# Patient Record
Sex: FEMALE | Race: BLACK OR AFRICAN AMERICAN | NOT HISPANIC OR LATINO | Employment: FULL TIME | URBAN - METROPOLITAN AREA
[De-identification: names, ages, dates, MRNs, and addresses within clinical notes are randomized per-mention and may not be internally consistent; named-entity substitution may affect disease eponyms.]

---

## 2017-12-26 PROBLEM — I80.01 SUPERFICIAL THROMBOPHLEBITIS OF RIGHT LEG: Status: ACTIVE | Noted: 2017-12-26

## 2017-12-26 PROBLEM — K21.9 GASTROESOPHAGEAL REFLUX DISEASE WITHOUT ESOPHAGITIS: Status: ACTIVE | Noted: 2017-12-26

## 2017-12-26 PROBLEM — F90.0 ATTENTION DEFICIT HYPERACTIVITY DISORDER (ADHD), PREDOMINANTLY INATTENTIVE TYPE: Status: ACTIVE | Noted: 2017-12-26

## 2017-12-26 PROBLEM — E61.1 IRON DEFICIENCY: Status: ACTIVE | Noted: 2017-12-26

## 2017-12-26 PROBLEM — E88.810 DYSMETABOLIC SYNDROME X: Status: ACTIVE | Noted: 2017-12-26

## 2017-12-26 PROBLEM — I10 ESSENTIAL HYPERTENSION, MALIGNANT: Status: ACTIVE | Noted: 2017-12-26

## 2017-12-28 ENCOUNTER — HOSPITAL ENCOUNTER (OUTPATIENT)
Dept: RADIOLOGY | Facility: HOSPITAL | Age: 42
Discharge: HOME | End: 2017-12-28
Attending: INTERNAL MEDICINE
Payer: COMMERCIAL

## 2017-12-28 DIAGNOSIS — M79.604 RIGHT LEG PAIN: ICD-10-CM

## 2017-12-28 DIAGNOSIS — I80.01 SUPERFICIAL THROMBOPHLEBITIS OF RIGHT LEG: ICD-10-CM

## 2017-12-28 PROCEDURE — 93926 LOWER EXTREMITY STUDY: CPT | Mod: 26,,, | Performed by: RADIOLOGY

## 2017-12-28 PROCEDURE — 93971 EXTREMITY STUDY: CPT | Mod: 26,,, | Performed by: RADIOLOGY

## 2017-12-28 PROCEDURE — 93971 EXTREMITY STUDY: CPT | Mod: TC,PO

## 2017-12-28 PROCEDURE — 93926 LOWER EXTREMITY STUDY: CPT | Mod: TC,PO

## 2018-06-21 PROBLEM — M54.50 CHRONIC MIDLINE LOW BACK PAIN WITHOUT SCIATICA: Status: ACTIVE | Noted: 2018-06-21

## 2018-06-21 PROBLEM — G89.29 CHRONIC MIDLINE LOW BACK PAIN WITHOUT SCIATICA: Status: ACTIVE | Noted: 2018-06-21

## 2018-06-22 ENCOUNTER — TELEPHONE (OUTPATIENT)
Dept: RADIOLOGY | Facility: HOSPITAL | Age: 43
End: 2018-06-22

## 2018-06-25 ENCOUNTER — HOSPITAL ENCOUNTER (OUTPATIENT)
Dept: RADIOLOGY | Facility: HOSPITAL | Age: 43
Discharge: HOME | End: 2018-06-25
Attending: INTERNAL MEDICINE
Payer: COMMERCIAL

## 2018-06-25 DIAGNOSIS — M25.552 HIP PAIN, ACUTE, LEFT: ICD-10-CM

## 2018-06-25 PROCEDURE — 73721 MRI JNT OF LWR EXTRE W/O DYE: CPT | Mod: 26,LT,, | Performed by: RADIOLOGY

## 2018-06-25 PROCEDURE — 73721 MRI JNT OF LWR EXTRE W/O DYE: CPT | Mod: TC,PO,LT

## 2018-07-15 PROBLEM — E78.49 OTHER HYPERLIPIDEMIA: Status: ACTIVE | Noted: 2018-07-15

## 2019-04-08 PROBLEM — N28.9 RENAL INSUFFICIENCY: Status: ACTIVE | Noted: 2019-04-08

## 2019-04-29 PROBLEM — A03.3: Status: ACTIVE | Noted: 2019-04-29

## 2020-01-14 ENCOUNTER — TELEPHONE (OUTPATIENT)
Dept: RADIOLOGY | Facility: HOSPITAL | Age: 45
End: 2020-01-14

## 2020-01-23 ENCOUNTER — TELEPHONE (OUTPATIENT)
Dept: RADIOLOGY | Facility: HOSPITAL | Age: 45
End: 2020-01-23

## 2020-01-23 PROBLEM — I82.4Y1 ACUTE DEEP VEIN THROMBOSIS (DVT) OF PROXIMAL VEIN OF RIGHT LOWER EXTREMITY: Status: ACTIVE | Noted: 2020-01-23

## 2020-01-23 PROBLEM — M54.41 RIGHT-SIDED LOW BACK PAIN WITH RIGHT-SIDED SCIATICA: Status: ACTIVE | Noted: 2020-01-23

## 2020-06-08 PROBLEM — I74.5 OCCLUSION OF RIGHT ILIAC ARTERY: Status: ACTIVE | Noted: 2020-06-08

## 2020-08-21 PROBLEM — I82.4Y1 ACUTE DEEP VEIN THROMBOSIS (DVT) OF PROXIMAL VEIN OF RIGHT LOWER EXTREMITY: Status: ACTIVE | Noted: 2020-07-23

## 2020-09-28 PROBLEM — F51.01 PRIMARY INSOMNIA: Status: ACTIVE | Noted: 2020-09-28

## 2020-09-28 PROBLEM — F41.0 PANIC DISORDER: Status: ACTIVE | Noted: 2020-09-28

## 2020-09-28 PROBLEM — F41.1 GAD (GENERALIZED ANXIETY DISORDER): Status: ACTIVE | Noted: 2020-09-28

## 2021-04-29 ENCOUNTER — PATIENT MESSAGE (OUTPATIENT)
Dept: RESEARCH | Facility: HOSPITAL | Age: 46
End: 2021-04-29

## 2021-09-16 ENCOUNTER — TELEPHONE (OUTPATIENT)
Dept: UROLOGY | Facility: CLINIC | Age: 46
End: 2021-09-16

## 2022-08-30 PROBLEM — K64.8 INTERNAL HEMORRHOID: Status: ACTIVE | Noted: 2022-08-30

## 2022-08-30 PROBLEM — I74.5 ILIAC ARTERY OCCLUSION: Status: ACTIVE | Noted: 2022-08-30

## 2022-08-30 PROBLEM — I82.4Y1 ACUTE DEEP VEIN THROMBOSIS (DVT) OF PROXIMAL VEIN OF RIGHT LOWER EXTREMITY: Status: RESOLVED | Noted: 2020-07-23 | Resolved: 2022-08-30

## 2022-08-30 PROBLEM — K21.9 GERD (GASTROESOPHAGEAL REFLUX DISEASE): Status: ACTIVE | Noted: 2022-08-30

## 2022-08-30 PROBLEM — E78.5 HYPERLIPIDEMIA: Status: ACTIVE | Noted: 2022-08-30

## 2022-08-30 PROBLEM — I80.01 SUPERFICIAL THROMBOPHLEBITIS OF RIGHT LEG: Status: RESOLVED | Noted: 2017-12-26 | Resolved: 2022-08-30

## 2023-01-11 ENCOUNTER — OFFICE VISIT (OUTPATIENT)
Dept: PRIMARY CARE CLINIC | Facility: CLINIC | Age: 48
End: 2023-01-11
Payer: COMMERCIAL

## 2023-01-11 VITALS — HEIGHT: 63 IN | WEIGHT: 224 LBS | BODY MASS INDEX: 39.69 KG/M2

## 2023-01-11 DIAGNOSIS — N28.9 RENAL INSUFFICIENCY: ICD-10-CM

## 2023-01-11 DIAGNOSIS — E78.49 OTHER HYPERLIPIDEMIA: ICD-10-CM

## 2023-01-11 DIAGNOSIS — K21.9 GASTROESOPHAGEAL REFLUX DISEASE, UNSPECIFIED WHETHER ESOPHAGITIS PRESENT: ICD-10-CM

## 2023-01-11 DIAGNOSIS — E66.01 CLASS 2 SEVERE OBESITY WITH SERIOUS COMORBIDITY AND BODY MASS INDEX (BMI) OF 39.0 TO 39.9 IN ADULT, UNSPECIFIED OBESITY TYPE: ICD-10-CM

## 2023-01-11 DIAGNOSIS — G47.00 INSOMNIA, UNSPECIFIED TYPE: ICD-10-CM

## 2023-01-11 DIAGNOSIS — E88.810 DYSMETABOLIC SYNDROME X: Primary | ICD-10-CM

## 2023-01-11 DIAGNOSIS — F41.1 GAD (GENERALIZED ANXIETY DISORDER): ICD-10-CM

## 2023-01-11 PROCEDURE — 1159F PR MEDICATION LIST DOCUMENTED IN MEDICAL RECORD: ICD-10-PCS | Mod: CPTII,95,, | Performed by: FAMILY MEDICINE

## 2023-01-11 PROCEDURE — 1160F RVW MEDS BY RX/DR IN RCRD: CPT | Mod: CPTII,95,, | Performed by: FAMILY MEDICINE

## 2023-01-11 PROCEDURE — 3008F BODY MASS INDEX DOCD: CPT | Mod: CPTII,95,, | Performed by: FAMILY MEDICINE

## 2023-01-11 PROCEDURE — 1160F PR REVIEW ALL MEDS BY PRESCRIBER/CLIN PHARMACIST DOCUMENTED: ICD-10-PCS | Mod: CPTII,95,, | Performed by: FAMILY MEDICINE

## 2023-01-11 PROCEDURE — 1159F MED LIST DOCD IN RCRD: CPT | Mod: CPTII,95,, | Performed by: FAMILY MEDICINE

## 2023-01-11 PROCEDURE — 99204 PR OFFICE/OUTPT VISIT, NEW, LEVL IV, 45-59 MIN: ICD-10-PCS | Mod: 95,,, | Performed by: FAMILY MEDICINE

## 2023-01-11 PROCEDURE — 99204 OFFICE O/P NEW MOD 45 MIN: CPT | Mod: 95,,, | Performed by: FAMILY MEDICINE

## 2023-01-11 PROCEDURE — 3008F PR BODY MASS INDEX (BMI) DOCUMENTED: ICD-10-PCS | Mod: CPTII,95,, | Performed by: FAMILY MEDICINE

## 2023-01-11 RX ORDER — PHENTERMINE HYDROCHLORIDE 8 MG/1
1 TABLET ORAL 2 TIMES DAILY PRN
Qty: 60 EACH | Refills: 0 | Status: SHIPPED | OUTPATIENT
Start: 2023-01-11

## 2023-01-11 NOTE — PROGRESS NOTES
Subjective:       Patient ID: Waldo Emmanuel is a 47 y.o. female.    Pmhx, fam hx, soc hx, surg hx, allergies, med list reviewed     Referring MD:self  PCP: Sangeetha  BMI noted 39  Diet: variable  Exercise/Activity: sedentary  Sleep: poor 4 hrs/night; some due to hx of break ins  Good neighborhood  Stressors: work  Anxiety/Depression Screen/PHQ-2: stable  Works at [employer]/NP  The patient location is: home/[place]  The chief complaint leading to consultation is: weight gain, difficulty losing    Sees Dr Henderson (hx of anemia/thrombocytosis/DVT) , Dr Vivas  NP    Pt wanted to discuss weight gain, potential lifestyle changes and med consideration    Tried trulicity and ozempic.   Is on topamax as well (migraines/has controlled). Was on metformin. No gallbladder so intolerable.   Was on Wellbutrin--did not like how it made her feel.   Pt has been on adipex in the future; made her have dry mouth.      Visit type: audiovisual    Face to Face time with patient: 45   55 minutes of total time spent on the encounter, which includes face to face time and non-face to face time preparing to see the patient (eg, review of tests), Obtaining and/or reviewing separately obtained history, Documenting clinical information in the electronic or other health record, Independently interpreting results (not separately reported) and communicating results to the patient/family/caregiver, or Care coordination (not separately reported).         Each patient to whom he or she provides medical services by telemedicine is:  (1) informed of the relationship between the physician and patient and the respective role of any other health care provider with respect to management of the patient; and (2) notified that he or she may decline to receive medical services by telemedicine and may withdraw from such care at any time.    Pt sent message asking about mounjaro vs ozempic. Pt reports lost 20 lb and gained back while still on the  medication. States has been on max dose of ozempic. Was 198 and now 223-224.       Chief Complaint: weight gain, desires weight loss    Hx of insulin resistance      Pt has been on phentermine    Cannot exercise due to arterial occlusion. On coumadin. Sees Dr. Henderson. Has INR drawn once a month. Has been on other anti coagulants (xarelto and eliquis). Does better on coumadin. Hx of R femoral art occlusion. Exercise is limited    BP controlled--pt reports doing well overall. Pt denies cp/sob    Pt getting water, not having soda.   Typically eating once a day: no rice, pasta, avoids fried foods  Bfast: either IF or latte with almond milk or smoothie  Lunch: as above  Dinner: as above    Today pt had rotisserie chicken, red beans no rice. Eats broccoli, cauliflower, fresh/frozen veggies.  No canned. Appetite variable.     NO imitrex use since on topamax--tolerating without side effects. No brain fog. No hx of renal stones.   NO hx of CV disease.    HPI  Review of Systems   Constitutional:  Negative for activity change, appetite change, fatigue and fever.   HENT:  Negative for mouth dryness and goiter.    Eyes:  Negative for visual disturbance.   Respiratory:  Negative for apnea, cough, chest tightness and shortness of breath.    Cardiovascular:  Negative for chest pain, palpitations and leg swelling.   Gastrointestinal:  Negative for abdominal pain, constipation and diarrhea.   Endocrine: Negative for cold intolerance, heat intolerance, polydipsia, polyphagia and polyuria.   Genitourinary:  Negative for frequency and menstrual problem.   Musculoskeletal:  Negative for arthralgias and myalgias.   Integumentary:  Negative for color change and rash.   Psychiatric/Behavioral:  Negative for sleep disturbance. The patient is not nervous/anxious.        Objective:      Physical Exam  Constitutional:       General: She is not in acute distress.     Appearance: Normal appearance.   HENT:      Head: Normocephalic and  "atraumatic.   Eyes:      General: No scleral icterus.  Pulmonary:      Effort: Pulmonary effort is normal. No respiratory distress.   Neurological:      Mental Status: She is alert and oriented to person, place, and time.   Psychiatric:         Mood and Affect: Mood normal.         Behavior: Behavior normal.       Assessment:         ICD-10-CM ICD-9-CM   1. Dysmetabolic syndrome X  E88.81 277.7   2. Gastroesophageal reflux disease, unspecified whether esophagitis present  K21.9 530.81   3. Other hyperlipidemia  E78.49 272.4   4. Renal insufficiency  N28.9 593.9   5. CIARA (generalized anxiety disorder)  F41.1 300.02   6. Insomnia, unspecified type  G47.00 780.52   7. Class 2 severe obesity with serious comorbidity and body mass index (BMI) of 39.0 to 39.9 in adult, unspecified obesity type  E66.01 278.01    Z68.39 V85.39            Plan:       Dysmetabolic syndrome X  Comments:  lifestyle interventions: send food log; may likely need more kcal and protein  solid fiber foods; may need BMR calc; asked pt to consider more than OMAD-IF has not been as helpful as she hoped  On statin, glp-1  Smart goal: add 1-2 veggie servings/day; low GI/with fiber  NO hypoglycemia   (Htn, waist circ self reported > 35" female,    )  Gastroesophageal reflux disease, unspecified whether esophagitis present  Comments:  chronic/stable on protonix    Other hyperlipidemia  Comments:  on statin    Renal insufficiency    CIARA (generalized anxiety disorder)    Insomnia, unspecified type  Comments:  Natural calm vitality gummies, grounding exercises  pt does not desire any rx medications/too sedating    Class 2 severe obesity with serious comorbidity and body mass index (BMI) of 39.0 to 39.9 in adult, unspecified obesity type  Comments:  on glp-1 and topamax, will add low dose phentermine  dw pt risks/benefits se's  failed metformin, intolerant of Wellbutrin  Pt to do next visit in person and I have asked her to send a set of vitals this " week  Chronic anti coag but denies CV dz; denies other stimulant use. Qsymia is cost prohibitive so will add low dose lomaira to current regimen; pt to get coupon;  reviewed  DC med and call provider if any cp/sob/elevated HR, dry mouth (if intolerable), elevated bp      Chart reviewed  DW pt sending me a food log  F/U appt made for pt

## 2023-01-11 NOTE — PATIENT INSTRUCTIONS
"Natural Vitality Calm Magnesium gummies (1-2 night)    Lomaira---got to website and get coupon    (Generic topamax + lomaira = Qsymia)          PRODUCE  [] All fresh fruit   [] All fresh vegetables   [] All fresh herbs  [] All herb purees + pastes  [] Pre-spiralized vegetable noodles   [] Steam-In-The-Bag begetables  [] Riced cauliflower  [] Jicama sticks  [] Love Beets  all varieties  [] Wholly Guacamole  all varieties  [] Hummus  all varieties, chickpea + vegetable  [] Tofu Shirataki noodles    [] Tofu  all varieties  [] Tempeh  all varieties    PROTEIN  CHICKEN   [] Boneless, skinless breasts  [] Boneless, skinless thighs  [] Ground chicken breast, at least 93% lean  [] Chicken breast cutlet  [] Aidell's  Chicken Sausage + Chicken Meatballs    TURKEY   [] Turkey breast tenderloin   [] Ground turkey breast, at least 93% lean  [] Jeet Naturals  Turkey Sausage    BEEF  [] Tenderloin  [] Sirloin  [] Top Loin  [] Flank Steak  [] Round Steak  [] Filet  [] Lean ground beef, at least 93% lean + grass-fed preferable    PORK  [] Tenderloin  [] Pork Chop  [] Center Cut  [] Jeet Naturals  No-Sugar Day    BISON  [] [place]  90 - 95% lean    SEAFOOD  [] All fresh fish + seafood; locally sourced when possible  [] Smoked salmon    HEAT + EAT ENTREES   [] Angel's Natural Foods  Chicken, Pork, Beef  [] George  "All Natural" Grilled Chicken Breast + Strips, all varieties    SAUCES SPREADS + DIPS  [] Bitchin Sauce  Original, Chipotle, Cilantro [place]  [] Wilfredo's Kitchen  Tzatziki Yogurt Dip, Babaganoush, Hummus  [] Wholly Guacamole  all varieties  [] Hummus  all varieties  [] [place] Gringo Salsa  all varieties  [] Mrs. Nu's Salsa  all varieties  [] Stubb's All Natural BBQ Sauce  [] Primal Kitchen  Maldonado, Ketchup, BBQ Sauce  [] Primal Kitchen Pasta Sauce  Roasted Garlic, Tomato Basil, no-dairy Vodka Sauce  [] Sal & Debra's  HeartSmart Pasta Sauce    DAIRY/DAIRY SUBSTITUTES/EGGS  EGGS   [] All eggs "  cage-free, pasture-raise preferable  [] Crepini  egg wraps  [] Vital Farms  Pasture-Raised Egg Bites  [] JUST Egg [vegan]     CREAMERS   [] [place]ia  Better Half, original + vanilla unsweetened  [] NutPods  all varieties    MILK   [] Horizon Organic  all varieties except chocolate  [] Organic Valley  all varieties except chocolate  [] Organic Valley  ultra-filtered, reduced fat milk     PLANT_BASED MILK ALTERNATIVES  [] All unsweetened almond milks  original, vanilla + chocolate  [] Ripple  unsweetened   [] Milkadamia  original +_ vanilla, unsweetened   [] Forager  original + vanilla, unsweetened   [] Silk Organic  soy milk, unsweetened  [] Oatly  unsweetened  [] Califia  regular + protein-fortified oat milk, unsweetened     CHEESES  [] Regular or reduced fat cheeses  [] BelGioso  Fresh Mozzarella Snack Packs, Parmesan Power-full Snack   [] Goat cheese  [] Fresh mozzarella  [] String cheese  all varieties  [] Martha Cottage Cheese  [] Jeri's Cultured Cottage Cheese  [] Martina Life 'Just Like Mozzarella'  plant-based shreds and other varieties  [] Parmela Creamery  plant-based shredded cheese    YOGURT  [] Fage  2% low-fat, plain  [] Siggi's  plain, vanilla  [] Chobani Greek  nonfat + whole milk yogurt, plain   [] Chobani Less Sugar  all flavored varieties   [] Oikos Greek  nonfat, plain  [] Two Good  all varieties   [] Irish Provisions  plain  [] [place] Organic  low-fat + nonfat, plain  [] [facility]  goat milk yogurt, plain  [] Kefit  unsweetened, plain  [] Forager  Greek style unsweetened, plain [dairy-free]  [] [place]  unsweetened Greek style, plain [dairy-free]  [] [place]  almond milk yogurt, vanilla or plain, unsweetened [dairy-free]    FREEZER SECTION  FROZEN VEGGIES  [] All plain frozen veggies + greens [e.g. broccoli, brussels, carrots, okra, mushrooms, zucchini, yellow squash, butternut squash, kale, spinach, ben greens]  [] Riced veggies [e.g. cauliflower,  broccoli, butternut squash]  [] Edamame  all varieties  [] Green Giant  [] Veggie Spirals  [] Marinated Veggies [e.g. eggplant, peppers, zucchini]  [] Simply Steam East Freedom Sprouts  [] Birds Eye  [] Power Blend Italian Style  lentils, broccoli, kale, zucchini  []  East Freedom Sprouts & Carrots  [] Oven Roasters Broccoli & Cauliflower  [] [place] Blend  [] Tattooed   [] Green Bean Blend  [] Farmer's Market Ratatouille  [] Butter Balsamic Glazed Vegetables  [] Riced Cauliflower & Quinoa Mediterranean Style  [] Nadeen's Good Life  Southern Style Greens [sauteed kale + onion]    FROZEN FRUITS  [] All unsweetened frozen fruits  all varieties  [] Dole Fruits & Veggie Blends  Berries 'n Kale  [] Dole Mix-ins  Triple Berry     FROZEN ENTREES  [] The Good Kitchen meals  all varieties [ e.g. Chili Lime Chicken Over Riced Cauliflower]  [] Premium Paleo  Not Gary Momma's Meatloaf  [] Primal Kitchen  Chicken Pesto + Steak Fajitas w/ Peppers & Onions  [] Eating Well Frozen Entrees  Butter Chicken Masala, Steak Carne Asada, Creamy Pesto Chicken, Chicken + Wild Rice Stroganoff, Yellow Bonds Chicken, Sun-dried Tomato Chicken, Chicken Lo Mein  [] Realgood Entree Bowls  Jordanian Inspired Beef Bowl over Riced Cauliflower, Chicken Burrito Bowl   [] Great Karma Coconut Bonds  [] Janae's  Tamale Nemesio with Black Beans, Vegetable Lasagna  [] Kashi Mayan Plato Bake  [] Healthy Choice  Simply Steamers Chicken Fried Rice  [] Basil Pesto Chicken & To Style Pork Power Bowls  [] Tattooed   Enchilada Bowl  [] Vega Farms  Spicy Black Bean Burgers    FROZEN PIZZAS  [] Cauli'flour Foods  Cauliflower Pizza Crusts  [] Outer Aisle  Cauliflower Crust  [] Janae's  Veggie Crust Cheese Pizza  [] Quest Pizza     VEGETARIAN PRODUCTS  [] Beyond Meat  ground 'meat' + grilled 'chicken' strips  [] Tofurkey  Original Italian Sausage + Original Tempeh  [] Gardein  Beefless Ground + Meatless Meatballs  [] Vega  Farms Grillers  Original Burger, Crumbles, Meatballs    ICE CREAMS + FROZEN DESSERTS  [] Halo Top  regular + keto series, pops  [] Rebel  ice cream + dessert sandwiches  [] Enlightened  ice cream + bars  [] Nightfood  ice cream  [] Realgood  ice cream  [] Arctic Zero Fit  frozen pint  [] The Frozen Farmer  sorbets  [] Wholly Rollies  Protein Balls, all varieties  [] Dream Pops  Coconut Latte    FROZEN BREAKFASTS  [] Realgood  Breakfast Sandwiches on Cauliflower Cheesy Bread  [] Rebel  ice cream + dessert sandwiches  [] Enlightened  ice cream + bars  [] Nightfood  ice cream  [] Realgood  ice cream  [] Arctic Zero Fit  frozen pint  [] The Frozen Farmer  sorbets  [] Wholly Rollies  Protein Balls, all varieties  [] Dream Pops  Coconut Latte    BREADS/BUNS/WRAPS  [] Everton Bread: All Types - In Freezer Section   [] Flat Out Light Wraps - All Varieties   [] Flat Out Protein Up Carb Down Flat Bread   [] Kontos Whole Wheat Pocket Nilam   [] Jamar REAL 100% Whole Wheat Tortillas   [] LaTortilla Factory Tortillas - Smart & Delicious; 50 or 80-calorie   [] Nature's Own 100% Whole Wheat Bread   [] Orowheat Healthful - 100% Whole Wheat Slice Bread and Conway Thins   [] Orowheat Healthful - Whole Wheat Nuts & Grain Bread; Flax & Seed Bread   [] [place] Farm Natural Whole Grain 15 Bread   [] [place] Farm Natural Whole Grain English Muffin - 100% Whole Wheat   [] [place] Very Thin 100% Whole Wheat   [] Rina Hoyt 45 Calories and Delightful   [] Yusuf' 100% Whole Wheat Thin-Sliced Bagels and English Muffins   [] Western Bagel: Perfect 10     GLUTEN FREE  [] Fransico's Gluten Free Bread   [] Florence Bakehouse 7-Grain Gluten Free Bread     LEGUME PASTA   [] Explore Asian Organic Black Bean Spaghetti   [] Modern Table   [] Tolerant Foods       NUT BUTTERS & JELLIES    NUT BUTTERS   [] Better'n Chocolate: Coconut Chocolate Peanut Butter Spread   [] Better'n Peanut Butter - All Types   [] Earth Balance Coconut  and Peanut Spread   [] Kendell's Nut Thendara   [] MaraNatha: All Natural Roasted Cashew Butter - Leonore or Creamy   [] MaraNatha: Roasted Peanut Butter   [] Nuts 'N More Peanut Thendara - All Flavors   [] PB2 Powder - Original or Chocolate   [] Skippy Natural - Creamy, Super Chunk   [] Smart Balance Peanut Butter - Leonore or Creamy   [] Peanut Butter & Company:   [] Smooth , Crunch Time, The Heat Is On, Old Fashioned Smooth, Mighty Nut- Powdered Peanut Butter, Squeeze Pack   [] Smucker's Natural Peanut Butter - Leonore or Creamy   [] Sunbutter Nut Butter   [] Wild Friends Protein Peanut Butter/Fajardo o Butter - Vanilla or Chocolate     JELLIES  o Polaner's All Fruit   o Clearly Organic Best Choice: Strawberry Fruit Spread       SNACKS    BARS  [] Kashi Bars - Chewy or Crunchy; Honey Fajardo o Flax or Peanut Butter   [] KIND Bars - 5 Grams of Sugar or Less   [] KIND Protein Bars - Strong and KIND   [] Nature Valley Protein Bar - All Varieties   [] Nature Valley Roasted Nut Crunch - Fajardo Crunch; Peanut Crunch   [] [facility] Valley Simple Nut Bar - Roasted Peanut & Honey   [] [facility] Valley Simple Nut Bar - Fajardo, Cashew & Sea Salt   [] [facility] Nut Sierra Bar - Salted Caramel Peanut   [] Think Thin Protein Bars   [] Quest Bars, Power Crunch Bars, Pure Protein Bars     BEEF JERKY - NITRATE FREE   [] Game On   [] Grass Run Farms   [] Krave   [] Ostrim   [] Perky Jerky   [] Primal Strips Meatless Vegan Jerky   [] [place]     CHIPS   [] Beanitos Chips   [] Fruit Crisps - e.g. Brother's-All-Natural, Bare Fruit, Yoga Chips   [] Kellee's Soy Crisps: 1.3 ounce bag   [] Quest Protein Chips   [] Wasa Whole Wheat Crisp Bread     CRACKERS  [] Vianey's Gone Crackers   [] Nabisco Triscuit: Regular and Thin Crisp Crackers   [] Vans Say Cheese Crackers (G-F)     POPCORN/NUTS   [] Owen Sears's Smart Pop Popcorn - Single Serving   [] 100-Calorie Pack of Nuts - All Varieties     PROTEIN POWDERS & DRINKS  []   Protein -  Whey Protein Powder   [] Garden of Life Raw Protein Powder   [] Iconic Ready-To-Drink Protein Drink   [] Kuo One Protein Powder   [] VegaSport Protein Powder     SALSA/HUMMUS/DIPS   [] Eat Well Embrace Life: Max Briannebernardjacklyn Carrot o Hummus   [] Pre-Portioned Guacamole Packs   [] Keily's   [] Tostitos Restaurant Style Salsa       SOUPS   [] Janae's Organic Soups - Lentil, Vegetable, Split Pea, Low-Sodium     CANNED GOODS   [] 100% Pure Pumpkin   [] BlueRunner Creole Cream-Style Red Beans or Navy Beans   [] Cajun Power Chicken Gumbo Base   [] Chicken of the Sea Agricola Salter Path   [] Gayathri Fresh Cut Sliced Beets   [] Hormel Breast of Chicken in Water   [] LeSuer Tender Baby Whole Carrots   [] Amandalmeera Tabasco [place] Starter   [] Bennettist: Chunk Lite Tuna in Water, Gourmet Select Pouches   [] StarKist: Yellowfin Tuna Fillets   [] Trappey's: Kidney, Butter, Erwin, Black Eye, Field, and Black Beans   [] BRITTANI Brower's Turnip Greens or Broderick Spinach     CONDIMENTS/ SAUCES/SPREADS/ SPICES  [] Piyush Soto's Magic Seasonings - Regular or Salt Free   [] Shweta Faith's Sauces - All Flavors   [] Laughing Cow Light - All Flavors   [] Dash Salt-Free Marinade - All Flavors   [] Eliot & Debra's: Heart Smart Pasta Sauces   [] Tabasco     SALAD DRESSINGS  [] Stephanie's Naturals: Lite Honey Mustard   [] Segundo's Own: Lighten Up Salad Dressing - All Varieties   [] OPA Greek Yogurt Dressings - Ranch, Blue o Cheese, Caesar, Feta Dill     SWEETENERS  [] Sweet [place] Sweetener   [] Swerve   [] Truvia     BEVERAGES  [] Coconut Water   [] Crystal Light PURE - All Flavors   [] Honest Tea: Just Green Tea, Unsweetened   [] Kombucha Tea   [] [place]   [] [place] Sisters Bloody Vianey Mix   [] Metromint - Zero-Sugar; All Natural Flavored   [] Connie - Plain or Flavored   [] David Crenshaw   [] Terranceaz - Zero-Sugar, All-Natural, Sparkling Tea   [] Tea Bags: Any Brand - e.g. Ceci, Yogi, Tazzo, Celestial   [] V8 100% Vegetable Juice    [] Vitamin Water Zero   [] Water   [] Zevia - Stevia Sweetened Soft Drink     BEER/KATIE/LIQUORS  []Tesfaye's Premier Light 64 Calories   [] Bud Select - 55 Calories   [] [facility] Bloody Vianey Mix   [] Blank Genuine Draft - 64 Calories   [] Red or White Wine - All Varieties     CEREALS: HOT/COLD   [] Gaby's Puffin's Original Cereal  [] Matilda's Mill Oat Bran Hot Cereal - Cracked Wheat, Multi-Grain  [] Kashi GoLean Cereal  [] Kashi GoLean Hot Cereal packets - Vanilla; Honey Cinnamon  [] Radha's Special K Protein Cereal  [] Jax's Steel Cut Korean Oatmeal  [] Nature's Path Smart Bran  [] Latter-day Instant Oatmeal packet, Original  [] Latter-day Old Fashioned Latter-day Oats  [] Uncle Derik's Whole Wheat & Flaxseed Original Cereal

## 2023-03-13 PROBLEM — E78.1 HYPERTRIGLYCERIDEMIA: Status: ACTIVE | Noted: 2023-03-13

## 2023-07-10 PROBLEM — A03.3: Status: RESOLVED | Noted: 2019-04-29 | Resolved: 2023-07-10

## 2024-11-18 ENCOUNTER — HOSPITAL ENCOUNTER (EMERGENCY)
Facility: HOSPITAL | Age: 49
Discharge: HOME | End: 2024-11-18
Attending: EMERGENCY MEDICINE
Payer: COMMERCIAL

## 2024-11-18 VITALS
BODY MASS INDEX: 41.46 KG/M2 | WEIGHT: 234 LBS | TEMPERATURE: 98 F | HEART RATE: 89 BPM | HEIGHT: 63 IN | DIASTOLIC BLOOD PRESSURE: 88 MMHG | OXYGEN SATURATION: 97 % | RESPIRATION RATE: 20 BRPM | SYSTOLIC BLOOD PRESSURE: 161 MMHG

## 2024-11-18 DIAGNOSIS — Z86.718 HISTORY OF DVT OF LOWER EXTREMITY: ICD-10-CM

## 2024-11-18 DIAGNOSIS — M79.604 RIGHT LEG PAIN: Primary | ICD-10-CM

## 2024-11-18 DIAGNOSIS — I74.09 CHRONIC AORTO-ILIAC OCCLUSION SYNDROME: ICD-10-CM

## 2024-11-18 DIAGNOSIS — M79.651 RIGHT THIGH PAIN: ICD-10-CM

## 2024-11-18 DIAGNOSIS — M25.452 SWELLING OF JOINT OF PELVIC REGION OR THIGH, LEFT: ICD-10-CM

## 2024-11-18 DIAGNOSIS — R07.9 CHEST PAIN: ICD-10-CM

## 2024-11-18 LAB
ALBUMIN SERPL BCP-MCNC: 3.6 G/DL (ref 3.5–5.2)
ALP SERPL-CCNC: 92 U/L (ref 40–150)
ALT SERPL W/O P-5'-P-CCNC: 23 U/L (ref 10–44)
ANION GAP SERPL CALC-SCNC: 8 MMOL/L (ref 8–16)
ANISOCYTOSIS BLD QL SMEAR: SLIGHT
APTT PPP: 25.7 SEC (ref 21–32)
AST SERPL-CCNC: 28 U/L (ref 10–40)
BASOPHILS # BLD AUTO: 0.02 K/UL (ref 0–0.2)
BASOPHILS NFR BLD: 0.3 % (ref 0–1.9)
BILIRUB SERPL-MCNC: 0.3 MG/DL (ref 0.1–1)
BILIRUB UR QL STRIP: NEGATIVE
BNP SERPL-MCNC: 13 PG/ML (ref 0–99)
BUN SERPL-MCNC: 16 MG/DL (ref 6–20)
CALCIUM SERPL-MCNC: 9.4 MG/DL (ref 8.7–10.5)
CHLORIDE SERPL-SCNC: 107 MMOL/L (ref 95–110)
CLARITY UR: CLEAR
CO2 SERPL-SCNC: 25 MMOL/L (ref 23–29)
COLOR UR: YELLOW
CREAT SERPL-MCNC: 0.8 MG/DL (ref 0.5–1.4)
DACRYOCYTES BLD QL SMEAR: ABNORMAL
DIFFERENTIAL METHOD BLD: ABNORMAL
EOSINOPHIL # BLD AUTO: 0.1 K/UL (ref 0–0.5)
EOSINOPHIL NFR BLD: 1.3 % (ref 0–8)
ERYTHROCYTE [DISTWIDTH] IN BLOOD BY AUTOMATED COUNT: 13.7 % (ref 11.5–14.5)
EST. GFR  (NO RACE VARIABLE): >60 ML/MIN/1.73 M^2
GLUCOSE SERPL-MCNC: 98 MG/DL (ref 70–110)
GLUCOSE UR QL STRIP: NEGATIVE
HCT VFR BLD AUTO: 36.4 % (ref 37–48.5)
HGB BLD-MCNC: 11.3 G/DL (ref 12–16)
HGB UR QL STRIP: NEGATIVE
HYPOCHROMIA BLD QL SMEAR: ABNORMAL
IMM GRANULOCYTES # BLD AUTO: 0.02 K/UL (ref 0–0.04)
IMM GRANULOCYTES NFR BLD AUTO: 0.3 % (ref 0–0.5)
INR PPP: 1.7 (ref 0.8–1.2)
KETONES UR QL STRIP: NEGATIVE
LEUKOCYTE ESTERASE UR QL STRIP: NEGATIVE
LYMPHOCYTES # BLD AUTO: 3.9 K/UL (ref 1–4.8)
LYMPHOCYTES NFR BLD: 54.4 % (ref 18–48)
MCH RBC QN AUTO: 27.9 PG (ref 27–31)
MCHC RBC AUTO-ENTMCNC: 31 G/DL (ref 32–36)
MCV RBC AUTO: 90 FL (ref 82–98)
MONOCYTES # BLD AUTO: 0.3 K/UL (ref 0.3–1)
MONOCYTES NFR BLD: 4.3 % (ref 4–15)
NEUTROPHILS # BLD AUTO: 2.8 K/UL (ref 1.8–7.7)
NEUTROPHILS NFR BLD: 39.4 % (ref 38–73)
NITRITE UR QL STRIP: NEGATIVE
NRBC BLD-RTO: 0 /100 WBC
PH UR STRIP: 7 [PH] (ref 5–8)
PLATELET # BLD AUTO: 165 K/UL (ref 150–450)
PLATELET BLD QL SMEAR: ABNORMAL
PMV BLD AUTO: 10 FL (ref 9.2–12.9)
POTASSIUM SERPL-SCNC: 4.3 MMOL/L (ref 3.5–5.1)
PROT SERPL-MCNC: 7.7 G/DL (ref 6–8.4)
PROT UR QL STRIP: NEGATIVE
PROTHROMBIN TIME: 19.3 SEC (ref 9–12.5)
RBC # BLD AUTO: 4.05 M/UL (ref 4–5.4)
SODIUM SERPL-SCNC: 140 MMOL/L (ref 136–145)
SP GR UR STRIP: 1.02 (ref 1–1.03)
TROPONIN I SERPL DL<=0.01 NG/ML-MCNC: 0.01 NG/ML (ref 0–0.03)
URN SPEC COLLECT METH UR: NORMAL
UROBILINOGEN UR STRIP-ACNC: NEGATIVE EU/DL
WBC # BLD AUTO: 7.13 K/UL (ref 3.9–12.7)

## 2024-11-18 PROCEDURE — 25500020 PHARM REV CODE 255: Performed by: EMERGENCY MEDICINE

## 2024-11-18 PROCEDURE — 85610 PROTHROMBIN TIME: CPT | Performed by: EMERGENCY MEDICINE

## 2024-11-18 PROCEDURE — 80053 COMPREHEN METABOLIC PANEL: CPT | Performed by: EMERGENCY MEDICINE

## 2024-11-18 PROCEDURE — 85730 THROMBOPLASTIN TIME PARTIAL: CPT | Performed by: EMERGENCY MEDICINE

## 2024-11-18 PROCEDURE — 84484 ASSAY OF TROPONIN QUANT: CPT | Performed by: EMERGENCY MEDICINE

## 2024-11-18 PROCEDURE — 99285 EMERGENCY DEPT VISIT HI MDM: CPT | Mod: 25

## 2024-11-18 PROCEDURE — 93005 ELECTROCARDIOGRAM TRACING: CPT

## 2024-11-18 PROCEDURE — 93010 ELECTROCARDIOGRAM REPORT: CPT | Mod: ,,, | Performed by: STUDENT IN AN ORGANIZED HEALTH CARE EDUCATION/TRAINING PROGRAM

## 2024-11-18 PROCEDURE — 81003 URINALYSIS AUTO W/O SCOPE: CPT | Performed by: EMERGENCY MEDICINE

## 2024-11-18 PROCEDURE — 85025 COMPLETE CBC W/AUTO DIFF WBC: CPT | Performed by: EMERGENCY MEDICINE

## 2024-11-18 PROCEDURE — 83880 ASSAY OF NATRIURETIC PEPTIDE: CPT | Performed by: EMERGENCY MEDICINE

## 2024-11-18 RX ORDER — HYDROCODONE BITARTRATE AND ACETAMINOPHEN 7.5; 325 MG/1; MG/1
1 TABLET ORAL EVERY 6 HOURS PRN
Qty: 15 TABLET | Refills: 0 | Status: SHIPPED | OUTPATIENT
Start: 2024-11-18

## 2024-11-18 RX ADMIN — IOHEXOL 100 ML: 350 INJECTION, SOLUTION INTRAVENOUS at 08:11

## 2024-11-18 NOTE — DISCHARGE INSTRUCTIONS
Take norco as directed for thigh pain     Continue coumadin as previous     Your case was discussed with Dr Zendejas vascular surgeon on call after reviewing records you have chronic aorto iliac disease of the right side as well as history of a dvt on the right    Scans and ultarsounds done here today show no acute change in chronic occlusion of right aorto iliac system with resolution of prior dvt in right leg

## 2024-11-18 NOTE — ED PROVIDER NOTES
SCRIBE #1 NOTE: I, Kaylan Calhoun, am scribing for, and in the presence of, Megan Barton MD. I have scribed the HPI, ROS, and PEx.     SCRIBE #2 NOTE: I, Anju Harry, am scribing for, and in the presence of,  Arturo Goldberg MD. I have scribed the remaining portions of the note not scribed by Scribe #1.      History     Chief Complaint   Patient presents with    Leg Swelling     Pt report swelling and warmth to right thigh that she first noticed at 0130 this morning when she went to the bathroom.  Pt noted she has been limping since Saturday but denies any injury or fall.  -N/-V/-D. 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      Review of patient's allergies indicates:   Allergen Reactions    Metformin Diarrhea     Other reaction(s): Diarrhae         History of Present Illness     [facility]    11/18/2024, 4:46 AM  History obtained from the patient      History of Present Illness: Waldo Emmanuel is a 49 y.o. female patient with a PMHx of GERD, HTN, Anemia, ADHD, and R Arterial Occlusion who presents to the Emergency Department for evaluation of R thigh swelling which she first noticed this morning at 1:30 am. Symptoms are constant and mild in severity. No mitigating or exacerbating factors reported. Associated sxs include L back pain, Chest pain, and limping (past 2 days). Pt denies any injury or fall. Patient denies any fever, SOB, and all other sxs at this time. No prior Tx reported. No further complaints or concerns at this time.       Arrival mode: Personal vehicle    PCP: Ansley Vivas MD        Past Medical History:  Past Medical History:   Diagnosis Date    ADHD (attention deficit hyperactivity disorder)     Anemia     Fibroids     Genital herpes     GERD (gastroesophageal reflux disease)     H/O total hysterectomy 08/03/2021    Hypertension     Labral tear of hip joint     Ovarian cyst     Right common arterial occlusion     Routine general medical examination at  a health care facility 2017    Total Right Arterial Occlusion        Past Surgical History:  Past Surgical History:   Procedure Laterality Date    CARPAL TUNNEL RELEASE       SECTION      CHOLECYSTECTOMY      DILATION AND CURETTAGE OF UTERUS      HYSTERECTOMY  2021    TOTAL ABDOMINAL HYSTERECTOMY W/ BILATERAL SALPINGOOPHORECTOMY           Family History:  Family History   Problem Relation Name Age of Onset    Hypertension Father      Stroke Father      Hypertension Maternal Grandmother      Stroke Maternal Grandmother      Stroke Mother         Social History:  Social History     Tobacco Use    Smoking status: Never    Smokeless tobacco: Never   Substance and Sexual Activity    Alcohol use: Yes     Comment: Social - Rare     Drug use: No    Sexual activity: Not Currently     Partners: Male     Birth control/protection: See Surgical Hx        Review of Systems     Review of Systems   Constitutional:  Negative for fever.        (+) Right sided Limp   Respiratory:  Negative for shortness of breath.    Musculoskeletal:  Positive for back pain (L).        Physical Exam     Initial Vitals [24 0318]   BP Pulse Resp Temp SpO2   (!) 171/86 94 18 98 °F (36.7 °C) 97 %      MAP       --          Physical Exam  Nursing Notes and Vital Signs Reviewed.  Constitutional: Patient is in no acute distress. Well-developed. Obese.  Head: Atraumatic. Normocephalic.  Eyes: PERRL. EOM intact. Conjunctivae are not pale. No scleral icterus.  ENT: Mucous membranes are moist. Oropharynx is clear and symmetric.    Neck: Supple. Full ROM. No lymphadenopathy.  Cardiovascular: Regular rate. Regular rhythm. No murmurs, rubs, or gallops. Distal pulses are 2+ and symmetric.  Pulmonary/Chest: No respiratory distress. Clear to auscultation bilaterally. No wheezing or rales.  Abdominal: Soft and non-distended.  There is no tenderness.  No rebound, guarding, or rigidity. Good bowel sounds.  Genitourinary: No CVA  "tenderness  Musculoskeletal: Moves all extremities. No obvious deformities. No edema. No calf tenderness.  Skin: Warm and dry.  Neurological:  Alert, awake, and appropriate.  Normal speech.  No acute focal neurological deficits are appreciated.  Psychiatric: Normal affect. Good eye contact. Appropriate in content.     ED Course   Procedures  ED Vital Signs:  Vitals:    11/18/24 0318 11/18/24 0333 11/18/24 0400 11/18/24 0610   BP: (!) 171/86 (!) 173/83 (!) 165/85 (!) 143/81   Pulse: 94 99 88 88   Resp: 18   20   Temp: 98 °F (36.7 °C)      TempSrc: Oral      SpO2: 97% 100% 96% 97%   Weight: 106.1 kg (234 lb)      Height: 5' 3" (1.6 m)       11/18/24 0631 11/18/24 0705 11/18/24 0739 11/18/24 0830   BP: 138/78 (!) 145/79  (!) 147/86   Pulse: 91 96 90 91   Resp: 20 20  20   Temp:       TempSrc:       SpO2: 97% 97%  97%   Weight:       Height:        11/18/24 0930 11/18/24 1015 11/18/24 1109   BP: (!) 141/76 (!) 150/86 (!) 161/88   Pulse: 86 86 89   Resp: 18 18 20   Temp:      TempSrc:      SpO2: 97% 99% 97%   Weight:      Height:          Abnormal Lab Results:  Labs Reviewed   CBC W/ AUTO DIFFERENTIAL - Abnormal       Result Value    WBC 7.13      RBC 4.05      Hemoglobin 11.3 (*)     Hematocrit 36.4 (*)     MCV 90      MCH 27.9      MCHC 31.0 (*)     RDW 13.7      Platelets 165      MPV 10.0      Immature Granulocytes 0.3      Gran # (ANC) 2.8      Immature Grans (Abs) 0.02      Lymph # 3.9      Mono # 0.3      Eos # 0.1      Baso # 0.02      nRBC 0      Gran % 39.4      Lymph % 54.4 (*)     Mono % 4.3      Eosinophil % 1.3      Basophil % 0.3      Platelet Estimate Appears normal      Aniso Slight      Hypo Occasional      Tear Drop Cells Occasional      Differential Method Automated     PROTIME-INR - Abnormal    Prothrombin Time 19.3 (*)     INR 1.7 (*)    COMPREHENSIVE METABOLIC PANEL    Sodium 140      Potassium 4.3      Chloride 107      CO2 25      Glucose 98      BUN 16      Creatinine 0.8      Calcium 9.4      " Total Protein 7.7      Albumin 3.6      Total Bilirubin 0.3      Alkaline Phosphatase 92      AST 28      ALT 23      eGFR >60      Anion Gap 8     TROPONIN I    Troponin I 0.010     APTT    aPTT 25.7     B-TYPE NATRIURETIC PEPTIDE    BNP 13     URINALYSIS, REFLEX TO URINE CULTURE    Specimen UA Urine, Clean Catch      Color, UA Yellow      Appearance, UA Clear      pH, UA 7.0      Specific Gravity, UA 1.025      Protein, UA Negative      Glucose, UA Negative      Ketones, UA Negative      Bilirubin (UA) Negative      Occult Blood UA Negative      Nitrite, UA Negative      Urobilinogen, UA Negative      Leukocytes, UA Negative      Narrative:     Specimen Source->Urine        All Lab Results:  Results for orders placed or performed during the hospital encounter of 11/18/24   CBC auto differential    Collection Time: 11/18/24  5:17 AM   Result Value Ref Range    WBC 7.13 3.90 - 12.70 K/uL    RBC 4.05 4.00 - 5.40 M/uL    Hemoglobin 11.3 (L) 12.0 - 16.0 g/dL    Hematocrit 36.4 (L) 37.0 - 48.5 %    MCV 90 82 - 98 fL    MCH 27.9 27.0 - 31.0 pg    MCHC 31.0 (L) 32.0 - 36.0 g/dL    RDW 13.7 11.5 - 14.5 %    Platelets 165 150 - 450 K/uL    MPV 10.0 9.2 - 12.9 fL    Immature Granulocytes 0.3 0.0 - 0.5 %    Gran # (ANC) 2.8 1.8 - 7.7 K/uL    Immature Grans (Abs) 0.02 0.00 - 0.04 K/uL    Lymph # 3.9 1.0 - 4.8 K/uL    Mono # 0.3 0.3 - 1.0 K/uL    Eos # 0.1 0.0 - 0.5 K/uL    Baso # 0.02 0.00 - 0.20 K/uL    nRBC 0 0 /100 WBC    Gran % 39.4 38.0 - 73.0 %    Lymph % 54.4 (H) 18.0 - 48.0 %    Mono % 4.3 4.0 - 15.0 %    Eosinophil % 1.3 0.0 - 8.0 %    Basophil % 0.3 0.0 - 1.9 %    Platelet Estimate Appears normal     Aniso Slight     Hypo Occasional     Tear Drop Cells Occasional     Differential Method Automated    Protime-INR    Collection Time: 11/18/24  5:17 AM   Result Value Ref Range    Prothrombin Time 19.3 (H) 9.0 - 12.5 sec    INR 1.7 (H) 0.8 - 1.2   APTT    Collection Time: 11/18/24  5:17 AM   Result Value Ref Range    aPTT  25.7 21.0 - 32.0 sec   Brain natriuretic peptide    Collection Time: 11/18/24  5:17 AM   Result Value Ref Range    BNP 13 0 - 99 pg/mL   Comprehensive metabolic panel    Collection Time: 11/18/24  5:55 AM   Result Value Ref Range    Sodium 140 136 - 145 mmol/L    Potassium 4.3 3.5 - 5.1 mmol/L    Chloride 107 95 - 110 mmol/L    CO2 25 23 - 29 mmol/L    Glucose 98 70 - 110 mg/dL    BUN 16 6 - 20 mg/dL    Creatinine 0.8 0.5 - 1.4 mg/dL    Calcium 9.4 8.7 - 10.5 mg/dL    Total Protein 7.7 6.0 - 8.4 g/dL    Albumin 3.6 3.5 - 5.2 g/dL    Total Bilirubin 0.3 0.1 - 1.0 mg/dL    Alkaline Phosphatase 92 40 - 150 U/L    AST 28 10 - 40 U/L    ALT 23 10 - 44 U/L    eGFR >60 >60 mL/min/1.73 m^2    Anion Gap 8 8 - 16 mmol/L   Troponin I    Collection Time: 11/18/24  5:55 AM   Result Value Ref Range    Troponin I 0.010 0.000 - 0.026 ng/mL   EKG 12-lead    Collection Time: 11/18/24  6:01 AM   Result Value Ref Range    QRS Duration 82 ms    [facility] QTC Calculation 464 ms   Urinalysis, Reflex to Urine Culture Urine, Clean Catch    Collection Time: 11/18/24  7:39 AM    Specimen: Urine   Result Value Ref Range    Specimen UA Urine, Clean Catch     Color, UA Yellow Yellow, Straw, Stephanie    Appearance, UA Clear Clear    pH, UA 7.0 5.0 - 8.0    Specific Gravity, UA 1.025 1.005 - 1.030    Protein, UA Negative Negative    Glucose, UA Negative Negative    Ketones, UA Negative Negative    Bilirubin (UA) Negative Negative    Occult Blood UA Negative Negative    Nitrite, UA Negative Negative    Urobilinogen, UA Negative <2.0 EU/dL    Leukocytes, UA Negative Negative         Imaging Results:  Imaging Results              CTA Abdomen and Pelvis (Final result)  Result time 11/18/24 09:45:30      Final result by Kameron Marroquin MD (11/18/24 09:45:30)                   Impression:      Severe stricture seen involving the right common iliac artery with trickle of flow extending into the right internal iliac artery which appears patent.  Occlusion of the  right external iliac artery and common femoral artery with recanalization via collaterals to the right superficial femoral artery which appears patent. Right-sided profundus femoris is patent.  Collaterals to the right lower extremity are from lumbar and hypogastric branches as well as from the right inferior epigastric and circumflex iliac arteries.    All CT scans at this facility use dose modulation, iterative reconstruction, and/or weight based dosing when appropriate to reduce radiation dose to as low as reasonable achievable.      Electronically signed by: Kameron Marroquin MD  Date:    11/18/2024  Time:    09:45               Narrative:    EXAMINATION:  CTA ABDOMEN AND PELVIS    CLINICAL HISTORY:  evaluate right leg pain swelling history of right aorto iliac occlusion;    TECHNIQUE:  Using 100 cc of  Omnipaque 350 IV, and multi-detector helical CT technique, axial CT angiogram images of the abdomen were obtained from the lung bases through the pelvis. Precontrast and portal venous phase images of the abdomen and pelvis also done. 2D post-processing coronal and sagittal reconstructions of the abdominal aorta and visceral arteries performed.    COMPARISON:  11/18/2024, CTA report from 06/30/2020 (outside study)    FINDINGS:  CTA: Mild atherosclerotic disease of the aorta.  No aneurysm.  Celiac artery, superior mesenteric artery, inferior mesenteric artery are all widely patent.  Single right and 2 left renal arteries are widely patent.  Incidental replaced hepatic artery off the SMA    Left common iliac artery, left internal iliac artery, left external iliac artery, left common femoral artery and visualized portions of the left superficial femoral artery and profundus femoris are widely patent.    Severe stricture seen involving the right common iliac artery with flow extending into the right internal iliac artery which appears patent.  Occlusion of the right external iliac artery and common femoral artery with  recanalization via collaterals to the right superficial femoral artery which appears patent.  Right-sided profundus femoris is patent.  Collaterals to the right lower extremity are from lumbar and hypogastric branches as well as from the right inferior epigastric and circumflex iliac arteries.    CT abdomen pelvis:    The lung bases are unremarkable.  There is no pleural fluid present.  The visualized portions of the heart appear normal.  Trace pericardial effusion    The liver is normal in size and attenuation with no focal hepatic abnormality.  Post cholecystectomy.  There is no intra-or extrahepatic biliary ductal dilatation.    The spleen, pancreas, and adrenal glands are unremarkable.    The kidneys are normal in size and location and concentrate and excrete contrast properly on delayed imaging.  There is no evidence of hydronephrosis.  The ureters appear normal in course and caliber without evidence of ureteral dilatation. The urinary bladder demonstrates no significant abnormality.  Post hysterectomy.    Stomach is unremarkable.  The visualized loops of small bowel show no evidence of obstruction or inflammation.  Large bowel demonstrates mild constipation.  Appendix is normal.    There is no ascites, free fluid, or intraperitoneal free air.    There is no evidence of lymph node enlargement in the abdomen or pelvis.    When viewed with bone windows the osseous structures are unremarkable.  Mild degenerative change.                                       US Lower Extremity Arteries Right (Final result)  Result time 11/18/24 07:22:47      Final result by Kameron Marroquin MD (11/18/24 07:22:47)                   Impression:      Findings are suggestive of aorta iliac disease.  CTA or conventional angiography.      Electronically signed by: Kameron Marroquin MD  Date:    11/18/2024  Time:    07:22               Narrative:    EXAMINATION:  US LOWER EXTREMITY ARTERIES RIGHT    CLINICAL HISTORY:  Effusion, left  hip    TECHNIQUE:  Right lower extremity arterial duplex ultrasound examination performed. Multiple gray scale and color doppler images were obtained in addition to waveform analysis.    COMPARISON:  12/28/2017    FINDINGS:  Overall decreased velocities throughout the right lower extremity with monophasic waveforms.  Findings are suggestive of aorta iliac disease.  CTA or conventional angiography.                                       US Lower Extremity Veins Right (Final result)  Result time 11/18/24 06:20:05      Final result by Mich Olivas MD (11/18/24 06:20:05)                   Impression:      No evidence of deep venous thrombosis in the right lower extremity.      Electronically signed by: Mich Olivas  Date:    11/18/2024  Time:    06:20               Narrative:    EXAMINATION:  US LOWER EXTREMITY VEINS RIGHT    CLINICAL HISTORY:  Pain in right thigh    TECHNIQUE:  Duplex and color flow Doppler evaluation and graded compression of the right lower extremity veins was performed.    COMPARISON:  None    FINDINGS:  Right thigh veins: The common femoral, femoral, popliteal, upper greater saphenous, and deep femoral veins are patent and free of thrombus. The veins are normally compressible and have normal phasic flow and augmentation response.    Right calf veins: The visualized calf veins are patent.    Contralateral CFV: The contralateral (left) common femoral vein is patent and free of thrombus.    Miscellaneous: None                                       The EKG was ordered, reviewed, and independently interpreted by the ED provider.  Interpretation time: 06:01  Rate: 85 BPM  Rhythm: normal sinus rhythm  Interpretation: No acute ST changes. No STEMI.           The Emergency Provider reviewed the vital signs and test results, which are outlined above.     ED Discussion       6:00 AM: Dr. Barton transfers care of patient to Dr. Goldberg pending imaging results.    11:07 AM: Discussed pt's case with   Aashish (Vascular Surgery) who reports the pt has chronic right aortoiliac occlusive disease and has a PMHx of a DVT in the right lower extremity, that has resolved. Dr. Zendejas sees no acute indication for admission. Dr. Zendejas advises the pt is evaluated by Dr. Aguilar with vascular surgery at [facility]. He also advises the pt continue Coumadin.    11:07 AM: Reassessed pt at this time. Discussed with pt all pertinent ED information and results. Discussed pt dx and plan of tx. Gave pt all f/u and return to the ED instructions. All questions and concerns were addressed at this time. Pt expresses understanding of information and instructions, and is comfortable with plan to discharge. Pt is stable for discharge.    I discussed with patient and/or family/caretaker that evaluation in the ED does not suggest any emergent or life threatening medical conditions requiring immediate intervention beyond what was provided in the ED, and I believe patient is safe for discharge.  Regardless, an unremarkable evaluation in the ED does not preclude the development or presence of a serious of life threatening condition. As such, patient was instructed to return immediately for any worsening or change in current symptoms.         Medical Decision Making  Pt here with complaint right leg pain and swelling for the past few days pt with hx of right aortoiliac vascular disease as well as right LE DVT per chart review. Initial vascular ultasounds done to evaluate right leg pain - showed no evidence of DVT . CTA of abdomen and pelvis done which showed chornic right aortoiliac disease unchanged from prior study per vascular surgoen Dr Zendejas who reviewed pts prior notes from [facility] - Dr Zendejas advised pt follow up as outpatient with vascular surgery at the [facility] and continue her coumadin as previoulsy directed      Amount and/or Complexity of Data Reviewed  External Data Reviewed: notes.     Details: Prior notes from [facility] and here in the past  reviewed   Labs: ordered. Decision-making details documented in ED Course.  Radiology: ordered. Decision-making details documented in ED Course.  ECG/medicine tests: ordered and independent interpretation performed. Decision-making details documented in ED Course.    Risk  Prescription drug management.                ED Medication(s):  Medications   iohexoL (OMNIPAQUE 350) injection 100 mL (100 mLs Intravenous Given 11/18/24 0858)       New Prescriptions    No medications on file        Follow-up Information       Schedule an appointment as soon as possible for a visit  with Xiomy Quinn MD.    Specialty: Cardiothoracic Surgery  Why: you should be contacted regarding an appt to see Vascular surgeon Dr Quinn next monday at the [facility]- call his office if you have not been contacted by wednesday from his office  Contact information:  [address]  [place] [address]  [phone]                                 Scribe Attestation:   Scribe #1: I performed the above scribed service and the documentation accurately describes the services I performed. I attest to the accuracy of the note.     Attending:   Physician Attestation Statement for Scribe #1: I, Megan Barton MD, personally performed the services described in this documentation, as scribed by Kaylan Calhoun, in my presence, and it is both accurate and complete.       Scribe Attestation:   Scribe #2: I performed the above scribed service and the documentation accurately describes the services I performed. I attest to the accuracy of the note.    Attending Attestation:           Physician Attestation for Scribe:    Physician Attestation Statement for Scribe #2: I, Arturo Goldberg MD, reviewed documentation, as scribed by Anju Harry in my presence, and it is both accurate and complete. I also acknowledge and confirm the content of the note done by Levibe #1.           Clinical Impression       ICD-10-CM ICD-9-CM   1. Right leg pain  M79.604 729.5    2. Swelling of joint of pelvic region or thigh, left  M25.452 719.05   3. Chest pain  R07.9 786.50   4. Right thigh pain  M79.651 729.5   5. Chronic aorto-iliac occlusion syndrome  I74.09 444.09   6. History of DVT of lower extremity  Z86.718 V12.51       Disposition:   Disposition: Discharged  Condition: Stable         Arturo Goldberg Jr., MD  11/18/24 6227

## 2024-11-19 ENCOUNTER — PATIENT MESSAGE (OUTPATIENT)
Dept: OTHER | Facility: OTHER | Age: 49
End: 2024-11-19
Payer: COMMERCIAL

## 2024-11-19 LAB
OHS QRS DURATION: 82 MS
OHS QTC CALCULATION: 464 MS

## 2024-11-22 DIAGNOSIS — I74.5 ILIAC ARTERY OCCLUSION: Primary | ICD-10-CM

## 2024-11-25 ENCOUNTER — OFFICE VISIT (OUTPATIENT)
Dept: VASCULAR SURGERY | Facility: CLINIC | Age: 49
End: 2024-11-25
Payer: COMMERCIAL

## 2024-11-25 ENCOUNTER — HOSPITAL ENCOUNTER (OUTPATIENT)
Dept: VASCULAR SURGERY | Facility: HOSPITAL | Age: 49
Discharge: HOME | End: 2024-11-25
Attending: PHYSICIAN ASSISTANT
Payer: COMMERCIAL

## 2024-11-25 VITALS — SYSTOLIC BLOOD PRESSURE: 142 MMHG | DIASTOLIC BLOOD PRESSURE: 76 MMHG

## 2024-11-25 DIAGNOSIS — I10 ESSENTIAL HYPERTENSION, MALIGNANT: ICD-10-CM

## 2024-11-25 DIAGNOSIS — I74.5 ILIAC ARTERY OCCLUSION: ICD-10-CM

## 2024-11-25 DIAGNOSIS — E78.1 HYPERTRIGLYCERIDEMIA: ICD-10-CM

## 2024-11-25 DIAGNOSIS — I73.9 PAD (PERIPHERAL ARTERY DISEASE): Primary | ICD-10-CM

## 2024-11-25 DIAGNOSIS — I73.9 PAD (PERIPHERAL ARTERY DISEASE): ICD-10-CM

## 2024-11-25 DIAGNOSIS — E78.49 OTHER HYPERLIPIDEMIA: ICD-10-CM

## 2024-11-25 PROCEDURE — 1160F RVW MEDS BY RX/DR IN RCRD: CPT | Mod: CPTII,S$GLB,, | Performed by: PHYSICIAN ASSISTANT

## 2024-11-25 PROCEDURE — 93922 UPR/L XTREMITY ART 2 LEVELS: CPT

## 2024-11-25 PROCEDURE — 93880 EXTRACRANIAL BILAT STUDY: CPT

## 2024-11-25 PROCEDURE — 93880 EXTRACRANIAL BILAT STUDY: CPT | Mod: 26,,, | Performed by: STUDENT IN AN ORGANIZED HEALTH CARE EDUCATION/TRAINING PROGRAM

## 2024-11-25 PROCEDURE — 4010F ACE/ARB THERAPY RXD/TAKEN: CPT | Mod: CPTII,S$GLB,, | Performed by: PHYSICIAN ASSISTANT

## 2024-11-25 PROCEDURE — 99999 PR PBB SHADOW E&M-EST. PATIENT-LVL III: CPT | Mod: PBBFAC,,, | Performed by: PHYSICIAN ASSISTANT

## 2024-11-25 PROCEDURE — 3077F SYST BP >= 140 MM HG: CPT | Mod: CPTII,S$GLB,, | Performed by: PHYSICIAN ASSISTANT

## 2024-11-25 PROCEDURE — 1159F MED LIST DOCD IN RCRD: CPT | Mod: CPTII,S$GLB,, | Performed by: PHYSICIAN ASSISTANT

## 2024-11-25 PROCEDURE — 93922 UPR/L XTREMITY ART 2 LEVELS: CPT | Mod: 26,,, | Performed by: STUDENT IN AN ORGANIZED HEALTH CARE EDUCATION/TRAINING PROGRAM

## 2024-11-25 PROCEDURE — 99204 OFFICE O/P NEW MOD 45 MIN: CPT | Mod: S$GLB,,, | Performed by: PHYSICIAN ASSISTANT

## 2024-11-25 PROCEDURE — 3078F DIAST BP <80 MM HG: CPT | Mod: CPTII,S$GLB,, | Performed by: PHYSICIAN ASSISTANT

## 2024-11-25 NOTE — ASSESSMENT & PLAN NOTE
Patient with known hx of right iliac artery stenosis and PAD. She is s/p right LE angiogram without intervention. She has had increased right lower leg claudication pains and night time pains. She was seen in the ED a week ago and CT imaging revealed significant right iliac stenosis with reconstitution to the right SFA. Discussed cares with patient of proceeding to the cath lab for repeat angiogram with possible intervention. She agreed with the plan. Continue with ASA 81, Crestor, and Coumadin

## 2024-11-25 NOTE — PROGRESS NOTES
Kendell Brower [facility], [place]  [facility]    OFFICE NOTE    DATE OF VISIT: 2024  PATIENT NAME: Waldo Emmanuel  : 1975  MRN: [id number]  PRIMARY CARE PHYSICIAN: Ansley Vivas MD  REFERRING PROVIDER: No ref. provider found    CHIEF COMPLAINT   Chief Complaint   Patient presents with    Peripheral Vascular Disease     Patient in for follow up hospital visit.       HISTORY OF PRESENT ILLNESS:  Waldo Emmanuel is a 49 y.o. female who presents to clinic today for ED follow up.  She states that about 4 years ago she was found to have a right lower leg arterial occlusion. She did undergo right LE angiography but without any interventions. She has not had follow up any vascular clinic since that time. Over the past week or more she began experiencing increased right leg pains and heaviness. She did go to the ED and CT imaging revealed right iliac artery stenosis and arterial disease. She was told to follow up with the vascular clinic.   Today she is overall doing well. She does follow up with hem/onc clinic and is on Coumadin from hx of arterial occlusion without known cause. She is not a diabetic and has no smoking hx. She     ALLERGIES:  Review of patient's allergies indicates:   Allergen Reactions    Metformin Diarrhea     Other reaction(s): Diarrhae       PAST MEDICAL HISTORY:  Past Medical History:   Diagnosis Date    ADHD (attention deficit hyperactivity disorder)     Anemia     Fibroids     Genital herpes     GERD (gastroesophageal reflux disease)     H/O total hysterectomy 2021    Hypertension     Labral tear of hip joint     Ovarian cyst     Right common arterial occlusion     Routine general medical examination at a health care facility 2017    Total Right Arterial Occlusion        PAST SURGICAL HISTORY:  Past Surgical History:   Procedure Laterality Date    CARPAL TUNNEL RELEASE       SECTION      CHOLECYSTECTOMY      DILATION AND CURETTAGE OF UTERUS      HYSTERECTOMY   08/02/2021    TOTAL ABDOMINAL HYSTERECTOMY W/ BILATERAL SALPINGOOPHORECTOMY         SOCIAL HISTORY:   Social History     Tobacco Use    Smoking status: Never    Smokeless tobacco: Never   Substance Use Topics    Alcohol use: Yes     Comment: Social - Rare     Drug use: No       FAMILY HISTORY:  Family History   Problem Relation Name Age of Onset    Hypertension Father      Stroke Father      Hypertension Maternal Grandmother      Stroke Maternal Grandmother      Stroke Mother         REVIEW OF SYSTEMS:  ROS    PHYSICAL EXAM:  Vitals:    11/25/24 1038   BP: (!) 142/76      Physical Exam        VASCULAR LAB STUDIES:  Dr. Zendejas and I have personally reviewed the studies.  QUIANA performed today in clinic:   Right is 0.54 with a TP of 80   Left is 1.00 with a TP of 125      ASSESSMENT AND PLAN:  Hypertriglyceridemia  Compliant with current medication regimen.     Iliac artery occlusion  Patient with known hx of right iliac artery stenosis and PAD. She is s/p right LE angiogram without intervention. She has had increased right lower leg claudication pains and night time pains. She was seen in the ED a week ago and CT imaging revealed significant right iliac stenosis with reconstitution to the right SFA. Discussed cares with patient of proceeding to the cath lab for repeat angiogram with possible intervention. She agreed with the plan. Continue with ASA 81, Crestor, and Coumadin    Essential hypertension, malignant  Compliant with current medication regimen.     Other hyperlipidemia  Compliant with current medication regimen.       Waldo was seen today for peripheral vascular disease.    Diagnoses and all orders for this visit:    PAD (peripheral artery disease)  -     CV Ultrasound Bilateral Doppler Carotid; Future  -     Basic Metabolic Panel; Future  -     CBC Without Differential; Future    Iliac artery occlusion  -     Basic Metabolic Panel; Future  -     CBC Without Differential; Future    Essential hypertension,  malignant    Other hyperlipidemia    Hypertriglyceridemia        Follow up in about 4 weeks (around 12/23/2024) for post op visit.        Kendell Brower  [facility]  Vascular Surgery  [phone] (Clinic Number)

## 2024-11-27 LAB
LEFT ABI: 1.05
LEFT ARM BP: 142 MMHG
LEFT ARM SYSTOLIC BLOOD PRESSURE: 145 MMHG
LEFT CCA DIST DIAS: 21 CM/S
LEFT CCA DIST SYS: 87 CM/S
LEFT CCA MID DIAS: 22 CM/S
LEFT CCA MID SYS: 118 CM/S
LEFT CCA PROX DIAS: 22 CM/S
LEFT CCA PROX SYS: 137 CM/S
LEFT DORSALIS PEDIS: 175 MMHG
LEFT ECA DIAS: 18 CM/S
LEFT ECA SYS: 141 CM/S
LEFT ICA DIST DIAS: 48 CM/S
LEFT ICA DIST SYS: 95 CM/S
LEFT ICA MID DIAS: 25 CM/S
LEFT ICA MID SYS: 108 CM/S
LEFT ICA PROX DIAS: 41 CM/S
LEFT ICA PROX SYS: 104 CM/S
LEFT POSTERIOR TIBIAL: 184 MMHG
LEFT TBI: 0.71
LEFT TOE PRESSURE: 125 MMHG
LEFT VERTEBRAL DIAS: 22 CM/S
LEFT VERTEBRAL SYS: 58 CM/S
OHS CV CAROTID RIGHT ICA EDV HIGHEST: 43
OHS CV CAROTID ULTRASOUND LEFT ICA/CCA RATIO: 1.24
OHS CV CAROTID ULTRASOUND RIGHT ICA/CCA RATIO: 1.2
OHS CV PV CAROTID LEFT HIGHEST CCA: 137
OHS CV PV CAROTID LEFT HIGHEST ICA: 108
OHS CV PV CAROTID RIGHT HIGHEST CCA: 101
OHS CV PV CAROTID RIGHT HIGHEST ICA: 106
OHS CV US CAROTID LEFT HIGHEST EDV: 48
RIGHT ABI: 0.63
RIGHT ARM BP: 175 MMHG
RIGHT ARM SYSTOLIC BLOOD PRESSURE: 172 MMHG
RIGHT CCA DIST DIAS: 22 CM/S
RIGHT CCA DIST SYS: 88 CM/S
RIGHT CCA MID DIAS: 33 CM/S
RIGHT CCA MID SYS: 101 CM/S
RIGHT CCA PROX DIAS: 11 CM/S
RIGHT CCA PROX SYS: 60 CM/S
RIGHT DORSALIS PEDIS: 95 MMHG
RIGHT ECA DIAS: 18 CM/S
RIGHT ECA SYS: 100 CM/S
RIGHT ICA DIST DIAS: 43 CM/S
RIGHT ICA DIST SYS: 106 CM/S
RIGHT ICA MID DIAS: 29 CM/S
RIGHT ICA MID SYS: 103 CM/S
RIGHT ICA PROX DIAS: 37 CM/S
RIGHT ICA PROX SYS: 100 CM/S
RIGHT POSTERIOR TIBIAL: 110 MMHG
RIGHT TBI: 0.46
RIGHT TOE PRESSURE: 80 MMHG
RIGHT VERTEBRAL DIAS: 28 CM/S
RIGHT VERTEBRAL SYS: 69 CM/S

## 2025-01-08 ENCOUNTER — HOSPITAL ENCOUNTER (OUTPATIENT)
Facility: HOSPITAL | Age: 50
Discharge: HOME | End: 2025-01-08
Attending: SURGERY | Admitting: SURGERY
Payer: COMMERCIAL

## 2025-01-08 VITALS
HEART RATE: 83 BPM | RESPIRATION RATE: 18 BRPM | DIASTOLIC BLOOD PRESSURE: 75 MMHG | OXYGEN SATURATION: 97 % | HEIGHT: 63 IN | BODY MASS INDEX: 42.35 KG/M2 | TEMPERATURE: 98 F | WEIGHT: 239 LBS | SYSTOLIC BLOOD PRESSURE: 144 MMHG

## 2025-01-08 DIAGNOSIS — I74.5 ILIAC ARTERY OCCLUSION: Primary | ICD-10-CM

## 2025-01-08 DIAGNOSIS — I74.5 ILIAC ARTERY OCCLUSION: ICD-10-CM

## 2025-01-08 LAB
ANION GAP SERPL CALC-SCNC: 10 MMOL/L (ref 8–16)
BASOPHILS # BLD AUTO: 0.02 K/UL (ref 0–0.2)
BASOPHILS NFR BLD: 0.3 % (ref 0–1.9)
BUN SERPL-MCNC: 16 MG/DL (ref 6–20)
CALCIUM SERPL-MCNC: 9.4 MG/DL (ref 8.7–10.5)
CHLORIDE SERPL-SCNC: 106 MMOL/L (ref 95–110)
CO2 SERPL-SCNC: 25 MMOL/L (ref 23–29)
CREAT SERPL-MCNC: 0.8 MG/DL (ref 0.5–1.4)
DIFFERENTIAL METHOD BLD: ABNORMAL
EOSINOPHIL # BLD AUTO: 0.2 K/UL (ref 0–0.5)
EOSINOPHIL NFR BLD: 2.6 % (ref 0–8)
ERYTHROCYTE [DISTWIDTH] IN BLOOD BY AUTOMATED COUNT: 14.1 % (ref 11.5–14.5)
EST. GFR  (NO RACE VARIABLE): >60 ML/MIN/1.73 M^2
GLUCOSE SERPL-MCNC: 126 MG/DL (ref 70–110)
HCT VFR BLD AUTO: 36.3 % (ref 37–48.5)
HGB BLD-MCNC: 11 G/DL (ref 12–16)
IMM GRANULOCYTES # BLD AUTO: 0.01 K/UL (ref 0–0.04)
IMM GRANULOCYTES NFR BLD AUTO: 0.1 % (ref 0–0.5)
INR PPP: 0.9 (ref 0.8–1.2)
LYMPHOCYTES # BLD AUTO: 2.7 K/UL (ref 1–4.8)
LYMPHOCYTES NFR BLD: 39.6 % (ref 18–48)
MCH RBC QN AUTO: 27.9 PG (ref 27–31)
MCHC RBC AUTO-ENTMCNC: 30.3 G/DL (ref 32–36)
MCV RBC AUTO: 92 FL (ref 82–98)
MONOCYTES # BLD AUTO: 0.5 K/UL (ref 0.3–1)
MONOCYTES NFR BLD: 6.7 % (ref 4–15)
NEUTROPHILS # BLD AUTO: 3.5 K/UL (ref 1.8–7.7)
NEUTROPHILS NFR BLD: 50.7 % (ref 38–73)
NRBC BLD-RTO: 0 /100 WBC
PLATELET # BLD AUTO: 261 K/UL (ref 150–450)
PMV BLD AUTO: 9.4 FL (ref 9.2–12.9)
POTASSIUM SERPL-SCNC: 3.8 MMOL/L (ref 3.5–5.1)
PROTHROMBIN TIME: 10.8 SEC (ref 9–12.5)
RBC # BLD AUTO: 3.94 M/UL (ref 4–5.4)
SODIUM SERPL-SCNC: 141 MMOL/L (ref 136–145)
WBC # BLD AUTO: 6.9 K/UL (ref 3.9–12.7)

## 2025-01-08 PROCEDURE — 25000003 PHARM REV CODE 250: Performed by: NURSE PRACTITIONER

## 2025-01-08 PROCEDURE — C1725 CATH, TRANSLUMIN NON-LASER: HCPCS | Performed by: SURGERY

## 2025-01-08 PROCEDURE — 99153 MOD SED SAME PHYS/QHP EA: CPT | Performed by: SURGERY

## 2025-01-08 PROCEDURE — 63600175 PHARM REV CODE 636 W HCPCS: Performed by: SURGERY

## 2025-01-08 PROCEDURE — 27201423 OPTIME MED/SURG SUP & DEVICES STERILE SUPPLY: Performed by: SURGERY

## 2025-01-08 PROCEDURE — 85610 PROTHROMBIN TIME: CPT | Performed by: NURSE PRACTITIONER

## 2025-01-08 PROCEDURE — C1887 CATHETER, GUIDING: HCPCS | Performed by: SURGERY

## 2025-01-08 PROCEDURE — C1730 CATH, EP, 19 OR FEW ELECT: HCPCS | Performed by: SURGERY

## 2025-01-08 PROCEDURE — 25000003 PHARM REV CODE 250: Performed by: SURGERY

## 2025-01-08 PROCEDURE — 80048 BASIC METABOLIC PNL TOTAL CA: CPT | Performed by: NURSE PRACTITIONER

## 2025-01-08 PROCEDURE — 25500020 PHARM REV CODE 255: Performed by: SURGERY

## 2025-01-08 PROCEDURE — 99152 MOD SED SAME PHYS/QHP 5/>YRS: CPT | Performed by: SURGERY

## 2025-01-08 PROCEDURE — 85025 COMPLETE CBC W/AUTO DIFF WBC: CPT | Performed by: NURSE PRACTITIONER

## 2025-01-08 PROCEDURE — C1894 INTRO/SHEATH, NON-LASER: HCPCS | Performed by: SURGERY

## 2025-01-08 PROCEDURE — C1769 GUIDE WIRE: HCPCS | Performed by: SURGERY

## 2025-01-08 RX ORDER — SODIUM CHLORIDE 9 MG/ML
INJECTION, SOLUTION INTRAVENOUS ONCE
Status: COMPLETED | OUTPATIENT
Start: 2025-01-08 | End: 2025-01-08

## 2025-01-08 RX ORDER — SODIUM CHLORIDE 0.9 % (FLUSH) 0.9 %
10 SYRINGE (ML) INJECTION
Status: DISCONTINUED | OUTPATIENT
Start: 2025-01-08 | End: 2025-01-08 | Stop reason: HOSPADM

## 2025-01-08 RX ORDER — IODIXANOL 320 MG/ML
INJECTION, SOLUTION INTRAVASCULAR
Status: DISCONTINUED | OUTPATIENT
Start: 2025-01-08 | End: 2025-01-08 | Stop reason: HOSPADM

## 2025-01-08 RX ORDER — HYDROCODONE BITARTRATE AND ACETAMINOPHEN 5; 325 MG/1; MG/1
1 TABLET ORAL EVERY 4 HOURS PRN
Status: DISCONTINUED | OUTPATIENT
Start: 2025-01-08 | End: 2025-01-08 | Stop reason: HOSPADM

## 2025-01-08 RX ORDER — MIDAZOLAM HYDROCHLORIDE 1 MG/ML
INJECTION, SOLUTION INTRAMUSCULAR; INTRAVENOUS
Status: DISCONTINUED | OUTPATIENT
Start: 2025-01-08 | End: 2025-01-08 | Stop reason: HOSPADM

## 2025-01-08 RX ORDER — CILOSTAZOL 50 MG/1
50 TABLET ORAL 2 TIMES DAILY
Qty: 60 TABLET | Refills: 11 | Status: SHIPPED | OUTPATIENT
Start: 2025-01-08 | End: 2026-01-08

## 2025-01-08 RX ORDER — LIDOCAINE HYDROCHLORIDE 20 MG/ML
INJECTION, SOLUTION INFILTRATION; PERINEURAL
Status: DISCONTINUED | OUTPATIENT
Start: 2025-01-08 | End: 2025-01-08 | Stop reason: HOSPADM

## 2025-01-08 RX ORDER — PROTAMINE SULFATE 10 MG/ML
INJECTION, SOLUTION INTRAVENOUS
Status: DISCONTINUED | OUTPATIENT
Start: 2025-01-08 | End: 2025-01-08 | Stop reason: HOSPADM

## 2025-01-08 RX ORDER — FENTANYL CITRATE 50 UG/ML
INJECTION, SOLUTION INTRAMUSCULAR; INTRAVENOUS
Status: DISCONTINUED | OUTPATIENT
Start: 2025-01-08 | End: 2025-01-08 | Stop reason: HOSPADM

## 2025-01-08 RX ORDER — HEPARIN SODIUM 1000 [USP'U]/ML
INJECTION, SOLUTION INTRAVENOUS; SUBCUTANEOUS
Status: DISCONTINUED | OUTPATIENT
Start: 2025-01-08 | End: 2025-01-08 | Stop reason: HOSPADM

## 2025-01-08 RX ORDER — DIPHENHYDRAMINE HYDROCHLORIDE 50 MG/ML
INJECTION INTRAMUSCULAR; INTRAVENOUS
Status: DISCONTINUED | OUTPATIENT
Start: 2025-01-08 | End: 2025-01-08 | Stop reason: HOSPADM

## 2025-01-08 RX ORDER — SODIUM CHLORIDE 9 MG/ML
INJECTION, SOLUTION INTRAVENOUS
Status: DISCONTINUED | OUTPATIENT
Start: 2025-01-08 | End: 2025-01-08 | Stop reason: HOSPADM

## 2025-01-08 RX ADMIN — SODIUM CHLORIDE: 9 INJECTION, SOLUTION INTRAVENOUS at 06:01

## 2025-01-08 NOTE — PLAN OF CARE
Went over discharge instructions with patient.   Stressed importance of making and keeping all follow ups as well as making prescribed medication changes.   Gave education material for safe mobility after discharge.   Walked patient to ensure patient was safe to go home.   Patient verbalized understanding and has no questions in regards to discharge.  IV removed, catheter intact.  Primary nurse notified of pt's discharge status.   Made sure patient has someone to drive them and explained not to drive for 24 hours.   Dressing to right and left groin CDI with no signs of bleeding or hematoma.

## 2025-01-08 NOTE — Clinical Note
The left DP pulse was 1+. The right DP pulse was detected with doppler.  The left PT pulse was 1+. The right PT pulse was detected with doppler.

## 2025-01-08 NOTE — H&P
Consulting Physician:  Lennox Zendejas MD    CC: life style limiting claudication    HPI: Waldo Emmanuel is a 49 y.o. female with PMHx as seen below, was admitted on 2025 for an aortogram with right leg angiogram.  She has life-style limiting claudication of right lower extremity.  She has known right iliac artery occlusion.  QUIANA of 0.5 on right and normal on left.  Comes in today for aortogram with right leg angiogram      Past Medical History:   Diagnosis Date    ADHD (attention deficit hyperactivity disorder)     Anemia     Fibroids     Genital herpes     GERD (gastroesophageal reflux disease)     H/O total hysterectomy 2021    Hypertension     Labral tear of hip joint     Ovarian cyst     Right common arterial occlusion     Routine general medical examination at a health care facility 2017    Total Right Arterial Occlusion        Past Surgical History:   Procedure Laterality Date    CARPAL TUNNEL RELEASE       SECTION      CHOLECYSTECTOMY      DILATION AND CURETTAGE OF UTERUS      TOTAL ABDOMINAL HYSTERECTOMY W/ BILATERAL SALPINGOOPHORECTOMY  2021    menorrhagia       Social History     Socioeconomic History    Marital status:    Tobacco Use    Smoking status: Never    Smokeless tobacco: Never   Substance and Sexual Activity    Alcohol use: Yes     Comment: Social - Rare     Drug use: No    Sexual activity: Not Currently     Partners: Male     Birth control/protection: See Surgical Hx     Social Drivers of Health     Financial Resource Strain: Low Risk  (2025)    Overall Financial Resource Strain (CARDIA)     Difficulty of Paying Living Expenses: Not hard at all   Food Insecurity: Patient Declined (2025)    Hunger Vital Sign     Worried About Running Out of Food in the Last Year: Patient declined     Ran Out of Food in the Last Year: Patient declined   Physical Activity: Inactive (2025)    Exercise Vital Sign     Days of Exercise per Week: 0 days  "    Minutes of Exercise per Session: 0 min   Stress: Stress Concern Present (1/2/2025)    [employer] - Occupational Stress Questionnaire     Feeling of Stress : Very much   Housing Stability: Unknown (1/2/2025)    Housing Stability Vital Sign     Unable to Pay for Housing in the Last Year: No       Family History   Problem Relation Name Age of Onset    Hypertension Maternal Grandmother      Stroke Maternal Grandmother      Hypertension Father      Stroke Father      Stroke Mother      Breast cancer Paternal Cousin      Prostate cancer Paternal Uncle           Current Facility-Administered Medications:     0.9% NaCl infusion, , , Continuous PRNAashish Matthew R, MD, Last Rate: 100 mL/hr at 01/08/25 0705, 100 mL/hr at 01/08/25 0705    diphenhydrAMINE injection, , , Aashish YAN Matthew R, MD, 25 mg at 01/08/25 0720    fentaNYL 50 mcg/mL injection, , , Aashish YAN Matthew R, MD, 50 mcg at 01/08/25 0817    heparin (porcine) injection, , , Aashish YAN Matthew R, MD, 5,000 Units at 01/08/25 0739    LIDOcaine HCL 20 mg/ml (2%) injection, , , Aashish YAN Matthew R, MD, 10 mL at 01/08/25 0752    midazolam injection, , , Aashish YAN Matthew R, MD, 0.5 mg at 01/08/25 0817    protamine injection, , , Aashish YAN Matthew R, MD, 30 mg at 01/08/25 0834    sodium chloride 0.9% flush 10 mL, 10 mL, Intravenous, PRN, Molly Daugherty NP    Review of patient's allergies indicates:   Allergen Reactions    Metformin Diarrhea     Other reaction(s): Diarrhae       ROS:  10 point review of systems is negative except as mentioned in HPI.    Vitals:    01/08/25 0606   BP:    Pulse: 86   Resp:    Temp:        Objective:  General Appearance:  Comfortable, well-appearing and in no acute distress.    Vital signs: (most recent): Blood pressure 137/75, pulse 86, temperature 98.1 °F (36.7 °C), temperature source Temporal, resp. rate 16, height 5' 3" (1.6 m), weight 108.4 kg (238 lb 15.7 oz), last menstrual period " 10/10/2020, SpO2 96%, not currently breastfeeding.  Vital signs are normal.    HEENT: Normal HEENT exam.    Lungs:  Normal effort and normal respiratory rate.    Heart: Normal rate.  Regular rhythm.    Abdomen: Abdomen is soft.  Bowel sounds are normal.   There is no abdominal tenderness.     Extremities: Normal range of motion.    Pulses: (Both feet warm and perfused; no pulses in right foot)    Neurological: Patient is alert and oriented to person, place and time.    Pupils:  Pupils are equal, round, and reactive to light.    Skin:  Warm.        LABS:  I have reviewed all pertinent lab results within the past 24 hours.    IMAGING:  I have personally reviewed the imaging.    No orders to display       MDM      Assessment & Plan  Waldo Emmanuel is a 49 y.o. female brought in today for aortogram with right leg angiogram.  Has life-style limiting claudication of right leg.  Known right iliac artery occlusion.  Risks and benefits of above procedure discussed with patient and she agrees to proceed.      I discussed this plan with the patient and she understands, agrees, and appreciates our input and would like to proceed with our above stated plan.    Lennox Zendejas  1/8/2025  [facility]  Vascular Surgery  [phone] (Clinic Number)    There are no hospital problems to display for this patient.

## 2025-01-08 NOTE — DISCHARGE INSTRUCTIONS
"                                      Post-op Abdominal Aorta Angiogram    1. DIET: It is advisable for you to follow a diet that limits the intake of salt, sugar, saturated fats and cholesterol.     2. FOR THE NEXT 24 HOURS:   For the next 8 hours, you should be watched by a responsible adult. This person should make sure your condition is not getting worse.   Don't drink any alcohol for the next 24 hours.  Don't drive, operate dangerous machinery, or make important business or personal decisions during the next 24 hours.  Your healthcare provider may tell you not to take any medicine by mouth for pain or sleep in the next 4 hours. These medicines may react with the medicines you were given in the hospital. This could cause a much stronger response than usual.       3. ACTIVITY:                                Day of discharge:             NO vigorous activity, lifting or straining                                                   Day after discharge:         Avoid heavy lifting (up to 10 lbs)                                                                        The day after discharge you may shower, but avoid tub baths for 5 days                                                                         Wash site gently with soap and water        NO powder or lotion to your procedure site.                 Before you shower, remove dressing, apply bandaid if desired                                                                                                                                                                            2nd day after discharge:  Resume normal activities                                                                         Exercise program as instructed      4. WOUND CARE: It is not unusual to have a small amount of bruising appear in the groin area. It is also common to have a tender "knot" develop beneath the skin at the puncture site in the groin. This is scar tissue only and is not " "a cause for concern or alarm. This tender knot may take several weeks to fully resolve. The bruise will usually spread over several days. If the lump gets bigger, call you doctor immediately.    5. DISCOMFORT: For general discomfort at the puncture site, you may take 1 or 2 Acetaminophen (Tylenol) tablets every 4 hours as needed. (Do not take more than 4000 mg a day)                 6. CALL YOUR HEALTHCARE PROVIDER IF YOU START TO HAVE THE FOLLOWING SYMPTOMS:        1. Problems with the affected leg: Pain, discomfort, loss of warmth, numbness or tingling                                                                                                                            2. Problems at the groin site: Bleeding, pain that is sudden/sharp/persistent,                   swelling at site or a change in "lump" size, increased redness or drainage at                     puncture site                                                               3. High fever (101 degrees or higher)       4.  Drowsiness that doesn't get better       5. Weakness or dizziness that doesn't get better            6. Repeated vomiting        7. GO TO  THE EMERGENCY ROOM OR CALL 911 IF YOU HAVE: Chest pains or discomforts not relieved with 3 nitroglycerin doses (sublingual tablets or spray), numbness or severe pain or if your foot or leg becomes cold or discolored or uncontrolled bleeding from site (apply direct pressure above site).  "

## 2025-01-08 NOTE — Clinical Note
An angiography was performed of the distal right anterior tibial artery, peroneal artery and posterior tibial artery

## 2025-01-08 NOTE — OP NOTE
Date of Surgery: 1/8/25    Pre-operative Diagnosis: peripheral arterial disease with short distance claudication    Post-operative Diagnosis: same    Surgeon: Lennox Zendejas MD    Procedure:  1) conscious sedation x77 minutes  2) u/s guided access of left common femoral artery  3) aortogram with right leg angiogram  4) u/s guided access right femoral vein with ascending venogram    Findings: Chronic total occlusion of right common iliac artery and external iliac artery down to the level of the inguinal ligament.    Complications: none immediate    Estimated Blood Loss: minimal    Disposition: to recovery in stable condition      INDICATION FOR PROCEDURE:  This is a 48 y/o female with known peripheral arterial disease.  Has known right iliac artery occlusion.  QUIANA of 0.5 on right side.  Presented to office recently with worsening right leg claudication.  Risks and benefits of aortogram with right leg angiogram discussed with patient and she agreed to proceed.      OPERATION IN DETAIL:  Patient taken back to the cath lab and laid supine on the angiography table.  She was prepped and draped in standard sterile fashion.  A timeout was called.  Conscious sedation was administered with intravenous versed and fentanyl under my direct supervision by an independent trained observer.  Patient monitored with rhythm strip, vital signs, and pulse oximetry throughout the procedure.  Intra-procedural sedation time was 77 minutes.  I started by gaining percutaneous access of the left common femoral artery under real-time ultrasound guidance with a microneedle.  The artery was pulsatile and patent.  A permanent image was stored and saved to PACS.  I put an omni flush catheter in the visceral segment of the aorta and shot an aortogram.  Images to be dictated in detail in the next section.  I pulled my catheter down to the distal infrarenal aorta and shot the right leg in stations to the foot.  At this point in time, I gave 5000  units of heparin and used a RIM catheter to hook the bifurcation and a soft glidewire to try and cross this iliac occlusion.  I could get 4-6 cm of purchase in the iliac with my wire but not enough purchase to push any kind of catheter up and over.  At this time, I used u/s guided access to access the proximal SFA initially so I could give myself some purchase to stent if I was able to cross the occlusion.  I got access to the artery with a wire and needle but could not get a microcatheter into the artery even using both 4Fr and 5Fr stiff microcatheters and pre-dilating first.  At one point, I inadvertently stuck the vein and could get the catheter her but a flouro run confirmed I was the right femoral vein.  Subsequent venogram confirmed this and I removed the catheter and wire and held pressure a few minutes.  I then moved up to the common femoral artery and same thing, I could get access to the artery with microneedle and wire under real-time ultrasound guidance but I could not get a 4Fr microcatheter to advance into the artery.  At this time, I concluded the case.  Patient tolerated the procedure well.  Protamine given.  Sheath pulled in left groin and pressure held.      RADIOGRAPHIC INTERPRETATION:  Visceral segment of aorta is patent.  Celiac artery and SMA patent.  Bilateral renal arteries patent.  Infrarenal aorta is patent.  Left iliac system patent.  Nubbing of right common iliac artery patent but occluded beyond this down to the distal external iliac artery just above the inguinal ligament.  Right common femoral artery, profunda, SFA, popliteal artery, and 3 vessel tibial runoff to the right foot.  There is an arteriovenous fistula present around the right knee and there is filling of contrast in the venous system.  No intervention done.      PLAN:  I will try Pletal for 3 months.  If this works will continue conservative measures.  If still with life-style limiting systems, will plan possible  aorto-femoral bypass vs a left to right fem-fem bypass.  I believe ideally, I would take her to a hybrid room and cut down on the right groin and attempt to cross this occlusion retrograde and possibly antegrade from the arm and if successful stent and if not bail out option at that time would be aorto-femoral bypass vs a left to right fem-fem bypass.

## 2025-01-08 NOTE — Clinical Note
An angiography of the  right lower extremity was performed with the catheter and power injected with 14 mL contrast at at 7 mL/s.

## 2025-01-08 NOTE — Clinical Note
An angiography was performed of the distal infrarenal abdominal aorta  via power injection. Injection was performed with 13 mL contrast at 7 mL/s.

## 2025-01-08 NOTE — Clinical Note
An angiography was performed of the right popliteal artery, anterior tibial artery, tibio-peroneal trunk, peroneal artery and posterior tibial artery

## 2025-01-15 ENCOUNTER — LAB VISIT (OUTPATIENT)
Dept: LAB | Facility: HOSPITAL | Age: 50
End: 2025-01-15
Attending: INTERNAL MEDICINE
Payer: COMMERCIAL

## 2025-01-15 DIAGNOSIS — Z00.00 ANNUAL PHYSICAL EXAM: ICD-10-CM

## 2025-01-15 LAB
ALBUMIN SERPL BCP-MCNC: 3.9 G/DL (ref 3.5–5.2)
ALBUMIN/CREAT UR: 9.6 UG/MG (ref 0–30)
ALP SERPL-CCNC: 95 U/L (ref 40–150)
ALT SERPL W/O P-5'-P-CCNC: 27 U/L (ref 10–44)
ANION GAP SERPL CALC-SCNC: 12 MMOL/L (ref 8–16)
AST SERPL-CCNC: 35 U/L (ref 10–40)
BASOPHILS # BLD AUTO: 0.02 K/UL (ref 0–0.2)
BASOPHILS NFR BLD: 0.2 % (ref 0–1.9)
BILIRUB SERPL-MCNC: 0.4 MG/DL (ref 0.1–1)
BUN SERPL-MCNC: 16 MG/DL (ref 6–20)
CALCIUM SERPL-MCNC: 10.3 MG/DL (ref 8.7–10.5)
CHLORIDE SERPL-SCNC: 105 MMOL/L (ref 95–110)
CHOLEST SERPL-MCNC: 100 MG/DL (ref 120–199)
CHOLEST/HDLC SERPL: 3.2 {RATIO} (ref 2–5)
CO2 SERPL-SCNC: 24 MMOL/L (ref 23–29)
CREAT SERPL-MCNC: 0.9 MG/DL (ref 0.5–1.4)
CREAT UR-MCNC: 197 MG/DL (ref 15–325)
DIFFERENTIAL METHOD BLD: ABNORMAL
EOSINOPHIL # BLD AUTO: 0.2 K/UL (ref 0–0.5)
EOSINOPHIL NFR BLD: 1.8 % (ref 0–8)
ERYTHROCYTE [DISTWIDTH] IN BLOOD BY AUTOMATED COUNT: 14.6 % (ref 11.5–14.5)
EST. GFR  (NO RACE VARIABLE): >60 ML/MIN/1.73 M^2
ESTIMATED AVG GLUCOSE: 108 MG/DL (ref 68–131)
GLUCOSE SERPL-MCNC: 86 MG/DL (ref 70–110)
HBA1C MFR BLD: 5.4 % (ref 4–5.6)
HCT VFR BLD AUTO: 35.3 % (ref 37–48.5)
HDLC SERPL-MCNC: 31 MG/DL (ref 40–75)
HDLC SERPL: 31 % (ref 20–50)
HGB BLD-MCNC: 10.6 G/DL (ref 12–16)
IMM GRANULOCYTES # BLD AUTO: 0.02 K/UL (ref 0–0.04)
IMM GRANULOCYTES NFR BLD AUTO: 0.2 % (ref 0–0.5)
INSULIN COLLECTION INTERVAL: ABNORMAL
INSULIN SERPL-ACNC: 33.9 UU/ML
LDLC SERPL CALC-MCNC: 31.6 MG/DL (ref 63–159)
LYMPHOCYTES # BLD AUTO: 4.3 K/UL (ref 1–4.8)
LYMPHOCYTES NFR BLD: 44.4 % (ref 18–48)
MCH RBC QN AUTO: 28.3 PG (ref 27–31)
MCHC RBC AUTO-ENTMCNC: 30 G/DL (ref 32–36)
MCV RBC AUTO: 94 FL (ref 82–98)
MICROALBUMIN UR DL<=1MG/L-MCNC: 19 UG/ML
MONOCYTES # BLD AUTO: 0.5 K/UL (ref 0.3–1)
MONOCYTES NFR BLD: 4.8 % (ref 4–15)
NEUTROPHILS # BLD AUTO: 4.7 K/UL (ref 1.8–7.7)
NEUTROPHILS NFR BLD: 48.6 % (ref 38–73)
NONHDLC SERPL-MCNC: 69 MG/DL
NRBC BLD-RTO: 0 /100 WBC
PLATELET # BLD AUTO: 373 K/UL (ref 150–450)
PMV BLD AUTO: 10.9 FL (ref 9.2–12.9)
POTASSIUM SERPL-SCNC: 4.5 MMOL/L (ref 3.5–5.1)
PROT SERPL-MCNC: 8.5 G/DL (ref 6–8.4)
RBC # BLD AUTO: 3.74 M/UL (ref 4–5.4)
SODIUM SERPL-SCNC: 141 MMOL/L (ref 136–145)
T3FREE SERPL-MCNC: 3.6 PG/ML (ref 2.3–4.2)
T4 FREE SERPL-MCNC: 1.06 NG/DL (ref 0.71–1.51)
TRIGL SERPL-MCNC: 187 MG/DL (ref 30–150)
TSH SERPL DL<=0.005 MIU/L-ACNC: 1.43 UIU/ML (ref 0.4–4)
WBC # BLD AUTO: 9.62 K/UL (ref 3.9–12.7)

## 2025-01-15 PROCEDURE — 84439 ASSAY OF FREE THYROXINE: CPT | Performed by: INTERNAL MEDICINE

## 2025-01-15 PROCEDURE — 82985 ASSAY OF GLYCATED PROTEIN: CPT | Performed by: INTERNAL MEDICINE

## 2025-01-15 PROCEDURE — 84481 FREE ASSAY (FT-3): CPT | Performed by: INTERNAL MEDICINE

## 2025-01-15 PROCEDURE — 80053 COMPREHEN METABOLIC PANEL: CPT | Performed by: INTERNAL MEDICINE

## 2025-01-15 PROCEDURE — 85025 COMPLETE CBC W/AUTO DIFF WBC: CPT | Performed by: INTERNAL MEDICINE

## 2025-01-15 PROCEDURE — 84443 ASSAY THYROID STIM HORMONE: CPT | Performed by: INTERNAL MEDICINE

## 2025-01-15 PROCEDURE — 83036 HEMOGLOBIN GLYCOSYLATED A1C: CPT | Performed by: INTERNAL MEDICINE

## 2025-01-15 PROCEDURE — 82570 ASSAY OF URINE CREATININE: CPT | Performed by: INTERNAL MEDICINE

## 2025-01-15 PROCEDURE — 80061 LIPID PANEL: CPT | Performed by: INTERNAL MEDICINE

## 2025-01-15 PROCEDURE — 83525 ASSAY OF INSULIN: CPT | Performed by: INTERNAL MEDICINE

## 2025-01-15 PROCEDURE — 36415 COLL VENOUS BLD VENIPUNCTURE: CPT | Performed by: INTERNAL MEDICINE

## 2025-01-20 LAB — FRUCTOSAMINE SERPL-SCNC: 170 UMOL /L (ref 151–300)

## 2025-01-30 ENCOUNTER — LAB VISIT (OUTPATIENT)
Dept: LAB | Facility: HOSPITAL | Age: 50
End: 2025-01-30
Attending: INTERNAL MEDICINE
Payer: COMMERCIAL

## 2025-01-30 ENCOUNTER — OFFICE VISIT (OUTPATIENT)
Dept: CARDIOLOGY | Facility: CLINIC | Age: 50
End: 2025-01-30
Payer: COMMERCIAL

## 2025-01-30 VITALS
OXYGEN SATURATION: 96 % | WEIGHT: 234.56 LBS | HEART RATE: 106 BPM | DIASTOLIC BLOOD PRESSURE: 78 MMHG | SYSTOLIC BLOOD PRESSURE: 146 MMHG | BODY MASS INDEX: 41.55 KG/M2

## 2025-01-30 DIAGNOSIS — I74.5 ILIAC ARTERY OCCLUSION: ICD-10-CM

## 2025-01-30 DIAGNOSIS — I73.9 PAD (PERIPHERAL ARTERY DISEASE): ICD-10-CM

## 2025-01-30 DIAGNOSIS — R00.2 PALPITATIONS: ICD-10-CM

## 2025-01-30 DIAGNOSIS — R29.818 SUSPECTED SLEEP APNEA: ICD-10-CM

## 2025-01-30 DIAGNOSIS — Z79.01 ON ANTICOAGULANT THERAPY: ICD-10-CM

## 2025-01-30 DIAGNOSIS — I31.39 PERICARDIAL EFFUSION: ICD-10-CM

## 2025-01-30 DIAGNOSIS — E66.01 MORBID OBESITY: ICD-10-CM

## 2025-01-30 DIAGNOSIS — I73.9 PAD (PERIPHERAL ARTERY DISEASE): Primary | ICD-10-CM

## 2025-01-30 PROCEDURE — 99999 PR PBB SHADOW E&M-EST. PATIENT-LVL V: CPT | Mod: PBBFAC,,, | Performed by: INTERNAL MEDICINE

## 2025-01-30 PROCEDURE — 3008F BODY MASS INDEX DOCD: CPT | Mod: CPTII,S$GLB,, | Performed by: INTERNAL MEDICINE

## 2025-01-30 PROCEDURE — 99205 OFFICE O/P NEW HI 60 MIN: CPT | Mod: S$GLB,,, | Performed by: INTERNAL MEDICINE

## 2025-01-30 PROCEDURE — 3061F NEG MICROALBUMINURIA REV: CPT | Mod: CPTII,S$GLB,, | Performed by: INTERNAL MEDICINE

## 2025-01-30 PROCEDURE — 3077F SYST BP >= 140 MM HG: CPT | Mod: CPTII,S$GLB,, | Performed by: INTERNAL MEDICINE

## 2025-01-30 PROCEDURE — 4010F ACE/ARB THERAPY RXD/TAKEN: CPT | Mod: CPTII,S$GLB,, | Performed by: INTERNAL MEDICINE

## 2025-01-30 PROCEDURE — 83695 ASSAY OF LIPOPROTEIN(A): CPT | Performed by: INTERNAL MEDICINE

## 2025-01-30 PROCEDURE — 3078F DIAST BP <80 MM HG: CPT | Mod: CPTII,S$GLB,, | Performed by: INTERNAL MEDICINE

## 2025-01-30 PROCEDURE — 36415 COLL VENOUS BLD VENIPUNCTURE: CPT | Performed by: INTERNAL MEDICINE

## 2025-01-30 PROCEDURE — 3044F HG A1C LEVEL LT 7.0%: CPT | Mod: CPTII,S$GLB,, | Performed by: INTERNAL MEDICINE

## 2025-01-30 PROCEDURE — 1159F MED LIST DOCD IN RCRD: CPT | Mod: CPTII,S$GLB,, | Performed by: INTERNAL MEDICINE

## 2025-01-30 PROCEDURE — 3066F NEPHROPATHY DOC TX: CPT | Mod: CPTII,S$GLB,, | Performed by: INTERNAL MEDICINE

## 2025-01-30 NOTE — PROGRESS NOTES
Subjective:   Patient ID:  Waldo Emmanuel is a 49 y.o. female who presents for evaluation of Chest Pain      HPI  2025  49-year-old female, was interesting history of right common iliac artery occlusion in 2019.  Initially was told she has a DVT.  However recent venous ultrasound negative for DVT.    She underwent also a peripheral angiogram with vascular with  of right common iliac artery.  Now on cilostazol.    She was also put on Coumadin in the past and had seen hematology.  She also had seen rheumatology with negative workup as she states.    She had a CT scan of her chest negative for arthritis or aneurysm.    She does report that her watch tells her that she has 2% AFib.  She does have palpitations at times.    She has a intermittent chest pain very short lasting, mild in intensity, nonexertional.    She is not able to do much exertion due to claudication from her right common iliac occlusion.    Recent CT scan showed trace pericardial effusion.    Thus she was referred to Cardiology.        Past Medical History:   Diagnosis Date    ADHD (attention deficit hyperactivity disorder)     Anemia     Fibroids     Genital herpes     GERD (gastroesophageal reflux disease)     H/O total hysterectomy 2021    Hypertension     Labral tear of hip joint     Ovarian cyst     Right common arterial occlusion     Routine general medical examination at a health care facility 2017    Total Right Arterial Occlusion        Past Surgical History:   Procedure Laterality Date    ANGIOGRAM, ABDOMINAL AORTA W/ EXTREMITY RUNOFF N/A 2025    Procedure: Angiogram, Abdominal Aorta W/ Extremity Runoff: Right lower extremity angiogram;  Surgeon: Lennox Zendejas MD;  Location: [facility] CATH LAB;  Service: Vascular;  Laterality: N/A;    CARPAL TUNNEL RELEASE       SECTION      CHOLECYSTECTOMY      DILATION AND CURETTAGE OF UTERUS      TOTAL ABDOMINAL HYSTERECTOMY W/ BILATERAL SALPINGOOPHORECTOMY   08/02/2021    menorrhagia       Social History     Tobacco Use    Smoking status: Never    Smokeless tobacco: Never   Substance Use Topics    Alcohol use: Yes     Comment: Social - Rare     Drug use: No       Family History   Problem Relation Name Age of Onset    Hypertension Maternal Grandmother      Stroke Maternal Grandmother      Hypertension Father      Stroke Father      Stroke Mother      Breast cancer Paternal Cousin      Prostate cancer Paternal Uncle         Review of Systems   Cardiovascular:  Positive for chest pain, claudication and palpitations. Negative for dyspnea on exertion and syncope.   Genitourinary: Negative.    Neurological: Negative.        Current Outpatient Medications on File Prior to Visit   Medication Sig    ALPRAZolam (XANAX) 0.5 MG tablet 1/2 tab po qd prn anxiety    aspirin (ECOTRIN) 81 MG EC tablet Take 81 mg by mouth.    cetirizine (ZYRTEC) 10 MG tablet Take 10 mg by mouth.    cilostazoL (PLETAL) 50 MG Tab Take 1 tablet (50 mg total) by mouth 2 (two) times daily.    lisdexamfetamine (VYVANSE) 60 MG capsule Take 1 capsule (60 mg total) by mouth every morning.    [START ON 2/14/2025] lisdexamfetamine (VYVANSE) 60 MG capsule Take 1 capsule (60 mg total) by mouth every morning.    [START ON 3/16/2025] lisdexamfetamine (VYVANSE) 60 MG capsule Take 1 capsule (60 mg total) by mouth every morning.    multivitamin capsule Take 1 capsule by mouth once daily.    omega-3 acid ethyl esters (LOVAZA) 1 gram capsule Take 1 capsule (1 g total) by mouth once daily.    pantoprazole (PROTONIX) 40 MG tablet Take 1 tablet (40 mg total) by mouth 2 (two) times daily.    rosuvastatin (CRESTOR) 20 MG tablet Take 1 tablet (20 mg total) by mouth every evening.    sumatriptan (IMITREX) 100 MG tablet Take one with onset of ha repeatin 2hours if still with ha No more than twice in a 24 hour period    topiramate (TOPAMAX) 50 MG tablet Take 1 tablet by mouth twice daily (dose correction)    valACYclovir (VALTREX)  1000 MG tablet Take 1 tablet (1,000 mg total) by mouth once daily.    valsartan (DIOVAN) 320 MG tablet Take 1 tablet (320 mg total) by mouth once daily.    warfarin (COUMADIN) 5 MG tablet Take 5 mg by mouth once daily.     No current facility-administered medications on file prior to visit.       Objective:   Objective:  Wt Readings from Last 3 Encounters:   01/30/25 106.4 kg (234 lb 9.1 oz)   01/15/25 108.5 kg (239 lb 3.2 oz)   01/08/25 108.4 kg (238 lb 15.7 oz)     Temp Readings from Last 3 Encounters:   01/15/25 97.3 °F (36.3 °C) (Tympanic)   01/08/25 98.1 °F (36.7 °C) (Temporal)   11/18/24 98 °F (36.7 °C) (Oral)     BP Readings from Last 3 Encounters:   01/30/25 (!) 146/78   01/15/25 132/80   01/08/25 (!) 144/75     Pulse Readings from Last 3 Encounters:   01/30/25 106   01/15/25 85   01/08/25 83       Physical Exam  Vitals reviewed.   Constitutional:       Appearance: She is well-developed.   Neck:      Vascular: No carotid bruit.   Cardiovascular:      Rate and Rhythm: Normal rate and regular rhythm.      Pulses: Intact distal pulses.      Heart sounds: Normal heart sounds. No murmur heard.  Pulmonary:      Breath sounds: Normal breath sounds.   Neurological:      Mental Status: She is oriented to person, place, and time.         Lab Results   Component Value Date    CHOL 100 (L) 01/15/2025    CHOL 184 07/03/2023    CHOL 116 02/24/2023     Lab Results   Component Value Date    HDL 31 (L) 01/15/2025    HDL 43 07/03/2023    HDL 34 (L) 02/24/2023     Lab Results   Component Value Date    LDLCALC 31.6 (L) 01/15/2025    LDLCALC 115 (H) 07/03/2023    LDLCALC 50 02/24/2023     Lab Results   Component Value Date    TRIG 187 (H) 01/15/2025    TRIG 148 07/03/2023    TRIG 192 (H) 02/24/2023     Lab Results   Component Value Date    CHOLHDL 31.0 01/15/2025       Chemistry        Component Value Date/Time     01/15/2025 0849    K 4.5 01/15/2025 0849     01/15/2025 0849    CO2 24 01/15/2025 0849    BUN 16  01/15/2025 0849    CREATININE 0.9 01/15/2025 0849    GLU 86 01/15/2025 0849        Component Value Date/Time    CALCIUM 10.3 01/15/2025 0849    ALKPHOS 95 01/15/2025 0849    AST 35 01/15/2025 0849    ALT 27 01/15/2025 0849    BILITOT 0.4 01/15/2025 0849    ESTGFRAFRICA 97 06/30/2020 1715    EGFRNONAA 65 08/26/2020 0946          Lab Results   Component Value Date    TSH 1.427 01/15/2025     Lab Results   Component Value Date    INR 1.9 01/30/2025    INR 1.5 01/23/2025    INR 1.5 01/16/2025     Lab Results   Component Value Date    WBC 9.62 01/15/2025    HGB 10.6 (L) 01/15/2025    HCT 35.3 (L) 01/15/2025    MCV 94 01/15/2025     01/15/2025     BNP  @LABRCNTIP(BNP,BNPTRIAGEBLO)@  CrCl cannot be calculated (Patient's most recent lab result is older than the maximum 7 days allowed.).     Imaging:  ======    No results found for this or any previous visit.    No results found for this or any previous visit.    Results for orders placed in visit on 08/30/22    X-Ray Chest PA And Lateral    No results found for this or any previous visit.    No valid procedures specified.    Results for orders placed during the hospital encounter of 01/08/25    Cardiac catheterization    Narrative  Procedure performed in the Invasive Lab  - See Procedure Log link below for nursing documentation  - See OpNote on Surgeries Tab for physician findings  - See Imaging Tab for radiologist dictation      No results found for this or any previous visit.      No results found for this or any previous visit.      Diagnostic Results:  ECG: Reviewed    The ASCVD Risk score (Melvin DK, et al., 2019) failed to calculate for the following reasons:    The valid total cholesterol range is 130 to 320 mg/dL        Assessment and Plan:   PAD (peripheral artery disease)  -     Echo; Future  -     Nuclear Stress - Cardiology Interpreted; Future  -     LIPOPROTEIN A (LPA); Future; Expected date: 01/30/2025    Pericardial effusion  -     Ambulatory  referral/consult to Cardiology  -     Echo; Future  -     Nuclear Stress - Cardiology Interpreted; Future    Palpitations  -     Cardiac Monitor - 3-15 Day Adult; Future    Suspected sleep apnea  -     Ambulatory referral/consult to Pulmonology; Future; Expected date: 02/06/2025    Morbid obesity    On anticoagulant therapy    Iliac artery occlusion      NEW PATIENT TO ME  Multiple stigmata of sleep apnea.  Stressed the importance of being evaluated for that.    Could she have had Afib?  With thromboembolism to her iliac artery with later recanalization.   We will also check lipoprotein a level  Followed and evaluated by heme Onc and rheumatology before.  On warfarin.  Follows with vascular  Reviewed all tests and above medical conditions with patient in detail and formulated treatment plan.  Risk factor modification discussed.   Cardiac low salt diet discussed.  Maintaining healthy weight and weight loss goals were discussed in clinic.  Follow-up in 3 months on results  Visit today included increased complexity associated with the care of the episodic problem unclear cause of PAD with possibility of thromboembolism and other risk factors of Afib.  Chart required careful review of multiple notes and studies from the past and personal review of recent peripheral angiogram

## 2025-02-03 LAB — LPA SERPL-MCNC: 11 MG/DL (ref 0–30)

## 2025-02-06 ENCOUNTER — TELEPHONE (OUTPATIENT)
Dept: HEMATOLOGY/ONCOLOGY | Facility: CLINIC | Age: 50
End: 2025-02-06
Payer: COMMERCIAL

## 2025-02-06 ENCOUNTER — PATIENT MESSAGE (OUTPATIENT)
Dept: PULMONOLOGY | Facility: CLINIC | Age: 50
End: 2025-02-06
Payer: COMMERCIAL

## 2025-02-06 NOTE — TELEPHONE ENCOUNTER
Spoke to pt in reference to Hem/Onc appt scheduled on 2/20 has been canceled and will need to be r/s d/t being scheduled incorrectly on Onc schedule. Appt r/s per request first available with Dr. Patino or EDEN Chappell. Notice via pt portal.

## 2025-02-07 ENCOUNTER — TELEPHONE (OUTPATIENT)
Dept: PREADMISSION TESTING | Facility: HOSPITAL | Age: 50
End: 2025-02-07
Payer: COMMERCIAL

## 2025-02-12 ENCOUNTER — HOSPITAL ENCOUNTER (OUTPATIENT)
Dept: CARDIOLOGY | Facility: HOSPITAL | Age: 50
Discharge: HOME | End: 2025-02-12
Attending: INTERNAL MEDICINE
Payer: COMMERCIAL

## 2025-02-12 ENCOUNTER — HOSPITAL ENCOUNTER (OUTPATIENT)
Dept: RADIOLOGY | Facility: HOSPITAL | Age: 50
Discharge: HOME | End: 2025-02-12
Attending: INTERNAL MEDICINE
Payer: COMMERCIAL

## 2025-02-12 VITALS
WEIGHT: 234 LBS | DIASTOLIC BLOOD PRESSURE: 78 MMHG | HEIGHT: 63 IN | BODY MASS INDEX: 41.46 KG/M2 | SYSTOLIC BLOOD PRESSURE: 146 MMHG

## 2025-02-12 DIAGNOSIS — I73.9 PAD (PERIPHERAL ARTERY DISEASE): ICD-10-CM

## 2025-02-12 DIAGNOSIS — R00.2 PALPITATIONS: ICD-10-CM

## 2025-02-12 DIAGNOSIS — I31.39 PERICARDIAL EFFUSION: ICD-10-CM

## 2025-02-12 LAB
AORTIC ROOT ANNULUS: 2.94 CM
ASCENDING AORTA: 2.74 CM
AV INDEX (PROSTH): 0.87
AV MEAN GRADIENT: 4 MMHG
AV PEAK GRADIENT: 9 MMHG
AV VALVE AREA BY VELOCITY RATIO: 2.5 CM²
AV VALVE AREA: 2.7 CM²
AV VELOCITY RATIO: 0.8
BSA FOR ECHO PROCEDURE: 2.17 M2
CV ECHO LV RWT: 0.46 CM
CV STRESS BASE HR: 88 BPM
DIASTOLIC BLOOD PRESSURE: 68 MMHG
DOP CALC AO PEAK VEL: 1.5 M/S
DOP CALC AO VTI: 28.5 CM
DOP CALC LVOT AREA: 3.1 CM2
DOP CALC LVOT DIAMETER: 2 CM
DOP CALC LVOT PEAK VEL: 1.2 M/S
DOP CALC LVOT STROKE VOLUME: 77.6 CM3
DOP CALC RVOT PEAK VEL: 0.72 M/S
DOP CALC RVOT VTI: 17.5 CM
DOP CALCLVOT PEAK VEL VTI: 24.7 CM
E WAVE DECELERATION TIME: 192 MSEC
E/A RATIO: 0.81
E/E' RATIO: 12 M/S
ECHO LV POSTERIOR WALL: 1.1 CM (ref 0.6–1.1)
EJECTION FRACTION: 60 %
FRACTIONAL SHORTENING: 35.4 % (ref 28–44)
INTERVENTRICULAR SEPTUM: 1 CM (ref 0.6–1.1)
IVRT: 94 MSEC
LA MAJOR: 6 CM
LA MINOR: 5.2 CM
LA WIDTH: 4.1 CM
LEFT ATRIUM AREA SYSTOLIC (APICAL 2 CHAMBER): 16.18 CM2
LEFT ATRIUM AREA SYSTOLIC (APICAL 4 CHAMBER): 21.76 CM2
LEFT ATRIUM SIZE: 3.8 CM
LEFT ATRIUM VOLUME INDEX MOD: 27 ML/M2
LEFT ATRIUM VOLUME INDEX: 36 ML/M2
LEFT ATRIUM VOLUME MOD: 55 ML
LEFT ATRIUM VOLUME: 74 CM3
LEFT INTERNAL DIMENSION IN SYSTOLE: 3.1 CM (ref 2.1–4)
LEFT VENTRICLE DIASTOLIC VOLUME INDEX: 51.53 ML/M2
LEFT VENTRICLE DIASTOLIC VOLUME: 106.66 ML
LEFT VENTRICLE END SYSTOLIC VOLUME APICAL 2 CHAMBER: 40.86 ML
LEFT VENTRICLE END SYSTOLIC VOLUME APICAL 4 CHAMBER: 67.82 ML
LEFT VENTRICLE MASS INDEX: 87.9 G/M2
LEFT VENTRICLE SYSTOLIC VOLUME INDEX: 18.6 ML/M2
LEFT VENTRICLE SYSTOLIC VOLUME: 38.56 ML
LEFT VENTRICULAR INTERNAL DIMENSION IN DIASTOLE: 4.8 CM (ref 3.5–6)
LEFT VENTRICULAR MASS: 181.9 G
LV LATERAL E/E' RATIO: 12.8 M/S
LV SEPTAL E/E' RATIO: 11 M/S
LVED V (TEICH): 106.66 ML
LVES V (TEICH): 38.56 ML
LVOT MG: 2.91 MMHG
LVOT MV: 0.8 CM/S
MV PEAK A VEL: 0.95 M/S
MV PEAK E VEL: 0.77 M/S
MV STENOSIS PRESSURE HALF TIME: 55.77 MS
MV VALVE AREA P 1/2 METHOD: 3.94 CM2
NUC REST EJECTION FRACTION: 68
NUC STRESS EJECTION FRACTION: 85 %
OHS CV CPX 85 PERCENT MAX PREDICTED HEART RATE MALE: 145
OHS CV CPX ESTIMATED METS: 1
OHS CV CPX MAX PREDICTED HEART RATE: 170
OHS CV CPX PATIENT IS FEMALE: 1
OHS CV CPX PATIENT IS MALE: 0
OHS CV CPX PEAK DIASTOLIC BLOOD PRESSURE: 68 MMHG
OHS CV CPX PEAK HEAR RATE: 112 BPM
OHS CV CPX PEAK RATE PRESSURE PRODUCT: NORMAL
OHS CV CPX PEAK SYSTOLIC BLOOD PRESSURE: 138 MMHG
OHS CV CPX PERCENT MAX PREDICTED HEART RATE ACHIEVED: 69
OHS CV CPX RATE PRESSURE PRODUCT PRESENTING: NORMAL
OHS CV INITIAL DOSE: 9.8 MCG/KG/MIN
OHS CV PEAK DOSE: 30 MCG/KG/MIN
OHS CV RV/LV RATIO: 0.58 CM
PISA TR MAX VEL: 2.3 M/S
PULM VEIN S/D RATIO: 1.45
PV MEAN GRADIENT: 1 MMHG
PV MV: 0.69 M/S
PV PEAK D VEL: 0.47 M/S
PV PEAK GRADIENT: 4 MMHG
PV PEAK S VEL: 0.68 M/S
PV PEAK VELOCITY: 0.95 M/S
RA MAJOR: 4.86 CM
RA PRESSURE ESTIMATED: 3 MMHG
RA WIDTH: 2.85 CM
RIGHT VENTRICLE DIASTOLIC BASEL DIMENSION: 2.8 CM
RIGHT VENTRICULAR END-DIASTOLIC DIMENSION: 2.84 CM
RV TB RVSP: 5 MMHG
SINUS: 2.8 CM
STJ: 2.3 CM
STRESS ECHO POST EXERCISE DUR SEC: 57 SECONDS
SYSTOLIC BLOOD PRESSURE: 132 MMHG
TDI LATERAL: 0.06 M/S
TDI SEPTAL: 0.07 M/S
TDI: 0.07 M/S
TR MAX PG: 21 MMHG
TRICUSPID ANNULAR PLANE SYSTOLIC EXCURSION: 2.02 CM
TV REST PULMONARY ARTERY PRESSURE: 24 MMHG
Z-SCORE OF LEFT VENTRICULAR DIMENSION IN END DIASTOLE: -2.71
Z-SCORE OF LEFT VENTRICULAR DIMENSION IN END SYSTOLE: -1.72

## 2025-02-12 PROCEDURE — 78452 HT MUSCLE IMAGE SPECT MULT: CPT

## 2025-02-12 PROCEDURE — 93016 CV STRESS TEST SUPVJ ONLY: CPT | Mod: ,,, | Performed by: INTERNAL MEDICINE

## 2025-02-12 PROCEDURE — 63600175 PHARM REV CODE 636 W HCPCS: Performed by: INTERNAL MEDICINE

## 2025-02-12 PROCEDURE — 93306 TTE W/DOPPLER COMPLETE: CPT | Mod: 26,,, | Performed by: INTERNAL MEDICINE

## 2025-02-12 PROCEDURE — 93017 CV STRESS TEST TRACING ONLY: CPT

## 2025-02-12 PROCEDURE — 78452 HT MUSCLE IMAGE SPECT MULT: CPT | Mod: 26,,, | Performed by: INTERNAL MEDICINE

## 2025-02-12 PROCEDURE — 93306 TTE W/DOPPLER COMPLETE: CPT

## 2025-02-12 PROCEDURE — 93018 CV STRESS TEST I&R ONLY: CPT | Mod: ,,, | Performed by: INTERNAL MEDICINE

## 2025-02-12 PROCEDURE — A9502 TC99M TETROFOSMIN: HCPCS | Performed by: INTERNAL MEDICINE

## 2025-02-12 RX ORDER — REGADENOSON 0.08 MG/ML
0.4 INJECTION, SOLUTION INTRAVENOUS
Status: COMPLETED | OUTPATIENT
Start: 2025-02-12 | End: 2025-02-12

## 2025-02-12 RX ADMIN — TETROFOSMIN 30 MILLICURIE: 1.38 INJECTION, POWDER, LYOPHILIZED, FOR SOLUTION INTRAVENOUS at 10:02

## 2025-02-12 RX ADMIN — TETROFOSMIN 9.8 MILLICURIE: 1.38 INJECTION, POWDER, LYOPHILIZED, FOR SOLUTION INTRAVENOUS at 07:02

## 2025-02-12 RX ADMIN — REGADENOSON 0.4 MG: 0.08 INJECTION, SOLUTION INTRAVENOUS at 09:02

## 2025-02-13 ENCOUNTER — OFFICE VISIT (OUTPATIENT)
Dept: PULMONOLOGY | Facility: CLINIC | Age: 50
End: 2025-02-13
Payer: COMMERCIAL

## 2025-02-13 DIAGNOSIS — R06.83 SNORING: Primary | ICD-10-CM

## 2025-02-13 DIAGNOSIS — F51.01 PRIMARY INSOMNIA: ICD-10-CM

## 2025-02-13 NOTE — PATIENT INSTRUCTIONS
Your provider has scheduled you for a sleep study.   You should be receiving a phone call from the sleep lab shortly after your study has been approved by your insurance. Please make sure you have your current phone numbers in the [facility] system.     If you do not hear from anyone in the next 10 business days, please call the sleep lab at [phone] to schedule your sleep study.     The sleep studies are performed at [facility] seven nights a week.        After completion of sleep study, the pulmonary /Sleep navigator coordinator  will  make you a follow up appointment with your sleep provider after they have reviewed the results     This follow up appointment will be 10-14 days after your sleep study to review the results. If it is noted that you do have sleep apnea on your initial sleep study, you may receive a call back for a second night study with the CPAP before you come back to the office.     Followup appointments for results review would be best done VIRTUAL however inperson can also be done
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
## 2025-02-14 NOTE — ASSESSMENT & PLAN NOTE
- [place] score 11  - STOP BANG: High risk of WILSON: Yes 5 - 8  - HTN, HDL, GERD, CIARA, ADHD, BMI 41.45, Headaches    Suspected sleep apnea    Plan:  - PSG  - CXR  - Follow up 4 weeks

## 2025-02-18 ENCOUNTER — RESULTS FOLLOW-UP (OUTPATIENT)
Dept: CARDIOLOGY | Facility: CLINIC | Age: 50
End: 2025-02-18

## 2025-02-28 ENCOUNTER — PATIENT MESSAGE (OUTPATIENT)
Dept: INTERNAL MEDICINE | Facility: CLINIC | Age: 50
End: 2025-02-28

## 2025-02-28 ENCOUNTER — OFFICE VISIT (OUTPATIENT)
Dept: INTERNAL MEDICINE | Facility: CLINIC | Age: 50
End: 2025-02-28
Payer: COMMERCIAL

## 2025-02-28 DIAGNOSIS — E66.01 OBESITY, CLASS III, BMI 40-49.9 (MORBID OBESITY): Primary | ICD-10-CM

## 2025-02-28 RX ORDER — TIRZEPATIDE 7.5 MG/.5ML
7.5 INJECTION, SOLUTION SUBCUTANEOUS
Qty: 2 ML | Refills: 0 | Status: SHIPPED | OUTPATIENT
Start: 2025-02-28

## 2025-02-28 RX ORDER — TIRZEPATIDE 5 MG/.5ML
5 INJECTION, SOLUTION SUBCUTANEOUS
Qty: 2 ML | Refills: 0 | Status: SHIPPED | OUTPATIENT
Start: 2025-02-28

## 2025-02-28 RX ORDER — TIRZEPATIDE 2.5 MG/.5ML
2.5 INJECTION, SOLUTION SUBCUTANEOUS
Qty: 2 ML | Refills: 0 | Status: SHIPPED | OUTPATIENT
Start: 2025-02-28

## 2025-02-28 NOTE — PROGRESS NOTES
Patient ID: Waldo Emmanuel is a 49 y.o. Black or  female    Subjective  Chief Complaint: patient presents for medical weight loss management.    Contraindications to GLP-1 receptor agonist therapy:   Denies personal or family history of MTC, personal history of MEN2, history of allergic reaction while taking a GLP-1 receptor agonist, and history of pancreatitis while taking a GLP-1 receptor agonist     Co-morbidities: HTN, DLD, metabolic syndrome    History of weight loss therapy:  Pt has previously used Ozempic for about 3 years and reported using Mounjaro (up to 15 mg) for about 6 months. Pt has not had therapy for >1 month ago. Pt denies tolerability issues and reported a loss of 20 lbs with Ozempic and 10 lbs with Mounjaro.    Weight loss history:  Starting weight:    2/24/2025   Recent Readings    Weight (lbs) 227 lb    BMI 40.21 BMI      Objective  Lab Results   Component Value Date     01/15/2025     01/08/2025     11/18/2024     Lab Results   Component Value Date    K 4.5 01/15/2025    K 3.8 01/08/2025    K 4.3 11/18/2024     Lab Results   Component Value Date     01/15/2025     01/08/2025     11/18/2024     Lab Results   Component Value Date    CO2 24 01/15/2025    CO2 25 01/08/2025    CO2 25 11/18/2024     Lab Results   Component Value Date    BUN 16 01/15/2025    BUN 16 01/08/2025    BUN 16 11/18/2024     Lab Results   Component Value Date    GLU 86 01/15/2025     (H) 01/08/2025    GLU 98 11/18/2024     Lab Results   Component Value Date    CALCIUM 10.3 01/15/2025    CALCIUM 9.4 01/08/2025    CALCIUM 9.4 11/18/2024     Lab Results   Component Value Date    PROT 8.5 (H) 01/15/2025    PROT 7.7 11/18/2024    PROT 7.8 05/22/2020     Lab Results   Component Value Date    ALBUMIN 3.9 01/15/2025    ALBUMIN 3.6 11/18/2024    ALBUMIN 4.2 07/03/2023     Lab Results   Component Value Date    BILITOT 0.4 01/15/2025    BILITOT 0.3 11/18/2024    BILITOT  0.3 07/03/2023     Lab Results   Component Value Date    AST 35 01/15/2025    AST 28 11/18/2024    AST 21 07/03/2023     Lab Results   Component Value Date    ALT 27 01/15/2025    ALT 23 11/18/2024    ALT 16 07/03/2023     Lab Results   Component Value Date    ANIONGAP 12 01/15/2025    ANIONGAP 10 01/08/2025    ANIONGAP 8 11/18/2024     Lab Results   Component Value Date    CREATININE 0.9 01/15/2025    CREATININE 0.8 01/08/2025    CREATININE 0.8 11/18/2024     Lab Results   Component Value Date    EGFRNORACEVR >60.0 01/15/2025    EGFRNORACEVR >60 01/08/2025    EGFRNORACEVR >60 11/18/2024     Assessment/Plan  -Pt qualifies for GLP-1 RA therapy based on BMI greater than or equal to 30 kg/m2  - Initiate Zepbound 2.5 mg SQ weekly x 4 weeks  - Then increase to Zepbound 5 mg SQ weekly x 4 weeks  - Then increase to Zepbound 7.5 mg SQ weekly  - RTC in 3 months for follow-up evaluation     Patient consented to pharmacist management via collaborative practice.

## 2025-03-11 PROBLEM — E66.01 OBESITY, CLASS III, BMI 40-49.9 (MORBID OBESITY): Status: ACTIVE | Noted: 2025-03-11

## 2025-03-11 PROBLEM — E66.813 OBESITY, CLASS III, BMI 40-49.9 (MORBID OBESITY): Status: ACTIVE | Noted: 2025-03-11

## 2025-03-14 ENCOUNTER — TELEPHONE (OUTPATIENT)
Dept: PREADMISSION TESTING | Facility: HOSPITAL | Age: 50
End: 2025-03-14
Payer: COMMERCIAL

## 2025-03-18 ENCOUNTER — LAB VISIT (OUTPATIENT)
Dept: LAB | Facility: HOSPITAL | Age: 50
End: 2025-03-18
Attending: INTERNAL MEDICINE
Payer: COMMERCIAL

## 2025-03-18 ENCOUNTER — TELEPHONE (OUTPATIENT)
Dept: HEMATOLOGY/ONCOLOGY | Facility: CLINIC | Age: 50
End: 2025-03-18
Payer: COMMERCIAL

## 2025-03-18 ENCOUNTER — OFFICE VISIT (OUTPATIENT)
Dept: HEMATOLOGY/ONCOLOGY | Facility: CLINIC | Age: 50
End: 2025-03-18
Payer: COMMERCIAL

## 2025-03-18 DIAGNOSIS — I74.5 ILIAC ARTERY OCCLUSION: Primary | ICD-10-CM

## 2025-03-18 DIAGNOSIS — I74.5 ILIAC ARTERY OCCLUSION: ICD-10-CM

## 2025-03-18 LAB
BASOPHILS # BLD AUTO: 0.04 K/UL (ref 0–0.2)
BASOPHILS NFR BLD: 0.3 % (ref 0–1.9)
DIFFERENTIAL METHOD BLD: ABNORMAL
EOSINOPHIL # BLD AUTO: 0.2 K/UL (ref 0–0.5)
EOSINOPHIL NFR BLD: 1.9 % (ref 0–8)
ERYTHROCYTE [DISTWIDTH] IN BLOOD BY AUTOMATED COUNT: 13.6 % (ref 11.5–14.5)
HCT VFR BLD AUTO: 37.7 % (ref 37–48.5)
HGB BLD-MCNC: 11.6 G/DL (ref 12–16)
IMM GRANULOCYTES # BLD AUTO: 0.03 K/UL (ref 0–0.04)
IMM GRANULOCYTES NFR BLD AUTO: 0.3 % (ref 0–0.5)
INR PPP: 2.4 (ref 0.8–1.2)
LYMPHOCYTES # BLD AUTO: 4.9 K/UL (ref 1–4.8)
LYMPHOCYTES NFR BLD: 42.2 % (ref 18–48)
MCH RBC QN AUTO: 28.6 PG (ref 27–31)
MCHC RBC AUTO-ENTMCNC: 30.8 G/DL (ref 32–36)
MCV RBC AUTO: 93 FL (ref 82–98)
MONOCYTES # BLD AUTO: 0.4 K/UL (ref 0.3–1)
MONOCYTES NFR BLD: 3.4 % (ref 4–15)
NEUTROPHILS # BLD AUTO: 6 K/UL (ref 1.8–7.7)
NEUTROPHILS NFR BLD: 51.9 % (ref 38–73)
NRBC BLD-RTO: 0 /100 WBC
PLATELET # BLD AUTO: 390 K/UL (ref 150–450)
PMV BLD AUTO: 9.2 FL (ref 9.2–12.9)
PROTHROMBIN TIME: 24.5 SEC (ref 9–12.5)
RBC # BLD AUTO: 4.06 M/UL (ref 4–5.4)
WBC # BLD AUTO: 11.49 K/UL (ref 3.9–12.7)

## 2025-03-18 PROCEDURE — 36415 COLL VENOUS BLD VENIPUNCTURE: CPT | Performed by: INTERNAL MEDICINE

## 2025-03-18 PROCEDURE — 85025 COMPLETE CBC W/AUTO DIFF WBC: CPT | Performed by: INTERNAL MEDICINE

## 2025-03-18 PROCEDURE — 85610 PROTHROMBIN TIME: CPT | Performed by: INTERNAL MEDICINE

## 2025-03-18 NOTE — TELEPHONE ENCOUNTER
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  I need those lab records and the reports for the Venous US she had done 01/04/2020.She is on warfarin so I'll refer her to the Coumadin clinic as well.Thanks!

## 2025-03-18 NOTE — PROGRESS NOTES
[facility]   DORIS Morelos  Phone: [phone];  Fax: [phone]    Patient ID: Waldo Emmanuel   Reason for Visit: Establish Care  MRN:  [id number]     Hematologic Diagnosis:  Arterial Thrombosis  Previous Treatment:  Apixaban   Current Treatment:  Warfarin  Subjective   Waldo Emmanuel is a 49 y.o. female with Essential hypertension, HLD, ADHD, GERD, Anemia, CIARA, G6 PD deficiency and Dysmetabolic Syndrome X who presents to clinic to establish care for history of arterial thrombosis.    She was previously followed at [facility] for thrombocytosis and leukocytosis - CLL ruled out.  In 2019, She was found to have right lower extremity DVT and was initially treated with the apixaban.  For ease of dosing she will switch to Xarelto.  On review of the medical record, it is noted that she had progressive discomfort in the right lower extremity and diminished dorsalis pedis and posterior tibial pulses of the right lower extremity.  Imaging revealed complete occlusion of the right common iliac artery and external iliac artery.  At that point she will switch to anticoagulation with warfarin which improved her symptoms.    On discussion, she reports that she has had multiple miscarriages ?on heparin.  She also states that she continues to have extremity pain especially when walking on concrete.  This is the only thrombosis that she has had.  Also, she reports that she had genetic testing last year and has G6PD deficiency.  She will need referral to the Coumadin clinic. She has no acute complaints.     She has no family history of clotting disorders that she is aware of.  She does not smoke cigarettes or drink alcohol.  No smoking or alcohol use    Review of Systems   Constitutional:  Negative for activity change, appetite change, diaphoresis, fatigue and fever.   HENT:  Negative for nosebleeds.    Respiratory:  Negative for shortness of breath.    Cardiovascular:   Negative for chest pain.   Gastrointestinal:  Negative for abdominal distention, abdominal pain, blood in stool, constipation, diarrhea, nausea and vomiting.   Genitourinary:  Negative for difficulty urinating and hematuria.   Skin:  Negative for rash.   Neurological:  Negative for weakness and headaches.   Hematological:  Does not bruise/bleed easily.     History     Oncology History    No history exists.         Past Medical History:   Diagnosis Date    ADHD (attention deficit hyperactivity disorder)     Anemia     Fibroids     Genital herpes     GERD (gastroesophageal reflux disease)     H/O total hysterectomy 2021    Hypertension     Labral tear of hip joint     Ovarian cyst     Right common arterial occlusion     Routine general medical examination at a [facility] facility 2017    Total Right Arterial Occlusion        Past Surgical History:   Procedure Laterality Date    ANGIOGRAM, ABDOMINAL AORTA W/ EXTREMITY RUNOFF N/A 2025    Procedure: Angiogram, Abdominal Aorta W/ Extremity Runoff: Right lower extremity angiogram;  Surgeon: Lennox Zendejas MD;  Location: [facility] CATH LAB;  Service: Vascular;  Laterality: N/A;    CARPAL TUNNEL RELEASE       SECTION      CHOLECYSTECTOMY      DILATION AND CURETTAGE OF UTERUS      TOTAL ABDOMINAL HYSTERECTOMY W/ BILATERAL SALPINGOOPHORECTOMY  2021    menorrhagia       Family History   Problem Relation Name Age of Onset    Hypertension Maternal Grandmother      Stroke Maternal Grandmother      Hypertension Father      Stroke Father      Stroke Mother      Breast cancer Paternal Cousin      Prostate cancer Paternal Uncle         Review of patient's allergies indicates:   Allergen Reactions    Metformin Diarrhea     Other reaction(s): Diarrhae       Social History[1]    Labs   Labs:  Lab Visit on 2025   Component Date Value Ref Range Status    Prothrombin Time 2025 24.5 (H)  9.0 - 12.5 sec Final    INR 2025 2.4 (H)  0.8 - 1.2 Final     Comment: Coumadin Therapy:  2.0 - 3.0 for INR for all indicators except mechanical heart valves  and antiphospholipid syndromes which should use 2.5 - 3.5.      WBC 03/18/2025 11.49  3.90 - 12.70 K/uL Final    RBC 03/18/2025 4.06  4.00 - 5.40 M/uL Final    Hemoglobin 03/18/2025 11.6 (L)  12.0 - 16.0 g/dL Final    Hematocrit 03/18/2025 37.7  37.0 - 48.5 % Final    MCV 03/18/2025 93  82 - 98 fL Final    MCH 03/18/2025 28.6  27.0 - 31.0 pg Final    MCHC 03/18/2025 30.8 (L)  32.0 - 36.0 g/dL Final    RDW 03/18/2025 13.6  11.5 - 14.5 % Final    Platelets 03/18/2025 390  150 - 450 K/uL Final    MPV 03/18/2025 9.2  9.2 - 12.9 fL Final    Immature Granulocytes 03/18/2025 0.3  0.0 - 0.5 % Final    Gran # (ANC) 03/18/2025 6.0  1.8 - 7.7 K/uL Final    Immature Grans (Abs) 03/18/2025 0.03  0.00 - 0.04 K/uL Final    Comment: Mild elevation in immature granulocytes is non specific and   can be seen in a variety of conditions including stress response,   acute inflammation, trauma and pregnancy. Correlation with other   laboratory and clinical findings is essential.      Lymph # 03/18/2025 4.9 (H)  1.0 - 4.8 K/uL Final    Mono # 03/18/2025 0.4  0.3 - 1.0 K/uL Final    Eos # 03/18/2025 0.2  0.0 - 0.5 K/uL Final    Baso # 03/18/2025 0.04  0.00 - 0.20 K/uL Final    nRBC 03/18/2025 0  0 /100 WBC Final    Gran % 03/18/2025 51.9  38.0 - 73.0 % Final    Lymph % 03/18/2025 42.2  18.0 - 48.0 % Final    Mono % 03/18/2025 3.4 (L)  4.0 - 15.0 % Final    Eosinophil % 03/18/2025 1.9  0.0 - 8.0 % Final    Basophil % 03/18/2025 0.3  0.0 - 1.9 % Final    Differential Method 03/18/2025 Automated   Final      Imaging   CTA Abdomen and Pelvis  - 11/18/2024  Impression:  Severe stricture seen involving the right common iliac artery with trickle of flow extending into the right internal iliac artery which appears patent.  Occlusion of the right external iliac artery and common femoral artery with recanalization via collaterals to the right  superficial femoral artery which appears patent. Right-sided profundus femoris is patent.  Collaterals to the right lower extremity are from lumbar and hypogastric branches as well as from the right inferior epigastric and circumflex iliac arteries.     US Lower Extremity Veins Right - 11/18/2024  Impression:  No evidence of deep venous thrombosis in the right lower extremity.    CT Angiogram Abdomen Pelvis   CT Angiogram Chest - 06/30/2020  Impression  1. Complete occlusion of the right common iliac artery and external iliac artery. There is reconstitution of flow in the right CFA and internal iliac arteries via collaterals. Left iliacs are patent.  2. No aortic dissection or aneurysm    Assessment and Plan   Hx of DVT  Chronic Iliac artery occlusion  She has established with vascular surgery and it is not clear why she had an arterial thrombosis; Hx R IR angiogram with discussion about possible surgery  Will obtain outside records  Per review of the medical record previous thrombophilia workup negative, however patient recommended for indefinite anticoagulation given recurrent nature and extent of previous thrombosis with possible chronicity  Recommend continuing with indefinite anticoagulation with warfarin  Referral placed to anticoagulation clinic for management and monitoring of PT INR levels      Cancer Screening  MMG 12/17/2024: BI-RADS Category 1: Negative   PAP Smear: Hx of DONNA/BSO 8/2/21   Colonoscopy: Due      Chronic Medical Conditions  Essential hypertension, malignant   HLD  ADHD  GERD  Anemia  CIARA  Dysmetabolic Syndrome X        Med Onc Chart Routing      Follow up with physician    Follow up with [facility] 1 year.   Infusion scheduling note    Injection scheduling note    Labs    Imaging    Pharmacy appointment    Other referrals                      The patient was seen, interviewed and examined. Pertinent lab and radiologic studies were reviewed. Pt instructed to call should they develop concerning  signs/symptoms or have further questions.        Portions of the record may have been created with voice recognition software. Occasional wrong-word or sound-a-like substitutions may have occurred due to the inherent limitations of voice recognition software. Read the chart carefully and recognize, using context, where substitutions have occurred.      Shwetha Patino MD    Hematology/Oncology              [1]   Social History  Tobacco Use    Smoking status: Never    Smokeless tobacco: Never   Substance Use Topics    Alcohol use: Yes     Comment: Social - Rare     Drug use: No

## 2025-03-20 ENCOUNTER — ANTI-COAG VISIT (OUTPATIENT)
Dept: CARDIOLOGY | Facility: CLINIC | Age: 50
End: 2025-03-20
Payer: COMMERCIAL

## 2025-03-20 ENCOUNTER — RESULTS FOLLOW-UP (OUTPATIENT)
Dept: HEMATOLOGY/ONCOLOGY | Facility: CLINIC | Age: 50
End: 2025-03-20

## 2025-03-20 DIAGNOSIS — I74.5 ILIAC ARTERY OCCLUSION: Primary | ICD-10-CM

## 2025-03-20 NOTE — PROGRESS NOTES
48 y/o referred to  for warfarin monitoirng 2/2 to DVT, recurrent/failed Xarelto.  Per Hem/On notes: 2019, the patient developed right lower extremity pain on ambulation. She had no swelling or skin changes. Found to have a right lower extremity DVT. She was initiated on apixaban therapy. She was subsequently switched to rivaroxaban for ease of once daily dosing. However while on rivaroxaban she had imaging that unfortunately revealed complete occlusion of the right common iliac artery and external iliac artery. Was then switched to warfarin therapy .  She was recently followed at [facility].  PMHx HTN, HLD, GERD, CIARA, obesity.  She reports her current dose of 2.5 mg on T/R and 5 mg all other days, will confirm.  LVMm x 2 and sent portal message.  Will await return call or message.

## 2025-03-27 ENCOUNTER — ANTI-COAG VISIT (OUTPATIENT)
Dept: CARDIOLOGY | Facility: CLINIC | Age: 50
End: 2025-03-27
Payer: COMMERCIAL

## 2025-03-27 DIAGNOSIS — Z79.01 LONG TERM (CURRENT) USE OF ANTICOAGULANTS: Primary | ICD-10-CM

## 2025-03-27 DIAGNOSIS — I74.5 ILIAC ARTERY OCCLUSION: ICD-10-CM

## 2025-03-27 LAB
CTP QC/QA: YES
INR PPP: 2.8 (ref 2–3)

## 2025-03-27 PROCEDURE — 85610 PROTHROMBIN TIME: CPT | Mod: QW,S$GLB,, | Performed by: INTERNAL MEDICINE

## 2025-03-27 NOTE — PROGRESS NOTES
Patient's INR is therapeutic at 2.8.  This is patient's initial Coumadin Clinic visit. Patient reports no changes. Coumadin Diet and Therapy reviewed with patient. Patient verbalizes understanding. Sent to PharmD for dosing/instructions.     Taking Coumadin   Coumadin (warfarin) helps keep your blood from clotting. But it also increases your risk for bleeding. Because of this, it must be taken exactly as directed. You also need to protect yourself from injury.      Follow These Tips   · Take Coumadin at the same time each day. If you miss a dose, take it as soon as you remember (if less then 4 hours); otherwise, if it's almost time for your next dose, skip the missed dose. Do not take a double dose.   · Go for your blood (protime/INR) tests as often as directed. Note that diet and   · medication can affect your protime/INR level.             REMEMBER:   When value decreases from therapeutic range, the blood                                           gets thicker and when value increases, the blood gets thinner.                                      · Don't take any other medications without checking with your healthcare provider first. This includes aspirin, vitamins, and herbal and other dietary supplements.   · Tell all healthcare providers that you take Coumadin. It's also a good idea to carry a medical ID card or wear a medical-alert bracelet.   · Use a soft toothbrush and an electric razor.   · Don't go barefoot. And don't trim corns or calluses yourself.  ·    When to Call Your Healthcare Provider   Call your healthcare provider right away before you take your next dose of Coumadin if you have any of these problems:   · Bleeding that doesn't stop in 10 minutes   · A heavier-than-normal period or bleeding between periods   · Coughing or throwing up blood   · Diarrhea or bleeding hemorrhoids   · Dark urine or black stools   · Red or black-and-blue marks on the skin that get larger   · A fever or an illness that  gets worse   · Dizziness or fatigue   · Chest pain or trouble breathing   · A serious fall or a blow to the head     Keep Your Diet Steady   Keep your diet pretty much the same each day. That's because many foods contain vitamin K. Vitamin K helps your blood clot. So eating foods that contain vitamin K can affect the way Coumadin works. You don't need to avoid foods that have vitamin K. But you do need to keep the amount of them you eat steady (about the same day to day). If you change your diet for any reason, such as due to illness or to lose weight, be sure to tell your doctor.      · Examples of foods high in vitamin K are brussels sprouts, avocado, broccoli, cabbage, coleslaw, mustard greens, ben greens, turnip greens, kale, spinach, ranjana lettuce, and some other leafy green vegetables. Foods that are mixed with mayonnaise, such as tuna, chicken, and potato salads.  Oils, such as soybean, canola, and Vegetable oils, are also high in vitamin K.         · Other food products can affect the way Coumadin works in your body

## 2025-03-31 ENCOUNTER — PATIENT MESSAGE (OUTPATIENT)
Dept: PULMONOLOGY | Facility: CLINIC | Age: 50
End: 2025-03-31
Payer: COMMERCIAL

## 2025-04-17 ENCOUNTER — PATIENT MESSAGE (OUTPATIENT)
Dept: ADMINISTRATIVE | Facility: OTHER | Age: 50
End: 2025-04-17
Payer: COMMERCIAL

## 2025-04-17 ENCOUNTER — ANTI-COAG VISIT (OUTPATIENT)
Dept: CARDIOLOGY | Facility: CLINIC | Age: 50
End: 2025-04-17
Payer: COMMERCIAL

## 2025-04-17 DIAGNOSIS — Z79.01 LONG TERM (CURRENT) USE OF ANTICOAGULANTS: Primary | ICD-10-CM

## 2025-04-17 DIAGNOSIS — I74.5 ILIAC ARTERY OCCLUSION: ICD-10-CM

## 2025-04-17 LAB
CTP QC/QA: YES
INR PPP: 4.9 (ref 2–3)

## 2025-04-17 PROCEDURE — 85610 PROTHROMBIN TIME: CPT | Mod: QW,S$GLB,, | Performed by: INTERNAL MEDICINE

## 2025-04-17 NOTE — PROGRESS NOTES
INR not at goal. Medications, chart, and patient findings reviewed. See calendar for adjustments to dose and follow up plan.  Jump in INR explained by alcohol intake, avoid or reduce as bleeding risk increases with ETOH.  ER with any bleeding.  Hold today and lower dose tomorrow to 2.5 mg then resume per calendar.  Repeat INR in 2 weeks.

## 2025-04-17 NOTE — PROGRESS NOTES
Patient's INR is supra-therapeutic at 4.9.  Patient reports she consumed alcoholic beverages. Advised patient of increased risk of bleeding;signs/symptoms of bleeding and need to go to ED if she experiences any. Patient reports she is not having any signs/symptoms of bleeding. Sent to PharmD for dosing/instructions.

## 2025-04-22 ENCOUNTER — LAB VISIT (OUTPATIENT)
Dept: LAB | Facility: HOSPITAL | Age: 50
End: 2025-04-22
Attending: INTERNAL MEDICINE
Payer: COMMERCIAL

## 2025-04-22 DIAGNOSIS — I10 ESSENTIAL HYPERTENSION, MALIGNANT: ICD-10-CM

## 2025-04-22 DIAGNOSIS — E88.810 DYSMETABOLIC SYNDROME X: ICD-10-CM

## 2025-04-22 DIAGNOSIS — E78.49 OTHER HYPERLIPIDEMIA: ICD-10-CM

## 2025-04-22 LAB
ALBUMIN SERPL BCP-MCNC: 3.5 G/DL (ref 3.5–5.2)
ALBUMIN/CREAT UR: 11.1 UG/MG
ALP SERPL-CCNC: 109 UNIT/L (ref 40–150)
ALT SERPL W/O P-5'-P-CCNC: 20 UNIT/L (ref 10–44)
ANION GAP (OHS): 8 MMOL/L (ref 8–16)
AST SERPL-CCNC: 27 UNIT/L (ref 11–45)
BILIRUB SERPL-MCNC: 0.5 MG/DL (ref 0.1–1)
BUN SERPL-MCNC: 15 MG/DL (ref 6–20)
CALCIUM SERPL-MCNC: 9.3 MG/DL (ref 8.7–10.5)
CHLORIDE SERPL-SCNC: 105 MMOL/L (ref 95–110)
CHOLEST SERPL-MCNC: 102 MG/DL (ref 120–199)
CHOLEST/HDLC SERPL: 3.4 {RATIO} (ref 2–5)
CO2 SERPL-SCNC: 27 MMOL/L (ref 23–29)
CREAT SERPL-MCNC: 0.8 MG/DL (ref 0.5–1.4)
CREAT UR-MCNC: 208 MG/DL (ref 15–325)
EAG (OHS): 108 MG/DL (ref 68–131)
GFR SERPLBLD CREATININE-BSD FMLA CKD-EPI: >60 ML/MIN/1.73/M2
GLUCOSE SERPL-MCNC: 97 MG/DL (ref 70–110)
HBA1C MFR BLD: 5.4 % (ref 4–5.6)
HDLC SERPL-MCNC: 30 MG/DL (ref 40–75)
HDLC SERPL: 29.4 % (ref 20–50)
INSULIN SERPL-ACNC: 16 UU/ML
LDLC SERPL CALC-MCNC: 49.8 MG/DL (ref 63–159)
MICROALBUMIN UR-MCNC: 23 UG/ML (ref ?–5000)
NONHDLC SERPL-MCNC: 72 MG/DL
POTASSIUM SERPL-SCNC: 4.2 MMOL/L (ref 3.5–5.1)
PROT SERPL-MCNC: 7.7 GM/DL (ref 6–8.4)
SODIUM SERPL-SCNC: 140 MMOL/L (ref 136–145)
TRIGL SERPL-MCNC: 111 MG/DL (ref 30–150)

## 2025-04-22 PROCEDURE — 82435 ASSAY OF BLOOD CHLORIDE: CPT

## 2025-04-22 PROCEDURE — 83525 ASSAY OF INSULIN: CPT

## 2025-04-22 PROCEDURE — 36415 COLL VENOUS BLD VENIPUNCTURE: CPT

## 2025-04-22 PROCEDURE — 82985 ASSAY OF GLYCATED PROTEIN: CPT

## 2025-04-22 PROCEDURE — 83036 HEMOGLOBIN GLYCOSYLATED A1C: CPT

## 2025-04-22 PROCEDURE — 82465 ASSAY BLD/SERUM CHOLESTEROL: CPT

## 2025-04-22 PROCEDURE — 82043 UR ALBUMIN QUANTITATIVE: CPT

## 2025-04-24 ENCOUNTER — TELEPHONE (OUTPATIENT)
Dept: PREADMISSION TESTING | Facility: HOSPITAL | Age: 50
End: 2025-04-24
Payer: COMMERCIAL

## 2025-04-25 ENCOUNTER — E-CONSULT (OUTPATIENT)
Dept: CARDIOLOGY | Facility: CLINIC | Age: 50
End: 2025-04-25
Payer: COMMERCIAL

## 2025-04-25 DIAGNOSIS — Z01.810 PREOP CARDIOVASCULAR EXAM: Primary | ICD-10-CM

## 2025-04-25 LAB — W FRUCTOSAMINE: 159 UMOL /L

## 2025-04-25 NOTE — CONSULTS
[facility]  Response for E-Consult     Patient Name: Waldo Emmanuel  MRN: [id number]  Primary Care Provider: Ansley Vivas MD   Requesting Provider: Ana Paula Evans FNP  E-Consult to General Cardiology  Consult performed by: Adan Denney MD  Consult ordered by: Ana Paula Evans FNP          E consult for preop clearance of colonoscopy  The chart reviewed.  PMH PAD HTN HLD  02/25 phar MPI showed no ischemia      Plan  Elevated periop risk of CV events for non-high risk procedure.  Ok to proceed the scheduled procedure without further cardiac study.  OK to hold ASA Pletal coumadin 5 days before the procedure and resume postop once hemodynamically stable      Total time of Consultation: 10 minute    I did not speak to the requesting provider verbally about this.     *This eConsult is based on the clinical data available to me and is furnished without benefit of a physical examination. The eConsult will need to be interpreted in light of any clinical issues or changes in patient status not available to me at the time of filing this eConsults. Significant changes in patient condition or level of acuity should result in immediate formal consultation and reevaluation. Please alert me if you have further questions.    Thank you for this eConsult referral.     Adan Denney MD  [facility]

## 2025-05-05 ENCOUNTER — ANTI-COAG VISIT (OUTPATIENT)
Dept: CARDIOLOGY | Facility: CLINIC | Age: 50
End: 2025-05-05
Payer: COMMERCIAL

## 2025-05-05 ENCOUNTER — HOSPITAL ENCOUNTER (EMERGENCY)
Facility: HOSPITAL | Age: 50
Discharge: ANOTHER HOSPITAL | End: 2025-05-06
Attending: EMERGENCY MEDICINE
Payer: COMMERCIAL

## 2025-05-05 DIAGNOSIS — R55 SYNCOPE, UNSPECIFIED SYNCOPE TYPE: Primary | ICD-10-CM

## 2025-05-05 DIAGNOSIS — Z79.01 LONG TERM (CURRENT) USE OF ANTICOAGULANTS: Primary | ICD-10-CM

## 2025-05-05 DIAGNOSIS — I62.9 INTRACRANIAL HEMORRHAGE: ICD-10-CM

## 2025-05-05 DIAGNOSIS — I74.5 ILIAC ARTERY OCCLUSION: ICD-10-CM

## 2025-05-05 LAB
BILIRUB UR QL STRIP.AUTO: NEGATIVE
CLARITY UR: CLEAR
COLOR UR AUTO: COLORLESS
CTP QC/QA: YES
GLUCOSE UR QL STRIP: ABNORMAL
HGB UR QL STRIP: NEGATIVE
INR PPP: 2.7 (ref 2–3)
KETONES UR QL STRIP: ABNORMAL
LEUKOCYTE ESTERASE UR QL STRIP: NEGATIVE
NITRITE UR QL STRIP: NEGATIVE
PH UR STRIP: 6 [PH]
PROT UR QL STRIP: NEGATIVE
SP GR UR STRIP: 1.01
UROBILINOGEN UR STRIP-ACNC: NEGATIVE EU/DL

## 2025-05-05 PROCEDURE — 93793 ANTICOAG MGMT PT WARFARIN: CPT | Mod: S$GLB,,,

## 2025-05-05 PROCEDURE — 25000003 PHARM REV CODE 250: Performed by: EMERGENCY MEDICINE

## 2025-05-05 PROCEDURE — 86803 HEPATITIS C AB TEST: CPT | Performed by: EMERGENCY MEDICINE

## 2025-05-05 PROCEDURE — 87389 HIV-1 AG W/HIV-1&-2 AB AG IA: CPT | Performed by: EMERGENCY MEDICINE

## 2025-05-05 PROCEDURE — 96375 TX/PRO/DX INJ NEW DRUG ADDON: CPT

## 2025-05-05 PROCEDURE — 85610 PROTHROMBIN TIME: CPT | Mod: QW,S$GLB,, | Performed by: INTERNAL MEDICINE

## 2025-05-05 PROCEDURE — 81003 URINALYSIS AUTO W/O SCOPE: CPT | Performed by: EMERGENCY MEDICINE

## 2025-05-05 PROCEDURE — 99291 CRITICAL CARE FIRST HOUR: CPT

## 2025-05-05 PROCEDURE — 99292 CRITICAL CARE ADDL 30 MIN: CPT

## 2025-05-05 PROCEDURE — 63600175 PHARM REV CODE 636 W HCPCS: Performed by: EMERGENCY MEDICINE

## 2025-05-05 RX ORDER — MORPHINE SULFATE 4 MG/ML
4 INJECTION, SOLUTION INTRAMUSCULAR; INTRAVENOUS
Refills: 0 | Status: COMPLETED | OUTPATIENT
Start: 2025-05-05 | End: 2025-05-05

## 2025-05-05 RX ORDER — ONDANSETRON HYDROCHLORIDE 2 MG/ML
4 INJECTION, SOLUTION INTRAVENOUS
Status: COMPLETED | OUTPATIENT
Start: 2025-05-05 | End: 2025-05-05

## 2025-05-05 RX ORDER — AMLODIPINE BESYLATE 5 MG/1
10 TABLET ORAL
Status: COMPLETED | OUTPATIENT
Start: 2025-05-05 | End: 2025-05-05

## 2025-05-05 RX ADMIN — ONDANSETRON 4 MG: 2 INJECTION INTRAMUSCULAR; INTRAVENOUS at 11:05

## 2025-05-05 RX ADMIN — AMLODIPINE BESYLATE 10 MG: 5 TABLET ORAL at 11:05

## 2025-05-05 RX ADMIN — MORPHINE SULFATE 4 MG: 4 INJECTION INTRAVENOUS at 11:05

## 2025-05-05 NOTE — PROGRESS NOTES
Patient's INR is therapeutic at 2.7. Patient reports no changes. Instructions given: Continue warfarin 2.5 mg on Tuesdays and Thursdays; and 5 mg all other days. Recheck on 5/26/25. Patient verbalizes understanding. Patient declined calendar.

## 2025-05-06 ENCOUNTER — HOSPITAL ENCOUNTER (INPATIENT)
Facility: HOSPITAL | Age: 50
LOS: 20 days | Discharge: NURSING, REHAB OR LONG-TERM CARE | DRG: 020 | End: 2025-05-26
Attending: EMERGENCY MEDICINE | Admitting: PSYCHIATRY & NEUROLOGY
Payer: COMMERCIAL

## 2025-05-06 ENCOUNTER — ANESTHESIA (OUTPATIENT)
Dept: INTERVENTIONAL RADIOLOGY/VASCULAR | Facility: HOSPITAL | Age: 50
End: 2025-05-06
Payer: COMMERCIAL

## 2025-05-06 VITALS
OXYGEN SATURATION: 100 % | DIASTOLIC BLOOD PRESSURE: 64 MMHG | HEART RATE: 113 BPM | HEIGHT: 63 IN | SYSTOLIC BLOOD PRESSURE: 131 MMHG | TEMPERATURE: 98 F | WEIGHT: 222 LBS | BODY MASS INDEX: 39.34 KG/M2 | RESPIRATION RATE: 24 BRPM

## 2025-05-06 DIAGNOSIS — I61.9 NONTRAUMATIC INTRACEREBRAL HEMORRHAGE, UNSPECIFIED CEREBRAL LOCATION, UNSPECIFIED LATERALITY: Primary | ICD-10-CM

## 2025-05-06 DIAGNOSIS — I49.9 ARRHYTHMIA: ICD-10-CM

## 2025-05-06 DIAGNOSIS — G93.6 BRAIN EDEMA: ICD-10-CM

## 2025-05-06 DIAGNOSIS — G93.5 BRAIN COMPRESSION: ICD-10-CM

## 2025-05-06 DIAGNOSIS — Z86.718 HISTORY OF DVT IN ADULTHOOD: ICD-10-CM

## 2025-05-06 DIAGNOSIS — I60.6 NONTRAUMATIC SUBARACHNOID HEMORRHAGE FROM OTHER INTRACRANIAL ARTERIES: ICD-10-CM

## 2025-05-06 DIAGNOSIS — I63.9 CEREBELLAR STROKE, ACUTE: ICD-10-CM

## 2025-05-06 DIAGNOSIS — R00.0 TACHYCARDIA: ICD-10-CM

## 2025-05-06 DIAGNOSIS — I70.90 ARTERIAL CALCIFICATION: ICD-10-CM

## 2025-05-06 DIAGNOSIS — I67.1 BRAIN ANEURYSM: ICD-10-CM

## 2025-05-06 DIAGNOSIS — I60.9 SAH (SUBARACHNOID HEMORRHAGE): ICD-10-CM

## 2025-05-06 DIAGNOSIS — G93.40 ACUTE ENCEPHALOPATHY: ICD-10-CM

## 2025-05-06 DIAGNOSIS — I72.9 ANEURYSM: ICD-10-CM

## 2025-05-06 DIAGNOSIS — G91.1 OBSTRUCTIVE HYDROCEPHALUS: ICD-10-CM

## 2025-05-06 DIAGNOSIS — I70.209 ARTERIAL OCCLUSION, LOWER EXTREMITY: ICD-10-CM

## 2025-05-06 DIAGNOSIS — I10 ESSENTIAL HYPERTENSION: ICD-10-CM

## 2025-05-06 PROBLEM — Z97.8 ENDOTRACHEAL TUBE PRESENT: Status: ACTIVE | Noted: 2025-05-06

## 2025-05-06 LAB
ABSOLUTE EOSINOPHIL (OHS): 0.01 K/UL
ABSOLUTE MONOCYTE (OHS): 0.49 K/UL (ref 0.3–1)
ABSOLUTE NEUTROPHIL COUNT (OHS): 13.52 K/UL (ref 1.8–7.7)
ALBUMIN SERPL BCP-MCNC: 3.9 G/DL (ref 3.5–5.2)
ALLENS TEST: ABNORMAL
ALLENS TEST: ABNORMAL
ALP SERPL-CCNC: 118 UNIT/L (ref 40–150)
ALT SERPL W/O P-5'-P-CCNC: 20 UNIT/L (ref 10–44)
ANION GAP (OHS): 13 MMOL/L (ref 8–16)
AST SERPL-CCNC: 26 UNIT/L (ref 11–45)
BACTERIA #/AREA URNS AUTO: ABNORMAL /HPF
BASOPHILS # BLD AUTO: 0.02 K/UL
BASOPHILS NFR BLD AUTO: 0.1 %
BILIRUB SERPL-MCNC: 0.3 MG/DL (ref 0.1–1)
BILIRUB UR QL STRIP.AUTO: NEGATIVE
BIPAP: 0
BNP SERPL-MCNC: <10 PG/ML (ref 0–99)
BUN SERPL-MCNC: 12 MG/DL (ref 6–20)
CALCIUM SERPL-MCNC: 9.5 MG/DL (ref 8.7–10.5)
CHLORIDE SERPL-SCNC: 102 MMOL/L (ref 95–110)
CLARITY UR: CLEAR
CO2 SERPL-SCNC: 22 MMOL/L (ref 23–29)
COLOR UR AUTO: COLORLESS
CORRECTED TEMPERATURE (PCO2): 40.2 MMHG
CORRECTED TEMPERATURE (PH): 7.36
CORRECTED TEMPERATURE (PO2): 85.5 MMHG
CREAT SERPL-MCNC: 0.7 MG/DL (ref 0.5–1.4)
DELSYS: ABNORMAL
DELSYS: ABNORMAL
ERYTHROCYTE [DISTWIDTH] IN BLOOD BY AUTOMATED COUNT: 13.3 % (ref 11.5–14.5)
ERYTHROCYTE [SEDIMENTATION RATE] IN BLOOD BY WESTERGREN METHOD: 20 MM/H
ERYTHROCYTE [SEDIMENTATION RATE] IN BLOOD BY WESTERGREN METHOD: 20 MM/H
FIO2: 100 %
FIO2: 60
FIO2: 90
GFR SERPLBLD CREATININE-BSD FMLA CKD-EPI: >60 ML/MIN/1.73/M2
GLUCOSE SERPL-MCNC: 156 MG/DL (ref 70–110)
GLUCOSE UR QL STRIP: ABNORMAL
HCO3 UR-SCNC: 22.1 MMOL/L (ref 24–28)
HCO3 UR-SCNC: 22.9 MMOL/L (ref 24–28)
HCT VFR BLD AUTO: 36.3 % (ref 37–48.5)
HCV AB SERPL QL IA: NEGATIVE
HGB BLD-MCNC: 11.2 GM/DL (ref 12–16)
HGB UR QL STRIP: ABNORMAL
HIV 1+2 AB+HIV1 P24 AG SERPL QL IA: NEGATIVE
HOLD SPECIMEN: NORMAL
HYALINE CASTS UR QL AUTO: 0 /LPF (ref 0–1)
IMM GRANULOCYTES # BLD AUTO: 0.08 K/UL (ref 0–0.04)
IMM GRANULOCYTES NFR BLD AUTO: 0.5 % (ref 0–0.5)
INDIRECT COOMBS: NORMAL
INDIRECT COOMBS: NORMAL
INR PPP: 2.3 (ref 0.8–1.2)
KETONES UR QL STRIP: ABNORMAL
LEUKOCYTE ESTERASE UR QL STRIP: NEGATIVE
LIPASE SERPL-CCNC: 14 U/L (ref 4–60)
LYMPHOCYTES # BLD AUTO: 1.94 K/UL (ref 1–4.8)
MCH RBC QN AUTO: 28.2 PG (ref 27–31)
MCHC RBC AUTO-ENTMCNC: 30.9 G/DL (ref 32–36)
MCV RBC AUTO: 91 FL (ref 82–98)
MICROSCOPIC COMMENT: ABNORMAL
MIN VOL: 9.11
MIN VOL: 9.45
MODE: ABNORMAL
MODE: ABNORMAL
NITRITE UR QL STRIP: NEGATIVE
NUCLEATED RBC (/100WBC) (OHS): 0 /100 WBC
OHS QRS DURATION: 84 MS
OHS QTC CALCULATION: 415 MS
PCO2 BLDA: 36.9 MMHG (ref 35–45)
PCO2 BLDA: 39.2 MMHG (ref 35–45)
PCO2 BLDA: 40.2 MMHG (ref 35–45)
PEEP: 10
PEEP: 5
PH SMN: 7.36 [PH] (ref 7.3–7.45)
PH SMN: 7.37 [PH] (ref 7.35–7.45)
PH SMN: 7.38 [PH] (ref 7.35–7.45)
PH UR STRIP: 6 [PH]
PIP: 23
PIP: 30
PLATELET # BLD AUTO: 384 K/UL (ref 150–450)
PMV BLD AUTO: 9 FL (ref 9.2–12.9)
PO2 BLDA: 242 MMHG (ref 80–100)
PO2 BLDA: 85 MMHG (ref 80–100)
PO2 BLDA: 85.5 MMHG (ref 80–100)
POC BASE DEFICIT: -2.7 MMOL/L (ref -2–2)
POC BE: -2 MMOL/L (ref -2–2)
POC BE: -3 MMOL/L (ref -2–2)
POC HCO3: 22.2 MMOL/L (ref 24–28)
POC PERFORMED BY: ABNORMAL
POC SATURATED O2: 100 % (ref 95–100)
POC SATURATED O2: 96 % (ref 95–100)
POC SATURATED O2: 96.8 % (ref 95–100)
POC TCO2: 23 MMOL/L (ref 23–27)
POC TCO2: 24 MMOL/L (ref 23–27)
POC TEMPERATURE: 37 C
POCT GLUCOSE: 125 MG/DL (ref 70–110)
POCT GLUCOSE: 96 MG/DL (ref 70–110)
POTASSIUM SERPL-SCNC: 3.7 MMOL/L (ref 3.5–5.1)
PROT SERPL-MCNC: 8.9 GM/DL (ref 6–8.4)
PROT UR QL STRIP: ABNORMAL
PROTHROMBIN TIME: 25.1 SECONDS (ref 9–12.5)
RBC # BLD AUTO: 3.97 M/UL (ref 4–5.4)
RBC #/AREA URNS AUTO: 17 /HPF (ref 0–4)
RELATIVE EOSINOPHIL (OHS): 0.1 %
RELATIVE LYMPHOCYTE (OHS): 12.1 % (ref 18–48)
RELATIVE MONOCYTE (OHS): 3.1 % (ref 4–15)
RELATIVE NEUTROPHIL (OHS): 84.1 % (ref 38–73)
RH BLD: NORMAL
RH BLD: NORMAL
SAMPLE: ABNORMAL
SAMPLE: ABNORMAL
SITE: ABNORMAL
SITE: ABNORMAL
SODIUM SERPL-SCNC: 137 MMOL/L (ref 136–145)
SP GR UR STRIP: >=1.03
SP02: 100
SP02: 95
SPECIMEN OUTDATE: NORMAL
SPECIMEN OUTDATE: NORMAL
SPECIMEN SOURCE: ABNORMAL
SQUAMOUS #/AREA URNS AUTO: <1 /HPF
TROPONIN I SERPL DL<=0.01 NG/ML-MCNC: <0.006 NG/ML
TSH SERPL-ACNC: 0.43 UIU/ML (ref 0.4–4)
UROBILINOGEN UR STRIP-ACNC: NEGATIVE EU/DL
VT: 375
VT: 375
WBC # BLD AUTO: 16.06 K/UL (ref 3.9–12.7)
WBC #/AREA URNS AUTO: 3 /HPF (ref 0–5)
YEAST UR QL AUTO: ABNORMAL /HPF

## 2025-05-06 PROCEDURE — 20000000 HC ICU ROOM

## 2025-05-06 PROCEDURE — 25500020 PHARM REV CODE 255: Performed by: EMERGENCY MEDICINE

## 2025-05-06 PROCEDURE — 5A1945Z RESPIRATORY VENTILATION, 24-96 CONSECUTIVE HOURS: ICD-10-PCS | Performed by: NEUROLOGICAL SURGERY

## 2025-05-06 PROCEDURE — 83690 ASSAY OF LIPASE: CPT | Performed by: EMERGENCY MEDICINE

## 2025-05-06 PROCEDURE — 80053 COMPREHEN METABOLIC PANEL: CPT | Performed by: EMERGENCY MEDICINE

## 2025-05-06 PROCEDURE — 82803 BLOOD GASES ANY COMBINATION: CPT

## 2025-05-06 PROCEDURE — 99900026 HC AIRWAY MAINTENANCE (STAT)

## 2025-05-06 PROCEDURE — 51702 INSERT TEMP BLADDER CATH: CPT

## 2025-05-06 PROCEDURE — 94002 VENT MGMT INPAT INIT DAY: CPT

## 2025-05-06 PROCEDURE — 96376 TX/PRO/DX INJ SAME DRUG ADON: CPT

## 2025-05-06 PROCEDURE — 96374 THER/PROPH/DIAG INJ IV PUSH: CPT | Mod: 59

## 2025-05-06 PROCEDURE — 86850 RBC ANTIBODY SCREEN: CPT | Performed by: EMERGENCY MEDICINE

## 2025-05-06 PROCEDURE — 25000003 PHARM REV CODE 250

## 2025-05-06 PROCEDURE — 93005 ELECTROCARDIOGRAM TRACING: CPT

## 2025-05-06 PROCEDURE — 25000003 PHARM REV CODE 250: Performed by: NURSE ANESTHETIST, CERTIFIED REGISTERED

## 2025-05-06 PROCEDURE — 93010 ELECTROCARDIOGRAM REPORT: CPT | Mod: ,,, | Performed by: INTERNAL MEDICINE

## 2025-05-06 PROCEDURE — 36620 INSERTION CATHETER ARTERY: CPT | Mod: ,,, | Performed by: ANESTHESIOLOGY

## 2025-05-06 PROCEDURE — 36600 WITHDRAWAL OF ARTERIAL BLOOD: CPT

## 2025-05-06 PROCEDURE — 63600175 PHARM REV CODE 636 W HCPCS

## 2025-05-06 PROCEDURE — 25000003 PHARM REV CODE 250: Performed by: EMERGENCY MEDICINE

## 2025-05-06 PROCEDURE — 25500020 PHARM REV CODE 255: Performed by: PSYCHIATRY & NEUROLOGY

## 2025-05-06 PROCEDURE — 63600175 PHARM REV CODE 636 W HCPCS: Performed by: NURSE ANESTHETIST, CERTIFIED REGISTERED

## 2025-05-06 PROCEDURE — B31GYZZ FLUOROSCOPY OF BILATERAL VERTEBRAL ARTERIES USING OTHER CONTRAST: ICD-10-PCS | Performed by: RADIOLOGY

## 2025-05-06 PROCEDURE — C9248 INJ, CLEVIDIPINE BUTYRATE: HCPCS | Mod: JZ,TB

## 2025-05-06 PROCEDURE — 99900035 HC TECH TIME PER 15 MIN (STAT)

## 2025-05-06 PROCEDURE — 86850 RBC ANTIBODY SCREEN: CPT | Mod: 91

## 2025-05-06 PROCEDURE — 85025 COMPLETE CBC W/AUTO DIFF WBC: CPT | Performed by: EMERGENCY MEDICINE

## 2025-05-06 PROCEDURE — 96375 TX/PRO/DX INJ NEW DRUG ADDON: CPT

## 2025-05-06 PROCEDURE — B31CYZZ FLUOROSCOPY OF BILATERAL EXTERNAL CAROTID ARTERIES USING OTHER CONTRAST: ICD-10-PCS | Performed by: RADIOLOGY

## 2025-05-06 PROCEDURE — 84484 ASSAY OF TROPONIN QUANT: CPT | Performed by: EMERGENCY MEDICINE

## 2025-05-06 PROCEDURE — 96365 THER/PROPH/DIAG IV INF INIT: CPT

## 2025-05-06 PROCEDURE — C9248 INJ, CLEVIDIPINE BUTYRATE: HCPCS | Mod: JZ,TB | Performed by: EMERGENCY MEDICINE

## 2025-05-06 PROCEDURE — 94761 N-INVAS EAR/PLS OXIMETRY MLT: CPT | Mod: XB

## 2025-05-06 PROCEDURE — 99285 EMERGENCY DEPT VISIT HI MDM: CPT | Mod: 25

## 2025-05-06 PROCEDURE — 96366 THER/PROPH/DIAG IV INF ADDON: CPT

## 2025-05-06 PROCEDURE — 85610 PROTHROMBIN TIME: CPT | Performed by: EMERGENCY MEDICINE

## 2025-05-06 PROCEDURE — 63600175 PHARM REV CODE 636 W HCPCS: Mod: JZ,TB

## 2025-05-06 PROCEDURE — 84443 ASSAY THYROID STIM HORMONE: CPT

## 2025-05-06 PROCEDURE — 63600175 PHARM REV CODE 636 W HCPCS: Mod: JZ,TB | Performed by: EMERGENCY MEDICINE

## 2025-05-06 PROCEDURE — B315YZZ FLUOROSCOPY OF BILATERAL COMMON CAROTID ARTERIES USING OTHER CONTRAST: ICD-10-PCS | Performed by: RADIOLOGY

## 2025-05-06 PROCEDURE — 31500 INSERT EMERGENCY AIRWAY: CPT

## 2025-05-06 PROCEDURE — 81003 URINALYSIS AUTO W/O SCOPE: CPT

## 2025-05-06 PROCEDURE — 94761 N-INVAS EAR/PLS OXIMETRY MLT: CPT

## 2025-05-06 PROCEDURE — 37799 UNLISTED PX VASCULAR SURGERY: CPT

## 2025-05-06 PROCEDURE — 27200966 HC CLOSED SUCTION SYSTEM

## 2025-05-06 PROCEDURE — B318YZZ FLUOROSCOPY OF BILATERAL INTERNAL CAROTID ARTERIES USING OTHER CONTRAST: ICD-10-PCS | Performed by: RADIOLOGY

## 2025-05-06 PROCEDURE — 99499 UNLISTED E&M SERVICE: CPT | Mod: ,,, | Performed by: PSYCHIATRY & NEUROLOGY

## 2025-05-06 PROCEDURE — 99223 1ST HOSP IP/OBS HIGH 75: CPT | Mod: ,,, | Performed by: PSYCHIATRY & NEUROLOGY

## 2025-05-06 PROCEDURE — 009630Z DRAINAGE OF CEREBRAL VENTRICLE WITH DRAINAGE DEVICE, PERCUTANEOUS APPROACH: ICD-10-PCS | Performed by: NEUROLOGICAL SURGERY

## 2025-05-06 PROCEDURE — 27100171 HC OXYGEN HIGH FLOW UP TO 24 HOURS

## 2025-05-06 PROCEDURE — 96365 THER/PROPH/DIAG IV INF INIT: CPT | Mod: 59

## 2025-05-06 PROCEDURE — 83880 ASSAY OF NATRIURETIC PEPTIDE: CPT | Performed by: EMERGENCY MEDICINE

## 2025-05-06 PROCEDURE — 96368 THER/DIAG CONCURRENT INF: CPT

## 2025-05-06 PROCEDURE — 03VG3DZ RESTRICTION OF INTRACRANIAL ARTERY WITH INTRALUMINAL DEVICE, PERCUTANEOUS APPROACH: ICD-10-PCS | Performed by: RADIOLOGY

## 2025-05-06 PROCEDURE — 99291 CRITICAL CARE FIRST HOUR: CPT | Mod: ,,, | Performed by: PSYCHIATRY & NEUROLOGY

## 2025-05-06 PROCEDURE — 009630Z DRAINAGE OF CEREBRAL VENTRICLE WITH DRAINAGE DEVICE, PERCUTANEOUS APPROACH: ICD-10-PCS | Performed by: RADIOLOGY

## 2025-05-06 PROCEDURE — 63600531 PHARM REV CODE 636 NO ALT 250 W HCPCS: Mod: JZ,TB | Performed by: EMERGENCY MEDICINE

## 2025-05-06 RX ORDER — SODIUM,POTASSIUM PHOSPHATES 280-250MG
2 POWDER IN PACKET (EA) ORAL
Status: DISCONTINUED | OUTPATIENT
Start: 2025-05-06 | End: 2025-05-08

## 2025-05-06 RX ORDER — CEFAZOLIN 2 G/1
2 INJECTION, POWDER, FOR SOLUTION INTRAMUSCULAR; INTRAVENOUS
Status: DISCONTINUED | OUTPATIENT
Start: 2025-05-06 | End: 2025-05-07

## 2025-05-06 RX ORDER — MIDAZOLAM HYDROCHLORIDE 1 MG/ML
4 INJECTION, SOLUTION INTRAMUSCULAR; INTRAVENOUS ONCE
Status: COMPLETED | OUTPATIENT
Start: 2025-05-06 | End: 2025-05-06

## 2025-05-06 RX ORDER — FENTANYL CITRATE 50 UG/ML
50 INJECTION, SOLUTION INTRAMUSCULAR; INTRAVENOUS ONCE
Refills: 0 | Status: DISCONTINUED | OUTPATIENT
Start: 2025-05-06 | End: 2025-05-06

## 2025-05-06 RX ORDER — PROPOFOL 10 MG/ML
INJECTION, EMULSION INTRAVENOUS
Status: COMPLETED
Start: 2025-05-06 | End: 2025-05-06

## 2025-05-06 RX ORDER — VASOPRESSIN 20 [USP'U]/ML
INJECTION, SOLUTION INTRAMUSCULAR; SUBCUTANEOUS
Status: DISCONTINUED | OUTPATIENT
Start: 2025-05-06 | End: 2025-05-06

## 2025-05-06 RX ORDER — SODIUM CHLORIDE 9 MG/ML
INJECTION, SOLUTION INTRAVENOUS CONTINUOUS
Status: DISCONTINUED | OUTPATIENT
Start: 2025-05-06 | End: 2025-05-06

## 2025-05-06 RX ORDER — LEVETIRACETAM 500 MG/5ML
1000 INJECTION, SOLUTION, CONCENTRATE INTRAVENOUS
Status: COMPLETED | OUTPATIENT
Start: 2025-05-06 | End: 2025-05-06

## 2025-05-06 RX ORDER — MUPIROCIN 20 MG/G
OINTMENT TOPICAL 2 TIMES DAILY
Status: CANCELLED | OUTPATIENT
Start: 2025-05-06 | End: 2025-05-11

## 2025-05-06 RX ORDER — POLYETHYLENE GLYCOL 3350 17 G/17G
17 POWDER, FOR SOLUTION ORAL DAILY
Status: DISCONTINUED | OUTPATIENT
Start: 2025-05-06 | End: 2025-05-08

## 2025-05-06 RX ORDER — FENTANYL CITRATE 50 UG/ML
100 INJECTION, SOLUTION INTRAMUSCULAR; INTRAVENOUS ONCE
Refills: 0 | Status: COMPLETED | OUTPATIENT
Start: 2025-05-06 | End: 2025-05-06

## 2025-05-06 RX ORDER — ATORVASTATIN CALCIUM 40 MG/1
40 TABLET, FILM COATED ORAL DAILY
Status: DISCONTINUED | OUTPATIENT
Start: 2025-05-06 | End: 2025-05-08

## 2025-05-06 RX ORDER — AMOXICILLIN 250 MG
1 CAPSULE ORAL DAILY
Status: DISCONTINUED | OUTPATIENT
Start: 2025-05-06 | End: 2025-05-08

## 2025-05-06 RX ORDER — INSULIN ASPART 100 [IU]/ML
0-5 INJECTION, SOLUTION INTRAVENOUS; SUBCUTANEOUS EVERY 6 HOURS PRN
Status: DISCONTINUED | OUTPATIENT
Start: 2025-05-06 | End: 2025-05-08

## 2025-05-06 RX ORDER — PROPOFOL 10 MG/ML
0-50 INJECTION, EMULSION INTRAVENOUS CONTINUOUS
Status: DISCONTINUED | OUTPATIENT
Start: 2025-05-06 | End: 2025-05-06

## 2025-05-06 RX ORDER — LABETALOL HCL 20 MG/4 ML
10 SYRINGE (ML) INTRAVENOUS EVERY 4 HOURS PRN
Status: DISCONTINUED | OUTPATIENT
Start: 2025-05-06 | End: 2025-05-07

## 2025-05-06 RX ORDER — LABETALOL HCL 20 MG/4 ML
10 SYRINGE (ML) INTRAVENOUS
Status: DISCONTINUED | OUTPATIENT
Start: 2025-05-06 | End: 2025-05-06

## 2025-05-06 RX ORDER — ONDANSETRON HYDROCHLORIDE 2 MG/ML
4 INJECTION, SOLUTION INTRAVENOUS
Status: COMPLETED | OUTPATIENT
Start: 2025-05-06 | End: 2025-05-06

## 2025-05-06 RX ORDER — SODIUM CHLORIDE 9 MG/ML
INJECTION, SOLUTION INTRAVENOUS CONTINUOUS
Status: ACTIVE | OUTPATIENT
Start: 2025-05-06 | End: 2025-05-07

## 2025-05-06 RX ORDER — NAPROXEN SODIUM 220 MG/1
81 TABLET, FILM COATED ORAL DAILY
Status: DISCONTINUED | OUTPATIENT
Start: 2025-05-07 | End: 2025-05-06

## 2025-05-06 RX ORDER — ROCURONIUM BROMIDE 10 MG/ML
INJECTION, SOLUTION INTRAVENOUS
Status: COMPLETED
Start: 2025-05-06 | End: 2025-05-06

## 2025-05-06 RX ORDER — CEFAZOLIN 2 G/1
2 INJECTION, POWDER, FOR SOLUTION INTRAMUSCULAR; INTRAVENOUS
Status: DISCONTINUED | OUTPATIENT
Start: 2025-05-06 | End: 2025-05-13

## 2025-05-06 RX ORDER — ROCURONIUM BROMIDE 10 MG/ML
50 INJECTION, SOLUTION INTRAVENOUS ONCE
Status: COMPLETED | OUTPATIENT
Start: 2025-05-06 | End: 2025-05-06

## 2025-05-06 RX ORDER — FENTANYL CITRATE 50 UG/ML
100 INJECTION, SOLUTION INTRAMUSCULAR; INTRAVENOUS
Refills: 0 | Status: COMPLETED | OUTPATIENT
Start: 2025-05-06 | End: 2025-05-06

## 2025-05-06 RX ORDER — PROPOFOL 10 MG/ML
0-50 INJECTION, EMULSION INTRAVENOUS CONTINUOUS
Status: DISCONTINUED | OUTPATIENT
Start: 2025-05-06 | End: 2025-05-06 | Stop reason: HOSPADM

## 2025-05-06 RX ORDER — LEVETIRACETAM 500 MG/5ML
500 INJECTION, SOLUTION, CONCENTRATE INTRAVENOUS EVERY 12 HOURS
Status: DISCONTINUED | OUTPATIENT
Start: 2025-05-07 | End: 2025-05-08

## 2025-05-06 RX ORDER — NAPROXEN SODIUM 220 MG/1
81 TABLET, FILM COATED ORAL DAILY
Status: DISCONTINUED | OUTPATIENT
Start: 2025-05-06 | End: 2025-05-08

## 2025-05-06 RX ORDER — SODIUM CHLORIDE 0.9 % (FLUSH) 0.9 %
10 SYRINGE (ML) INJECTION
Status: DISCONTINUED | OUTPATIENT
Start: 2025-05-06 | End: 2025-05-26 | Stop reason: HOSPADM

## 2025-05-06 RX ORDER — IODIXANOL 320 MG/ML
200 INJECTION, SOLUTION INTRAVASCULAR
Status: COMPLETED | OUTPATIENT
Start: 2025-05-06 | End: 2025-05-06

## 2025-05-06 RX ORDER — ROCURONIUM BROMIDE 10 MG/ML
INJECTION, SOLUTION INTRAVENOUS
Status: DISCONTINUED | OUTPATIENT
Start: 2025-05-06 | End: 2025-05-06

## 2025-05-06 RX ORDER — FENTANYL CITRATE-0.9 % NACL/PF 10 MCG/ML
25 PLASTIC BAG, INJECTION (ML) INTRAVENOUS CONTINUOUS
Status: DISCONTINUED | OUTPATIENT
Start: 2025-05-06 | End: 2025-05-06

## 2025-05-06 RX ORDER — SODIUM CHLORIDE 0.9 % (FLUSH) 0.9 %
10 SYRINGE (ML) INJECTION
Status: DISCONTINUED | OUTPATIENT
Start: 2025-05-06 | End: 2025-05-07

## 2025-05-06 RX ORDER — FAMOTIDINE 10 MG/ML
20 INJECTION, SOLUTION INTRAVENOUS 2 TIMES DAILY
Status: CANCELLED | OUTPATIENT
Start: 2025-05-06

## 2025-05-06 RX ORDER — PANTOPRAZOLE SODIUM 40 MG/10ML
40 INJECTION, POWDER, LYOPHILIZED, FOR SOLUTION INTRAVENOUS DAILY
Status: DISCONTINUED | OUTPATIENT
Start: 2025-05-06 | End: 2025-05-06

## 2025-05-06 RX ORDER — VALACYCLOVIR HYDROCHLORIDE 500 MG/1
1000 TABLET, FILM COATED ORAL DAILY
Status: DISCONTINUED | OUTPATIENT
Start: 2025-05-06 | End: 2025-05-06

## 2025-05-06 RX ORDER — PROPOFOL 10 MG/ML
VIAL (ML) INTRAVENOUS
Status: DISCONTINUED | OUTPATIENT
Start: 2025-05-06 | End: 2025-05-06

## 2025-05-06 RX ORDER — FAMOTIDINE 20 MG/1
20 TABLET, FILM COATED ORAL 2 TIMES DAILY
Status: DISCONTINUED | OUTPATIENT
Start: 2025-05-07 | End: 2025-05-07

## 2025-05-06 RX ORDER — BISACODYL 10 MG/1
10 SUPPOSITORY RECTAL DAILY PRN
Status: DISCONTINUED | OUTPATIENT
Start: 2025-05-06 | End: 2025-05-26 | Stop reason: HOSPADM

## 2025-05-06 RX ORDER — ETOMIDATE 2 MG/ML
20 INJECTION INTRAVENOUS
Status: COMPLETED | OUTPATIENT
Start: 2025-05-06 | End: 2025-05-06

## 2025-05-06 RX ORDER — LEVETIRACETAM 500 MG/5ML
500 INJECTION, SOLUTION, CONCENTRATE INTRAVENOUS EVERY 12 HOURS
Status: DISCONTINUED | OUTPATIENT
Start: 2025-05-06 | End: 2025-05-06

## 2025-05-06 RX ORDER — LIDOCAINE HYDROCHLORIDE AND EPINEPHRINE 10; 10 UG/ML; MG/ML
10 INJECTION, SOLUTION INFILTRATION; PERINEURAL ONCE
Status: COMPLETED | OUTPATIENT
Start: 2025-05-06 | End: 2025-05-06

## 2025-05-06 RX ORDER — ETOMIDATE 2 MG/ML
INJECTION INTRAVENOUS
Status: COMPLETED
Start: 2025-05-06 | End: 2025-05-06

## 2025-05-06 RX ORDER — PHENYLEPHRINE HYDROCHLORIDE 10 MG/ML
INJECTION INTRAVENOUS
Status: DISCONTINUED | OUTPATIENT
Start: 2025-05-06 | End: 2025-05-06

## 2025-05-06 RX ORDER — GLUCAGON 1 MG
1 KIT INJECTION
Status: DISCONTINUED | OUTPATIENT
Start: 2025-05-06 | End: 2025-05-08

## 2025-05-06 RX ORDER — FENTANYL CITRATE-0.9 % NACL/PF 10 MCG/ML
0-250 PLASTIC BAG, INJECTION (ML) INTRAVENOUS CONTINUOUS
Refills: 0 | Status: DISCONTINUED | OUTPATIENT
Start: 2025-05-06 | End: 2025-05-07

## 2025-05-06 RX ADMIN — PHENYLEPHRINE HYDROCHLORIDE 200 MCG: 10 INJECTION INTRAVENOUS at 11:05

## 2025-05-06 RX ADMIN — Medication 2710 UNITS: at 03:05

## 2025-05-06 RX ADMIN — VASOPRESSIN 1 UNITS: 20 INJECTION INTRAVENOUS at 12:05

## 2025-05-06 RX ADMIN — ETOMIDATE 20 MG: 2 INJECTION INTRAVENOUS at 02:05

## 2025-05-06 RX ADMIN — CEFAZOLIN 2 G: 2 INJECTION, POWDER, FOR SOLUTION INTRAMUSCULAR; INTRAVENOUS at 06:05

## 2025-05-06 RX ADMIN — ROCURONIUM BROMIDE 10 MG: 10 INJECTION INTRAVENOUS at 02:05

## 2025-05-06 RX ADMIN — PANTOPRAZOLE SODIUM 40 MG: 40 INJECTION, POWDER, FOR SOLUTION INTRAVENOUS at 10:05

## 2025-05-06 RX ADMIN — VASOPRESSIN 2 UNITS: 20 INJECTION INTRAVENOUS at 12:05

## 2025-05-06 RX ADMIN — LEVETIRACETAM 1000 MG: 100 INJECTION INTRAVENOUS at 01:05

## 2025-05-06 RX ADMIN — PROPOFOL 20 MCG/KG/MIN: 10 INJECTION, EMULSION INTRAVENOUS at 06:05

## 2025-05-06 RX ADMIN — SODIUM CHLORIDE 500 ML: 9 INJECTION, SOLUTION INTRAVENOUS at 09:05

## 2025-05-06 RX ADMIN — PROPOFOL 20 MCG/KG/MIN: 10 INJECTION, EMULSION INTRAVENOUS at 10:05

## 2025-05-06 RX ADMIN — PHYTONADIONE 10 MG: 10 INJECTION, EMULSION INTRAMUSCULAR; INTRAVENOUS; SUBCUTANEOUS at 01:05

## 2025-05-06 RX ADMIN — FENTANYL CITRATE 100 MCG: 50 INJECTION INTRAMUSCULAR; INTRAVENOUS at 07:05

## 2025-05-06 RX ADMIN — Medication 50 MCG/HR: at 09:05

## 2025-05-06 RX ADMIN — VALACYCLOVIR HYDROCHLORIDE 1000 MG: 500 TABLET, FILM COATED ORAL at 10:05

## 2025-05-06 RX ADMIN — ONDANSETRON 4 MG: 2 INJECTION INTRAMUSCULAR; INTRAVENOUS at 03:05

## 2025-05-06 RX ADMIN — ROCURONIUM BROMIDE 50 MG: 10 INJECTION INTRAVENOUS at 02:05

## 2025-05-06 RX ADMIN — SENNOSIDES AND DOCUSATE SODIUM 1 TABLET: 50; 8.6 TABLET ORAL at 10:05

## 2025-05-06 RX ADMIN — Medication 25 MCG/HR: at 07:05

## 2025-05-06 RX ADMIN — ROCURONIUM BROMIDE 50 MG: 10 INJECTION INTRAVENOUS at 11:05

## 2025-05-06 RX ADMIN — FENTANYL CITRATE 100 MCG: 50 INJECTION INTRAMUSCULAR; INTRAVENOUS at 06:05

## 2025-05-06 RX ADMIN — CLEVIPIDINE 4 MG/HR: 0.5 EMULSION INTRAVENOUS at 06:05

## 2025-05-06 RX ADMIN — LIDOCAINE HYDROCHLORIDE,EPINEPHRINE BITARTRATE 10 ML: 10; .01 INJECTION, SOLUTION INFILTRATION; PERINEURAL at 09:05

## 2025-05-06 RX ADMIN — PROPOFOL 50 MG: 10 INJECTION, EMULSION INTRAVENOUS at 02:05

## 2025-05-06 RX ADMIN — CEFAZOLIN 2 G: 2 INJECTION, POWDER, FOR SOLUTION INTRAMUSCULAR; INTRAVENOUS at 10:05

## 2025-05-06 RX ADMIN — LEVETIRACETAM 500 MG: 500 INJECTION, SOLUTION, CONCENTRATE INTRAVENOUS at 10:05

## 2025-05-06 RX ADMIN — NOREPINEPHRINE BITARTRATE 0.06 MCG/KG/MIN: 1 INJECTION, SOLUTION, CONCENTRATE INTRAVENOUS at 12:05

## 2025-05-06 RX ADMIN — SODIUM CHLORIDE, SODIUM GLUCONATE, SODIUM ACETATE, POTASSIUM CHLORIDE, MAGNESIUM CHLORIDE, SODIUM PHOSPHATE, DIBASIC, AND POTASSIUM PHOSPHATE: .53; .5; .37; .037; .03; .012; .00082 INJECTION, SOLUTION INTRAVENOUS at 11:05

## 2025-05-06 RX ADMIN — LEVETIRACETAM 500 MG: 500 INJECTION, SOLUTION, CONCENTRATE INTRAVENOUS at 09:05

## 2025-05-06 RX ADMIN — CLEVIPIDINE 4 MG/HR: 0.5 EMULSION INTRAVENOUS at 03:05

## 2025-05-06 RX ADMIN — ROCURONIUM BROMIDE 20 MG: 10 INJECTION INTRAVENOUS at 01:05

## 2025-05-06 RX ADMIN — CLEVIPIDINE 1 MG/HR: 0.5 EMULSION INTRAVENOUS at 01:05

## 2025-05-06 RX ADMIN — IOHEXOL 100 ML: 350 INJECTION, SOLUTION INTRAVENOUS at 03:05

## 2025-05-06 RX ADMIN — PROPOFOL 5 MCG/KG/MIN: 10 INJECTION, EMULSION INTRAVENOUS at 02:05

## 2025-05-06 RX ADMIN — NIMODIPINE 60 MG: 60 SOLUTION ORAL at 04:05

## 2025-05-06 RX ADMIN — ROCURONIUM BROMIDE 50 MG: 10 INJECTION, SOLUTION INTRAVENOUS at 02:05

## 2025-05-06 RX ADMIN — NIMODIPINE 60 MG: 60 SOLUTION ORAL at 09:05

## 2025-05-06 RX ADMIN — PROPOFOL 15 MCG/KG/MIN: 10 INJECTION, EMULSION INTRAVENOUS at 06:05

## 2025-05-06 RX ADMIN — IODIXANOL 120 ML: 320 INJECTION, SOLUTION INTRAVASCULAR at 02:05

## 2025-05-06 RX ADMIN — SUGAMMADEX 400 MG: 100 INJECTION, SOLUTION INTRAVENOUS at 03:05

## 2025-05-06 RX ADMIN — PHENYLEPHRINE HYDROCHLORIDE 100 MCG: 10 INJECTION INTRAVENOUS at 11:05

## 2025-05-06 RX ADMIN — ROCURONIUM BROMIDE 20 MG: 10 INJECTION INTRAVENOUS at 12:05

## 2025-05-06 RX ADMIN — ASPIRIN 81 MG CHEWABLE TABLET 81 MG: 81 TABLET CHEWABLE at 09:05

## 2025-05-06 RX ADMIN — MIDAZOLAM 4 MG: 1 INJECTION INTRAMUSCULAR; INTRAVENOUS at 09:05

## 2025-05-06 RX ADMIN — VASOPRESSIN 1 UNITS: 20 INJECTION INTRAVENOUS at 11:05

## 2025-05-06 RX ADMIN — PROPOFOL 50 MG: 10 INJECTION, EMULSION INTRAVENOUS at 11:05

## 2025-05-06 NOTE — HPI
Waldo Emmanuel is a 49 y.o. female with PMH of HTN, PAD, Right iliac arterial occlusion,2019 (takes coumadin daily) and anemia, that presents as a transfer from [facility] with [facility] for NSGY evaluation. Per chart review, patient experienced syncopal episode witnessed by bystanders while loading groceries into her car. Unknown head strike or trauma post fall. She was transported to [facility] and evaluated. Per chart review, she was awake and following commands upon arrival, but required emergent intubation for airway protection. 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 She was given KCentra and transferred to [facility] for NSGY evaluation and possible IR intervention. She is now s/p coil embolization of R AICA, DSA, EVD placement, and admitted to [facility] where she remains intubated for close monitoring and observation.

## 2025-05-06 NOTE — CONSULTS
[facility] - Neuro Critical Care  Vascular Neurology  [facility]  Consult Note    Inpatient consult to Vascular Neurology  Consult performed by: Ramesh Reeves MD  Consult ordered by: Nerissa Rios,         Assessment/Plan:     Patient is a 49 y.o. 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 Patient transferred to [facility] for IR evaluation. DSA performed 5/6/25 demonstrated the above aneurysm, which was secured with coiling. Patient remains intubated and sedation is being weaned. Recommend continuing aneurysmal SAH protocol.    Antithrombotics for secondary stroke prevention: Antiplatelets: Aspirin: 81 mg daily    Statins for secondary stroke prevention and hyperlipidemia, if present:   Statins: Atorvastatin- 40 mg daily    Aggressive risk factor modification: HTN, HLD, Diet, Exercise, Obesity     Rehab efforts: The patient has been evaluated by a stroke team provider and the therapy needs have been fully considered based off the presenting complaints and exam findings. The following therapy evaluations are needed: PT evaluate and treat, OT evaluate and treat, SLP evaluate and treat    Diagnostics ordered/pending: MRI head without contrast to assess brain parenchyma, TTE to assess cardiac function/status     VTE prophylaxis: None: Reason for No Pharmacological VTE Prophylaxis: Known or suspected cerebral aneurysm or AVM    BP parameters: SAH: Secured aneurysm, no target, increase BP to prevent vasospasm if needed     Plan  -Continue nimodipine 60mg q4h  -sbp goal as above  -antiplatelet as above  -statin as above  -Appreciate NSGY recommendations  -PT/OT/SLP when appropriate        STROKE DOCUMENTATION          NIH Scale:  1a. Level of Consciousness: 2-->Not alert, requires  repeated stimulation to attend, or is obtunded and requires strong or painful stimulation to make movements (not stereotyped)  1b. LOC Questions: 2-->Answers neither question correctly  1c. LOC Commands: 2-->Performs neither task correctly  2. Best Gaze: 0-->Normal  3. Visual: 0-->No visual loss  4. Facial Palsy: 0-->Normal symmetrical movements  5a. Motor Arm, Left: 2-->Some effort against gravity, limb cannot get to or maintain (if cued) 90 (or 45) degrees, drifts down to bed, but has some effort against gravity  5b. Motor Arm, Right: 2-->Some effort against gravity, limb cannot get to or maintain (if cued) 90 (or 45) degrees, drifts down to bed, but has some effort against gravity  6a. Motor Leg, Left: 2-->Some effort against gravity, leg falls to bed by 5 secs, but has some effort against gravity  6b. Motor Leg, Right: 2-->Some effort against gravity, leg falls to bed by 5 secs, but has some effort against gravity  7. Limb Ataxia: 0-->Absent  8. Sensory: 1-->Mild-to-moderate sensory loss, patient feels pinprick is less sharp or is dull on the affected side, or there is a loss of superficial pain with pinprick, but patient is aware of being touched  9. Best Language: 3-->Mute, global aphasia, no usable speech or auditory comprehension  10. Dysarthria: (UN) Intubated or other physical barrier  11. Extinction and Inattention (formerly Neglect): 0-->No abnormality  Total (NIH Stroke Scale): 18    Modified [place]    [place]oro Coma Scale:    ABCD2 Score:    [id number] Score:   HAS -BLED Score:   ICH Score:   Hunt & Castillo Classification:       Thrombolysis Candidate? No, Suspicion of subarachnoid hemorrhage     Delays to Thrombolysis?  Not Applicable    Interventional Revascularization Candidate?   Is the patient eligible for mechanical endovascular reperfusion (JOSE)?  No; at this time symptoms not suggestive of large vessel occlusion    Delays to Thrombectomy? Not Applicable    Hemorrhagic change of an Ischemic Stroke:  Does this patient have an ischemic stroke with hemorrhagic changes? No     Subjective:     History of Present Illness:  Waldo Emmanuel is a 49 y.o. female with PMH of HTN, PAD, Right iliac arterial occlusion,2019 (takes coumadin daily) and anemia, that presents as a transfer from [facility] with SAH for NSGY evaluation. Per chart review, patient experienced syncopal episode witnessed by bystanders while loading groceries into her car. Unknown head strike or trauma post fall. She was transported to [facility] and evaluated. Per chart review, she was awake and following commands upon arrival, but required emergent intubation for airway protection. 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 She was given KCentra and transferred to [facility] for NSGY evaluation and possible IR intervention. She is now s/p coil embolization of R AICA, DSA, EVD placement, and admitted to [facility] where she remains intubated for close monitoring and observation.            Past Medical History:   Diagnosis Date    ADHD (attention deficit hyperactivity disorder)     Anemia     Fibroids     Genital herpes     GERD (gastroesophageal reflux disease)     H/O total hysterectomy 2021    Hypertension     Labral tear of hip joint     Ovarian cyst     Right common arterial occlusion     Routine general medical examination at a health care facility 2017    Total Right Arterial Occlusion      Past Surgical History:   Procedure Laterality Date    ANGIOGRAM, ABDOMINAL AORTA W/ EXTREMITY RUNOFF N/A 2025    Procedure: Angiogram, Abdominal Aorta W/ Extremity Runoff: Right lower extremity angiogram;  Surgeon: Lennox Zendejas MD;  Location: [facility] CATH LAB;  Service: Vascular;  Laterality: N/A;    CARPAL TUNNEL RELEASE       SECTION      CHOLECYSTECTOMY      DILATION AND CURETTAGE OF UTERUS      TOTAL ABDOMINAL HYSTERECTOMY W/ BILATERAL  SALPINGOOPHORECTOMY  08/02/2021    menorrhagia     Social History[1]  Review of patient's allergies indicates:   Allergen Reactions    Metformin Diarrhea     Other reaction(s): Diarrhae       Medications: I have reviewed the current medication administration record.    Prescriptions Prior to Admission[2]    Review of Systems   Unable to perform ROS: Intubated     Objective:     Vital Signs (Most Recent):  Temp: 99 °F (37.2 °C) (05/06/25 1101)  Pulse: 87 (05/06/25 1802)  Resp: (!) 23 (05/06/25 1802)  BP: 94/68 (05/06/25 1802)  SpO2: 95 % (05/06/25 1802)  ICP Mean (mmHg): 8 mmHg (05/06/25 1700)  CPP (mmHg calculated using NBP): 76 (05/06/25 1700)    Vital Signs Range (Last 24H):  Temp:  [98.2 °F (36.8 °C)-99.3 °F (37.4 °C)]   Pulse:  []   Resp:  [15-48]   BP: ()/()   SpO2:  [95 %-100 %]   Arterial Line BP: (112-127)/(44-66)        Physical Exam  Vitals and nursing note reviewed.   Constitutional:       General: She is not in acute distress.     Appearance: She is ill-appearing.   HENT:      Head:      Comments: EVD in place  Intubated  Eyes:      General: No scleral icterus.     Conjunctiva/sclera: Conjunctivae normal.   Cardiovascular:      Rate and Rhythm: Normal rate.      Pulses: Normal pulses.   Pulmonary:      Comments: Mechanical breath sounds  Abdominal:      General: Abdomen is flat.      Palpations: Abdomen is soft.   Musculoskeletal:      Right lower leg: No edema.      Left lower leg: No edema.   Skin:     General: Skin is warm and dry.              Neurological Exam:   LOC: intubated and sedated  Language: Intubated  Pupils (CN II, III): PERRL  Facial Movement (CN VII): Symmetric facial expression    Gag Reflex: present  Motor: spontaneous antigravity movement in all extremities in response to noxious stimuli      Laboratory:  CMP:   Recent Labs   Lab 05/06/25  0148   CALCIUM 9.5   ALBUMIN 3.9   PROT 8.9*      K 3.7   CO2 22*      BUN 12   CREATININE 0.7   ALKPHOS 118   ALT  "20   AST 26   BILITOT 0.3     CBC:   Recent Labs   Lab 05/06/25  0148   WBC 16.06*   RBC 3.97*   HGB 11.2*   HCT 36.3*      MCV 91   MCH 28.2   MCHC 30.9*     Lipid Panel: No results for input(s): "CHOL", "LDLCALC", "HDL", "TRIG" in the last 168 hours.  Coagulation:   Recent Labs   Lab 05/06/25  0148   INR 2.3*     Hgb A1C: No results for input(s): "HGBA1C" in the last 168 hours.  TSH:   Recent Labs   Lab 05/06/25  0750   TSH 0.430       Diagnostic Results:      Brain imaging:  CTH 5/6/25  Impression:  -Subarachnoid hemorrhage.  Moderate volume of intraventricular hemorrhage.  Mild hydrocephalus.  Recommend CTA.  Findings reported by Stat Rad radiologist at 01:06    DSA 5/6/25  Endovascular coil embolization of partially thrombosed right anterior inferior cerebellar artery aneurysm using Cerepak coils 5 mm * 10 cm. There were no complications. Please see Imaging report for full details.     Vessel Imaging:  CTA Head and Neck 5/6/25  Impression:  -8.6 mm aneurysm arising off the distal right anterior inferior cerebellar artery.  Distal location suspicious for mycotic aneurysm.  -No intracranial large vessel occlusion or hemodynamically significant stenosis.  -The cervical carotid and vertebral arteries are patent without hemodynamically significant stenosis.  -Right upper lobe, right middle lobe and left lower lobe consolidative opacities.  -Endotracheal tube terminates just superior to the charis recommend slight retraction.    Cardiac Evaluation:   TTE pending      Ramesh Reeves MD  Comprehensive Stroke Center  Department of Vascular Neurology   [facility] - Neuro Critical Care        [1]   Social History  Tobacco Use    Smoking status: Never    Smokeless tobacco: Never   Substance Use Topics    Alcohol use: Yes     Comment: Social - Rare     Drug use: No   [2]   Medications Prior to Admission   Medication Sig Dispense Refill Last Dose/Taking    aspirin (ECOTRIN) 81 MG EC tablet Take 81 mg by mouth.       " cetirizine (ZYRTEC) 10 MG tablet Take 10 mg by mouth.       cilostazoL (PLETAL) 50 MG Tab Take 1 tablet (50 mg total) by mouth 2 (two) times daily. 60 tablet 11     lisdexamfetamine (VYVANSE) 60 MG capsule Take 1 capsule (60 mg total) by mouth every morning. 30 capsule 0     multivitamin capsule Take 1 capsule by mouth once daily.       omega-3 acid ethyl esters (LOVAZA) 1 gram capsule Take 1 capsule (1 g total) by mouth once daily. 90 capsule 1     pantoprazole (PROTONIX) 40 MG tablet Take 1 tablet (40 mg total) by mouth 2 (two) times daily. 180 tablet 1     rosuvastatin (CRESTOR) 20 MG tablet Take 1 tablet (20 mg total) by mouth every evening. 90 tablet 1     tirzepatide, weight loss, (ZEPBOUND) 5 mg/0.5 mL PnIj Inject 5 mg into the skin every 7 days. 2 mL 0     tirzepatide, weight loss, (ZEPBOUND) 7.5 mg/0.5 mL PnIj Inject 7.5 mg into the skin every 7 days. 2 mL 0     topiramate (TOPAMAX) 50 MG tablet Take 1 tablet by mouth twice daily (dose correction) 180 tablet 1     valACYclovir (VALTREX) 1000 MG tablet Take 1 tablet (1,000 mg total) by mouth once daily. 90 tablet 1     valsartan (DIOVAN) 320 MG tablet Take 1 tablet (320 mg total) by mouth once daily. 90 tablet 1     warfarin (COUMADIN) 5 MG tablet Take by mouth. Takes 2.5 mg on Tues and Thur; and 5 mg all other days.

## 2025-05-06 NOTE — ASSESSMENT & PLAN NOTE
49f presenting after syncope with CTH demonstrating SAH. CTA without clear vascular malformation. Intubated prior to arrival to [facility] ED.   Suspicious for aneurysmal SAH    Plan:  EVD in ICU  DSA today  SAH protocol (euvolemia, SBP <140 until aneurysm secured, nimotop, keppra, TCDs)

## 2025-05-06 NOTE — PLAN OF CARE
Cerebral angiogram procedure completed. One coil implanted. Anesthesia remains on the case. Please see anesthesia flowsheet for airway, cardiac and VS monitoring, as well as sedation/medication administration. Left groin  procedure site clean, dry, and intact; no bleeding or hematoma noted. Hemostasis at 1445. Pt must lie flat for 2 hours. Pt can sit up at 1645. Posterior tibial pulses remain doppler. Patient to be transferred to [facility]. Report to be given at bedside to RN.

## 2025-05-06 NOTE — ED TRIAGE NOTES
Waldo Chanashley Emmanuel, a 49 y.o. female presents to the ED w/ complaint of [facility] neuro ICU transfer from .    Triage note:  Chief Complaint   Patient presents with    Cerebrovascular Accident     Transfer from [place]. [facility]. neuroICU transfer     Review of patient's allergies indicates:   Allergen Reactions    Metformin Diarrhea     Other reaction(s): Diarrhae     Past Medical History:   Diagnosis Date    ADHD (attention deficit hyperactivity disorder)     Anemia     Fibroids     Genital herpes     GERD (gastroesophageal reflux disease)     H/O total hysterectomy 08/03/2021    Hypertension     Labral tear of hip joint     Ovarian cyst     Right common arterial occlusion     Routine general medical examination at a [facility] care facility 06/26/2017    Total Right Arterial Occlusion

## 2025-05-06 NOTE — SUBJECTIVE & OBJECTIVE
Past Medical History:   Diagnosis Date    ADHD (attention deficit hyperactivity disorder)     Anemia     Fibroids     Genital herpes     GERD (gastroesophageal reflux disease)     H/O total hysterectomy 2021    Hypertension     Labral tear of hip joint     Ovarian cyst     Right common arterial occlusion     Routine general medical examination at a health care facility 2017    Total Right Arterial Occlusion      Past Surgical History:   Procedure Laterality Date    ANGIOGRAM, ABDOMINAL AORTA W/ EXTREMITY RUNOFF N/A 2025    Procedure: Angiogram, Abdominal Aorta W/ Extremity Runoff: Right lower extremity angiogram;  Surgeon: Lennox Zendejas MD;  Location: [facility] CATH LAB;  Service: Vascular;  Laterality: N/A;    CARPAL TUNNEL RELEASE       SECTION      CHOLECYSTECTOMY      DILATION AND CURETTAGE OF UTERUS      TOTAL ABDOMINAL HYSTERECTOMY W/ BILATERAL SALPINGOOPHORECTOMY  2021    menorrhagia     Social History[1]  Review of patient's allergies indicates:   Allergen Reactions    Metformin Diarrhea     Other reaction(s): Diarrhae       Medications: I have reviewed the current medication administration record.    Prescriptions Prior to Admission[2]    Review of Systems   Unable to perform ROS: Intubated     Objective:     Vital Signs (Most Recent):  Temp: 99 °F (37.2 °C) (25 1101)  Pulse: 87 (25 1802)  Resp: (!) 23 (25 1802)  BP: 94/68 (25 1802)  SpO2: 95 % (25 1802)  ICP Mean (mmHg): 8 mmHg (25 1700)  CPP (mmHg calculated using NBP): 76 (25 1700)    Vital Signs Range (Last 24H):  Temp:  [98.2 °F (36.8 °C)-99.3 °F (37.4 °C)]   Pulse:  []   Resp:  [15-48]   BP: ()/()   SpO2:  [95 %-100 %]   Arterial Line BP: (112-127)/(44-66)        Physical Exam  Vitals and nursing note reviewed.   Constitutional:       General: She is not in acute distress.     Appearance: She is ill-appearing.   HENT:      Head:      Comments: EVD in  "place  Intubated  Eyes:      General: No scleral icterus.     Conjunctiva/sclera: Conjunctivae normal.   Cardiovascular:      Rate and Rhythm: Normal rate.      Pulses: Normal pulses.   Pulmonary:      Comments: Mechanical breath sounds  Abdominal:      General: Abdomen is flat.      Palpations: Abdomen is soft.   Musculoskeletal:      Right lower leg: No edema.      Left lower leg: No edema.   Skin:     General: Skin is warm and dry.              Neurological Exam:   LOC: intubated and sedated  Language: Intubated  Pupils (CN II, III): PERRL  Facial Movement (CN VII): Symmetric facial expression    Gag Reflex: present  Motor: spontaneous antigravity movement in all extremities in response to noxious stimuli      Laboratory:  CMP:   Recent Labs   Lab 05/06/25  0148   CALCIUM 9.5   ALBUMIN 3.9   PROT 8.9*      K 3.7   CO2 22*      BUN 12   CREATININE 0.7   ALKPHOS 118   ALT 20   AST 26   BILITOT 0.3     CBC:   Recent Labs   Lab 05/06/25  0148   WBC 16.06*   RBC 3.97*   HGB 11.2*   HCT 36.3*      MCV 91   MCH 28.2   MCHC 30.9*     Lipid Panel: No results for input(s): "CHOL", "LDLCALC", "HDL", "TRIG" in the last 168 hours.  Coagulation:   Recent Labs   Lab 05/06/25  0148   INR 2.3*     Hgb A1C: No results for input(s): "HGBA1C" in the last 168 hours.  TSH:   Recent Labs   Lab 05/06/25  0750   TSH 0.430       Diagnostic Results:      Brain imaging:  CTH 5/6/25  Impression:  -Subarachnoid hemorrhage.  Moderate volume of intraventricular hemorrhage.  Mild hydrocephalus.  Recommend CTA.  Findings reported by Stat Rad radiologist at 01:06    DSA 5/6/25  Endovascular coil embolization of partially thrombosed right anterior inferior cerebellar artery aneurysm using Cerepak coils 5 mm * 10 cm. There were no complications. Please see Imaging report for full details.     Vessel Imaging:  CTA Head and Neck 5/6/25  Impression:  -8.6 mm aneurysm arising off the distal right anterior inferior cerebellar artery.  " Distal location suspicious for mycotic aneurysm.  -No intracranial large vessel occlusion or hemodynamically significant stenosis.  -The cervical carotid and vertebral arteries are patent without hemodynamically significant stenosis.  -Right upper lobe, right middle lobe and left lower lobe consolidative opacities.  -Endotracheal tube terminates just superior to the charis recommend slight retraction.    Cardiac Evaluation:   TTE pending         [1]   Social History  Tobacco Use    Smoking status: Never    Smokeless tobacco: Never   Substance Use Topics    Alcohol use: Yes     Comment: Social - Rare     Drug use: No   [2]   Medications Prior to Admission   Medication Sig Dispense Refill Last Dose/Taking    aspirin (ECOTRIN) 81 MG EC tablet Take 81 mg by mouth.       cetirizine (ZYRTEC) 10 MG tablet Take 10 mg by mouth.       cilostazoL (PLETAL) 50 MG Tab Take 1 tablet (50 mg total) by mouth 2 (two) times daily. 60 tablet 11     lisdexamfetamine (VYVANSE) 60 MG capsule Take 1 capsule (60 mg total) by mouth every morning. 30 capsule 0     multivitamin capsule Take 1 capsule by mouth once daily.       omega-3 acid ethyl esters (LOVAZA) 1 gram capsule Take 1 capsule (1 g total) by mouth once daily. 90 capsule 1     pantoprazole (PROTONIX) 40 MG tablet Take 1 tablet (40 mg total) by mouth 2 (two) times daily. 180 tablet 1     rosuvastatin (CRESTOR) 20 MG tablet Take 1 tablet (20 mg total) by mouth every evening. 90 tablet 1     tirzepatide, weight loss, (ZEPBOUND) 5 mg/0.5 mL PnIj Inject 5 mg into the skin every 7 days. 2 mL 0     tirzepatide, weight loss, (ZEPBOUND) 7.5 mg/0.5 mL PnIj Inject 7.5 mg into the skin every 7 days. 2 mL 0     topiramate (TOPAMAX) 50 MG tablet Take 1 tablet by mouth twice daily (dose correction) 180 tablet 1     valACYclovir (VALTREX) 1000 MG tablet Take 1 tablet (1,000 mg total) by mouth once daily. 90 tablet 1     valsartan (DIOVAN) 320 MG tablet Take 1 tablet (320 mg total) by mouth once  daily. 90 tablet 1     warfarin (COUMADIN) 5 MG tablet Take by mouth. Takes 2.5 mg on Tues and Thur; and 5 mg all other days.

## 2025-05-06 NOTE — ANESTHESIA POSTPROCEDURE EVALUATION
Anesthesia Post Evaluation    Patient: Waldo Emmanuel    Procedure(s) Performed: * No procedures listed *    Final Anesthesia Type: general      Patient location during evaluation: PACU  Patient participation: Yes- Able to Participate  Level of consciousness: awake and alert  Post-procedure vital signs: reviewed and stable  Pain management: adequate  Airway patency: patent    PONV status at discharge: No PONV  Anesthetic complications: no      Cardiovascular status: blood pressure returned to baseline  Respiratory status: unassisted  Hydration status: euvolemic  Follow-up not needed.            Vitals Value Taken Time   /69 05/06/25 17:02   Temp  05/06/25 17:05   Pulse 94 05/06/25 17:04   Resp 21 05/06/25 17:04   SpO2 96 % 05/06/25 17:04   Vitals shown include unfiled device data.      No case tracking events are documented in the log.      Pain/Louis Score: Pain Rating Prior to Med Admin: 0 (5/6/2025 10:04 AM)  Pain Rating Post Med Admin: 8 (5/6/2025 12:54 AM)           04-Mar-2024 14:56

## 2025-05-06 NOTE — ANESTHESIA PROCEDURE NOTES
Arterial    Diagnosis: [facility]    Patient location during procedure: done in OR  Procedure end time: 5/6/2025 11:56 AM    Staffing  Authorizing Provider: Rober Morejon Jr., MD  Performing Provider: Rober Morejon Jr., MD    Staffing  Performed by: Rober Morejon Jr., MD  Authorized by: Rober Morejon Jr., MD    Anesthesiologist was present at the time of the procedure.    Preanesthetic Checklist  Completed: patient identified, IV checked, site marked, risks and benefits discussed, surgical consent, monitors and equipment checked, pre-op evaluation, timeout performed and anesthesia consent givenArterial  Skin Prep: chlorhexidine gluconate  Local Infiltration: none  Orientation: right  Location: radial    Catheter Size: 20 G  Catheter placement by Ultrasound guidance. Heme positive aspiration all ports.   Vessel Caliber: medium, patent, compressibility normal  Needle advanced into vessel with real time Ultrasound guidance.  Guidewire confirmed in vessel.  Sterile sheath used.Insertion Attempts: 2  Assessment  Dressing: secured with tape and tegaderm  Patient: Tolerated well  Additional Notes  Failed attempt on left radial

## 2025-05-06 NOTE — ED NOTES
Pt transported to [id number] with RN and respiratory.  All belongings sent with pt.  Daughter with pt for transport.

## 2025-05-06 NOTE — SUBJECTIVE & OBJECTIVE
Prescriptions Prior to Admission[1]    Review of patient's allergies indicates:   Allergen Reactions    Metformin Diarrhea     Other reaction(s): Diarrhae       Past Medical History:   Diagnosis Date    ADHD (attention deficit hyperactivity disorder)     Anemia     Fibroids     Genital herpes     GERD (gastroesophageal reflux disease)     H/O total hysterectomy 2021    Hypertension     Labral tear of hip joint     Ovarian cyst     Right common arterial occlusion     Routine general medical examination at a health care facility 2017    Total Right Arterial Occlusion      Past Surgical History:   Procedure Laterality Date    ANGIOGRAM, ABDOMINAL AORTA W/ EXTREMITY RUNOFF N/A 2025    Procedure: Angiogram, Abdominal Aorta W/ Extremity Runoff: Right lower extremity angiogram;  Surgeon: Lennox Zendejas MD;  Location: [facility] CATH LAB;  Service: Vascular;  Laterality: N/A;    CARPAL TUNNEL RELEASE       SECTION      CHOLECYSTECTOMY      DILATION AND CURETTAGE OF UTERUS      TOTAL ABDOMINAL HYSTERECTOMY W/ BILATERAL SALPINGOOPHORECTOMY  2021    menorrhagia     Family History       Problem Relation (Age of Onset)    Breast cancer Paternal Cousin    Hypertension Maternal Grandmother, Father    Prostate cancer Paternal Uncle    Stroke Maternal Grandmother, Father, Mother          Tobacco Use    Smoking status: Never    Smokeless tobacco: Never   Substance and Sexual Activity    Alcohol use: Yes     Comment: Social - Rare     Drug use: No    Sexual activity: Not Currently     Partners: Male     Birth control/protection: See Surgical Hx     Review of Systems  Objective:     Weight: 100.6 kg (221 lb 12.5 oz)  Body mass index is 39.29 kg/m².  Vital Signs (Most Recent):  Temp: 98.3 °F (36.8 °C) (25)  Pulse: (!) 142 (25)  Resp: 15 (25)  BP: 131/64 (25)  SpO2: 100 % (25) Vital Signs (24h Range):  Temp:  [98.2 °F (36.8 °C)-98.3 °F (36.8 °C)] 98.3 °F  "(36.8 °C)  Pulse:  [] 142  Resp:  [15-39] 15  SpO2:  [92 %-100 %] 100 %  BP: (104-197)/() 131/64                Vent Mode: A/C  Oxygen Concentration (%):  [] 100  Resp Rate Total:  [20 br/min-37 br/min] 33 br/min  Vt Set:  [375 mL-380 mL] 375 mL  PEEP/CPAP:  [5 cmH20-10 cmH20] 10 cmH20  Mean Airway Pressure:  [11 cmH20-15 cmH20] 15 cmH20             Urethral Catheter 05/06/25 0608 (Active)   Site Assessment Clean;Intact;Dry 05/06/25 0609   Collection Container Standard drainage bag 05/06/25 0609   Securement Method secured to top of thigh w/ adhesive device 05/06/25 0609   Catheter Care Performed yes 05/06/25 0609   Reason for Continuing Urinary Catheterization Critically ill in ICU and requiring hourly monitoring of intake/output 05/06/25 0609   CAUTI Prevention Bundle Securement Device in place with 1" slack 05/06/25 0609   Output (mL) 1000 mL 05/06/25 0609          Physical Exam      E2VTM5 ; BSi , PERRL , Loc briskly x4    Neurosurgery Physical Exam    Significant Labs:  Recent Labs   Lab 05/06/25  0148   *      K 3.7      CO2 22*   BUN 12   CREATININE 0.7   CALCIUM 9.5     Recent Labs   Lab 05/06/25  0148   WBC 16.06*   HGB 11.2*   HCT 36.3*        Recent Labs   Lab 05/05/25  1201 05/06/25  0148   INR 2.7 2.3*     Microbiology Results (last 7 days)       ** No results found for the last 168 hours. **          All pertinent labs from the last 24 hours have been reviewed.    Significant Diagnostics:  I have reviewed all pertinent imaging results/findings within the past 24 hours.  I have reviewed and interpreted all pertinent imaging results/findings within the past 24 hours.  No results found in the last 24 hours.         [1] (Not in a hospital admission)    "

## 2025-05-06 NOTE — ED NOTES
Pt c/o of generalized pain reporting that it hurts to move. Pt denies hx of dm. Reports that she got dizzy and fell pta no hx of similar episodes.

## 2025-05-06 NOTE — PLAN OF CARE
Pt arrived to 200 for cerebral angiogram for subarachnoid hemorrhage. Pt intubated. Anesthesia on the case. Please see anesthesia flowsheet for cardiac, airway and VS monitoring, as well as sedation/medication administration. Pt safely transferred from stretcher to procedural table. Ar, boards applied, positioner pillows utilized to minimize pressure points. Blankets applied. Pt prepped and draped utilizing standard sterile technique. Patient placed on continuous monitoring, as required by sedation policy. Timeouts to be completed utilizing standard universal time-out, per department and facility policy. RN to remain at bedside.

## 2025-05-06 NOTE — TRANSFER OF CARE
"Anesthesia Transfer of Care Note    Patient: Waldo Emmanuel    Procedure(s) Performed: * No procedures listed *    Patient location: ICU    Anesthesia Type: general    Transport from OR: Transported from OR intubated on 100% O2 by AMBU with adequate controlled ventilation. Continuous ECG monitoring in transport. Continuous SpO2 monitoring in transport. Continuos invasive BP monitoring in transport    Post pain: adequate analgesia    Post assessment: no apparent anesthetic complications and tolerated procedure well    Post vital signs: stable    Level of consciousness: sedated and responds to stimulation    Nausea/Vomiting: no nausea/vomiting    Complications: none    Transfer of care protocol was followedComments: Patient remains intubated per surgeon request, EVD clamped for transport.       Last vitals: Visit Vitals  /63   Pulse 101   Temp 37.2 °C (99 °F) (Axillary)   Resp (!) 20   Ht 5' 3" (1.6 m)   Wt 100.6 kg (221 lb 12.5 oz)   LMP 10/10/2020 (Exact Date)   SpO2 99%   Breastfeeding No   BMI 39.29 kg/m²     "

## 2025-05-06 NOTE — SUBJECTIVE & OBJECTIVE
Past Medical History:   Diagnosis Date    ADHD (attention deficit hyperactivity disorder)     Anemia     Fibroids     Genital herpes     GERD (gastroesophageal reflux disease)     H/O total hysterectomy 2021    Hypertension     Labral tear of hip joint     Ovarian cyst     Right common arterial occlusion     Routine general medical examination at a health care facility 2017    Total Right Arterial Occlusion      Past Surgical History:   Procedure Laterality Date    ANGIOGRAM, ABDOMINAL AORTA W/ EXTREMITY RUNOFF N/A 2025    Procedure: Angiogram, Abdominal Aorta W/ Extremity Runoff: Right lower extremity angiogram;  Surgeon: Lennox Zendejas MD;  Location: [facility] CATH LAB;  Service: Vascular;  Laterality: N/A;    CARPAL TUNNEL RELEASE       SECTION      CHOLECYSTECTOMY      DILATION AND CURETTAGE OF UTERUS      TOTAL ABDOMINAL HYSTERECTOMY W/ BILATERAL SALPINGOOPHORECTOMY  2021    menorrhagia      Medications Ordered Prior to Encounter[1]   Allergies: Metformin  Family History   Problem Relation Name Age of Onset    Hypertension Maternal Grandmother      Stroke Maternal Grandmother      Hypertension Father      Stroke Father      Stroke Mother      Breast cancer Paternal Cousin      Prostate cancer Paternal Uncle       Social History[2]  Review of Systems   Unable to perform ROS: Acuity of condition     Objective:     Vitals:    Temp: 99 °F (37.2 °C)  Pulse: 105  Rhythm: normal sinus rhythm  BP: (!) 115/58  MAP (mmHg): 93  ICP Mean (mmHg): 8 mmHg  Resp: (!) 22  SpO2: 100 %  Oxygen Concentration (%): 90  Vent Mode: A/C  Set Rate: 20 BPM  Vt Set: 375 mL  PEEP/CPAP: 10 cmH20  Peak Airway Pressure: 30 cmH20  Mean Airway Pressure: 15 cmH20  Plateau Pressure: 0 cmH20    Temp  Min: 98.2 °F (36.8 °C)  Max: 99.3 °F (37.4 °C)  Pulse  Min: 66  Max: 150  BP  Min: 104/75  Max: 197/102  MAP (mmHg)  Min: 87  Max: 167  ICP Mean (mmHg)  Min: 8 mmHg  Max: 8 mmHg  Resp  Min: 15  Max: 48  SpO2  Min: 95 %   Max: 100 %  Oxygen Concentration (%)  Min: 60  Max: 100    05/05 0701 - 05/06 0700  In: -   Out: 1900 [Urine:1900]            Physical Exam  Constitutional:       Appearance: She is not ill-appearing.      Comments: Sedated   HENT:      Head: Atraumatic.      Right Ear: External ear normal.      Left Ear: External ear normal.      Mouth/Throat:      Comments: ETT in place  Cardiovascular:      Rate and Rhythm: Regular rhythm. Tachycardia present.      Pulses: Normal pulses.   Pulmonary:      Breath sounds: No wheezing or rales.   Abdominal:      General: There is no distension.   Musculoskeletal:         General: No swelling.   Skin:     General: Skin is warm.   Neurological:      Comments: E3VTM5  Localizes pain in all extremities  Pupils equal and reactive                Today I personally reviewed pertinent medications, lines/drains/airways, imaging, laboratory results, notably:             [1]   Current Facility-Administered Medications on File Prior to Encounter   Medication Dose Route Frequency Provider Last Rate Last Admin    [COMPLETED] amLODIPine tablet 10 mg  10 mg Oral ED 1 Time Doron Cavanaugh Jr., MD   10 mg at 05/05/25 2351    [COMPLETED] etomidate injection 20 mg  20 mg Intravenous ED 1 Time Doron Cavanaugh Jr., MD   20 mg at 05/06/25 0206    [COMPLETED] iohexoL (OMNIPAQUE 350) injection 100 mL  100 mL Intravenous ONCE PRN Doron Cavanaugh Jr., MD   100 mL at 05/06/25 0305    [COMPLETED] levETIRAcetam injection 1,000 mg  1,000 mg Intravenous ED 1 Time Doron Cavanaugh Jr., MD   1,000 mg at 05/06/25 0139    [COMPLETED] morphine injection 4 mg  4 mg Intravenous ED 1 Time Doron Cavanaugh Jr., MD   4 mg at 05/05/25 2351    [COMPLETED] ondansetron injection 4 mg  4 mg Intravenous ED 1 Time Doron Cavanaugh Jr., MD   4 mg at 05/05/25 2351    [COMPLETED] ondansetron injection 4 mg  4 mg Intravenous ED 1 Time Doron Cavanaugh Jr., MD   4 mg at 05/06/25 0315    [COMPLETED] phytonadione vitamin k  (AQUA-MEPHYTON) 10 mg in D5W 50 mL IVPB  10 mg Intravenous ED 1 Time Doron Cavanaugh Jr., MD   Stopped at 05/06/25 0251    [COMPLETED] Prothrombin complex concentrate, human (Kcentra) 2,710 Units in sterile water for injection IVPB  2,710 Units Intravenous Once Doron Cavanaugh Jr.,  mL/hr at 05/06/25 0354 2,710 Units at 05/06/25 0354    [COMPLETED] rocuronium injection 50 mg  50 mg Intravenous Once Doron Cavanaugh Jr., MD   50 mg at 05/06/25 0206    [DISCONTINUED] clevidipine (CLEVIPREX) 25 mg/50 mL infusion  0-16 mg/hr Intravenous Continuous Doron Cavanaugh Jr., MD 8 mL/hr at 05/06/25 0359 4 mg/hr at 05/06/25 0359    [DISCONTINUED] propofol (DIPRIVAN) 10 mg/mL infusion  0-50 mcg/kg/min Intravenous Continuous Doron Cavanaugh Jr., MD 12.1 mL/hr at 05/06/25 0321 20 mcg/kg/min at 05/06/25 0321     Current Outpatient Medications on File Prior to Encounter   Medication Sig Dispense Refill    aspirin (ECOTRIN) 81 MG EC tablet Take 81 mg by mouth.      cetirizine (ZYRTEC) 10 MG tablet Take 10 mg by mouth.      cilostazoL (PLETAL) 50 MG Tab Take 1 tablet (50 mg total) by mouth 2 (two) times daily. 60 tablet 11    lisdexamfetamine (VYVANSE) 60 MG capsule Take 1 capsule (60 mg total) by mouth every morning. 30 capsule 0    multivitamin capsule Take 1 capsule by mouth once daily.      omega-3 acid ethyl esters (LOVAZA) 1 gram capsule Take 1 capsule (1 g total) by mouth once daily. 90 capsule 1    pantoprazole (PROTONIX) 40 MG tablet Take 1 tablet (40 mg total) by mouth 2 (two) times daily. 180 tablet 1    rosuvastatin (CRESTOR) 20 MG tablet Take 1 tablet (20 mg total) by mouth every evening. 90 tablet 1    tirzepatide, weight loss, (ZEPBOUND) 5 mg/0.5 mL PnIj Inject 5 mg into the skin every 7 days. 2 mL 0    tirzepatide, weight loss, (ZEPBOUND) 7.5 mg/0.5 mL PnIj Inject 7.5 mg into the skin every 7 days. 2 mL 0    topiramate (TOPAMAX) 50 MG tablet Take 1 tablet by mouth twice daily (dose correction) 180 tablet  1    valACYclovir (VALTREX) 1000 MG tablet Take 1 tablet (1,000 mg total) by mouth once daily. 90 tablet 1    valsartan (DIOVAN) 320 MG tablet Take 1 tablet (320 mg total) by mouth once daily. 90 tablet 1    warfarin (COUMADIN) 5 MG tablet Take by mouth. Takes 2.5 mg on Tues and Thur; and 5 mg all other days.     [2]   Social History  Tobacco Use    Smoking status: Never    Smokeless tobacco: Never   Substance Use Topics    Alcohol use: Yes     Comment: Social - Rare     Drug use: No

## 2025-05-06 NOTE — PLAN OF CARE
Recommendations  With propofol:   --Continuous TF recommendation: Impact Peptide 1.5 @ 40 mL/hr to provide 960 mL total volume, 1440 kcal, 134 g CHO, 90 g protein, 0 g fiber, 741 mL free water, 96% DRI         --115 mL flush q4 hrs to provide additional 690 mL free water (Total 1431 mL)    --Propofol providing an additional 319 kcal/day     --Nursing: please continue to document rate and formula on flowsheet    --RD to monitor weight, rate, tolerance    Without propofol:   ---Continuous TF recommendation: Impact Peptide 1.5 @ 45 mL/hr to provide 1080 mL total volume, 1620 kcal, 151 g CHO, 102 g protein, 0 g fiber, 834 mL free water, 108% DRI         --130 mL flush q4 hrs to provide additional 780 mL free water (Total 1614 mL)    Goals: Provide nutrition within 48 hours  Nutrition Goal Status: new  Communication of FIDELINA Recs:  (POC)

## 2025-05-06 NOTE — ASSESSMENT & PLAN NOTE
Patient is a 49-year-old female with past medical history of hypertension presenting via transfer from [place] for higher level of care after being diagnosed with subarachnoid hemorrhage.    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  S/p EVD 5/6  S/p Coil emolization 5/6  - Neuro IR consulted   - Neurosurgery consulted  - Vascular Neurology consulted  - goal BP less than 140  - fentanyl propofol for sedation  - nimodipine and daily TCDs given subarachnoid hemorrhage  - daily aspirin per neuro IR    CV  Hx of HTN  Tachycardia - unclear to monitor  - on clevidipine for BP goals  - oral BP meds if needed  - PRN labetalol     Pulm  Patient intubated for airway protection prior to transfer and given intraventricular hemorraghe  - mechanical ventilation, wean settings as tolerated  - daily x-ray    GI  - NPO for now  - Bowel regimen  - Consider tube feeding    Renal  - Electrolyte replacement as needed  - Daily CMP    Heme  Hx of anemia   - Daily CBC    ID  - Cefazolin prophylaxis while EVD inplace

## 2025-05-06 NOTE — PROCEDURES
Interventional Neuroradiology Post-Procedure Note    Pre Op Diagnosis: SAH, Right Right AICA aneurysm    Post Op Diagnosis: Same    Procedure: Endo-vascular treatment- coil embolization of Right AICA aneurysm + Diagnostic cerebral angiogram    Procedure performed by: Piyush Silverio MD;  Sunny SHI, Nadia    Written Informed Consent Obtained: Yes    Specimen Removed: NO    Estimated Blood Loss: Minimal    Procedure report:     Initially we attempted to access the right groin under US guidance using a micropuncture needle. The microwire would not pass be external iliac artery so needle and wire were removed and hemostasis was achieved.       Next, under US guidance, A 6 F sheath was placed into the left femoral artery and a 5F Robert catheter was advanced into the aortic arch.  Bilateral CCA/ICA/ECA and vertebral arteries were subselected and angiography of the brain was performed after injection into each of these vessels.    For intervention purposes, Benchmark 071- 95 cm long, Jeane 5 Pashto- 125 cm long, Headway duo 156 cm, Synchro, 5 Pashto SELECT    Preliminary interpretation:  Endovascular coil embolization of partially thrombosed right anterior inferior cerebellar artery aneurysm using Cerepak coils 5 mm * 10 cm. There were no complications. Please see Imaging report for full details.    A left femoral artery angiogram was performed, the sheath removed and hemostasis achieved using 6 F Vascade device.  No hematoma was present at the time of hemostasis.    The patient tolerated the procedure well.     Plan:  -To [facility] for monitoring  -Bed rest for 2h  -Please start aspirin 81 mg daily from today  -Groin check and pulse check q2h   - BP goal per ICU  -Avoid carrying heavy weights > 10 lbs x 24 hrs   -Remove groin dressing tomorrow       Nadia Correa MD  Fellow, NeuroEndovascular Surgery, [facility]

## 2025-05-06 NOTE — RESPIRATORY THERAPY
Pt received from EMS with 7.5 ETT secured 23cm @tetth. Pt placed on  with settings documented in flowsheets.

## 2025-05-06 NOTE — PROCEDURES
Indications, risks, and benefits explained to surrogate decision maker and informed consent obtained. A time-out was completed verifying correct patient, procedure, and site. All pre-operative labs and imaging were reviewed. The scalp was clipped. Patient was prepped with ChloraPrep and draped in a sterile manner. Incisions were infiltrated with 1% Xylocaine with epinephrine [id number] and post-procedural antibiotics were given. An incision was made on the right side of the head at the mid-pupillary line, 11 cm behind the nasion. A self-retaining retractor was placed. A burrhole was drilled with the cranial twist drill and the dura was punctured with the EVD catheter trochar. The ventricular catheter was then passed into the ventricle and brought out through a separate stab wound, the depth of catheter was 7 cm from the outer table of the skull. There was intermittent drainage of CSF after that. The catheter was secured to the scalp with #2-0 silk suture, stapled to the scalp at various places along its length, and the incision was closed with staples. The patient tolerated the procedure well. No complications. Sponge and needle counts were correct. Blood loss is minimal. Post-operative CTH was ordered for confirmation of catheter placement.  Total fluid removed: 2 mL  Color of fluid: clear

## 2025-05-06 NOTE — EICU
Intervention Initiated From:  Bedside    Kendall intervened regarding:  Documentation    Comments: Called to the room for EVD placement    [x] Verified patient name   [x] Verified patient   [x] Read from armband    EVD     0846 - Versed 4 mg given IVP  0848 - fentanyl 50 mcg given IVP  0858 - NS bolus 500 ml initiated  0914 - fentanyl 50 mcg bolus    0910 - EVD placed to (R) side by MD Yu.    Procedure checklist: Time out flowsheet complete, LDA added, and Patient tolerated well

## 2025-05-06 NOTE — EICU
"Intervention Initiated From:  COR / EICU    Kendall intervened regarding:  Other    Comments: Virtual ICU Admission    Admit Date: 2025  LOS: 0  Code Status: Full Code   : 1975  Bed: 9091/9091 A:     Diagnosis: <principal problem not specified>    Patient  has a past medical history of ADHD (attention deficit hyperactivity disorder), Anemia, Fibroids, Genital herpes, GERD (gastroesophageal reflux disease), H/O total hysterectomy, Hypertension, Labral tear of hip joint, Ovarian cyst, Right common arterial occlusion, Routine general medical examination at a health care facility, and Total Right Arterial Occlusion.    Last VS: /84   Pulse (!) 126   Temp 99.3 °F (37.4 °C) (Axillary)   Resp (!) 27   Ht 5' 3" (1.6 m)   Wt 100.6 kg (221 lb 12.5 oz)   LMP 10/10/2020 (Exact Date)   SpO2 98%   Breastfeeding No   BMI 39.29 kg/m²       VICU Review    VICU nurse assessment :  [place] completed, LDA documentation reconciliation completed, Ventilator settings reviewed , Stress ulcer prophylaxis for ventilated patients review, and VTE prophylaxis review                  "

## 2025-05-06 NOTE — ED PROVIDER NOTES
Encounter Date: 5/6/2025       History     Chief Complaint   Patient presents with    Cerebrovascular Accident     Transfer from [place]. ICH. neuroICU transfer    Transfer     [facility], dx with ICH, arrives intubated and on sedation     HPI  Waldo Emmanuel is a 49 y.o. female, PMH HTN and anemia,  presenting as a transfer for neurosurgical evaluation with newly diagnosed ICH from outside hospital.  Patient was transferred from [facility].  Daughter at bedside helps provide history.  She states that she was at a grocery store when she had a syncopal event.  She was unsure if she hit her head.  She states that people on the scene called her to let her know what happened and after she was seen in the ED they told her that she had bleeding inside of her head.  She states that while in the ED her mother was answering questions appropriately and acting like her normal self except frequently falling asleep.  Patient was intubated for airway protection prior to arrival.  Patient and reversal with [facility] prior to transfer.  She was previously taking Coumadin.    Review of patient's allergies indicates:   Allergen Reactions    Metformin Diarrhea     Other reaction(s): Diarrhae     Past Medical History:   Diagnosis Date    ADHD (attention deficit hyperactivity disorder)     Anemia     Fibroids     Genital herpes     GERD (gastroesophageal reflux disease)     H/O total hysterectomy 08/03/2021    Hypertension     Labral tear of hip joint     Ovarian cyst     Right common arterial occlusion     Routine general medical examination at a health care facility 06/26/2017    Total Right Arterial Occlusion      Past Surgical History:   Procedure Laterality Date    ANGIOGRAM, ABDOMINAL AORTA W/ EXTREMITY RUNOFF N/A 1/8/2025    Procedure: Angiogram, Abdominal Aorta W/ Extremity Runoff: Right lower extremity angiogram;  Surgeon: Lennox Zendejas MD;  Location: [facility] CATH LAB;  Service:  Vascular;  Laterality: N/A;    CARPAL TUNNEL RELEASE       SECTION      CHOLECYSTECTOMY      DILATION AND CURETTAGE OF UTERUS      TOTAL ABDOMINAL HYSTERECTOMY W/ BILATERAL SALPINGOOPHORECTOMY  2021    menorrhagia     Family History   Problem Relation Name Age of Onset    Hypertension Maternal Grandmother      Stroke Maternal Grandmother      Hypertension Father      Stroke Father      Stroke Mother      Breast cancer Paternal Cousin      Prostate cancer Paternal Uncle       Social History[1]  Review of Systems    Physical Exam     Initial Vitals   BP Pulse Resp Temp SpO2   25 0600 25 0541 25 0541 25 0546 25 0541   (!) 141/74 (!) 116 (!) 22 98.3 °F (36.8 °C) 100 %      MAP       --                Physical Exam    Nursing note and vitals reviewed.  Constitutional: She appears well-developed and well-nourished. She is not diaphoretic. She is sleeping. No distress. She is sedated and intubated.   Eyes: Pupils are equal, round, and reactive to light.   Cardiovascular:  Normal rate and regular rhythm.     Exam reveals no gallop and no friction rub.       No murmur heard.  Pulmonary/Chest: No accessory muscle usage. She is intubated. No respiratory distress. She has no decreased breath sounds. She has no wheezes.   Abdominal: She exhibits no shifting dullness, no distension and no fluid wave.     Neurological: GCS eye subscore is 2. GCS motor subscore is 4.   Skin: Skin is warm and dry. Capillary refill takes less than 2 seconds.         ED Course   Procedures  Labs Reviewed   TSH   URINALYSIS   TYPE & SCREEN          Imaging Results    None          Medications   propofol (DIPRIVAN) 10 mg/mL infusion (20 mcg/kg/min × 100.6 kg Intravenous New Bag 25 0605)   clevidipine (CLEVIPREX) 50 mg/100 mL infusion (0 mg/hr Intravenous Stopped 25 0748)   fentaNYL 2500 mcg in 0.9% sodium chloride 250 mL infusion premix (non-titrating) (conc: 10mcg/mL) (25 mcg/hr Intravenous New Bag  5/6/25 0740)   LIDOcaine-EPINEPHrine 1%-1:100,000 injection 10 mL (has no administration in time range)   midazolam injection 4 mg (0 mg Intravenous Hold 5/6/25 0741)   sodium chloride 0.9% flush 10 mL (has no administration in time range)   labetalol 20 mg/4 mL (5 mg/mL) IV syring (has no administration in time range)   bisacodyL suppository 10 mg (has no administration in time range)   potassium bicarbonate disintegrating tablet 50 mEq (has no administration in time range)   potassium bicarbonate disintegrating tablet 35 mEq (has no administration in time range)   potassium bicarbonate disintegrating tablet 60 mEq (has no administration in time range)   potassium, sodium phosphates 280-160-250 mg packet 2 packet (has no administration in time range)   potassium, sodium phosphates 280-160-250 mg packet 2 packet (has no administration in time range)   potassium, sodium phosphates 280-160-250 mg packet 2 packet (has no administration in time range)   niMODipine (Nymalize) 6 mg/mL oral solution 60 mg (has no administration in time range)   levETIRAcetam injection 500 mg (has no administration in time range)   pantoprazole injection 40 mg (has no administration in time range)   atorvastatin tablet 40 mg (has no administration in time range)   valACYclovir tablet 1,000 mg (has no administration in time range)   polyethylene glycol packet 17 g (has no administration in time range)   senna-docusate 8.6-50 mg per tablet 1 tablet (has no administration in time range)   fentaNYL 50 mcg/mL injection 100 mcg (100 mcg Intravenous Given 5/6/25 4657)   fentaNYL 50 mcg/mL injection 100 mcg (100 mcg Intravenous Given 5/6/25 0740)     Medical Decision Making  Waldo Emmanuel  is a 49 y.o. female, presenting with complaints of ICH, history of present illness obtained from daughter and records review as seen above. Patient unable to provide adequate and detailed history of present illness. Patient found to be well appearing and  in no acute distress , stable. Exam notable as above.     DDX includes but is not limited to:  ICH, intraparenchymal hemorrhage, cerebral aneurysm.  Patient has known ICH.  Neurosurgery has evaluated and has plans for EVD later today.  Patient will remain intubated and sedated on propofol and goal SBP <140 on Cleviprex.     See ED course for additional discussion. I discussed this case with neurosurgery who agreed to evaluate and provide recommendations for this patient.     Data Reviewed/Counseling: I have reviewed the patient's vital signs, nursing notes, and other relevant tests/information. Any incidental findings were discussed with the patient. I had a detailed discussion with the patient regarding the historical points, exam findings, and any diagnostic results supporting the diagnosis. Discussed case with neuro critical care, who agreed to evaluate and admit patient.  Disposition: Admitted    Risk  Prescription drug management.  Decision regarding hospitalization.               ED Course as of 05/06/25 0848   Tue May 06, 2025   0635 Attempted NCC X2 no answer [HB]   0645 NCC will admit  [HB]      ED Course User Index  [HB] Purvi Gonzales MD                           Clinical Impression:  Final diagnoses:  [R00.0] Tachycardia  [I61.9] Nontraumatic intracerebral hemorrhage, unspecified cerebral location, unspecified laterality (Primary)          ED Disposition Condition    Admit                   [1]   Social History  Tobacco Use    Smoking status: Never    Smokeless tobacco: Never   Substance Use Topics    Alcohol use: Yes     Comment: Social - Rare     Drug use: No        Purvi Gonzales MD  Resident  05/06/25 08

## 2025-05-06 NOTE — CONSULTS
[facility] - Neuro Critical Care  Adult Nutrition  Consult Note    SUMMARY     Recommendations  With propofol:   --Continuous TF recommendation: Impact Peptide 1.5 @ 40 mL/hr to provide 960 mL total volume, 1440 kcal, 134 g CHO, 90 g protein, 0 g fiber, 741 mL free water, 96% DRI         --115 mL flush q4 hrs to provide additional 690 mL free water (Total 1431 mL)    --Propofol providing an additional 319 kcal/day     --Nursing: please continue to document rate and formula on flowsheet    --RD to monitor weight, rate, tolerance    Without propofol:   ---Continuous TF recommendation: Impact Peptide 1.5 @ 45 mL/hr to provide 1080 mL total volume, 1620 kcal, 151 g CHO, 102 g protein, 0 g fiber, 834 mL free water, 108% DRI         --130 mL flush q4 hrs to provide additional 780 mL free water (Total 1614 mL)    Goals: Provide nutrition within 48 hours  Nutrition Goal Status: new  Communication of RD Recs:  (POC)    Nutrition Discharge Planning     Nutrition Discharge Planning: Too early to determine, pending clinical course     Reason for Assessment    Reason For Assessment: consult (Assess dietary needs)  Diagnosis: hemorrhage (SAH (subarachnoid hemorrhage))  General Information Comments: 49f presenting after syncope with CTH demonstrating SAH. RD consult to assess dietary needs. Pt intubated and sedated - Vtot 9.23 L/m. Propofol running at 12.1 mL/hr, providing an additional 319 kcal/day. Pt currently NPO with NG in place. See above for tube feed recommendations. Weight stable - no concerns for malnutrition at this time.    Nutrition Related Social Determinants of Health: SDOH: None Identified     Food Insecurity: Patient Declined (2/23/2025)    Hunger Vital Sign     Worried About Running Out of Food in the Last Year: Patient declined     Ran Out of Food in the Last Year: Patient declined       Nutrition/Diet History    Spiritual, Cultural Beliefs, Jewish Practices, Values that Affect Care: no  Food Allergies:  "NKFA  Factors Affecting Nutritional Intake: NPO, on mechanical ventilation    Anthropometrics    Height: 5' 3" (160 cm)  Height (inches): 63 in  Height Method: Stated  Weight: 100.6 kg (221 lb 12.5 oz)  Weight (lb): 221.78 lb  Weight Method: Standard Scale  Ideal Body Weight (IBW), Female: 115 lb  % Ideal Body Weight, Female (lb): 192.85 %  BMI (Calculated): 39.3  BMI Grade: 35 - 39.9 - obesity - grade II       Lab/Procedures/Meds    Pertinent Labs Reviewed: reviewed - glucose 156    Pertinent Medications Reviewed: reviewed - atorvastatin, fentanyl, bowel reg    Estimated/Assessed Needs    Weight Used For Calorie Calculations: 100.6 kg (221 lb 12.5 oz)  Energy Calorie Requirements (kcal): 1849 kcal/day  Energy Need Method: [facility]  Protein Requirements: 105 g/day (2.0 g/kg IBW)  Weight Used For Protein Calculations: 52.3 kg (115 lb 4.8 oz)     Estimated Fluid Requirement Method: RDA Method  RDA Method (mL): 1849         Nutrition Prescription Ordered    Current Diet Order: NPO    Evaluation of Received Nutrient/Fluid Intake    Energy Calories Required: not meeting needs  Protein Required: not meeting needs  Fluid Required:  (Per MD)  % Intake of Estimated Energy Needs: NPO  % Meal Intake: NPO    PES Statement  Nutrition PES Status: New  Nutrition PES Problem: Inadequate enteral nutrition infusion  Nutrition PES Etiology:  (Infusion volume not reached)  Nutrition PES Signs and Symptoms:  (Inadequate EN volume compared to estimated requirements)    Nutrition Risk    Level of Risk/Frequency of Follow-up: high       Monitor and Evaluation    Monitor and Evaluation: Enteral and parenteral nutrition administration, Weight, Electrolyte and renal panel, Gastrointestinal profile, Glucose/endocrine profile, Inflammatory profile, Lipid profile, Nutrition focused physical findings, Skin       Nutrition Follow-Up    RD Follow-up?: Yes    "

## 2025-05-06 NOTE — CONSULTS
[facility] - Emergency Dept  Neurosurgery  Consult Note    Inpatient consult to Neurosurgery  Consult performed by: Angel Yu MD  Consult ordered by: Purvi Gonzales MD        Subjective:     Chief Complaint/Reason for Admission: syncope    History of Present Illness: 49f presenting after syncope with CTH demonstrating SAH. CTA without clear vascular malformation. Intubated prior to arrival to [facility] ED. Discussed expected hospital course and imaging with daughter in room    Prescriptions Prior to Admission[1]    Review of patient's allergies indicates:   Allergen Reactions    Metformin Diarrhea     Other reaction(s): Diarrhae       Past Medical History:   Diagnosis Date    ADHD (attention deficit hyperactivity disorder)     Anemia     Fibroids     Genital herpes     GERD (gastroesophageal reflux disease)     H/O total hysterectomy 2021    Hypertension     Labral tear of hip joint     Ovarian cyst     Right common arterial occlusion     Routine general medical examination at a [facility] care facility 2017    Total Right Arterial Occlusion      Past Surgical History:   Procedure Laterality Date    ANGIOGRAM, ABDOMINAL AORTA W/ EXTREMITY RUNOFF N/A 2025    Procedure: Angiogram, Abdominal Aorta W/ Extremity Runoff: Right lower extremity angiogram;  Surgeon: Lennox Zendejas MD;  Location: [facility] CATH LAB;  Service: Vascular;  Laterality: N/A;    CARPAL TUNNEL RELEASE       SECTION      CHOLECYSTECTOMY      DILATION AND CURETTAGE OF UTERUS      TOTAL ABDOMINAL HYSTERECTOMY W/ BILATERAL SALPINGOOPHORECTOMY  2021    menorrhagia     Family History       Problem Relation (Age of Onset)    Breast cancer Paternal Cousin    Hypertension Maternal Grandmother, Father    Prostate cancer Paternal Uncle    Stroke Maternal Grandmother, Father, Mother          Tobacco Use    Smoking status: Never    Smokeless tobacco: Never   Substance and Sexual Activity    Alcohol use: Yes     Comment: Social - Rare      "Drug use: No    Sexual activity: Not Currently     Partners: Male     Birth control/protection: See Surgical Hx     Review of Systems  Objective:     Weight: 100.6 kg (221 lb 12.5 oz)  Body mass index is 39.29 kg/m².  Vital Signs (Most Recent):  Temp: 98.3 °F (36.8 °C) (05/06/25 0615)  Pulse: (!) 142 (05/06/25 0647)  Resp: 15 (05/06/25 0657)  BP: 131/64 (05/06/25 0615)  SpO2: 100 % (05/06/25 0647) Vital Signs (24h Range):  Temp:  [98.2 °F (36.8 °C)-98.3 °F (36.8 °C)] 98.3 °F (36.8 °C)  Pulse:  [] 142  Resp:  [15-39] 15  SpO2:  [92 %-100 %] 100 %  BP: (104-197)/() 131/64                Vent Mode: A/C  Oxygen Concentration (%):  [] 100  Resp Rate Total:  [20 br/min-37 br/min] 33 br/min  Vt Set:  [375 mL-380 mL] 375 mL  PEEP/CPAP:  [5 cmH20-10 cmH20] 10 cmH20  Mean Airway Pressure:  [11 cmH20-15 cmH20] 15 cmH20             Urethral Catheter 05/06/25 0608 (Active)   Site Assessment Clean;Intact;Dry 05/06/25 0609   Collection Container Standard drainage bag 05/06/25 0609   Securement Method secured to top of thigh w/ adhesive device 05/06/25 0609   Catheter Care Performed yes 05/06/25 0609   Reason for Continuing Urinary Catheterization Critically ill in ICU and requiring hourly monitoring of intake/output 05/06/25 0609   CAUTI Prevention Bundle Securement Device in place with 1" slack 05/06/25 0609   Output (mL) 1000 mL 05/06/25 0609          Physical Exam      E2VTM5 ; BSi , PERRL , Loc briskly x4    Neurosurgery Physical Exam    Significant Labs:  Recent Labs   Lab 05/06/25  0148   *      K 3.7      CO2 22*   BUN 12   CREATININE 0.7   CALCIUM 9.5     Recent Labs   Lab 05/06/25  0148   WBC 16.06*   HGB 11.2*   HCT 36.3*        Recent Labs   Lab 05/05/25  1201 05/06/25 0148   INR 2.7 2.3*     Microbiology Results (last 7 days)       ** No results found for the last 168 hours. **          All pertinent labs from the last 24 hours have been reviewed.    Significant " Diagnostics:  I have reviewed all pertinent imaging results/findings within the past 24 hours.  I have reviewed and interpreted all pertinent imaging results/findings within the past 24 hours.  No results found in the last 24 hours.    Assessment/Plan:     SAH (subarachnoid hemorrhage)  49f presenting after syncope with CTH demonstrating SAH. CTA without clear vascular malformation. Intubated prior to arrival to [facility] ED.   Suspicious for aneurysmal SAH    Plan:  EVD in ICU  DSA today  SAH protocol (euvolemia, SBP <140 until aneurysm secured, nimotop, keppra, TCDs)            Thank you for your consult. I will follow-up with patient. Please contact us if you have any additional questions.    Angel Yu MD  Neurosurgery  [facility] - Emergency Dept       [1] (Not in a hospital admission)

## 2025-05-06 NOTE — CONSULTS
Interventional Neuroradiology Pre-procedure Note    History of Present Illness:  49 years old female presenting after syncope and CTH demonstrating SAH. CTA  with 8.6 mm aneurysm arising off the distal right anterior inferior cerebellar artery.      Past Medical History:   Diagnosis Date    ADHD (attention deficit hyperactivity disorder)     Anemia     Fibroids     Genital herpes     GERD (gastroesophageal reflux disease)     H/O total hysterectomy 2021    Hypertension     Labral tear of hip joint     Ovarian cyst     Right common arterial occlusion     Routine general medical examination at a health care facility 2017    Total Right Arterial Occlusion      Past Surgical History:   Procedure Laterality Date    ANGIOGRAM, ABDOMINAL AORTA W/ EXTREMITY RUNOFF N/A 2025    Procedure: Angiogram, Abdominal Aorta W/ Extremity Runoff: Right lower extremity angiogram;  Surgeon: Lennox Zendejas MD;  Location: [facility] CATH LAB;  Service: Vascular;  Laterality: N/A;    CARPAL TUNNEL RELEASE       SECTION      CHOLECYSTECTOMY      DILATION AND CURETTAGE OF UTERUS      TOTAL ABDOMINAL HYSTERECTOMY W/ BILATERAL SALPINGOOPHORECTOMY  2021    menorrhagia       Review of Systems:   As documented in primary team H&P    Home Meds:   Prior to Admission medications    Medication Sig Start Date End Date Taking? Authorizing Provider   aspirin (ECOTRIN) 81 MG EC tablet Take 81 mg by mouth.    Provider, Historical   cetirizine (ZYRTEC) 10 MG tablet Take 10 mg by mouth.    Provider, Historical   cilostazoL (PLETAL) 50 MG Tab Take 1 tablet (50 mg total) by mouth 2 (two) times daily. 25  Lennox Zendejas MD   lisdexamfetamine (VYVANSE) 60 MG capsule Take 1 capsule (60 mg total) by mouth every morning. 3/16/25 5/1/25  Ansley Vivas MD   multivitamin capsule Take 1 capsule by mouth once daily.    Provider, Historical   omega-3 acid ethyl esters (LOVAZA) 1 gram capsule Take 1 capsule (1 g total) by mouth  once daily. 7/10/23   Ansley Vivas MD   pantoprazole (PROTONIX) 40 MG tablet Take 1 tablet (40 mg total) by mouth 2 (two) times daily. 1/15/25   Ansley Vivas MD   rosuvastatin (CRESTOR) 20 MG tablet Take 1 tablet (20 mg total) by mouth every evening. 2/15/25 2/15/26  Ansley Vivas MD   tirzepatide, weight loss, (ZEPBOUND) 5 mg/0.5 mL PnIj Inject 5 mg into the skin every 7 days. 2/28/25   Bella Diane MD   tirzepatide, weight loss, (ZEPBOUND) 7.5 mg/0.5 mL PnIj Inject 7.5 mg into the skin every 7 days. 2/28/25   Bella Diane MD   topiramate (TOPAMAX) 50 MG tablet Take 1 tablet by mouth twice daily (dose correction) 1/15/25   Ansley Vivas MD   valACYclovir (VALTREX) 1000 MG tablet Take 1 tablet (1,000 mg total) by mouth once daily. 1/15/25   Ansley Vivas MD   valsartan (DIOVAN) 320 MG tablet Take 1 tablet (320 mg total) by mouth once daily. 1/15/25   Ansley Vivas MD   warfarin (COUMADIN) 5 MG tablet Take by mouth. Takes 2.5 mg on Tues and Thur; and 5 mg all other days.    Provider, Historical     Scheduled Meds:    atorvastatin  40 mg Per OG tube Daily    ceFAZolin (Ancef) IV (PEDS and ADULTS)  2 g Intravenous Q8H    fentaNYL  50 mcg Intravenous Once    fentaNYL  50 mcg Intravenous Once    levETIRAcetam (Keppra) IV (PEDS and ADULTS)  500 mg Intravenous Q12H    niMODipine  60 mg Per OG tube Q4H    pantoprazole  40 mg Intravenous Daily    polyethylene glycol  17 g Per OG tube Daily    senna-docusate  1 tablet Per OG tube Daily    valACYclovir  1,000 mg Per OG tube Daily     Continuous Infusions:    clevidipine  0-32 mg/hr Intravenous Continuous   Stopped at 05/06/25 0748    fentanyl  25 mcg/hr Intravenous Continuous 5 mL/hr at 05/06/25 0915 50 mcg/hr at 05/06/25 0915    propofoL  0-50 mcg/kg/min Intravenous Continuous 12.1 mL/hr at 05/06/25 1008 20 mcg/kg/min at 05/06/25 1008     PRN Meds:  Current Facility-Administered Medications:     bisacodyL, 10 mg, Rectal, Daily  PRN    ceFAZolin (Ancef) IV (PEDS and ADULTS), 2 g, Intravenous, On Call Procedure    labetalol, 10 mg, Intravenous, Q15 Min PRN    potassium bicarbonate, 35 mEq, Per OG tube, PRN    potassium bicarbonate, 50 mEq, Per OG tube, PRN    potassium bicarbonate, 60 mEq, Per OG tube, PRN    potassium, sodium phosphates, 2 packet, Per OG tube, PRN    potassium, sodium phosphates, 2 packet, Per OG tube, PRN    potassium, sodium phosphates, 2 packet, Per OG tube, PRN    sodium chloride 0.9%, 10 mL, Intravenous, PRN  Anticoagulants/Antiplatelets: no anticoagulation    Allergies:   Review of patient's allergies indicates:   Allergen Reactions    Metformin Diarrhea     Other reaction(s): Diarrhae     Sedation Hx: have not been any systemic reactions    Labs:  Recent Labs   Lab 05/06/25 0148   INR 2.3*       Recent Labs   Lab 05/06/25 0148   WBC 16.06*   HGB 11.2*   HCT 36.3*   MCV 91         Recent Labs   Lab 05/06/25 0148   *      K 3.7      CO2 22*   BUN 12   CREATININE 0.7   CALCIUM 9.5   ALT 20   AST 26   ALBUMIN 3.9   BILITOT 0.3         Vitals:  Temp: 99.3 °F (37.4 °C) (05/06/25 0845)  Pulse: (!) 126 (05/06/25 0951)  Resp: (!) 27 (05/06/25 0951)  BP: 129/84 (05/06/25 0859)  SpO2: 98 % (05/06/25 0951)     Physical Exam:  ASA: 4  Mallampati: 2    Intubated and sedated  Face symmetric, Pupil equal and reactive to light, corneal reflex intact, gag reflex intact, cough reflex intact  Withdrawal in the legs with moderate sedation  Spontaneous movement in legs with mild sedation  Localizes with both arms and upper extremities      Plan:  Sedation Plan: general anesthesia  Patient will undergo: Patient will undergo cerebral angiogram and possible endo-vascular treatment of intracranial aneurysm   Informed consent obtained from patient's daughter. Risk vs benefits explained. She agrees to procedure.        Nadia Correa MD  Fellow, NeuroEndovascular Surgery, [facility]

## 2025-05-06 NOTE — PLAN OF CARE
[facility] Care Plan  POC reviewed with Waldo Emmanuel and family at 1600. Family verbalized understanding. Questions and concerns addressed. No acute events today. Pt progressing toward goals. Will continue to monitor. See below and flowsheets for full assessment and VS info.     Pt intubated, weaning vent settings, possible extubation in am   ABG in am  Sp coiling of aneurysm in IR  PT pulses per doppler  Weaning sedation as tolerated  EVD at 10  ICP 8 - 22 today  Minimal CSF drainage - light pink in color             Is this a stroke patient? yes    Neuro:  Roro Coma Scale  Best Eye Response: 3-->(E3) to speech  Best Motor Response: 4-->(M4) withdraws from pain  Best Verbal Response: 1-->(V1) none  Roro Coma Scale Score: 8  Assessment Qualifiers: patient intubated, patient chemically sedated or paralyzed  Pupil PERRLA: yes (admit assessemt interrupted by urgent EVD placement)     24 hr Temp:  [98.2 °F (36.8 °C)-99.3 °F (37.4 °C)]     CV:   Rhythm: normal sinus rhythm  BP goals:   SBP < 140  MAP > 65    Resp:      Vent Mode: A/C  Set Rate: 20 BPM  Oxygen Concentration (%): 60  Vt Set: 375 mL  PEEP/CPAP: 5 cmH20    Plan: wean to extubate    GI/:     Diet/Nutrition Received: NPO  Last Bowel Movement: 05/06/25  Voiding Characteristics: urethral catheter (bladder)    Intake/Output Summary (Last 24 hours) at 5/6/2025 1848  Last data filed at 5/6/2025 1801  Gross per 24 hour   Intake 1477.66 ml   Output 2312 ml   Net -834.34 ml          Labs/Accuchecks:  Recent Labs   Lab 05/06/25 0148   WBC 16.06*   RBC 3.97*   HGB 11.2*   HCT 36.3*         Recent Labs   Lab 05/06/25 0148      K 3.7   CO2 22*      BUN 12   CREATININE 0.7   ALKPHOS 118   ALT 20   AST 26   BILITOT 0.3      Recent Labs   Lab 05/06/25 0148   PROTIME 25.1*   INR 2.3*      Recent Labs   Lab 05/06/25 0148   TROPONINI <0.006       Electrolytes: N/A - electrolytes WDL  Accuchecks: Q6H    Gtts:   clevidipine  0-32 mg/hr  Intravenous Continuous   Stopped at 25 0748    fentanyl  0-250 mcg/hr Intravenous Continuous        propofoL  0-50 mcg/kg/min Intravenous Continuous 9.1 mL/hr at 25 1808 15 mcg/kg/min at 25 180       LDA/Wounds:    Ivan Risk Assessment  Sensory Perception: 3-->slightly limited  Moisture: 3-->occasionally moist  Activity: 1-->bedfast  Mobility: 2-->very limited  Nutrition: 2-->probably inadequate  Friction and Shear: 2-->potential problem  Ivan Score: 13    Is your ivan score 12 or less? no            Restraints:   Restraint Order  Length of Order: Order good for next 24 hours or when removed.  Date that the current order will : 25  Time that the current order will : 1115  Order Upon Application: Yes    [facility]

## 2025-05-06 NOTE — NURSING
SHAHBAZ Rios DO notified at bedside that pt is withdrawing x4 extremities only, Propofol at 50 mcg/kg/min, will wean sedation as tolerated.

## 2025-05-06 NOTE — PT/OT/SLP PROGRESS
Occupational Therapy      Patient Name:  Waldo Emmanuel   MRN:  [id number]    OT orders received and acknowledged.  Patient not seen today secondary to hold due to pt being intuabted and sedated.  Pt also was off the floor for a procedure earlier.  Will follow-up when appropriate.    5/6/2025

## 2025-05-06 NOTE — ANESTHESIA PREPROCEDURE EVALUATION
05/06/2025  Waldo Emmanuel is a 49 y.o., female.      HISTORY FROM [facility] [phone]      Procedure: IR ANGIOGRAM CAROTID INTERNAL INC ARCH AND CEREBRAL BILAT   Diagnosis:      Intracranial hemorrhage [I62.9]      Tachycardia [R00.0]      Intracranial hemorrhage [I62.9]      Tachycardia [R00.0]   Indications: SAH-           Pre-operative evaluation for * No procedures listed *      Encounter Diagnoses   Name Primary?    Tachycardia     Nontraumatic intracerebral hemorrhage, unspecified cerebral location, unspecified laterality Yes       Review of patient's allergies indicates:   Allergen Reactions    Metformin Diarrhea     Other reaction(s): Diarrhae       Medications Prior to Admission   Medication Sig Dispense Refill Last Dose/Taking    aspirin (ECOTRIN) 81 MG EC tablet Take 81 mg by mouth.       cetirizine (ZYRTEC) 10 MG tablet Take 10 mg by mouth.       cilostazoL (PLETAL) 50 MG Tab Take 1 tablet (50 mg total) by mouth 2 (two) times daily. 60 tablet 11     lisdexamfetamine (VYVANSE) 60 MG capsule Take 1 capsule (60 mg total) by mouth every morning. 30 capsule 0     multivitamin capsule Take 1 capsule by mouth once daily.       omega-3 acid ethyl esters (LOVAZA) 1 gram capsule Take 1 capsule (1 g total) by mouth once daily. 90 capsule 1     pantoprazole (PROTONIX) 40 MG tablet Take 1 tablet (40 mg total) by mouth 2 (two) times daily. 180 tablet 1     rosuvastatin (CRESTOR) 20 MG tablet Take 1 tablet (20 mg total) by mouth every evening. 90 tablet 1     tirzepatide, weight loss, (ZEPBOUND) 5 mg/0.5 mL PnIj Inject 5 mg into the skin every 7 days. 2 mL 0     tirzepatide, weight loss, (ZEPBOUND) 7.5 mg/0.5 mL PnIj Inject 7.5 mg into the skin every 7 days. 2 mL 0     topiramate (TOPAMAX) 50 MG tablet Take 1 tablet by mouth twice daily (dose correction) 180 tablet 1     valACYclovir (VALTREX)  1000 MG tablet Take 1 tablet (1,000 mg total) by mouth once daily. 90 tablet 1     valsartan (DIOVAN) 320 MG tablet Take 1 tablet (320 mg total) by mouth once daily. 90 tablet 1     warfarin (COUMADIN) 5 MG tablet Take by mouth. Takes 2.5 mg on Tues and Thur; and 5 mg all other days.            clevidipine  0-32 mg/hr Intravenous Continuous   Stopped at 25 0748    fentanyl  25 mcg/hr Intravenous Continuous 2.5 mL/hr at 25 0740 25 mcg/hr at 25 0740    propofoL  0-50 mcg/kg/min Intravenous Continuous 12.1 mL/hr at 25 0605 20 mcg/kg/min at 25 0605       Medications Ordered Prior to Encounter[1]    Past Medical History:  No date: ADHD (attention deficit hyperactivity disorder)  No date: Anemia  No date: Fibroids  No date: Genital herpes  No date: GERD (gastroesophageal reflux disease)  2021: H/O total hysterectomy  No date: Hypertension  No date: Labral tear of hip joint  No date: Ovarian cyst  No date: Right common arterial occlusion  2017: Routine general medical examination at a health care   facility  No date: Total Right Arterial Occlusion    Past Surgical History:   Procedure Laterality Date    ANGIOGRAM, ABDOMINAL AORTA W/ EXTREMITY RUNOFF N/A 2025    Procedure: Angiogram, Abdominal Aorta W/ Extremity Runoff: Right lower extremity angiogram;  Surgeon: Lennox Zendejas MD;  Location: [facility] CATH LAB;  Service: Vascular;  Laterality: N/A;    CARPAL TUNNEL RELEASE       SECTION      CHOLECYSTECTOMY      DILATION AND CURETTAGE OF UTERUS      TOTAL ABDOMINAL HYSTERECTOMY W/ BILATERAL SALPINGOOPHORECTOMY  2021    menorrhagia       Tobacco Use History[2]    Social History     Substance and Sexual Activity   Alcohol Use Yes    Comment: Social - Rare        Physical Activity: Inactive (2025)    Exercise Vital Sign     Days of Exercise per Week: 0 days     Minutes of Exercise per Session: 0 min       No birth history on file.  Recent Labs     25  0148  "  HCT 36.3*     Recent Labs     05/06/25  0148        Recent Labs     05/06/25  0148   K 3.7     Recent Labs     05/06/25  0148   CREATININE 0.7     Recent Labs     05/06/25  0148   *     No results for input(s): "PT" in the last 72 hours.  Vitals:    05/06/25 0800 05/06/25 0817 05/06/25 0845 05/06/25 0859   BP: (!) 135/90 124/76  129/84   Pulse: (!) 140 (!) 142  (!) 143   Resp: (!) 22 (!) 25  (!) 25   Temp:   37.4 °C (99.3 °F)    TempSrc:   Axillary    SpO2: 100% 100%  100%   Weight:       Height:    5' 3" (1.6 m)                       Pre-op Assessment          Review of Systems  Anesthesia Hx:  No problems with previous Anesthesia                Hematology/Oncology:  Hematology Normal   Oncology Normal                Hematology Comments: DVT without PE years ago, last coumadin was probably Sunday night per daughter                    Cardiovascular:     Denies Pacemaker. Hypertension, well controlled   Denies MI.     Denies CABG/stent.    Denies Angina.                                    Pulmonary:  Pulmonary Normal   Denies COPD.  Denies Asthma.   Denies Shortness of breath.   Acute bronchitis a few years ago without sequelae with Covid.    Intubated and ventilated               Renal/:   Denies Chronic Renal Disease.                Hepatic/GI:     GERD, poorly controlled Denies Liver Disease.   Taking GLP-1 Agonists (daughter unaware if symptoms or if she is still taking it)            Neurological:   CVA    Denies Seizures.    No prior history,  found unconscious in parking lot yesterday                            Endocrine:  Denies Diabetes.               Physical Exam  General: Well nourished, Cooperative, Alert and Oriented    Airway:  Mallampati: II   Mouth Opening: Normal  TM Distance: Normal  Tongue: Normal  Neck ROM: Normal ROM  Pre-Existing Airway: Oral Endotracheal tube        Anesthesia Plan  Type of Anesthesia, risks & benefits discussed:    Anesthesia Type: Gen ETT  Intra-op " Monitoring Plan: Standard ASA Monitors  Post Op Pain Control Plan: multimodal analgesia and IV/PO Opioids PRN  Induction:  IV  Informed Consent: Informed consent signed with the Patient and all parties understand the risks and agree with anesthesia plan.  All questions answered.   ASA Score: 4 Emergent  Day of Surgery Review of History & Physical: H&P Update referred to the surgeon/provider.    Ready For Surgery From Anesthesia Perspective.     .  Date/Time Temp Temp #2 Pulse Resp BP Patient Position MAP (mmHg) SpO2 Weight Flow (L/min) (Oxygen Therapy) Oxygen Concentration (%) Device (Oxygen Therapy) Arterial Line BP Arterial Line BP 2 Temp #2 [facility]   05/06/25 0859 -- -- 143 Abnormal  25 Abnormal  129/84 -- -- 100 % -- -- 90 ventilator -- -- --    05/06/25 0845 37.4 °C (99.3 °F) -- -- -- -- -- -- -- -- -- -- -- -- -- --    05/06/25 0817 -- -- 142 Abnormal  25 Abnormal  124/76 -- 95 100 % -- -- 90 -- -- -- --    05/06/25 0800 -- -- 140 Abnormal  22 Abnormal  135/90 Abnormal  -- 108 100 % -- -- 90 ventilator -- -- --    05/06/25 0740 -- -- -- 26 Abnormal  -- -- -- -- -- -- -- -- -- -- --    05/06/25 0730 -- -- 144 Abnormal  27 Abnormal  -- -- -- 100 % -- -- 100 ventilator -- -- --    05/06/25 0700 36.8 °C (98.3 °F) -- -- 20 -- -- -- -- -- -- 100 ventilator -- -- --    05/06/25 0700 -- -- 139 Abnormal  -- 122/67 -- 87 100 % -- -- -- -- -- -- -- AS   05/06/25 0657 -- -- -- 15 -- -- -- -- -- -- -- -- -- -- --    05/06/25 0647 -- -- 142 Abnormal  33 Abnormal  -- -- -- 100 % -- -- 100 ventilator -- -- -- EM   05/06/25 0645 36.8 °C (98.3 °F) -- 142 Abnormal  33 Abnormal  131/61 -- -- 100 % -- -- 100 ventilator -- -- -- AC   05/06/25 0630 36.8 °C (98.3 °F) -- 143 Abnormal  37 Abnormal  134/69 -- 95 100 % -- -- 100 ventilator -- -- -- AC   05/06/25 0616 -- -- -- -- -- -- -- -- 100.6 kg (221 lb 12.5 oz) -- -- -- -- -- -- [facility] Reference Range & Units 05/06/25 01:48   PT 9.0 - 12.5 seconds 25.1 (H)    (H): Data is abnormally high         Latest Reference Range & Units 05/06/25 01:48   BNP 0 - 99 pg/mL <10         Vent. Rate :  85 BPM     Atrial Rate :  85 BPM      P-R Int : 162 ms          QRS Dur :  82 ms       QT Int : 390 ms       P-R-T Axes :  42  12  75 degrees     QTcB Int : 464 ms     Normal sinus rhythm   Normal ECG   No previous ECGs available   Confirmed by Josee Gregorio ([id number]) on 11/19/2024 8:12:54 PM       2/2025    Normal myocardial perfusion scan. There is no evidence of myocardial ischemia or infarction.    There is a moderate intensity perfusion abnormality in the apical and anteroseptal wall of the left ventricle, secondary to breast attenuation.    The gated perfusion images showed an ejection fraction of 68% at rest. The gated perfusion images showed an ejection fraction of 85% post stress.    There is normal wall motion at rest and post-stress.    The ECG portion of the study is negative for ischemia.    The patient reported no chest pain during the stress test.        2/12/2025  Left Ventricle The left ventricle is normal in size. Normal wall thickness. There is concentric remodeling. Normal wall motion. There is normal systolic function with a visually estimated ejection fraction of 60 - 65%. Ejection fraction is approximately 60%. There is normal diastolic function.   Right Ventricle Normal right ventricular cavity size. Wall thickness is normal. Systolic function is normal.   Left Atrium Left atrium is mildly dilated.   Right Atrium Normal right atrial size.   Aortic Valve The aortic valve is structurally normal. There is normal leaflet mobility. Aortic valve peak velocity is 1.5 m/s. Mean gradient is 4 mmHg. There is no significant regurgitation.   Mitral Valve The mitral valve is structurally normal. There is normal leaflet mobility. There is trace regurgitation.   Tricuspid Valve The tricuspid valve is structurally normal. There is normal leaflet mobility. There is trace  regurgitation.  No pulmonary hypertension.   Pulmonic Valve The pulmonic valve is structurally normal. There is normal leaflet mobility. There is no significant regurgitation.   IVC/SVC Normal venous pressure at 3 mmHg.   Ascending Aorta Aortic root is normal in size measuring 2.80 cm. Ascending aorta is normal measuring 2.74 cm.   Pericardium and Other Findings There is a posterior effusion. No indication of cardiac tamponade.   Pulmonary Artery The estimated pulmonary artery systolic pressure is 24 mmHg.                  [1]   No current facility-administered medications on file prior to encounter.     Current Outpatient Medications on File Prior to Encounter   Medication Sig Dispense Refill    aspirin (ECOTRIN) 81 MG EC tablet Take 81 mg by mouth.      cetirizine (ZYRTEC) 10 MG tablet Take 10 mg by mouth.      cilostazoL (PLETAL) 50 MG Tab Take 1 tablet (50 mg total) by mouth 2 (two) times daily. 60 tablet 11    lisdexamfetamine (VYVANSE) 60 MG capsule Take 1 capsule (60 mg total) by mouth every morning. 30 capsule 0    multivitamin capsule Take 1 capsule by mouth once daily.      omega-3 acid ethyl esters (LOVAZA) 1 gram capsule Take 1 capsule (1 g total) by mouth once daily. 90 capsule 1    pantoprazole (PROTONIX) 40 MG tablet Take 1 tablet (40 mg total) by mouth 2 (two) times daily. 180 tablet 1    rosuvastatin (CRESTOR) 20 MG tablet Take 1 tablet (20 mg total) by mouth every evening. 90 tablet 1    tirzepatide, weight loss, (ZEPBOUND) 5 mg/0.5 mL PnIj Inject 5 mg into the skin every 7 days. 2 mL 0    tirzepatide, weight loss, (ZEPBOUND) 7.5 mg/0.5 mL PnIj Inject 7.5 mg into the skin every 7 days. 2 mL 0    topiramate (TOPAMAX) 50 MG tablet Take 1 tablet by mouth twice daily (dose correction) 180 tablet 1    valACYclovir (VALTREX) 1000 MG tablet Take 1 tablet (1,000 mg total) by mouth once daily. 90 tablet 1    valsartan (DIOVAN) 320 MG tablet Take 1 tablet (320 mg total) by mouth once daily. 90 tablet 1     warfarin (COUMADIN) 5 MG tablet Take by mouth. Takes 2.5 mg on Tues and Thur; and 5 mg all other days.     [2]   Social History  Tobacco Use   Smoking Status Never   Smokeless Tobacco Never

## 2025-05-06 NOTE — HPI
49f presenting after syncope with CTH demonstrating SAH. CTA without clear vascular malformation. Intubated prior to arrival to [facility] ED. Discussed expected hospital course and imaging with daughter in room

## 2025-05-06 NOTE — ED PROVIDER NOTES
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                ED Medication(s):  Medications   clevidipine (CLEVIPREX) 25 mg/50 mL infusion (4 mg/hr Intravenous Rate/Dose Change 5/6/25 0320)   propofol (DIPRIVAN) 10 mg/mL infusion (20 mcg/kg/min × 100.7 kg Intravenous Verify Only 5/6/25 0321)   Prothrombin complex concentrate, human (Kcentra) 2,517.5 Units in sterile water for injection IVPB (has no administration in time range)   amLODIPine tablet 10 mg (10 mg Oral Given 5/5/25 2351)   ondansetron injection 4 mg (4 mg Intravenous Given 5/5/25 2351)   morphine injection 4 mg (4 mg Intravenous Given 5/5/25 2351)   phytonadione vitamin k (AQUA-MEPHYTON) 10 mg in D5W 50 mL IVPB (0 mg Intravenous Stopped 5/6/25 0251)   levETIRAcetam injection 1,000 mg (1,000 mg Intravenous Given 5/6/25 0139)   iohexoL (OMNIPAQUE 350) injection 100 mL (100 mLs Intravenous Given 5/6/25 0305)   etomidate injection 20 mg (20 mg Intravenous Given 5/6/25 0206)   rocuronium injection 50 mg (50 mg Intravenous Given 5/6/25 0206)   ondansetron injection 4 mg (4 mg Intravenous Given 5/6/25 0315)       New Prescriptions    No medications on file               Scribe Attestation:   Scribe #1: I performed the above scribed service and the documentation accurately  describes the services I performed. I attest to the accuracy of the note.     Attending:   Physician Attestation Statement for Scribe #1: I, Doron Cavanaugh Jr., MD, personally performed the services described in this documentation, as scribed by Estrellita Birmingham, in my presence, and it is both accurate and complete.           Clinical Impression       ICD-10-CM ICD-9-CM   1. Syncope, unspecified syncope type  R55 780.2   2. Intracranial hemorrhage  I62.9 432.9       Disposition:   Disposition: Transferred  Condition: Stable       Doron Cavanaugh Jr., MD  05/06/25 0306       Doron Cavanaugh Jr., MD  05/06/25 4017

## 2025-05-06 NOTE — PT/OT/SLP PROGRESS
Speech Language Pathology  Discharge    Waldo Emmanuel  MRN: [id number]    Patient not seen today secondary to pt intubated at this time. Please re-consult when medically appropriate. No further ST warranted at this time.   5/6/2025

## 2025-05-06 NOTE — HPI
History obtained via chart review and daughter at bedside as patient intubated on arrival.     Per ED note:  49 y.o. female, PMH HTN and anemia,  presenting as a transfer for neurosurgical evaluation with newly diagnosed ICH from outside hospital.  Patient was transferred from [facility].  Daughter at bedside helps provide history.  She states that she was at a grocery store when she had a syncopal event.  She was unsure if she hit her head.  She states that people on the scene called her to let her know what happened and after she was seen in the ED they told her that she had bleeding inside of her head.  She states that while in the ED her mother was answering questions appropriately and acting like her normal self except frequently falling asleep.  Patient was intubated for airway protection prior to arrival.  Patient and reversal with Kcentra prior to transfer.  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 Distal location suspicious for mycotic aneurysm     [place] [facility]gery consulted, EVD placed and admitted to neurocritical care

## 2025-05-06 NOTE — NURSING
Patient arrived to [facility] from ED     Type of stroke/diagnosis:  SAH    TPA start and end time (if applicable)    Thrombectomy start and end time (if applicable): na     Current symptoms: arthur spont, localized bue, pupils 2-3 mm/reactive    Skin assessment done: Y/N yes   Wounds noted:: none    *If wounds noted, was Wound Care consulted? Y/N na    Harper Completed? Y/N pending extubation     Patient Belongings on Admit: (be specific): with daughter     NCC notified: name of person notified Susana HARMON

## 2025-05-06 NOTE — PLAN OF CARE
Coil embo for R AICA per IR  Okay for ASA as requested by IR  Please obtain CTH post IR procedure  Dropping EVD to 10 now that aneurysm secured  Okay to liberalize SBP per NCC now that aneurysm secured    Kevin Aria , MD [facility]

## 2025-05-06 NOTE — ASSESSMENT & PLAN NOTE
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 Patient transferred [place] [facility] [place]. DSA performed 5/6/25 demonstrated the above aneurysm, which was secured with coiling. Patient remains intubated and sedation is being weaned. Recommend continuing aneurysmal SAH protocol.    Antithrombotics for secondary stroke prevention: Antiplatelets: Aspirin: 81 mg daily    Statins for secondary stroke prevention and hyperlipidemia, if present:   Statins: Atorvastatin- 40 mg daily    Aggressive risk factor modification: HTN, HLD, Diet, Exercise, Obesity     Rehab efforts: The patient has been evaluated by a stroke team provider and the therapy needs have been fully considered based off the presenting complaints and exam findings. The following therapy evaluations are needed: PT evaluate and treat, OT evaluate and treat, SLP evaluate and treat    Diagnostics ordered/pending: MRI head without contrast to assess brain parenchyma, TTE to assess cardiac function/status     VTE prophylaxis: None: Reason for No Pharmacological VTE Prophylaxis: Known or suspected cerebral aneurysm or AVM    BP parameters: SAH: Secured aneurysm, no target, increase BP to prevent vasospasm if needed     Plan  -Continue nimodipine 60mg q4h  -sbp goal as above  -antiplatelet as above  -statin as above  -Appreciate NSGY recommendations  -PT/OT/SLP when appropriate

## 2025-05-06 NOTE — H&P
[facility] - Neuro Critical Care  Neurocritical Care  History & Physical    Admit Date: 5/6/2025  Service Date: 05/06/2025  Length of Stay: 0    Subjective:     Chief Complaint: SAH (subarachnoid hemorrhage)    History of Present Illness: History obtained via chart review and daughter at bedside as patient intubated on arrival.     Per ED note:  49 y.o. female, PMH HTN and anemia,  presenting as a transfer for neurosurgical evaluation with newly diagnosed ICH from outside hospital.  Patient was transferred from [facility].  Daughter at bedside helps provide history.  She states that she was at a grocery store when she had a syncopal event.  She was unsure if she hit her head.  She states that people on the scene called her to let her know what happened and after she was seen in the ED they told her that she had bleeding inside of her head.  She states that while in the ED her mother was answering questions appropriately and acting like her normal self except frequently falling asleep.  Patient was intubated for airway protection prior to arrival.  Patient and reversal with Kcentra prior to transfer.  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 Distal location suspicious for mycotic aneurysm     On arrival [place] [facility]: Neursurgery consulted, EVD placed and admitted to neurocritical care      Past Medical History:   Diagnosis Date    ADHD (attention deficit hyperactivity disorder)     Anemia     Fibroids     Genital herpes     GERD (gastroesophageal reflux disease)     H/O total hysterectomy 08/03/2021    Hypertension     Labral tear of hip joint     Ovarian cyst     Right common arterial occlusion     Routine general medical examination at a health care facility 06/26/2017    Total Right Arterial Occlusion      Past Surgical History:   Procedure Laterality Date    ANGIOGRAM, ABDOMINAL AORTA W/ EXTREMITY RUNOFF  N/A 2025    Procedure: Angiogram, Abdominal Aorta W/ Extremity Runoff: Right lower extremity angiogram;  Surgeon: Lennox Zendejas MD;  Location: [facility] CATH LAB;  Service: Vascular;  Laterality: N/A;    CARPAL TUNNEL RELEASE       SECTION      CHOLECYSTECTOMY      DILATION AND CURETTAGE OF UTERUS      TOTAL ABDOMINAL HYSTERECTOMY W/ BILATERAL SALPINGOOPHORECTOMY  2021    menorrhagia      Medications Ordered Prior to Encounter[1]   Allergies: Metformin  Family History   Problem Relation Name Age of Onset    Hypertension Maternal Grandmother      Stroke Maternal Grandmother      Hypertension Father      Stroke Father      Stroke Mother      Breast cancer Paternal Cousin      Prostate cancer Paternal Uncle       Social History[2]  Review of Systems   Unable to perform ROS: Acuity of condition     Objective:     Vitals:    Temp: 99 °F (37.2 °C)  Pulse: 105  Rhythm: normal sinus rhythm  BP: (!) 115/58  MAP (mmHg): 93  ICP Mean (mmHg): 8 mmHg  Resp: (!) 22  SpO2: 100 %  Oxygen Concentration (%): 90  Vent Mode: A/C  Set Rate: 20 BPM  Vt Set: 375 mL  PEEP/CPAP: 10 cmH20  Peak Airway Pressure: 30 cmH20  Mean Airway Pressure: 15 cmH20  Plateau Pressure: 0 cmH20    Temp  Min: 98.2 °F (36.8 °C)  Max: 99.3 °F (37.4 °C)  Pulse  Min: 66  Max: 150  BP  Min: 104/75  Max: 197/102  MAP (mmHg)  Min: 87  Max: 167  ICP Mean (mmHg)  Min: 8 mmHg  Max: 8 mmHg  Resp  Min: 15  Max: 48  SpO2  Min: 95 %  Max: 100 %  Oxygen Concentration (%)  Min: 60  Max: 100    05/ 0701 -  0700  In: -   Out: 1900 [Urine:1900]            Physical Exam  Constitutional:       Appearance: She is not ill-appearing.      Comments: Sedated   HENT:      Head: Atraumatic.      Right Ear: External ear normal.      Left Ear: External ear normal.      Mouth/Throat:      Comments: ETT in place  Cardiovascular:      Rate and Rhythm: Regular rhythm. Tachycardia present.      Pulses: Normal pulses.   Pulmonary:      Breath sounds: No wheezing or  rales.   Abdominal:      General: There is no distension.   Musculoskeletal:         General: No swelling.   Skin:     General: Skin is warm.   Neurological:      Comments: [id number]  Localizes pain in all extremities  Pupils equal and reactive                Today I personally reviewed pertinent medications, lines/drains/airways, imaging, laboratory results, notably:        Assessment/Plan:     Neuro  * SAH (subarachnoid hemorrhage)  Patient is a 49-year-old female with past medical history of hypertension presenting via transfer from [place] for higher level of care after being diagnosed with subarachnoid hemorrhage.    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  S/p EVD 5/6  S/p Coil emolization 5/6  - Neuro IR consulted   - Neurosurgery consulted  - Vascular Neurology consulted  - goal BP less than 140  - fentanyl propofol for sedation  - nimodipine and daily TCDs given subarachnoid hemorrhage  - daily aspirin per neuro IR    CV  Hx of HTN  Tachycardia - unclear to monitor  - on clevidipine for BP goals  - oral BP meds if needed  - PRN labetalol     Pulm  Patient intubated for airway protection prior to transfer and given intraventricular hemorraghe  - mechanical ventilation, wean settings as tolerated  - daily x-ray    GI  - NPO for now  - Bowel regimen  - Consider tube feeding    Renal  - Electrolyte replacement as needed  - Daily CMP    Heme  Hx of anemia   - Daily CBC    ID  - Cefazolin prophylaxis while EVD inplace                 Brain aneurysm  See primary problem          The patient is being Prophylaxed for:  Venous Thromboembolism with: Mechanical  Stress Ulcer with: PPI  Ventilator Pneumonia with: chlorhexidine oral care    Activity Orders            Progressive Mobility Protocol (mobilize patient to their highest level of functioning at least twice daily) starting at 05/06 2000    Turn patient starting at 05/06 0800    Diet  NPO: NPO starting at 05/06 0731    Bed rest starting at 05/06 0554    Elevate HOB 30 (30-45 Degrees) starting at 05/06 0554          Full Code    Nerissa Rios DO  Neurocritical Care  [facility] - Neuro Critical Care       [1]   Current Facility-Administered Medications on File Prior to Encounter   Medication Dose Route Frequency Provider Last Rate Last Admin    [COMPLETED] amLODIPine tablet 10 mg  10 mg Oral ED 1 Time Doron Cavanaugh Jr., MD   10 mg at 05/05/25 2351    [COMPLETED] etomidate injection 20 mg  20 mg Intravenous ED 1 Time Doron Cavanaugh Jr., MD   20 mg at 05/06/25 0206    [COMPLETED] iohexoL (OMNIPAQUE 350) injection 100 mL  100 mL Intravenous ONCE PRN Doron Cavanaugh Jr., MD   100 mL at 05/06/25 0305    [COMPLETED] levETIRAcetam injection 1,000 mg  1,000 mg Intravenous ED 1 Time Doron Cavanaugh Jr., MD   1,000 mg at 05/06/25 0139    [COMPLETED] morphine injection 4 mg  4 mg Intravenous ED 1 Time Doron Cavanaugh Jr., MD   4 mg at 05/05/25 2351    [COMPLETED] ondansetron injection 4 mg  4 mg Intravenous ED 1 Time Doron Cavanaugh Jr., MD   4 mg at 05/05/25 2351    [COMPLETED] ondansetron injection 4 mg  4 mg Intravenous ED 1 Time Doron Cavanaugh Jr., MD   4 mg at 05/06/25 0315    [COMPLETED] phytonadione vitamin k (AQUA-MEPHYTON) 10 mg in D5W 50 mL IVPB  10 mg Intravenous ED 1 Time Doron Cavanaugh Jr., MD   Stopped at 05/06/25 0251    [COMPLETED] Prothrombin complex concentrate, human (Kcentra) 2,710 Units in sterile water for injection IVPB  2,710 Units Intravenous Once Doron Cavanaugh Jr.,  mL/hr at 05/06/25 0354 2,710 Units at 05/06/25 0354    [COMPLETED] rocuronium injection 50 mg  50 mg Intravenous Once Doron Cavanaguh Jr., MD   50 mg at 05/06/25 0206    [DISCONTINUED] clevidipine (CLEVIPREX) 25 mg/50 mL infusion  0-16 mg/hr Intravenous Continuous Doron Cavanaugh Jr., MD 8 mL/hr at 05/06/25 0359 4 mg/hr at 05/06/25 0359    [DISCONTINUED] propofol (DIPRIVAN) 10 mg/mL  infusion  0-50 mcg/kg/min Intravenous Continuous Doron Cavanaugh Jr., MD 12.1 mL/hr at 05/06/25 0321 20 mcg/kg/min at 05/06/25 0321     Current Outpatient Medications on File Prior to Encounter   Medication Sig Dispense Refill    aspirin (ECOTRIN) 81 MG EC tablet Take 81 mg by mouth.      cetirizine (ZYRTEC) 10 MG tablet Take 10 mg by mouth.      cilostazoL (PLETAL) 50 MG Tab Take 1 tablet (50 mg total) by mouth 2 (two) times daily. 60 tablet 11    lisdexamfetamine (VYVANSE) 60 MG capsule Take 1 capsule (60 mg total) by mouth every morning. 30 capsule 0    multivitamin capsule Take 1 capsule by mouth once daily.      omega-3 acid ethyl esters (LOVAZA) 1 gram capsule Take 1 capsule (1 g total) by mouth once daily. 90 capsule 1    pantoprazole (PROTONIX) 40 MG tablet Take 1 tablet (40 mg total) by mouth 2 (two) times daily. 180 tablet 1    rosuvastatin (CRESTOR) 20 MG tablet Take 1 tablet (20 mg total) by mouth every evening. 90 tablet 1    tirzepatide, weight loss, (ZEPBOUND) 5 mg/0.5 mL PnIj Inject 5 mg into the skin every 7 days. 2 mL 0    tirzepatide, weight loss, (ZEPBOUND) 7.5 mg/0.5 mL PnIj Inject 7.5 mg into the skin every 7 days. 2 mL 0    topiramate (TOPAMAX) 50 MG tablet Take 1 tablet by mouth twice daily (dose correction) 180 tablet 1    valACYclovir (VALTREX) 1000 MG tablet Take 1 tablet (1,000 mg total) by mouth once daily. 90 tablet 1    valsartan (DIOVAN) 320 MG tablet Take 1 tablet (320 mg total) by mouth once daily. 90 tablet 1    warfarin (COUMADIN) 5 MG tablet Take by mouth. Takes 2.5 mg on Tues and Thur; and 5 mg all other days.     [2]   Social History  Tobacco Use    Smoking status: Never    Smokeless tobacco: Never   Substance Use Topics    Alcohol use: Yes     Comment: Social - Rare     Drug use: No

## 2025-05-06 NOTE — NURSING
Subjective:       Patient ID: Julius Baker is a 77 y.o. male.    Chief Complaint: swelling of testicles     This is a 77 y.o.  male patient that is new to me.  The patient was referred to me by Dr. Howard for testicular swelling.   Patient reports testicle goes up into his groin but will come back down into his scrotum; this happens at random times but always comes back down.  Reports that he has been having swelling in his testicles for the past three days. Yesterday he was sitting at the car shop for a couple of hours and when he woke up he had swollen testicles, he applied ice and the swelling has gone down. Denies pain to the testicles, LUTS, hematuria, dysuria.      LAST PSA  Lab Results   Component Value Date    PSA 4.7 (H) 02/03/2020    PSADIAG 6.0 (H) 07/30/2020       Lab Results   Component Value Date    CREATININE 1.1 03/21/2024       ---  PMH/PSH/Medications/Allergies/Social history reviewed and as in chart.    Review of Systems   Constitutional:  Negative for activity change, chills and fever.   Respiratory:  Negative for shortness of breath.    Cardiovascular:  Negative for chest pain and palpitations.   Gastrointestinal:  Negative for abdominal pain and constipation.   Genitourinary:  Positive for scrotal swelling. Negative for difficulty urinating, dysuria, flank pain, frequency, hematuria, penile pain, penile swelling, testicular pain and urgency.   Neurological:  Negative for dizziness and light-headedness.       Objective:      Physical Exam  HENT:      Head: Normocephalic.   Pulmonary:      Effort: Pulmonary effort is normal.   Abdominal:      General: Abdomen is flat.      Palpations: Abdomen is soft.   Genitourinary:     Testes:         Right: Swelling present. Tenderness not present.         Left: Swelling present. Tenderness not present.      Comments: Bilateral testicles swollen, left larger than the right, possible hydrocele.  Musculoskeletal:         General: Normal range of  Pt transported back to room 9091 from IR per CRNA and IR RN via bed and transport vent, portable monitor, EVD and Propofol gtt. S/p aneurysm coiling hemostasis time 1439. L groin without hematoma, dressing clean, dry and intact. NCC team notified.     motion.      Cervical back: Normal range of motion.   Skin:     General: Skin is warm and dry.   Neurological:      Mental Status: He is alert and oriented to person, place, and time.         Assessment:     Problem Noted   Testicular Swelling 6/7/2024       Plan:     Conservative/behavioral management for testicular pain: NSAIDs (Ibuprofen 200-400mg three times daily as needed), supportive underwear, cold compresses, elevate the testicles.   Scrotal US ordered.  Notify us if testes ascend upward into the groin and do not go back down. Patient voiced understanding.  Advised him to go to the ED if intractable pain, n/v, fevers, chills occur. Patient voiced understanding.  Follow-up 1 month.    JINNY Paredes    I spent a total of 25 minutes on the day of the visit.This includes face to face time and non-face to face time preparing to see the patient (eg, review of tests), obtaining and/or reviewing separately obtained history, documenting clinical information in the electronic or other health record, independently interpreting results and communicating results to the patient/family/caregiver, or care coordinator.

## 2025-05-07 PROBLEM — Z86.718 HISTORY OF DVT IN ADULTHOOD: Status: ACTIVE | Noted: 2025-05-07

## 2025-05-07 LAB
ABSOLUTE EOSINOPHIL (OHS): 0.02 K/UL
ABSOLUTE MONOCYTE (OHS): 0.47 K/UL (ref 0.3–1)
ABSOLUTE NEUTROPHIL COUNT (OHS): 8.98 K/UL (ref 1.8–7.7)
ALBUMIN SERPL BCP-MCNC: 2.8 G/DL (ref 3.5–5.2)
ALLENS TEST: ABNORMAL
ALLENS TEST: ABNORMAL
ALP SERPL-CCNC: 88 UNIT/L (ref 40–150)
ALT SERPL W/O P-5'-P-CCNC: 15 UNIT/L (ref 10–44)
ANION GAP (OHS): 11 MMOL/L (ref 8–16)
APTT PPP: 27.2 SECONDS (ref 21–32)
AST SERPL-CCNC: 22 UNIT/L (ref 11–45)
AV AREA BY CONTINUOUS VTI: 2.8 CM2
AV INDEX (PROSTH): 0.97
AV LVOT MEAN GRADIENT: 3 MMHG
AV LVOT PEAK GRADIENT: 4 MMHG
AV MEAN GRADIENT: 4 MMHG
AV PEAK GRADIENT: 9 MMHG
AV VALVE AREA BY VELOCITY RATIO: 1.9 CM²
AV VALVE AREA: 2.7 CM2
AV VELOCITY RATIO: 0.67
BASOPHILS # BLD AUTO: 0.03 K/UL
BASOPHILS NFR BLD AUTO: 0.3 %
BILIRUB SERPL-MCNC: 0.4 MG/DL (ref 0.1–1)
BSA FOR ECHO PROCEDURE: 2.11 M2
BUN SERPL-MCNC: 20 MG/DL (ref 6–20)
CALCIUM SERPL-MCNC: 8.6 MG/DL (ref 8.7–10.5)
CHLORIDE SERPL-SCNC: 104 MMOL/L (ref 95–110)
CO2 SERPL-SCNC: 22 MMOL/L (ref 23–29)
CREAT SERPL-MCNC: 0.9 MG/DL (ref 0.5–1.4)
CV ECHO LV RWT: 0.39 CM
DELSYS: ABNORMAL
DELSYS: ABNORMAL
DOP CALC AO PEAK VEL: 1.5 M/S
DOP CALC AO VTI: 21.7 CM
DOP CALC LVOT AREA: 2.8 CM2
DOP CALC LVOT DIAMETER: 1.9 CM
DOP CALC LVOT PEAK VEL: 1 M/S
DOP CALC LVOT STROKE VOLUME: 59.5 CM3
DOP CALCLVOT PEAK VEL VTI: 21 CM
E WAVE DECELERATION TIME: 244 MS
E WAVE DECELERATION TIME: 245 MS
E/A RATIO: 0.83
E/E' RATIO: 7 M/S
ECHO EF ESTIMATED: 71 %
ECHO LV POSTERIOR WALL: 0.9 CM (ref 0.6–1.1)
EJECTION FRACTION: 68 %
ERYTHROCYTE [DISTWIDTH] IN BLOOD BY AUTOMATED COUNT: 13.9 % (ref 11.5–14.5)
ERYTHROCYTE [SEDIMENTATION RATE] IN BLOOD BY WESTERGREN METHOD: 20 MM/H
FIO2: 40
FIO2: 60
FRACTIONAL SHORTENING: 41.3 % (ref 28–44)
GFR SERPLBLD CREATININE-BSD FMLA CKD-EPI: >60 ML/MIN/1.73/M2
GLUCOSE SERPL-MCNC: 146 MG/DL (ref 70–110)
HCO3 UR-SCNC: 25.5 MMOL/L (ref 24–28)
HCO3 UR-SCNC: 26.9 MMOL/L (ref 24–28)
HCT VFR BLD AUTO: 31.8 % (ref 37–48.5)
HCT VFR BLD CALC: 29 %PCV (ref 36–54)
HGB BLD-MCNC: 9.5 GM/DL (ref 12–16)
IMM GRANULOCYTES # BLD AUTO: 0.05 K/UL (ref 0–0.04)
IMM GRANULOCYTES NFR BLD AUTO: 0.5 % (ref 0–0.5)
INR PPP: 1.1 (ref 0.8–1.2)
INTERVENTRICULAR SEPTUM: 0.9 CM (ref 0.6–1.1)
LA MAJOR: 5 CM
LA MINOR: 4.4 CM
LA WIDTH: 3.6 CM
LEFT ATRIUM SIZE: 3.2 CM
LEFT ATRIUM VOLUME INDEX: 23 ML/M2
LEFT ATRIUM VOLUME: 46 CM3
LEFT INTERNAL DIMENSION IN SYSTOLE: 2.7 CM (ref 2.1–4)
LEFT VENTRICLE DIASTOLIC VOLUME INDEX: 48.51 ML/M2
LEFT VENTRICLE DIASTOLIC VOLUME: 98 ML
LEFT VENTRICLE MASS INDEX: 68.2 G/M2
LEFT VENTRICLE SYSTOLIC VOLUME INDEX: 13.9 ML/M2
LEFT VENTRICLE SYSTOLIC VOLUME: 28 ML
LEFT VENTRICULAR INTERNAL DIMENSION IN DIASTOLE: 4.6 CM (ref 3.5–6)
LEFT VENTRICULAR MASS: 137.7 G
LV LATERAL E/E' RATIO: 7.8
LV SEPTAL E/E' RATIO: 6.4
LYMPHOCYTES # BLD AUTO: 1.34 K/UL (ref 1–4.8)
MAGNESIUM SERPL-MCNC: 1.6 MG/DL (ref 1.6–2.6)
MCH RBC QN AUTO: 26.8 PG (ref 27–31)
MCHC RBC AUTO-ENTMCNC: 29.9 G/DL (ref 32–36)
MCV RBC AUTO: 90 FL (ref 82–98)
MIN VOL: 8.28
MODE: ABNORMAL
MODE: ABNORMAL
MV PEAK A VEL: 0.84 M/S
MV PEAK E VEL: 0.7 M/S
NUCLEATED RBC (/100WBC) (OHS): 0 /100 WBC
OHS CV RV/LV RATIO: 0.65 CM
PCO2 BLDA: 43.5 MMHG (ref 35–45)
PCO2 BLDA: 44.6 MMHG (ref 35–45)
PEEP: 5
PEEP: 5
PH SMN: 7.38 [PH] (ref 7.35–7.45)
PH SMN: 7.39 [PH] (ref 7.35–7.45)
PHOSPHATE SERPL-MCNC: 3 MG/DL (ref 2.7–4.5)
PIP: 25
PISA TR MAX VEL: 3.1 M/S
PLATELET # BLD AUTO: 335 K/UL (ref 150–450)
PMV BLD AUTO: 9.6 FL (ref 9.2–12.9)
PO2 BLDA: 126 MMHG (ref 80–100)
PO2 BLDA: 72 MMHG (ref 80–100)
POC BE: 0 MMOL/L (ref -2–2)
POC BE: 2 MMOL/L (ref -2–2)
POC IONIZED CALCIUM: 1.17 MMOL/L (ref 1.06–1.42)
POC SATURATED O2: 94 % (ref 95–100)
POC SATURATED O2: 99 % (ref 95–100)
POC TCO2: 27 MMOL/L (ref 23–27)
POC TCO2: 28 MMOL/L (ref 23–27)
POCT GLUCOSE: 140 MG/DL (ref 70–110)
POCT GLUCOSE: 144 MG/DL (ref 70–110)
POCT GLUCOSE: 150 MG/DL (ref 70–110)
POCT GLUCOSE: 161 MG/DL (ref 70–110)
POTASSIUM BLD-SCNC: 4.1 MMOL/L (ref 3.5–5.1)
POTASSIUM SERPL-SCNC: 4.3 MMOL/L (ref 3.5–5.1)
PROT SERPL-MCNC: 6.8 GM/DL (ref 6–8.4)
PROTHROMBIN TIME: 12.4 SECONDS (ref 9–12.5)
PS: 5
RA MAJOR: 4.58 CM
RA WIDTH: 3.53 CM
RBC # BLD AUTO: 3.54 M/UL (ref 4–5.4)
RELATIVE EOSINOPHIL (OHS): 0.2 %
RELATIVE LYMPHOCYTE (OHS): 12.3 % (ref 18–48)
RELATIVE MONOCYTE (OHS): 4.3 % (ref 4–15)
RELATIVE NEUTROPHIL (OHS): 82.4 % (ref 38–73)
RIGHT VENTRICLE DIASTOLIC BASEL DIMENSION: 3 CM
SAMPLE: ABNORMAL
SAMPLE: ABNORMAL
SINUS: 2.72 CM
SITE: ABNORMAL
SITE: ABNORMAL
SODIUM BLD-SCNC: 139 MMOL/L (ref 136–145)
SODIUM SERPL-SCNC: 137 MMOL/L (ref 136–145)
SP02: 94
SP02: 99
SPONT RATE: 27
STJ: 2.4 CM
TDI LATERAL: 0.09 M/S
TDI SEPTAL: 0.11 M/S
TDI: 0.1 M/S
TRICUSPID ANNULAR PLANE SYSTOLIC EXCURSION: 1.8 CM
TV PEAK GRADIENT: 38 MMHG
VT: 375
WBC # BLD AUTO: 10.89 K/UL (ref 3.9–12.7)
Z-SCORE OF LEFT VENTRICULAR DIMENSION IN END DIASTOLE: -2.58
Z-SCORE OF LEFT VENTRICULAR DIMENSION IN END SYSTOLE: -2.38

## 2025-05-07 PROCEDURE — 99233 SBSQ HOSP IP/OBS HIGH 50: CPT | Mod: ,,, | Performed by: NEUROLOGICAL SURGERY

## 2025-05-07 PROCEDURE — 82803 BLOOD GASES ANY COMBINATION: CPT

## 2025-05-07 PROCEDURE — 94010 BREATHING CAPACITY TEST: CPT

## 2025-05-07 PROCEDURE — 25000003 PHARM REV CODE 250: Performed by: NURSE PRACTITIONER

## 2025-05-07 PROCEDURE — 85025 COMPLETE CBC W/AUTO DIFF WBC: CPT

## 2025-05-07 PROCEDURE — 94150 VITAL CAPACITY TEST: CPT

## 2025-05-07 PROCEDURE — 83735 ASSAY OF MAGNESIUM: CPT

## 2025-05-07 PROCEDURE — 99232 SBSQ HOSP IP/OBS MODERATE 35: CPT | Mod: ,,,

## 2025-05-07 PROCEDURE — 37799 UNLISTED PX VASCULAR SURGERY: CPT

## 2025-05-07 PROCEDURE — 27200966 HC CLOSED SUCTION SYSTEM

## 2025-05-07 PROCEDURE — 85610 PROTHROMBIN TIME: CPT

## 2025-05-07 PROCEDURE — 63600175 PHARM REV CODE 636 W HCPCS

## 2025-05-07 PROCEDURE — 94761 N-INVAS EAR/PLS OXIMETRY MLT: CPT

## 2025-05-07 PROCEDURE — 27100171 HC OXYGEN HIGH FLOW UP TO 24 HOURS

## 2025-05-07 PROCEDURE — 25000003 PHARM REV CODE 250

## 2025-05-07 PROCEDURE — 85730 THROMBOPLASTIN TIME PARTIAL: CPT

## 2025-05-07 PROCEDURE — 84100 ASSAY OF PHOSPHORUS: CPT

## 2025-05-07 PROCEDURE — 20000000 HC ICU ROOM

## 2025-05-07 PROCEDURE — 99900026 HC AIRWAY MAINTENANCE (STAT)

## 2025-05-07 PROCEDURE — 99900035 HC TECH TIME PER 15 MIN (STAT)

## 2025-05-07 PROCEDURE — 94640 AIRWAY INHALATION TREATMENT: CPT

## 2025-05-07 PROCEDURE — 94003 VENT MGMT INPAT SUBQ DAY: CPT

## 2025-05-07 PROCEDURE — 25000242 PHARM REV CODE 250 ALT 637 W/ HCPCS: Performed by: NURSE PRACTITIONER

## 2025-05-07 PROCEDURE — 80053 COMPREHEN METABOLIC PANEL: CPT

## 2025-05-07 PROCEDURE — 99291 CRITICAL CARE FIRST HOUR: CPT | Mod: ,,, | Performed by: NURSE PRACTITIONER

## 2025-05-07 PROCEDURE — 99900017 HC EXTUBATION W/PARAMETERS (STAT)

## 2025-05-07 RX ORDER — IPRATROPIUM BROMIDE AND ALBUTEROL SULFATE 2.5; .5 MG/3ML; MG/3ML
3 SOLUTION RESPIRATORY (INHALATION) EVERY 6 HOURS
Status: DISCONTINUED | OUTPATIENT
Start: 2025-05-07 | End: 2025-05-16

## 2025-05-07 RX ORDER — LABETALOL HCL 20 MG/4 ML
10 SYRINGE (ML) INTRAVENOUS EVERY 4 HOURS PRN
Status: DISCONTINUED | OUTPATIENT
Start: 2025-05-07 | End: 2025-05-11

## 2025-05-07 RX ORDER — FAMOTIDINE 20 MG/1
20 TABLET, FILM COATED ORAL 2 TIMES DAILY
Status: DISCONTINUED | OUTPATIENT
Start: 2025-05-07 | End: 2025-05-07

## 2025-05-07 RX ORDER — ACETAMINOPHEN 325 MG/1
650 TABLET ORAL EVERY 6 HOURS PRN
Status: DISCONTINUED | OUTPATIENT
Start: 2025-05-07 | End: 2025-05-26 | Stop reason: HOSPADM

## 2025-05-07 RX ORDER — OXYCODONE HYDROCHLORIDE 5 MG/1
5 TABLET ORAL EVERY 6 HOURS PRN
Refills: 0 | Status: DISCONTINUED | OUTPATIENT
Start: 2025-05-07 | End: 2025-05-16

## 2025-05-07 RX ADMIN — NIMODIPINE 30 MG: 60 SOLUTION ORAL at 03:05

## 2025-05-07 RX ADMIN — IPRATROPIUM BROMIDE AND ALBUTEROL SULFATE 3 ML: 2.5; .5 SOLUTION RESPIRATORY (INHALATION) at 01:05

## 2025-05-07 RX ADMIN — ASPIRIN 81 MG CHEWABLE TABLET 81 MG: 81 TABLET CHEWABLE at 08:05

## 2025-05-07 RX ADMIN — ATORVASTATIN CALCIUM 40 MG: 40 TABLET, FILM COATED ORAL at 08:05

## 2025-05-07 RX ADMIN — NIMODIPINE 30 MG: 60 SOLUTION ORAL at 10:05

## 2025-05-07 RX ADMIN — ACETAMINOPHEN 650 MG: 325 TABLET ORAL at 05:05

## 2025-05-07 RX ADMIN — NIMODIPINE 30 MG: 60 SOLUTION ORAL at 06:05

## 2025-05-07 RX ADMIN — LEVETIRACETAM 500 MG: 500 INJECTION, SOLUTION, CONCENTRATE INTRAVENOUS at 08:05

## 2025-05-07 RX ADMIN — SENNOSIDES AND DOCUSATE SODIUM 1 TABLET: 50; 8.6 TABLET ORAL at 08:05

## 2025-05-07 RX ADMIN — NIMODIPINE 30 MG: 60 SOLUTION ORAL at 04:05

## 2025-05-07 RX ADMIN — NIMODIPINE 30 MG: 60 SOLUTION ORAL at 05:05

## 2025-05-07 RX ADMIN — IPRATROPIUM BROMIDE AND ALBUTEROL SULFATE 3 ML: 2.5; .5 SOLUTION RESPIRATORY (INHALATION) at 07:05

## 2025-05-07 RX ADMIN — NIMODIPINE 30 MG: 60 SOLUTION ORAL at 08:05

## 2025-05-07 RX ADMIN — FAMOTIDINE 20 MG: 20 TABLET, FILM COATED ORAL at 08:05

## 2025-05-07 RX ADMIN — NIMODIPINE 30 MG: 60 SOLUTION ORAL at 12:05

## 2025-05-07 RX ADMIN — OXYCODONE 5 MG: 5 TABLET ORAL at 06:05

## 2025-05-07 RX ADMIN — CEFAZOLIN 2 G: 2 INJECTION, POWDER, FOR SOLUTION INTRAMUSCULAR; INTRAVENOUS at 02:05

## 2025-05-07 RX ADMIN — SODIUM CHLORIDE: 9 INJECTION, SOLUTION INTRAVENOUS at 06:05

## 2025-05-07 RX ADMIN — NIMODIPINE 30 MG: 60 SOLUTION ORAL at 02:05

## 2025-05-07 RX ADMIN — SODIUM CHLORIDE: 9 INJECTION, SOLUTION INTRAVENOUS at 12:05

## 2025-05-07 RX ADMIN — POLYETHYLENE GLYCOL 3350 17 G: 17 POWDER, FOR SOLUTION ORAL at 08:05

## 2025-05-07 RX ADMIN — CEFAZOLIN 2 G: 2 INJECTION, POWDER, FOR SOLUTION INTRAMUSCULAR; INTRAVENOUS at 10:05

## 2025-05-07 RX ADMIN — CEFAZOLIN 2 G: 2 INJECTION, POWDER, FOR SOLUTION INTRAMUSCULAR; INTRAVENOUS at 05:05

## 2025-05-07 NOTE — RESPIRATORY THERAPY
Patient extubated per MD order.  Placed patient on 4L NC; sats are currently 95%.  Will continue to monitor.

## 2025-05-07 NOTE — PROGRESS NOTES
[facility] - Neuro Critical Care  Neurocritical Care  Progress Note    Admit Date: 5/6/2025  Service Date: 05/07/2025  Length of Stay: 1    Subjective:     Chief Complaint: SAH (subarachnoid hemorrhage)    History of Present Illness: History obtained via chart review and daughter at bedside as patient intubated on arrival.     Per ED note:  49 y.o. female, PMH HTN and anemia,  presenting as a transfer for neurosurgical evaluation with newly diagnosed ICH from outside hospital.  Patient was transferred from [facility].  Daughter at bedside helps provide history.  She states that she was at a grocery store when she had a syncopal event.  She was unsure if she hit her head.  She states that people on the scene called her to let her know what happened and after she was seen in the ED they told her that she had bleeding inside of her head.  She states that while in the ED her mother was answering questions appropriately and acting like her normal self except frequently falling asleep.  Patient was intubated for airway protection prior to arrival.  Patient and reversal with Kcentra prior to transfer.  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 Distal location suspicious for mycotic aneurysm     On arrival to [facility]: Neursurgery consulted, EVD placed and admitted to neurocritical care    Hospital Course: 5/7/2025: extubated today to NC, SBP liberalized to 220, d/c jay cath,       Review of Systems  Unable to obtain a complete ROS due to patient was intubated but wake and follows simple commands  Objective:     Vitals:  Temp: 98 °F (36.7 °C)  Pulse: 82  Rhythm: normal sinus rhythm  BP: (!) 128/102  MAP (mmHg): 76  ICP Mean (mmHg): 5 mmHg  Resp: (!) 27  SpO2: 98 %  Oxygen Concentration (%): 40  Vent Mode: Spont  Set Rate: 20 BPM  Vt Set: 375 mL  Pressure Support: 5 cmH20  PEEP/CPAP: 5 cmH20  Peak Airway Pressure:  11 cmH20  Mean Airway Pressure: 7.4 cmH20  Plateau Pressure: 0 cmH20    Temp  Min: 98 °F (36.7 °C)  Max: 98.9 °F (37.2 °C)  Pulse  Min: 69  Max: 106  BP  Min: 85/61  Max: 128/102  MAP (mmHg)  Min: 68  Max: 89  ICP Mean (mmHg)  Min: 2 mmHg  Max: 22 mmHg  Resp  Min: 5  Max: 37  SpO2  Min: 93 %  Max: 100 %  Oxygen Concentration (%)  Min: 40  Max: 90    05/06 0701 - 05/07 0700  In: 2069.8 [I.V.:795.4]  Out: 1031 [Urine:885; Drains:146]            Physical Exam  Vitals reviewed.   Constitutional:       Appearance: She is obese. She is ill-appearing.   HENT:      Head: Normocephalic.      Nose: Nose normal.      Mouth/Throat:      Mouth: Mucous membranes are moist.   Eyes:      Pupils: Pupils are equal, round, and reactive to light.   Cardiovascular:      Rate and Rhythm: Normal rate.   Pulmonary:      Comments: intubated  Neurological:      Mental Status: She is alert.       Neuro:  --GCS: E3 Vt1 M6  --Mental Status:  awake, nods appropriately, follows simple commands  --Pupils reactive   --URENA spont         Medications:  Continuous Scheduledalbuterol-ipratropium, 3 mL, Q6H  aspirin, 81 mg, Daily  atorvastatin, 40 mg, Daily  ceFAZolin (Ancef) IV (PEDS and ADULTS), 2 g, Q8H  famotidine, 20 mg, BID  levETIRAcetam (Keppra) IV (PEDS and ADULTS), 500 mg, Q12H  niMODipine, 30 mg, Q2H  polyethylene glycol, 17 g, Daily  senna-docusate, 1 tablet, Daily    PRNbisacodyL, 10 mg, Daily PRN  dextrose 50%, 12.5 g, PRN  fentanyl, 50 mcg, Q1H PRN  glucagon (human recombinant), 1 mg, PRN  insulin aspart U-100, 0-5 Units, Q6H PRN  labetalol, 10 mg, Q4H PRN  potassium bicarbonate, 35 mEq, PRN  potassium bicarbonate, 50 mEq, PRN  potassium bicarbonate, 60 mEq, PRN  potassium, sodium phosphates, 2 packet, PRN  potassium, sodium phosphates, 2 packet, PRN  potassium, sodium phosphates, 2 packet, PRN  sodium chloride 0.9%, 10 mL, PRN      Today I personally reviewed pertinent medications, lines/drains/airways, imaging, cardiology results,  laboratory results, microbiology results,    Diet  Diet NPO  Diet NPO      Assessment/Plan:     Neuro  * SAH (subarachnoid hemorrhage)  Patient is a 49-year-old female with past medical history of hypertension presenting via transfer from [place] for higher level of care after being diagnosed with subarachnoid hemorrhage.    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  S/p EVD 5/6  S/p Coil emolization 5/6  - Neuro IR consulted   - Neurosurgery consulted  - Vascular Neurology consulted  - goal BP less than 140  - fentanyl propofol for sedation  - nimodipine and daily TCDs given subarachnoid hemorrhage  - daily aspirin per neuro IR  5/7/2025: TCD's pending today, CTH reviewed  SBP <220    Brain aneurysm  See primary problem    Cardiac/Vascular  Essential hypertension  -- SBP <220    Hematology  History of DVT in adulthood  -- pt was warfarin- currently held  -- Per NSGY team hold AC for 72 hrs and obtain US     Palliative Care  Endotracheal tube present  - plan to extubate today           The patient is being Prophylaxed for:  Venous Thromboembolism with: Mechanical  Stress Ulcer with: H2B  Ventilator Pneumonia with: not applicable    Activity Orders            Progressive Mobility Protocol (mobilize patient to their highest level of functioning at least twice daily) starting at 05/06 2000    Turn patient starting at 05/06 0800    Diet NPO: NPO starting at 05/06 0731          Full Code    Mariluz Durand NP  Neurocritical Care  [facility] - Neuro Critical Care    Critical condition in that Patient has a condition that poses threat to life and bodily function: SAH, plan for extubation today      35 minutes of Critical care time was spent personally by me on the following activities: development of treatment plan with patient or surrogate and bedside caregivers, discussions with consultants, evaluation of patient's response to treatment,  examination of patient, ordering and performing treatments and interventions, ordering and review of laboratory studies, ordering and review of radiographic studies, pulse oximetry, antibiotic titration if applicable, vasopressor titration if applicable, re-evaluation of patient's condition. This critical care time did not overlap with that of any other provider or involve time for any procedures. There is high probability for acute neurological change leading to clinical and possibly life-threatening deterioration requiring highest level of physician preparedness for urgent intervention.

## 2025-05-07 NOTE — PROGRESS NOTES
[facility] - Neuro Critical Care  Neurosurgery  Progress Note    Subjective:     History of Present Illness: 49f presenting after syncope with CTH demonstrating SAH. CTA without clear vascular malformation. Intubated prior to arrival to [facility] ED. Discussed expected hospital course and imaging with daughter in room    Post-Op Info:  * No surgery found *       : POD 1 from coil embo AICA aneurysm. EVD dropped to 10 post procedure. ASA start today. CTH post procedure stable. FC this AM. WTE per NCC    Prescriptions Prior to Admission[1]    Review of patient's allergies indicates:   Allergen Reactions    Metformin Diarrhea     Other reaction(s): Diarrhae       Past Medical History:   Diagnosis Date    ADHD (attention deficit hyperactivity disorder)     Anemia     Fibroids     Genital herpes     GERD (gastroesophageal reflux disease)     H/O total hysterectomy 2021    Hypertension     Labral tear of hip joint     Ovarian cyst     Right common arterial occlusion     Routine general medical examination at a [facility] care facility 2017    Total Right Arterial Occlusion      Past Surgical History:   Procedure Laterality Date    ANGIOGRAM, ABDOMINAL AORTA W/ EXTREMITY RUNOFF N/A 2025    Procedure: Angiogram, Abdominal Aorta W/ Extremity Runoff: Right lower extremity angiogram;  Surgeon: Lennox Zendejas MD;  Location: [facility] CATH LAB;  Service: Vascular;  Laterality: N/A;    CARPAL TUNNEL RELEASE       SECTION      CHOLECYSTECTOMY      DILATION AND CURETTAGE OF UTERUS      TOTAL ABDOMINAL HYSTERECTOMY W/ BILATERAL SALPINGOOPHORECTOMY  2021    menorrhagia     Family History       Problem Relation (Age of Onset)    Breast cancer Paternal Cousin    Hypertension Maternal Grandmother, Father    Prostate cancer Paternal Uncle    Stroke Maternal Grandmother, Father, Mother          Tobacco Use    Smoking status: Never    Smokeless tobacco: Never   Substance and Sexual Activity    Alcohol use: Yes      "Comment: Social - Rare     Drug use: No    Sexual activity: Not Currently     Partners: Male     Birth control/protection: See Surgical Hx     Review of Systems  Objective:     Weight: 100.6 kg (221 lb 12.5 oz)  Body mass index is 39.29 kg/m².  Vital Signs (Most Recent):  Temp: 98.1 °F (36.7 °C) (05/07/25 0330)  Pulse: 83 (05/07/25 0701)  Resp: 20 (05/07/25 0701)  BP: 102/69 (05/07/25 0701)  SpO2: 100 % (05/07/25 0701) Vital Signs (24h Range):  Temp:  [98.1 °F (36.7 °C)-99.3 °F (37.4 °C)] 98.1 °F (36.7 °C)  Pulse:  [] 83  Resp:  [20-48] 20  SpO2:  [95 %-100 %] 100 %  BP: ()/(53-90) 102/69  Arterial Line BP: (109-127)/(44-66) 125/59     Date 05/07/25 0700 - 05/08/25 0659   Shift 2065-0663 9401-1721 2275-6474 24 Hour Total   INTAKE   I.V.(mL/kg) 42.4(0.4)   42.4(0.4)   Shift Total(mL/kg) 42.4(0.4)   42.4(0.4)   OUTPUT   Drains 15   15   Shift Total(mL/kg) 15(0.1)   15(0.1)   Weight (kg) 100.6 100.6 100.6 100.6              Vent Mode: A/C  Oxygen Concentration (%):  [60-90] 60  Resp Rate Total:  [20 br/min-34 br/min] 20 br/min  Vt Set:  [375 mL] 375 mL  PEEP/CPAP:  [5 cmH20-10 cmH20] 5 cmH20  Mean Airway Pressure:  [9.5 qlA56-54 cmH20] 9.7 cmH20             Urethral Catheter 05/06/25 0608 (Active)   Site Assessment Clean;Intact;Dry 05/06/25 0609   Collection Container Standard drainage bag 05/06/25 0609   Securement Method secured to top of thigh w/ adhesive device 05/06/25 0609   Catheter Care Performed yes 05/06/25 0609   Reason for Continuing Urinary Catheterization Critically ill in ICU and requiring hourly monitoring of intake/output 05/06/25 0609   CAUTI Prevention Bundle Securement Device in place with 1" slack 05/06/25 0609   Output (mL) 1000 mL 05/06/25 0609          Physical Exam      E3VTM6 ; BSi , PERRL , FC x4    Neurosurgery Physical Exam    Significant Labs:  Recent Labs   Lab 05/06/25  0148 05/07/25  0029   * 146*    137   K 3.7 4.3    104   CO2 22* 22*   BUN 12 20 "   CREATININE 0.7 0.9   CALCIUM 9.5 8.6*   MG  --  1.6     Recent Labs   Lab 05/06/25  0148 05/07/25  0029 05/07/25  0352   WBC 16.06* 10.89  --    HGB 11.2* 9.5*  --    HCT 36.3* 31.8* 29*    335  --      Recent Labs   Lab 05/05/25  1201 05/06/25  0148 05/07/25  0029   INR 2.7 2.3* 1.1   APTT  --   --  27.2     Microbiology Results (last 7 days)       ** No results found for the last 168 hours. **          All pertinent labs from the last 24 hours have been reviewed.    Significant Diagnostics:  I have reviewed all pertinent imaging results/findings within the past 24 hours.  I have reviewed and interpreted all pertinent imaging results/findings within the past 24 hours.  No results found in the last 24 hours.  X-Ray Chest 1 View  Result Date: 5/7/2025  See above Electronically signed by: Lito Hoover MD Date:    05/07/2025 Time:    07:08    X-Ray Chest AP Portable  Result Date: 5/6/2025  Right-sided pneumonia versus aspiration. Electronically signed by: Gilberto Mcneal MD Date:    05/06/2025 Time:    10:56    CT Head Without Contrast  Result Date: 5/6/2025  Subarachnoid and intraventricular hemorrhage, similar to recent exam. Interval placement of right frontal EVD without apparent intracranial complication.  Ventricles remain mildly dilated. Electronically signed by: Merrill Shepard MD Date:    05/06/2025 Time:    10:40      Assessment/Plan:     * SAH (subarachnoid hemorrhage)  49f presenting after syncope with CTH demonstrating SAH. CTA with AICA aneurysm R. Intubated prior to arrival to [facility] ED.   DSA coil embo 5/6    Plan:  EVD dropped to 10 post coil  Daily ASA 81  Hold warfarin  SAH protocol (euvolemia, SBP liberalized, nimotop, keppra, TCDs)            Angel Yu MD  Neurosurgery  [facility] - Neuro Critical Care       [1]   Medications Prior to Admission   Medication Sig Dispense Refill Last Dose/Taking    aspirin (ECOTRIN) 81 MG EC tablet Take 81 mg by mouth.       cetirizine (ZYRTEC) 10 MG  tablet Take 10 mg by mouth.       cilostazoL (PLETAL) 50 MG Tab Take 1 tablet (50 mg total) by mouth 2 (two) times daily. 60 tablet 11     lisdexamfetamine (VYVANSE) 60 MG capsule Take 1 capsule (60 mg total) by mouth every morning. 30 capsule 0     multivitamin capsule Take 1 capsule by mouth once daily.       omega-3 acid ethyl esters (LOVAZA) 1 gram capsule Take 1 capsule (1 g total) by mouth once daily. 90 capsule 1     pantoprazole (PROTONIX) 40 MG tablet Take 1 tablet (40 mg total) by mouth 2 (two) times daily. 180 tablet 1     rosuvastatin (CRESTOR) 20 MG tablet Take 1 tablet (20 mg total) by mouth every evening. 90 tablet 1     tirzepatide, weight loss, (ZEPBOUND) 5 mg/0.5 mL PnIj Inject 5 mg into the skin every 7 days. 2 mL 0     tirzepatide, weight loss, (ZEPBOUND) 7.5 mg/0.5 mL PnIj Inject 7.5 mg into the skin every 7 days. 2 mL 0     topiramate (TOPAMAX) 50 MG tablet Take 1 tablet by mouth twice daily (dose correction) 180 tablet 1     valACYclovir (VALTREX) 1000 MG tablet Take 1 tablet (1,000 mg total) by mouth once daily. 90 tablet 1     valsartan (DIOVAN) 320 MG tablet Take 1 tablet (320 mg total) by mouth once daily. 90 tablet 1     warfarin (COUMADIN) 5 MG tablet Take by mouth. Takes 2.5 mg on Tues and Thur; and 5 mg all other days.

## 2025-05-07 NOTE — PLAN OF CARE
[facility] Care Plan    POC reviewed with Waldo Emmanuel and family at 0300. Pt is intubated and unable to verbalize understanding , family verbalized understanding. Questions and concerns addressed. No acute events today. Pt progressing toward goals. Will continue to monitor. See below and flowsheets for full assessment and VS info.     No acute neuro changes overnight   CT head completed   Fentanyl gtt @ 50 mcg/hr  NS gtt @ 75 ml/ hr   Nimodipine Q2h    EVD open @ 10; CSF mean 6-18 and ICP mean 4-8  Grajeda cath in place       Is this a stroke patient? yes    Neuro:  Roro Coma Scale  Best Eye Response: 3-->(E3) to speech  Best Motor Response: 5-->(M5) localizes pain  Best Verbal Response: 1-->(V1) none  Roro Coma Scale Score: 9  Assessment Qualifiers: patient intubated, patient chemically sedated or paralyzed  Pupil PERRLA: yes     24 hr Temp:  [98.1 °F (36.7 °C)-99.3 °F (37.4 °C)]     CV:   Rhythm: normal sinus rhythm  BP goals:   SBP < 140  MAP > 65    Resp:      Vent Mode: A/C  Set Rate: 20 BPM  Oxygen Concentration (%): 60  Vt Set: 375 mL  PEEP/CPAP: 5 cmH20    Plan: wean to extubate    GI/:     Diet/Nutrition Received: NPO  Last Bowel Movement: 05/06/25  Voiding Characteristics: ureteral catheter    Intake/Output Summary (Last 24 hours) at 5/7/2025 0551  Last data filed at 5/7/2025 0530  Gross per 24 hour   Intake 1945.46 ml   Output 2813 ml   Net -867.54 ml          Labs/Accuchecks:  Recent Labs   Lab 05/07/25 0029 05/07/25  0352   WBC 10.89  --    RBC 3.54*  --    HGB 9.5*  --    HCT 31.8* 29*     --       Recent Labs   Lab 05/07/25  0029      K 4.3   CO2 22*      BUN 20   CREATININE 0.9   ALKPHOS 88   ALT 15   AST 22   BILITOT 0.4      Recent Labs   Lab 05/07/25  0029   PROTIME 12.4   INR 1.1   APTT 27.2      Recent Labs   Lab 05/06/25  0148   TROPONINI <0.006       Electrolytes: No replacement orders  Accuchecks: Q6H    Gtts:   0.9% NaCl   Intravenous Continuous 75 mL/hr at  25 0501 Rate Verify at 25 0501    clevidipine  0-32 mg/hr Intravenous Continuous   Stopped at 25 0748    fentanyl  0-250 mcg/hr Intravenous Continuous 7.5 mL/hr at 25 0501 75 mcg/hr at 25 0501       LDA/Wounds:    Ivan Risk Assessment  Sensory Perception: 3-->slightly limited  Moisture: 3-->occasionally moist  Activity: 1-->bedfast  Mobility: 2-->very limited  Nutrition: 2-->probably inadequate  Friction and Shear: 2-->potential problem  Ivan Score: 13  Is your ivan score 12 or less? no          Restraints:   Restraint Order  Length of Order: Order good for next 24 hours or when removed.  Date that the current order will : 25  Time that the current order will :   Order Upon Application: Yes    [facility]  Problem: Skin Injury Risk Increased  Goal: Skin Health and Integrity  Outcome: Progressing     Problem: Adult Inpatient Plan of Care  Goal: Plan of Care Review  Outcome: Progressing     Problem: Restraint, Nonviolent  Goal: Absence of Harm or Injury  Outcome: Progressing

## 2025-05-07 NOTE — SUBJECTIVE & OBJECTIVE
Review of Systems  Unable to obtain a complete ROS due to patient was intubated but wake and follows simple commands  Objective:     Vitals:  Temp: 98 °F (36.7 °C)  Pulse: 82  Rhythm: normal sinus rhythm  BP: (!) 128/102  MAP (mmHg): 76  ICP Mean (mmHg): 5 mmHg  Resp: (!) 27  SpO2: 98 %  Oxygen Concentration (%): 40  Vent Mode: Spont  Set Rate: 20 BPM  Vt Set: 375 mL  Pressure Support: 5 cmH20  PEEP/CPAP: 5 cmH20  Peak Airway Pressure: 11 cmH20  Mean Airway Pressure: 7.4 cmH20  Plateau Pressure: 0 cmH20    Temp  Min: 98 °F (36.7 °C)  Max: 98.9 °F (37.2 °C)  Pulse  Min: 69  Max: 106  BP  Min: 85/61  Max: 128/102  MAP (mmHg)  Min: 68  Max: 89  ICP Mean (mmHg)  Min: 2 mmHg  Max: 22 mmHg  Resp  Min: 5  Max: 37  SpO2  Min: 93 %  Max: 100 %  Oxygen Concentration (%)  Min: 40  Max: 90    05/06 0701 - 05/07 0700  In: 2069.8 [I.V.:795.4]  Out: 1031 [Urine:885; Drains:146]            Physical Exam  Vitals reviewed.   Constitutional:       Appearance: She is obese. She is ill-appearing.   HENT:      Head: Normocephalic.      Nose: Nose normal.      Mouth/Throat:      Mouth: Mucous membranes are moist.   Eyes:      Pupils: Pupils are equal, round, and reactive to light.   Cardiovascular:      Rate and Rhythm: Normal rate.   Pulmonary:      Comments: intubated  Neurological:      Mental Status: She is alert.       Neuro:  --GCS: E3 Vt1 M6  --Mental Status:  awake, nods appropriately, follows simple commands  --Pupils reactive   --URENA spont         Medications:  Continuous Scheduledalbuterol-ipratropium, 3 mL, Q6H  aspirin, 81 mg, Daily  atorvastatin, 40 mg, Daily  ceFAZolin (Ancef) IV (PEDS and ADULTS), 2 g, Q8H  famotidine, 20 mg, BID  levETIRAcetam (Keppra) IV (PEDS and ADULTS), 500 mg, Q12H  niMODipine, 30 mg, Q2H  polyethylene glycol, 17 g, Daily  senna-docusate, 1 tablet, Daily    PRNbisacodyL, 10 mg, Daily PRN  dextrose 50%, 12.5 g, PRN  fentanyl, 50 mcg, Q1H PRN  glucagon (human recombinant), 1 mg, PRN  insulin aspart  U-100, 0-5 Units, Q6H PRN  labetalol, 10 mg, Q4H PRN  potassium bicarbonate, 35 mEq, PRN  potassium bicarbonate, 50 mEq, PRN  potassium bicarbonate, 60 mEq, PRN  potassium, sodium phosphates, 2 packet, PRN  potassium, sodium phosphates, 2 packet, PRN  potassium, sodium phosphates, 2 packet, PRN  sodium chloride 0.9%, 10 mL, PRN      Today I personally reviewed pertinent medications, lines/drains/airways, imaging, cardiology results, laboratory results, microbiology results,    Diet  Diet NPO  Diet NPO

## 2025-05-07 NOTE — PROGRESS NOTES
Interventional Radiology   Progress Note      SUBJECTIVE:     History of Present Illness:  Waldo Emmanuel is a 49 y.o. 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 Hospital course notable for Neursurgery consulted, EVD placed and admitted to neurocritical care. Interventional Radiology has been consulted for coil embolization of Right AICA aneurysm + Diagnostic cerebral angiogram which was performed on 5/6. Pt tolerated the procedure well.     Interval History:  NAEON. L sided groin access clean, dry, intact, no signs of infection.    Review of Systems   Reason unable to perform ROS: Intubated.       Scheduled Meds:   aspirin  81 mg Per OG tube Daily    atorvastatin  40 mg Per OG tube Daily    ceFAZolin (Ancef) IV (PEDS and ADULTS)  2 g Intravenous Q8H    famotidine  20 mg Per OG tube BID    levETIRAcetam (Keppra) IV (PEDS and ADULTS)  500 mg Intravenous Q12H    niMODipine  30 mg Per OG tube Q2H    polyethylene glycol  17 g Per OG tube Daily    senna-docusate  1 tablet Per OG tube Daily     Continuous Infusions:   0.9% NaCl   Intravenous Continuous 75 mL/hr at 05/07/25 0801 Rate Verify at 05/07/25 0801    clevidipine  0-32 mg/hr Intravenous Continuous   Stopped at 05/06/25 0748    fentanyl  0-250 mcg/hr Intravenous Continuous 5 mL/hr at 05/07/25 0801 50 mcg/hr at 05/07/25 0801     PRN Meds:  Current Facility-Administered Medications:     bisacodyL, 10 mg, Rectal, Daily PRN    dextrose 50%, 12.5 g, Intravenous, PRN    fentanyl, 50 mcg, Intravenous, Q1H PRN    glucagon (human recombinant), 1 mg, Intramuscular, PRN    insulin aspart U-100, 0-5 Units, Subcutaneous, Q6H PRN    labetalol, 10 mg, Intravenous, Q4H PRN    potassium bicarbonate, 35 mEq, Per OG tube, PRN    potassium bicarbonate, 50 mEq, Per OG tube, PRN    potassium bicarbonate, 60 mEq, Per OG tube, PRN    potassium, sodium  phosphates, 2 packet, Per OG tube, PRN    potassium, sodium phosphates, 2 packet, Per OG tube, PRN    potassium, sodium phosphates, 2 packet, Per OG tube, PRN    sodium chloride 0.9%, 10 mL, Intravenous, PRN    Review of patient's allergies indicates:   Allergen Reactions    Metformin Diarrhea     Other reaction(s): Diarrhae       Past Medical History:   Diagnosis Date    ADHD (attention deficit hyperactivity disorder)     Anemia     Fibroids     Genital herpes     GERD (gastroesophageal reflux disease)     H/O total hysterectomy 2021    Hypertension     Labral tear of hip joint     Ovarian cyst     Right common arterial occlusion     Routine general medical examination at a [facility] care facility 2017    Total Right Arterial Occlusion      Past Surgical History:   Procedure Laterality Date    ANGIOGRAM, ABDOMINAL AORTA W/ EXTREMITY RUNOFF N/A 2025    Procedure: Angiogram, Abdominal Aorta W/ Extremity Runoff: Right lower extremity angiogram;  Surgeon: Lennox Zendejas MD;  Location: [facility] CATH LAB;  Service: Vascular;  Laterality: N/A;    CARPAL TUNNEL RELEASE       SECTION      CHOLECYSTECTOMY      DILATION AND CURETTAGE OF UTERUS      TOTAL ABDOMINAL HYSTERECTOMY W/ BILATERAL SALPINGOOPHORECTOMY  2021    menorrhagia     Family History   Problem Relation Name Age of Onset    Hypertension Maternal Grandmother      Stroke Maternal Grandmother      Hypertension Father      Stroke Father      Stroke Mother      Breast cancer Paternal Cousin      Prostate cancer Paternal Uncle       Social History[1]    OBJECTIVE:     Vital Signs (Most Recent)  Temp: 98.2 °F (36.8 °C) (25 0801)  Pulse: 69 (25 08)  Resp: (!) 22 (25)  BP: 104/70 (25 0819)  SpO2: 95 % (25)    Physical Exam:  Physical Exam  Vitals and nursing note reviewed.   HENT:      Head: Normocephalic and atraumatic.      Nose: Nose normal.      Mouth/Throat:      Comments: Intubated  Eyes:       Conjunctiva/sclera: Conjunctivae normal.   Cardiovascular:      Rate and Rhythm: Normal rate.   Pulmonary:      Comments: intubated  Abdominal:      Palpations: Abdomen is soft.   Musculoskeletal:         General: No swelling.   Skin:     General: Skin is warm and dry.      Comments: L sided groin access site CDI, no evidence of infection         Laboratory  I have reviewed all pertinent lab results within the past 24 hours.  CBC:   Recent Labs   Lab 05/07/25  0029 05/07/25  0352   WBC 10.89  --    RBC 3.54*  --    HGB 9.5*  --    HCT 31.8* 29*     --    MCV 90  --    MCH 26.8*  --    MCHC 29.9*  --      CMP:   Recent Labs   Lab 05/07/25  0029   *   CALCIUM 8.6*   ALBUMIN 2.8*   PROT 6.8      K 4.3   CO2 22*      BUN 20   CREATININE 0.9   ALKPHOS 88   ALT 15   AST 22   BILITOT 0.4     Coagulation:   Recent Labs   Lab 05/07/25  0029   INR 1.1   APTT 27.2       Imaging:  Recent imaging studies including CT Head on 5/7 which was independently reviewed by Dr. Silverio.    EXAMINATION:  CT HEAD WITHOUT CONTRAST     CLINICAL HISTORY:  post coil AICA;     TECHNIQUE:  Multiple sequential 5 mm axial images of the head without contrast.  Coronal and sagittal reformatted imaging from the axial acquisition.     COMPARISON:  05/06/2025     FINDINGS:  Operative changes status post right frontal ventricular catheter placement stable in course and positioning tip extending to the 3rd ventricle to the right foramen of [place]     Slight smaller caliber ventricles without evidence for worsening hydrocephalus     Interval operative change with artifact from endovascular coiling left AICA aneurysm.  Scattered subarachnoid and intraventricular hemorrhage again seen relatively similar.     Continued slight crowding the cerebral sulci and basilar cisterns with small caliber 4th ventricle.  No significant new abnormal parenchymal attenuation.  Moderate opacification sphenoid sinuses with lobular opacity right  maxillary antra suggestive for mucosal thickening.     This report was flagged in Epic as abnormal.     Impression:     Interval operative change left AICA endovascular aneurysm coiling.     Evolving scattered subarachnoid and intraventricular hemorrhage.  No definite new hemorrhage     Stable right frontal coursing ventricular catheter with slight reduced caliber of the ventricles without evidence for worsening hydrocephalus.     Continued slight crowding cerebral sulci and basilar cisterns with small caliber 4th ventricle.  Question component of inferior extension of the cerebellar tonsils below the foramen magnum.  This could be better assessed with MR imaging if patient compatible.     ASSESSMENT/PLAN:     Assessment:  49 y.o. 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    Plan:  Pt tolerated procedure well, no acute complications noted  Groin site clean, dry, intact, no evidence of infection  Care per [facility]  EVD dropped to 10 post coil  Daily ASA 81  SAH protocol (euvolemia, SBP liberalized, nimotop, keppra, TCDs)  Please contact with questions via [employer] secure chat    Spike David PA-C  Interventional Radiology          [1]   Social History  Tobacco Use    Smoking status: Never    Smokeless tobacco: Never   Substance Use Topics    Alcohol use: Yes     Comment: Social - Rare     Drug use: No

## 2025-05-07 NOTE — ASSESSMENT & PLAN NOTE
Patient is a 49-year-old female with past medical history of hypertension presenting via transfer from [place] for higher level of care after being diagnosed with subarachnoid hemorrhage.    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  S/p EVD 5/6  S/p Coil emolization 5/6  - Neuro IR consulted   - Neurosurgery consulted  - Vascular Neurology consulted  - goal BP less than 140  - fentanyl propofol for sedation  - nimodipine and daily TCDs given subarachnoid hemorrhage  - daily aspirin per neuro IR  5/7/2025: TCD's pending today, CT reviewed  SBP <220

## 2025-05-07 NOTE — PLAN OF CARE
[facility] - Neuro Critical Care  Initial Discharge Assessment       Primary Care Provider: Ansley Vivas MD    Admission Diagnosis: Intracranial hemorrhage [I62.9]  Tachycardia [R00.0]    Admission Date: 5/6/2025  Expected Discharge Date:     Transition of Care Barriers: None    Payor: BLUE CROSS [employer] EMPLOYEE BENEFIT / Plan: [employer]OY[employer] [place] [place] / Product Type: Self Funded /     Extended Emergency Contact Information  Primary Emergency Contact: henry syed  Mobile Phone: [phone]  Relation: Daughter  Preferred language: English   needed? No  Secondary Emergency Contact: Ehsan Jimenez  Mobile Phone: [phone]  Relation: Son  Preferred language: English   needed? No    Discharge Plan A: Rehab, Home, Other (TBD)  Discharge Plan B: Home Health, Home with family      [facility]  [address]  [place] [address]  Phone: [phone] Fax: [phone]      Initial Assessment (most recent)       Adult Discharge Assessment - 05/07/25 1101          Discharge Assessment    Assessment Type Discharge Planning Assessment     Confirmed/corrected address, phone number and insurance Yes     Confirmed Demographics Correct on Facesheet     Source of Information family     If unable to respond/provide information was family/caregiver contacted? Yes     Contact Name/Number patients daughter at bed side//patient vented     Does patient/caregiver understand observation status Yes     Communicated ELIEL with patient/caregiver Yes;Date not available/Unable to determine     Reason For Admission SAH (subarachnoid hemorrhage)     People in Home child(tena), adult;other relative(s)     Facility Arrived From: [facility]     Do you have help at home or someone to help you manage your care at home? Yes     Prior to hospitilization cognitive status: Unable to Assess     Current cognitive status: Unable to Assess     Walking or Climbing Stairs Difficulty no     Dressing/Bathing  Difficulty no     Equipment Currently Used at Home none     Readmission within 30 days? No     Patient currently being followed by outpatient case management? No     Do you currently have service(s) that help you manage your care at home? No     Do you take prescription medications? Yes     Do you have prescription coverage? Yes     Coverage Payor: BLUE CROSS [facility] EMPLOYEE BENEFIT - [facility] [email or web address] [place] -     Do you have any problems affording any of your prescribed medications? No     Is the patient taking medications as prescribed? yes     How do you get to doctors appointments? car, drives self;family or friend will provide     Are you on dialysis? No     Do you take coumadin? No     Discharge Plan A Rehab;Home;Other   TBD    Discharge Plan B Home Health;Home with family     DME Needed Upon Discharge  other (see comments)   TBD    Discharge Plan discussed with: Adult children     Transition of Care Barriers None

## 2025-05-07 NOTE — RESPIRATORY THERAPY
Respiratory mechanics performed on patient @ 1110.  NIF -34;  ml.  Patient has an audible cuff leak.

## 2025-05-07 NOTE — SUBJECTIVE & OBJECTIVE
: POD 1 from coil embo AICA aneurysm. EVD dropped to 10 post procedure. ASA start today. CTH post procedure stable. FC this AM. WTE per NCC    Prescriptions Prior to Admission[1]    Review of patient's allergies indicates:   Allergen Reactions    Metformin Diarrhea     Other reaction(s): Diarrhae       Past Medical History:   Diagnosis Date    ADHD (attention deficit hyperactivity disorder)     Anemia     Fibroids     Genital herpes     GERD (gastroesophageal reflux disease)     H/O total hysterectomy 2021    Hypertension     Labral tear of hip joint     Ovarian cyst     Right common arterial occlusion     Routine general medical examination at a [facility] care facility 2017    Total Right Arterial Occlusion      Past Surgical History:   Procedure Laterality Date    ANGIOGRAM, ABDOMINAL AORTA W/ EXTREMITY RUNOFF N/A 2025    Procedure: Angiogram, Abdominal Aorta W/ Extremity Runoff: Right lower extremity angiogram;  Surgeon: Lennox Zendejas MD;  Location: [facility] CATH LAB;  Service: Vascular;  Laterality: N/A;    CARPAL TUNNEL RELEASE       SECTION      CHOLECYSTECTOMY      DILATION AND CURETTAGE OF UTERUS      TOTAL ABDOMINAL HYSTERECTOMY W/ BILATERAL SALPINGOOPHORECTOMY  2021    menorrhagia     Family History       Problem Relation (Age of Onset)    Breast cancer Paternal Cousin    Hypertension Maternal Grandmother, Father    Prostate cancer Paternal Uncle    Stroke Maternal Grandmother, Father, Mother          Tobacco Use    Smoking status: Never    Smokeless tobacco: Never   Substance and Sexual Activity    Alcohol use: Yes     Comment: Social - Rare     Drug use: No    Sexual activity: Not Currently     Partners: Male     Birth control/protection: See Surgical Hx     Review of Systems  Objective:     Weight: 100.6 kg (221 lb 12.5 oz)  Body mass index is 39.29 kg/m².  Vital Signs (Most Recent):  Temp: 98.1 °F (36.7 °C) (25 0330)  Pulse: 83 (25 0701)  Resp: 20 (25  "0701)  BP: 102/69 (05/07/25 0701)  SpO2: 100 % (05/07/25 0701) Vital Signs (24h Range):  Temp:  [98.1 °F (36.7 °C)-99.3 °F (37.4 °C)] 98.1 °F (36.7 °C)  Pulse:  [] 83  Resp:  [20-48] 20  SpO2:  [95 %-100 %] 100 %  BP: ()/(53-90) 102/69  Arterial Line BP: (109-127)/(44-66) 125/59     Date 05/07/25 0700 - 05/08/25 0659   Shift 8631-2981 7193-7912 6990-2725 24 Hour Total   INTAKE   I.V.(mL/kg) 42.4(0.4)   42.4(0.4)   Shift Total(mL/kg) 42.4(0.4)   42.4(0.4)   OUTPUT   Drains 15   15   Shift Total(mL/kg) 15(0.1)   15(0.1)   Weight (kg) 100.6 100.6 100.6 100.6              Vent Mode: A/C  Oxygen Concentration (%):  [60-90] 60  Resp Rate Total:  [20 br/min-34 br/min] 20 br/min  Vt Set:  [375 mL] 375 mL  PEEP/CPAP:  [5 cmH20-10 cmH20] 5 cmH20  Mean Airway Pressure:  [9.5 bnP47-90 cmH20] 9.7 cmH20             Urethral Catheter 05/06/25 0608 (Active)   Site Assessment Clean;Intact;Dry 05/06/25 0609   Collection Container Standard drainage bag 05/06/25 0609   Securement Method secured to top of thigh w/ adhesive device 05/06/25 0609   Catheter Care Performed yes 05/06/25 0609   Reason for Continuing Urinary Catheterization Critically ill in ICU and requiring hourly monitoring of intake/output 05/06/25 0609   CAUTI Prevention Bundle Securement Device in place with 1" slack 05/06/25 0609   Output (mL) 1000 mL 05/06/25 0609          Physical Exam      E3VTM6 ; BSi , PERRL , FC x4    Neurosurgery Physical Exam    Significant Labs:  Recent Labs   Lab 05/06/25  0148 05/07/25  0029   * 146*    137   K 3.7 4.3    104   CO2 22* 22*   BUN 12 20   CREATININE 0.7 0.9   CALCIUM 9.5 8.6*   MG  --  1.6     Recent Labs   Lab 05/06/25  0148 05/07/25  0029 05/07/25  0352   WBC 16.06* 10.89  --    HGB 11.2* 9.5*  --    HCT 36.3* 31.8* 29*    335  --      Recent Labs   Lab 05/05/25  1201 05/06/25  0148 05/07/25  0029   INR 2.7 2.3* 1.1   APTT  --   --  27.2     Microbiology Results (last 7 days)       ** No " results found for the last 168 hours. **          All pertinent labs from the last 24 hours have been reviewed.    Significant Diagnostics:  I have reviewed all pertinent imaging results/findings within the past 24 hours.  I have reviewed and interpreted all pertinent imaging results/findings within the past 24 hours.  No results found in the last 24 hours.  X-Ray Chest 1 View  Result Date: 5/7/2025  See above Electronically signed by: Lito Hoover MD Date:    05/07/2025 Time:    07:08    X-Ray Chest AP Portable  Result Date: 5/6/2025  Right-sided pneumonia versus aspiration. Electronically signed by: Gilberto Mcneal MD Date:    05/06/2025 Time:    10:56    CT Head Without Contrast  Result Date: 5/6/2025  Subarachnoid and intraventricular hemorrhage, similar to recent exam. Interval placement of right frontal EVD without apparent intracranial complication.  Ventricles remain mildly dilated. Electronically signed by: Merrill Shepard MD Date:    05/06/2025 Time:    10:40           [1]   Medications Prior to Admission   Medication Sig Dispense Refill Last Dose/Taking    aspirin (ECOTRIN) 81 MG EC tablet Take 81 mg by mouth.       cetirizine (ZYRTEC) 10 MG tablet Take 10 mg by mouth.       cilostazoL (PLETAL) 50 MG Tab Take 1 tablet (50 mg total) by mouth 2 (two) times daily. 60 tablet 11     lisdexamfetamine (VYVANSE) 60 MG capsule Take 1 capsule (60 mg total) by mouth every morning. 30 capsule 0     multivitamin capsule Take 1 capsule by mouth once daily.       omega-3 acid ethyl esters (LOVAZA) 1 gram capsule Take 1 capsule (1 g total) by mouth once daily. 90 capsule 1     pantoprazole (PROTONIX) 40 MG tablet Take 1 tablet (40 mg total) by mouth 2 (two) times daily. 180 tablet 1     rosuvastatin (CRESTOR) 20 MG tablet Take 1 tablet (20 mg total) by mouth every evening. 90 tablet 1     tirzepatide, weight loss, (ZEPBOUND) 5 mg/0.5 mL PnIj Inject 5 mg into the skin every 7 days. 2 mL 0     tirzepatide, weight loss,  (ZEPBOUND) 7.5 mg/0.5 mL PnIj Inject 7.5 mg into the skin every 7 days. 2 mL 0     topiramate (TOPAMAX) 50 MG tablet Take 1 tablet by mouth twice daily (dose correction) 180 tablet 1     valACYclovir (VALTREX) 1000 MG tablet Take 1 tablet (1,000 mg total) by mouth once daily. 90 tablet 1     valsartan (DIOVAN) 320 MG tablet Take 1 tablet (320 mg total) by mouth once daily. 90 tablet 1     warfarin (COUMADIN) 5 MG tablet Take by mouth. Takes 2.5 mg on Tues and Thur; and 5 mg all other days.

## 2025-05-07 NOTE — PLAN OF CARE
[facility] Care Plan  POC reviewed with Waldo Emmanuel and family at 1400. Patient and Family verbalized understanding. Questions and concerns addressed. No acute events today. Pt progressing toward goals. Will continue to monitor. See below and flowsheets for full assessment and VS info.     Pt extubated  On 4-6L NC with humidification  EVD open at 10  ICP <10  CSF 2-20 bloody  Bath given/ linen changed  Extremity Ultrasounds done  Grajeda removed 1600. Due to void  by 2200          Is this a stroke patient? Yes     Neuro:  Roro Coma Scale  Best Eye Response: 3-->(E3) to speech  Best Motor Response: 6-->(M6) obeys commands  Best Verbal Response: 1-->(V1) none  Roro Coma Scale Score: 10  Assessment Qualifiers: patient chemically sedated or paralyzed  Pupil PERRLA: yes     24 hr Temp:  [97.7 °F (36.5 °C)-98.9 °F (37.2 °C)]     CV:   Rhythm: normal sinus rhythm  BP goals:   SBP < 220  MAP > 65    Resp:      Vent Mode: Spont  Set Rate: 20 BPM  Oxygen Concentration (%): 40  Vt Set: 375 mL  PEEP/CPAP: 5 cmH20  Pressure Support: 5 cmH20    Plan: N/A    GI/:     Diet/Nutrition Received: NPO  Last Bowel Movement: 05/06/25  Voiding Characteristics: urethral catheter (bladder)    Intake/Output Summary (Last 24 hours) at 5/7/2025 1623  Last data filed at 5/7/2025 1601  Gross per 24 hour   Intake 1200.99 ml   Output 1451 ml   Net -250.01 ml          Labs/Accuchecks:  Recent Labs   Lab 05/07/25 0029 05/07/25  0352   WBC 10.89  --    RBC 3.54*  --    HGB 9.5*  --    HCT 31.8* 29*     --       Recent Labs   Lab 05/07/25  0029      K 4.3   CO2 22*      BUN 20   CREATININE 0.9   ALKPHOS 88   ALT 15   AST 22   BILITOT 0.4      Recent Labs   Lab 05/07/25  0029   PROTIME 12.4   INR 1.1   APTT 27.2      Recent Labs   Lab 05/06/25  0148   TROPONINI <0.006       Electrolytes: N/A - electrolytes WDL  Accuchecks: Q6H    Gtts:      LDA/Wounds:    Ivan Risk Assessment  Sensory Perception: 3-->slightly  limited  Moisture: 3-->occasionally moist  Activity: 1-->bedfast  Mobility: 2-->very limited  Nutrition: 2-->probably inadequate  Friction and Shear: 2-->potential problem  Ivan Score: 13    Is your ivan score 12 or less? no            Restraints:   Restraint Order  Length of Order: Order good for next 24 hours or when removed.  Date that the current order will : 25  Time that the current order will : 1115  Order Upon Application: Yes    [facility]

## 2025-05-07 NOTE — HOSPITAL COURSE
5/7: POD 1 from coil embo AICA aneurysm. EVD dropped to 10 post procedure. ASA start today. CTH post procedure stable. FC this AM. WTE per NCC  5/8: extubated grossly intact on exam. EVD at 10. Continue ICU for SAH protocol.  5/9: NAEON. DVT u/s no dvt in ble , r brachial vein +, evd draining well at 10cm H2O  5/10: NAEON, TCDs wnl, ok for hep gtt  5/11: NAEON, ICPs 5-14. CTH with new R PICA stroke, vents stable   5/12: naeon, exam stable, MRI yesterday showed R Cerbellar infarct. EVD and ICP WNL.   5/13 naeon, exam stable. Plan for MRA w contrast when able  5/14: NAEON. MRA w contrast today. Neuro stable. Continue EVD @ 10  5/15: improved delirium today, exam stable. Plan for MRA w contrast today w anesthesia  5/16: NAEON. Pt exam stable this mroning. 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 MRA completed yesterday with no acute findings.   5/17: naeon, exam improved. On hep gtt. Will move EVD to 20 today for wean trial  5/18 naeon, exam stable. Keep EVD at 20 today. Continue ICU and SAH protocol  5/19: EVD clamp today , cth tomorrow  5/20: naeon, exam stable, EVD clamped over 24 hours with stable ICP and CT. Will remove today.  5/21: EVD removed yesterday. CTH this am stable after removal. Patient doing well without issues. PBD 15 today, plan to step down to floor   5/22: NAEO. AFVSS. Patient downgraded from [facility]. Continues on Heparin gtt and Coumadin, Pharmacy consult placed for assistance with the bridge. INR 1.2 today. Exam stable.   5/23: NAEON. AFVSS. Reports mild headache. Continues on hep gtt and Coumadin. Per pharmacy, anticipate patient's INR will be therapeutic Saturday 5/24: NAEON. AFVSS. Patient without complaints. INR 2.5, d/c heparin gtt. CTM - pharmacy managing. Anticipate IPR Monday.   5/25: exam stable, warfarin dosing per pharmacy   5/26: MARICHUYON. AFVSS. Neurologically stable. Tolerating PO diet and voiding spontaneously. Medically stable to discharge to [facility]. Follow up in clinic in 4-6 weeks with repeat MRA.  Discharge instructions given verbally to patient. All of her questions were answered. She is encouraged to call the clinic with any questions or concerns prior to follow up appt.       Physical exam 5/26/25:  General: well developed, well nourished, no distress.   Head: normocephalic  Neurologic: Alert and oriented. Thought content appropriate.  GCS: Motor: 6/Verbal: 5/Eyes: 4 GCS Total: 15  Mental Status: Awake, Alert, Oriented x 4  Language: No aphasia  Speech: No dysarthria  Cranial nerves: face symmetric,CN II-XII grossly intact.   Eyes: EOMI.   Pulmonary: normal respirations, no signs of respiratory distress  Skin: Skin is warm, dry and intact.  Sensory: intact to light touch throughout  Motor Strength:Moves all extremities spontaneously with good tone.  Full strength upper and lower extremities. No abnormal movements seen.

## 2025-05-07 NOTE — ASSESSMENT & PLAN NOTE
49f presenting after syncope with CTH demonstrating SAH. CTA with AICA aneurysm R. Intubated prior to arrival to [facility] ED.   DSA coil embo 5/6    Plan:  EVD dropped to 10 post coil  Daily ASA 81  Hold warfarin  SAH protocol (euvolemia, SBP liberalized, nimotop, keppra, TCDs)

## 2025-05-07 NOTE — PLAN OF CARE
[facility] - Neuro Critical Care  Initial Discharge Assessment       Primary Care Provider: Ansley Vivas MD    Admission Diagnosis: Intracranial hemorrhage [I62.9]  Tachycardia [R00.0]    Admission Date: 5/6/2025  Expected Discharge Date:     Transition of Care Barriers: None    Payor: BLUE CROSS [employer] BENEFIT / Plan: [employer] [place] [place] / Product Type: Self Funded /     Extended Emergency Contact Information  Primary Emergency Contact: henry syed  Mobile Phone: [phone]  Relation: Daughter  Preferred language: English   needed? No  Secondary Emergency Contact: Ehsan Jimenez  Mobile Phone: [phone]  Relation: Son  Preferred language: English   needed? No    Discharge Plan A: Rehab, Home, Other (TBD)  Discharge Plan B: Home Health, Home with family      [facility]  [address]  [place] [address]  Phone: [phone] Fax: [phone]      Initial Assessment (most recent)       Adult Discharge Assessment - 05/07/25 1103          Discharge Assessment    Assessment Type Discharge Planning Assessment     Confirmed/corrected address, phone number and insurance Yes     Confirmed Demographics Correct on Facesheet     Source of Information family     If unable to respond/provide information was family/caregiver contacted? Yes     Contact Name/Number Patients daughter at bed side//patient cented.     Does patient/caregiver understand observation status Yes     Communicated ELIEL with patient/caregiver Yes;Date not available/Unable to determine     Reason For Admission SAH (subarachnoid hemorrhage)     People in Home child(tena), adult;other relative(s)     Facility Arrived From: [facility]     Do you have help at home or someone to help you manage your care at home? Yes     Prior to hospitilization cognitive status: Unable to Assess     Current cognitive status: Unable to Assess     Walking or Climbing Stairs Difficulty no     Dressing/Bathing  Difficulty no     Equipment Currently Used at Home none     Readmission within 30 days? No     Patient currently being followed by outpatient case management? No     Do you currently have service(s) that help you manage your care at home? No     Do you take prescription medications? Yes     Do you have prescription coverage? Yes     Coverage Payor: BLUE CROSS [facility] EMPLOYEE BENEFIT - [employer] [place] -     Do you have any problems affording any of your prescribed medications? No     Is the patient taking medications as prescribed? yes     How do you get to doctors appointments? car, drives self;family or friend will provide     Are you on dialysis? No     Do you take coumadin? Yes     Who monitors your labs? [facility]     Discharge Plan A Rehab;Home;Other   TBD    Discharge Plan B Home Health;Home with family     DME Needed Upon Discharge  other (see comments)   TBD    Discharge Plan discussed with: Adult children     Transition of Care Barriers None

## 2025-05-07 NOTE — PT/OT/SLP PROGRESS
Physical Therapy      Patient Name:  Waldo Emmanuel   MRN:  [id number]    Patient not seen today secondary to therapist assessment. Writing therapist spoke with RN, RN stated pt was just extubated this afternoon and is still lethargic. She suggested waiting until the AM to evaluate. Will follow-up as appropriate.

## 2025-05-07 NOTE — HOSPITAL COURSE
5/7/2025: extubated today to NC, SBP liberalized to 220, d/c jay cath,   5/8/2025: EVD per NSSGY, TCD's no vasospasm, DVT prophylaxis initiated, pending to hear from NSGY team when ok to start AC  05/09/2025: repeat CXR today, plan for CT chest, resp cx , add IS, EVD @10 per NSGY, TCDs today no vasospasm  5/10: Discussed with Dr. Chase and Neurosurgery, Heparin gtt started for R brachial DVT and hx arterial occlusion, will need repeat CTH when therapeutic. Hold off on transitioning to Coumadin until cleared by NSGY. CT Chest negative for DVT, but concerning for PNA. Repeat sputum cx sent. Regular diet started. Repeat Na improved to 138.   5/11: right cerebellar CVA on imaging, HTS, MRI, family updated at bedside, place andreia, hold heparin gtt   05/12/2025 EVD @ 10. SOC watch. PBD7- plan for MRA c/s contrast for reevaluation of coiled aneurysm. Dc keppra. TCDs negative for spasm. Given 500NS for euvolemia. Given 3% HTS 250ml bolus and continue 2% HTS gtt @ 75. Na checks q6h for goal > 145. Started valsartan 80. Lower extremity arterial US ordered given hx RLE arterial occlusion, plan to restart heparin gtt pending results. SLP consult for diet, plan for FEES tomorrow. Started lovenox. Started seoquel 25qHS. Resp cx w GNR, many GPCs- has low grade temps possibly 2/2 DVT and stable mild leukocytosis so will hold off on abx for now.   05/13/2025 Delirium overnight requiring zyprexa x2. Restarted on precedex today and increased seroquel to 50qHS. Required 500NS bolus for hypotension after zyprexa last night. RLE arterial US negative for occlusion, only showing moderate stenosis. Decided not to continue heparin gtt given brachial DVT is in upper extremity. Will start plavix for DAPT. Respiratory cx w Klebsiella aerogenes x2, but not symptomatic. Transitioned from Ancef to cefepime. Bedside FEES completed, started regular thin diet. Given 3%  bolus x2 for Na goal > 145 and remains on 2% HTS @ 75. Prn valium for  neck and back pain.   Pt has R brachial DVT and LUE 2% gtt infiltrated PIV and given hyaluronidase. L femoral CVC placed as pt had no access.   05/14/2025 Given 3%HTS bolus for Na > 145. TCDs negative for spasm. CXR reviewed. Increase cefepime course to 7d. Unable to obtain MRA while on precedex 1.4 and given versed due to pt restlessness and difficulty to sedate. Ordered MRA w anesthesia, likely tomorrow. NPO midnight. Sending full hypercoaguable w/u- likely start minimal intensity heparin gtt after sending off w/u.  5/15/2025: MRA brain w/wout con today, switched plavix to baby ASA, follow CT head tomorrow AM, increase 2% gtt to 100 ml/hr (Na goal > 145), Zyprexa once PRN available,  05/16/2025 D/c 2% and sodium goal. Max precedex to 0.6. Add miralax. Complaints of bilateral intermittent ear pain (R>L). Trial steroids.   05/17/2025 Patient clamped EVD overnight and later attempted to get out of bed on her own to shower. 30 cc of output. NSGY aware. NSGY raised EVD from 10 to 20. Add pregabalin for nerve pain.   05/18/2025 Continue EVD @ 20. Patient declining seroquel. Continue precedex and encouragement of seroquel.   5/19/2025: EVD clapmped today per NSGY follow up CT head tomorrow AM, repeat US upper extremities, TCDs yesterday no vasospasm  05/20/2025 EVD removed today.  CTH overnight.  Will begin warfarin this evening, and continue heparin until INR therapeutic.   05/21/2025 Continue heparin while bridging to warfarin.  Daily and INR.  Transfer to NSGY today. Discussed with team.

## 2025-05-07 NOTE — EICU
Intervention Initiated From:  COR / MELISAU    Kendall intervened regarding:  Rounding (Video assessment)  VICU Night Rounds Checklist  24H Vital Sign Range:  Temp:  [98.3 °F (36.8 °C)-99.3 °F (37.4 °C)]   Pulse:  []   Resp:  [15-48]   BP: ()/()   SpO2:  [95 %-100 %]   Arterial Line BP: (112-127)/(44-66)     Video rounds

## 2025-05-08 LAB
ABSOLUTE EOSINOPHIL (OHS): 0.09 K/UL
ABSOLUTE MONOCYTE (OHS): 0.48 K/UL (ref 0.3–1)
ABSOLUTE NEUTROPHIL COUNT (OHS): 9.3 K/UL (ref 1.8–7.7)
ALBUMIN SERPL BCP-MCNC: 2.7 G/DL (ref 3.5–5.2)
ALP SERPL-CCNC: 119 UNIT/L (ref 40–150)
ALT SERPL W/O P-5'-P-CCNC: 16 UNIT/L (ref 10–44)
ANION GAP (OHS): 7 MMOL/L (ref 8–16)
AST SERPL-CCNC: 24 UNIT/L (ref 11–45)
BASOPHILS # BLD AUTO: 0.02 K/UL
BASOPHILS NFR BLD AUTO: 0.2 %
BILIRUB SERPL-MCNC: 0.3 MG/DL (ref 0.1–1)
BUN SERPL-MCNC: 13 MG/DL (ref 6–20)
CALCIUM SERPL-MCNC: 9.1 MG/DL (ref 8.7–10.5)
CHLORIDE SERPL-SCNC: 102 MMOL/L (ref 95–110)
CO2 SERPL-SCNC: 26 MMOL/L (ref 23–29)
CREAT SERPL-MCNC: 0.7 MG/DL (ref 0.5–1.4)
ERYTHROCYTE [DISTWIDTH] IN BLOOD BY AUTOMATED COUNT: 13.5 % (ref 11.5–14.5)
GFR SERPLBLD CREATININE-BSD FMLA CKD-EPI: >60 ML/MIN/1.73/M2
GLUCOSE SERPL-MCNC: 130 MG/DL (ref 70–110)
HCT VFR BLD AUTO: 31.8 % (ref 37–48.5)
HGB BLD-MCNC: 9.7 GM/DL (ref 12–16)
IMM GRANULOCYTES # BLD AUTO: 0.07 K/UL (ref 0–0.04)
IMM GRANULOCYTES NFR BLD AUTO: 0.6 % (ref 0–0.5)
LYMPHOCYTES # BLD AUTO: 1.88 K/UL (ref 1–4.8)
MAGNESIUM SERPL-MCNC: 2 MG/DL (ref 1.6–2.6)
MCH RBC QN AUTO: 27.6 PG (ref 27–31)
MCHC RBC AUTO-ENTMCNC: 30.5 G/DL (ref 32–36)
MCV RBC AUTO: 90 FL (ref 82–98)
NUCLEATED RBC (/100WBC) (OHS): 0 /100 WBC
PHOSPHATE SERPL-MCNC: 1.7 MG/DL (ref 2.7–4.5)
PLATELET # BLD AUTO: 295 K/UL (ref 150–450)
PMV BLD AUTO: 9.5 FL (ref 9.2–12.9)
POCT GLUCOSE: 136 MG/DL (ref 70–110)
POCT GLUCOSE: 142 MG/DL (ref 70–110)
POTASSIUM SERPL-SCNC: 4 MMOL/L (ref 3.5–5.1)
PROT SERPL-MCNC: 7 GM/DL (ref 6–8.4)
RBC # BLD AUTO: 3.52 M/UL (ref 4–5.4)
RELATIVE EOSINOPHIL (OHS): 0.8 %
RELATIVE LYMPHOCYTE (OHS): 15.9 % (ref 18–48)
RELATIVE MONOCYTE (OHS): 4.1 % (ref 4–15)
RELATIVE NEUTROPHIL (OHS): 78.4 % (ref 38–73)
SODIUM SERPL-SCNC: 135 MMOL/L (ref 136–145)
WBC # BLD AUTO: 11.84 K/UL (ref 3.9–12.7)

## 2025-05-08 PROCEDURE — 63600175 PHARM REV CODE 636 W HCPCS

## 2025-05-08 PROCEDURE — 80053 COMPREHEN METABOLIC PANEL: CPT

## 2025-05-08 PROCEDURE — 84100 ASSAY OF PHOSPHORUS: CPT

## 2025-05-08 PROCEDURE — 94761 N-INVAS EAR/PLS OXIMETRY MLT: CPT

## 2025-05-08 PROCEDURE — 25000003 PHARM REV CODE 250

## 2025-05-08 PROCEDURE — 27000221 HC OXYGEN, UP TO 24 HOURS

## 2025-05-08 PROCEDURE — 85025 COMPLETE CBC W/AUTO DIFF WBC: CPT

## 2025-05-08 PROCEDURE — 99233 SBSQ HOSP IP/OBS HIGH 50: CPT | Mod: ,,, | Performed by: NURSE PRACTITIONER

## 2025-05-08 PROCEDURE — 97535 SELF CARE MNGMENT TRAINING: CPT

## 2025-05-08 PROCEDURE — 25000003 PHARM REV CODE 250: Performed by: PSYCHIATRY & NEUROLOGY

## 2025-05-08 PROCEDURE — 20000000 HC ICU ROOM

## 2025-05-08 PROCEDURE — 92610 EVALUATE SWALLOWING FUNCTION: CPT

## 2025-05-08 PROCEDURE — 25000003 PHARM REV CODE 250: Performed by: NURSE PRACTITIONER

## 2025-05-08 PROCEDURE — 83735 ASSAY OF MAGNESIUM: CPT

## 2025-05-08 PROCEDURE — 94640 AIRWAY INHALATION TREATMENT: CPT

## 2025-05-08 PROCEDURE — 25000242 PHARM REV CODE 250 ALT 637 W/ HCPCS: Performed by: NURSE PRACTITIONER

## 2025-05-08 PROCEDURE — 99233 SBSQ HOSP IP/OBS HIGH 50: CPT | Mod: ,,, | Performed by: NEUROLOGICAL SURGERY

## 2025-05-08 PROCEDURE — 63600175 PHARM REV CODE 636 W HCPCS: Performed by: NURSE PRACTITIONER

## 2025-05-08 PROCEDURE — 99900035 HC TECH TIME PER 15 MIN (STAT)

## 2025-05-08 RX ORDER — HEPARIN SODIUM 5000 [USP'U]/ML
5000 INJECTION, SOLUTION INTRAVENOUS; SUBCUTANEOUS EVERY 8 HOURS
Status: DISCONTINUED | OUTPATIENT
Start: 2025-05-08 | End: 2025-05-10

## 2025-05-08 RX ORDER — SODIUM,POTASSIUM PHOSPHATES 280-250MG
2 POWDER IN PACKET (EA) ORAL
Status: DISCONTINUED | OUTPATIENT
Start: 2025-05-08 | End: 2025-05-21

## 2025-05-08 RX ORDER — METHOCARBAMOL 500 MG/1
500 TABLET, FILM COATED ORAL EVERY 6 HOURS
Status: DISCONTINUED | OUTPATIENT
Start: 2025-05-08 | End: 2025-05-08

## 2025-05-08 RX ORDER — LEVETIRACETAM 100 MG/ML
500 SOLUTION ORAL 2 TIMES DAILY
Status: DISCONTINUED | OUTPATIENT
Start: 2025-05-08 | End: 2025-05-11

## 2025-05-08 RX ORDER — METHOCARBAMOL 500 MG/1
500 TABLET, FILM COATED ORAL EVERY 6 HOURS PRN
Status: DISCONTINUED | OUTPATIENT
Start: 2025-05-08 | End: 2025-05-12

## 2025-05-08 RX ORDER — POLYETHYLENE GLYCOL 3350 17 G/17G
17 POWDER, FOR SOLUTION ORAL DAILY
Status: DISCONTINUED | OUTPATIENT
Start: 2025-05-08 | End: 2025-05-08

## 2025-05-08 RX ORDER — AMOXICILLIN 250 MG
1 CAPSULE ORAL DAILY
Status: DISCONTINUED | OUTPATIENT
Start: 2025-05-08 | End: 2025-05-08

## 2025-05-08 RX ORDER — ATORVASTATIN CALCIUM 40 MG/1
40 TABLET, FILM COATED ORAL DAILY
Status: DISCONTINUED | OUTPATIENT
Start: 2025-05-08 | End: 2025-05-26 | Stop reason: HOSPADM

## 2025-05-08 RX ORDER — GABAPENTIN 300 MG/1
300 CAPSULE ORAL EVERY 8 HOURS
Status: DISCONTINUED | OUTPATIENT
Start: 2025-05-08 | End: 2025-05-08

## 2025-05-08 RX ORDER — GABAPENTIN 300 MG/1
300 CAPSULE ORAL EVERY 8 HOURS PRN
Status: DISCONTINUED | OUTPATIENT
Start: 2025-05-08 | End: 2025-05-15

## 2025-05-08 RX ORDER — NAPROXEN SODIUM 220 MG/1
81 TABLET, FILM COATED ORAL DAILY
Status: DISCONTINUED | OUTPATIENT
Start: 2025-05-08 | End: 2025-05-14

## 2025-05-08 RX ORDER — NIMODIPINE 30 MG/1
60 CAPSULE, LIQUID FILLED ORAL EVERY 4 HOURS
Status: DISCONTINUED | OUTPATIENT
Start: 2025-05-08 | End: 2025-05-26 | Stop reason: HOSPADM

## 2025-05-08 RX ADMIN — IPRATROPIUM BROMIDE AND ALBUTEROL SULFATE 3 ML: 2.5; .5 SOLUTION RESPIRATORY (INHALATION) at 01:05

## 2025-05-08 RX ADMIN — CEFAZOLIN 2 G: 2 INJECTION, POWDER, FOR SOLUTION INTRAMUSCULAR; INTRAVENOUS at 10:05

## 2025-05-08 RX ADMIN — NIMODIPINE 60 MG: 30 CAPSULE, LIQUID FILLED ORAL at 09:05

## 2025-05-08 RX ADMIN — NIMODIPINE 60 MG: 60 SOLUTION ORAL at 06:05

## 2025-05-08 RX ADMIN — NIMODIPINE 60 MG: 30 CAPSULE, LIQUID FILLED ORAL at 10:05

## 2025-05-08 RX ADMIN — HEPARIN SODIUM 5000 UNITS: 5000 INJECTION INTRAVENOUS; SUBCUTANEOUS at 09:05

## 2025-05-08 RX ADMIN — METHOCARBAMOL 500 MG: 500 TABLET ORAL at 06:05

## 2025-05-08 RX ADMIN — HEPARIN SODIUM 5000 UNITS: 5000 INJECTION INTRAVENOUS; SUBCUTANEOUS at 02:05

## 2025-05-08 RX ADMIN — LEVETIRACETAM 500 MG: 500 INJECTION, SOLUTION, CONCENTRATE INTRAVENOUS at 09:05

## 2025-05-08 RX ADMIN — NIMODIPINE 60 MG: 60 SOLUTION ORAL at 02:05

## 2025-05-08 RX ADMIN — CEFAZOLIN 2 G: 2 INJECTION, POWDER, FOR SOLUTION INTRAMUSCULAR; INTRAVENOUS at 02:05

## 2025-05-08 RX ADMIN — GABAPENTIN 300 MG: 300 CAPSULE ORAL at 08:05

## 2025-05-08 RX ADMIN — METHOCARBAMOL 500 MG: 500 TABLET ORAL at 12:05

## 2025-05-08 RX ADMIN — ATORVASTATIN CALCIUM 40 MG: 40 TABLET, FILM COATED ORAL at 09:05

## 2025-05-08 RX ADMIN — IPRATROPIUM BROMIDE AND ALBUTEROL SULFATE 3 ML: 2.5; .5 SOLUTION RESPIRATORY (INHALATION) at 12:05

## 2025-05-08 RX ADMIN — NIMODIPINE 60 MG: 30 CAPSULE, LIQUID FILLED ORAL at 02:05

## 2025-05-08 RX ADMIN — NIMODIPINE 60 MG: 30 CAPSULE, LIQUID FILLED ORAL at 06:05

## 2025-05-08 RX ADMIN — ASPIRIN 81 MG CHEWABLE TABLET 81 MG: 81 TABLET CHEWABLE at 09:05

## 2025-05-08 RX ADMIN — IPRATROPIUM BROMIDE AND ALBUTEROL SULFATE 3 ML: 2.5; .5 SOLUTION RESPIRATORY (INHALATION) at 07:05

## 2025-05-08 RX ADMIN — ACETAMINOPHEN 650 MG: 325 TABLET ORAL at 10:05

## 2025-05-08 RX ADMIN — SODIUM CHLORIDE 1000 ML: 9 INJECTION, SOLUTION INTRAVENOUS at 08:05

## 2025-05-08 RX ADMIN — METHOCARBAMOL 500 MG: 500 TABLET ORAL at 07:05

## 2025-05-08 RX ADMIN — CEFAZOLIN 2 G: 2 INJECTION, POWDER, FOR SOLUTION INTRAMUSCULAR; INTRAVENOUS at 06:05

## 2025-05-08 RX ADMIN — GABAPENTIN 300 MG: 300 CAPSULE ORAL at 12:05

## 2025-05-08 RX ADMIN — ACETAMINOPHEN 650 MG: 325 TABLET ORAL at 08:05

## 2025-05-08 RX ADMIN — LEVETIRACETAM 500 MG: 500 SOLUTION ORAL at 08:05

## 2025-05-08 RX ADMIN — GABAPENTIN 300 MG: 300 CAPSULE ORAL at 06:05

## 2025-05-08 RX ADMIN — POTASSIUM & SODIUM PHOSPHATES POWDER PACK 280-160-250 MG 2 PACKET: 280-160-250 PACK at 06:05

## 2025-05-08 RX ADMIN — IPRATROPIUM BROMIDE AND ALBUTEROL SULFATE 3 ML: 2.5; .5 SOLUTION RESPIRATORY (INHALATION) at 09:05

## 2025-05-08 NOTE — ASSESSMENT & PLAN NOTE
49f presenting after syncope with CTH demonstrating SAH. CTA with AICA aneurysm R. Intubated prior to arrival to [facility] ED.     S/p R frontal EVD on 5/6 and s/p DSA with coil embolization of R AICA aneurysm 5/6    Plan:  Admitted to ICU  EVD dropped to 10 post coil, abx while in place  Daily ASA 81  Hold warfarin  SAH protocol (euvolemia, SBP liberalized, nimotop, keppra, TCDs)

## 2025-05-08 NOTE — PLAN OF CARE
[facility] Care Plan  POC reviewed with Waldo Alegriaer and family at 1400. Patient and Family verbalized understanding. Questions and concerns addressed. No acute events today. Pt progressing toward goals. Will continue to monitor. See below and flowsheets for full assessment and VS info.     -EVD open at 10, ICP 6-10, 9-25  -BM x3  -PRN tylenol given x1  -Pt more awake in afternoon, tolerated sitting in chair for ~15 min    Is this a stroke patient? yes    Neuro:  Roro Coma Scale  Best Eye Response: 4-->(E4) spontaneous  Best Motor Response: 6-->(M6) obeys commands  Best Verbal Response: 5-->(V5) oriented  Roro Coma Scale Score: 15  Assessment Qualifiers: patient not sedated/intubated, no eye obstruction present  Pupil PERRLA: yes     24 hr Temp:  [97.6 °F (36.4 °C)-98.2 °F (36.8 °C)]     CV:   Rhythm: normal sinus rhythm  BP goals:   SBP < 220  MAP > 65    Resp:      Vent Mode: Spont  Set Rate: 20 BPM  Oxygen Concentration (%): 40  Vt Set: 375 mL  PEEP/CPAP: 5 cmH20  Pressure Support: 5 cmH20    Plan: N/A    GI/:     Diet/Nutrition Received: sips of water  Last Bowel Movement: 05/08/25  Voiding Characteristics: voids spontaneously without difficulty    Intake/Output Summary (Last 24 hours) at 5/8/2025 1826  Last data filed at 5/8/2025 1501  Gross per 24 hour   Intake 984 ml   Output 1479 ml   Net -495 ml     Unmeasured Output  Unmeasured Urine Occurrence: 1  Unmeasured Stool Occurrence: 1    Labs/Accuchecks:  Recent Labs   Lab 05/08/25  0405   WBC 11.84   RBC 3.52*   HGB 9.7*   HCT 31.8*         Recent Labs   Lab 05/08/25  0405   *   K 4.0   CO2 26      BUN 13   CREATININE 0.7   ALKPHOS 119   ALT 16   AST 24   BILITOT 0.3      Recent Labs   Lab 05/07/25  0029   PROTIME 12.4   INR 1.1   APTT 27.2      Recent Labs   Lab 05/06/25  0148   TROPONINI <0.006       Electrolytes: N/A - electrolytes WDL  Accuchecks: none    Gtts:      LDA/Wounds:    Ivan Risk Assessment  Sensory Perception:  3-->slightly limited  Moisture: 4-->rarely moist  Activity: 1-->bedfast  Mobility: 3-->slightly limited  Nutrition: 2-->probably inadequate  Friction and Shear: 2-->potential problem  Ivan Score: 15    Is your ivan score 12 or less? no            Restraints:   Restraint Order  Length of Order: Order good for next 24 hours or when removed.  Date that the current order will : 25  Time that the current order will : 1115  Order Upon Application: Yes    [facility]

## 2025-05-08 NOTE — PT/OT/SLP EVAL
Speech Language Pathology Evaluation  Bedside Swallow    Patient Name:  Waldo Emmanuel   MRN:  [id number]  Admitting Diagnosis: SAH (subarachnoid hemorrhage)    Recommendations:                 General Recommendations:  Dysphagia therapy and Cognitive-linguistic evaluation  Diet recommendations: cautiously resume Full liquids with strict aspiration precautions, and ST to continue ongoing swallow assessment as alertness improves   Aspiration Precautions: Only when vital signs stable, only when awake/alert/attentive and upright, 1 bite/sip at a time, Assistance with meals, Avoid talking while eating, Eliminate distractions, Frequent oral care, Monitor for s/s of aspiration, and Strict aspiration precautions Continue to monitor for signs and symptoms of aspiration and discontinue oral feeding should you notice any of the following: watery eyes, reddened facial area, wet vocal quality, increased work of breathing, change in respiratory status, increased congestion, coughing, fever and/or change in level of alertness  General Precautions: Standard, aspiration, fall, respiratory  Communication strategies:  go to room if call light pushed    Assessment:     Waldo Emmanuel is a 49 y.o. female with an SLP diagnosis of Dysphagia.  She presents with risk of aspiration due to waxing/waning alertness and decreased endurance s/p extubation. Strict aspiration precautions recommended. ST to continue to follow for ongoing assessment of swallow fx and cognitive-lingusitic skills as alertness improves.     History:     Past Medical History:   Diagnosis Date    ADHD (attention deficit hyperactivity disorder)     Anemia     Fibroids     Genital herpes     GERD (gastroesophageal reflux disease)     H/O total hysterectomy 08/03/2021    Hypertension     Labral tear of hip joint     Ovarian cyst     Right common arterial occlusion     Routine general medical examination at a [facility] care facility 06/26/2017    Total Right  "Arterial Occlusion        Past Surgical History:   Procedure Laterality Date    ANGIOGRAM, ABDOMINAL AORTA W/ EXTREMITY RUNOFF N/A 2025    Procedure: Angiogram, Abdominal Aorta W/ Extremity Runoff: Right lower extremity angiogram;  Surgeon: Lennox Zendejas MD;  Location: [facility] CATH LAB;  Service: Vascular;  Laterality: N/A;    CARPAL TUNNEL RELEASE       SECTION      CHOLECYSTECTOMY      DILATION AND CURETTAGE OF UTERUS      TOTAL ABDOMINAL HYSTERECTOMY W/ BILATERAL SALPINGOOPHORECTOMY  2021    menorrhagia     Social History: Patient lives in [place], [place]     Prior Intubation HX:   - 25     Modified Barium Swallow: none prior at this facility     Chest X-Rays: 25: Pulmonary edema pneumonia aspiration or sepsis.     Prior diet: regular textures, thin liquids.    Occupation/hobbies/homemaking: Employed (NP), Independent with ADLs prior to admission.W  She wears eyeglasses for distance.      Subjective     SLP reviewed Pt with RN, RN cleared for PO trials and explained Pt tolerated medications whole with thin liquids w/o difficulty  Pt presents lethargic  She explains," Just blurry" re: vision   Daughter explains, "She just wants to know what happened"     Pain/Comfort:  Pain Rating 1: 0/10    Respiratory Status: Nasal cannula, flow 3 L/min    Objective:     Oral Musculature Evaluation  Oral Musculature: general weakness  Dentition: present and adequate  Secretion Management: adequate  Mucosal Quality: adequate  Mandibular Strength and Mobility: WNL  Oral Labial Strength and Mobility: WNL  Lingual Strength and Mobility: WNL  Volitional Cough: present  Volitional Swallow: elicited  Voice Prior to PO Intake: mildly hoarse, low intensity    Bedside Swallow Eval:   Consistencies Assessed:  Thin liquids : ice chip, tsp sip, straw sips x3  Puree tsp bites x3   +Additional PO trials held 2/2 decreased sustained alertness     Oral Phase:   WNL    Pharyngeal Phase:   no overt clinical " signs/symptoms of aspiration    Compensatory Strategies  Pt required cues to attend to bites/sips     Treatment: Pt found asleep in bed with oxygen and EVD in place. RN in room to help Pt reposition in bed, elevate HOB and clamp EVD. Pt was oriented x3 and visibly lethargic and endorsed blurred vision. She denied difficulty with speech, language or swallow. She followed simple commands WNL.  No overt S/S aspiration with minimally accepted PO trials. Additional trials of advanced textures deferred due to waxing/waning alertness, decreased endurance and aspiration risk. Pt and family were educated on SLP role, definition and risk of aspiration, aspiration precautions, diet recommendations, S/S aspiration and ongoing SLP POC. No additional questions. Pt with minimal sustained alertness t/o education and would benefit from ongoing review.  She remained in bed upright with RN at the bedside, family/friend in room, as SLP discontinued session. White board updated.      Goals:   Multidisciplinary Problems       SLP Goals          Problem: SLP    Goal Priority Disciplines Outcome   SLP Goal     SLP Progressing   Description: Speech Language Pathology Goals  Goals expected to be met by 5/15/25    1. Pt will participate in ongoing assessment of swallow function to determine safest, least restrictive means of nutrition/hydration  2. Pt will participate in full assessment of cognitive-linguistic skills to determine ongoing POC needs  3.  Educate Pt and family on aspiration precautions and S/S aspiration                        Plan:     Patient to be seen:  4 x/week   Plan of Care expires:  06/07/25  Plan of Care reviewed with:  patient, daughter, friend   SLP Follow-Up:  Yes       Discharge recommendations:  High Intensity Therapy     Time Tracking:     SLP Treatment Date:   05/08/25  Speech Start Time:  0933  Speech Stop Time:  0953     Speech Total Time (min):  20 min    Billable Minutes: Eval Swallow and Oral Function 9 and  Self Care/Home Management Training 8    05/08/2025

## 2025-05-08 NOTE — EICU
Intervention Initiated From:  COR / EICU    Kendall intervened regarding:  Rounding (Video assessment)  VICU Night Rounds Checklist  24H Vital Sign Range:  Temp:  [97.7 °F (36.5 °C)-98.2 °F (36.8 °C)]   Pulse:  []   Resp:  [5-33]   BP: ()/()   SpO2:  [93 %-100 %]   Arterial Line BP: (109-166)/()     Video rounds and LDA reconciliation        Nursing orders placed : IP FAVIAN Peripheral IV Access

## 2025-05-08 NOTE — PT/OT/SLP PROGRESS
Occupational Therapy      Patient Name:  Waldo Emmanuel   MRN:  [id number]    Patient not seen today secondary to pt. Dx with new DVT in UE and INR not therapeutic on this date. Will assess on 05-09-25 5/8/2025

## 2025-05-08 NOTE — PT/OT/SLP PROGRESS
Physical Therapy      Patient Name:  Waldo Emmanuel   MRN:  [id number]    Patient not seen today secondary to new R UE DVT . Will follow-up when anti-coagulated/in therapeutic range.

## 2025-05-08 NOTE — SUBJECTIVE & OBJECTIVE
Review of Systems  Constitutional: Denies fevers, weight loss, chills, or weakness.  Eyes: Denies changes in vision.  ENT: Denies dysphagia, nasal discharge, ear pain or discharge.  Cardiovascular: Denies chest pain, palpitations, orthopnea, or claudication.  Respiratory: Denies shortness of breath, cough, hemoptysis, or wheezing.  GI: Denies nausea/vomitting, hematochezia, melena, abd pain, or changes in appetite.  : Denies dysuria, incontinence, or hematuria.  Musculoskeletal: Denies joint pain or myalgias.  Skin/breast: Denies rashes, lumps, lesions, or discharge.  Neurologic: Denies headache, dizziness, vertigo, or paresthesias.  Psychiatric: Denies changes in mood or hallucinations.  Endocrine: Denies polyuria, polydipsia, heat/cold intolerance.  Hematologic/Lymph: Denies lymphadenopathy, easy bruising or easy bleeding.  Allergic/Immunologic: Denies rash, rhinitis.   Objective:     Vitals:  Temp: 97.8 °F (36.6 °C)  Pulse: 83  Rhythm: normal sinus rhythm  BP: (!) 145/74  MAP (mmHg): 102  ICP Mean (mmHg): 10 mmHg  Resp: (!) 26  SpO2: 97 %    Temp  Min: 97.6 °F (36.4 °C)  Max: 98.1 °F (36.7 °C)  Pulse  Min: 73  Max: 105  BP  Min: 97/60  Max: 156/86  MAP (mmHg)  Min: 74  Max: 114  ICP Mean (mmHg)  Min: 4 mmHg  Max: 11 mmHg  Resp  Min: 10  Max: 34  SpO2  Min: 91 %  Max: 100 %    05/07 0701 - 05/08 0700  In: 867.1 [I.V.:867.1]  Out: 1620 [Urine:1340; Drains:280]   Unmeasured Output  Unmeasured Urine Occurrence: 1  Unmeasured Stool Occurrence: 1        Physical Exam  Vitals reviewed.   Constitutional:       Appearance: She is obese. She is ill-appearing.   HENT:      Head: Normocephalic.EVD catheter present      Nose: Nose normal.      Mouth/Throat:      Mouth: Mucous membranes are moist.   Eyes:      Pupils: Pupils are equal, round, and reactive to light.   Cardiovascular:      Rate and Rhythm: Normal rate.   Pulmonary: no issues  Neurological:      Mental Status: She is alert.        Neuro:  --GCS: E3 Vt1  M6  --Mental Status:  awake, follows simple commands  --Pupils reactive   --URENA spont       Medications:  Continuous Scheduledalbuterol-ipratropium, 3 mL, Q6H  aspirin, 81 mg, Daily  atorvastatin, 40 mg, Daily  ceFAZolin (Ancef) IV (PEDS and ADULTS), 2 g, Q8H  gabapentin, 300 mg, Q8H  heparin (porcine), 5,000 Units, Q8H  levetiracetam, 500 mg, BID  methocarbamoL, 500 mg, Q6H  niMODipine, 60 mg, Q4H  senna-docusate, 1 tablet, Daily    PRNacetaminophen, 650 mg, Q6H PRN  bisacodyL, 10 mg, Daily PRN  labetalol, 10 mg, Q4H PRN  oxyCODONE, 5 mg, Q6H PRN  potassium bicarbonate, 35 mEq, PRN  potassium bicarbonate, 50 mEq, PRN  potassium bicarbonate, 60 mEq, PRN  potassium, sodium phosphates, 2 packet, PRN  potassium, sodium phosphates, 2 packet, PRN  potassium, sodium phosphates, 2 packet, PRN  sodium chloride 0.9%, 10 mL, PRN      Today I personally reviewed pertinent medications, lines/drains/airways, imaging, cardiology results, laboratory results, microbiology results,     Diet  Diet Full Liquid Standard Tray

## 2025-05-08 NOTE — EICU
Virtual ICU Quality Rounds    Admit Date: 5/6/2025  Hospital Day: 2    ICU Day: 2d 1h    24H Vital Sign Range:  Temp:  [97.6 °F (36.4 °C)-98.1 °F (36.7 °C)]   Pulse:  []   Resp:  [5-34]   BP: ()/()   SpO2:  [91 %-100 %]   Arterial Line BP: (124-166)/()     VICU Surveillance Screening                    LDA reconciliation : Yes

## 2025-05-08 NOTE — PROGRESS NOTES
[facility] - Neuro Critical Care  Neurosurgery  Progress Note    Subjective:     History of Present Illness: 49f presenting after syncope with CTH demonstrating SAH. CTA without clear vascular malformation. Intubated prior to arrival to [facility] ED. Discussed expected hospital course and imaging with daughter in room    Post-Op Info:  * No surgery found *       Interval History:  extubated grossly intact on exam. EVD at 10. Continue ICU for SAH protocol.    Prescriptions Prior to Admission[1]    Review of patient's allergies indicates:   Allergen Reactions    Metformin Diarrhea     Other reaction(s): Diarrhae       Past Medical History:   Diagnosis Date    ADHD (attention deficit hyperactivity disorder)     Anemia     Fibroids     Genital herpes     GERD (gastroesophageal reflux disease)     H/O total hysterectomy 2021    Hypertension     Labral tear of hip joint     Ovarian cyst     Right common arterial occlusion     Routine general medical examination at a [facility] care facility 2017    Total Right Arterial Occlusion      Past Surgical History:   Procedure Laterality Date    ANGIOGRAM, ABDOMINAL AORTA W/ EXTREMITY RUNOFF N/A 2025    Procedure: Angiogram, Abdominal Aorta W/ Extremity Runoff: Right lower extremity angiogram;  Surgeon: Lennox Zendejas MD;  Location: [facility] CATH LAB;  Service: Vascular;  Laterality: N/A;    CARPAL TUNNEL RELEASE       SECTION      CHOLECYSTECTOMY      DILATION AND CURETTAGE OF UTERUS      TOTAL ABDOMINAL HYSTERECTOMY W/ BILATERAL SALPINGOOPHORECTOMY  2021    menorrhagia     Family History       Problem Relation (Age of Onset)    Breast cancer Paternal Cousin    Hypertension Maternal Grandmother, Father    Prostate cancer Paternal Uncle    Stroke Maternal Grandmother, Father, Mother          Tobacco Use    Smoking status: Never    Smokeless tobacco: Never   Substance and Sexual Activity    Alcohol use: Yes     Comment: Social - Rare     Drug use: No     "Sexual activity: Not Currently     Partners: Male     Birth control/protection: See Surgical Hx     Review of Systems  Objective:     Weight: 100.2 kg (221 lb)  Body mass index is 39.15 kg/m².  Vital Signs (Most Recent):  Temp: 97.6 °F (36.4 °C) (05/08/25 0701)  Pulse: 81 (05/08/25 0918)  Resp: (!) 22 (05/08/25 0918)  BP: (!) 149/71 (05/08/25 1000)  SpO2: 97 % (05/08/25 0918) Vital Signs (24h Range):  Temp:  [97.6 °F (36.4 °C)-98.1 °F (36.7 °C)] 97.6 °F (36.4 °C)  Pulse:  [] 81  Resp:  [10-34] 22  SpO2:  [91 %-100 %] 97 %  BP: ()/() 149/71  Arterial Line BP: (124-166)/() 153/79     Date 05/08/25 0700 - 05/09/25 0659   Shift 9465-8263 2577-3771 3753-3297 24 Hour Total   INTAKE   IV Piggyback 23   23   Shift Total(mL/kg) 23(0.2)   23(0.2)   OUTPUT   Drains 50   50   Shift Total(mL/kg) 50(0.5)   50(0.5)   Weight (kg) 100.2 100.2 100.2 100.2              Vent Mode: Spont  Oxygen Concentration (%):  [40] 40  Resp Rate Total:  [26 br/min-32 br/min] 32 br/min  PEEP/CPAP:  [5 cmH20] 5 cmH20  Pressure Support:  [5 cmH20] 5 cmH20  Mean Airway Pressure:  [6.9 cmH20-7.4 cmH20] 7.4 cmH20             Urethral Catheter 05/06/25 0608 (Active)   Site Assessment Clean;Intact;Dry 05/06/25 0609   Collection Container Standard drainage bag 05/06/25 0609   Securement Method secured to top of thigh w/ adhesive device 05/06/25 0609   Catheter Care Performed yes 05/06/25 0609   Reason for Continuing Urinary Catheterization Critically ill in ICU and requiring hourly monitoring of intake/output 05/06/25 0609   CAUTI Prevention Bundle Securement Device in place with 1" slack 05/06/25 0609   Output (mL) 1000 mL 05/06/25 0609          Physical Exam  [id number]  Awake, alert, Ox4  Cni  Follows commands x4 grossly full strength throughout  SILT    EVD Incision dressed CDI       Neurosurgery Physical Exam    Significant Labs:  Recent Labs   Lab 05/07/25  0029 05/08/25  0405   * 130*    135*   K 4.3 4.0    102 "   CO2 22* 26   BUN 20 13   CREATININE 0.9 0.7   CALCIUM 8.6* 9.1   MG 1.6 2.0     Recent Labs   Lab 05/07/25  0029 05/07/25  0352 05/08/25  0405   WBC 10.89  --  11.84   HGB 9.5*  --  9.7*   HCT 31.8* 29* 31.8*     --  295     Recent Labs   Lab 05/07/25  0029   INR 1.1   APTT 27.2     Microbiology Results (last 7 days)       ** No results found for the last 168 hours. **          All pertinent labs from the last 24 hours have been reviewed.    Significant Diagnostics:  I have reviewed all pertinent imaging results/findings within the past 24 hours.  I have reviewed and interpreted all pertinent imaging results/findings within the past 24 hours.  No results found in the last 24 hours.  X-Ray Chest 1 View  Result Date: 5/7/2025  See above Electronically signed by: Lito Hoover MD Date:    05/07/2025 Time:    07:08    X-Ray Chest AP Portable  Result Date: 5/6/2025  Right-sided pneumonia versus aspiration. Electronically signed by: Gilberto Mcneal MD Date:    05/06/2025 Time:    10:56    CT Head Without Contrast  Result Date: 5/6/2025  Subarachnoid and intraventricular hemorrhage, similar to recent exam. Interval placement of right frontal EVD without apparent intracranial complication.  Ventricles remain mildly dilated. Electronically signed by: Merrill Shepard MD Date:    05/06/2025 Time:    10:40      Assessment/Plan:     * SAH (subarachnoid hemorrhage)  49f presenting after syncope with CTH demonstrating SAH. CTA with AICA aneurysm R. Intubated prior to arrival to [facility] ED.     S/p R frontal EVD on 5/6 and s/p DSA with coil embolization of R AICA aneurysm 5/6    Plan:  Admitted to ICU  EVD dropped to 10 post coil, abx while in place  Daily ASA 81  Hold warfarin  SAH protocol (euvolemia, SBP liberalized, nimotop, keppra, TCDs)            Merrill Almendarez MD  Neurosurgery  [facility] - Neuro Critical Care       [1]   Medications Prior to Admission   Medication Sig Dispense Refill Last Dose/Taking    aspirin  (ECOTRIN) 81 MG EC tablet Take 81 mg by mouth.       cetirizine (ZYRTEC) 10 MG tablet Take 10 mg by mouth.       cilostazoL (PLETAL) 50 MG Tab Take 1 tablet (50 mg total) by mouth 2 (two) times daily. 60 tablet 11     lisdexamfetamine (VYVANSE) 60 MG capsule Take 1 capsule (60 mg total) by mouth every morning. 30 capsule 0     multivitamin capsule Take 1 capsule by mouth once daily.       omega-3 acid ethyl esters (LOVAZA) 1 gram capsule Take 1 capsule (1 g total) by mouth once daily. 90 capsule 1     pantoprazole (PROTONIX) 40 MG tablet Take 1 tablet (40 mg total) by mouth 2 (two) times daily. 180 tablet 1     rosuvastatin (CRESTOR) 20 MG tablet Take 1 tablet (20 mg total) by mouth every evening. 90 tablet 1     tirzepatide, weight loss, (ZEPBOUND) 5 mg/0.5 mL PnIj Inject 5 mg into the skin every 7 days. 2 mL 0     tirzepatide, weight loss, (ZEPBOUND) 7.5 mg/0.5 mL PnIj Inject 7.5 mg into the skin every 7 days. 2 mL 0     topiramate (TOPAMAX) 50 MG tablet Take 1 tablet by mouth twice daily (dose correction) 180 tablet 1     valACYclovir (VALTREX) 1000 MG tablet Take 1 tablet (1,000 mg total) by mouth once daily. 90 tablet 1     valsartan (DIOVAN) 320 MG tablet Take 1 tablet (320 mg total) by mouth once daily. 90 tablet 1     warfarin (COUMADIN) 5 MG tablet Take by mouth. Takes 2.5 mg on Tues and Thur; and 5 mg all other days.

## 2025-05-08 NOTE — ASSESSMENT & PLAN NOTE
Patient is a 49-year-old female with past medical history of hypertension presenting via transfer from [place] for higher level of care after being diagnosed with subarachnoid hemorrhage.    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  S/p EVD 5/6  S/p Coil emolization 5/6  - Neuro IR consulted   - Neurosurgery consulted  - Vascular Neurology consulted  - goal BP less than 140  - fentanyl propofol for sedation  - nimodipine and daily TCDs given subarachnoid hemorrhage  - daily aspirin per neuro IR  5/8/2025: TCD's  no vasospasm today, 1L NS bolus today for euvolemia, DVT proph initiated

## 2025-05-08 NOTE — SUBJECTIVE & OBJECTIVE
Interval History:  extubated grossly intact on exam. EVD at 10. Continue ICU for SAH protocol.    Prescriptions Prior to Admission[1]    Review of patient's allergies indicates:   Allergen Reactions    Metformin Diarrhea     Other reaction(s): Diarrhae       Past Medical History:   Diagnosis Date    ADHD (attention deficit hyperactivity disorder)     Anemia     Fibroids     Genital herpes     GERD (gastroesophageal reflux disease)     H/O total hysterectomy 2021    Hypertension     Labral tear of hip joint     Ovarian cyst     Right common arterial occlusion     Routine general medical examination at a health care facility 2017    Total Right Arterial Occlusion      Past Surgical History:   Procedure Laterality Date    ANGIOGRAM, ABDOMINAL AORTA W/ EXTREMITY RUNOFF N/A 2025    Procedure: Angiogram, Abdominal Aorta W/ Extremity Runoff: Right lower extremity angiogram;  Surgeon: Lennox Zendejas MD;  Location: [facility] CATH LAB;  Service: Vascular;  Laterality: N/A;    CARPAL TUNNEL RELEASE       SECTION      CHOLECYSTECTOMY      DILATION AND CURETTAGE OF UTERUS      TOTAL ABDOMINAL HYSTERECTOMY W/ BILATERAL SALPINGOOPHORECTOMY  2021    menorrhagia     Family History       Problem Relation (Age of Onset)    Breast cancer Paternal Cousin    Hypertension Maternal Grandmother, Father    Prostate cancer Paternal Uncle    Stroke Maternal Grandmother, Father, Mother          Tobacco Use    Smoking status: Never    Smokeless tobacco: Never   Substance and Sexual Activity    Alcohol use: Yes     Comment: Social - Rare     Drug use: No    Sexual activity: Not Currently     Partners: Male     Birth control/protection: See Surgical Hx     Review of Systems  Objective:     Weight: 100.2 kg (221 lb)  Body mass index is 39.15 kg/m².  Vital Signs (Most Recent):  Temp: 97.6 °F (36.4 °C) (25 0701)  Pulse: 81 (25 0918)  Resp: (!) 22 (25 0918)  BP: (!) 149/71 (25 1000)  SpO2: 97 %  "(05/08/25 0918) Vital Signs (24h Range):  Temp:  [97.6 °F (36.4 °C)-98.1 °F (36.7 °C)] 97.6 °F (36.4 °C)  Pulse:  [] 81  Resp:  [10-34] 22  SpO2:  [91 %-100 %] 97 %  BP: ()/() 149/71  Arterial Line BP: (124-166)/() 153/79     Date 05/08/25 0700 - 05/09/25 0659   Shift 3393-1615 8049-6297 6481-3618 24 Hour Total   INTAKE   IV Piggyback 23   23   Shift Total(mL/kg) 23(0.2)   23(0.2)   OUTPUT   Drains 50   50   Shift Total(mL/kg) 50(0.5)   50(0.5)   Weight (kg) 100.2 100.2 100.2 100.2              Vent Mode: Spont  Oxygen Concentration (%):  [40] 40  Resp Rate Total:  [26 br/min-32 br/min] 32 br/min  PEEP/CPAP:  [5 cmH20] 5 cmH20  Pressure Support:  [5 cmH20] 5 cmH20  Mean Airway Pressure:  [6.9 cmH20-7.4 cmH20] 7.4 cmH20             Urethral Catheter 05/06/25 0608 (Active)   Site Assessment Clean;Intact;Dry 05/06/25 0609   Collection Container Standard drainage bag 05/06/25 0609   Securement Method secured to top of thigh w/ adhesive device 05/06/25 0609   Catheter Care Performed yes 05/06/25 0609   Reason for Continuing Urinary Catheterization Critically ill in ICU and requiring hourly monitoring of intake/output 05/06/25 0609   CAUTI Prevention Bundle Securement Device in place with 1" slack 05/06/25 0609   Output (mL) 1000 mL 05/06/25 0609          Physical Exam  E4V5M6  Awake, alert, Ox4  Cni  Follows commands x4 grossly full strength throughout  SILT    EVD Incision dressed CDI       Neurosurgery Physical Exam    Significant Labs:  Recent Labs   Lab 05/07/25  0029 05/08/25  0405   * 130*    135*   K 4.3 4.0    102   CO2 22* 26   BUN 20 13   CREATININE 0.9 0.7   CALCIUM 8.6* 9.1   MG 1.6 2.0     Recent Labs   Lab 05/07/25  0029 05/07/25  0352 05/08/25  0405   WBC 10.89  --  11.84   HGB 9.5*  --  9.7*   HCT 31.8* 29* 31.8*     --  295     Recent Labs   Lab 05/07/25  0029   INR 1.1   APTT 27.2     Microbiology Results (last 7 days)       ** No results found for the " last 168 hours. **          All pertinent labs from the last 24 hours have been reviewed.    Significant Diagnostics:  I have reviewed all pertinent imaging results/findings within the past 24 hours.  I have reviewed and interpreted all pertinent imaging results/findings within the past 24 hours.  No results found in the last 24 hours.  X-Ray Chest 1 View  Result Date: 5/7/2025  See above Electronically signed by: Lito Hoover MD Date:    05/07/2025 Time:    07:08    X-Ray Chest AP Portable  Result Date: 5/6/2025  Right-sided pneumonia versus aspiration. Electronically signed by: Gilberto Mcneal MD Date:    05/06/2025 Time:    10:56    CT Head Without Contrast  Result Date: 5/6/2025  Subarachnoid and intraventricular hemorrhage, similar to recent exam. Interval placement of right frontal EVD without apparent intracranial complication.  Ventricles remain mildly dilated. Electronically signed by: Merrill Shepard MD Date:    05/06/2025 Time:    10:40           [1]   Medications Prior to Admission   Medication Sig Dispense Refill Last Dose/Taking    aspirin (ECOTRIN) 81 MG EC tablet Take 81 mg by mouth.       cetirizine (ZYRTEC) 10 MG tablet Take 10 mg by mouth.       cilostazoL (PLETAL) 50 MG Tab Take 1 tablet (50 mg total) by mouth 2 (two) times daily. 60 tablet 11     lisdexamfetamine (VYVANSE) 60 MG capsule Take 1 capsule (60 mg total) by mouth every morning. 30 capsule 0     multivitamin capsule Take 1 capsule by mouth once daily.       omega-3 acid ethyl esters (LOVAZA) 1 gram capsule Take 1 capsule (1 g total) by mouth once daily. 90 capsule 1     pantoprazole (PROTONIX) 40 MG tablet Take 1 tablet (40 mg total) by mouth 2 (two) times daily. 180 tablet 1     rosuvastatin (CRESTOR) 20 MG tablet Take 1 tablet (20 mg total) by mouth every evening. 90 tablet 1     tirzepatide, weight loss, (ZEPBOUND) 5 mg/0.5 mL PnIj Inject 5 mg into the skin every 7 days. 2 mL 0     tirzepatide, weight loss, (ZEPBOUND) 7.5 mg/0.5  mL PnIj Inject 7.5 mg into the skin every 7 days. 2 mL 0     topiramate (TOPAMAX) 50 MG tablet Take 1 tablet by mouth twice daily (dose correction) 180 tablet 1     valACYclovir (VALTREX) 1000 MG tablet Take 1 tablet (1,000 mg total) by mouth once daily. 90 tablet 1     valsartan (DIOVAN) 320 MG tablet Take 1 tablet (320 mg total) by mouth once daily. 90 tablet 1     warfarin (COUMADIN) 5 MG tablet Take by mouth. Takes 2.5 mg on Tues and Thur; and 5 mg all other days.

## 2025-05-08 NOTE — PROGRESS NOTES
[facility] - Neuro Critical Care  Neurocritical Care  Progress Note    Admit Date: 5/6/2025  Service Date: 05/08/2025  Length of Stay: 2    Subjective:     Chief Complaint: SAH (subarachnoid hemorrhage)    History of Present Illness: History obtained via chart review and daughter at bedside as patient intubated on arrival.     Per ED note:  49 y.o. female, PMH HTN and anemia,  presenting as a transfer for neurosurgical evaluation with newly diagnosed ICH from outside hospital.  Patient was transferred from [facility].  Daughter at bedside helps provide history.  She states that she was at a grocery store when she had a syncopal event.  She was unsure if she hit her head.  She states that people on the scene called her to let her know what happened and after she was seen in the ED they told her that she had bleeding inside of her head.  She states that while in the ED her mother was answering questions appropriately and acting like her normal self except frequently falling asleep.  Patient was intubated for airway protection prior to arrival.  Patient and reversal with [facility] prior to transfer.  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 Distal location suspicious for mycotic aneurysm     On arrival to [facility]: Neursurgery consulted, EVD placed and admitted to neurocritical care    Hospital Course: 5/7/2025: extubated today to NC, SBP liberalized to 220, d/c jay cath,   5/8/2025: EVD per NSSGY, TCD's no vasospasm, DVT prophylaxis initiated, pending to hear from NSGY team when ok to start AC        Review of Systems  Constitutional: Denies fevers, weight loss, chills, or weakness.  Eyes: Denies changes in vision.  ENT: Denies dysphagia, nasal discharge, ear pain or discharge.  Cardiovascular: Denies chest pain, palpitations, orthopnea, or claudication.  Respiratory: Denies shortness of breath, cough,  hemoptysis, or wheezing.  GI: Denies nausea/vomitting, hematochezia, melena, abd pain, or changes in appetite.  : Denies dysuria, incontinence, or hematuria.  Musculoskeletal: Denies joint pain or myalgias.  Skin/breast: Denies rashes, lumps, lesions, or discharge.  Neurologic: Denies headache, dizziness, vertigo, or paresthesias.  Psychiatric: Denies changes in mood or hallucinations.  Endocrine: Denies polyuria, polydipsia, heat/cold intolerance.  Hematologic/Lymph: Denies lymphadenopathy, easy bruising or easy bleeding.  Allergic/Immunologic: Denies rash, rhinitis.   Objective:     Vitals:  Temp: 97.8 °F (36.6 °C)  Pulse: 83  Rhythm: normal sinus rhythm  BP: (!) 145/74  MAP (mmHg): 102  ICP Mean (mmHg): 10 mmHg  Resp: (!) 26  SpO2: 97 %    Temp  Min: 97.6 °F (36.4 °C)  Max: 98.1 °F (36.7 °C)  Pulse  Min: 73  Max: 105  BP  Min: 97/60  Max: 156/86  MAP (mmHg)  Min: 74  Max: 114  ICP Mean (mmHg)  Min: 4 mmHg  Max: 11 mmHg  Resp  Min: 10  Max: 34  SpO2  Min: 91 %  Max: 100 %    05/07 0701 - 05/08 0700  In: 867.1 [I.V.:867.1]  Out: 1620 [Urine:1340; Drains:280]   Unmeasured Output  Unmeasured Urine Occurrence: 1  Unmeasured Stool Occurrence: 1        Physical Exam  Vitals reviewed.   Constitutional:       Appearance: She is obese. She is ill-appearing.   HENT:      Head: Normocephalic.EVD catheter present      Nose: Nose normal.      Mouth/Throat:      Mouth: Mucous membranes are moist.   Eyes:      Pupils: Pupils are equal, round, and reactive to light.   Cardiovascular:      Rate and Rhythm: Normal rate.   Pulmonary: no issues  Neurological:      Mental Status: She is alert.        Neuro:  --GCS: E3 Vt1 M6  --Mental Status:  awake, follows simple commands  --Pupils reactive   --URENA spont       Medications:  Continuous Scheduledalbuterol-ipratropium, 3 mL, Q6H  aspirin, 81 mg, Daily  atorvastatin, 40 mg, Daily  ceFAZolin (Ancef) IV (PEDS and ADULTS), 2 g, Q8H  gabapentin, 300 mg, Q8H  heparin (porcine), 5,000  Units, Q8H  levetiracetam, 500 mg, BID  methocarbamoL, 500 mg, Q6H  niMODipine, 60 mg, Q4H  senna-docusate, 1 tablet, Daily    PRNacetaminophen, 650 mg, Q6H PRN  bisacodyL, 10 mg, Daily PRN  labetalol, 10 mg, Q4H PRN  oxyCODONE, 5 mg, Q6H PRN  potassium bicarbonate, 35 mEq, PRN  potassium bicarbonate, 50 mEq, PRN  potassium bicarbonate, 60 mEq, PRN  potassium, sodium phosphates, 2 packet, PRN  potassium, sodium phosphates, 2 packet, PRN  potassium, sodium phosphates, 2 packet, PRN  sodium chloride 0.9%, 10 mL, PRN      Today I personally reviewed pertinent medications, lines/drains/airways, imaging, cardiology results, laboratory results, microbiology results,     Diet  Diet Full Liquid Standard Tray    Assessment/Plan:     Neuro  * SAH (subarachnoid hemorrhage)  Patient is a 49-year-old female with past medical history of hypertension presenting via transfer from [place] for higher level of care after being diagnosed with subarachnoid hemorrhage.    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  S/p EVD 5/6  S/p Coil emolization 5/6  - Neuro IR consulted   - Neurosurgery consulted  - Vascular Neurology consulted  - goal BP less than 140  - fentanyl propofol for sedation  - nimodipine and daily TCDs given subarachnoid hemorrhage  - daily aspirin per neuro IR  5/8/2025: TCD's  no vasospasm today, 1L NS bolus today for euvolemia, EVD@10 per NSGY, DVT proph initiated      Obstructive hydrocephalus  S/p EVD    Brain compression  - seen on brain imaging   - hourly neurochecks    Cardiac/Vascular  Essential hypertension  -- SBP <220    Hematology  History of DVT in adulthood  -- pt was warfarin- currently held  -- Per NSGY team hold AC for 72 hrs and obtain US   5/8/2025: pending to hear from NSGY when ok to start AC          The patient is being Prophylaxed for:  Venous Thromboembolism with: Chemical  Stress Ulcer with: None  Ventilator Pneumonia  with: not applicable    Activity Orders            Diet Full Liquid Standard Tray: Full Liquid starting at 05/08 1054    Progressive Mobility Protocol (mobilize patient to their highest level of functioning at least twice daily) starting at 05/06 2000    Turn patient starting at 05/06 0800          Full Code    Mariluz Durand NP  Neurocritical Care  [facility] - Neuro Critical Care

## 2025-05-08 NOTE — PLAN OF CARE
Problem: SLP  Goal: SLP Goal  Description: Speech Language Pathology Goals  Goals expected to be met by 5/15/25    1. Pt will participate in ongoing assessment of swallow function to determine safest, least restrictive means of nutrition/hydration  2. Pt will participate in full assessment of cognitive-linguistic skills to determine ongoing POC needs  3.  Educate Pt and family on aspiration precautions and S/S aspiration     Outcome: Progressing     SLP Bedside Swallow Evaluation initiated. PO trials limited 2/2 waxing/waning alertness. No overt S/S aspiration with trials accepted.  Risk of aspiration remains due to waxing/waning alertness and generalized weakness. REC: cautiously resume full liquids provided Pt is awake/alert/attentive, upright, 1:1 assistance with all bites/sips and close monitoring for S/S aspiration with all PO intake. Continue to monitor for signs and symptoms of aspiration and discontinue oral feeding should you notice any of the following: watery eyes, reddened facial area, wet vocal quality, increased work of breathing, change in respiratory status, increased congestion, coughing, fever and/or change in level of alertness. ST to continue to follow for ongoing assessment of swallow and cognitive-linguistic skills to determine ongoing POC needs.     5/8/2025

## 2025-05-08 NOTE — ASSESSMENT & PLAN NOTE
-- pt was warfarin- currently held  -- Per NSGY team hold AC for 72 hrs and obtain US   5/8/2025: pending to hear from NSGY when ok to start AC

## 2025-05-09 LAB
ABSOLUTE EOSINOPHIL (OHS): 0.1 K/UL
ABSOLUTE MONOCYTE (OHS): 1 K/UL (ref 0.3–1)
ABSOLUTE NEUTROPHIL COUNT (OHS): 9.4 K/UL (ref 1.8–7.7)
ALBUMIN SERPL BCP-MCNC: 2.9 G/DL (ref 3.5–5.2)
ALP SERPL-CCNC: 121 UNIT/L (ref 40–150)
ALT SERPL W/O P-5'-P-CCNC: 20 UNIT/L (ref 10–44)
ANION GAP (OHS): 10 MMOL/L (ref 8–16)
AST SERPL-CCNC: 39 UNIT/L (ref 11–45)
BASOPHILS # BLD AUTO: 0.17 K/UL
BASOPHILS NFR BLD AUTO: 1.2 %
BILIRUB SERPL-MCNC: 0.5 MG/DL (ref 0.1–1)
BUN SERPL-MCNC: 11 MG/DL (ref 6–20)
CALCIUM SERPL-MCNC: 9.4 MG/DL (ref 8.7–10.5)
CHLORIDE SERPL-SCNC: 100 MMOL/L (ref 95–110)
CO2 SERPL-SCNC: 24 MMOL/L (ref 23–29)
CREAT SERPL-MCNC: 0.6 MG/DL (ref 0.5–1.4)
ERYTHROCYTE [DISTWIDTH] IN BLOOD BY AUTOMATED COUNT: 13.3 % (ref 11.5–14.5)
GFR SERPLBLD CREATININE-BSD FMLA CKD-EPI: >60 ML/MIN/1.73/M2
GLUCOSE SERPL-MCNC: 112 MG/DL (ref 70–110)
HCT VFR BLD AUTO: 35 % (ref 37–48.5)
HGB BLD-MCNC: 11.5 GM/DL (ref 12–16)
IMM GRANULOCYTES # BLD AUTO: 0.6 K/UL (ref 0–0.04)
IMM GRANULOCYTES NFR BLD AUTO: 4.4 % (ref 0–0.5)
LYMPHOCYTES # BLD AUTO: 2.43 K/UL (ref 1–4.8)
MAGNESIUM SERPL-MCNC: 2.1 MG/DL (ref 1.6–2.6)
MCH RBC QN AUTO: 28.3 PG (ref 27–31)
MCHC RBC AUTO-ENTMCNC: 32.9 G/DL (ref 32–36)
MCV RBC AUTO: 86 FL (ref 82–98)
NUCLEATED RBC (/100WBC) (OHS): 0 /100 WBC
PHOSPHATE SERPL-MCNC: 2.4 MG/DL (ref 2.7–4.5)
PLATELET # BLD AUTO: 354 K/UL (ref 150–450)
PMV BLD AUTO: 9.6 FL (ref 9.2–12.9)
POC ACTIVATED CLOTTING TIME K: 129 SEC (ref 74–137)
POTASSIUM SERPL-SCNC: 4.6 MMOL/L (ref 3.5–5.1)
PROT SERPL-MCNC: 7.7 GM/DL (ref 6–8.4)
RBC # BLD AUTO: 4.07 M/UL (ref 4–5.4)
RELATIVE EOSINOPHIL (OHS): 0.7 %
RELATIVE LYMPHOCYTE (OHS): 17.7 % (ref 18–48)
RELATIVE MONOCYTE (OHS): 7.3 % (ref 4–15)
RELATIVE NEUTROPHIL (OHS): 68.7 % (ref 38–73)
SAMPLE: NORMAL
SODIUM SERPL-SCNC: 134 MMOL/L (ref 136–145)
WBC # BLD AUTO: 13.7 K/UL (ref 3.9–12.7)

## 2025-05-09 PROCEDURE — 25000003 PHARM REV CODE 250

## 2025-05-09 PROCEDURE — 25000003 PHARM REV CODE 250: Performed by: NURSE PRACTITIONER

## 2025-05-09 PROCEDURE — 94799 UNLISTED PULMONARY SVC/PX: CPT

## 2025-05-09 PROCEDURE — 25000242 PHARM REV CODE 250 ALT 637 W/ HCPCS: Performed by: NURSE PRACTITIONER

## 2025-05-09 PROCEDURE — 83735 ASSAY OF MAGNESIUM: CPT

## 2025-05-09 PROCEDURE — 99233 SBSQ HOSP IP/OBS HIGH 50: CPT | Mod: ,,, | Performed by: NURSE PRACTITIONER

## 2025-05-09 PROCEDURE — 92526 ORAL FUNCTION THERAPY: CPT

## 2025-05-09 PROCEDURE — 63600175 PHARM REV CODE 636 W HCPCS: Performed by: NURSE PRACTITIONER

## 2025-05-09 PROCEDURE — 92610 EVALUATE SWALLOWING FUNCTION: CPT

## 2025-05-09 PROCEDURE — 25500020 PHARM REV CODE 255: Performed by: PSYCHIATRY & NEUROLOGY

## 2025-05-09 PROCEDURE — 80053 COMPREHEN METABOLIC PANEL: CPT

## 2025-05-09 PROCEDURE — 20000000 HC ICU ROOM

## 2025-05-09 PROCEDURE — 85025 COMPLETE CBC W/AUTO DIFF WBC: CPT

## 2025-05-09 PROCEDURE — 99900035 HC TECH TIME PER 15 MIN (STAT)

## 2025-05-09 PROCEDURE — 94640 AIRWAY INHALATION TREATMENT: CPT

## 2025-05-09 PROCEDURE — 25000003 PHARM REV CODE 250: Performed by: PSYCHIATRY & NEUROLOGY

## 2025-05-09 PROCEDURE — 84100 ASSAY OF PHOSPHORUS: CPT

## 2025-05-09 PROCEDURE — 94761 N-INVAS EAR/PLS OXIMETRY MLT: CPT

## 2025-05-09 PROCEDURE — 97535 SELF CARE MNGMENT TRAINING: CPT

## 2025-05-09 PROCEDURE — 27000221 HC OXYGEN, UP TO 24 HOURS

## 2025-05-09 PROCEDURE — 99233 SBSQ HOSP IP/OBS HIGH 50: CPT | Mod: ,,, | Performed by: NEUROLOGICAL SURGERY

## 2025-05-09 PROCEDURE — 94664 DEMO&/EVAL PT USE INHALER: CPT

## 2025-05-09 PROCEDURE — 63600175 PHARM REV CODE 636 W HCPCS

## 2025-05-09 RX ORDER — IPRATROPIUM BROMIDE AND ALBUTEROL SULFATE 2.5; .5 MG/3ML; MG/3ML
3 SOLUTION RESPIRATORY (INHALATION) EVERY 4 HOURS PRN
Status: DISCONTINUED | OUTPATIENT
Start: 2025-05-09 | End: 2025-05-09

## 2025-05-09 RX ADMIN — OXYCODONE 5 MG: 5 TABLET ORAL at 06:05

## 2025-05-09 RX ADMIN — ACETAMINOPHEN 650 MG: 325 TABLET ORAL at 12:05

## 2025-05-09 RX ADMIN — HEPARIN SODIUM 5000 UNITS: 5000 INJECTION INTRAVENOUS; SUBCUTANEOUS at 01:05

## 2025-05-09 RX ADMIN — IPRATROPIUM BROMIDE AND ALBUTEROL SULFATE 3 ML: 2.5; .5 SOLUTION RESPIRATORY (INHALATION) at 07:05

## 2025-05-09 RX ADMIN — ASPIRIN 81 MG CHEWABLE TABLET 81 MG: 81 TABLET CHEWABLE at 08:05

## 2025-05-09 RX ADMIN — ACETAMINOPHEN 650 MG: 325 TABLET ORAL at 05:05

## 2025-05-09 RX ADMIN — OXYCODONE 5 MG: 5 TABLET ORAL at 09:05

## 2025-05-09 RX ADMIN — METHOCARBAMOL 500 MG: 500 TABLET ORAL at 09:05

## 2025-05-09 RX ADMIN — POTASSIUM & SODIUM PHOSPHATES POWDER PACK 280-160-250 MG 2 PACKET: 280-160-250 PACK at 05:05

## 2025-05-09 RX ADMIN — GABAPENTIN 300 MG: 300 CAPSULE ORAL at 07:05

## 2025-05-09 RX ADMIN — CEFAZOLIN 2 G: 2 INJECTION, POWDER, FOR SOLUTION INTRAMUSCULAR; INTRAVENOUS at 06:05

## 2025-05-09 RX ADMIN — LEVETIRACETAM 500 MG: 500 SOLUTION ORAL at 08:05

## 2025-05-09 RX ADMIN — ACETAMINOPHEN 650 MG: 325 TABLET ORAL at 11:05

## 2025-05-09 RX ADMIN — GABAPENTIN 300 MG: 300 CAPSULE ORAL at 08:05

## 2025-05-09 RX ADMIN — NIMODIPINE 60 MG: 30 CAPSULE, LIQUID FILLED ORAL at 05:05

## 2025-05-09 RX ADMIN — ATORVASTATIN CALCIUM 40 MG: 40 TABLET, FILM COATED ORAL at 08:05

## 2025-05-09 RX ADMIN — POTASSIUM & SODIUM PHOSPHATES POWDER PACK 280-160-250 MG 2 PACKET: 280-160-250 PACK at 09:05

## 2025-05-09 RX ADMIN — IOHEXOL 100 ML: 350 INJECTION, SOLUTION INTRAVENOUS at 11:05

## 2025-05-09 RX ADMIN — CEFAZOLIN 2 G: 2 INJECTION, POWDER, FOR SOLUTION INTRAMUSCULAR; INTRAVENOUS at 02:05

## 2025-05-09 RX ADMIN — HEPARIN SODIUM 5000 UNITS: 5000 INJECTION INTRAVENOUS; SUBCUTANEOUS at 05:05

## 2025-05-09 RX ADMIN — METHOCARBAMOL 500 MG: 500 TABLET ORAL at 04:05

## 2025-05-09 RX ADMIN — HEPARIN SODIUM 5000 UNITS: 5000 INJECTION INTRAVENOUS; SUBCUTANEOUS at 09:05

## 2025-05-09 RX ADMIN — IPRATROPIUM BROMIDE AND ALBUTEROL SULFATE 3 ML: 2.5; .5 SOLUTION RESPIRATORY (INHALATION) at 12:05

## 2025-05-09 RX ADMIN — NIMODIPINE 60 MG: 30 CAPSULE, LIQUID FILLED ORAL at 02:05

## 2025-05-09 RX ADMIN — IPRATROPIUM BROMIDE AND ALBUTEROL SULFATE 3 ML: 2.5; .5 SOLUTION RESPIRATORY (INHALATION) at 01:05

## 2025-05-09 RX ADMIN — CEFAZOLIN 2 G: 2 INJECTION, POWDER, FOR SOLUTION INTRAMUSCULAR; INTRAVENOUS at 09:05

## 2025-05-09 RX ADMIN — NIMODIPINE 60 MG: 30 CAPSULE, LIQUID FILLED ORAL at 06:05

## 2025-05-09 RX ADMIN — NIMODIPINE 60 MG: 30 CAPSULE, LIQUID FILLED ORAL at 01:05

## 2025-05-09 RX ADMIN — NIMODIPINE 60 MG: 30 CAPSULE, LIQUID FILLED ORAL at 09:05

## 2025-05-09 RX ADMIN — METHOCARBAMOL 500 MG: 500 TABLET ORAL at 12:05

## 2025-05-09 NOTE — PLAN OF CARE
Recommendations  --Continue Full Liquid diet as tolerated and clinically indicated   --Order Boost Plus TID   --Encourage good intakes   --Nursing: please continue to document % meal eaten on flowsheet   --RD to monitor weight, PO intake     Goals: Meet % EEN/EPN by next RD follow-up  Nutrition Goal Status: new  Communication of RD Recs:  (POC)

## 2025-05-09 NOTE — PT/OT/SLP PROGRESS
"Speech Language Pathology Treatment    Patient Name:  Waldo Emmanuel   MRN:  [id number]  Admitting Diagnosis: SAH (subarachnoid hemorrhage)    Recommendations:                 General Recommendations:  Dysphagia therapy and Cognitive-linguistic evaluation  Diet recommendations:  Regular Diet - IDDSI Level 7, Liquid Diet Level: Thin liquids - IDDSI Level 0   Aspiration Precautions: 1 bite/sip at a time, Assistance with meals, Eliminate distractions, Feed only when awake/alert, upright with all PO, Meds whole 1 at a time, and Strict aspiration precautions. Continue to monitor for signs and symptoms of aspiration and discontinue oral feeding should you notice any of the following: watery eyes, reddened facial area, wet vocal quality, increased work of breathing, change in respiratory status, increased congestion, coughing, fever and/or change in level of alertness  General Precautions: Standard, aspiration, fall, respiratory  Communication strategies:  go to room if call light pushed    Assessment:     Waldo Emmanuel is a 49 y.o. female with an SLP diagnosis of Dysphagia.  She presents with increased alertness and PO acceptance.  Risk of aspiration remains due to underlying respiratory status and decreased endurance. ST to continue to follow.     Subjective     SLP reviewed Pt with RN upon SLP attempts  Pt presents alert  She explains, "I'll try the cracker"     Pain/Comfort:  Pain Rating 1: 0/10    Respiratory Status: Nasal cannula, flow 1 L/min    Objective:     Has the patient been evaluated by SLP for swallowing?   Yes  Keep patient NPO? No     Upon initial attempts, Patient unavailable (testing, RN hold.)  Upon later attempt, Pt found awake, reclined in bed with oxygen and EVD in place. RN in room to elevate HOB and clamp EVD. Pt with increased alertness and eye contact this afternoon. She endorsed a productive cough in isolation of PO intake. Her voice was clear with low intensity. No word-finding " difficulty appreciated t/o conversational tasks. Her speech was precise and 100 % intelligible t/o conversational speech tasks. She followed simple instructions WNL. Pt with adequate labial/lingual/mandibular strength and coordination upon review of the oral mechanism.  She willingly accepted trials of solids (crackers x2) and thin liquids (cup sips x3.) no overt S/S aspiration with trials accepted. She was educated on SLP Role, aspiration precautions, diet recommendations, safety precautions and S/S aspiration for ongoing monitoring. No additional questions. She remained upright in bed with her family and RN at the bedside upon SLP exit.     Goals:   Multidisciplinary Problems       SLP Goals          Problem: SLP    Goal Priority Disciplines Outcome   SLP Goal     SLP Progressing   Description: Speech Language Pathology Goals  Goals expected to be met by 5/15/25    1. Pt will participate in ongoing assessment of swallow function to determine safest, least restrictive means of nutrition/hydration  2. Pt will participate in full assessment of cognitive-linguistic skills to determine ongoing POC needs  3.  Educate Pt and family on aspiration precautions and S/S aspiration                          Plan:     Patient to be seen:  4 x/week   Plan of Care expires:  06/07/25  Plan of Care reviewed with:  patient, daughter, family   SLP Follow-Up:  Yes       Discharge recommendations:  High Intensity Therapy     Time Tracking:     SLP Treatment Date:   05/09/25  Speech Start Time:  1452  Speech Stop Time:  1515     Speech Total Time (min):  23 min    Billable Minutes: Treatment Swallowing Dysfunction 15 and Self Care/Home Management Training 8    05/09/2025

## 2025-05-09 NOTE — ASSESSMENT & PLAN NOTE
-- pt was warfarin- currently held  -- Per NSGY team hold AC for 72 hrs and obtain US   5/9/2025: pending to hear from NSGY when ok to start AC

## 2025-05-09 NOTE — EICU
Intervention Initiated From:  COR / MELISAU    Kendall intervened regarding:  Rounding (Video assessment)      VICU Night Rounds Checklist  24H Vital Sign Range:  Temp:  [97.6 °F (36.4 °C)-98.5 °F (36.9 °C)]   Pulse:  []   Resp:  [11-34]   BP: (122-193)/(59-86)   SpO2:  [91 %-100 %]     Video rounds and LDA reconciliation

## 2025-05-09 NOTE — PROGRESS NOTES
[facility] - Neuro Critical Care  Neurosurgery  Progress Note    Subjective:     History of Present Illness: 49f presenting after syncope with CTH demonstrating SAH. CTA without clear vascular malformation. Intubated prior to arrival to [facility] ED. Discussed expected hospital course and imaging with daughter in room    Post-Op Info:  * No surgery found *       Interval History: : NAEON. DVT u/s no dvt in ble , r brachial vein +, evd draining well at 10cm H2O    Prescriptions Prior to Admission[1]    Review of patient's allergies indicates:   Allergen Reactions    Metformin Diarrhea     Other reaction(s): Diarrhae       Past Medical History:   Diagnosis Date    ADHD (attention deficit hyperactivity disorder)     Anemia     Fibroids     Genital herpes     GERD (gastroesophageal reflux disease)     H/O total hysterectomy 2021    Hypertension     Labral tear of hip joint     Ovarian cyst     Right common arterial occlusion     Routine general medical examination at a [facility] care facility 2017    Total Right Arterial Occlusion      Past Surgical History:   Procedure Laterality Date    ANGIOGRAM, ABDOMINAL AORTA W/ EXTREMITY RUNOFF N/A 2025    Procedure: Angiogram, Abdominal Aorta W/ Extremity Runoff: Right lower extremity angiogram;  Surgeon: Lennox Zendejas MD;  Location: [facility] CATH LAB;  Service: Vascular;  Laterality: N/A;    CARPAL TUNNEL RELEASE       SECTION      CHOLECYSTECTOMY      DILATION AND CURETTAGE OF UTERUS      TOTAL ABDOMINAL HYSTERECTOMY W/ BILATERAL SALPINGOOPHORECTOMY  2021    menorrhagia     Family History       Problem Relation (Age of Onset)    Breast cancer Paternal Cousin    Hypertension Maternal Grandmother, Father    Prostate cancer Paternal Uncle    Stroke Maternal Grandmother, Father, Mother          Tobacco Use    Smoking status: Never    Smokeless tobacco: Never   Substance and Sexual Activity    Alcohol use: Yes     Comment: Social - Rare     Drug use: No     "Sexual activity: Not Currently     Partners: Male     Birth control/protection: See Surgical Hx     Review of Systems  Objective:     Weight: 100.2 kg (221 lb)  Body mass index is 39.15 kg/m².  Vital Signs (Most Recent):  Temp: 98 °F (36.7 °C) (05/09/25 1101)  Pulse: 92 (05/09/25 1144)  Resp: (!) 21 (05/09/25 1101)  BP: (!) 145/82 (05/09/25 1101)  SpO2: 98 % (05/09/25 1101) Vital Signs (24h Range):  Temp:  [98 °F (36.7 °C)-98.5 °F (36.9 °C)] 98 °F (36.7 °C)  Pulse:  [73-93] 92  Resp:  [18-32] 21  SpO2:  [92 %-100 %] 98 %  BP: (122-193)/() 145/82     Date 05/09/25 0700 - 05/10/25 0659   Shift 5696-3410 1447-5482 7625-0970 24 Hour Total   INTAKE   P.O. 240   240   Shift Total(mL/kg) 240(2.4)   240(2.4)   OUTPUT   Urine(mL/kg/hr) 500   500   Drains 46   46   Shift Total(mL/kg) 546(5.4)   546(5.4)   Weight (kg) 100.2 100.2 100.2 100.2                            Urethral Catheter 05/06/25 0608 (Active)   Site Assessment Clean;Intact;Dry 05/06/25 0609   Collection Container Standard drainage bag 05/06/25 0609   Securement Method secured to top of thigh w/ adhesive device 05/06/25 0609   Catheter Care Performed yes 05/06/25 0609   Reason for Continuing Urinary Catheterization Critically ill in ICU and requiring hourly monitoring of intake/output 05/06/25 0609   CAUTI Prevention Bundle Securement Device in place with 1" slack 05/06/25 0609   Output (mL) 1000 mL 05/06/25 0609          Physical Exam  [id number]  Awake, alert, Ox4  Cni  Follows commands x4 grossly full strength throughout  SILT    EVD Incision dressed CDI       Neurosurgery Physical Exam    Significant Labs:  Recent Labs   Lab 05/08/25  0405 05/09/25  0156   * 112*   * 134*   K 4.0 4.6    100   CO2 26 24   BUN 13 11   CREATININE 0.7 0.6   CALCIUM 9.1 9.4   MG 2.0 2.1     Recent Labs   Lab 05/08/25  0405 05/09/25  0156   WBC 11.84 13.70*   HGB 9.7* 11.5*   HCT 31.8* 35.0*    354     No results for input(s): "LABPT", "INR", "APTT" in " the last 48 hours.    Microbiology Results (last 7 days)       Procedure Component Value Units Date/Time    Culture, Respiratory with Gram Stain [[id number]]     Order Status: Sent Specimen: Respiratory from Sputum, Expectorated           All pertinent labs from the last 24 hours have been reviewed.    Significant Diagnostics:  I have reviewed all pertinent imaging results/findings within the past 24 hours.  I have reviewed and interpreted all pertinent imaging results/findings within the past 24 hours.  No results found in the last 24 hours.  X-Ray Chest 1 View  Result Date: 5/7/2025  See above Electronically signed by: Lito Hoover MD Date:    05/07/2025 Time:    07:08    X-Ray Chest AP Portable  Result Date: 5/6/2025  Right-sided pneumonia versus aspiration. Electronically signed by: Gilberto Mcneal MD Date:    05/06/2025 Time:    10:56    CT Head Without Contrast  Result Date: 5/6/2025  Subarachnoid and intraventricular hemorrhage, similar to recent exam. Interval placement of right frontal EVD without apparent intracranial complication.  Ventricles remain mildly dilated. Electronically signed by: Merrill Shepard MD Date:    05/06/2025 Time:    10:40    US Transcran Doppler Intracran Artr Comp  Result Date: 5/9/2025  No Doppler evidence of vasospasm. Electronically signed by: Paramjit Oneill MD Date:    05/09/2025 Time:    10:20    X-Ray Chest 1 View  Result Date: 5/9/2025  Pulmonary edema pneumonia aspiration or sepsis. Electronically signed by: Gilberto Mcneal MD Date:    05/09/2025 Time:    09:53      Assessment/Plan:     * SAH (subarachnoid hemorrhage)  49f presenting after syncope with CTH demonstrating SAH. CTA with AICA aneurysm R. Intubated prior to arrival to [facility] ED.     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 - Neuro Critical Care       [1]   Medications Prior to Admission   Medication Sig Dispense Refill Last Dose/Taking    aspirin (ECOTRIN) 81 MG EC tablet Take 81 mg by mouth.       cetirizine (ZYRTEC) 10 MG tablet Take 10 mg by mouth.       cilostazoL (PLETAL) 50 MG Tab Take 1 tablet (50 mg total) by mouth 2 (two) times daily. 60 tablet 11     lisdexamfetamine (VYVANSE) 60 MG capsule Take 1 capsule (60 mg total) by mouth every morning. 30 capsule 0     multivitamin capsule Take 1 capsule by mouth once daily.       omega-3 acid ethyl esters (LOVAZA) 1 gram capsule Take 1 capsule (1 g total) by mouth once daily. 90 capsule 1     pantoprazole (PROTONIX) 40 MG tablet Take 1 tablet (40 mg total) by mouth 2 (two) times daily. 180 tablet 1     rosuvastatin (CRESTOR) 20 MG tablet Take 1 tablet (20 mg total) by mouth every evening. 90 tablet 1     tirzepatide, weight loss, (ZEPBOUND) 5 mg/0.5 mL PnIj Inject 5 mg into the skin every 7 days. 2 mL 0     tirzepatide, weight loss, (ZEPBOUND) 7.5 mg/0.5 mL PnIj Inject 7.5 mg into the skin every 7 days. 2 mL 0     topiramate (TOPAMAX) 50 MG tablet Take 1 tablet by mouth twice daily (dose correction) 180 tablet 1     valACYclovir (VALTREX) 1000 MG tablet Take 1 tablet (1,000 mg total) by mouth once daily. 90 tablet 1     valsartan (DIOVAN) 320 MG tablet Take 1 tablet (320 mg total) by mouth once daily. 90 tablet 1     warfarin (COUMADIN) 5 MG tablet Take by mouth. Takes 2.5 mg on Tues and Thur; and 5 mg all other days.

## 2025-05-09 NOTE — SUBJECTIVE & OBJECTIVE
Interval History: : NAEON. DVT u/s no dvt in ble , r brachial vein +, evd draining well at 10cm H2O    Prescriptions Prior to Admission[1]    Review of patient's allergies indicates:   Allergen Reactions    Metformin Diarrhea     Other reaction(s): Diarrhae       Past Medical History:   Diagnosis Date    ADHD (attention deficit hyperactivity disorder)     Anemia     Fibroids     Genital herpes     GERD (gastroesophageal reflux disease)     H/O total hysterectomy 2021    Hypertension     Labral tear of hip joint     Ovarian cyst     Right common arterial occlusion     Routine general medical examination at a health care facility 2017    Total Right Arterial Occlusion      Past Surgical History:   Procedure Laterality Date    ANGIOGRAM, ABDOMINAL AORTA W/ EXTREMITY RUNOFF N/A 2025    Procedure: Angiogram, Abdominal Aorta W/ Extremity Runoff: Right lower extremity angiogram;  Surgeon: Lennox Zendejas MD;  Location: [facility] CATH LAB;  Service: Vascular;  Laterality: N/A;    CARPAL TUNNEL RELEASE       SECTION      CHOLECYSTECTOMY      DILATION AND CURETTAGE OF UTERUS      TOTAL ABDOMINAL HYSTERECTOMY W/ BILATERAL SALPINGOOPHORECTOMY  2021    menorrhagia     Family History       Problem Relation (Age of Onset)    Breast cancer Paternal Cousin    Hypertension Maternal Grandmother, Father    Prostate cancer Paternal Uncle    Stroke Maternal Grandmother, Father, Mother          Tobacco Use    Smoking status: Never    Smokeless tobacco: Never   Substance and Sexual Activity    Alcohol use: Yes     Comment: Social - Rare     Drug use: No    Sexual activity: Not Currently     Partners: Male     Birth control/protection: See Surgical Hx     Review of Systems  Objective:     Weight: 100.2 kg (221 lb)  Body mass index is 39.15 kg/m².  Vital Signs (Most Recent):  Temp: 98 °F (36.7 °C) (25 1101)  Pulse: 92 (25 1144)  Resp: (!) 21 (25 1101)  BP: (!) 145/82 (25 1101)  SpO2: 98 %  "(05/09/25 1101) Vital Signs (24h Range):  Temp:  [98 °F (36.7 °C)-98.5 °F (36.9 °C)] 98 °F (36.7 °C)  Pulse:  [73-93] 92  Resp:  [18-32] 21  SpO2:  [92 %-100 %] 98 %  BP: (122-193)/() 145/82     Date 05/09/25 0700 - 05/10/25 0659   Shift 8253-7967 2978-2515 4855-6021 24 Hour Total   INTAKE   P.O. 240   240   Shift Total(mL/kg) 240(2.4)   240(2.4)   OUTPUT   Urine(mL/kg/hr) 500   500   Drains 46   46   Shift Total(mL/kg) 546(5.4)   546(5.4)   Weight (kg) 100.2 100.2 100.2 100.2                            Urethral Catheter 05/06/25 0608 (Active)   Site Assessment Clean;Intact;Dry 05/06/25 0609   Collection Container Standard drainage bag 05/06/25 0609   Securement Method secured to top of thigh w/ adhesive device 05/06/25 0609   Catheter Care Performed yes 05/06/25 0609   Reason for Continuing Urinary Catheterization Critically ill in ICU and requiring hourly monitoring of intake/output 05/06/25 0609   CAUTI Prevention Bundle Securement Device in place with 1" slack 05/06/25 0609   Output (mL) 1000 mL 05/06/25 0609          Physical Exam  E4V5M6  Awake, alert, Ox4  Cni  Follows commands x4 grossly full strength throughout  SILT    EVD Incision dressed CDI       Neurosurgery Physical Exam    Significant Labs:  Recent Labs   Lab 05/08/25  0405 05/09/25  0156   * 112*   * 134*   K 4.0 4.6    100   CO2 26 24   BUN 13 11   CREATININE 0.7 0.6   CALCIUM 9.1 9.4   MG 2.0 2.1     Recent Labs   Lab 05/08/25  0405 05/09/25  0156   WBC 11.84 13.70*   HGB 9.7* 11.5*   HCT 31.8* 35.0*    354     No results for input(s): "LABPT", "INR", "APTT" in the last 48 hours.    Microbiology Results (last 7 days)       Procedure Component Value Units Date/Time    Culture, Respiratory with Gram Stain [[id number]]     Order Status: Sent Specimen: Respiratory from Sputum, Expectorated           All pertinent labs from the last 24 hours have been reviewed.    Significant Diagnostics:  I have reviewed all pertinent " imaging results/findings within the past 24 hours.  I have reviewed and interpreted all pertinent imaging results/findings within the past 24 hours.  No results found in the last 24 hours.  X-Ray Chest 1 View  Result Date: 5/7/2025  See above Electronically signed by: Lito Hoover MD Date:    05/07/2025 Time:    07:08    X-Ray Chest AP Portable  Result Date: 5/6/2025  Right-sided pneumonia versus aspiration. Electronically signed by: Gilberto Mcneal MD Date:    05/06/2025 Time:    10:56    CT Head Without Contrast  Result Date: 5/6/2025  Subarachnoid and intraventricular hemorrhage, similar to recent exam. Interval placement of right frontal EVD without apparent intracranial complication.  Ventricles remain mildly dilated. Electronically signed by: Merrill Shepard MD Date:    05/06/2025 Time:    10:40    US Transcran Doppler Intracran Artr Comp  Result Date: 5/9/2025  No Doppler evidence of vasospasm. Electronically signed by: Paramjit Oneill MD Date:    05/09/2025 Time:    10:20    X-Ray Chest 1 View  Result Date: 5/9/2025  Pulmonary edema pneumonia aspiration or sepsis. Electronically signed by: Gilberto Mcneal MD Date:    05/09/2025 Time:    09:53           [1]   Medications Prior to Admission   Medication Sig Dispense Refill Last Dose/Taking    aspirin (ECOTRIN) 81 MG EC tablet Take 81 mg by mouth.       cetirizine (ZYRTEC) 10 MG tablet Take 10 mg by mouth.       cilostazoL (PLETAL) 50 MG Tab Take 1 tablet (50 mg total) by mouth 2 (two) times daily. 60 tablet 11     lisdexamfetamine (VYVANSE) 60 MG capsule Take 1 capsule (60 mg total) by mouth every morning. 30 capsule 0     multivitamin capsule Take 1 capsule by mouth once daily.       omega-3 acid ethyl esters (LOVAZA) 1 gram capsule Take 1 capsule (1 g total) by mouth once daily. 90 capsule 1     pantoprazole (PROTONIX) 40 MG tablet Take 1 tablet (40 mg total) by mouth 2 (two) times daily. 180 tablet 1     rosuvastatin (CRESTOR) 20 MG tablet Take 1 tablet  (20 mg total) by mouth every evening. 90 tablet 1     tirzepatide, weight loss, (ZEPBOUND) 5 mg/0.5 mL PnIj Inject 5 mg into the skin every 7 days. 2 mL 0     tirzepatide, weight loss, (ZEPBOUND) 7.5 mg/0.5 mL PnIj Inject 7.5 mg into the skin every 7 days. 2 mL 0     topiramate (TOPAMAX) 50 MG tablet Take 1 tablet by mouth twice daily (dose correction) 180 tablet 1     valACYclovir (VALTREX) 1000 MG tablet Take 1 tablet (1,000 mg total) by mouth once daily. 90 tablet 1     valsartan (DIOVAN) 320 MG tablet Take 1 tablet (320 mg total) by mouth once daily. 90 tablet 1     warfarin (COUMADIN) 5 MG tablet Take by mouth. Takes 2.5 mg on Tues and Thur; and 5 mg all other days.

## 2025-05-09 NOTE — PLAN OF CARE
[facility] Care Plan  POC reviewed with Waldo Emmanuel and family at 1400. Patient and Family verbalized understanding. Questions and concerns addressed. No acute events today. Pt progressing toward goals. Will continue to monitor. See below and flowsheets for full assessment and VS info.     EVD open at 10  ICP <12  CSF 5-18, serosanguinous  PRN medication for HA  Chest CT done  Chest x ray done   Phos replaced  Incentive Spirometer encouraged  PT/OT pending DVT anticoagulation. Plan pending per NCC providers.  BM x2  Weaned to 1L nasal Cannula  Linen changed        Is this a stroke patient? yes    Neuro:  Roro Coma Scale  Best Eye Response: 4-->(E4) spontaneous  Best Motor Response: 6-->(M6) obeys commands  Best Verbal Response: 5-->(V5) oriented  Roro Coma Scale Score: 15  Assessment Qualifiers: patient chemically sedated or paralyzed  Pupil PERRLA: yes     24 hr Temp:  [98 °F (36.7 °C)-98.5 °F (36.9 °C)]     CV:   Rhythm: normal sinus rhythm  BP goals:   SBP < 220  MAP > 65    Resp:      Vent Mode: Spont  Set Rate: 20 BPM  Oxygen Concentration (%): 40  Vt Set: 375 mL  PEEP/CPAP: 5 cmH20  Pressure Support: 5 cmH20    Plan: N/A    GI/:     Diet/Nutrition Received: full liquid  Last Bowel Movement: 05/09/25  Voiding Characteristics: voids spontaneously without difficulty    Intake/Output Summary (Last 24 hours) at 5/9/2025 1745  Last data filed at 5/9/2025 1601  Gross per 24 hour   Intake 1160 ml   Output 2682 ml   Net -1522 ml     Unmeasured Output  Unmeasured Urine Occurrence: 1  Unmeasured Stool Occurrence: 1  Pad Count: 1    Labs/Accuchecks:  Recent Labs   Lab 05/09/25  0156   WBC 13.70*   RBC 4.07   HGB 11.5*   HCT 35.0*         Recent Labs   Lab 05/09/25  0156   *   K 4.6   CO2 24      BUN 11   CREATININE 0.6   ALKPHOS 121   ALT 20   AST 39   BILITOT 0.5      Recent Labs   Lab 05/07/25  0029   PROTIME 12.4   INR 1.1   APTT 27.2      Recent Labs   Lab 05/06/25  0148   TROPONINI  <0.006       Electrolytes: Replaced  Accuchecks: none    Gtts:      LDA/Wounds:    Ivan Risk Assessment  Sensory Perception: 3-->slightly limited  Moisture: 4-->rarely moist  Activity: 1-->bedfast  Mobility: 3-->slightly limited  Nutrition: 2-->probably inadequate  Friction and Shear: 2-->potential problem  Ivan Score: 15    Is your ivan score 12 or less? no            Restraints:   Restraint Order  Length of Order: Order good for next 24 hours or when removed.  Date that the current order will : 25  Time that the current order will : 1115  Order Upon Application: Yes    [facility]

## 2025-05-09 NOTE — PLAN OF CARE
Problem: SLP  Goal: SLP Goal  Description: Speech Language Pathology Goals  Goals expected to be met by 5/15/25    1. Pt will participate in ongoing assessment of swallow function to determine safest, least restrictive means of nutrition/hydration  2. Pt will participate in full assessment of cognitive-linguistic skills to determine ongoing POC needs  3.  Educate Pt and family on aspiration precautions and S/S aspiration     Outcome: Progressing     Pt tolerated trials of solids without overt S/S aspiration. REC: cautiously advance to regular textures, thin liquids, medications ok whole one at a time, provided Pt is awake/alert/attentive, upright, single bites/sips, supervision with all PO for safety and strict aspiration precautions. Should change in status occur, defer PO intake and further assess via MBSS.     5/9/2025

## 2025-05-09 NOTE — PT/OT/SLP PROGRESS
Physical Therapy      Patient Name:  Waldo Emmanuel   MRN:  [id number]    Patient not seen today secondary to Therapist assessment d/t known DVT with pending anticoagulation plan. Will follow-up as appropriate.

## 2025-05-09 NOTE — PT/OT/SLP PROGRESS
Occupational Therapy      Patient Name:  Waldo Emmanuel   MRN:  [id number]    Patient not seen today secondary to Therapist assessment d/t known DVT with pending anticoagulation plan. Will follow-up as appropriate.    5/9/2025

## 2025-05-09 NOTE — PROGRESS NOTES
"[facility] - Neuro Critical Care  Adult Nutrition  Progress Note    SUMMARY       Recommendations  --Continue Full Liquid diet as tolerated and clinically indicated   --Order Boost Plus TID   --Encourage good intakes   --Nursing: please continue to document % meal eaten on flowsheet   --RD to monitor weight, PO intake     Goals: Meet % EEN/EPN by next RD follow-up  Nutrition Goal Status: new  Communication of RD Recs:  (POC)    Nutrition Discharge Planning    Nutrition Discharge Planning: General healthy diet, Oral diet with texture modifications per SLP (comments)    Reason for Assessment    Reason For Assessment:RD follow-up  Diagnosis: hemorrhage (SAH (subarachnoid hemorrhage))  General Information Comments: 49f presenting after syncope with CTH demonstrating SAH. RD follow-up. Pt extubated on 5/7. Pt currently on full liquid diet, recommendations per SLP. No ONS currently ordered, recommend ONS to better meet needs while on full liquid diet. Weight stable - no concerns for malnutrition at this time.     Nutrition Related Social Determinants of Health: SDOH: None Identified     Food Insecurity: Patient Declined (2/23/2025)    Hunger Vital Sign     Worried About Running Out of Food in the Last Year: Patient declined     Ran Out of Food in the Last Year: Patient declined       Nutrition/Diet History    Spiritual, Cultural Beliefs, Buddhism Practices, Values that Affect Care: no  Food Allergies: NKFA  Factors Affecting Nutritional Intake: None identified at this time    Anthropometrics    Height: 5' 3" (160 cm)  Height (inches): 63 in  Height Method: Stated  Weight: 100.2 kg (221 lb)  Weight (lb): 221 lb  Weight Method: Standard Scale  Ideal Body Weight (IBW), Female: 115 lb  % Ideal Body Weight, Female (lb): 192.17 %  BMI (Calculated): 39.2  BMI Grade: 35 - 39.9 - obesity - grade II       Lab/Procedures/Meds    Pertinent Labs Reviewed: reviewed - Na 134, glucose 112, P 2.4    Pertinent Medications Reviewed: " reviewed - aspirin, atorvastatin, heparin    Estimated/Assessed Needs    Weight Used For Calorie Calculations: 100.6 kg (221 lb 12.5 oz)  Energy Calorie Requirements (kcal): 1849 kcla/day  Energy Need Method: [facility]  Protein Requirements: 105 g/day (2.0 g/kg IBW)  Weight Used For Protein Calculations: 52.3 kg (115 lb 4.8 oz)     Estimated Fluid Requirement Method: RDA Method  RDA Method (mL): 1849         Nutrition Prescription Ordered    Current Diet Order: NPO    Evaluation of Received Nutrient/Fluid Intake    Energy Calories Required: not meeting needs  Protein Required: not meeting needs  Fluid Required:  (Per MD)  % Intake of Estimated Energy Needs: 0 - 25 %  % Meal Intake: 0 - 25 %    PES Statement  Inadequate enteral nutrition infusion related to  (Infusion volume not reached) as evidenced by  (Inadequate EN volume compared to estimated requirements)  Status: Resolved    Nutrition Risk    Level of Risk/Frequency of Follow-up: high     Monitor and Evaluation    Monitor and Evaluation: Enteral and parenteral nutrition administration, Weight, Electrolyte and renal panel, Gastrointestinal profile, Glucose/endocrine profile, Inflammatory profile, Lipid profile, Nutrition focused physical findings, Skin     Nutrition Follow-Up    RD Follow-up?: Yes

## 2025-05-09 NOTE — PLAN OF CARE
[facility] Care Plan    POC reviewed with Waldo Jones Emmanuel and family at 0300. {patientfamily:29956} verbalized understanding. Questions and concerns addressed. No acute events today. Pt progressing toward goals. Will continue to monitor. See below and flowsheets for full assessment and VS info.             Is this a stroke patient? {STROKE PATIENT?:68141}    Neuro:  [place] Coma Scale  Best Eye Response: 4-->(E4) spontaneous  Best Motor Response: 6-->(M6) obeys commands  Best Verbal Response: 5-->(V5) oriented  [place] Coma Scale Score: 15  Assessment Qualifiers: patient chemically sedated or paralyzed  Pupil PERRLA: yes     24 hr Temp:  [97.6 °F (36.4 °C)-98.5 °F (36.9 °C)]     CV:   Rhythm: normal sinus rhythm  BP goals:   SBP < {SBP <:34790}  MAP > {MAPS:82466}    Resp:      Vent Mode: Spont  Set Rate: 20 BPM  Oxygen Concentration (%): 40  Vt Set: 375 mL  PEEP/CPAP: 5 cmH20  Pressure Support: 5 cmH20    Plan: {resp plan:05903}    GI/:     Diet/Nutrition Received: full liquid  Last Bowel Movement: 05/08/25  Voiding Characteristics: voids spontaneously without difficulty, external catheter    Intake/Output Summary (Last 24 hours) at 5/9/2025 0510  Last data filed at 5/9/2025 0437  Gross per 24 hour   Intake 1354 ml   Output 2681 ml   Net -1327 ml     Unmeasured Output  Unmeasured Urine Occurrence: 1  Unmeasured Stool Occurrence: 1  Pad Count: 1    Labs/Accuchecks:  Recent Labs   Lab 05/08/25  0405 05/09/25  0156   WBC 11.84 13.70*   RBC 3.52* 4.07   HGB 9.7* 11.5*   HCT 31.8* 35.0*     --       Recent Labs   Lab 05/09/25  0156   *   K 4.6   CO2 24      BUN 11   CREATININE 0.6   ALKPHOS 121   ALT 20   AST 39   BILITOT 0.5      Recent Labs   Lab 05/07/25  0029   PROTIME 12.4   INR 1.1   APTT 27.2      Recent Labs   Lab 05/06/25  0148   TROPONINI <0.006       Electrolytes: {electrolyte replacement:85131}  Accuchecks: {accuchecks:19218}    Gtts:      LDA/Wounds:    Ivan Risk Assessment  Sensory  Perception: 3-->slightly limited  Moisture: 4-->rarely moist  Activity: 1-->bedfast  Mobility: 3-->slightly limited  Nutrition: 2-->probably inadequate  Friction and Shear: 2-->potential problem  Ivan Score: 15  Is your ivan score 12 or less? {PPyesno:90257}          Restraints:   Restraint Order  Length of Order: Order good for next 24 hours or when removed.  Date that the current order will : 25  Time that the current order will : 1115  Order Upon Application: Yes    [facility]

## 2025-05-09 NOTE — PLAN OF CARE
[facility] Care Plan    POC reviewed with Waldo Jones Cholo and family at 0300. Patient and Family verbalized understanding. Questions and concerns addressed. No acute events today. Pt progressing toward goals. Will continue to monitor. See below and flowsheets for full assessment and VS info.   - replaced phos.  -Neuro status unchanged , SBP goal maintained.   -EVD opened at 10; Output : 4-18cc, ICP : 7-12  -Gave PRN methocarbamol 500mg twice, acetaminophen 650mg twice, and gabapentin 300mg once for headache.  -Had 4 BM through the night      Is this a stroke patient? Yes    Neuro:  [place] Coma Scale  Best Eye Response: 4-->(E4) spontaneous  Best Motor Response: 6-->(M6) obeys commands  Best Verbal Response: 5-->(V5) oriented  [place] Coma Scale Score: 15  Assessment Qualifiers: patient chemically sedated or paralyzed  Pupil PERRLA: yes     24 hr Temp:  [97.6 °F (36.4 °C)-98.5 °F (36.9 °C)]     CV:   Rhythm: normal sinus rhythm  BP goals:   SBP < 220  MAP > 65    Resp: O2: 2L via nasal cannula       GI/:     Diet/Nutrition Received: full liquid  Last Bowel Movement: 05/09/25  Voiding Characteristics: voids spontaneously without difficulty, external catheter    Intake/Output Summary (Last 24 hours) at 5/9/2025 0801  Last data filed at 5/9/2025 0701  Gross per 24 hour   Intake 1504 ml   Output 1773 ml   Net -269 ml     Unmeasured Output  Unmeasured Urine Occurrence: 1  Unmeasured Stool Occurrence: 1  Pad Count: 1    Labs/Accuchecks:  Recent Labs   Lab 05/08/25  0405 05/09/25  0156   WBC 11.84 13.70*   RBC 3.52* 4.07   HGB 9.7* 11.5*   HCT 31.8* 35.0*     --       Recent Labs   Lab 05/09/25  0156   *   K 4.6   CO2 24      BUN 11   CREATININE 0.6   ALKPHOS 121   ALT 20   AST 39   BILITOT 0.5      Recent Labs   Lab 05/07/25  0029   PROTIME 12.4   INR 1.1   APTT 27.2      Recent Labs   Lab 05/06/25  0148   TROPONINI <0.006       Electrolytes: Electrolytes replaced  Accuchecks: none                                                                                                                                                                                                                                                                                                                                                                                                                                                                Gtts:      LDA/Wounds:    Ivan Risk Assessment  Sensory Perception: 3-->slightly limited  Moisture: 4-->rarely moist  Activity: 1-->bedfast  Mobility: 3-->slightly limited  Nutrition: 2-->probably inadequate  Friction and Shear: 2-->potential problem  Ivan Score: 15  Is your ivan score 12 or less? no                [facility]

## 2025-05-09 NOTE — ASSESSMENT & PLAN NOTE
Patient is a 49-year-old female with past medical history of hypertension presenting via transfer from [place] for higher level of care after being diagnosed with subarachnoid hemorrhage.    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  S/p EVD 5/6  S/p Coil emolization 5/6  - Neuro IR consulted   - Neurosurgery consulted  - Vascular Neurology consulted  - goal BP less than 140  - fentanyl propofol for sedation  - nimodipine and daily TCDs given subarachnoid hemorrhage  - daily aspirin per neuro IR  TCD's  no vasospasm today, 1L NS bolus today for euvolemia, DVT proph initiated  5/9/2025: TCD's no vasospasm

## 2025-05-09 NOTE — SUBJECTIVE & OBJECTIVE
Review of Systems  Constitutional: Denies fevers, weight loss, chills, or weakness.  Eyes: Denies changes in vision.  ENT: Denies dysphagia, nasal discharge, ear pain or discharge.  Cardiovascular: Denies chest pain, palpitations, orthopnea, or claudication.  Respiratory: Denies shortness of breath, cough, hemoptysis, or wheezing.  GI: Denies nausea/vomitting, hematochezia, melena, abd pain, or changes in appetite.  : Denies dysuria, incontinence, or hematuria.  Musculoskeletal: Denies joint pain or myalgias.  Skin/breast: Denies rashes, lumps, lesions, or discharge.  Neurologic: Denies headache, dizziness, vertigo, or paresthesias.  Psychiatric: Denies changes in mood or hallucinations.  Endocrine: Denies polyuria, polydipsia, heat/cold intolerance.  Hematologic/Lymph: Denies lymphadenopathy, easy bruising or easy bleeding.  Allergic/Immunologic: Denies rash, rhinitis.   Objective:     Vitals:  Temp: 98 °F (36.7 °C)  Pulse: 82  Rhythm: normal sinus rhythm  BP: (!) 161/82  MAP (mmHg): 99  ICP Mean (mmHg): 11 mmHg  Resp: 20  SpO2: 98 %    Temp  Min: 98 °F (36.7 °C)  Max: 98.5 °F (36.9 °C)  Pulse  Min: 73  Max: 93  BP  Min: 122/62  Max: 187/84  MAP (mmHg)  Min: 84  Max: 129  ICP Mean (mmHg)  Min: 1 mmHg  Max: 12 mmHg  Resp  Min: 18  Max: 29  SpO2  Min: 92 %  Max: 100 %    05/08 0701 - 05/09 0700  In: 1504 [P.O.:520]  Out: 2200 [Urine:1900; Drains:300]   Unmeasured Output  Unmeasured Urine Occurrence: 1  Unmeasured Stool Occurrence: 1  Pad Count: 1        Physical Exam  Vitals reviewed.   Constitutional:       Appearance: She is obese. She is ill-appearing.   HENT:      Head: Normocephalic.      Comments: EVD catheter in place     Nose: Nose normal.      Mouth/Throat:      Mouth: Mucous membranes are moist.   Cardiovascular:      Rate and Rhythm: Normal rate.   Pulmonary:      Effort: Pulmonary effort is normal.   Abdominal:      Palpations: Abdomen is soft.   Skin:     General: Skin is warm and dry.    Neurological:      Mental Status: She is alert.   Psychiatric:         Mood and Affect: Mood normal.         Behavior: Behavior normal.       Neuro:  --GCS: E4 V5 M6  --Mental Status:  awake, oriented X4, follows all commands, fluent speech  --Pupils reactive  --URENA spont    Medications:  Continuous Scheduledalbuterol-ipratropium, 3 mL, Q6H  aspirin, 81 mg, Daily  atorvastatin, 40 mg, Daily  ceFAZolin (Ancef) IV (PEDS and ADULTS), 2 g, Q8H  heparin (porcine), 5,000 Units, Q8H  levetiracetam, 500 mg, BID  niMODipine, 60 mg, Q4H    PRNacetaminophen, 650 mg, Q6H PRN  bisacodyL, 10 mg, Daily PRN  gabapentin, 300 mg, Q8H PRN  labetalol, 10 mg, Q4H PRN  methocarbamoL, 500 mg, Q6H PRN  oxyCODONE, 5 mg, Q6H PRN  potassium bicarbonate, 35 mEq, PRN  potassium bicarbonate, 50 mEq, PRN  potassium bicarbonate, 60 mEq, PRN  potassium, sodium phosphates, 2 packet, PRN  potassium, sodium phosphates, 2 packet, PRN  potassium, sodium phosphates, 2 packet, PRN  sodium chloride 0.9%, 10 mL, PRN      Today I personally reviewed pertinent medications, lines/drains/airways, imaging, cardiology results, laboratory results, microbiology results,    Diet  Diet Full Liquid Standard Tray

## 2025-05-09 NOTE — PROGRESS NOTES
[facility] - Neuro Critical Care  Neurocritical Care  Progress Note    Admit Date: 5/6/2025  Service Date: 05/09/2025  Length of Stay: 3    Subjective:     Chief Complaint: SAH (subarachnoid hemorrhage)    History of Present Illness: History obtained via chart review and daughter at bedside as patient intubated on arrival.     Per ED note:  49 y.o. female, PMH HTN and anemia,  presenting as a transfer for neurosurgical evaluation with newly diagnosed ICH from outside hospital.  Patient was transferred from [facility].  Daughter at bedside helps provide history.  She states that she was at a grocery store when she had a syncopal event.  She was unsure if she hit her head.  She states that people on the scene called her to let her know what happened and after she was seen in the ED they told her that she had bleeding inside of her head.  She states that while in the ED her mother was answering questions appropriately and acting like her normal self except frequently falling asleep.  Patient was intubated for airway protection prior to arrival.  Patient and reversal with [facility] prior to transfer.  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 Distal location suspicious for mycotic aneurysm     On arrival to [facility]: Neursurgery consulted, EVD placed and admitted to neurocritical care    Hospital Course: 5/7/2025: extubated today to NC, SBP liberalized to 220, d/c jay cath,   5/8/2025: EVD per NSSGY, TCD's no vasospasm, DVT prophylaxis initiated, pending to hear from NSGY team when ok to start AC  05/09/2025: repeat CXR today, plan for CT chest, resp cx , add IS, EVD @10 per NSGY, TCDs today no vasospasm        Review of Systems  Constitutional: Denies fevers, weight loss, chills, or weakness.  Eyes: Denies changes in vision.  ENT: Denies dysphagia, nasal discharge, ear pain or discharge.  Cardiovascular: Denies  chest pain, palpitations, orthopnea, or claudication.  Respiratory: Denies shortness of breath, cough, hemoptysis, or wheezing.  GI: Denies nausea/vomitting, hematochezia, melena, abd pain, or changes in appetite.  : Denies dysuria, incontinence, or hematuria.  Musculoskeletal: Denies joint pain or myalgias.  Skin/breast: Denies rashes, lumps, lesions, or discharge.  Neurologic: Denies headache, dizziness, vertigo, or paresthesias.  Psychiatric: Denies changes in mood or hallucinations.  Endocrine: Denies polyuria, polydipsia, heat/cold intolerance.  Hematologic/Lymph: Denies lymphadenopathy, easy bruising or easy bleeding.  Allergic/Immunologic: Denies rash, rhinitis.   Objective:     Vitals:  Temp: 98 °F (36.7 °C)  Pulse: 82  Rhythm: normal sinus rhythm  BP: (!) 161/82  MAP (mmHg): 99  ICP Mean (mmHg): 11 mmHg  Resp: 20  SpO2: 98 %    Temp  Min: 98 °F (36.7 °C)  Max: 98.5 °F (36.9 °C)  Pulse  Min: 73  Max: 93  BP  Min: 122/62  Max: 187/84  MAP (mmHg)  Min: 84  Max: 129  ICP Mean (mmHg)  Min: 1 mmHg  Max: 12 mmHg  Resp  Min: 18  Max: 29  SpO2  Min: 92 %  Max: 100 %    05/08 0701 - 05/09 0700  In: 1504 [P.O.:520]  Out: 2200 [Urine:1900; Drains:300]   Unmeasured Output  Unmeasured Urine Occurrence: 1  Unmeasured Stool Occurrence: 1  Pad Count: 1        Physical Exam  Vitals reviewed.   Constitutional:       Appearance: She is obese. She is ill-appearing.   HENT:      Head: Normocephalic.      Comments: EVD catheter in place     Nose: Nose normal.      Mouth/Throat:      Mouth: Mucous membranes are moist.   Cardiovascular:      Rate and Rhythm: Normal rate.   Pulmonary:      Effort: Pulmonary effort is normal.   Abdominal:      Palpations: Abdomen is soft.   Skin:     General: Skin is warm and dry.   Neurological:      Mental Status: She is alert.   Psychiatric:         Mood and Affect: Mood normal.         Behavior: Behavior normal.       Neuro:  --GCS: E4 V5 M6  --Mental Status:  awake, oriented X4, follows all  commands, fluent speech  --Pupils reactive  --URENA spont    Medications:  Continuous Scheduledalbuterol-ipratropium, 3 mL, Q6H  aspirin, 81 mg, Daily  atorvastatin, 40 mg, Daily  ceFAZolin (Ancef) IV (PEDS and ADULTS), 2 g, Q8H  heparin (porcine), 5,000 Units, Q8H  levetiracetam, 500 mg, BID  niMODipine, 60 mg, Q4H    PRNacetaminophen, 650 mg, Q6H PRN  bisacodyL, 10 mg, Daily PRN  gabapentin, 300 mg, Q8H PRN  labetalol, 10 mg, Q4H PRN  methocarbamoL, 500 mg, Q6H PRN  oxyCODONE, 5 mg, Q6H PRN  potassium bicarbonate, 35 mEq, PRN  potassium bicarbonate, 50 mEq, PRN  potassium bicarbonate, 60 mEq, PRN  potassium, sodium phosphates, 2 packet, PRN  potassium, sodium phosphates, 2 packet, PRN  potassium, sodium phosphates, 2 packet, PRN  sodium chloride 0.9%, 10 mL, PRN      Today I personally reviewed pertinent medications, lines/drains/airways, imaging, cardiology results, laboratory results, microbiology results,    Diet  Diet Full Liquid Standard Tray    Assessment/Plan:     Neuro  * SAH (subarachnoid hemorrhage)  Patient is a 49-year-old female with past medical history of hypertension presenting via transfer from [place] for higher level of care after being diagnosed with subarachnoid hemorrhage.    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  S/p EVD 5/6  S/p Coil emolization 5/6  - Neuro IR consulted   - Neurosurgery consulted  - Vascular Neurology consulted  - goal BP less than 140  - fentanyl propofol for sedation  - nimodipine and daily TCDs given subarachnoid hemorrhage  - daily aspirin per neuro IR  TCD's  no vasospasm today, 1L NS bolus today for euvolemia, DVT proph initiated  5/9/2025: TCD's no vasospasm       Brain aneurysm  See primary problem    Cardiac/Vascular  Essential hypertension  -- SBP <220    Hematology  History of DVT in adulthood  -- pt was warfarin- currently held  -- Per NSGY team hold AC for 72 hrs and obtain US    5/9/2025: pending to hear from NSGY when ok to start AC          The patient is being Prophylaxed for:  Venous Thromboembolism with: Chemical  Stress Ulcer with: None  Ventilator Pneumonia with: not applicable    Activity Orders            Diet Full Liquid Standard Tray: Full Liquid starting at 05/08 1054    Progressive Mobility Protocol (mobilize patient to their highest level of functioning at least twice daily) starting at 05/06 2000    Turn patient starting at 05/06 0800          Full Code    Mariluz Durand NP  Neurocritical Care  [facility] - Neuro Critical Care

## 2025-05-09 NOTE — ASSESSMENT & PLAN NOTE
49f presenting after syncope with CTH demonstrating SAH. CTA with AICA aneurysm R. Intubated prior to arrival to [facility] ED.     S/p R frontal EVD on 5/6 and s/p DSA with coil embolization of R AICA aneurysm 5/6    Plan:  Admitted to ICU  EVD dropped to 10 post coil, abx while in place  Daily ASA 81  No dvt in BLE on ultrasound, R brachial vein dvt+ but no need for urgent AC per NCC; recommend continuing to hold warfarin   SAH protocol (euvolemia, SBP liberalized, nimotop, keppra, TCDs)

## 2025-05-10 PROBLEM — Z97.8 ENDOTRACHEAL TUBE PRESENT: Status: RESOLVED | Noted: 2025-05-06 | Resolved: 2025-05-10

## 2025-05-10 LAB
ABSOLUTE EOSINOPHIL (OHS): 0.06 K/UL
ABSOLUTE EOSINOPHIL (OHS): 0.1 K/UL
ABSOLUTE MONOCYTE (OHS): 0.72 K/UL (ref 0.3–1)
ABSOLUTE MONOCYTE (OHS): 0.93 K/UL (ref 0.3–1)
ABSOLUTE NEUTROPHIL COUNT (OHS): 7.51 K/UL (ref 1.8–7.7)
ABSOLUTE NEUTROPHIL COUNT (OHS): 8.88 K/UL (ref 1.8–7.7)
ALBUMIN SERPL BCP-MCNC: 3.1 G/DL (ref 3.5–5.2)
ALP SERPL-CCNC: 107 UNIT/L (ref 40–150)
ALT SERPL W/O P-5'-P-CCNC: 14 UNIT/L (ref 10–44)
ANION GAP (OHS): 12 MMOL/L (ref 8–16)
APTT PPP: 22 SECONDS (ref 21–32)
AST SERPL-CCNC: 22 UNIT/L (ref 11–45)
BASOPHILS # BLD AUTO: 0.04 K/UL
BASOPHILS # BLD AUTO: 0.04 K/UL
BASOPHILS NFR BLD AUTO: 0.3 %
BASOPHILS NFR BLD AUTO: 0.3 %
BILIRUB SERPL-MCNC: 0.4 MG/DL (ref 0.1–1)
BUN SERPL-MCNC: 16 MG/DL (ref 6–20)
CALCIUM SERPL-MCNC: 9.6 MG/DL (ref 8.7–10.5)
CHLORIDE SERPL-SCNC: 98 MMOL/L (ref 95–110)
CO2 SERPL-SCNC: 23 MMOL/L (ref 23–29)
CREAT SERPL-MCNC: 0.7 MG/DL (ref 0.5–1.4)
ERYTHROCYTE [DISTWIDTH] IN BLOOD BY AUTOMATED COUNT: 13.2 % (ref 11.5–14.5)
ERYTHROCYTE [DISTWIDTH] IN BLOOD BY AUTOMATED COUNT: 13.2 % (ref 11.5–14.5)
GFR SERPLBLD CREATININE-BSD FMLA CKD-EPI: >60 ML/MIN/1.73/M2
GLUCOSE SERPL-MCNC: 125 MG/DL (ref 70–110)
HCT VFR BLD AUTO: 33.2 % (ref 37–48.5)
HCT VFR BLD AUTO: 33.8 % (ref 37–48.5)
HGB BLD-MCNC: 10.3 GM/DL (ref 12–16)
HGB BLD-MCNC: 10.7 GM/DL (ref 12–16)
IMM GRANULOCYTES # BLD AUTO: 0.19 K/UL (ref 0–0.04)
IMM GRANULOCYTES # BLD AUTO: 0.2 K/UL (ref 0–0.04)
IMM GRANULOCYTES NFR BLD AUTO: 1.5 % (ref 0–0.5)
IMM GRANULOCYTES NFR BLD AUTO: 1.7 % (ref 0–0.5)
INDIRECT COOMBS: NORMAL
INR PPP: 1 (ref 0.8–1.2)
LYMPHOCYTES # BLD AUTO: 2.64 K/UL (ref 1–4.8)
LYMPHOCYTES # BLD AUTO: 2.82 K/UL (ref 1–4.8)
MAGNESIUM SERPL-MCNC: 2.1 MG/DL (ref 1.6–2.6)
MCH RBC QN AUTO: 27.2 PG (ref 27–31)
MCH RBC QN AUTO: 27.4 PG (ref 27–31)
MCHC RBC AUTO-ENTMCNC: 31 G/DL (ref 32–36)
MCHC RBC AUTO-ENTMCNC: 31.7 G/DL (ref 32–36)
MCV RBC AUTO: 87 FL (ref 82–98)
MCV RBC AUTO: 88 FL (ref 82–98)
NUCLEATED RBC (/100WBC) (OHS): 0 /100 WBC
NUCLEATED RBC (/100WBC) (OHS): 1 /100 WBC
PHOSPHATE SERPL-MCNC: 2.6 MG/DL (ref 2.7–4.5)
PLATELET # BLD AUTO: 333 K/UL (ref 150–450)
PLATELET # BLD AUTO: 396 K/UL (ref 150–450)
PMV BLD AUTO: 9 FL (ref 9.2–12.9)
PMV BLD AUTO: 9.4 FL (ref 9.2–12.9)
POTASSIUM SERPL-SCNC: 4 MMOL/L (ref 3.5–5.1)
PROT SERPL-MCNC: 8 GM/DL (ref 6–8.4)
PROTHROMBIN TIME: 11.4 SECONDS (ref 9–12.5)
RBC # BLD AUTO: 3.78 M/UL (ref 4–5.4)
RBC # BLD AUTO: 3.9 M/UL (ref 4–5.4)
RELATIVE EOSINOPHIL (OHS): 0.5 %
RELATIVE EOSINOPHIL (OHS): 0.9 %
RELATIVE LYMPHOCYTE (OHS): 21.1 % (ref 18–48)
RELATIVE LYMPHOCYTE (OHS): 24.3 % (ref 18–48)
RELATIVE MONOCYTE (OHS): 5.7 % (ref 4–15)
RELATIVE MONOCYTE (OHS): 8 % (ref 4–15)
RELATIVE NEUTROPHIL (OHS): 64.8 % (ref 38–73)
RELATIVE NEUTROPHIL (OHS): 70.9 % (ref 38–73)
RH BLD: NORMAL
SODIUM SERPL-SCNC: 133 MMOL/L (ref 136–145)
SODIUM SERPL-SCNC: 138 MMOL/L (ref 136–145)
SPECIMEN OUTDATE: NORMAL
WBC # BLD AUTO: 11.6 K/UL (ref 3.9–12.7)
WBC # BLD AUTO: 12.53 K/UL (ref 3.9–12.7)

## 2025-05-10 PROCEDURE — 94799 UNLISTED PULMONARY SVC/PX: CPT

## 2025-05-10 PROCEDURE — 94761 N-INVAS EAR/PLS OXIMETRY MLT: CPT

## 2025-05-10 PROCEDURE — 99232 SBSQ HOSP IP/OBS MODERATE 35: CPT | Mod: ,,, | Performed by: STUDENT IN AN ORGANIZED HEALTH CARE EDUCATION/TRAINING PROGRAM

## 2025-05-10 PROCEDURE — 99900026 HC AIRWAY MAINTENANCE (STAT)

## 2025-05-10 PROCEDURE — 20000000 HC ICU ROOM

## 2025-05-10 PROCEDURE — 85730 THROMBOPLASTIN TIME PARTIAL: CPT | Performed by: STUDENT IN AN ORGANIZED HEALTH CARE EDUCATION/TRAINING PROGRAM

## 2025-05-10 PROCEDURE — 87070 CULTURE OTHR SPECIMN AEROBIC: CPT | Performed by: STUDENT IN AN ORGANIZED HEALTH CARE EDUCATION/TRAINING PROGRAM

## 2025-05-10 PROCEDURE — 25000003 PHARM REV CODE 250: Performed by: NURSE PRACTITIONER

## 2025-05-10 PROCEDURE — 84450 TRANSFERASE (AST) (SGOT): CPT

## 2025-05-10 PROCEDURE — 82040 ASSAY OF SERUM ALBUMIN: CPT

## 2025-05-10 PROCEDURE — 63600175 PHARM REV CODE 636 W HCPCS: Performed by: STUDENT IN AN ORGANIZED HEALTH CARE EDUCATION/TRAINING PROGRAM

## 2025-05-10 PROCEDURE — 94640 AIRWAY INHALATION TREATMENT: CPT

## 2025-05-10 PROCEDURE — 25000242 PHARM REV CODE 250 ALT 637 W/ HCPCS: Performed by: NURSE PRACTITIONER

## 2025-05-10 PROCEDURE — 85610 PROTHROMBIN TIME: CPT | Performed by: STUDENT IN AN ORGANIZED HEALTH CARE EDUCATION/TRAINING PROGRAM

## 2025-05-10 PROCEDURE — 86850 RBC ANTIBODY SCREEN: CPT

## 2025-05-10 PROCEDURE — 85025 COMPLETE CBC W/AUTO DIFF WBC: CPT

## 2025-05-10 PROCEDURE — 94760 N-INVAS EAR/PLS OXIMETRY 1: CPT

## 2025-05-10 PROCEDURE — 84295 ASSAY OF SERUM SODIUM: CPT | Performed by: STUDENT IN AN ORGANIZED HEALTH CARE EDUCATION/TRAINING PROGRAM

## 2025-05-10 PROCEDURE — 27000221 HC OXYGEN, UP TO 24 HOURS

## 2025-05-10 PROCEDURE — 84100 ASSAY OF PHOSPHORUS: CPT

## 2025-05-10 PROCEDURE — 25000003 PHARM REV CODE 250

## 2025-05-10 PROCEDURE — 83735 ASSAY OF MAGNESIUM: CPT

## 2025-05-10 PROCEDURE — 25000003 PHARM REV CODE 250: Performed by: PSYCHIATRY & NEUROLOGY

## 2025-05-10 PROCEDURE — 63600175 PHARM REV CODE 636 W HCPCS: Performed by: NURSE PRACTITIONER

## 2025-05-10 PROCEDURE — 85025 COMPLETE CBC W/AUTO DIFF WBC: CPT | Performed by: STUDENT IN AN ORGANIZED HEALTH CARE EDUCATION/TRAINING PROGRAM

## 2025-05-10 PROCEDURE — 63600175 PHARM REV CODE 636 W HCPCS

## 2025-05-10 PROCEDURE — 27200966 HC CLOSED SUCTION SYSTEM

## 2025-05-10 PROCEDURE — 87070 CULTURE OTHR SPECIMN AEROBIC: CPT | Performed by: NURSE PRACTITIONER

## 2025-05-10 PROCEDURE — 99900035 HC TECH TIME PER 15 MIN (STAT)

## 2025-05-10 RX ORDER — HEPARIN SODIUM,PORCINE/D5W 25000/250
0-40 INTRAVENOUS SOLUTION INTRAVENOUS CONTINUOUS
Status: DISCONTINUED | OUTPATIENT
Start: 2025-05-10 | End: 2025-05-11

## 2025-05-10 RX ORDER — MUPIROCIN 20 MG/G
OINTMENT TOPICAL 2 TIMES DAILY
Status: COMPLETED | OUTPATIENT
Start: 2025-05-10 | End: 2025-05-15

## 2025-05-10 RX ADMIN — IPRATROPIUM BROMIDE AND ALBUTEROL SULFATE 3 ML: 2.5; .5 SOLUTION RESPIRATORY (INHALATION) at 12:05

## 2025-05-10 RX ADMIN — ACETAMINOPHEN 650 MG: 325 TABLET ORAL at 01:05

## 2025-05-10 RX ADMIN — NIMODIPINE 60 MG: 30 CAPSULE, LIQUID FILLED ORAL at 09:05

## 2025-05-10 RX ADMIN — HEPARIN SODIUM AND DEXTROSE 12 UNITS/KG/HR: 10000; 5 INJECTION INTRAVENOUS at 06:05

## 2025-05-10 RX ADMIN — METHOCARBAMOL 500 MG: 500 TABLET ORAL at 11:05

## 2025-05-10 RX ADMIN — OXYCODONE 5 MG: 5 TABLET ORAL at 01:05

## 2025-05-10 RX ADMIN — NIMODIPINE 60 MG: 30 CAPSULE, LIQUID FILLED ORAL at 01:05

## 2025-05-10 RX ADMIN — CEFAZOLIN 2 G: 2 INJECTION, POWDER, FOR SOLUTION INTRAMUSCULAR; INTRAVENOUS at 05:05

## 2025-05-10 RX ADMIN — IPRATROPIUM BROMIDE AND ALBUTEROL SULFATE 3 ML: 2.5; .5 SOLUTION RESPIRATORY (INHALATION) at 06:05

## 2025-05-10 RX ADMIN — MUPIROCIN: 20 OINTMENT TOPICAL at 09:05

## 2025-05-10 RX ADMIN — NIMODIPINE 60 MG: 30 CAPSULE, LIQUID FILLED ORAL at 02:05

## 2025-05-10 RX ADMIN — HEPARIN SODIUM 5000 UNITS: 5000 INJECTION INTRAVENOUS; SUBCUTANEOUS at 01:05

## 2025-05-10 RX ADMIN — CEFAZOLIN 2 G: 2 INJECTION, POWDER, FOR SOLUTION INTRAMUSCULAR; INTRAVENOUS at 09:05

## 2025-05-10 RX ADMIN — ATORVASTATIN CALCIUM 40 MG: 40 TABLET, FILM COATED ORAL at 08:05

## 2025-05-10 RX ADMIN — HEPARIN SODIUM 5000 UNITS: 5000 INJECTION INTRAVENOUS; SUBCUTANEOUS at 05:05

## 2025-05-10 RX ADMIN — ASPIRIN 81 MG CHEWABLE TABLET 81 MG: 81 TABLET CHEWABLE at 08:05

## 2025-05-10 RX ADMIN — NIMODIPINE 60 MG: 30 CAPSULE, LIQUID FILLED ORAL at 05:05

## 2025-05-10 RX ADMIN — POTASSIUM & SODIUM PHOSPHATES POWDER PACK 280-160-250 MG 2 PACKET: 280-160-250 PACK at 05:05

## 2025-05-10 RX ADMIN — GABAPENTIN 300 MG: 300 CAPSULE ORAL at 08:05

## 2025-05-10 RX ADMIN — ACETAMINOPHEN 650 MG: 325 TABLET ORAL at 08:05

## 2025-05-10 RX ADMIN — LEVETIRACETAM 500 MG: 500 SOLUTION ORAL at 09:05

## 2025-05-10 RX ADMIN — GABAPENTIN 300 MG: 300 CAPSULE ORAL at 04:05

## 2025-05-10 RX ADMIN — LEVETIRACETAM 500 MG: 500 SOLUTION ORAL at 08:05

## 2025-05-10 RX ADMIN — IPRATROPIUM BROMIDE AND ALBUTEROL SULFATE 3 ML: 2.5; .5 SOLUTION RESPIRATORY (INHALATION) at 08:05

## 2025-05-10 RX ADMIN — CEFAZOLIN 2 G: 2 INJECTION, POWDER, FOR SOLUTION INTRAMUSCULAR; INTRAVENOUS at 02:05

## 2025-05-10 NOTE — ASSESSMENT & PLAN NOTE
Patient is a 49-year-old female with past medical history of hypertension presenting via transfer from [place] for higher level of care after being diagnosed with subarachnoid hemorrhage.    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  S/p EVD 5/6  S/p Coil emolization 5/6  - EVD open at 10  - Neuro IR consulted   - Neurosurgery consulted  - Vascular Neurology consulted  - goal BP less than 220  - fentanyl propofol for sedation  - nimodipine and daily TCDs given subarachnoid hemorrhage  - daily aspirin per neuro IR  TCD's  no vasospasm today  - Regular diet started, encourage PO intake  5/10/2025: TCD's no vasospasm

## 2025-05-10 NOTE — PLAN OF CARE
[facility] Care Plan    POC reviewed with Waldo Chanashley Emmanuel and family at 0300. Patient verbalized understanding. Questions and concerns addressed. No acute events today. Pt progressing toward goals. Will continue to monitor. See below and flowsheets for full assessment and VS info.   -Neuro status stabled,   -  SBP goal maintained.     -EVD open @ 10; ICP : 5- 12, out put : 10-16cc/hr  -Gave PRN pain meds as needed  - replaced phos         Is this a stroke patient? Yes      Neuro:  [place] Coma Scale  Best Eye Response: 4-->(E4) spontaneous  Best Motor Response: 6-->(M6) obeys commands  Best Verbal Response: 5-->(V5) oriented  [place] Coma Scale Score: 15  Assessment Qualifiers: patient chemically sedated or paralyzed  Pupil PERRLA: yes     24 hr Temp:  [98 °F (36.7 °C)-99.9 °F (37.7 °C)]     CV:   Rhythm: normal sinus rhythm  BP goals:   SBP < 220  MAP > 65    Resp: O2:1L via nasal cannula         Plan: N/A    GI/:     Diet/Nutrition Received: regular  Last Bowel Movement: 05/09/25  Voiding Characteristics: voids spontaneously without difficulty, external catheter    Intake/Output Summary (Last 24 hours) at 5/10/2025 0457  Last data filed at 5/10/2025 0405  Gross per 24 hour   Intake 1200 ml   Output 1441 ml   Net -241 ml     Unmeasured Output  Unmeasured Urine Occurrence: 1  Unmeasured Stool Occurrence: 1  Pad Count: 1    Labs/Accuchecks:  Recent Labs   Lab 05/10/25  0130   WBC 12.53   RBC 3.90*   HGB 10.7*   HCT 33.8*         Recent Labs   Lab 05/10/25  0130   *   K 4.0   CO2 23   CL 98   BUN 16   CREATININE 0.7   ALKPHOS 107   ALT 14   AST 22   BILITOT 0.4      Recent Labs   Lab 05/07/25  0029   PROTIME 12.4   INR 1.1   APTT 27.2      Recent Labs   Lab 05/06/25  0148   TROPONINI <0.006       Electrolytes: N/A - electrolytes WDL  Accuchecks: none    Gtts:      LDA/Wounds:    Ivan Risk Assessment  Sensory Perception: 3-->slightly limited  Moisture: 4-->rarely moist  Activity:  1-->bedfast  Mobility: 3-->slightly limited  Nutrition: 2-->probably inadequate  Friction and Shear: 2-->potential problem  Ivan Score: 15  Is your ivan score 12 or less? no          Restraints:   [facility]

## 2025-05-10 NOTE — EICU
Intervention Initiated From:  COR / MELISAU    Kendall intervened regarding:  Rounding (Video assessment)    VICU Night Rounds Checklist  24H Vital Sign Range:  Temp:  [98 °F (36.7 °C)-99.9 °F (37.7 °C)]   Pulse:  []   Resp:  [18-30]   BP: (129-178)/()   SpO2:  [94 %-100 %]     Video rounds and LDA reconciliation

## 2025-05-10 NOTE — EICU
Intervention Initiated From:  COR / EICU    Virtual ICU Quality Rounds    Admit Date: 5/6/2025  Hospital Day: 4    ICU Day: 4d 2h    24H Vital Sign Range:  Temp:  [98 °F (36.7 °C)-99.9 °F (37.7 °C)]   Pulse:  []   Resp:  [18-36]   BP: (118-175)/(57-89)   SpO2:  [94 %-100 %]     VICU Surveillance Screening

## 2025-05-10 NOTE — SUBJECTIVE & OBJECTIVE
Interval History:  5/10: Discussed with Dr. Chase and Neurosurgery, Heparin gtt started for R brachial DVT and hx arterial occlusion, will need repeat CTH when therapeutic. Hold off on transitioning to Coumadin until cleared by NSGY. CT Chest negative for DVT, but concerning for PNA. Repeat sputum cx sent. Regular diet started. Repeat Na improved to 138. conditions    Objective:     Vitals:  Temp: 98 °F (36.7 °C)  Pulse: 95  Rhythm: sinus tachycardia  BP: 133/61  MAP (mmHg): 86  ICP Mean (mmHg): 8 mmHg  Resp: (!) 27  SpO2: 99 %    Temp  Min: 97.6 °F (36.4 °C)  Max: 99.9 °F (37.7 °C)  Pulse  Min: 78  Max: 108  BP  Min: 118/57  Max: 175/81  MAP (mmHg)  Min: 81  Max: 121  ICP Mean (mmHg)  Min: 5 mmHg  Max: 14 mmHg  Resp  Min: 10  Max: 36  SpO2  Min: 94 %  Max: 100 %    05/09 0701 - 05/10 0700  In: 1050 [P.O.:1050]  Out: 1697 [Urine:1450; Drains:247]   Unmeasured Output  Unmeasured Urine Occurrence: 1  Unmeasured Stool Occurrence: 1  Pad Count: 1        Physical Exam  Vitals reviewed.   Constitutional:       Appearance: She is obese. She is ill-appearing.   HENT:      Head: Normocephalic.      Comments: EVD catheter in place     Nose: Nose normal.      Mouth/Throat:      Mouth: Mucous membranes are moist.   Cardiovascular:      Rate and Rhythm: Normal rate.   Pulmonary:      Effort: Pulmonary effort is normal.   Abdominal:      Palpations: Abdomen is soft.   Skin:     General: Skin is warm and dry.   Neurological:      Mental Status: She is alert.   Psychiatric:         Mood and Affect: Mood normal.         Behavior: Behavior normal.       Neuro:  --GCS: E4 V5 M6  --Mental Status:  awake, oriented X4, follows all commands, fluent speech  --Pupils reactive  --URENA spont  No pronator drift  No dysmetria     Medications:  Continuousheparin (porcine) in D5W    Scheduledalbuterol-ipratropium, 3 mL, Q6H  aspirin, 81 mg, Daily  atorvastatin, 40 mg, Daily  ceFAZolin (Ancef) IV (PEDS and ADULTS), 2 g, Q8H  levetiracetam, 500  mg, BID  niMODipine, 60 mg, Q4H    PRNacetaminophen, 650 mg, Q6H PRN  bisacodyL, 10 mg, Daily PRN  gabapentin, 300 mg, Q8H PRN  labetalol, 10 mg, Q4H PRN  methocarbamoL, 500 mg, Q6H PRN  oxyCODONE, 5 mg, Q6H PRN  potassium bicarbonate, 35 mEq, PRN  potassium bicarbonate, 50 mEq, PRN  potassium bicarbonate, 60 mEq, PRN  potassium, sodium phosphates, 2 packet, PRN  potassium, sodium phosphates, 2 packet, PRN  potassium, sodium phosphates, 2 packet, PRN  sodium chloride 0.9%, 10 mL, PRN      Today I personally reviewed pertinent medications, imaging, laboratory results, notably:    Diet  Diet Adult Regular  Diet Adult Regular

## 2025-05-10 NOTE — PT/OT/SLP PROGRESS
Physical Therapy      Patient Name:  Waldo Emmanuel   MRN:  [id number]    Patient not seen today secondary to Therapist assessment. MD team still determining plan for AC 2/2 acute R brachial DVT, will continue to hold on eval. Will follow-up as appropriate.  Orquidea Denton, PT

## 2025-05-10 NOTE — PLAN OF CARE
[facility] Care Plan  POC reviewed with Waldo Alegriaer and family at 1400. Patient and Family verbalized understanding. Questions and concerns addressed. No acute events today. Pt progressing toward goals. Will continue to monitor. See below and flowsheets for full assessment and VS info.       EVD open at 10  CSF serosanguinous   ICP<15  NA recheck 138  Diet advanced  Sputum specimen sent  Heparin gtt started @1820 AT 12u/kg/hr  Next aPTT 5/11 0030  Linen changed  Bath done      Is this a stroke patient? Yes stroke booklet reviewed    Neuro:  [place] Coma Scale  Best Eye Response: 4-->(E4) spontaneous  Best Motor Response: 6-->(M6) obeys commands  Best Verbal Response: 5-->(V5) oriented  [place] Coma Scale Score: 15  Assessment Qualifiers: patient chemically sedated or paralyzed  Pupil PERRLA: yes     24 hr Temp:  [97.6 °F (36.4 °C)-99.9 °F (37.7 °C)]     CV:   Rhythm: sinus tachycardia  BP goals:   SBP < 220  MAP > 65    Resp:      Vent Mode: Spont  Set Rate: 20 BPM  Oxygen Concentration (%): 40  Vt Set: 375 mL  PEEP/CPAP: 5 cmH20  Pressure Support: 5 cmH20    Plan: N/A    GI/:     Diet/Nutrition Received: regular  Last Bowel Movement: 05/10/25  Voiding Characteristics: voids spontaneously without difficulty    Intake/Output Summary (Last 24 hours) at 5/10/2025 1718  Last data filed at 5/10/2025 1701  Gross per 24 hour   Intake 950 ml   Output 1278 ml   Net -328 ml     Unmeasured Output  Unmeasured Urine Occurrence: 1  Unmeasured Stool Occurrence: 1  Pad Count: 1    Labs/Accuchecks:  Recent Labs   Lab 05/10/25  0130   WBC 12.53   RBC 3.90*   HGB 10.7*   HCT 33.8*         Recent Labs   Lab 05/10/25  0130 05/10/25  1516   * 138   K 4.0  --    CO2 23  --    CL 98  --    BUN 16  --    CREATININE 0.7  --    ALKPHOS 107  --    ALT 14  --    AST 22  --    BILITOT 0.4  --       Recent Labs   Lab 05/07/25  0029   PROTIME 12.4   INR 1.1   APTT 27.2      Recent Labs   Lab 05/06/25  0148   TROPONINI <0.006        Electrolytes: Electrolytes replaced  Accuchecks: none    Gtts:   heparin (porcine) in D5W  0-40 Units/kg/hr Intravenous Continuous           LDA/Wounds:    Ivan Risk Assessment  Sensory Perception: 3-->slightly limited  Moisture: 4-->rarely moist  Activity: 1-->bedfast  Mobility: 3-->slightly limited  Nutrition: 2-->probably inadequate  Friction and Shear: 2-->potential problem  Ivan Score: 15    Is your ivan score 12 or less? no            Restraints:   Restraint Order  Length of Order: Order good for next 24 hours or when removed.  Date that the current order will : 25  Time that the current order will : 1115  Order Upon Application: Yes    [facility]

## 2025-05-10 NOTE — PT/OT/SLP PROGRESS
Occupational Therapy      Patient Name:  Waldo Emmanuel   MRN:  [id number]    Patient not seen today secondary to Therapist assessment. MD team still determining plan for AC 2/2 acute R brachial DVT, will continue to hold on eval. Will follow-up as appropriate.     5/10/2025

## 2025-05-10 NOTE — PROGRESS NOTES
[facility] - Neuro Critical Care  Neurocritical Care  Progress Note    Admit Date: 5/6/2025  Service Date: 05/10/2025  Length of Stay: 4    Subjective:     Chief Complaint: SAH (subarachnoid hemorrhage)    History of Present Illness: History obtained via chart review and daughter at bedside as patient intubated on arrival.     Per ED note:  49 y.o. female, PMH HTN and anemia,  presenting as a transfer for neurosurgical evaluation with newly diagnosed ICH from outside hospital.  Patient was transferred from [facility].  Daughter at bedside helps provide history.  She states that she was at a grocery store when she had a syncopal event.  She was unsure if she hit her head.  She states that people on the scene called her to let her know what happened and after she was seen in the ED they told her that she had bleeding inside of her head.  She states that while in the ED her mother was answering questions appropriately and acting like her normal self except frequently falling asleep.  Patient was intubated for airway protection prior to arrival.  Patient and reversal with [facility] prior to transfer.  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 Distal location suspicious for mycotic aneurysm     [place] [facility] [place] Neursurgery consulted, EVD placed and admitted to neurocritical care    Hospital Course: 5/7/2025: extubated today to NC, SBP liberalized to 220, d/c jay cath,   5/8/2025: EVD per NSSGY, TCD's no vasospasm, DVT prophylaxis initiated, pending to hear from NSGY team when ok to start AC  05/09/2025: repeat CXR today, plan for CT chest, resp cx , add IS, EVD @10 per NSGY, TCDs today no vasospasm  5/10: Discussed with Dr. Chase and Neurosurgery, Heparin gtt started for R brachial DVT and hx arterial occlusion, will need repeat CTH when therapeutic. Hold off on transitioning to Coumadin until cleared by  NSGY. CT Chest negative for DVT, but concerning for PNA. Repeat sputum cx sent. Regular diet started. Repeat Na improved to 138.     Interval History:  5/10: Discussed with Dr. Chase and Neurosurgery, Heparin gtt started for R brachial DVT and hx arterial occlusion, will need repeat CTH when therapeutic. Hold off on transitioning to Coumadin until cleared by NSGY. CT Chest negative for DVT, but concerning for PNA. Repeat sputum cx sent. Regular diet started. Repeat Na improved to 138. conditions    Objective:     Vitals:  Temp: 98 °F (36.7 °C)  Pulse: 95  Rhythm: sinus tachycardia  BP: 133/61  MAP (mmHg): 86  ICP Mean (mmHg): 8 mmHg  Resp: (!) 27  SpO2: 99 %    Temp  Min: 97.6 °F (36.4 °C)  Max: 99.9 °F (37.7 °C)  Pulse  Min: 78  Max: 108  BP  Min: 118/57  Max: 175/81  MAP (mmHg)  Min: 81  Max: 121  ICP Mean (mmHg)  Min: 5 mmHg  Max: 14 mmHg  Resp  Min: 10  Max: 36  SpO2  Min: 94 %  Max: 100 %    05/09 0701 - 05/10 0700  In: 1050 [P.O.:1050]  Out: 1697 [Urine:1450; Drains:247]   Unmeasured Output  Unmeasured Urine Occurrence: 1  Unmeasured Stool Occurrence: 1  Pad Count: 1        Physical Exam  Vitals reviewed.   Constitutional:       Appearance: She is obese. She is ill-appearing.   HENT:      Head: Normocephalic.      Comments: EVD catheter in place     Nose: Nose normal.      Mouth/Throat:      Mouth: Mucous membranes are moist.   Cardiovascular:      Rate and Rhythm: Normal rate.   Pulmonary:      Effort: Pulmonary effort is normal.   Abdominal:      Palpations: Abdomen is soft.   Skin:     General: Skin is warm and dry.   Neurological:      Mental Status: She is alert.   Psychiatric:         Mood and Affect: Mood normal.         Behavior: Behavior normal.       Neuro:  --GCS: E4 V5 M6  --Mental Status:  awake, oriented X4, follows all commands, fluent speech  --Pupils reactive  --URENA spont  No pronator drift  No dysmetria     Medications:  Continuousheparin (porcine) in D5W    Scheduledalbuterol-ipratropium,  3 mL, Q6H  aspirin, 81 mg, Daily  atorvastatin, 40 mg, Daily  ceFAZolin (Ancef) IV (PEDS and ADULTS), 2 g, Q8H  levetiracetam, 500 mg, BID  niMODipine, 60 mg, Q4H    PRNacetaminophen, 650 mg, Q6H PRN  bisacodyL, 10 mg, Daily PRN  gabapentin, 300 mg, Q8H PRN  labetalol, 10 mg, Q4H PRN  methocarbamoL, 500 mg, Q6H PRN  oxyCODONE, 5 mg, Q6H PRN  potassium bicarbonate, 35 mEq, PRN  potassium bicarbonate, 50 mEq, PRN  potassium bicarbonate, 60 mEq, PRN  potassium, sodium phosphates, 2 packet, PRN  potassium, sodium phosphates, 2 packet, PRN  potassium, sodium phosphates, 2 packet, PRN  sodium chloride 0.9%, 10 mL, PRN      Today I personally reviewed pertinent medications, imaging, laboratory results, notably:    Diet  Diet Adult Regular  Diet Adult Regular  Assessment/Plan:     Neuro  * SAH (subarachnoid hemorrhage)  Patient is a 49-year-old female with past medical history of hypertension presenting via transfer from [place] for higher level of care after being diagnosed with subarachnoid hemorrhage.    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  S/p EVD 5/6  S/p Coil emolization 5/6  - EVD open at 10  - Neuro IR consulted   - Neurosurgery consulted  - Vascular Neurology consulted  - goal BP less than 220  - fentanyl propofol for sedation  - nimodipine and daily TCDs given subarachnoid hemorrhage  - daily aspirin per neuro IR  TCD's  no vasospasm today  - Regular diet started, encourage PO intake  5/10/2025: TCD's no vasospasm       Obstructive hydrocephalus  S/p EVD    Brain compression  - seen on brain imaging   - hourly neurochecks    Brain aneurysm  See primary problem    Hematology  History of DVT in adulthood  -- Pt on Warfarin outpatient, currently held  -- Discussed with Dr. Chase and Neurosurgery, Heparin gtt started today for for R brachial DVT and hx arterial occlusion, will need repeat CTH when therapeutic. Hold off  on transitioning to Coumadin until cleared by NSGY.           The patient is being Prophylaxed for:  Venous Thromboembolism with: Mechanical or Chemical  Stress Ulcer with: None  Ventilator Pneumonia with: not applicable    Activity Orders            Diet Adult Regular: Regular starting at 05/10 1057    Progressive Mobility Protocol (mobilize patient to their highest level of functioning at least twice daily) starting at 05/06 2000    Turn patient starting at 05/06 0800          Full Code    Suha Mayo PA-C  Neurocritical Care  [facility] - Neuro Critical Care

## 2025-05-10 NOTE — ASSESSMENT & PLAN NOTE
-- Pt on Warfarin outpatient, currently held  -- Discussed with Dr. Chase and Neurosurgery, Heparin gtt started today for for R brachial DVT and hx arterial occlusion, will need repeat CTH when therapeutic. Hold off on transitioning to Coumadin until cleared by NSGY.

## 2025-05-11 PROBLEM — G93.6 BRAIN EDEMA: Status: ACTIVE | Noted: 2025-05-11

## 2025-05-11 PROBLEM — I63.9 CEREBELLAR STROKE, ACUTE: Status: ACTIVE | Noted: 2025-05-11

## 2025-05-11 LAB
ABSOLUTE EOSINOPHIL (OHS): 0.08 K/UL
ABSOLUTE MONOCYTE (OHS): 1.14 K/UL (ref 0.3–1)
ABSOLUTE NEUTROPHIL COUNT (OHS): 11.18 K/UL (ref 1.8–7.7)
ALBUMIN SERPL BCP-MCNC: 3.1 G/DL (ref 3.5–5.2)
ALP SERPL-CCNC: 103 UNIT/L (ref 40–150)
ALT SERPL W/O P-5'-P-CCNC: 13 UNIT/L (ref 10–44)
ANION GAP (OHS): 11 MMOL/L (ref 8–16)
APTT PPP: 45.3 SECONDS (ref 21–32)
APTT PPP: 45.5 SECONDS (ref 21–32)
AST SERPL-CCNC: 28 UNIT/L (ref 11–45)
BASOPHILS # BLD AUTO: 0.05 K/UL
BASOPHILS NFR BLD AUTO: 0.3 %
BILIRUB SERPL-MCNC: 0.4 MG/DL (ref 0.1–1)
BUN SERPL-MCNC: 15 MG/DL (ref 6–20)
CALCIUM SERPL-MCNC: 9.5 MG/DL (ref 8.7–10.5)
CHLORIDE SERPL-SCNC: 96 MMOL/L (ref 95–110)
CO2 SERPL-SCNC: 26 MMOL/L (ref 23–29)
CREAT SERPL-MCNC: 0.7 MG/DL (ref 0.5–1.4)
ERYTHROCYTE [DISTWIDTH] IN BLOOD BY AUTOMATED COUNT: 13.2 % (ref 11.5–14.5)
GFR SERPLBLD CREATININE-BSD FMLA CKD-EPI: >60 ML/MIN/1.73/M2
GLUCOSE SERPL-MCNC: 140 MG/DL (ref 70–110)
HCT VFR BLD AUTO: 36.2 % (ref 37–48.5)
HGB BLD-MCNC: 11.6 GM/DL (ref 12–16)
IMM GRANULOCYTES # BLD AUTO: 0.28 K/UL (ref 0–0.04)
IMM GRANULOCYTES NFR BLD AUTO: 1.7 % (ref 0–0.5)
LYMPHOCYTES # BLD AUTO: 3.7 K/UL (ref 1–4.8)
MAGNESIUM SERPL-MCNC: 2.2 MG/DL (ref 1.6–2.6)
MCH RBC QN AUTO: 27.8 PG (ref 27–31)
MCHC RBC AUTO-ENTMCNC: 32 G/DL (ref 32–36)
MCV RBC AUTO: 87 FL (ref 82–98)
NUCLEATED RBC (/100WBC) (OHS): 1 /100 WBC
PHOSPHATE SERPL-MCNC: 2.3 MG/DL (ref 2.7–4.5)
PLATELET # BLD AUTO: 421 K/UL (ref 150–450)
PMV BLD AUTO: 9.4 FL (ref 9.2–12.9)
POTASSIUM SERPL-SCNC: 4 MMOL/L (ref 3.5–5.1)
PROT SERPL-MCNC: 8 GM/DL (ref 6–8.4)
RBC # BLD AUTO: 4.18 M/UL (ref 4–5.4)
RELATIVE EOSINOPHIL (OHS): 0.5 %
RELATIVE LYMPHOCYTE (OHS): 22.5 % (ref 18–48)
RELATIVE MONOCYTE (OHS): 6.9 % (ref 4–15)
RELATIVE NEUTROPHIL (OHS): 68.1 % (ref 38–73)
SODIUM SERPL-SCNC: 133 MMOL/L (ref 136–145)
SODIUM SERPL-SCNC: 135 MMOL/L (ref 136–145)
SODIUM SERPL-SCNC: 135 MMOL/L (ref 136–145)
SODIUM SERPL-SCNC: 139 MMOL/L (ref 136–145)
WBC # BLD AUTO: 16.43 K/UL (ref 3.9–12.7)

## 2025-05-11 PROCEDURE — 99233 SBSQ HOSP IP/OBS HIGH 50: CPT | Mod: ,,, | Performed by: NEUROLOGICAL SURGERY

## 2025-05-11 PROCEDURE — 25000003 PHARM REV CODE 250

## 2025-05-11 PROCEDURE — 97162 PT EVAL MOD COMPLEX 30 MIN: CPT

## 2025-05-11 PROCEDURE — 97165 OT EVAL LOW COMPLEX 30 MIN: CPT

## 2025-05-11 PROCEDURE — A4217 STERILE WATER/SALINE, 500 ML: HCPCS

## 2025-05-11 PROCEDURE — 97112 NEUROMUSCULAR REEDUCATION: CPT

## 2025-05-11 PROCEDURE — 85025 COMPLETE CBC W/AUTO DIFF WBC: CPT | Performed by: STUDENT IN AN ORGANIZED HEALTH CARE EDUCATION/TRAINING PROGRAM

## 2025-05-11 PROCEDURE — 99900035 HC TECH TIME PER 15 MIN (STAT)

## 2025-05-11 PROCEDURE — 84100 ASSAY OF PHOSPHORUS: CPT

## 2025-05-11 PROCEDURE — 27000221 HC OXYGEN, UP TO 24 HOURS

## 2025-05-11 PROCEDURE — 94640 AIRWAY INHALATION TREATMENT: CPT

## 2025-05-11 PROCEDURE — 25000003 PHARM REV CODE 250: Performed by: PSYCHIATRY & NEUROLOGY

## 2025-05-11 PROCEDURE — 25000242 PHARM REV CODE 250 ALT 637 W/ HCPCS: Performed by: NURSE PRACTITIONER

## 2025-05-11 PROCEDURE — 63600175 PHARM REV CODE 636 W HCPCS

## 2025-05-11 PROCEDURE — 83735 ASSAY OF MAGNESIUM: CPT

## 2025-05-11 PROCEDURE — 97535 SELF CARE MNGMENT TRAINING: CPT

## 2025-05-11 PROCEDURE — 85730 THROMBOPLASTIN TIME PARTIAL: CPT | Performed by: PSYCHIATRY & NEUROLOGY

## 2025-05-11 PROCEDURE — 99291 CRITICAL CARE FIRST HOUR: CPT | Mod: ,,, | Performed by: PSYCHIATRY & NEUROLOGY

## 2025-05-11 PROCEDURE — 94761 N-INVAS EAR/PLS OXIMETRY MLT: CPT

## 2025-05-11 PROCEDURE — 25000003 PHARM REV CODE 250: Performed by: NURSE PRACTITIONER

## 2025-05-11 PROCEDURE — 94799 UNLISTED PULMONARY SVC/PX: CPT

## 2025-05-11 PROCEDURE — 80053 COMPREHEN METABOLIC PANEL: CPT

## 2025-05-11 PROCEDURE — 20000000 HC ICU ROOM

## 2025-05-11 PROCEDURE — 84295 ASSAY OF SERUM SODIUM: CPT

## 2025-05-11 RX ORDER — NICARDIPINE HYDROCHLORIDE 0.2 MG/ML
0-15 INJECTION INTRAVENOUS CONTINUOUS
Status: DISCONTINUED | OUTPATIENT
Start: 2025-05-11 | End: 2025-05-11

## 2025-05-11 RX ORDER — DEXMEDETOMIDINE HYDROCHLORIDE 4 UG/ML
0-.8 INJECTION, SOLUTION INTRAVENOUS CONTINUOUS
Status: DISCONTINUED | OUTPATIENT
Start: 2025-05-11 | End: 2025-05-13

## 2025-05-11 RX ORDER — LABETALOL HCL 20 MG/4 ML
10 SYRINGE (ML) INTRAVENOUS EVERY 4 HOURS PRN
Status: DISCONTINUED | OUTPATIENT
Start: 2025-05-11 | End: 2025-05-26 | Stop reason: HOSPADM

## 2025-05-11 RX ORDER — LEVETIRACETAM 500 MG/1
500 TABLET ORAL 2 TIMES DAILY
Status: DISCONTINUED | OUTPATIENT
Start: 2025-05-11 | End: 2025-05-12

## 2025-05-11 RX ORDER — HYDRALAZINE HYDROCHLORIDE 20 MG/ML
10 INJECTION INTRAMUSCULAR; INTRAVENOUS EVERY 6 HOURS PRN
Status: DISCONTINUED | OUTPATIENT
Start: 2025-05-11 | End: 2025-05-16

## 2025-05-11 RX ADMIN — VASOPRESSIN 250 ML: 20 INJECTION, SOLUTION INTRAVENOUS at 10:05

## 2025-05-11 RX ADMIN — NICARDIPINE HYDROCHLORIDE 2.5 MG/HR: 0.2 INJECTION, SOLUTION INTRAVENOUS at 12:05

## 2025-05-11 RX ADMIN — ATORVASTATIN CALCIUM 40 MG: 40 TABLET, FILM COATED ORAL at 08:05

## 2025-05-11 RX ADMIN — POTASSIUM & SODIUM PHOSPHATES POWDER PACK 280-160-250 MG 2 PACKET: 280-160-250 PACK at 08:05

## 2025-05-11 RX ADMIN — VASOPRESSIN: 20 INJECTION, SOLUTION INTRAVENOUS at 09:05

## 2025-05-11 RX ADMIN — ACETAMINOPHEN 650 MG: 325 TABLET ORAL at 03:05

## 2025-05-11 RX ADMIN — OXYCODONE 5 MG: 5 TABLET ORAL at 02:05

## 2025-05-11 RX ADMIN — LEVETIRACETAM 500 MG: 500 SOLUTION ORAL at 08:05

## 2025-05-11 RX ADMIN — MUPIROCIN: 20 OINTMENT TOPICAL at 09:05

## 2025-05-11 RX ADMIN — CEFAZOLIN 2 G: 2 INJECTION, POWDER, FOR SOLUTION INTRAMUSCULAR; INTRAVENOUS at 10:05

## 2025-05-11 RX ADMIN — NIMODIPINE 60 MG: 30 CAPSULE, LIQUID FILLED ORAL at 02:05

## 2025-05-11 RX ADMIN — CEFAZOLIN 2 G: 2 INJECTION, POWDER, FOR SOLUTION INTRAMUSCULAR; INTRAVENOUS at 02:05

## 2025-05-11 RX ADMIN — VASOPRESSIN: 20 INJECTION, SOLUTION INTRAVENOUS at 11:05

## 2025-05-11 RX ADMIN — CEFAZOLIN 2 G: 2 INJECTION, POWDER, FOR SOLUTION INTRAMUSCULAR; INTRAVENOUS at 06:05

## 2025-05-11 RX ADMIN — LABETALOL HYDROCHLORIDE 10 MG: 5 INJECTION, SOLUTION INTRAVENOUS at 10:05

## 2025-05-11 RX ADMIN — NIMODIPINE 60 MG: 30 CAPSULE, LIQUID FILLED ORAL at 10:05

## 2025-05-11 RX ADMIN — IPRATROPIUM BROMIDE AND ALBUTEROL SULFATE 3 ML: 2.5; .5 SOLUTION RESPIRATORY (INHALATION) at 08:05

## 2025-05-11 RX ADMIN — ASPIRIN 81 MG CHEWABLE TABLET 81 MG: 81 TABLET CHEWABLE at 08:05

## 2025-05-11 RX ADMIN — HYDRALAZINE HYDROCHLORIDE 10 MG: 20 INJECTION, SOLUTION INTRAMUSCULAR; INTRAVENOUS at 11:05

## 2025-05-11 RX ADMIN — IPRATROPIUM BROMIDE AND ALBUTEROL SULFATE 3 ML: 2.5; .5 SOLUTION RESPIRATORY (INHALATION) at 07:05

## 2025-05-11 RX ADMIN — IPRATROPIUM BROMIDE AND ALBUTEROL SULFATE 3 ML: 2.5; .5 SOLUTION RESPIRATORY (INHALATION) at 02:05

## 2025-05-11 RX ADMIN — DEXMEDETOMIDINE HYDROCHLORIDE 0.2 MCG/KG/HR: 4 INJECTION INTRAVENOUS at 10:05

## 2025-05-11 RX ADMIN — LABETALOL HYDROCHLORIDE 10 MG: 5 INJECTION, SOLUTION INTRAVENOUS at 11:05

## 2025-05-11 RX ADMIN — ACETAMINOPHEN 650 MG: 325 TABLET ORAL at 10:05

## 2025-05-11 RX ADMIN — MUPIROCIN: 20 OINTMENT TOPICAL at 10:05

## 2025-05-11 RX ADMIN — NIMODIPINE 60 MG: 30 CAPSULE, LIQUID FILLED ORAL at 05:05

## 2025-05-11 RX ADMIN — NIMODIPINE 60 MG: 30 CAPSULE, LIQUID FILLED ORAL at 06:05

## 2025-05-11 RX ADMIN — LEVETIRACETAM 500 MG: 500 TABLET, FILM COATED ORAL at 10:05

## 2025-05-11 NOTE — PT/OT/SLP EVAL
"Occupational Therapy   Evaluation    Name: Waldo Emmanuel  MRN: [id number]  Admitting Diagnosis: SAH (subarachnoid hemorrhage)  Recent Surgery: * No surgery found *      Recommendations:     Discharge Recommendations: High Intensity Therapy  Discharge Equipment Recommendations:  bedside commode, wheelchair, bath bench  Barriers to discharge:       Assessment:       Assessment:      Waldo Emmanuel is a 49 y.o. female with a medical diagnosis of SAH (subarachnoid hemorrhage).  Performance deficits affecting function are impaired functional mobility, impaired self care skills, impaired endurance, weakness, impaired balance, gait instability, visual deficits, decreased coordination, decreased upper extremity function, decreased lower extremity function, decreased safety awareness, pain, impaired coordination, impaired fine motor. Pt agreeable to therapy and tolerated well. Pt impulsive requiring moderate verba cues for safety awareness and sequencing/processing. Pt with B horizontal nystagmus and upward nystagmus, reporting double vision when not wearing glasses. RN clamped EVD prior to OOB mobility. Pt inattentive towards EVD. Pt A O x2, unable to determine situation and time, with pt continuously asking "where are my patients" .  Pt remains limited in ADLs, functional mobility, and functional transfers. Patient presents with good participation and motivation to return to prior level of function with high intensity therapy.  The patient demonstrates appropriate endurance, strength, pain control, and fine motor control to participate in up to 3 hours or 15hrs of combined therapy post acute.        Co-treat performed due to acuity and complexity of pt's medical status with the expectation of 2 skilled disciplines needed to optimize pts occupational performance and to improve activity tolerance.        Rehab Prognosis: Good; patient would benefit from acute skilled OT services to address these deficits and " "reach maximum level of function.       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  Patient found left sidelying with blood pressure cuff, telemetry, oxygen, pulse ox (continuous), PureWick, peripheral IV, external ventricular drain upon OT entry to room.    General Precautions: Standard, fall  Orthopedic Precautions:    Braces: N/A  Respiratory Status: Nasal cannula, flow 1 L/min    Occupational Performance:    Bed Mobility:    Patient completed Rolling/Turning to Left with  minimum assistance  Patient completed Rolling/Turning to Right with minimum assistance  Patient completed Scooting/Bridging with minimum assistance  Patient completed Supine to Sit with maximal assistance and 2 persons  Patient completed Sit to Supine with minimum assistance and 2 persons      Functional Mobility/Transfers:  Patient completed Sit <> Stand Transfer with minimum assistance and of 2 persons  with  hand-held assist   Functional Mobility: Pt engaged in functional mobility performing 4 L lateral steps with Mod A x2 and BLE knee buckling to maximize functional endurance and standing balance required for home/community mobility and " occupational engagement.   Pt sat EOB with CGA 2/2 pt impulsive, attempting multiple times throughout session to get OOB, inattentive towards EVD line      Activities of Daily Living:  Grooming: contact guard assistance pt sat EOB and brushed teeth with RUE  Toileting: total assistance pt with BM during mobility, requiring bed level toileting       Cognitive/Visual Perceptual:  Cognitive/Psychosocial Skills:     -       Oriented to: Person and Place   -       Follows Commands/attention:Easily distracted and Follows one-step commands  -       Communication: clear/fluent  -       Memory: impaired  -       Safety awareness/insight to disability: impaired   -       Mood/Affect/Coping skills/emotional control: Appropriate to situation  Visual/Perceptual:      -Impaired  pt with B horizontal nystagmus and upward nystagmus       Physical Exam:  Balance:    -       CGA EOB, MinX2- Mod A x2 for mobility   Sensation:    -       Intact  Dominant hand:    -       Right  Upper Extremity Range of Motion:     -       Right Upper Extremity: WNL  -       Left Upper Extremity: WFL  Upper Extremity Strength:    -       Right Upper Extremity: WNL  -       Left Upper Extremity: WFL   Strength:    -       Right Upper Extremity: WNL  -       Left Upper Extremity: WFL  Fine Motor Coordination:    -       Intact    AMPAC 6 Click ADL:  AMPAC Total Score: 14    Treatment & Education:  -Education on energy conservation and task modification to maximize safety and (I) during ADLs and mobility  -Education on importance of OOB activity to improve overall activity tolerance and promote recovery  -Pt educated to call for assistance and to transfer with hospital staff only  -Provided education regarding role of OT, POC, & discharge recommendations with pt and family verbalizing understanding.  Pt had no further questions & when asked whether there were any concerns pt reported none.      Patient left HOB elevated with all lines intact, call  button in reach, nurse notified, and family present    GOALS:   Multidisciplinary Problems       Occupational Therapy Goals          Problem: Occupational Therapy    Goal Priority Disciplines Outcome Interventions   Occupational Therapy Goal     OT, PT/OT Progressing    Description: Goals to be met by: 2025     Patient will increase functional independence with ADLs by performing:    UE Dressing with Supervision.  LE Dressing with Supervision.  Grooming while standing at sink with Supervision.  Toileting from toilet with Supervision for hygiene and clothing management.   Toilet transfer to toilet with Supervision.                         History:     Past Medical History:   Diagnosis Date    ADHD (attention deficit hyperactivity disorder)     Anemia     Fibroids     Genital herpes     GERD (gastroesophageal reflux disease)     H/O total hysterectomy 2021    Hypertension     Labral tear of hip joint     Ovarian cyst     Right common arterial occlusion     Routine general medical examination at a [facility] care facility 2017    Total Right Arterial Occlusion          Past Surgical History:   Procedure Laterality Date    ANGIOGRAM, ABDOMINAL AORTA W/ EXTREMITY RUNOFF N/A 2025    Procedure: Angiogram, Abdominal Aorta W/ Extremity Runoff: Right lower extremity angiogram;  Surgeon: Lennox Zendejas MD;  Location: [facility] CATH LAB;  Service: Vascular;  Laterality: N/A;    CARPAL TUNNEL RELEASE       SECTION      CHOLECYSTECTOMY      DILATION AND CURETTAGE OF UTERUS      TOTAL ABDOMINAL HYSTERECTOMY W/ BILATERAL SALPINGOOPHORECTOMY  2021    menorrhagia       Time Tracking:     OT Date of Treatment: 25  OT Start Time: 959  OT Stop Time:   OT Total Time (min): 28 min    Billable Minutes:Evaluation 10 min  Self Care/Home Management 18 min    2025

## 2025-05-11 NOTE — NURSING
Informed RENÉ Pagan that the patient's Na was 133 which was consistent with 5/10's  am draw. She instructed me to push Gatorade and Pedialyte that the patient has @ bedside and to monitor her urine output. WCTC for the patient.

## 2025-05-11 NOTE — PROGRESS NOTES
[facility] - Neuro Critical Care  Neurosurgery  Progress Note    Subjective:     History of Present Illness: 49f presenting after syncope with CTH demonstrating SAH. CTA without clear vascular malformation. Intubated prior to arrival to [facility] ED. Discussed expected hospital course and imaging with daughter in room    Post-Op Info:  * No surgery found *       Interval History: 5/10: Aida SOU[facility] wnl, ok for hep gtt    Medications:  Continuous Infusions:   heparin (porcine) in D5W  0-40 Units/kg/hr Intravenous Continuous 12 mL/hr at 05/11/25 0121 12 Units/kg/hr at 05/11/25 0121     Scheduled Meds:   albuterol-ipratropium  3 mL Nebulization Q6H    aspirin  81 mg Oral Daily    atorvastatin  40 mg Oral Daily    ceFAZolin (Ancef) IV (PEDS and ADULTS)  2 g Intravenous Q8H    levetiracetam  500 mg Oral BID    mupirocin   Nasal BID    niMODipine  60 mg Oral Q4H     PRN Meds:  Current Facility-Administered Medications:     acetaminophen, 650 mg, Oral, Q6H PRN    bisacodyL, 10 mg, Rectal, Daily PRN    gabapentin, 300 mg, Oral, Q8H PRN    labetalol, 10 mg, Intravenous, Q4H PRN    methocarbamoL, 500 mg, Oral, Q6H PRN    oxyCODONE, 5 mg, Oral, Q6H PRN    potassium bicarbonate, 35 mEq, Oral, PRN    potassium bicarbonate, 50 mEq, Oral, PRN    potassium bicarbonate, 60 mEq, Oral, PRN    potassium, sodium phosphates, 2 packet, Oral, PRN    potassium, sodium phosphates, 2 packet, Oral, PRN    potassium, sodium phosphates, 2 packet, Oral, PRN    sodium chloride 0.9%, 10 mL, Intravenous, PRN     Review of Systems  Objective:     Weight: 100.2 kg (221 lb)  Body mass index is 39.15 kg/m².  Vital Signs (Most Recent):  Temp: 99.1 °F (37.3 °C) (05/10/25 2305)  Pulse: 99 (05/11/25 0205)  Resp: 20 (05/11/25 0209)  BP: (!) 140/90 (05/11/25 0205)  SpO2: 96 % (05/11/25 0205) Vital Signs (24h Range):  Temp:  [97.6 °F (36.4 °C)-99.9 °F (37.7 °C)] 99.1 °F (37.3 °C)  Pulse:  [] 99  Resp:  [10-36] 20  SpO2:  [94 %-100 %] 96 %  BP:  (122-175)/(58-96) 140/90                              ICP/Ventriculostomy 05/06/25 0920 Right Parietal region (Active)   Level of Ventriculostomy (cm above) 10 05/10/25 1905   Status Open to drainage 05/11/25 0205   Site Assessment Clean;Dry 05/11/25 0205   Site Drainage No drainage 05/11/25 0205   Waveform normal waveform 05/10/25 1905   Output (mL) 15 mL 05/10/25 2205   CSF Color red 05/11/25 0205   Dressing Status Clean;Dry;Intact 05/11/25 0205   Interventions HOB degrees;bed controls locked;zeroed 05/10/25 0605       Female External Urinary Catheter w/ Suction 05/07/25 1732 (Active)   Skin no redness;no breakdown 05/10/25 1701   Tolerance no signs/symptoms of discomfort 05/10/25 1701   Suction Continuous suction at 40 mmHg 05/08/25 1905   Date of last wick change 05/08/25 05/08/25 1905   Time of last wick change 1905 05/08/25 1905   Output (mL) 250 mL 05/10/25 1001          Physical Exam         Neurosurgery Physical Exam  [id number]  Awake, alert, Ox4  Cni  Follows commands x4 grossly full strength throughout  SILT     EVD Incision dressed CDI       Significant Labs:  Recent Labs   Lab 05/10/25  0130 05/10/25  1516 05/11/25  0031   *  --  140*   * 138 133*   K 4.0  --  4.0   CL 98  --  96   CO2 23  --  26   BUN 16  --  15   CREATININE 0.7  --  0.7   CALCIUM 9.6  --  9.5   MG 2.1  --  2.2     Recent Labs   Lab 05/10/25  0130 05/10/25  1734 05/11/25  0031   WBC 12.53 11.60 16.43*   HGB 10.7* 10.3* 11.6*   HCT 33.8* 33.2* 36.2*    333 421     Recent Labs   Lab 05/10/25  1734 05/11/25  0031   INR 1.0  --    APTT 22.0 45.3*     Microbiology Results (last 7 days)       Procedure Component Value Units Date/Time    Culture, Respiratory with Gram Stain [[id number]] Collected: 05/10/25 1120    Order Status: Completed Specimen: Respiratory from Sputum, Expectorated Updated: 05/11/25 0036     GRAM STAIN <10 Epithelial Cells/LPF      Rare WBC seen      Many Gram positive cocci      Many Gram Negative Rods     Culture, Respiratory with Gram Stain [[id number]] Collected: 05/10/25 2035    Order Status: Sent Specimen: Respiratory from Sputum Updated: 05/10/25 2137          All pertinent labs from the last 24 hours have been reviewed.    Significant Diagnostics:  CT: CT Head Without Contrast  Result Date: 5/11/2025  1. Compared to prior head CT performed 05/06/2025, 20:13 hours, evolving relatively large right cerebellar infarct without macroscopic hemorrhage within the infarct territory.  Questionable hypoattenuation involving the brainstem which may be artifactual given coil artifact, however additional areas of ischemia not excluded.  MRI may be considered for further characterization.  Further effacement of the 4th ventricle since the prior study, however there has been slight decompression of the lateral and 3rd ventricles since the prior exam. 2. Relatively similar subarachnoid and interventricular hemorrhage.  No new or enlarging areas of intracranial hemorrhage appreciated. This report was flagged in Epic as abnormal. Electronically signed by: Mich Downs Date:    05/11/2025 Time:    02:09    MRI: No results found in the last 24 hours.  Assessment/Plan:     * SAH (subarachnoid hemorrhage)  49f presenting after syncope with CTH demonstrating SAH. CTA with AICA aneurysm R. Intubated prior to arrival to [facility] ED.     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 Barney Blank MD  Neurosurgery  [facility] - Neuro Critical Care

## 2025-05-11 NOTE — ASSESSMENT & PLAN NOTE
Patient is a 49-year-old female with past medical history of hypertension presenting via transfer from [place] for higher level of care after being diagnosed with subarachnoid hemorrhage.    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  S/p EVD 5/6  S/p Coil emolization 5/6  - EVD open at 10  - Neuro IR consulted   - Neurosurgery consulted  - Vascular Neurology consulted  - goal BP less than 220  - fentanyl propofol for sedation  - nimodipine and daily TCDs given subarachnoid hemorrhage  - daily aspirin per neuro IR  TCD's  no vasospasm today  - made NPO for cerebellar stroke  TCD's no vasospasm

## 2025-05-11 NOTE — NURSING
RN in room with patient most of morning to help diffuse issues with plan of care/expectations of team and communication issues. Pt is now resting comfortably in bed with family after working with PT (RN reached out and PT came by to see patient and work with her). Pt is experiencing new onset ICU delirium symptoms and RN educated patient and family on importance to keep patient alert and awake during day with lights on. Pt and family agreeable. RN provided education to patient and several family members about importance to stay as level with EVD as possible/manageable at the risk of over/under draining. Pt with limited awareness of drain and requires consistent reminding. Family expressed understanding and stated they will only move patient with help of RN/team and to call with any position changes.     Patient educated on 2% bolus and drip, as well as need for MRI today. Pt has been made NPO and had questions regarding what she could or could not have. Instructed pt is firm NPO except with meds and this was understood verbally by patient and family. Written on white board in two places to stave off confusion about new diet order.

## 2025-05-11 NOTE — ASSESSMENT & PLAN NOTE
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

## 2025-05-11 NOTE — ASSESSMENT & PLAN NOTE
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 [place] [facility] [place] DSA performed 5/6/25 demonstrated the above aneurysm, which was secured with coiling.     - Pt s/p EVD and DSA with coil embolization on 5/6. Later started on Hep gtt due to dvt in R brachial vein  - Repeat CTH imaging once PTT reached therapeutic levels on heparin --> Imaging obtained on 5/11 and significant for R cerebellar infarct; etiology of infarction unknown at this time  - Will obtain f/u imaging with MRI Brain for further assessment  - Hep gtt discontinued given new CT findings  - Pt started on hypertonic saline for prevention of posterior brain swelling  - On SAH protocol  - NSGY planning MRA w/ con on PBD7, may require open clippiing pending results of MRA      Antithrombotics for secondary stroke prevention: Antiplatelets: Aspirin: 81 mg daily    Statins for secondary stroke prevention and hyperlipidemia, if present:   Statins: Atorvastatin- 40 mg daily    Aggressive risk factor modification: HTN, HLD, Diet, Exercise, Obesity     Rehab efforts: The patient has been evaluated by a stroke team provider and the therapy needs have been fully considered based off the presenting complaints and exam findings. The following therapy evaluations are needed: PT evaluate and treat, OT evaluate and treat, SLP evaluate and treat    Diagnostics ordered/pending: MRI head without contrast to assess brain parenchyma, TTE to assess cardiac function/status     VTE prophylaxis: None: Reason for No Pharmacological VTE Prophylaxis: Known or suspected cerebral aneurysm or AVM    BP parameters: SAH: Secured aneurysm, no target, increase BP to prevent vasospasm if needed     Plan  -Continue nimodipine 60mg q4h  -sbp goal as above  -antiplatelet as above  -statin as  above  -Appreciate NSGY recommendations  -PT/OT/SLP when appropriate

## 2025-05-11 NOTE — NURSING
Verbal order given to stop heparin drip by [facility] RENÉ Diop. Drip stopped but PA still wanted the 0720 aptt despite stopping drip

## 2025-05-11 NOTE — PROGRESS NOTES
[facility] - Neuro Critical Care  Neurosurgery  Progress Note    Subjective:     History of Present Illness: 49f presenting after syncope with CTH demonstrating SAH. CTA without clear vascular malformation. Intubated prior to arrival to [facility] ED. Discussed expected hospital course and imaging with daughter in room    Post-Op Info:  * No surgery found *       Interval History: NAEON, ICPs 5-14. CTH with new R PICA stroke, vents stable    Medications:  Continuous Infusions:   heparin (porcine) in D5W  0-40 Units/kg/hr Intravenous Continuous 12 mL/hr at 05/11/25 0121 12 Units/kg/hr at 05/11/25 0121     Scheduled Meds:   albuterol-ipratropium  3 mL Nebulization Q6H    aspirin  81 mg Oral Daily    atorvastatin  40 mg Oral Daily    ceFAZolin (Ancef) IV (PEDS and ADULTS)  2 g Intravenous Q8H    levetiracetam  500 mg Oral BID    mupirocin   Nasal BID    niMODipine  60 mg Oral Q4H     PRN Meds:  Current Facility-Administered Medications:     acetaminophen, 650 mg, Oral, Q6H PRN    bisacodyL, 10 mg, Rectal, Daily PRN    gabapentin, 300 mg, Oral, Q8H PRN    labetalol, 10 mg, Intravenous, Q4H PRN    methocarbamoL, 500 mg, Oral, Q6H PRN    oxyCODONE, 5 mg, Oral, Q6H PRN    potassium bicarbonate, 35 mEq, Oral, PRN    potassium bicarbonate, 50 mEq, Oral, PRN    potassium bicarbonate, 60 mEq, Oral, PRN    potassium, sodium phosphates, 2 packet, Oral, PRN    potassium, sodium phosphates, 2 packet, Oral, PRN    potassium, sodium phosphates, 2 packet, Oral, PRN    sodium chloride 0.9%, 10 mL, Intravenous, PRN     Review of Systems  Objective:     Weight: 100.2 kg (221 lb)  Body mass index is 39.15 kg/m².  Vital Signs (Most Recent):  Temp: 99.1 °F (37.3 °C) (05/10/25 2305)  Pulse: 99 (05/11/25 0205)  Resp: 20 (05/11/25 0209)  BP: (!) 140/90 (05/11/25 0205)  SpO2: 96 % (05/11/25 0205) Vital Signs (24h Range):  Temp:  [97.6 °F (36.4 °C)-99.9 °F (37.7 °C)] 99.1 °F (37.3 °C)  Pulse:  [] 99  Resp:  [10-36] 20  SpO2:  [94 %-100 %] 96  %  BP: (122-175)/(58-96) 140/90                              ICP/Ventriculostomy 05/06/25 0920 Right Parietal region (Active)   Level of Ventriculostomy (cm above) 10 05/10/25 1905   Status Open to drainage 05/11/25 0205   Site Assessment Clean;Dry 05/11/25 0205   Site Drainage No drainage 05/11/25 0205   Waveform normal waveform 05/10/25 1905   Output (mL) 15 mL 05/10/25 2205   CSF Color red 05/11/25 0205   Dressing Status Clean;Dry;Intact 05/11/25 0205   Interventions HOB degrees;bed controls locked;zeroed 05/10/25 0605       Female External Urinary Catheter w/ Suction 05/07/25 1732 (Active)   Skin no redness;no breakdown 05/10/25 1701   Tolerance no signs/symptoms of discomfort 05/10/25 1701   Suction Continuous suction at 40 mmHg 05/08/25 1905   Date of last wick change 05/08/25 05/08/25 1905   Time of last wick change 1905 05/08/25 1905   Output (mL) 250 mL 05/10/25 1001          Physical Exam         Neurosurgery Physical Exam    [id number]  Awake, alert, Ox4  Cni  Follows commands x4 grossly full strength throughout  SILT     EVD Incision dressed CDI     Significant Labs:  Recent Labs   Lab 05/10/25  0130 05/10/25  1516 05/11/25  0031   *  --  140*   * 138 133*   K 4.0  --  4.0   CL 98  --  96   CO2 23  --  26   BUN 16  --  15   CREATININE 0.7  --  0.7   CALCIUM 9.6  --  9.5   MG 2.1  --  2.2     Recent Labs   Lab 05/10/25  0130 05/10/25  1734 05/11/25  0031   WBC 12.53 11.60 16.43*   HGB 10.7* 10.3* 11.6*   HCT 33.8* 33.2* 36.2*    333 421     Recent Labs   Lab 05/10/25  1734 05/11/25  0031   INR 1.0  --    APTT 22.0 45.3*     Microbiology Results (last 7 days)       Procedure Component Value Units Date/Time    Culture, Respiratory with Gram Stain [[id number]] Collected: 05/10/25 1120    Order Status: Completed Specimen: Respiratory from Sputum, Expectorated Updated: 05/11/25 0036     GRAM STAIN <10 Epithelial Cells/LPF      Rare WBC seen      Many Gram positive cocci      Many Gram Negative  Rods    Culture, Respiratory with Gram Stain [[id number]] Collected: 05/10/25 2035    Order Status: Sent Specimen: Respiratory from Sputum Updated: 05/10/25 2137          All pertinent labs from the last 24 hours have been reviewed.    Significant Diagnostics:  CT: CT Head Without Contrast  Result Date: 5/11/2025  1. Compared to prior head CT performed 05/06/2025, 20:13 hours, evolving relatively large right cerebellar infarct without macroscopic hemorrhage within the infarct territory.  Questionable hypoattenuation involving the brainstem which may be artifactual given coil artifact, however additional areas of ischemia not excluded.  MRI may be considered for further characterization.  Further effacement of the 4th ventricle since the prior study, however there has been slight decompression of the lateral and 3rd ventricles since the prior exam. 2. Relatively similar subarachnoid and interventricular hemorrhage.  No new or enlarging areas of intracranial hemorrhage appreciated. This report was flagged in Epic as abnormal. Electronically signed by: Mich Downs Date:    05/11/2025 Time:    02:09    MRI: No results found in the last 24 hours.  Assessment/Plan:     * SAH (subarachnoid hemorrhage)  49f presenting after syncope with CTH demonstrating SAH. CTA with AICA aneurysm R. Intubated prior to arrival to [facility] ED.     S/p R frontal EVD on 5/6 and s/p DSA with coil embolization of R AICA aneurysm 5/6    New R cerebellar infarct on 5/11 CTH    Plan:  Admitted to ICU  EVD dropped to 10 post coil, abx while in place  Daily ASA 81  No dvt in BLE on ultrasound, R brachial vein dvt+ - ok to start hep gtt, please discuss prior to transitioning to PO   Will plan on MRA w/ con PBD7, could require open clipping pending MRA result   SAH protocol (euvolemia, SBP liberalized, nimotop, keppra, TCDs)    Discussed with Dr. Barney Blank MD  Neurosurgery  [facility] - Neuro Critical Care

## 2025-05-11 NOTE — PT/OT/SLP EVAL
Physical Therapy Co-Evaluation and Co-Treatment    Patient Name:  Waldo Emmanuel   MRN:  [id number]    Co-evaluation and co-treatment performed for this visit due to suspected patient need for two skilled therapists to ensure patient and staff safety and to accommodate for patient activity tolerance/pain management     Recommendations:     Discharge Recommendations: High Intensity Therapy  Discharge Equipment Recommendations: bedside commode, wheelchair, bath bench   Barriers to Discharge: Increased level of assist and Decreased caregiver support at current functional status  Safest Mobility Level with Nursing: Stand pivot transfer with moderate assistance    Assessment:     Waldo Emmanuel is a 49 y.o. female admitted with a medical diagnosis of SAH (subarachnoid hemorrhage). She presents with the following impairments/functional limitations: weakness, impaired endurance, impaired self care skills, impaired functional mobility, gait instability, impaired balance, decreased safety awareness, decreased lower extremity function, decreased upper extremity function, impaired fine motor, impaired coordination, impaired cognition, visual deficits. Pt presenting with HOB elevated and agreeable to completing therapy evaluation. Pt's family present at bedside throughout session providing verbal encouragement and asking appropriate questions surrounding functional status. Pt currently demonstrating impulsivity, decreased insight to current functional deficits, poor motor coordination, decreased activity tolerance, visual impairments, and impaired postural control resulting in need for increased assistance with all visualized functional mobility. Pt able to complete bed mobility and transfers with good motor initiation and decreased assistance, but demonstrating notable difficulty with dynamic postural control. Pt able to attempt several small lateral steps along EOB with moderate assistance of 2 persons 2/2  "truncal instability and BLE buckling. PTA, pt independent with all mobility and ADL completion; therefore, operating far below functional baseline at this time. Recommend high intensity therapy following discharge once medically stable in order to reduce fall risk, reduce caregiver burden, improve quality of life, and return to PLOF.  Patient presents with good participation, motivation, and family support to return to prior level of function and demonstrates appropriate endurance, strength, pain control, and fine motor control to participate in up to 3 hours daily or 15hrs weekly of multidisciplinary intensive therapy. Pt would continue to benefit from skilled acute PT in order to address current deficits and progress functional mobility.     Rehab Prognosis: Good; patient would benefit from acute skilled PT services 4 x/week to address these deficits and reach maximum level of function.  Recent Surgery: * No surgery found *      Plan:     During this hospitalization, patient to be seen 4 x/week to address the identified rehab impairments via gait training, therapeutic activities, therapeutic exercises, neuromuscular re-education and progress toward the following goals:    Plan of Care Expires:  06/11/25    Subjective     Chief Complaint: None verbalized  Patient/Family Comments/Goals: "Are my patients ready for me?" (Frequently reminded pt is at the hospital and is the patient)  Pain/Comfort:  Pain Rating 1: 0/10    Patients cultural, spiritual, Confucianist conflicts given the current situation: no    Living Environment:  Living Environment: Patient lives with their children in a single story house with number of outside stair(s): 0 and tub-shower combo.  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  Equipment Used at Home: none.  DME owned (not currently used): none  Assistance Upon Discharge: family    Objective:     EVD clamped by RN " prior to mobility.    Communicated with nursing prior to session. Patient found HOB elevated with blood pressure cuff, pulse ox (continuous), telemetry, oxygen, external ventricular drain upon PT entry to room.    General Precautions: Standard, fall  Orthopedic Precautions:N/A    Braces: N/A    Exams:  Cognitive Exam:  Patient is oriented to Person, Place, Not oriented to time, Not oriented to situation, follows commands 75% of the time  RLE ROM: WFL  RLE Strength: WFL, grossly 4/5  LLE ROM: WFL  LLE Strength: WFL, grossly 4/5  Sensation: Intact light touch to BLEs    Functional Mobility:  Bed Mobility:  Verbal cues for sequencing and technique  Rolling Left:  minimum assistance  Rolling Right: minimum assistance  Scooting: minimum assistance  Supine to Sit: maximal assistance of 2 persons for LE management and trunk management  Sit to Supine: minimum assistance of 2 persons for LE management and trunk management  Transfers:    Sit to Stand: minimum assistance of 2 persons with hand-held assist with cues for hand placement and foot placement  Verbal cues for increased use of momentum, improved anterior weight shift, and increased BLE motor activation   Gait: Patient ambulated 4 small steps along EOB to L with no AD and moderate assistance of 2 persons.   Patient demonstrates unsteady gait, decreased step length, wide base of support, decreased weight shift, decreased foot clearance, flexed posture, decreased tyler, inconsistent bilateral foot placement, and decreased bilateral knee stability.   Patient required cues for upright posture, gluteal activation, sequencing, increased step size, foot placement, and increased foot clearance  All lines remained intact throughout ambulation trial.  Balance:   Static Sitting: Good, able to maintain for 8 minute(s) with contact guard assistance  Dynamic Sitting: Good: Patient accepts moderate challenge, contact guard assistance  Pt completed oral care and facial hygiene while  seated at EOB  Static Standing: Poor, able to maintain for 2 minute(s) with minimum assistance of 2 persons  Verbal cues for upright posture, increased hip extension, increased knee extension, and maintenance of midline orientation  Dynamic Standing: Poor: Patient unable to accept challenge or move without loss of balance, moderate assistance of 2 persons    Therapeutic Activities and Exercises:  Patient educated on role of acute care PT and PT POC, safety while in hospital including calling nurse for mobility, and call light usage.  Pt educated on the effects of bed rest and the importance of OOB activity. Pt encouraged to sit UIC majority of day as tolerated and continue daily transfers with nursing assist. Pt verbalized understanding.  Pt educated on importance of maximal participation in therapy session in order to reduce negative effects of prolonged sedentary positioning.   Answered all questions within PT scope of practice and addressed functional mobility concerns.    AM-PAC 6 CLICK MOBILITY  Total Score:9     Patient left HOB elevated with all lines intact, call button in reach, RN notified, and family, MD, and PA present.    GOALS:   Multidisciplinary Problems       Physical Therapy Goals          Problem: Physical Therapy    Goal Priority Disciplines Outcome Interventions   Physical Therapy Goal     PT, PT/OT Progressing    Description: Goals to be met by: 2025     Patient will increase functional independence with mobility by performin. Supine to sit with Contact Guard Assistance  2. Sit to supine with Contact Guard Assistance  3. Sit to stand transfer with Contact Guard Assistance  4. Bed to chair transfer with Minimal Assistance using LRAD  5. Gait  x 25 feet with Minimal Assistance using LRAD.   6. Stand for 2 minutes with Contact Guard Assistance using LRAD  7. Lower extremity exercise program x15 reps per handout, with independence                         DME Justifications:  Patient has  a mobility limitation that significantly impairs their ability to participate in one or more mobility related activities of daily living, including toileting. This deficit can be resolved by using a bedside commode. Patient demonstrates mobility limitations that will cause them to be confined to one room at home without bathroom access for up to 30 days. Using a bedside commode will greatly improve the patient's ability to participate in MRADLs.    Patient has a mobility limitation that significantly impairs their ability to participate in one or more mobility related activities of daily living in customary locations in the home. The mobility limitation cannot be sufficiently resolved by the use of a cane or walker. The use of a manual wheelchair will greatly improve the patient's ability to participate in MRADLs. The patient will use the wheelchair on a regular basis at home. They have expressed their willingness to use a manual wheelchair in the home, and have a caregiver who is available and willing to assist with the wheelchair if needed.    History:     Past Medical History:   Diagnosis Date    ADHD (attention deficit hyperactivity disorder)     Anemia     Fibroids     Genital herpes     GERD (gastroesophageal reflux disease)     H/O total hysterectomy 2021    Hypertension     Labral tear of hip joint     Ovarian cyst     Right common arterial occlusion     Routine general medical examination at a [facility] facility 2017    Total Right Arterial Occlusion        Past Surgical History:   Procedure Laterality Date    ANGIOGRAM, ABDOMINAL AORTA W/ EXTREMITY RUNOFF N/A 2025    Procedure: Angiogram, Abdominal Aorta W/ Extremity Runoff: Right lower extremity angiogram;  Surgeon: Lennox Zendejas MD;  Location: [facility] CATH LAB;  Service: Vascular;  Laterality: N/A;    CARPAL TUNNEL RELEASE       SECTION      CHOLECYSTECTOMY      DILATION AND CURETTAGE OF UTERUS      TOTAL ABDOMINAL HYSTERECTOMY  W/ BILATERAL SALPINGOOPHORECTOMY  08/02/2021    menorrhagia       Time Tracking:     PT Received On: 05/11/25  PT Start Time: 0959     PT Stop Time: 1024  PT Total Time (min): 25 min     Billable Minutes: Evaluation 10 Neuromuscular Re-education 15     05/11/2025

## 2025-05-11 NOTE — SUBJECTIVE & OBJECTIVE
Neurologic Chief Complaint: SAH (subarachnoid hemorrhage)     Subjective:     Interval History: Patient is seen for follow-up neurological assessment and treatment recommendations: See Hospital Course    HPI, Past Medical, Family, and Social History remains the same as documented in the initial encounter.     Review of Systems  Scheduled Meds:   albuterol-ipratropium  3 mL Nebulization Q6H    aspirin  81 mg Oral Daily    atorvastatin  40 mg Oral Daily    ceFAZolin (Ancef) IV (PEDS and ADULTS)  2 g Intravenous Q8H    levetiracetam  500 mg Oral BID    mupirocin   Nasal BID    niMODipine  60 mg Oral Q4H     Continuous Infusions:   dexmedeTOMIDine (Precedex) infusion (titrating)  0-0.8 mcg/kg/hr Intravenous Continuous        nicardipine  0-15 mg/hr Intravenous Continuous 12.5 mL/hr at 05/11/25 1225 2.5 mg/hr at 05/11/25 1225    sodium chloride (23.4%) HYPERTONIC 4 mEq/mL 172 mEq in sterile water 500 mL (sodium chloride 2%) infusion   Intravenous Continuous 40 mL/hr at 05/11/25 1201 Rate Verify at 05/11/25 1201     PRN Meds:  Current Facility-Administered Medications:     acetaminophen, 650 mg, Oral, Q6H PRN    bisacodyL, 10 mg, Rectal, Daily PRN    gabapentin, 300 mg, Oral, Q8H PRN    hydrALAZINE, 10 mg, Intravenous, Q6H PRN    labetalol, 10 mg, Intravenous, Q4H PRN    methocarbamoL, 500 mg, Oral, Q6H PRN    oxyCODONE, 5 mg, Oral, Q6H PRN    potassium bicarbonate, 35 mEq, Oral, PRN    potassium bicarbonate, 50 mEq, Oral, PRN    potassium bicarbonate, 60 mEq, Oral, PRN    potassium, sodium phosphates, 2 packet, Oral, PRN    potassium, sodium phosphates, 2 packet, Oral, PRN    potassium, sodium phosphates, 2 packet, Oral, PRN    sodium chloride 0.9%, 10 mL, Intravenous, PRN    Objective:     Vital Signs (Most Recent):  Temp: 99 °F (37.2 °C) (05/11/25 1101)  Pulse: 97 (05/11/25 1215)  Resp: (!) 27 (05/11/25 1215)  BP: (!) 195/79 (cardene drip started) (05/11/25 1215)  SpO2: 98 % (05/11/25 1215)  BP Location: Left  leg    Vital Signs Range (Last 24H):  Temp:  [98 °F (36.7 °C)-99.5 °F (37.5 °C)]   Pulse:  []   Resp:  [18-33]   BP: (122-206)/()   SpO2:  [91 %-100 %]   BP Location: Left leg       Physical Exam  Vitals reviewed.   Constitutional:       General: She is not in acute distress.     Appearance: She is ill-appearing.   HENT:      Head:      Comments: EVD catheter in place     Nose: Nose normal.      Mouth/Throat:      Mouth: Mucous membranes are moist.   Eyes:      Extraocular Movements: Extraocular movements intact.   Cardiovascular:      Rate and Rhythm: Normal rate.   Pulmonary:      Effort: Pulmonary effort is normal.   Abdominal:      Palpations: Abdomen is soft.   Musculoskeletal:      Cervical back: Normal range of motion.   Skin:     General: Skin is warm and dry.   Neurological:      Mental Status: She is alert and oriented to person, place, and time.      Motor: Weakness present.      Coordination: Coordination abnormal.      Comments: Limb ataxia     Psychiatric:         Mood and Affect: Mood normal.         Behavior: Behavior normal.              Neurological Exam:   LOC: drowsy  Attention Span: Good   Language: No aphasia  Articulation: Dysarthria  EOM (CN III, IV, VI): Full/intact  Facial Movement (CN VII): Symmetric facial expression    Motor: 4/5 in all extremities  Cerebellum: Upper Extremity Appendicular Ataxia (Finger Nose Finger)  Bilateral  Sensation: Intact to light touch, temperature and vibration    Laboratory:  All labs reviewed    Diagnostic Results   All imaging reviewed

## 2025-05-11 NOTE — EICU
Intervention Initiated From:  COR / MELISAU    Kendall intervened regarding:  Rounding (Video assessment)  VICU Night Rounds Checklist  24H Vital Sign Range:  Temp:  [97.6 °F (36.4 °C)-99.9 °F (37.7 °C)]   Pulse:  []   Resp:  [10-36]   BP: (118-175)/(57-89)   SpO2:  [94 %-100 %]     Video rounds

## 2025-05-11 NOTE — NURSING
Pt persistently hypertensive >180 this shift. Initial SBP goal before rounds <220 but now <160. Pt had BP cuff on L lower arm but d/t new infusion of hypertonic saline and risk for infiltration/extravasation with cuff, RN moved cuff to LLE. Maxed out PRN's since rounds and RN to RENÉ Petty regarding need for cardene to keep pressure within parameters.     PA placed order for cardene, RN will begin infusion after efficient enough time has passed to see hydralazine's effect before starting drip and expressed this to PA. Okay with plan.

## 2025-05-11 NOTE — SUBJECTIVE & OBJECTIVE
Interval History: 5/10: [facility] wnl, ok for hep gtt    Medications:  Continuous Infusions:   heparin (porcine) in D5W  0-40 Units/kg/hr Intravenous Continuous 12 mL/hr at 05/11/25 0121 12 Units/kg/hr at 05/11/25 0121     Scheduled Meds:   albuterol-ipratropium  3 mL Nebulization Q6H    aspirin  81 mg Oral Daily    atorvastatin  40 mg Oral Daily    ceFAZolin (Ancef) IV (PEDS and ADULTS)  2 g Intravenous Q8H    levetiracetam  500 mg Oral BID    mupirocin   Nasal BID    niMODipine  60 mg Oral Q4H     PRN Meds:  Current Facility-Administered Medications:     acetaminophen, 650 mg, Oral, Q6H PRN    bisacodyL, 10 mg, Rectal, Daily PRN    gabapentin, 300 mg, Oral, Q8H PRN    labetalol, 10 mg, Intravenous, Q4H PRN    methocarbamoL, 500 mg, Oral, Q6H PRN    oxyCODONE, 5 mg, Oral, Q6H PRN    potassium bicarbonate, 35 mEq, Oral, PRN    potassium bicarbonate, 50 mEq, Oral, PRN    potassium bicarbonate, 60 mEq, Oral, PRN    potassium, sodium phosphates, 2 packet, Oral, PRN    potassium, sodium phosphates, 2 packet, Oral, PRN    potassium, sodium phosphates, 2 packet, Oral, PRN    sodium chloride 0.9%, 10 mL, Intravenous, PRN     Review of Systems  Objective:     Weight: 100.2 kg (221 lb)  Body mass index is 39.15 kg/m².  Vital Signs (Most Recent):  Temp: 99.1 °F (37.3 °C) (05/10/25 2305)  Pulse: 99 (05/11/25 0205)  Resp: 20 (05/11/25 0209)  BP: (!) 140/90 (05/11/25 0205)  SpO2: 96 % (05/11/25 0205) Vital Signs (24h Range):  Temp:  [97.6 °F (36.4 °C)-99.9 °F (37.7 °C)] 99.1 °F (37.3 °C)  Pulse:  [] 99  Resp:  [10-36] 20  SpO2:  [94 %-100 %] 96 %  BP: (122-175)/(58-96) 140/90                              ICP/Ventriculostomy 05/06/25 0920 Right Parietal region (Active)   Level of Ventriculostomy (cm above) 10 05/10/25 1905   Status Open to drainage 05/11/25 0205   Site Assessment Clean;Dry 05/11/25 0205   Site Drainage No drainage 05/11/25 0205   Waveform normal waveform 05/10/25 1905   Output (mL) 15 mL 05/10/25 2205    CSF Color red 05/11/25 0205   Dressing Status Clean;Dry;Intact 05/11/25 0205   Interventions HOB degrees;bed controls locked;zeroed 05/10/25 0605       Female External Urinary Catheter w/ Suction 05/07/25 1732 (Active)   Skin no redness;no breakdown 05/10/25 1701   Tolerance no signs/symptoms of discomfort 05/10/25 1701   Suction Continuous suction at 40 mmHg 05/08/25 1905   Date of last wick change 05/08/25 05/08/25 1905   Time of last wick change 1905 05/08/25 1905   Output (mL) 250 mL 05/10/25 1001          Physical Exam         Neurosurgery Physical Exam  [id number]  Awake, alert, Ox4  Cni  Follows commands x4 grossly full strength throughout  SILT     EVD Incision dressed CDI       Significant Labs:  Recent Labs   Lab 05/10/25  0130 05/10/25  1516 05/11/25  0031   *  --  140*   * 138 133*   K 4.0  --  4.0   CL 98  --  96   CO2 23  --  26   BUN 16  --  15   CREATININE 0.7  --  0.7   CALCIUM 9.6  --  9.5   MG 2.1  --  2.2     Recent Labs   Lab 05/10/25  0130 05/10/25  1734 05/11/25  0031   WBC 12.53 11.60 16.43*   HGB 10.7* 10.3* 11.6*   HCT 33.8* 33.2* 36.2*    333 421     Recent Labs   Lab 05/10/25  1734 05/11/25  0031   INR 1.0  --    APTT 22.0 45.3*     Microbiology Results (last 7 days)       Procedure Component Value Units Date/Time    Culture, Respiratory with Gram Stain [[id number]] Collected: 05/10/25 1120    Order Status: Completed Specimen: Respiratory from Sputum, Expectorated Updated: 05/11/25 0036     GRAM STAIN <10 Epithelial Cells/LPF      Rare WBC seen      Many Gram positive cocci      Many Gram Negative Rods    Culture, Respiratory with Gram Stain [[id number]] Collected: 05/10/25 2035    Order Status: Sent Specimen: Respiratory from Sputum Updated: 05/10/25 2137          All pertinent labs from the last 24 hours have been reviewed.    Significant Diagnostics:  CT: CT Head Without Contrast  Result Date: 5/11/2025  1. Compared to prior head CT performed 05/06/2025, 20:13 hours,  evolving relatively large right cerebellar infarct without macroscopic hemorrhage within the infarct territory.  Questionable hypoattenuation involving the brainstem which may be artifactual given coil artifact, however additional areas of ischemia not excluded.  MRI may be considered for further characterization.  Further effacement of the 4th ventricle since the prior study, however there has been slight decompression of the lateral and 3rd ventricles since the prior exam. 2. Relatively similar subarachnoid and interventricular hemorrhage.  No new or enlarging areas of intracranial hemorrhage appreciated. This report was flagged in Epic as abnormal. Electronically signed by: Mich Downs Date:    05/11/2025 Time:    02:09    MRI: No results found in the last 24 hours.

## 2025-05-11 NOTE — NURSING
Patient educated numerous times on the importance of not pulling @ monitor cable and IV line. Patient was also informed that if she continued to pull at the lines she may have mitts placed for her well-being. WCTC for patient,

## 2025-05-11 NOTE — NURSING
Pt is still very agitated and non redirectable. RN further explained to the patient the importance of not touching the lines and monitor wires. She insisted that she did not want to be in restraints despite not complying to agreement of not pulling at IVs and wires. Pt was encouraged to not touch anything as it would be in her best interest at that point to be placed in restraints. Pt verbalized that she understood and she would not pull at anything that she was instructed not to.       RENÉ Vo was notified of the patient's increased agitation as well as hallucinations despite being Aox4 abd obering commands. She informed me that she will order Precedex in the event that the agitation increases.

## 2025-05-11 NOTE — EICU
Intervention Initiated From:  COR / EICU    Virtual ICU Quality Rounds    Admit Date: 5/6/2025  Hospital Day: 5    ICU Day: 5d 4h    24H Vital Sign Range:  Temp:  [98 °F (36.7 °C)-99.5 °F (37.5 °C)]   Pulse:  []   Resp:  [18-33]   BP: (122-206)/()   SpO2:  [91 %-100 %]     VICU Surveillance Screening

## 2025-05-11 NOTE — PLAN OF CARE
[facility] Care Plan  POC reviewed with Waldo Emmanuel and family at 1400. Patient and Family verbalized understanding. Questions and concerns addressed. No acute events today. Pt progressing toward goals. Will continue to monitor. See below and flowsheets for full assessment and VS info.     -heparin gtt dc  -2% 250ml bolus x1--continuous gtt @ 40 and then 60ml   -q4 NA's  -MRI complete  -made NPO except with medicine  -SBP goal tightened to <160 from <220  -bm entirely liquid x1  -several episodes of incontinence--full bedroll/linen change complete  -s/s of delirium--neuro checks Q1  -EVD open at 10-- ICP's 5-16 Output 5-20  -phos replaced        Is this a stroke patient? Yes    Neuro:  [place] Coma Scale  Best Eye Response: 3-->(E3) to speech  Best Motor Response: 6-->(M6) obeys commands  Best Verbal Response: 5-->(V5) oriented  [place] Coma Scale Score: 14  Assessment Qualifiers: patient chemically sedated or paralyzed  Pupil PERRLA: yes     24 hr Temp:  [98.6 °F (37 °C)-100.3 °F (37.9 °C)]     CV:   Rhythm: sinus tachycardia  BP goals:   SBP < 160  MAP > 65    Resp:      Vent Mode: Spont  Set Rate: 20 BPM  Oxygen Concentration (%): 40  Vt Set: 375 mL  PEEP/CPAP: 5 cmH20  Pressure Support: 5 cmH20    Plan: N/A    GI/:     Diet/Nutrition Received: regular  Last Bowel Movement: 05/11/25  Voiding Characteristics: voids spontaneously without difficulty    Intake/Output Summary (Last 24 hours) at 5/11/2025 1841  Last data filed at 5/11/2025 1801  Gross per 24 hour   Intake 693.01 ml   Output 610 ml   Net 83.01 ml     Unmeasured Output  Unmeasured Urine Occurrence: 1  Unmeasured Stool Occurrence: 1  Pad Count: 1    Labs/Accuchecks:  Recent Labs   Lab 05/11/25  0031   WBC 16.43*   RBC 4.18   HGB 11.6*   HCT 36.2*         Recent Labs   Lab 05/11/25  0031 05/11/25  1421 05/11/25  1756   *   < > 135*   K 4.0  --   --    CO2 26  --   --    CL 96  --   --    BUN 15  --   --    CREATININE 0.7  --   --     ALKPHOS 103  --   --    ALT 13  --   --    AST 28  --   --    BILITOT 0.4  --   --     < > = values in this interval not displayed.      Recent Labs   Lab 05/10/25  1734 25  0031 25  0724   PROTIME 11.4  --   --    INR 1.0  --   --    APTT 22.0   < > 45.5*    < > = values in this interval not displayed.      Recent Labs   Lab 25  0148   TROPONINI <0.006       Electrolytes: Electrolytes replaced  Accuchecks: none    Gtts:   dexmedeTOMIDine (Precedex) infusion (titrating)  0-0.8 mcg/kg/hr Intravenous Continuous        nicardipine  0-15 mg/hr Intravenous Continuous   Stopped at 25 1409    sodium chloride (23.4%) HYPERTONIC 4 mEq/mL 172 mEq in sterile water 500 mL (sodium chloride 2%) infusion   Intravenous Continuous 60 mL/hr at 25 1801 Rate Verify at 25 1801       LDA/Wounds:    Ivan Risk Assessment  Sensory Perception: 3-->slightly limited  Moisture: 3-->occasionally moist  Activity: 1-->bedfast  Mobility: 3-->slightly limited  Nutrition: 3-->adequate  Friction and Shear: 2-->potential problem  Ivan Score: 15    Is your ivan score 12 or less? no            Restraints:   Restraint Order  Length of Order: Order good for next 24 hours or when removed.  Date that the current order will : 25  Time that the current order will : 1115  Order Upon Application: Yes    [facility]

## 2025-05-11 NOTE — PROGRESS NOTES
[facility] - Neuro Critical Care  Vascular Neurology  Comprehensive Stroke Center  Progress Note    Assessment/Plan:     * SAH (subarachnoid hemorrhage)  This is a 49 year old female with a past history of HTN, RLE arterial occlusion in 2019, GERD, anemia, and ADHD that was admitted after being found to have a perimesencephalic SAH with associated R AICA aneurysm on CTA. Patient transferred to [facility] for IR evaluation. DSA performed 5/6/25 demonstrated the above aneurysm, which was secured with coiling.     - Pt s/p EVD and DSA with coil embolization on 5/6. Later started on Hep gtt due to dvt in R brachial vein  - Repeat CTH imaging once PTT reached therapeutic levels on heparin --> Imaging obtained on 5/11 and significant for R cerebellar infarct; etiology of infarction unknown at this time  - Will obtain f/u imaging with MRI Brain for further assessment  - Hep gtt discontinued given new CT findings  - Pt started on hypertonic saline for prevention of posterior brain swelling  - On SAH protocol  - NSGY planning MRA w/ con on PBD7, may require open clippiing pending results of MRA      Antithrombotics for secondary stroke prevention: Antiplatelets: Aspirin: 81 mg daily    Statins for secondary stroke prevention and hyperlipidemia, if present:   Statins: Atorvastatin- 40 mg daily    Aggressive risk factor modification: HTN, HLD, Diet, Exercise, Obesity     Rehab efforts: The patient has been evaluated by a stroke team provider and the therapy needs have been fully considered based off the presenting complaints and exam findings. The following therapy evaluations are needed: PT evaluate and treat, OT evaluate and treat, SLP evaluate and treat    Diagnostics ordered/pending: MRI head without contrast to assess brain parenchyma, TTE to assess cardiac function/status     VTE prophylaxis: None: Reason for No Pharmacological VTE Prophylaxis: Known or suspected cerebral aneurysm or AVM    BP parameters: SAH: Secured aneurysm,  no target, increase BP to prevent vasospasm if needed       Cerebellar stroke, acute  -- See plan for SAH             No notes on file    STROKE DOCUMENTATION        NIH Scale:  1a. Level of Consciousness: 1-->Not alert, but arousable by minor stimulation to obey, answer, or respond  1b. LOC Questions: 1-->Answers one question correctly  1c. LOC Commands: 1-->Performs one task correctly  2. Best Gaze: 0-->Normal  3. Visual: 0-->No visual loss  4. Facial Palsy: 0-->Normal symmetrical movements  5a. Motor Arm, Left: 1-->Drift, limb holds 90 (or 45) degrees, but drifts down before full 10 seconds, does not hit bed or other support  5b. Motor Arm, Right: 1-->Drift, limb holds 90 (or 45) degrees, but drifts down before full 10 secs, does not hit bed or other support  6a. Motor Leg, Left: 2-->Some effort against gravity, leg falls to bed by 5 secs, but has some effort against gravity  6b. Motor Leg, Right: 2-->Some effort against gravity, leg falls to bed by 5 secs, but has some effort against gravity  7. Limb Ataxia: 2-->Present in two limbs  8. Sensory: 1-->Mild-to-moderate sensory loss, patient feels pinprick is less sharp or is dull on the affected side, or there is a loss of superficial pain with pinprick, but patient is aware of being touched  9. Best Language: 1-->Mild-to-moderate aphasia, some obvious loss of fluency or facility of comprehension, without significant limitation on ideas expressed or form of expression. Reduction of speech and/or comprehension, however, makes conversation. . . (see row details)  10. Dysarthria: 1-->Mild-to-moderate dysarthria, patient slurs at least some words and, at worst, can be understood with some difficulty  11. Extinction and Inattention (formerly Neglect): 1-->Visual, tactile, auditory, spatial, or personal inattention or extinction to bilateral simultaneous stimulation in one of the sensory modalities  Total (NIH Stroke Scale): 15       Modified Theodore    Castle Coma  Scale:    ABCD2 Score:    [id number] Score:   HAS -BLED Score:   ICH Score:   Hunt & Castillo Classification:        Neurologic Chief Complaint: SAH (subarachnoid hemorrhage)     Subjective:     Interval History: Patient is seen for follow-up neurological assessment and treatment recommendations: See Hospital Course    HPI, Past Medical, Family, and Social History remains the same as documented in the initial encounter.     Review of Systems  Scheduled Meds:   albuterol-ipratropium  3 mL Nebulization Q6H    aspirin  81 mg Oral Daily    atorvastatin  40 mg Oral Daily    ceFAZolin (Ancef) IV (PEDS and ADULTS)  2 g Intravenous Q8H    levetiracetam  500 mg Oral BID    mupirocin   Nasal BID    niMODipine  60 mg Oral Q4H     Continuous Infusions:   dexmedeTOMIDine (Precedex) infusion (titrating)  0-0.8 mcg/kg/hr Intravenous Continuous        nicardipine  0-15 mg/hr Intravenous Continuous 12.5 mL/hr at 05/11/25 1225 2.5 mg/hr at 05/11/25 1225    sodium chloride (23.4%) HYPERTONIC 4 mEq/mL 172 mEq in sterile water 500 mL (sodium chloride 2%) infusion   Intravenous Continuous 40 mL/hr at 05/11/25 1201 Rate Verify at 05/11/25 1201     PRN Meds:  Current Facility-Administered Medications:     acetaminophen, 650 mg, Oral, Q6H PRN    bisacodyL, 10 mg, Rectal, Daily PRN    gabapentin, 300 mg, Oral, Q8H PRN    hydrALAZINE, 10 mg, Intravenous, Q6H PRN    labetalol, 10 mg, Intravenous, Q4H PRN    methocarbamoL, 500 mg, Oral, Q6H PRN    oxyCODONE, 5 mg, Oral, Q6H PRN    potassium bicarbonate, 35 mEq, Oral, PRN    potassium bicarbonate, 50 mEq, Oral, PRN    potassium bicarbonate, 60 mEq, Oral, PRN    potassium, sodium phosphates, 2 packet, Oral, PRN    potassium, sodium phosphates, 2 packet, Oral, PRN    potassium, sodium phosphates, 2 packet, Oral, PRN    sodium chloride 0.9%, 10 mL, Intravenous, PRN    Objective:     Vital Signs (Most Recent):  Temp: 99 °F (37.2 °C) (05/11/25 1101)  Pulse: 97 (05/11/25 1215)  Resp: (!) 27 (05/11/25  1215)  BP: (!) 195/79 (cardene drip started) (05/11/25 1215)  SpO2: 98 % (05/11/25 1215)  BP Location: Left leg    Vital Signs Range (Last 24H):  Temp:  [98 °F (36.7 °C)-99.5 °F (37.5 °C)]   Pulse:  []   Resp:  [18-33]   BP: (122-206)/()   SpO2:  [91 %-100 %]   BP Location: Left leg       Physical Exam  Vitals reviewed.   Constitutional:       General: She is not in acute distress.     Appearance: She is ill-appearing.   HENT:      Head:      Comments: EVD catheter in place     Nose: Nose normal.      Mouth/Throat:      Mouth: Mucous membranes are moist.   Eyes:      Extraocular Movements: Extraocular movements intact.   Cardiovascular:      Rate and Rhythm: Normal rate.   Pulmonary:      Effort: Pulmonary effort is normal.   Abdominal:      Palpations: Abdomen is soft.   Musculoskeletal:      Cervical back: Normal range of motion.   Skin:     General: Skin is warm and dry.   Neurological:      Mental Status: She is alert and oriented to person, place, and time.      Motor: Weakness present.      Coordination: Coordination abnormal.      Comments: Limb ataxia     Psychiatric:         Mood and Affect: Mood normal.         Behavior: Behavior normal.              Neurological Exam:   LOC: drowsy  Attention Span: Good   Language: No aphasia  Articulation: Dysarthria  EOM (CN III, IV, VI): Full/intact  Facial Movement (CN VII): Symmetric facial expression    Motor: 4/5 in all extremities  Cerebellum: Upper Extremity Appendicular Ataxia (Finger Nose Finger)  Bilateral  Sensation: Intact to light touch, temperature and vibration    Laboratory:  All labs reviewed    Diagnostic Results   All imaging reviewed    Demetris Cruz MD  [facility]  Department of Vascular Neurology   [facility] - Neuro Critical Care

## 2025-05-11 NOTE — PROGRESS NOTES
[facility] - Neuro Critical Care  Neurocritical Care  Progress Note    Admit Date: 5/6/2025  Service Date: 05/11/2025  Length of Stay: 5    Subjective:     Chief Complaint: SAH (subarachnoid hemorrhage)    History of Present Illness: History obtained via chart review and daughter at bedside as patient intubated on arrival.     Per ED note:  49 y.o. female, PMH HTN and anemia,  presenting as a transfer for neurosurgical evaluation with newly diagnosed ICH from outside hospital.  Patient was transferred from [facility].  Daughter at bedside helps provide history.  She states that she was at a grocery store when she had a syncopal event.  She was unsure if she hit her head.  She states that people on the scene called her to let her know what happened and after she was seen in the ED they told her that she had bleeding inside of her head.  She states that while in the ED her mother was answering questions appropriately and acting like her normal self except frequently falling asleep.  Patient was intubated for airway protection prior to arrival.  Patient and reversal with [facility] prior to transfer.  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 Distal location suspicious for mycotic aneurysm     On arrival to [facility]: Neursurgery consulted, EVD placed and admitted to neurocritical care    Hospital Course: 5/7/2025: extubated today to NC, SBP liberalized to 220, d/c jay cath,   5/8/2025: EVD per NSSGY, TCD's no vasospasm, DVT prophylaxis initiated, pending to hear from NSGY team when ok to start AC  05/09/2025: repeat CXR today, plan for CT chest, resp cx , add IS, EVD @10 per NSGY, TCDs today no vasospasm  5/10: Discussed with Dr. Chase and Neurosurgery, Heparin gtt started for R brachial DVT and hx arterial occlusion, will need repeat CTH when therapeutic. Hold off on transitioning to Coumadin until cleared by  NSGY. CT Chest negative for DVT, but concerning for PNA. Repeat sputum cx sent. Regular diet started. Repeat Na improved to 138.   5/11: right cerebellar CVA on imaging, HTS, MRI, family updated at bedside, place andreia, hold heparin gtt, bilat lower arterial US    Review of Symptoms:   Constitutional: Denies fevers or chills.  ENT: no hearing difficulty, no visual changes  Pulmonary: Denies shortness of breath or cough.  Cardiology: Denies chest pain or palpitations.  GI: Denies abdominal pain or constipation.  Neurologic: Denies new weakness, headaches, or paresthesias.   : no dysuria  Musk: no muscle pain, no joint pain  Psych: no hallucinations    Physical Exam:  GA: comfortable, no acute distress.   HEENT: No scleral icterus  Pulmonary: breathing comfortably, no accessory muscle use  Cardiac: RRR on tele monitor  Abdominal: non-distended, and non-tender  Skin: No jaundice, rashes, or visible lesions.EVD site clean and dry  Pulses:2+ DP bilat    Neuro:  --sedation: none  --GCS: 15  --Mental Status: intermittently has tangential speech and disorientation though wax and wane    --CN II-XII grossly intact.   --Pupils brisk bilat   --brainstem: intact  --Motor: 5/5 throughout  --sensory: intact to soft touch and pain throughout  --Reflexes: not tested  --Gait: deferred  --finger nose finger dysmetric on right    Recent Labs   Lab 05/11/25  0031   WBC 16.43*   RBC 4.18   HGB 11.6*   HCT 36.2*      MCV 87   MCH 27.8   MCHC 32.0     Recent Labs   Lab 05/11/25  0031   CALCIUM 9.5   ALBUMIN 3.1*   PROT 8.0   *   K 4.0   CO2 26   CL 96   BUN 15   CREATININE 0.7   ALKPHOS 103   ALT 13   AST 28   BILITOT 0.4     Recent Labs   Lab 05/10/25  1734 05/11/25  0031 05/11/25  0724   INR 1.0  --   --    APTT 22.0   < > 45.5*    < > = values in this interval not displayed.              Temp: 99 °F (37.2 °C)  Pulse: 93  Rhythm: sinus tachycardia  BP: (!) 200/81 (see RN note regarding BP)  MAP (mmHg): 117  ICP Mean  (mmHg): 16 mmHg  Resp: (!) 29  SpO2: 96 %    Temp  Min: 98 °F (36.7 °C)  Max: 99.5 °F (37.5 °C)  Pulse  Min: 85  Max: 111  BP  Min: 122/58  Max: 206/98  MAP (mmHg)  Min: 83  Max: 141  ICP Mean (mmHg)  Min: 5 mmHg  Max: 18 mmHg  Resp  Min: 18  Max: 33  SpO2  Min: 91 %  Max: 100 %    05/10 0701 - 05/11 0700  In: 597.4 [P.O.:540; I.V.:57.4]  Out: 796 [Urine:504; Drains:292]   Unmeasured Output  Unmeasured Urine Occurrence: 1  Unmeasured Stool Occurrence: 1  Pad Count: 1    Nutrition Prescription Ordered  Current Diet Order: NPO  Last Bowel Movement: 05/10/25    Body mass index is 39.15 kg/m².      I have personally reviewed all labs, imaging, and studies today    Scheduled Meds:   albuterol-ipratropium  3 mL Nebulization Q6H    aspirin  81 mg Oral Daily    atorvastatin  40 mg Oral Daily    ceFAZolin (Ancef) IV (PEDS and ADULTS)  2 g Intravenous Q8H    levetiracetam  500 mg Oral BID    mupirocin   Nasal BID    niMODipine  60 mg Oral Q4H     Continuous Infusions:   dexmedeTOMIDine (Precedex) infusion (titrating)  0-0.8 mcg/kg/hr Intravenous Continuous        nicardipine  0-15 mg/hr Intravenous Continuous        sodium chloride (23.4%) HYPERTONIC 4 mEq/mL 172 mEq in sterile water 500 mL (sodium chloride 2%) infusion   Intravenous Continuous 40 mL/hr at 05/11/25 1201 Rate Verify at 05/11/25 1201     PRN Meds:.  Current Facility-Administered Medications:     acetaminophen, 650 mg, Oral, Q6H PRN    bisacodyL, 10 mg, Rectal, Daily PRN    gabapentin, 300 mg, Oral, Q8H PRN    hydrALAZINE, 10 mg, Intravenous, Q6H PRN    labetalol, 10 mg, Intravenous, Q4H PRN    methocarbamoL, 500 mg, Oral, Q6H PRN    oxyCODONE, 5 mg, Oral, Q6H PRN    potassium bicarbonate, 35 mEq, Oral, PRN    potassium bicarbonate, 50 mEq, Oral, PRN    potassium bicarbonate, 60 mEq, Oral, PRN    potassium, sodium phosphates, 2 packet, Oral, PRN    potassium, sodium phosphates, 2 packet, Oral, PRN    potassium, sodium phosphates, 2 packet, Oral, PRN    sodium  chloride 0.9%, 10 mL, Intravenous, PRN        Assessment/Plan:     Neuro  * SAH (subarachnoid hemorrhage)  Patient is a 49-year-old female with past medical history of hypertension presenting via transfer from [place] for higher level of care after being diagnosed with subarachnoid hemorrhage.    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  S/p EVD 5/6  S/p Coil emolization 5/6  - EVD open at 10  - Neuro IR consulted   - Neurosurgery consulted  - Vascular Neurology consulted  - goal BP less than 220  - fentanyl propofol for sedation  - nimodipine and daily TCDs given subarachnoid hemorrhage  - daily aspirin per neuro IR  TCD's  no vasospasm today  - made NPO for cerebellar stroke  TCD's no vasospasm       Cerebellar stroke, acute  CT on 5/11 revealed right cerebellar infarct  Likely uriah-angio  Family aware and imaging reviewed  Statin  ASA ok  Holding heparin gtt, even though no hemorrhage on therapeutic CTH  Concern for brain compression in post wilma  HTS therapy    Obstructive hydrocephalus   EVD monitoring    Brain compression  -monitor EVD output closely  -HTS therapy  Na goal > 145  Neurochecks  Repeat CTH tonight  MRI pending    Brain aneurysm  See primary problem          The patient is being Prophylaxed for:  Venous Thromboembolism with: Chemical  Stress Ulcer with: Not Applicable   Ventilator Pneumonia with: not applicable    Activity Orders            Diet NPO: NPO starting at 05/11 1012    Progressive Mobility Protocol (mobilize patient to their highest level of functioning at least twice daily) starting at 05/06 2000    Turn patient starting at 05/06 0800          Full Code    Alejandro Chase MD  Neurocritical Care  [facility] - Neuro Critical Care    33     minutes of Critical care time was spent personally by me on the following activities: development of treatment plan with patient or surrogate and bedside  caregivers, discussions with consultants, evaluation of patient's response to treatment, examination of patient, ordering and performing treatments and interventions, ordering and review of laboratory studies, ordering and review of radiographic studies, pulse oximetry, antibiotic titration if applicable, vasopressor titration if applicable, re-evaluation of patient's condition. This critical care time did not overlap with that of any other provider or involve time for any procedures. There is potential for acute life threatening neurological and or medical decompensation therefore will continue to monitor closely. Current critical conditions posing threat to organ systems, limb, or life include: see note

## 2025-05-11 NOTE — ASSESSMENT & PLAN NOTE
-monitor EVD output closely  -HTS therapy  Na goal > 145  Neurochecks  Repeat CTH tonight  MRI pending

## 2025-05-11 NOTE — PLAN OF CARE
[facility] Care Plan    POC reviewed with Hennybartoloazalia Emmanuel and family at 0300. Patient and Family verbalized understanding. Questions and concerns addressed. No acute events today. Pt progressing toward goals. Will continue to monitor. See below and flowsheets for full assessment and VS info.     - CT completed- see results for details   - Precedex ordered in case the patient becomes agitated again   - Day shift bath  - EVD @ 10  - ICP: <15     CSF: 9-17      Is this a stroke patient? Yes - stroke booklet provided to the patient and family    Neuro:  [place] Coma Scale  Best Eye Response: 4-->(E4) spontaneous  Best Motor Response: 6-->(M6) obeys commands  Best Verbal Response: 5-->(V5) oriented  [place] Coma Scale Score: 15  Assessment Qualifiers: patient chemically sedated or paralyzed  Pupil PERRLA: yes     24 hr Temp:  [97.6 °F (36.4 °C)-99.9 °F (37.7 °C)]     CV:   Rhythm: sinus tachycardia  BP goals:   SBP < 220  MAP > 65    Resp:      Vent Mode: Spont  Set Rate: 20 BPM  Oxygen Concentration (%): 40  Vt Set: 375 mL  PEEP/CPAP: 5 cmH20  Pressure Support: 5 cmH20    Plan: N/A    GI/:     Diet/Nutrition Received: regular  Last Bowel Movement: 05/10/25  Voiding Characteristics: voids spontaneously without difficulty    Intake/Output Summary (Last 24 hours) at 5/11/2025 0629  Last data filed at 5/11/2025 0605  Gross per 24 hour   Intake 597.43 ml   Output 796 ml   Net -198.57 ml     Unmeasured Output  Unmeasured Urine Occurrence: 1  Unmeasured Stool Occurrence: 1  Pad Count: 1    Labs/Accuchecks:  Recent Labs   Lab 05/11/25  0031   WBC 16.43*   RBC 4.18   HGB 11.6*   HCT 36.2*         Recent Labs   Lab 05/11/25  0031   *   K 4.0   CO2 26   CL 96   BUN 15   CREATININE 0.7   ALKPHOS 103   ALT 13   AST 28   BILITOT 0.4      Recent Labs   Lab 05/10/25  1734 05/11/25  0031   PROTIME 11.4  --    INR 1.0  --    APTT 22.0 45.3*      Recent Labs   Lab 05/06/25  0148   TROPONINI <0.006       Electrolytes:    Accuchecks: none    Gtts:   dexmedeTOMIDine (Precedex) infusion (titrating)  0-0.8 mcg/kg/hr Intravenous Continuous        heparin (porcine) in D5W  0-40 Units/kg/hr Intravenous Continuous 12 mL/hr at 25 0121 12 Units/kg/hr at 25 0121       LDA/Wounds:    Ivan Risk Assessment  Sensory Perception: 3-->slightly limited  Moisture: 4-->rarely moist  Activity: 1-->bedfast  Mobility: 3-->slightly limited  Nutrition: 2-->probably inadequate  Friction and Shear: 2-->potential problem  Ivan Score: 15  Is your ivan score 12 or less? no          Restraints:   Restraint Order  Length of Order: Order good for next 24 hours or when removed.  Date that the current order will : 25  Time that the current order will : 1115  Order Upon Application: Yes    [facility]

## 2025-05-11 NOTE — SUBJECTIVE & OBJECTIVE
Interval History: NAEON, ICPs 5-14    Medications:  Continuous Infusions:   heparin (porcine) in D5W  0-40 Units/kg/hr Intravenous Continuous 12 mL/hr at 05/11/25 0121 12 Units/kg/hr at 05/11/25 0121     Scheduled Meds:   albuterol-ipratropium  3 mL Nebulization Q6H    aspirin  81 mg Oral Daily    atorvastatin  40 mg Oral Daily    ceFAZolin (Ancef) IV (PEDS and ADULTS)  2 g Intravenous Q8H    levetiracetam  500 mg Oral BID    mupirocin   Nasal BID    niMODipine  60 mg Oral Q4H     PRN Meds:  Current Facility-Administered Medications:     acetaminophen, 650 mg, Oral, Q6H PRN    bisacodyL, 10 mg, Rectal, Daily PRN    gabapentin, 300 mg, Oral, Q8H PRN    labetalol, 10 mg, Intravenous, Q4H PRN    methocarbamoL, 500 mg, Oral, Q6H PRN    oxyCODONE, 5 mg, Oral, Q6H PRN    potassium bicarbonate, 35 mEq, Oral, PRN    potassium bicarbonate, 50 mEq, Oral, PRN    potassium bicarbonate, 60 mEq, Oral, PRN    potassium, sodium phosphates, 2 packet, Oral, PRN    potassium, sodium phosphates, 2 packet, Oral, PRN    potassium, sodium phosphates, 2 packet, Oral, PRN    sodium chloride 0.9%, 10 mL, Intravenous, PRN     Review of Systems  Objective:     Weight: 100.2 kg (221 lb)  Body mass index is 39.15 kg/m².  Vital Signs (Most Recent):  Temp: 99.1 °F (37.3 °C) (05/10/25 2305)  Pulse: 99 (05/11/25 0205)  Resp: 20 (05/11/25 0209)  BP: (!) 140/90 (05/11/25 0205)  SpO2: 96 % (05/11/25 0205) Vital Signs (24h Range):  Temp:  [97.6 °F (36.4 °C)-99.9 °F (37.7 °C)] 99.1 °F (37.3 °C)  Pulse:  [] 99  Resp:  [10-36] 20  SpO2:  [94 %-100 %] 96 %  BP: (122-175)/(58-96) 140/90                              ICP/Ventriculostomy 05/06/25 0920 Right Parietal region (Active)   Level of Ventriculostomy (cm above) 10 05/10/25 1905   Status Open to drainage 05/11/25 0205   Site Assessment Clean;Dry 05/11/25 0205   Site Drainage No drainage 05/11/25 0205   Waveform normal waveform 05/10/25 1905   Output (mL) 15 mL 05/10/25 2205   CSF Color red 05/11/25  0205   Dressing Status Clean;Dry;Intact 05/11/25 0205   Interventions HOB degrees;bed controls locked;zeroed 05/10/25 0605       Female External Urinary Catheter w/ Suction 05/07/25 1732 (Active)   Skin no redness;no breakdown 05/10/25 1701   Tolerance no signs/symptoms of discomfort 05/10/25 1701   Suction Continuous suction at 40 mmHg 05/08/25 1905   Date of last wick change 05/08/25 05/08/25 1905   Time of last wick change 1905 05/08/25 1905   Output (mL) 250 mL 05/10/25 1001          Physical Exam         Neurosurgery Physical Exam    [id number]  Awake, alert, Ox4  Cni  Follows commands x4 grossly full strength throughout  SILT     EVD Incision dressed CDI     Significant Labs:  Recent Labs   Lab 05/10/25  0130 05/10/25  1516 05/11/25  0031   *  --  140*   * 138 133*   K 4.0  --  4.0   CL 98  --  96   CO2 23  --  26   BUN 16  --  15   CREATININE 0.7  --  0.7   CALCIUM 9.6  --  9.5   MG 2.1  --  2.2     Recent Labs   Lab 05/10/25  0130 05/10/25  1734 05/11/25  0031   WBC 12.53 11.60 16.43*   HGB 10.7* 10.3* 11.6*   HCT 33.8* 33.2* 36.2*    333 421     Recent Labs   Lab 05/10/25  1734 05/11/25  0031   INR 1.0  --    APTT 22.0 45.3*     Microbiology Results (last 7 days)       Procedure Component Value Units Date/Time    Culture, Respiratory with Gram Stain [[id number]] Collected: 05/10/25 1120    Order Status: Completed Specimen: Respiratory from Sputum, Expectorated Updated: 05/11/25 0036     GRAM STAIN <10 Epithelial Cells/LPF      Rare WBC seen      Many Gram positive cocci      Many Gram Negative Rods    Culture, Respiratory with Gram Stain [[id number]] Collected: 05/10/25 2035    Order Status: Sent Specimen: Respiratory from Sputum Updated: 05/10/25 2137          All pertinent labs from the last 24 hours have been reviewed.    Significant Diagnostics:  CT: CT Head Without Contrast  Result Date: 5/11/2025  1. Compared to prior head CT performed 05/06/2025, 20:13 hours, evolving relatively large  right cerebellar infarct without macroscopic hemorrhage within the infarct territory.  Questionable hypoattenuation involving the brainstem which may be artifactual given coil artifact, however additional areas of ischemia not excluded.  MRI may be considered for further characterization.  Further effacement of the 4th ventricle since the prior study, however there has been slight decompression of the lateral and 3rd ventricles since the prior exam. 2. Relatively similar subarachnoid and interventricular hemorrhage.  No new or enlarging areas of intracranial hemorrhage appreciated. This report was flagged in Epic as abnormal. Electronically signed by: Mich Downs Date:    05/11/2025 Time:    02:09    MRI: No results found in the last 24 hours.

## 2025-05-11 NOTE — NURSING
Pt returned to room following MRI with the assistance of two RN's. VSS, pt complaining of pain in back from MRI. Drips continued, EVD leveled and bed alarm set with siderails up

## 2025-05-11 NOTE — PLAN OF CARE
Problem: Occupational Therapy  Goal: Occupational Therapy Goal  Description: Goals to be met by: 6/11/2025     Patient will increase functional independence with ADLs by performing:    UE Dressing with Supervision.  LE Dressing with Supervision.  Grooming while standing at sink with Supervision.  Toileting from toilet with Supervision for hygiene and clothing management.   Toilet transfer to toilet with Supervision.    Outcome: Progressing

## 2025-05-11 NOTE — PLAN OF CARE
PT evaluation complete and appropriate goals established.    Problem: Physical Therapy  Goal: Physical Therapy Goal  Description: Goals to be met by: 2025     Patient will increase functional independence with mobility by performin. Supine to sit with Contact Guard Assistance  2. Sit to supine with Contact Guard Assistance  3. Sit to stand transfer with Contact Guard Assistance  4. Bed to chair transfer with Minimal Assistance using LRAD  5. Gait  x 25 feet with Minimal Assistance using LRAD.   6. Stand for 2 minutes with Contact Guard Assistance using LRAD  7. Lower extremity exercise program x15 reps per handout, with independence    Outcome: Progressing     2025

## 2025-05-11 NOTE — ASSESSMENT & PLAN NOTE
49f presenting after syncope with CTH demonstrating SAH. CTA with AICA aneurysm R. Intubated prior to arrival to [facility] ED.     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 Corley

## 2025-05-12 LAB
ABSOLUTE EOSINOPHIL (OHS): 0.06 K/UL
ABSOLUTE MONOCYTE (OHS): 1.38 K/UL (ref 0.3–1)
ABSOLUTE NEUTROPHIL COUNT (OHS): 8.72 K/UL (ref 1.8–7.7)
ALBUMIN SERPL BCP-MCNC: 3 G/DL (ref 3.5–5.2)
ALP SERPL-CCNC: 96 UNIT/L (ref 40–150)
ALT SERPL W/O P-5'-P-CCNC: 16 UNIT/L (ref 10–44)
ANION GAP (OHS): 8 MMOL/L (ref 8–16)
AST SERPL-CCNC: 27 UNIT/L (ref 11–45)
BASOPHILS # BLD AUTO: 0.05 K/UL
BASOPHILS NFR BLD AUTO: 0.4 %
BILIRUB SERPL-MCNC: 0.5 MG/DL (ref 0.1–1)
BUN SERPL-MCNC: 20 MG/DL (ref 6–20)
CALCIUM SERPL-MCNC: 9.6 MG/DL (ref 8.7–10.5)
CHLORIDE SERPL-SCNC: 106 MMOL/L (ref 95–110)
CLARITY CSF: ABNORMAL
CO2 SERPL-SCNC: 25 MMOL/L (ref 23–29)
COLOR CSF: ABNORMAL
CREAT SERPL-MCNC: 0.8 MG/DL (ref 0.5–1.4)
CSF TUBE NUMBER (OHS): ABNORMAL
CSF TUBE NUMBER (OHS): NORMAL
EOSINOPHIL NFR CSF MANUAL: 2 %
ERYTHROCYTE [DISTWIDTH] IN BLOOD BY AUTOMATED COUNT: 13.5 % (ref 11.5–14.5)
GFR SERPLBLD CREATININE-BSD FMLA CKD-EPI: >60 ML/MIN/1.73/M2
GLUCOSE CSF-MCNC: 77 MG/DL (ref 40–70)
GLUCOSE SERPL-MCNC: 129 MG/DL (ref 70–110)
HCT VFR BLD AUTO: 35.9 % (ref 37–48.5)
HGB BLD-MCNC: 11 GM/DL (ref 12–16)
IMM GRANULOCYTES # BLD AUTO: 0.28 K/UL (ref 0–0.04)
IMM GRANULOCYTES NFR BLD AUTO: 2 % (ref 0–0.5)
LEFT ANT TIBIAL SYS PSV: 76 CM/S
LEFT CFA PSV: 125 CM/S
LEFT EXTERNAL ILIAC PSV: 160 CM/S
LEFT PERONEAL SYS PSV: 78 CM/S
LEFT POPLITEAL PSV: 67 CM/S
LEFT POST TIBIAL SYS PSV: 109 CM/S
LEFT PROFUNDA SYS PSV: 80 CM/S
LEFT SUPER FEMORAL DIST SYS PSV: 90 CM/S
LEFT SUPER FEMORAL MID SYS PSV: 118 CM/S
LEFT SUPER FEMORAL OSTIAL SYS PSV: 115 CM/S
LEFT SUPER FEMORAL PROX SYS PSV: 135 CM/S
LEFT TIB/PER TRUNK SYS PSV: 69 CM/S
LYMPHOCYTES # BLD AUTO: 3.73 K/UL (ref 1–4.8)
LYMPHOCYTES NFR CSF MANUAL: 41 % (ref 40–80)
MAGNESIUM SERPL-MCNC: 2.3 MG/DL (ref 1.6–2.6)
MCH RBC QN AUTO: 27.6 PG (ref 27–31)
MCHC RBC AUTO-ENTMCNC: 30.6 G/DL (ref 32–36)
MCV RBC AUTO: 90 FL (ref 82–98)
MONOS+MACROS NFR CSF MANUAL: 28 % (ref 15–45)
NEUTROPHILS NFR CSF MANUAL: 29 %
NUCLEATED RBC (/100WBC) (OHS): 0 /100 WBC
OHS CV LEFT LOWER EXTREMITY ABI (NO CALC): 1
OHS CV RIGHT ABI LOWER EXTREMITY (NO CALC): 0.65
PHOSPHATE SERPL-MCNC: 2.9 MG/DL (ref 2.7–4.5)
PLATELET # BLD AUTO: 393 K/UL (ref 150–450)
PMV BLD AUTO: 9.3 FL (ref 9.2–12.9)
POTASSIUM SERPL-SCNC: 4.8 MMOL/L (ref 3.5–5.1)
PROT CSF-MCNC: 15 MG/DL (ref 15–40)
PROT SERPL-MCNC: 7.7 GM/DL (ref 6–8.4)
RBC # BLD AUTO: 3.99 M/UL (ref 4–5.4)
RBC # CSF: ABNORMAL /CU MM
RELATIVE EOSINOPHIL (OHS): 0.4 %
RELATIVE LYMPHOCYTE (OHS): 26.2 % (ref 18–48)
RELATIVE MONOCYTE (OHS): 9.7 % (ref 4–15)
RELATIVE NEUTROPHIL (OHS): 61.3 % (ref 38–73)
RIGHT ANT TIBIAL SYS PSV: 17 CM/S
RIGHT CFA PSV: 43 CM/S
RIGHT EXTERNAL ILLIAC PSV: 65 CM/S
RIGHT PERONEAL SYS PSV: 18 CM/S
RIGHT POPLITEAL PSV: 31 CM/S
RIGHT POST TIBIAL SYS PSV: 56 CM/S
RIGHT PROFUNDA SYS PSV: 119 CM/S
RIGHT SUPER FEMORAL DIST SYS PSV: 76 CM/S
RIGHT SUPER FEMORAL MID SYS PSV: 57 CM/S
RIGHT SUPER FEMORAL OSTIAL SYS PSV: 85 CM/S
RIGHT SUPER FEMORAL PROX SYS PSV: 69 CM/S
RIGHT TIB/PER TRUNK SYS PSV: 19 CM/S
SODIUM SERPL-SCNC: 139 MMOL/L (ref 136–145)
SODIUM SERPL-SCNC: 142 MMOL/L (ref 136–145)
SODIUM SERPL-SCNC: 143 MMOL/L (ref 136–145)
SPECIMEN VOL CSF: 1 ML
WBC # BLD AUTO: 14.22 K/UL (ref 3.9–12.7)
WBC # CSF: 15 /CU MM

## 2025-05-12 PROCEDURE — 92526 ORAL FUNCTION THERAPY: CPT

## 2025-05-12 PROCEDURE — 80053 COMPREHEN METABOLIC PANEL: CPT

## 2025-05-12 PROCEDURE — 25000003 PHARM REV CODE 250: Performed by: PSYCHIATRY & NEUROLOGY

## 2025-05-12 PROCEDURE — 84295 ASSAY OF SERUM SODIUM: CPT

## 2025-05-12 PROCEDURE — 25000003 PHARM REV CODE 250

## 2025-05-12 PROCEDURE — 83735 ASSAY OF MAGNESIUM: CPT

## 2025-05-12 PROCEDURE — 25000003 PHARM REV CODE 250: Performed by: NURSE PRACTITIONER

## 2025-05-12 PROCEDURE — 89051 BODY FLUID CELL COUNT: CPT

## 2025-05-12 PROCEDURE — 99900035 HC TECH TIME PER 15 MIN (STAT)

## 2025-05-12 PROCEDURE — 20000000 HC ICU ROOM

## 2025-05-12 PROCEDURE — 99291 CRITICAL CARE FIRST HOUR: CPT | Mod: FS,,, | Performed by: PSYCHIATRY & NEUROLOGY

## 2025-05-12 PROCEDURE — 63600175 PHARM REV CODE 636 W HCPCS

## 2025-05-12 PROCEDURE — 99233 SBSQ HOSP IP/OBS HIGH 50: CPT | Mod: ,,, | Performed by: NEUROLOGICAL SURGERY

## 2025-05-12 PROCEDURE — 25000242 PHARM REV CODE 250 ALT 637 W/ HCPCS: Performed by: NURSE PRACTITIONER

## 2025-05-12 PROCEDURE — 97535 SELF CARE MNGMENT TRAINING: CPT

## 2025-05-12 PROCEDURE — 82945 GLUCOSE OTHER FLUID: CPT

## 2025-05-12 PROCEDURE — A4217 STERILE WATER/SALINE, 500 ML: HCPCS

## 2025-05-12 PROCEDURE — 85025 COMPLETE CBC W/AUTO DIFF WBC: CPT | Performed by: PSYCHIATRY & NEUROLOGY

## 2025-05-12 PROCEDURE — 84157 ASSAY OF PROTEIN OTHER: CPT

## 2025-05-12 PROCEDURE — 84100 ASSAY OF PHOSPHORUS: CPT

## 2025-05-12 PROCEDURE — 99499 UNLISTED E&M SERVICE: CPT | Mod: ,,,

## 2025-05-12 PROCEDURE — 87070 CULTURE OTHR SPECIMN AEROBIC: CPT

## 2025-05-12 PROCEDURE — 94761 N-INVAS EAR/PLS OXIMETRY MLT: CPT

## 2025-05-12 PROCEDURE — 94640 AIRWAY INHALATION TREATMENT: CPT

## 2025-05-12 RX ORDER — TALC
6 POWDER (GRAM) TOPICAL NIGHTLY PRN
Status: DISCONTINUED | OUTPATIENT
Start: 2025-05-12 | End: 2025-05-26 | Stop reason: HOSPADM

## 2025-05-12 RX ORDER — QUETIAPINE FUMARATE 25 MG/1
25 TABLET, FILM COATED ORAL NIGHTLY
Status: DISCONTINUED | OUTPATIENT
Start: 2025-05-12 | End: 2025-05-13

## 2025-05-12 RX ORDER — OLANZAPINE 10 MG/2ML
5 INJECTION, POWDER, FOR SOLUTION INTRAMUSCULAR ONCE AS NEEDED
Status: COMPLETED | OUTPATIENT
Start: 2025-05-12 | End: 2025-05-12

## 2025-05-12 RX ORDER — METHOCARBAMOL 750 MG/1
750 TABLET, FILM COATED ORAL EVERY 6 HOURS PRN
Status: DISCONTINUED | OUTPATIENT
Start: 2025-05-12 | End: 2025-05-26 | Stop reason: HOSPADM

## 2025-05-12 RX ORDER — ENOXAPARIN SODIUM 100 MG/ML
40 INJECTION SUBCUTANEOUS EVERY 24 HOURS
Status: DISCONTINUED | OUTPATIENT
Start: 2025-05-12 | End: 2025-05-15

## 2025-05-12 RX ORDER — AMOXICILLIN 250 MG
1 CAPSULE ORAL 2 TIMES DAILY
Status: DISCONTINUED | OUTPATIENT
Start: 2025-05-12 | End: 2025-05-17

## 2025-05-12 RX ORDER — VALSARTAN 80 MG/1
80 TABLET ORAL DAILY
Status: DISCONTINUED | OUTPATIENT
Start: 2025-05-12 | End: 2025-05-26 | Stop reason: HOSPADM

## 2025-05-12 RX ADMIN — SODIUM CHLORIDE 250 ML: 3 INJECTION, SOLUTION INTRAVENOUS at 10:05

## 2025-05-12 RX ADMIN — CEFAZOLIN 2 G: 2 INJECTION, POWDER, FOR SOLUTION INTRAMUSCULAR; INTRAVENOUS at 06:05

## 2025-05-12 RX ADMIN — QUETIAPINE FUMARATE 25 MG: 25 TABLET ORAL at 08:05

## 2025-05-12 RX ADMIN — NIMODIPINE 60 MG: 30 CAPSULE, LIQUID FILLED ORAL at 10:05

## 2025-05-12 RX ADMIN — ACETAMINOPHEN 650 MG: 325 TABLET ORAL at 05:05

## 2025-05-12 RX ADMIN — IPRATROPIUM BROMIDE AND ALBUTEROL SULFATE 3 ML: 2.5; .5 SOLUTION RESPIRATORY (INHALATION) at 12:05

## 2025-05-12 RX ADMIN — IPRATROPIUM BROMIDE AND ALBUTEROL SULFATE 3 ML: 2.5; .5 SOLUTION RESPIRATORY (INHALATION) at 07:05

## 2025-05-12 RX ADMIN — CEFAZOLIN 2 G: 2 INJECTION, POWDER, FOR SOLUTION INTRAMUSCULAR; INTRAVENOUS at 10:05

## 2025-05-12 RX ADMIN — ATORVASTATIN CALCIUM 40 MG: 40 TABLET, FILM COATED ORAL at 08:05

## 2025-05-12 RX ADMIN — CEFAZOLIN 2 G: 2 INJECTION, POWDER, FOR SOLUTION INTRAMUSCULAR; INTRAVENOUS at 02:05

## 2025-05-12 RX ADMIN — LABETALOL HYDROCHLORIDE 10 MG: 5 INJECTION, SOLUTION INTRAVENOUS at 03:05

## 2025-05-12 RX ADMIN — NIMODIPINE 60 MG: 30 CAPSULE, LIQUID FILLED ORAL at 05:05

## 2025-05-12 RX ADMIN — SODIUM CHLORIDE 250 ML: 3 INJECTION, SOLUTION INTRAVENOUS at 11:05

## 2025-05-12 RX ADMIN — METHOCARBAMOL 500 MG: 500 TABLET ORAL at 06:05

## 2025-05-12 RX ADMIN — OXYCODONE 5 MG: 5 TABLET ORAL at 05:05

## 2025-05-12 RX ADMIN — VASOPRESSIN: 20 INJECTION, SOLUTION INTRAVENOUS at 11:05

## 2025-05-12 RX ADMIN — GABAPENTIN 300 MG: 300 CAPSULE ORAL at 08:05

## 2025-05-12 RX ADMIN — MUPIROCIN: 20 OINTMENT TOPICAL at 08:05

## 2025-05-12 RX ADMIN — ACETAMINOPHEN 650 MG: 325 TABLET ORAL at 08:05

## 2025-05-12 RX ADMIN — VALSARTAN 80 MG: 80 TABLET, FILM COATED ORAL at 08:05

## 2025-05-12 RX ADMIN — LEVETIRACETAM 500 MG: 500 TABLET, FILM COATED ORAL at 08:05

## 2025-05-12 RX ADMIN — HYDRALAZINE HYDROCHLORIDE 10 MG: 20 INJECTION, SOLUTION INTRAMUSCULAR; INTRAVENOUS at 04:05

## 2025-05-12 RX ADMIN — Medication 6 MG: at 11:05

## 2025-05-12 RX ADMIN — OLANZAPINE 5 MG: 10 INJECTION, POWDER, FOR SOLUTION INTRAMUSCULAR at 11:05

## 2025-05-12 RX ADMIN — NIMODIPINE 60 MG: 30 CAPSULE, LIQUID FILLED ORAL at 02:05

## 2025-05-12 RX ADMIN — METHOCARBAMOL 750 MG: 750 TABLET ORAL at 04:05

## 2025-05-12 RX ADMIN — NIMODIPINE 60 MG: 30 CAPSULE, LIQUID FILLED ORAL at 06:05

## 2025-05-12 RX ADMIN — OXYCODONE 5 MG: 5 TABLET ORAL at 08:05

## 2025-05-12 RX ADMIN — SODIUM CHLORIDE 500 ML: 9 INJECTION, SOLUTION INTRAVENOUS at 08:05

## 2025-05-12 RX ADMIN — ASPIRIN 81 MG CHEWABLE TABLET 81 MG: 81 TABLET CHEWABLE at 08:05

## 2025-05-12 RX ADMIN — ENOXAPARIN SODIUM 40 MG: 40 INJECTION SUBCUTANEOUS at 10:05

## 2025-05-12 RX ADMIN — NIMODIPINE 60 MG: 30 CAPSULE, LIQUID FILLED ORAL at 08:05

## 2025-05-12 RX ADMIN — IPRATROPIUM BROMIDE AND ALBUTEROL SULFATE 3 ML: 2.5; .5 SOLUTION RESPIRATORY (INHALATION) at 08:05

## 2025-05-12 NOTE — SUBJECTIVE & OBJECTIVE
Review of Systems   Constitutional:  Positive for fever. Negative for chills.   Respiratory:  Negative for shortness of breath.    Cardiovascular:  Negative for chest pain.   Gastrointestinal:  Negative for abdominal pain.   Genitourinary:  Negative for difficulty urinating.   Musculoskeletal:  Positive for neck stiffness. Negative for neck pain.   Neurological:  Positive for headaches. Negative for weakness and numbness.       Objective:     Vitals:  Temp: 98.1 °F (36.7 °C)  Pulse: 93  Rhythm: sinus tachycardia  BP: (!) 157/72  MAP (mmHg): 111  ICP Mean (mmHg): 6 mmHg  Resp: 17  SpO2: 100 %    Temp  Min: 98.1 °F (36.7 °C)  Max: 100.3 °F (37.9 °C)  Pulse  Min: 77  Max: 114  BP  Min: 86/53  Max: 191/82  MAP (mmHg)  Min: 64  Max: 129  ICP Mean (mmHg)  Min: 3 mmHg  Max: 17 mmHg  Resp  Min: 14  Max: 35  SpO2  Min: 94 %  Max: 100 %    05/11 0701 - 05/12 0700  In: 1480.1 [I.V.:1480.1]  Out: 2318 [Urine:2050; Drains:268]   Unmeasured Output  Unmeasured Urine Occurrence: 1  Unmeasured Stool Occurrence: 1  Pad Count: 1        Physical Exam  Vitals and nursing note reviewed.   Constitutional:       General: She is not in acute distress.     Appearance: She is obese.   HENT:      Head:      Comments: EVD site c/d/i     Right Ear: External ear normal.      Left Ear: External ear normal.      Nose: Nose normal.      Mouth/Throat:      Mouth: Mucous membranes are moist.      Pharynx: Oropharynx is clear.   Eyes:      Pupils: Pupils are equal, round, and reactive to light.   Cardiovascular:      Rate and Rhythm: Normal rate and regular rhythm.   Pulmonary:      Effort: Pulmonary effort is normal. No respiratory distress.   Abdominal:      General: There is no distension.      Palpations: Abdomen is soft.      Tenderness: There is no abdominal tenderness.   Musculoskeletal:      Cervical back: Normal range of motion. No rigidity.   Skin:     General: Skin is warm and dry.   Neurological:      Mental Status: She is alert.       Comments: E4 V5 M6  Mental status: Alert. Oriented x3. Speech fluent. No dysarthria. Following commands.   CN II-XII grossly intact, specifically:  PERRL  No facial asymmetry.   Tongue midline.   Shoulder shrug symmetric  Motor:  RUE 5/5  RLE 5/5  LUE 5/5  LLE 5/5  R dysmetria   Sensation intact and symmetric throughout    Psychiatric:         Mood and Affect: Mood normal.            Medications:  ContinuousdexmedeTOMIDine (Precedex) infusion (titrating), Last Rate: Stopped (05/12/25 0701)  sodium chloride (23.4%) HYPERTONIC 4 mEq/mL 172 mEq in sterile water 500 mL (sodium chloride 2%) infusion, Last Rate: 75 mL/hr at 05/12/25 1301    Scheduledalbuterol-ipratropium, 3 mL, Q6H  aspirin, 81 mg, Daily  atorvastatin, 40 mg, Daily  ceFAZolin (Ancef) IV (PEDS and ADULTS), 2 g, Q8H  enoxparin, 40 mg, Daily  mupirocin, , BID  niMODipine, 60 mg, Q4H  QUEtiapine, 25 mg, QHS  senna-docusate, 1 tablet, BID  valsartan, 80 mg, Daily    PRNacetaminophen, 650 mg, Q6H PRN  bisacodyL, 10 mg, Daily PRN  gabapentin, 300 mg, Q8H PRN  hydrALAZINE, 10 mg, Q6H PRN  labetalol, 10 mg, Q4H PRN  methocarbamoL, 500 mg, Q6H PRN  oxyCODONE, 5 mg, Q6H PRN  potassium bicarbonate, 35 mEq, PRN  potassium bicarbonate, 50 mEq, PRN  potassium bicarbonate, 60 mEq, PRN  potassium, sodium phosphates, 2 packet, PRN  potassium, sodium phosphates, 2 packet, PRN  potassium, sodium phosphates, 2 packet, PRN  sodium chloride 0.9%, 10 mL, PRN      Today I personally reviewed pertinent medications, lines/drains/airways, imaging, cardiology results, laboratory results, microbiology results, notably:    5/11 CTH   1. Compared to prior head CT performed 05/06/2025, 20:13 hours, evolving relatively large right cerebellar infarct without macroscopic hemorrhage within the infarct territory.  Questionable hypoattenuation involving the brainstem which may be artifactual given coil artifact, however additional areas of ischemia not excluded.  MRI may be considered for  further characterization.  Further effacement of the 4th ventricle since the prior study, however there has been slight decompression of the lateral and 3rd ventricles since the prior exam.  2. Relatively similar subarachnoid and interventricular hemorrhage.  No new or enlarging areas of intracranial hemorrhage appreciated.      Microbiology Results (last 7 days)       Procedure Component Value Units Date/Time    Gram stain [[id number]]     Order Status: Sent Specimen: CSF (Spinal Fluid) from CSF Tap, Tube 3     CSF culture [[id number]]     Order Status: Sent Specimen: CSF (Spinal Fluid) from CSF Tap, Tube 3     Culture, Respiratory with Gram Stain [[id number]]  (Abnormal) Collected: 05/10/25 1120    Order Status: Completed Specimen: Respiratory from Sputum, Expectorated Updated: 05/12/25 1112     Respiratory Culture Few Gram-negative Rods     Comment: Identification and susceptibility pending        GRAM STAIN <10 Epithelial Cells/LPF      Rare WBC seen      Many Gram positive cocci      Many Gram Negative Rods    Culture, Respiratory with Gram Stain [[id number]]  (Abnormal) Collected: 05/10/25 2035    Order Status: Completed Specimen: Respiratory from Sputum Updated: 05/12/25 1108     Respiratory Culture Few Gram-negative Rods     Comment: Identification and susceptibility pending  with normal respiratory chichi        GRAM STAIN <10 Epithelial Cells/LPF      Rare WBC seen      Many Gram positive cocci      Moderate Gram Negative Rods           Laboratory:  CBC:  Recent Labs   Lab 05/12/25 0221   WBC 14.22*   RBC 3.99*   HGB 11.0*   HCT 35.9*      MCV 90   MCH 27.6   MCHC 30.6*       CMP:  Recent Labs   Lab 05/12/25  0221 05/12/25  0620 05/12/25  1351   CALCIUM 9.6  --   --    ALBUMIN 3.0*  --   --    PROT 7.7  --   --      139   < > 142   K 4.8  --   --    CO2 25  --   --      --   --    BUN 20  --   --    CREATININE 0.8  --   --    ALKPHOS 96  --   --    ALT 16  --   --    AST 27  --   --   "  BILITOT 0.5  --   --     < > = values in this interval not displayed.     Recent Labs   Lab 05/12/25  0221   MG 2.3   PHOS 2.9       Coagulation:  Recent Labs   Lab 05/10/25  1734 05/11/25  0031 05/11/25  0724   INR 1.0  --   --    APTT 22.0   < > 45.5*    < > = values in this interval not displayed.       Lipid Panel:  No results for input(s): "CHOL", "HDL", "LDLCALC", "TRIG" in the last 168 hours.    Endocrine:  No results for input(s): "HGBA1C", "TSH" in the last 72 hours.    ABG:  No results for input(s): "PH", "PCO2", "HCO3", "POCSATURATED", "BE" in the last 72 hours.    Urinalysis:  Recent Labs   Lab 05/06/25  0750   COLORU Colorless*   SPECGRAV >=1.030*   PHUR 6.0   OCCULTUA 2+*   RBCUA 17*   WBCUA 3   BACTERIA Rare   GLUCUA 3+*   PROTEINUA 2+*   BILIRUBINUA Negative       Diet  Diet NPO  Diet NPO      "

## 2025-05-12 NOTE — PT/OT/SLP PROGRESS
"Speech Language Pathology Treatment    Patient Name:  Waldo Emmanuel   MRN:  [id number]  Admitting Diagnosis: SAH (subarachnoid hemorrhage)     Recommendations:                 General Recommendations:  Dysphagia therapy, Speech/language therapy, Cognitive-linguistic therapy, Further objective assessment of swallow function likely warranted (MBSS, FEES) as feasible   Diet recommendations:  Other (Comment) (pending further instrumental assessment), Liquid Diet Level: Thin liquids - IDDSI Level 0 ice chips ok for comfort pending further assessment   Aspiration Precautions: Frequent oral care, Ice chips sparingly, and Strict aspiration precautions   General Precautions: Standard, aspiration, fall  Communication strategies:  provide increased time to answer and go to room if call light pushed    Assessment:     Waldo Emmanuel is a 49 y.o. female with an SLP diagnosis of Dysphagia.  She presents with episodes of decreased in SpO2 during PO trials. RN aware.  SLP unable to r/o silent aspiration at the bedside.      Subjective     SLP reviewed Pt with RN, RN explained Patient NPO pending re-evaluation of swallow with Speech   Pt presents easily distracted   She explains, "Its hurts more when she sits me up"     Pain/Comfort:  Pain Rating 1: 3/10  Location - Orientation 1: generalized  Location 1: neck  Pain Addressed 1: Nurse notified, Distraction, Reposition  Pain Rating Post-Intervention 1: 3/10    Respiratory Status: Room air    Objective:     Has the patient been evaluated by SLP for swallowing?   Yes  Keep patient NPO? No     Pt unavailable upon initial attempts (testing.)   Upon later attempt, Patient found awake in bed with EVD in place. RNs at the bedside. RN clamped EVD and cleared Pt for HOB elevation. Pt was alert and oriented though easily distracted by neck pain. She endorsed change in vocal intensity since prior session.  She endorsed increased dizziness with HOB elevation which impeded " endurance for PO trials. RN aware.  She denied difficulty with swallowing medications whole with thin liquids this service day. Her voice was mildly breathy with mildly decreased intensity which she was able to increase on command. Her speech was precise. She demonstrated adequate lingual/labial/ strength and coordination upon review of the oral mechanism. Her cough and throat clear were adequate upon command. Pt with productive cough x1 prior to initiation of PO trials. She was seen with trials of thin liquids (cup edge sips x4) and bites of puree (tsp bites x3.) Pt with decreased in SpO2 (86%) x1 with larger self-fed cup sip thin liquids and decrease in Sp02 (86% ) following tsp bite of puree. SLP unable to r/o aspiration at the bedside. Pt and family were educated on ongoing aspiration precautions and recommendations for further objective assessment via MBSS as feasible.  Patient with partial demonstration of understanding and frequent movement and asked to be repositioned 2/2 pain.  RN in room and aware.  RN repositioned Pt in bed and remained in the room as SLP discontinued session.  Findings and recommendations reviewed with MD team.   Patient in deep sleep state upon later attempt to review SLP POC.      Goals:   Multidisciplinary Problems       SLP Goals          Problem: SLP    Goal Priority Disciplines Outcome   SLP Goal     SLP Progressing   Description: Speech Language Pathology Goals  Goals expected to be met by 5/15/25    1. Pt will participate in ongoing assessment of swallow function to determine safest, least restrictive means of nutrition/hydration  2. Pt will participate in full assessment of cognitive-linguistic skills to determine ongoing POC needs  3.  Educate Pt and family on aspiration precautions and S/S aspiration                          Plan:     Patient to be seen:  4 x/week   Plan of Care expires:  06/07/25  Plan of Care reviewed with:  patient, daughter, family   SLP Follow-Up:  Yes        Discharge recommendations:  High Intensity Therapy   Barriers to Discharge:  diet not yet determined     Time Tracking:     SLP Treatment Date:   05/12/25  Speech Start Time:  1129 /13:31  Speech Stop Time:  1155     Speech Total Time (min):  26 min    Billable Minutes: Treatment Swallowing Dysfunction 10 and Self Care/Home Management Training 15    05/12/2025

## 2025-05-12 NOTE — EICU
Intervention Initiated From:  COR / MELISAU    Kendall intervened regarding:  Rounding (Video assessment)  VICU Night Rounds Checklist  24H Vital Sign Range:  Temp:  [98.6 °F (37 °C)-100.3 °F (37.9 °C)]   Pulse:  []   Resp:  [17-33]   BP: ()/()   SpO2:  [91 %-100 %]     Video rounds

## 2025-05-12 NOTE — PROGRESS NOTES
[facility] - Neuro Critical Care  Neurosurgery  Progress Note    Subjective:     History of Present Illness: 49f presenting after syncope with CTH demonstrating SAH. CTA without clear vascular malformation. Intubated prior to arrival to [facility] ED. Discussed expected hospital course and imaging with daughter in room    Post-Op Info:  * No surgery found *       Interval History: 5/12 naeon, exam stable, MRI yesterday showed R Cerbellar infarct. EVD and ICP WNL     Medications:  Continuous Infusions:   dexmedeTOMIDine (Precedex) infusion (titrating)  0-0.8 mcg/kg/hr Intravenous Continuous   Stopped at 05/12/25 0701    sodium chloride (23.4%) HYPERTONIC 4 mEq/mL 172 mEq in sterile water 500 mL (sodium chloride 2%) infusion   Intravenous Continuous 75 mL/hr at 05/12/25 0801 Rate Verify at 05/12/25 0801     Scheduled Meds:   albuterol-ipratropium  3 mL Nebulization Q6H    aspirin  81 mg Oral Daily    atorvastatin  40 mg Oral Daily    ceFAZolin (Ancef) IV (PEDS and ADULTS)  2 g Intravenous Q8H    levETIRAcetam  500 mg Oral BID    mupirocin   Nasal BID    niMODipine  60 mg Oral Q4H    senna-docusate  1 tablet Oral BID    sodium chloride 0.9%  500 mL Intravenous Once    valsartan  80 mg Oral Daily     PRN Meds:  Current Facility-Administered Medications:     acetaminophen, 650 mg, Oral, Q6H PRN    bisacodyL, 10 mg, Rectal, Daily PRN    gabapentin, 300 mg, Oral, Q8H PRN    hydrALAZINE, 10 mg, Intravenous, Q6H PRN    labetalol, 10 mg, Intravenous, Q4H PRN    methocarbamoL, 500 mg, Oral, Q6H PRN    oxyCODONE, 5 mg, Oral, Q6H PRN    potassium bicarbonate, 35 mEq, Oral, PRN    potassium bicarbonate, 50 mEq, Oral, PRN    potassium bicarbonate, 60 mEq, Oral, PRN    potassium, sodium phosphates, 2 packet, Oral, PRN    potassium, sodium phosphates, 2 packet, Oral, PRN    potassium, sodium phosphates, 2 packet, Oral, PRN    sodium chloride 0.9%, 10 mL, Intravenous, PRN     Review of Systems  Objective:     Weight: 100.2 kg (221  lb)  Body mass index is 39.15 kg/m².  Vital Signs (Most Recent):  Temp: 99.6 °F (37.6 °C) (05/12/25 0829)  Pulse: 106 (05/12/25 0807)  Resp: (!) 29 (05/12/25 0830)  BP: (!) 141/91 (05/12/25 0829)  SpO2: 97 % (05/12/25 0807) Vital Signs (24h Range):  Temp:  [98.9 °F (37.2 °C)-100.3 °F (37.9 °C)] 99.6 °F (37.6 °C)  Pulse:  [] 106  Resp:  [14-35] 29  SpO2:  [91 %-100 %] 97 %  BP: ()/() 141/91     Date 05/12/25 0700 - 05/13/25 0659   Shift 7602-3698 9779-1284 8067-4294 24 Hour Total   INTAKE   P.O. 150   150   I.V.(mL/kg) 145.3(1.4)   145.3(1.4)   Shift Total(mL/kg) 295.3(2.9)   295.3(2.9)   OUTPUT   Urine(mL/kg/hr) 250   250   Drains 21   21   Shift Total(mL/kg) 271(2.7)   271(2.7)   Weight (kg) 100.2 100.2 100.2 100.2                            ICP/Ventriculostomy 05/06/25 0920 Right Parietal region (Active)   Level of Ventriculostomy (cm above) 10 05/10/25 1905   Status Open to drainage 05/11/25 0205   Site Assessment Clean;Dry 05/11/25 0205   Site Drainage No drainage 05/11/25 0205   Waveform normal waveform 05/10/25 1905   Output (mL) 15 mL 05/10/25 2205   CSF Color red 05/11/25 0205   Dressing Status Clean;Dry;Intact 05/11/25 0205   Interventions HOB degrees;bed controls locked;zeroed 05/10/25 0605       Female External Urinary Catheter w/ Suction 05/07/25 1732 (Active)   Skin no redness;no breakdown 05/10/25 1701   Tolerance no signs/symptoms of discomfort 05/10/25 1701   Suction Continuous suction at 40 mmHg 05/08/25 1905   Date of last wick change 05/08/25 05/08/25 1905   Time of last wick change 1905 05/08/25 1905   Output (mL) 250 mL 05/10/25 1001          Physical Exam         Neurosurgery Physical Exam    E4V5M6  Awake, alert, Ox4  Cni  Follows commands x4 grossly full strength throughout  SILT  R dysmetria     EVD Incision dressed CDI     Significant Labs:  Recent Labs   Lab 05/11/25  0031 05/11/25  1421 05/11/25  2209 05/12/25  0221 05/12/25  0620   *  --   --  129*  --    *    < > 139 139  139 139   K 4.0  --   --  4.8  --    CL 96  --   --  106  --    CO2 26  --   --  25  --    BUN 15  --   --  20  --    CREATININE 0.7  --   --  0.8  --    CALCIUM 9.5  --   --  9.6  --    MG 2.2  --   --  2.3  --     < > = values in this interval not displayed.     Recent Labs   Lab 05/10/25  1734 05/11/25  0031 05/12/25  0221   WBC 11.60 16.43* 14.22*   HGB 10.3* 11.6* 11.0*   HCT 33.2* 36.2* 35.9*    421 393     Recent Labs   Lab 05/10/25  1734 05/11/25  0031 05/11/25  0724   INR 1.0  --   --    APTT 22.0 45.3* 45.5*     Microbiology Results (last 7 days)       Procedure Component Value Units Date/Time    Culture, Respiratory with Gram Stain [[id number]] Collected: 05/10/25 2035    Order Status: Completed Specimen: Respiratory from Sputum Updated: 05/11/25 1035     GRAM STAIN <10 Epithelial Cells/LPF      Rare WBC seen      Many Gram positive cocci      Moderate Gram Negative Rods    Culture, Respiratory with Gram Stain [[id number]] Collected: 05/10/25 1120    Order Status: Completed Specimen: Respiratory from Sputum, Expectorated Updated: 05/11/25 0036     GRAM STAIN <10 Epithelial Cells/LPF      Rare WBC seen      Many Gram positive cocci      Many Gram Negative Rods          All pertinent labs from the last 24 hours have been reviewed.    Significant Diagnostics:  CT: CT Head Without Contrast  Result Date: 5/11/2025  1. Compared to prior head CT performed 05/06/2025, 20:13 hours, evolving relatively large right cerebellar infarct without macroscopic hemorrhage within the infarct territory.  Questionable hypoattenuation involving the brainstem which may be artifactual given coil artifact, however additional areas of ischemia not excluded.  MRI may be considered for further characterization.  Further effacement of the 4th ventricle since the prior study, however there has been slight decompression of the lateral and 3rd ventricles since the prior exam. 2. Relatively similar subarachnoid and  interventricular hemorrhage.  No new or enlarging areas of intracranial hemorrhage appreciated. This report was flagged in Epic as abnormal. Electronically signed by: Mich Downs Date:    05/11/2025 Time:    02:09    MRI: No results found in the last 24 hours.  Assessment/Plan:     * SAH (subarachnoid hemorrhage)  49f presenting after syncope with CTH demonstrating SAH. CTA with AICA aneurysm R. Intubated prior to arrival to [facility] ED.     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 Barney Almendarez MD  Neurosurgery  [facility] - Neuro Critical Care

## 2025-05-12 NOTE — PLAN OF CARE
[facility] Care Plan  POC reviewed with Waldo Emmanuel and family at 1400. Patient and Family verbalized understanding. Questions and concerns addressed. No acute events today. Pt progressing toward goals. Will continue to monitor. See below and flowsheets for full assessment and VS info.   EVD open at 10 ICP less than 17, CSF less than 19            Is this a stroke patient? yes    Neuro:  Roro Coma Scale  Best Eye Response: 4-->(E4) spontaneous  Best Motor Response: 6-->(M6) obeys commands  Best Verbal Response: 5-->(V5) oriented  [place]lyn Coma Scale Score: 15  Assessment Qualifiers: patient chemically sedated or paralyzed  Pupil PERRLA: yes     24 hr Temp:  [98.1 °F (36.7 °C)-99.6 °F (37.6 °C)]     CV:   Rhythm: sinus tachycardia  BP goals:   SBP < 160  MAP > 65    Resp:      Vent Mode: Spont  Set Rate: 20 BPM  Oxygen Concentration (%): 40  Vt Set: 375 mL  PEEP/CPAP: 5 cmH20  Pressure Support: 5 cmH20    Plan: N/A    GI/:     Diet/Nutrition Received: NPO  Last Bowel Movement: 05/12/25  Voiding Characteristics: voids spontaneously without difficulty    Intake/Output Summary (Last 24 hours) at 5/12/2025 1611  Last data filed at 5/12/2025 1601  Gross per 24 hour   Intake 2403.73 ml   Output 3023 ml   Net -619.27 ml     Unmeasured Output  Unmeasured Urine Occurrence: 1  Unmeasured Stool Occurrence: 1  Pad Count: 1    Labs/Accuchecks:  Recent Labs   Lab 05/12/25 0221   WBC 14.22*   RBC 3.99*   HGB 11.0*   HCT 35.9*         Recent Labs   Lab 05/12/25  0221 05/12/25  0620 05/12/25  1351     139   < > 142   K 4.8  --   --    CO2 25  --   --      --   --    BUN 20  --   --    CREATININE 0.8  --   --    ALKPHOS 96  --   --    ALT 16  --   --    AST 27  --   --    BILITOT 0.5  --   --     < > = values in this interval not displayed.      Recent Labs   Lab 05/10/25  1734 05/11/25  0031 05/11/25  0724   PROTIME 11.4  --   --    INR 1.0  --   --    APTT 22.0   < > 45.5*    < > = values in this  interval not displayed.      Recent Labs   Lab 25  0148   TROPONINI <0.006       Electrolytes: N/A - electrolytes WDL  Accuchecks: none    Gtts:   dexmedeTOMIDine (Precedex) infusion (titrating)  0-0.8 mcg/kg/hr Intravenous Continuous   Stopped at 25 0701    sodium chloride (23.4%) HYPERTONIC 4 mEq/mL 172 mEq in sterile water 500 mL (sodium chloride 2%) infusion   Intravenous Continuous 75 mL/hr at 25 1601 Rate Verify at 25 1601       LDA/Wounds:    Ivan Risk Assessment  Sensory Perception: 3-->slightly limited  Moisture: 3-->occasionally moist  Activity: 1-->bedfast  Mobility: 3-->slightly limited  Nutrition: 2-->probably inadequate  Friction and Shear: 3-->no apparent problem  Ivan Score: 15    Is your ivan score 12 or less? no            Restraints:   Restraint Order  Length of Order: Order good for next 24 hours or when removed.  Date that the current order will : 25  Time that the current order will : 1115  Order Upon Application: Yes    [facility]

## 2025-05-12 NOTE — SUBJECTIVE & OBJECTIVE
Interval History: 5/12 naeon, exam stable, MRI yesterday showed R Cerbellar infarct. EVD and ICP WNL     Medications:  Continuous Infusions:   dexmedeTOMIDine (Precedex) infusion (titrating)  0-0.8 mcg/kg/hr Intravenous Continuous   Stopped at 05/12/25 0701    sodium chloride (23.4%) HYPERTONIC 4 mEq/mL 172 mEq in sterile water 500 mL (sodium chloride 2%) infusion   Intravenous Continuous 75 mL/hr at 05/12/25 0801 Rate Verify at 05/12/25 0801     Scheduled Meds:   albuterol-ipratropium  3 mL Nebulization Q6H    aspirin  81 mg Oral Daily    atorvastatin  40 mg Oral Daily    ceFAZolin (Ancef) IV (PEDS and ADULTS)  2 g Intravenous Q8H    levETIRAcetam  500 mg Oral BID    mupirocin   Nasal BID    niMODipine  60 mg Oral Q4H    senna-docusate  1 tablet Oral BID    sodium chloride 0.9%  500 mL Intravenous Once    valsartan  80 mg Oral Daily     PRN Meds:  Current Facility-Administered Medications:     acetaminophen, 650 mg, Oral, Q6H PRN    bisacodyL, 10 mg, Rectal, Daily PRN    gabapentin, 300 mg, Oral, Q8H PRN    hydrALAZINE, 10 mg, Intravenous, Q6H PRN    labetalol, 10 mg, Intravenous, Q4H PRN    methocarbamoL, 500 mg, Oral, Q6H PRN    oxyCODONE, 5 mg, Oral, Q6H PRN    potassium bicarbonate, 35 mEq, Oral, PRN    potassium bicarbonate, 50 mEq, Oral, PRN    potassium bicarbonate, 60 mEq, Oral, PRN    potassium, sodium phosphates, 2 packet, Oral, PRN    potassium, sodium phosphates, 2 packet, Oral, PRN    potassium, sodium phosphates, 2 packet, Oral, PRN    sodium chloride 0.9%, 10 mL, Intravenous, PRN     Review of Systems  Objective:     Weight: 100.2 kg (221 lb)  Body mass index is 39.15 kg/m².  Vital Signs (Most Recent):  Temp: 99.6 °F (37.6 °C) (05/12/25 0829)  Pulse: 106 (05/12/25 0807)  Resp: (!) 29 (05/12/25 0830)  BP: (!) 141/91 (05/12/25 0829)  SpO2: 97 % (05/12/25 0807) Vital Signs (24h Range):  Temp:  [98.9 °F (37.2 °C)-100.3 °F (37.9 °C)] 99.6 °F (37.6 °C)  Pulse:  [] 106  Resp:  [14-35] 29  SpO2:  [91  %-100 %] 97 %  BP: ()/() 141/91     Date 05/12/25 0700 - 05/13/25 0659   Shift 1691-9615 0685-6647 9009-1723 24 Hour Total   INTAKE   P.O. 150   150   I.V.(mL/kg) 145.3(1.4)   145.3(1.4)   Shift Total(mL/kg) 295.3(2.9)   295.3(2.9)   OUTPUT   Urine(mL/kg/hr) 250   250   Drains 21   21   Shift Total(mL/kg) 271(2.7)   271(2.7)   Weight (kg) 100.2 100.2 100.2 100.2                            ICP/Ventriculostomy 05/06/25 0920 Right Parietal region (Active)   Level of Ventriculostomy (cm above) 10 05/10/25 1905   Status Open to drainage 05/11/25 0205   Site Assessment Clean;Dry 05/11/25 0205   Site Drainage No drainage 05/11/25 0205   Waveform normal waveform 05/10/25 1905   Output (mL) 15 mL 05/10/25 2205   CSF Color red 05/11/25 0205   Dressing Status Clean;Dry;Intact 05/11/25 0205   Interventions HOB degrees;bed controls locked;zeroed 05/10/25 0605       Female External Urinary Catheter w/ Suction 05/07/25 1732 (Active)   Skin no redness;no breakdown 05/10/25 1701   Tolerance no signs/symptoms of discomfort 05/10/25 1701   Suction Continuous suction at 40 mmHg 05/08/25 1905   Date of last wick change 05/08/25 05/08/25 1905   Time of last wick change 1905 05/08/25 1905   Output (mL) 250 mL 05/10/25 1001          Physical Exam         Neurosurgery Physical Exam    [id number]  Awake, alert, Ox4  Cni  Follows commands x4 grossly full strength throughout  SILT  R dysmetria     EVD Incision dressed CDI     Significant Labs:  Recent Labs   Lab 05/11/25  0031 05/11/25  1421 05/11/25  2209 05/12/25  0221 05/12/25  0620   *  --   --  129*  --    *   < > 139 139  139 139   K 4.0  --   --  4.8  --    CL 96  --   --  106  --    CO2 26  --   --  25  --    BUN 15  --   --  20  --    CREATININE 0.7  --   --  0.8  --    CALCIUM 9.5  --   --  9.6  --    MG 2.2  --   --  2.3  --     < > = values in this interval not displayed.     Recent Labs   Lab 05/10/25  1734 05/11/25  0031 05/12/25  0221   WBC 11.60 16.43*  14.22*   HGB 10.3* 11.6* 11.0*   HCT 33.2* 36.2* 35.9*    421 393     Recent Labs   Lab 05/10/25  1734 05/11/25  0031 05/11/25  0724   INR 1.0  --   --    APTT 22.0 45.3* 45.5*     Microbiology Results (last 7 days)       Procedure Component Value Units Date/Time    Culture, Respiratory with Gram Stain [[id number]] Collected: 05/10/25 2035    Order Status: Completed Specimen: Respiratory from Sputum Updated: 05/11/25 1035     GRAM STAIN <10 Epithelial Cells/LPF      Rare WBC seen      Many Gram positive cocci      Moderate Gram Negative Rods    Culture, Respiratory with Gram Stain [[id number]] Collected: 05/10/25 1120    Order Status: Completed Specimen: Respiratory from Sputum, Expectorated Updated: 05/11/25 0036     GRAM STAIN <10 Epithelial Cells/LPF      Rare WBC seen      Many Gram positive cocci      Many Gram Negative Rods          All pertinent labs from the last 24 hours have been reviewed.    Significant Diagnostics:  CT: CT Head Without Contrast  Result Date: 5/11/2025  1. Compared to prior head CT performed 05/06/2025, 20:13 hours, evolving relatively large right cerebellar infarct without macroscopic hemorrhage within the infarct territory.  Questionable hypoattenuation involving the brainstem which may be artifactual given coil artifact, however additional areas of ischemia not excluded.  MRI may be considered for further characterization.  Further effacement of the 4th ventricle since the prior study, however there has been slight decompression of the lateral and 3rd ventricles since the prior exam. 2. Relatively similar subarachnoid and interventricular hemorrhage.  No new or enlarging areas of intracranial hemorrhage appreciated. This report was flagged in Epic as abnormal. Electronically signed by: Mich Downs Date:    05/11/2025 Time:    02:09    MRI: No results found in the last 24 hours.

## 2025-05-12 NOTE — PT/OT/SLP PROGRESS
Physical Therapy      Patient Name:  Waldo Emmanuel   MRN:  [id number]    PT attempted to treat patient at 0945, but pt was getting a TCD. PT unable to return today for an additional attempt. Will follow-up as appropriate.

## 2025-05-12 NOTE — PLAN OF CARE
Problem: SLP  Goal: SLP Goal  Description: Speech Language Pathology Goals  Goals expected to be met by 5/15/25    1. Pt will participate in ongoing assessment of swallow function to determine safest, least restrictive means of nutrition/hydration  2. Pt will participate in full assessment of cognitive-linguistic skills to determine ongoing POC needs  3.  Educate Pt and family on aspiration precautions and S/S aspiration     Outcome: Progressing     Pt seen for reassessment of swallow function.  SLP unable to r/o silent aspiration at the bedside. Patient would benefit from further, objective assessment of swallow function as feasible. Findings/recommendations reviewed with RN and MD team.     5/12/2025

## 2025-05-12 NOTE — PLAN OF CARE
[facility] Care Plan    POC reviewed with Waldo Karen Emmanuel and family at 0300. Patient and Family verbalized understanding. Questions and concerns addressed. No acute events today. Pt progressing toward goals. Will continue to monitor. See below and flowsheets for full assessment and VS info.       - NAEON  - electrolytes WDL- no replacements  - ICP: <17      CSF: 2-13              Is this a stroke patient? Yes- provided stroke booklet    Neuro:  [place] Coma Scale  Best Eye Response: 4-->(E4) spontaneous  Best Motor Response: 6-->(M6) obeys commands  Best Verbal Response: 5-->(V5) oriented  [place] Coma Scale Score: 15  Assessment Qualifiers: patient chemically sedated or paralyzed  Pupil PERRLA: yes     24 hr Temp:  [98.6 °F (37 °C)-100.3 °F (37.9 °C)]     CV:   Rhythm: sinus tachycardia  BP goals:   SBP < 160  MAP > 65    Resp:      Vent Mode: Spont  Set Rate: 20 BPM  Oxygen Concentration (%): 40  Vt Set: 375 mL  PEEP/CPAP: 5 cmH20  Pressure Support: 5 cmH20    Plan: N/A    GI/:     Diet/Nutrition Received: regular  Last Bowel Movement: 05/10/25  Voiding Characteristics: voids spontaneously without difficulty    Intake/Output Summary (Last 24 hours) at 5/12/2025 0603  Last data filed at 5/12/2025 0505  Gross per 24 hour   Intake 1391.06 ml   Output 2318 ml   Net -926.94 ml     Unmeasured Output  Unmeasured Urine Occurrence: 1  Unmeasured Stool Occurrence: 1  Pad Count: 1    Labs/Accuchecks:  Recent Labs   Lab 05/12/25 0221   WBC 14.22*   RBC 3.99*   HGB 11.0*   HCT 35.9*         Recent Labs   Lab 05/12/25  0221     139   K 4.8   CO2 25      BUN 20   CREATININE 0.8   ALKPHOS 96   ALT 16   AST 27   BILITOT 0.5      Recent Labs   Lab 05/10/25  1734 05/11/25  0031 05/11/25  0724   PROTIME 11.4  --   --    INR 1.0  --   --    APTT 22.0   < > 45.5*    < > = values in this interval not displayed.      Recent Labs   Lab 05/06/25  0148   TROPONINI <0.006       Electrolytes: N/A - electrolytes  WDL  Accuchecks: none    Gtts:   dexmedeTOMIDine (Precedex) infusion (titrating)  0-0.8 mcg/kg/hr Intravenous Continuous 12.53 mL/hr at 25 0505 0.5 mcg/kg/hr at 25 0505    sodium chloride (23.4%) HYPERTONIC 4 mEq/mL 172 mEq in sterile water 500 mL (sodium chloride 2%) infusion   Intravenous Continuous 75 mL/hr at 25 0505 Rate Verify at 25 0505       LDA/Wounds:    Ivan Risk Assessment  Sensory Perception: 3-->slightly limited  Moisture: 3-->occasionally moist  Activity: 1-->bedfast  Mobility: 3-->slightly limited  Nutrition: 2-->probably inadequate  Friction and Shear: 3-->no apparent problem  Ivan Score: 15  Is your ivan score 12 or less? no          Restraints:   Restraint Order  Length of Order: Order good for next 24 hours or when removed.  Date that the current order will : 25  Time that the current order will : 1115  Order Upon Application: Yes    [facility]

## 2025-05-12 NOTE — ASSESSMENT & PLAN NOTE
CT on 5/11 revealed R cerebellar infarct. Likely uriah-angio  Family aware and imaging reviewed  Atorvastatin 40  ASA 81  Holding heparin gtt, plan to restart pending arterial BLE US  Concern for brain compression in post wilma  SOC watch, NSGY following   Given 3% HTS 250ml bolus x1  2% HTS gtt @ 75  Na checks q6h for goal > 145

## 2025-05-12 NOTE — ASSESSMENT & PLAN NOTE
49f presenting after syncope with CTH demonstrating SAH. CTA with AICA aneurysm R. Intubated prior to arrival to [facility] ED.     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 Corley

## 2025-05-12 NOTE — EICU
Virtual ICU Quality Rounds    Admit Date: 5/6/2025  Hospital Day: 6    ICU Day: 6d    24H Vital Sign Range:  Temp:  [98.9 °F (37.2 °C)-100.3 °F (37.9 °C)]   Pulse:  []   Resp:  [14-35]   BP: ()/()   SpO2:  [91 %-100 %]     VICU Surveillance Screening                    LDA reconciliation : Yes

## 2025-05-12 NOTE — PLAN OF CARE
[facility] - Neuro Critical Care  Discharge Reassessment    Primary Care Provider: Ansley Vivas MD    Expected Discharge Date: 5/13/2025    Patient is not medically ready for discharge. Patient is high intensity therapy level. CM to meet with patient to discuss discharge planning.    Update:  CM met with daughter at bedside to discuss discharge planning. Daughter receptive to conversation, however, no referrals sent at this time.    Discharge Plan A and Plan B have been determined by review of patient's clinical status, future medical and therapeutic needs, and coverage/benefits for post-acute care in coordination with multidisciplinary team members.     Reassessment (most recent)       Discharge Reassessment - 05/12/25 1344          Discharge Reassessment    Assessment Type Discharge Planning Reassessment     Did the patient's condition or plan change since previous assessment? No     Discharge Plan discussed with: Adult children     Communicated ELIEL with patient/caregiver Date not available/Unable to determine     Discharge Plan A Rehab     Discharge Plan B Home with family;Home Health     DME Needed Upon Discharge  other (see comments)   tbd    Transition of Care Barriers None     Why the patient remains in the hospital Requires continued medical care        Post-Acute Status    Discharge Delays None known at this time

## 2025-05-12 NOTE — ASSESSMENT & PLAN NOTE
Patient is a 49-year-old female with past medical history of hypertension presenting via transfer from [place] for higher level of care after being diagnosed with subarachnoid hemorrhage.    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  S/p EVD 5/6  S/p Coil emolization 5/6    - EVD open at 10  - Neurosurgery consulted  - Vascular Neurology consulted  - SBP< 160   - nimodipine 60 mg q4h   - daily TCDs - negative for vasospasm thus far  - daily aspirin 81 per neuro IR  - atorvastatin 40 mg  - euvolemia  - Na > 145  - CTH 5/11 with new R PICA/ superior cerebellar infarct.  SOC watch  - PBD7- plan for MRA c/s contrast for reevaluation of coiled aneurysm  - SLP consult for diet, plan for FEES tomorrow.   - SCDs; lovenox for VTE ppx  - Delirium precautions. Started seoquel 25qHS.   -PT/OT

## 2025-05-12 NOTE — ASSESSMENT & PLAN NOTE
monitor EVD output closely  HTS therapy  Na goal > 145  Neurochecks  CTH w R cerebellar infarct   SOC watch

## 2025-05-12 NOTE — PT/OT/SLP PROGRESS
Occupational Therapy      Patient Name:  Waldo Emmanuel   MRN:  [id number]    OT attempted to treat patient at 0945, but pt was getting a TCD. OT unable to return today for an additional attempt. Will follow-up as appropriate.     5/12/2025

## 2025-05-12 NOTE — ASSESSMENT & PLAN NOTE
-- Pt on Warfarin outpatient, currently held  -- Pt was on a Heparin gtt for for R brachial DVT and hx arterial occlusion. 5/11/25 CTH showed large R cerebellar infarct and heparin gtt was held on 5/11.   Lower extremity arterial US ordered given hx RLE arterial occlusion, plan to restart heparin gtt pending results.   Started lovenox in the interim for VTE ppx

## 2025-05-12 NOTE — PROGRESS NOTES
[facility]rivast[facility] - Neuro Critical Care  Neurocritical Care  Progress Note    Admit Date: 5/6/2025  Service Date: 05/12/2025  Length of Stay: 6    Subjective:     Chief Complaint: SAH (subarachnoid hemorrhage)    History of Present Illness: History obtained via chart review and daughter at bedside as patient intubated on arrival.     Per ED note:  49 y.o. female, PMH HTN and anemia,  presenting as a transfer for neurosurgical evaluation with newly diagnosed ICH from outside hospital.  Patient was transferred from [facility].  Daughter at bedside helps provide history.  She states that she was at a grocery store when she had a syncopal event.  She was unsure if she hit her head.  She states that people on the scene called her to let her know what happened and after she was seen in the ED they told her that she had bleeding inside of her head.  She states that while in the ED her mother was answering questions appropriately and acting like her normal self except frequently falling asleep.  Patient was intubated for airway protection prior to arrival.  Patient and reversal with [facility] prior to transfer.  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 Distal location suspicious for mycotic aneurysm     [place] [facility] consulted, EVD placed and admitted to neurocritical care    Hospital Course: 5/7/2025: extubated today to NC, SBP liberalized to 220, d/c jay cath,   5/8/2025: EVD per NSSGY, TCD's no vasospasm, DVT prophylaxis initiated, pending to hear from NSGY team when ok to start AC  05/09/2025: repeat CXR today, plan for CT chest, resp cx , add IS, EVD @10 per NSGY, TCDs today no vasospasm  5/10: Discussed with Dr. Chase and Neurosurgery, Heparin gtt started for R brachial DVT and hx arterial occlusion, will need repeat CTH when therapeutic. Hold off on transitioning to Coumadin until cleared by  NSGY. CT Chest negative for DVT, but concerning for PNA. Repeat sputum cx sent. Regular diet started. Repeat Na improved to 138.   5/11: right cerebellar CVA on imaging, HTS, MRI, family updated at bedside, place andreia, hold heparin gtt   05/12/2025 EVD @ 10. SOC watch. PBD7- plan for MRA c/s contrast for reevaluation of coiled aneurysm. Dc keppra. TCDs negative for spasm. Given 500NS for euvolemia. Given 3% HTS 250ml bolus and continue 2% HTS gtt @ 75. Na checks q6h for goal > 145. Started valsartan 80. Lower extremity arterial US ordered given hx RLE arterial occlusion, plan to restart heparin gtt pending results. SLP consult for diet, plan for FEES tomorrow. Started lovenox. Started seoquel 25qHS. Resp cx w GNR, many GPCs- has low grade temps possibly 2/2 DVT and stable mild leukocytosis so will hold off on abx for now.     Review of Systems   Constitutional:  Positive for fever. Negative for chills.   Respiratory:  Negative for shortness of breath.    Cardiovascular:  Negative for chest pain.   Gastrointestinal:  Negative for abdominal pain.   Genitourinary:  Negative for difficulty urinating.   Musculoskeletal:  Positive for neck stiffness. Negative for neck pain.   Neurological:  Positive for headaches. Negative for weakness and numbness.       Objective:     Vitals:  Temp: 98.1 °F (36.7 °C)  Pulse: 93  Rhythm: sinus tachycardia  BP: (!) 157/72  MAP (mmHg): 111  ICP Mean (mmHg): 6 mmHg  Resp: 17  SpO2: 100 %    Temp  Min: 98.1 °F (36.7 °C)  Max: 100.3 °F (37.9 °C)  Pulse  Min: 77  Max: 114  BP  Min: 86/53  Max: 191/82  MAP (mmHg)  Min: 64  Max: 129  ICP Mean (mmHg)  Min: 3 mmHg  Max: 17 mmHg  Resp  Min: 14  Max: 35  SpO2  Min: 94 %  Max: 100 %    05/11 0701 - 05/12 0700  In: 1480.1 [I.V.:1480.1]  Out: 2318 [Urine:2050; Drains:268]   Unmeasured Output  Unmeasured Urine Occurrence: 1  Unmeasured Stool Occurrence: 1  Pad Count: 1        Physical Exam  Vitals and nursing note reviewed.   Constitutional:        General: She is not in acute distress.     Appearance: She is obese.   HENT:      Head:      Comments: EVD site c/d/i     Right Ear: External ear normal.      Left Ear: External ear normal.      Nose: Nose normal.      Mouth/Throat:      Mouth: Mucous membranes are moist.      Pharynx: Oropharynx is clear.   Eyes:      Pupils: Pupils are equal, round, and reactive to light.   Cardiovascular:      Rate and Rhythm: Normal rate and regular rhythm.   Pulmonary:      Effort: Pulmonary effort is normal. No respiratory distress.   Abdominal:      General: There is no distension.      Palpations: Abdomen is soft.      Tenderness: There is no abdominal tenderness.   Musculoskeletal:      Cervical back: Normal range of motion. No rigidity.   Skin:     General: Skin is warm and dry.   Neurological:      Mental Status: She is alert.      Comments: E4 V5 M6  Mental status: Alert. Oriented x3. Speech fluent. No dysarthria. Following commands.   CN II-XII grossly intact, specifically:  PERRL  No facial asymmetry.   Tongue midline.   Shoulder shrug symmetric  Motor:  RUE 5/5  RLE 5/5  LUE 5/5  LLE 5/5  R dysmetria   Sensation intact and symmetric throughout    Psychiatric:         Mood and Affect: Mood normal.            Medications:  ContinuousdexmedeTOMIDine (Precedex) infusion (titrating), Last Rate: Stopped (05/12/25 0701)  sodium chloride (23.4%) HYPERTONIC 4 mEq/mL 172 mEq in sterile water 500 mL (sodium chloride 2%) infusion, Last Rate: 75 mL/hr at 05/12/25 1301    Scheduledalbuterol-ipratropium, 3 mL, Q6H  aspirin, 81 mg, Daily  atorvastatin, 40 mg, Daily  ceFAZolin (Ancef) IV (PEDS and ADULTS), 2 g, Q8H  enoxparin, 40 mg, Daily  mupirocin, , BID  niMODipine, 60 mg, Q4H  QUEtiapine, 25 mg, QHS  senna-docusate, 1 tablet, BID  valsartan, 80 mg, Daily    PRNacetaminophen, 650 mg, Q6H PRN  bisacodyL, 10 mg, Daily PRN  gabapentin, 300 mg, Q8H PRN  hydrALAZINE, 10 mg, Q6H PRN  labetalol, 10 mg, Q4H PRN  methocarbamoL, 500 mg, Q6H  PRN  oxyCODONE, 5 mg, Q6H PRN  potassium bicarbonate, 35 mEq, PRN  potassium bicarbonate, 50 mEq, PRN  potassium bicarbonate, 60 mEq, PRN  potassium, sodium phosphates, 2 packet, PRN  potassium, sodium phosphates, 2 packet, PRN  potassium, sodium phosphates, 2 packet, PRN  sodium chloride 0.9%, 10 mL, PRN      Today I personally reviewed pertinent medications, lines/drains/airways, imaging, cardiology results, laboratory results, microbiology results, notably:    5/11 CTH   1. Compared to prior head CT performed 05/06/2025, 20:13 hours, evolving relatively large right cerebellar infarct without macroscopic hemorrhage within the infarct territory.  Questionable hypoattenuation involving the brainstem which may be artifactual given coil artifact, however additional areas of ischemia not excluded.  MRI may be considered for further characterization.  Further effacement of the 4th ventricle since the prior study, however there has been slight decompression of the lateral and 3rd ventricles since the prior exam.  2. Relatively similar subarachnoid and interventricular hemorrhage.  No new or enlarging areas of intracranial hemorrhage appreciated.      Microbiology Results (last 7 days)       Procedure Component Value Units Date/Time    Gram stain [[id number]]     Order Status: Sent Specimen: CSF (Spinal Fluid) from CSF Tap, Tube 3     CSF culture [[id number]]     Order Status: Sent Specimen: CSF (Spinal Fluid) from CSF Tap, Tube 3     Culture, Respiratory with Gram Stain [[id number]]  (Abnormal) Collected: 05/10/25 1120    Order Status: Completed Specimen: Respiratory from Sputum, Expectorated Updated: 05/12/25 1112     Respiratory Culture Few Gram-negative Rods     Comment: Identification and susceptibility pending        GRAM STAIN <10 Epithelial Cells/LPF      Rare WBC seen      Many Gram positive cocci      Many Gram Negative Rods    Culture, Respiratory with Gram Stain [[id number]]  (Abnormal) Collected: 05/10/25  "2035    Order Status: Completed Specimen: Respiratory from Sputum Updated: 05/12/25 1108     Respiratory Culture Few Gram-negative Rods     Comment: Identification and susceptibility pending  with normal respiratory chichi        GRAM STAIN <10 Epithelial Cells/LPF      Rare WBC seen      Many Gram positive cocci      Moderate Gram Negative Rods           Laboratory:  CBC:  Recent Labs   Lab 05/12/25 0221   WBC 14.22*   RBC 3.99*   HGB 11.0*   HCT 35.9*      MCV 90   MCH 27.6   MCHC 30.6*       CMP:  Recent Labs   Lab 05/12/25 0221 05/12/25  0620 05/12/25  1351   CALCIUM 9.6  --   --    ALBUMIN 3.0*  --   --    PROT 7.7  --   --      139   < > 142   K 4.8  --   --    CO2 25  --   --      --   --    BUN 20  --   --    CREATININE 0.8  --   --    ALKPHOS 96  --   --    ALT 16  --   --    AST 27  --   --    BILITOT 0.5  --   --     < > = values in this interval not displayed.     Recent Labs   Lab 05/12/25 0221   MG 2.3   PHOS 2.9       Coagulation:  Recent Labs   Lab 05/10/25  1734 05/11/25  0031 05/11/25  0724   INR 1.0  --   --    APTT 22.0   < > 45.5*    < > = values in this interval not displayed.       Lipid Panel:  No results for input(s): "CHOL", "HDL", "LDLCALC", "TRIG" in the last 168 hours.    Endocrine:  No results for input(s): "HGBA1C", "TSH" in the last 72 hours.    ABG:  No results for input(s): "PH", "PCO2", "HCO3", "POCSATURATED", "BE" in the last 72 hours.    Urinalysis:  Recent Labs   Lab 05/06/25  0750   COLORU Colorless*   SPECGRAV >=1.030*   PHUR 6.0   OCCULTUA 2+*   RBCUA 17*   WBCUA 3   BACTERIA Rare   GLUCUA 3+*   PROTEINUA 2+*   BILIRUBINUA Negative       Diet  Diet NPO  Diet NPO      Assessment/Plan:     Neuro  * SAH (subarachnoid hemorrhage)  Patient is a 49-year-old female with past medical history of hypertension presenting via transfer from [place] for higher level of care after being diagnosed with subarachnoid hemorrhage.    CTA: 8.6 mm aneurysm arising off the " distal right anterior inferior cerebellar artery.  S/p EVD 5/6  S/p Coil emolization 5/6    - EVD open at 10  - Neurosurgery consulted  - Vascular Neurology consulted  - SBP< 160   - nimodipine 60 mg q4h   - daily TCDs - negative for vasospasm thus far  - daily aspirin 81 per neuro IR  - atorvastatin 40 mg  - euvolemia  - Na > 145  - CTH 5/11 with new R PICA/ superior cerebellar infarct.  SOC watch  - PBD7- plan for MRA c/s contrast for reevaluation of coiled aneurysm  - SLP consult for diet, plan for FEES tomorrow.   - SCDs; lovenox for VTE ppx  - Delirium precautions. Started seoquel 25qHS.   -PT/OT    Brain edema  See cerebellar stroke     Cerebellar stroke, acute  CT on 5/11 revealed R cerebellar infarct. Likely uriah-angio  Family aware and imaging reviewed  Atorvastatin 40  ASA 81  Holding heparin gtt, plan to restart pending arterial BLE US  Concern for brain compression in post wilma  SOC watch, NSGY following   Given 3% HTS 250ml bolus x1  2% HTS gtt @ 75  Na checks q6h for goal > 145    Acute encephalopathy  See primary problem    Obstructive hydrocephalus  EVD @ 10  R Cerebellar infarct, SOC watch.      Brain compression  monitor EVD output closely  HTS therapy  Na goal > 145  Neurochecks  CTH w R cerebellar infarct   SOC watch    Brain aneurysm  See primary problem    Cardiac/Vascular  Essential hypertension  SBP <160  Valsartan 80 mg    Hematology  History of DVT in adulthood  -- Pt on Warfarin outpatient, currently held  -- Pt was on a Heparin gtt for for R brachial DVT and hx arterial occlusion. 5/11/25 CTH showed large R cerebellar infarct and heparin gtt was held on 5/11.   Lower extremity arterial US ordered given hx RLE arterial occlusion, plan to restart heparin gtt pending results.   Started lovenox in the interim for VTE ppx    Palliative Care  Endotracheal tube present-resolved as of 5/10/2025  - extubated 5/8, on RA          The patient is being Prophylaxed for:  Venous Thromboembolism with:  Mechanical or Chemical  Stress Ulcer with: Not Applicable   Ventilator Pneumonia with: not applicable    Activity Orders            Diet NPO: NPO starting at 05/11 1012    Progressive Mobility Protocol (mobilize patient to their highest level of functioning at least twice daily) starting at 05/06 2000    Turn patient starting at 05/06 0800          Full Code    Critical care time spent on the evaluation and treatment of severe organ dysfunction, review of pertinent labs and imaging studies, discussions with consulting providers and discussions with patient/family: 35 minutes.    Asiya Mast, PA-C  Neurocritical Care  [facility] - Neuro Critical Care

## 2025-05-13 LAB
ABSOLUTE EOSINOPHIL (OHS): 0.04 K/UL
ABSOLUTE MONOCYTE (OHS): 0.87 K/UL (ref 0.3–1)
ABSOLUTE NEUTROPHIL COUNT (OHS): 8.33 K/UL (ref 1.8–7.7)
ALBUMIN SERPL BCP-MCNC: 2.8 G/DL (ref 3.5–5.2)
ALP SERPL-CCNC: 82 UNIT/L (ref 40–150)
ALT SERPL W/O P-5'-P-CCNC: 11 UNIT/L (ref 10–44)
ANION GAP (OHS): 9 MMOL/L (ref 8–16)
AST SERPL-CCNC: 24 UNIT/L (ref 11–45)
BACTERIA SPEC CULT: ABNORMAL
BACTERIA SPEC CULT: ABNORMAL
BACTERIA SPT CULT: ABNORMAL
BACTERIA SPT CULT: ABNORMAL
BASOPHILS # BLD AUTO: 0.03 K/UL
BASOPHILS NFR BLD AUTO: 0.2 %
BILIRUB SERPL-MCNC: 0.4 MG/DL (ref 0.1–1)
BUN SERPL-MCNC: 15 MG/DL (ref 6–20)
CALCIUM SERPL-MCNC: 8.3 MG/DL (ref 8.7–10.5)
CHLORIDE SERPL-SCNC: 114 MMOL/L (ref 95–110)
CO2 SERPL-SCNC: 19 MMOL/L (ref 23–29)
CREAT SERPL-MCNC: 0.6 MG/DL (ref 0.5–1.4)
ERYTHROCYTE [DISTWIDTH] IN BLOOD BY AUTOMATED COUNT: 14 % (ref 11.5–14.5)
GFR SERPLBLD CREATININE-BSD FMLA CKD-EPI: >60 ML/MIN/1.73/M2
GLUCOSE SERPL-MCNC: 115 MG/DL (ref 70–110)
GRAM STN SPEC: ABNORMAL
HCT VFR BLD AUTO: 33 % (ref 37–48.5)
HGB BLD-MCNC: 10.1 GM/DL (ref 12–16)
IMM GRANULOCYTES # BLD AUTO: 0.56 K/UL (ref 0–0.04)
IMM GRANULOCYTES NFR BLD AUTO: 4.2 % (ref 0–0.5)
INDIRECT COOMBS: NORMAL
LYMPHOCYTES # BLD AUTO: 3.42 K/UL (ref 1–4.8)
MAGNESIUM SERPL-MCNC: 1.9 MG/DL (ref 1.6–2.6)
MCH RBC QN AUTO: 27.8 PG (ref 27–31)
MCHC RBC AUTO-ENTMCNC: 30.6 G/DL (ref 32–36)
MCV RBC AUTO: 91 FL (ref 82–98)
NUCLEATED RBC (/100WBC) (OHS): 0 /100 WBC
OHS QRS DURATION: 80 MS
OHS QTC CALCULATION: 455 MS
PHOSPHATE SERPL-MCNC: 1.5 MG/DL (ref 2.7–4.5)
PLATELET # BLD AUTO: 370 K/UL (ref 150–450)
PMV BLD AUTO: 9.2 FL (ref 9.2–12.9)
POTASSIUM SERPL-SCNC: 3.7 MMOL/L (ref 3.5–5.1)
PROT SERPL-MCNC: 6.9 GM/DL (ref 6–8.4)
RBC # BLD AUTO: 3.63 M/UL (ref 4–5.4)
RELATIVE EOSINOPHIL (OHS): 0.3 %
RELATIVE LYMPHOCYTE (OHS): 25.8 % (ref 18–48)
RELATIVE MONOCYTE (OHS): 6.6 % (ref 4–15)
RELATIVE NEUTROPHIL (OHS): 62.9 % (ref 38–73)
RH BLD: NORMAL
SODIUM SERPL-SCNC: 142 MMOL/L (ref 136–145)
SODIUM SERPL-SCNC: 142 MMOL/L (ref 136–145)
SODIUM SERPL-SCNC: 144 MMOL/L (ref 136–145)
SODIUM SERPL-SCNC: 145 MMOL/L (ref 136–145)
SPECIMEN OUTDATE: NORMAL
WBC # BLD AUTO: 13.25 K/UL (ref 3.9–12.7)

## 2025-05-13 PROCEDURE — 97535 SELF CARE MNGMENT TRAINING: CPT

## 2025-05-13 PROCEDURE — 25000003 PHARM REV CODE 250

## 2025-05-13 PROCEDURE — 25000242 PHARM REV CODE 250 ALT 637 W/ HCPCS: Performed by: NURSE PRACTITIONER

## 2025-05-13 PROCEDURE — 02HV33Z INSERTION OF INFUSION DEVICE INTO SUPERIOR VENA CAVA, PERCUTANEOUS APPROACH: ICD-10-PCS | Performed by: STUDENT IN AN ORGANIZED HEALTH CARE EDUCATION/TRAINING PROGRAM

## 2025-05-13 PROCEDURE — 94640 AIRWAY INHALATION TREATMENT: CPT

## 2025-05-13 PROCEDURE — 63600175 PHARM REV CODE 636 W HCPCS

## 2025-05-13 PROCEDURE — 84100 ASSAY OF PHOSPHORUS: CPT

## 2025-05-13 PROCEDURE — 99291 CRITICAL CARE FIRST HOUR: CPT | Mod: FS,,, | Performed by: PSYCHIATRY & NEUROLOGY

## 2025-05-13 PROCEDURE — 99233 SBSQ HOSP IP/OBS HIGH 50: CPT | Mod: ,,, | Performed by: NEUROLOGICAL SURGERY

## 2025-05-13 PROCEDURE — 84295 ASSAY OF SERUM SODIUM: CPT

## 2025-05-13 PROCEDURE — 92612 ENDOSCOPY SWALLOW (FEES) VID: CPT

## 2025-05-13 PROCEDURE — 63600175 PHARM REV CODE 636 W HCPCS: Performed by: STUDENT IN AN ORGANIZED HEALTH CARE EDUCATION/TRAINING PROGRAM

## 2025-05-13 PROCEDURE — 99291 CRITICAL CARE FIRST HOUR: CPT | Mod: 25,,,

## 2025-05-13 PROCEDURE — 97110 THERAPEUTIC EXERCISES: CPT

## 2025-05-13 PROCEDURE — 94761 N-INVAS EAR/PLS OXIMETRY MLT: CPT

## 2025-05-13 PROCEDURE — 93010 ELECTROCARDIOGRAM REPORT: CPT | Mod: ,,, | Performed by: INTERNAL MEDICINE

## 2025-05-13 PROCEDURE — 97530 THERAPEUTIC ACTIVITIES: CPT

## 2025-05-13 PROCEDURE — 25000003 PHARM REV CODE 250: Performed by: NURSE PRACTITIONER

## 2025-05-13 PROCEDURE — 82565 ASSAY OF CREATININE: CPT

## 2025-05-13 PROCEDURE — 93005 ELECTROCARDIOGRAM TRACING: CPT

## 2025-05-13 PROCEDURE — 25000003 PHARM REV CODE 250: Performed by: PSYCHIATRY & NEUROLOGY

## 2025-05-13 PROCEDURE — 85025 COMPLETE CBC W/AUTO DIFF WBC: CPT | Performed by: PSYCHIATRY & NEUROLOGY

## 2025-05-13 PROCEDURE — 36556 INSERT NON-TUNNEL CV CATH: CPT | Mod: ,,,

## 2025-05-13 PROCEDURE — 20000000 HC ICU ROOM

## 2025-05-13 PROCEDURE — 83735 ASSAY OF MAGNESIUM: CPT

## 2025-05-13 PROCEDURE — A4217 STERILE WATER/SALINE, 500 ML: HCPCS

## 2025-05-13 PROCEDURE — 86850 RBC ANTIBODY SCREEN: CPT

## 2025-05-13 RX ORDER — MIDAZOLAM HYDROCHLORIDE 1 MG/ML
INJECTION, SOLUTION INTRAMUSCULAR; INTRAVENOUS
Status: COMPLETED
Start: 2025-05-13 | End: 2025-05-13

## 2025-05-13 RX ORDER — DEXMEDETOMIDINE HYDROCHLORIDE 4 UG/ML
0-.6 INJECTION, SOLUTION INTRAVENOUS CONTINUOUS
Status: DISCONTINUED | OUTPATIENT
Start: 2025-05-13 | End: 2025-05-21

## 2025-05-13 RX ORDER — MIDAZOLAM HYDROCHLORIDE 1 MG/ML
2 INJECTION, SOLUTION INTRAMUSCULAR; INTRAVENOUS
Status: COMPLETED | OUTPATIENT
Start: 2025-05-13 | End: 2025-05-13

## 2025-05-13 RX ORDER — MIDAZOLAM HYDROCHLORIDE 1 MG/ML
1 INJECTION, SOLUTION INTRAMUSCULAR; INTRAVENOUS
Status: DISCONTINUED | OUTPATIENT
Start: 2025-05-13 | End: 2025-05-13

## 2025-05-13 RX ORDER — OLANZAPINE 10 MG/2ML
10 INJECTION, POWDER, FOR SOLUTION INTRAMUSCULAR ONCE AS NEEDED
Status: COMPLETED | OUTPATIENT
Start: 2025-05-13 | End: 2025-05-13

## 2025-05-13 RX ORDER — DIAZEPAM 5 MG/1
10 TABLET ORAL EVERY 6 HOURS PRN
Status: DISCONTINUED | OUTPATIENT
Start: 2025-05-13 | End: 2025-05-26 | Stop reason: HOSPADM

## 2025-05-13 RX ORDER — OLANZAPINE 10 MG/2ML
5 INJECTION, POWDER, FOR SOLUTION INTRAMUSCULAR ONCE AS NEEDED
Status: DISCONTINUED | OUTPATIENT
Start: 2025-05-13 | End: 2025-05-13

## 2025-05-13 RX ORDER — OLANZAPINE 10 MG/2ML
5 INJECTION, POWDER, FOR SOLUTION INTRAMUSCULAR ONCE
Status: COMPLETED | OUTPATIENT
Start: 2025-05-13 | End: 2025-05-13

## 2025-05-13 RX ORDER — QUETIAPINE FUMARATE 25 MG/1
50 TABLET, FILM COATED ORAL 2 TIMES DAILY
Status: DISCONTINUED | OUTPATIENT
Start: 2025-05-13 | End: 2025-05-17

## 2025-05-13 RX ORDER — CEFEPIME HYDROCHLORIDE 2 G/1
2 INJECTION, POWDER, FOR SOLUTION INTRAVENOUS
Status: DISCONTINUED | OUTPATIENT
Start: 2025-05-13 | End: 2025-05-14

## 2025-05-13 RX ORDER — MIDAZOLAM HYDROCHLORIDE 1 MG/ML
4 INJECTION, SOLUTION INTRAMUSCULAR; INTRAVENOUS ONCE
Status: COMPLETED | OUTPATIENT
Start: 2025-05-13 | End: 2025-05-13

## 2025-05-13 RX ORDER — CLOPIDOGREL BISULFATE 75 MG/1
75 TABLET ORAL DAILY
Status: DISCONTINUED | OUTPATIENT
Start: 2025-05-13 | End: 2025-05-15

## 2025-05-13 RX ORDER — QUETIAPINE FUMARATE 25 MG/1
50 TABLET, FILM COATED ORAL NIGHTLY
Status: DISCONTINUED | OUTPATIENT
Start: 2025-05-13 | End: 2025-05-13

## 2025-05-13 RX ADMIN — SENNOSIDES AND DOCUSATE SODIUM 1 TABLET: 50; 8.6 TABLET ORAL at 08:05

## 2025-05-13 RX ADMIN — NIMODIPINE 60 MG: 30 CAPSULE, LIQUID FILLED ORAL at 09:05

## 2025-05-13 RX ADMIN — IPRATROPIUM BROMIDE AND ALBUTEROL SULFATE 3 ML: 2.5; .5 SOLUTION RESPIRATORY (INHALATION) at 01:05

## 2025-05-13 RX ADMIN — POTASSIUM & SODIUM PHOSPHATES POWDER PACK 280-160-250 MG 2 PACKET: 280-160-250 PACK at 09:05

## 2025-05-13 RX ADMIN — OLANZAPINE 10 MG: 10 INJECTION, POWDER, FOR SOLUTION INTRAMUSCULAR at 01:05

## 2025-05-13 RX ADMIN — VASOPRESSIN: 20 INJECTION, SOLUTION INTRAVENOUS at 09:05

## 2025-05-13 RX ADMIN — METHOCARBAMOL 750 MG: 750 TABLET ORAL at 01:05

## 2025-05-13 RX ADMIN — POTASSIUM BICARBONATE 50 MEQ: 978 TABLET, EFFERVESCENT ORAL at 02:05

## 2025-05-13 RX ADMIN — MIDAZOLAM 4 MG: 1 INJECTION INTRAMUSCULAR; INTRAVENOUS at 06:05

## 2025-05-13 RX ADMIN — SODIUM CHLORIDE 250 ML: 3 INJECTION, SOLUTION INTRAVENOUS at 09:05

## 2025-05-13 RX ADMIN — QUETIAPINE FUMARATE 50 MG: 25 TABLET ORAL at 09:05

## 2025-05-13 RX ADMIN — GABAPENTIN 300 MG: 300 CAPSULE ORAL at 04:05

## 2025-05-13 RX ADMIN — NIMODIPINE 60 MG: 30 CAPSULE, LIQUID FILLED ORAL at 06:05

## 2025-05-13 RX ADMIN — OLANZAPINE 5 MG: 10 INJECTION, POWDER, FOR SOLUTION INTRAMUSCULAR at 06:05

## 2025-05-13 RX ADMIN — MIDAZOLAM HYDROCHLORIDE 2 MG: 2 INJECTION, SOLUTION INTRAMUSCULAR; INTRAVENOUS at 11:05

## 2025-05-13 RX ADMIN — MUPIROCIN: 20 OINTMENT TOPICAL at 09:05

## 2025-05-13 RX ADMIN — OXYCODONE 5 MG: 5 TABLET ORAL at 01:05

## 2025-05-13 RX ADMIN — MUPIROCIN: 20 OINTMENT TOPICAL at 08:05

## 2025-05-13 RX ADMIN — CEFAZOLIN 2 G: 2 INJECTION, POWDER, FOR SOLUTION INTRAMUSCULAR; INTRAVENOUS at 01:05

## 2025-05-13 RX ADMIN — DIAZEPAM 10 MG: 5 TABLET ORAL at 09:05

## 2025-05-13 RX ADMIN — LABETALOL HYDROCHLORIDE 10 MG: 5 INJECTION, SOLUTION INTRAVENOUS at 02:05

## 2025-05-13 RX ADMIN — SODIUM CHLORIDE 250 ML: 9 INJECTION, SOLUTION INTRAVENOUS at 03:05

## 2025-05-13 RX ADMIN — CEFEPIME 2 G: 2 INJECTION, POWDER, FOR SOLUTION INTRAVENOUS at 07:05

## 2025-05-13 RX ADMIN — ATORVASTATIN CALCIUM 40 MG: 40 TABLET, FILM COATED ORAL at 08:05

## 2025-05-13 RX ADMIN — SENNOSIDES AND DOCUSATE SODIUM 1 TABLET: 50; 8.6 TABLET ORAL at 09:05

## 2025-05-13 RX ADMIN — IPRATROPIUM BROMIDE AND ALBUTEROL SULFATE 3 ML: 2.5; .5 SOLUTION RESPIRATORY (INHALATION) at 07:05

## 2025-05-13 RX ADMIN — SODIUM CHLORIDE 250 ML: 9 INJECTION, SOLUTION INTRAVENOUS at 02:05

## 2025-05-13 RX ADMIN — HYALURONIDASE (HUMAN RECOMBINANT) 150 UNITS: 150 INJECTION, SOLUTION SUBCUTANEOUS at 04:05

## 2025-05-13 RX ADMIN — CEFEPIME 2 G: 2 INJECTION, POWDER, FOR SOLUTION INTRAVENOUS at 11:05

## 2025-05-13 RX ADMIN — POTASSIUM & SODIUM PHOSPHATES POWDER PACK 280-160-250 MG 2 PACKET: 280-160-250 PACK at 01:05

## 2025-05-13 RX ADMIN — VASOPRESSIN: 20 INJECTION, SOLUTION INTRAVENOUS at 01:05

## 2025-05-13 RX ADMIN — POTASSIUM & SODIUM PHOSPHATES POWDER PACK 280-160-250 MG 2 PACKET: 280-160-250 PACK at 06:05

## 2025-05-13 RX ADMIN — MIDAZOLAM 4 MG: 1 INJECTION INTRAMUSCULAR; INTRAVENOUS at 04:05

## 2025-05-13 RX ADMIN — MIDAZOLAM HYDROCHLORIDE 2 MG: 1 INJECTION, SOLUTION INTRAMUSCULAR; INTRAVENOUS at 11:05

## 2025-05-13 RX ADMIN — CLOPIDOGREL 75 MG: 75 TABLET ORAL at 09:05

## 2025-05-13 RX ADMIN — POTASSIUM & SODIUM PHOSPHATES POWDER PACK 280-160-250 MG 2 PACKET: 280-160-250 PACK at 02:05

## 2025-05-13 RX ADMIN — VALSARTAN 80 MG: 80 TABLET, FILM COATED ORAL at 08:05

## 2025-05-13 RX ADMIN — NIMODIPINE 60 MG: 30 CAPSULE, LIQUID FILLED ORAL at 01:05

## 2025-05-13 RX ADMIN — ACETAMINOPHEN 650 MG: 325 TABLET ORAL at 01:05

## 2025-05-13 RX ADMIN — CEFAZOLIN 2 G: 2 INJECTION, POWDER, FOR SOLUTION INTRAMUSCULAR; INTRAVENOUS at 09:05

## 2025-05-13 RX ADMIN — METHOCARBAMOL 750 MG: 750 TABLET ORAL at 04:05

## 2025-05-13 RX ADMIN — ENOXAPARIN SODIUM 40 MG: 40 INJECTION SUBCUTANEOUS at 04:05

## 2025-05-13 RX ADMIN — ASPIRIN 81 MG CHEWABLE TABLET 81 MG: 81 TABLET CHEWABLE at 08:05

## 2025-05-13 RX ADMIN — DEXMEDETOMIDINE HYDROCHLORIDE 0.2 MCG/KG/HR: 4 INJECTION INTRAVENOUS at 08:05

## 2025-05-13 NOTE — PT/OT/SLP PROGRESS
Physical Therapy Co-Treatment with OT    Patient Name:  Waldo Emmanuel   MRN:  [id number]    Recent Surgery: * No surgery found *      Patient required co-tx with OT secondary to need for multiple set of skilled hands to provide safest therapy and best outcomes.      Recommendations:     Discharge Recommendations:   High intensity therapy   Discharge Equipment Recommendations: bath bench, bedside commode, wheelchair   Barriers to discharge: Increased level of assist    Highest Level of Mobility: Stand step t/f bed to chair  Assistance Required: Mod(A) w/ RW    Assessment:     Waldo Emmanuel is a 49 y.o. female admitted with a medical diagnosis of SAH (subarachnoid hemorrhage).    Pt met with HOB elevated and agreeable to PT treatment. Today's PT treatment focus was on continued transfer training to improve function. Pt is A&Ox3 but is confused throughout session and highly distractible. She follows simple commands but requires redirection to task. Pt able to complete lateral steps along EOB today with min(a) and HHA, then a t/f from bed to chair with mod(A) and RW. Pt has difficulty managing RW, so recommend using HHA in the future. Pt required multiple rest breaks throughout session due to tachycardia into 150s. She is a high fall risk at this time.    Pt is progressing towards acute PT goals appropriately and continues to benefit from acute PT sessions.    Rehab Prognosis: Good; patient would benefit from acute skilled PT services to address these deficits and reach maximum level of function.      Plan:     During this hospitalization, patient to be seen 4 x/week to address the identified rehab impairments via gait training, therapeutic activities, therapeutic exercises, neuromuscular re-education and progress toward the following goals:    Plan of Care Expires:  06/11/25    This plan of care has been discussed with the patient/caregiver, who was included in its development and is in agreement with  "the identified goals and treatment plan.     Subjective     Communicated with RN prior to session.  Patient agreeable to participate.     Pain/Comfort:  Pain Rating 1: 0/10  Pain Rating Post-Intervention 1: 0/10    Chief Complaint: SAH  Patient/Family Comments/goals: "I haven't gotten up yet"      Objective:     Patient found HOB elevated with telemetry, pulse ox (continuous), blood pressure cuff, PureWick, peripheral IV, external ventricular drain  upon PT entry to room.    General Precautions: Standard, fall, aspiration   Orthopedic Precautions:N/A   Braces: N/A         Exams:    Cognition:  Patient is oriented to Person, Place, Time  Follows one-step commands   Insight to deficits/safety awareness: impaired    Functional Mobility:  *EVD clamped by RN prior to mobility    Bed Mobility:  Supine to Sit: Minimal Assistance and 2 persons on L side of bed  Scooting anteriorly to EOB to plant feet on floor: Minimal Assistance    Transfers:   Sit to Stand Transfer: Minimal Assistance  from EOB with HHAx2   X3 trials  B LEs loosely blocked  Standing lateral weight shifts performed- no buckling  Bed to Chair: Moderate Assistance with RW via stand step t/f  Pt with poor RW management- let go of RW with R UE and required cues for redirection             Gait:  Patient received gait training 3 lateral steps along EOB with Minimal Assistance and HHAx2  Gait Assessment: unsteady gait, decreased step length, narrow base of support, and decreased tyler  Gait Pattern Observed: Step-to  Comments: All lines remained intact throughout ambulation trial, gait belt utilized    Balance:  Static Sit:   CGA-mod(A) at EOB  L posterior lean  Pt with B shoulder hike- requires cues for deep breathing and relaxation  Static Stand:   Min Assist with B hand-held support  Dynamic Stand:  Min Assist with B hand-held support      Therapeutic Activities/Exercises     Patient assisted with functional mobility as noted above  Patient educated on the " importance of early mobility to prevent functional decline during hospital stay  Patient was instructed to utilize staff assistance for mobility/transfers.  Patient is appropriate to transfer with min(A)X2 STAND PIVOT and RN/PCT assist  Patient educated on PT POC and role of PT in acute care  White board updated regarding patient's safest level of mobility with staff assistance, RN also updated.     AM-PAC 6 CLICK MOBILITY  Turning over in bed (including adjusting bedclothes, sheets and blankets)?: 3  Sitting down on and standing up from a chair with arms (e.g., wheelchair, bedside commode, etc.): 3  Moving from lying on back to sitting on the side of the bed?: 3  Moving to and from a bed to a chair (including a wheelchair)?: 3  Need to walk in hospital room?: 2  Climbing 3-5 steps with a railing?: 1  Basic Mobility Total Score: 15     DME Justifications:   Waldo requires a commode for home use because she is confined to a single room.  Waldo Emmanuel has a mobility limitation that significantly impairs her ability to participate in one or more mobility related activities of daily living (MRADLs) such as toileting, feeding, dressing, grooming, and bathing in customary locations in the home.  The mobility limitation cannot be sufficiently resolved by the use of a cane or walker.   The use of a manual wheelchair will significantly improve the patients ability to participate in MRADLS and the patient will use it on regular basis in the home.  Waldo Emmanuel has expressed her willingness to use a manual wheelchair in the home. Patients upper body strength is sufficient for propulsion.  She also has a caregiver who is available, willing, and able to provide assistance with the wheelchair when needed.      Patient left up in chair with all lines intact, call button in reach, chair alarm on, and rn notified.        History/Goals:     PAST MEDICAL HISTORY:  Past Medical History:   Diagnosis Date    ADHD (attention  deficit hyperactivity disorder)     Anemia     Fibroids     Genital herpes     GERD (gastroesophageal reflux disease)     H/O total hysterectomy 2021    Hypertension     Labral tear of hip joint     Ovarian cyst     Right common arterial occlusion     Routine general medical examination at a health care facility 2017    Total Right Arterial Occlusion        Past Surgical History:   Procedure Laterality Date    ANGIOGRAM, ABDOMINAL AORTA W/ EXTREMITY RUNOFF N/A 2025    Procedure: Angiogram, Abdominal Aorta W/ Extremity Runoff: Right lower extremity angiogram;  Surgeon: Lennox Zendejas MD;  Location: [facility] CATH LAB;  Service: Vascular;  Laterality: N/A;    CARPAL TUNNEL RELEASE       SECTION      CHOLECYSTECTOMY      DILATION AND CURETTAGE OF UTERUS      TOTAL ABDOMINAL HYSTERECTOMY W/ BILATERAL SALPINGOOPHORECTOMY  2021    menorrhagia       GOALS:   Multidisciplinary Problems       Physical Therapy Goals          Problem: Physical Therapy    Goal Priority Disciplines Outcome Interventions   Physical Therapy Goal     PT, PT/OT Progressing    Description: Goals to be met by: 2025     Patient will increase functional independence with mobility by performin. Supine to sit with Contact Guard Assistance  2. Sit to supine with Contact Guard Assistance  3. Sit to stand transfer with Contact Guard Assistance  4. Bed to chair transfer with Minimal Assistance using LRAD  5. Gait  x 25 feet with Minimal Assistance using LRAD.   6. Stand for 2 minutes with Contact Guard Assistance using LRAD  7. Lower extremity exercise program x15 reps per handout, with independence                         Time Tracking:     PT Received On: 25  PT Start Time: 814     PT Stop Time: 0849  PT Total Time (min): 35 min     Billable Minutes: Therapeutic Exercise 35      Estela Rogers, PT  2025  Pager# [phone]

## 2025-05-13 NOTE — CONSULTS
ANNIA consulted for peripheral access.   On arrival at bedside, RENÉ Ryan and bedside nurse at bedside. Pt has DVT to RUE. Per PA fresh infiltration to LUE, peripheral access not indicated at this time, they will proceed with central access.

## 2025-05-13 NOTE — PROCEDURES
"Waldo Emmanuel is a 49 y.o. female patient.    Temp: 98.2 °F (36.8 °C) (05/13/25 1501)  Pulse: 107 (05/13/25 1801)  Resp: (!) 35 (05/13/25 1801)  BP: 132/77 (05/13/25 1801)  SpO2: 100 % (05/13/25 1801)  Weight: 100.2 kg (220 lb 14.4 oz) (05/13/25 1247)  Height: 5' 3" (160 cm) (05/13/25 1248)       Central Line    Date/Time: 5/13/2025 6:47 PM    Performed by: Onel Ryan PA-C  Authorized by: Onel Ryan PA-C    Location procedure was performed:  [facility] NEURO CRITICAL CARE  Consent Done ?:  Yes  Time out complete?: Verified correct patient, procedure, equipment, staff, and site/side    Indications:  Med administration and vascular access  Preparation:  Skin prepped with ChloraPrep  Skin prep agent dried: Skin prep agent completely dried prior to procedure    Sterile barriers: All five maximal sterile barriers used - gloves, gown, cap, mask and large sterile sheet    Hand hygiene: Hand hygiene performed immediately prior to central venous catheter insertion    Location:  Left femoral  Site selection rationale:  ICP  Catheter type:  Triple lumen  Catheter size:  7 Fr  Inserted Catheter Length (cm):  20  Ultrasound guidance: Yes    Vessel Caliber:  Medium   patent  Comprressibility:  Normal  Doppler:  Not done  Needle advanced into vessel with real time ultrasound guidance.    Guidewire confirmed in vessel.    Steril sheath on probe.    Sterile gel used.  Manometry: Yes    Number of attempts:  2  Securement:  Line sutured, chlorhexidine patch, sterile dressing applied and blood return through all ports  Guidewire: guidewire removed intact, verified with nurse    XRay:  Successful placement  Adverse Events:  None  Other Complications:  R femoral central line initially attempted but unable to advance guidewire, suspect proximal clot- will order DVT US. Reattempted CVC on the left side and procedure completed in 1 attempt without issue.    R femoral central line initially attempted but unable to advance " guidewire, suspect proximal clot- will order DVT US. Reattempted CVC on the left side and procedure completed in 1 attempt without issue. Termination Site: inferior vena cava    Asiya Mast, CARLOS  Neurocritical Care  5/13/2025

## 2025-05-13 NOTE — SUBJECTIVE & OBJECTIVE
Review of Systems   Constitutional:  Negative for chills and fever.   Respiratory:  Negative for shortness of breath.    Cardiovascular:  Negative for chest pain.   Gastrointestinal:  Negative for abdominal pain.   Genitourinary:  Negative for difficulty urinating.   Musculoskeletal:  Positive for back pain and neck stiffness. Negative for neck pain.   Neurological:  Positive for headaches. Negative for weakness and numbness.       Objective:     Vitals:  Temp: 98.4 °F (36.9 °C)  Pulse: (!) 116  Rhythm: sinus tachycardia  BP: (!) 140/96  MAP (mmHg): 111  ICP Mean (mmHg): 14 mmHg  Resp: (!) 51  SpO2: 100 %    Temp  Min: 97.6 °F (36.4 °C)  Max: 99 °F (37.2 °C)  Pulse  Min: 101  Max: 139  BP  Min: 93/50  Max: 178/133  MAP (mmHg)  Min: 66  Max: 145  ICP Mean (mmHg)  Min: 4 mmHg  Max: 17 mmHg  Resp  Min: 13  Max: 51  SpO2  Min: 95 %  Max: 100 %    05/12 0701 - 05/13 0700  In: 3449.2 [P.O.:390; I.V.:2085.4]  Out: 3810 [Urine:3520; Drains:290]   Unmeasured Output  Unmeasured Urine Occurrence: 1  Unmeasured Stool Occurrence: 1  Pad Count: 1        Physical Exam  Vitals and nursing note reviewed.   Constitutional:       General: She is not in acute distress.     Appearance: She is obese.   HENT:      Head:      Comments: EVD site c/d/i     Right Ear: External ear normal.      Left Ear: External ear normal.      Nose: Nose normal.      Mouth/Throat:      Mouth: Mucous membranes are moist.      Pharynx: Oropharynx is clear.   Eyes:      Pupils: Pupils are equal, round, and reactive to light.   Cardiovascular:      Rate and Rhythm: Normal rate and regular rhythm.   Pulmonary:      Effort: Pulmonary effort is normal. No respiratory distress.   Abdominal:      General: There is no distension.      Palpations: Abdomen is soft.      Tenderness: There is no abdominal tenderness.   Musculoskeletal:      Cervical back: Normal range of motion. No rigidity or tenderness.   Skin:     General: Skin is warm and dry.   Neurological:       Mental Status: She is alert.      Comments: E4 V4 M6  Mental status: Alert. Oriented x person, place, but intermittently to time. Delirious. Speech fluent. No dysarthria. Following commands.   CN II-XII grossly intact, specifically:  PERRL  No facial asymmetry.   Tongue midline.   Shoulder shrug symmetric  Motor:  RUE 5/5  RLE 5/5  LUE 5/5  LLE 5/5  R dysmetria   Sensation intact and symmetric throughout    Psychiatric:         Mood and Affect: Mood normal.            Medications:  ContinuousdexmedeTOMIDine (Precedex) infusion (titrating)  sodium chloride (23.4%) HYPERTONIC 4 mEq/mL 172 mEq in sterile water 500 mL (sodium chloride 2%) infusion, Last Rate: 75 mL/hr at 05/13/25 1301    Scheduledalbuterol-ipratropium, 3 mL, Q6H  aspirin, 81 mg, Daily  atorvastatin, 40 mg, Daily  ceFEPime IV (PEDS and ADULTS), 2 g, Q8H  clopidogreL, 75 mg, Daily  enoxparin, 40 mg, Daily  mupirocin, , BID  niMODipine, 60 mg, Q4H  QUEtiapine, 50 mg, QHS  senna-docusate, 1 tablet, BID  valsartan, 80 mg, Daily    PRNacetaminophen, 650 mg, Q6H PRN  bisacodyL, 10 mg, Daily PRN  diazePAM, 10 mg, Q6H PRN  gabapentin, 300 mg, Q8H PRN  hydrALAZINE, 10 mg, Q6H PRN  labetalol, 10 mg, Q4H PRN  melatonin, 6 mg, Nightly PRN  methocarbamoL, 750 mg, Q6H PRN  oxyCODONE, 5 mg, Q6H PRN  potassium bicarbonate, 35 mEq, PRN  potassium bicarbonate, 50 mEq, PRN  potassium bicarbonate, 60 mEq, PRN  potassium, sodium phosphates, 2 packet, PRN  potassium, sodium phosphates, 2 packet, PRN  potassium, sodium phosphates, 2 packet, PRN  sodium chloride 0.9%, 10 mL, PRN      Today I personally reviewed pertinent medications, lines/drains/airways, imaging, cardiology results, laboratory results, microbiology results, notably:    5/12 BLE Arterial US     Right QUIANA 0.65, is suggestive of moderate right lower extremity arterial disease.    Right profunda femoral artery stenosis, 50-75%.    Right below-knee arteries display blunted monophasic waveforms with low peak  systolic velocities.    Right posterior tibial artery stenosis, 50-75%.    Left QUIANA 1.0, is normal.    Duplex ultrasound shows left lower extremity arteries with no hemodynamically significant stenosis.    5/11 CTH   1. Compared to prior head CT performed 05/06/2025, 20:13 hours, evolving relatively large right cerebellar infarct without macroscopic hemorrhage within the infarct territory.  Questionable hypoattenuation involving the brainstem which may be artifactual given coil artifact, however additional areas of ischemia not excluded.  MRI may be considered for further characterization.  Further effacement of the 4th ventricle since the prior study, however there has been slight decompression of the lateral and 3rd ventricles since the prior exam.  2. Relatively similar subarachnoid and interventricular hemorrhage.  No new or enlarging areas of intracranial hemorrhage appreciated.      Microbiology Results (last 7 days)       Procedure Component Value Units Date/Time    Culture, Respiratory with Gram Stain [[id number]]  (Abnormal)  (Susceptibility) Collected: 05/10/25 1120    Order Status: Completed Specimen: Respiratory from Sputum, Expectorated Updated: 05/13/25 0946     Respiratory Culture No S aureus or Pseudomonas isolated      Few KLEBSIELLA AEROGENES     Comment: with normal respiratory chichi        GRAM STAIN <10 Epithelial Cells/LPF      Rare WBC seen      Many Gram positive cocci      Many Gram Negative Rods    Culture, Respiratory with Gram Stain [[id number]]  (Abnormal)  (Susceptibility) Collected: 05/10/25 2035    Order Status: Completed Specimen: Respiratory from Sputum Updated: 05/13/25 0945     Respiratory Culture No S aureus or Pseudomonas isolated      Few KLEBSIELLA AEROGENES     Comment: with normal respiratory chichi        GRAM STAIN <10 Epithelial Cells/LPF      Rare WBC seen      Many Gram positive cocci      Moderate Gram Negative Rods    CSF culture [[id number]] Collected: 05/12/25 6750  "   Order Status: Completed Specimen: CSF (Spinal Fluid) from CSF Tap, Tube 3 Updated: 05/13/25 0011     GRAM STAIN Rare WBC seen      No organisms seen    Gram stain [[id number]] Collected: 05/12/25 1729    Order Status: Canceled Specimen: CSF (Spinal Fluid) from CSF Tap, Tube 3 Updated: 05/12/25 1909           Laboratory:  CBC:  Recent Labs   Lab 05/13/25  0131   WBC 13.25*   RBC 3.63*   HGB 10.1*   HCT 33.0*      MCV 91   MCH 27.8   MCHC 30.6*       CMP:  Recent Labs   Lab 05/13/25  0131 05/13/25  0909   CALCIUM 8.3*  --    ALBUMIN 2.8*  --    PROT 6.9  --     144   K 3.7  --    CO2 19*  --    *  --    BUN 15  --    CREATININE 0.6  --    ALKPHOS 82  --    ALT 11  --    AST 24  --    BILITOT 0.4  --      Recent Labs   Lab 05/13/25  0131   MG 1.9   PHOS 1.5*       Coagulation:  Recent Labs   Lab 05/10/25  1734 05/11/25  0031 05/11/25  0724   INR 1.0  --   --    APTT 22.0   < > 45.5*    < > = values in this interval not displayed.       Lipid Panel:  No results for input(s): "CHOL", "HDL", "LDLCALC", "TRIG" in the last 168 hours.    Endocrine:  No results for input(s): "HGBA1C", "TSH" in the last 72 hours.    ABG:  No results for input(s): "PH", "PCO2", "HCO3", "POCSATURATED", "BE" in the last 72 hours.    Urinalysis:  No results for input(s): "COLORU", "CLARITYU", "SPECGRAV", "PHUR", "OCCULTUA", "RBCUA", "WBCUA", "BACTERIA", "GLUCUA", "KETONESU", "PROTEINUA", "BILIRUBINUA", "MUCUS" in the last 168 hours.      Diet  Diet Adult Regular Thin  Diet Adult Regular Thin      "

## 2025-05-13 NOTE — EICU
Virtual ICU Quality Rounds    Admit Date: 5/6/2025  Hospital Day: 7    ICU Day: 7d    24H Vital Sign Range:  Temp:  [97.6 °F (36.4 °C)-99 °F (37.2 °C)]   Pulse:  []   Resp:  [13-33]   BP: ()/()   SpO2:  [94 %-100 %]     VICU Surveillance Screening                    LDA reconciliation : Yes

## 2025-05-13 NOTE — PT/OT/SLP PROGRESS
"Occupational Therapy  Co- Treatment    Name: Waldo Emmanuel  MRN: [id number]  Admitting Diagnosis:  SAH (subarachnoid hemorrhage)       Recommendations:     Discharge Recommendations: High Intensity Therapy  Discharge Equipment Recommendations:  bath bench, bedside commode, wheelchair  Barriers to discharge:  None    Assessment:     Waldo Emmanuel is a 49 y.o. female with a medical diagnosis of SAH (subarachnoid hemorrhage).  She presents with functional confusion despite A&Ox4. Session limited by tachycardia (up to 150-160s) and with some anxiety present with movement prompting frequent guided breathing to attempt to lower HR and calm patient. Still impulsive during session, although not limiting performance. Performance deficits affecting function are weakness, impaired endurance, impaired cognition, impaired self care skills, impaired functional mobility, gait instability, impaired balance, decreased safety awareness, decreased lower extremity function, decreased upper extremity function, impaired skin, impaired fine motor, decreased coordination. Patient has demonstrated sufficient progression to warrant high intensity therapy evidenced by objectives noted below.     Rehab Prognosis:  Good; patient would benefit from acute skilled OT services to address these deficits and reach maximum level of function.       Plan:     Patient to be seen 4 x/week to address the above listed problems via self-care/home management, therapeutic activities, therapeutic exercises, neuromuscular re-education  Plan of Care Expires: 06/11/25  Plan of Care Reviewed with: patient, family    Subjective     Chief Complaint: patient visibly   Patient/Family Comments/goals: "I'm just nervous, I just haven't been up"  Pain/Comfort:  Pain Rating 1: 0/10  Pain Rating Post-Intervention 1: 0/10    Objective:   Co-treatment with PT performed for patient safety, education, and facilitation of treatment to maximize activity tolerance " and progression towards goals from two skilled therapy disciplines.    Communicated with: nursing prior to session.  Patient found HOB elevated with telemetry, pulse ox (continuous), blood pressure cuff, PureWick, peripheral IV, external ventricular drain upon OT entry to room. RN Sandi clamping EVD prior to session.    General Precautions: Standard, fall, aspiration    Orthopedic Precautions:N/A  Braces: N/A  Respiratory Status: Room air     Occupational Performance:     Bed Mobility:    Patient completed Supine to Sit with minimum assistance and 2 persons   Patient sat EOB for ~ 25 minutes with Moderate Assistance to contact guard assistance   Guarding with BUE  resulting in shoulder hiking    Functional Mobility/Transfers:  Patient completed Sit <> Stand Transfer with minimum assistance  with  hand-held assist x2 reps and x1 rep with RW  Functional Mobility: Patient completed bed>chair step transfer with RW and mod A  Frequent cueing required for maintenance of hands on RW    Activities of Daily Living:  Lower Body Dressing: maximal assistance to don socks      [facility] 6 Click ADL: 14    Treatment & Education:  Patient with some dysmetria during finger to nose testing.    Patient educated on:   -pursed lip breathing and guided breathing techniques  -purpose of OT and OT POC  -facilitation and education on proper body mechanics, energy conservation, and safety  -importance of early mobility and out of bed activities with staff assist  -overall benefits of therapy     All questions answered within OT scope and to patient's satisfaction    Patient left up in chair with all lines intact, call button in reach, chair alarm on, and nursing present    GOALS:   Multidisciplinary Problems       Occupational Therapy Goals          Problem: Occupational Therapy    Goal Priority Disciplines Outcome Interventions   Occupational Therapy Goal     OT, PT/OT Progressing    Description: Goals to be met by: 6/11/2025     Patient will  increase functional independence with ADLs by performing:    UE Dressing with Supervision.  LE Dressing with Supervision.  Grooming while standing at sink with Supervision.  Toileting from toilet with Supervision for hygiene and clothing management.   Toilet transfer to toilet with Supervision.                         DME Justifications:   Waldo requires a commode for home use because she is confined to a single room.  Waldo Emmanuel has a mobility limitation that significantly impairs her ability to participate in one or more mobility related activities of daily living (MRADLs) such as toileting, feeding, dressing, grooming, and bathing in customary locations in the home.  The mobility limitation cannot be sufficiently resolved by the use of a cane or walker.   Patients upper body strength is not sufficient to propel a standard WC. The use of a lightweight wheelchair will significantly improve the patients ability to participate in MRADLS and the patient will use it on regular basis in the home.   She has a caregiver who is available, willing, and able to provide assistance with the wheelchair when needed.     Time Tracking:     OT Date of Treatment: 05/13/25  OT Start Time: 0814  OT Stop Time: 0849  OT Total Time (min): 35 min    Billable Minutes:Therapeutic Activity 35    OT/ALEXUS: OT          5/13/2025

## 2025-05-13 NOTE — SIGNIFICANT EVENT
Notified by RN that LUE PIV infiltrated with 2% HTS gtt.   - Given hyaluronidase for 2% infiltration    -Discussed next steps with pt and pt's daughter as pt is delirious and slightly confused. Pt has limb alert on RUE due to R brachial DVT and now LUE has infiltrated PIV w 2% and requires hyaluronidase, elevation, and cool dry compress. Decision made to move forward w central line as both arms are unable to be used. Informed consent obtained from daughter.     Onel Ryan, [facility] [place], PA-C  Neurocritical Care

## 2025-05-13 NOTE — SUBJECTIVE & OBJECTIVE
Interval History: 5/13 naeon, exam stable. Plan for MRA w contrast when able    Medications:  Continuous Infusions:   sodium chloride (23.4%) HYPERTONIC 4 mEq/mL 172 mEq in sterile water 500 mL (sodium chloride 2%) infusion   Intravenous Continuous 75 mL/hr at 05/13/25 1101 Rate Verify at 05/13/25 1101     Scheduled Meds:   albuterol-ipratropium  3 mL Nebulization Q6H    aspirin  81 mg Oral Daily    atorvastatin  40 mg Oral Daily    ceFEPime IV (PEDS and ADULTS)  2 g Intravenous Q8H    clopidogreL  75 mg Oral Daily    enoxparin  40 mg Subcutaneous Daily    mupirocin   Nasal BID    niMODipine  60 mg Oral Q4H    QUEtiapine  50 mg Oral QHS    senna-docusate  1 tablet Oral BID    valsartan  80 mg Oral Daily     PRN Meds:  Current Facility-Administered Medications:     acetaminophen, 650 mg, Oral, Q6H PRN    bisacodyL, 10 mg, Rectal, Daily PRN    diazePAM, 10 mg, Oral, Q6H PRN    gabapentin, 300 mg, Oral, Q8H PRN    hydrALAZINE, 10 mg, Intravenous, Q6H PRN    labetalol, 10 mg, Intravenous, Q4H PRN    melatonin, 6 mg, Oral, Nightly PRN    methocarbamoL, 750 mg, Oral, Q6H PRN    oxyCODONE, 5 mg, Oral, Q6H PRN    potassium bicarbonate, 35 mEq, Oral, PRN    potassium bicarbonate, 50 mEq, Oral, PRN    potassium bicarbonate, 60 mEq, Oral, PRN    potassium, sodium phosphates, 2 packet, Oral, PRN    potassium, sodium phosphates, 2 packet, Oral, PRN    potassium, sodium phosphates, 2 packet, Oral, PRN    sodium chloride 0.9%, 10 mL, Intravenous, PRN     Review of Systems  Objective:     Weight: 100.2 kg (221 lb)  Body mass index is 39.15 kg/m².  Vital Signs (Most Recent):  Temp: 98.4 °F (36.9 °C) (05/13/25 1101)  Pulse: (!) 134 (05/13/25 1101)  Resp: (!) 26 (05/13/25 1101)  BP: (!) 167/73 (05/13/25 1101)  SpO2: 100 % (05/13/25 1101) Vital Signs (24h Range):  Temp:  [97.6 °F (36.4 °C)-99 °F (37.2 °C)] 98.4 °F (36.9 °C)  Pulse:  [] 134  Resp:  [13-33] 26  SpO2:  [95 %-100 %] 100 %  BP: ()/() 167/73     Date  05/13/25 0700 - 05/14/25 0659   Shift 0411-8129 7748-6560 0550-3234 24 Hour Total   INTAKE   I.V.(mL/kg) 455.7(4.5)   455.7(4.5)   IV Piggyback 0   0   Shift Total(mL/kg) 455.7(4.5)   455.7(4.5)   OUTPUT   Drains 74   74   Shift Total(mL/kg) 74(0.7)   74(0.7)   Weight (kg) 100.2 100.2 100.2 100.2                            ICP/Ventriculostomy 05/06/25 0920 Right Parietal region (Active)   Level of Ventriculostomy (cm above) 10 05/10/25 1905   Status Open to drainage 05/11/25 0205   Site Assessment Clean;Dry 05/11/25 0205   Site Drainage No drainage 05/11/25 0205   Waveform normal waveform 05/10/25 1905   Output (mL) 15 mL 05/10/25 2205   CSF Color red 05/11/25 0205   Dressing Status Clean;Dry;Intact 05/11/25 0205   Interventions HOB degrees;bed controls locked;zeroed 05/10/25 0605       Female External Urinary Catheter w/ Suction 05/07/25 1732 (Active)   Skin no redness;no breakdown 05/10/25 1701   Tolerance no signs/symptoms of discomfort 05/10/25 1701   Suction Continuous suction at 40 mmHg 05/08/25 1905   Date of last wick change 05/08/25 05/08/25 1905   Time of last wick change 1905 05/08/25 1905   Output (mL) 250 mL 05/10/25 1001          Physical Exam         Neurosurgery Physical Exam    [id number]  Drowsy but easily arousable, alert, Ox4  Cni  Follows commands x4 grossly full strength throughout  SILT  R dysmetria     EVD Incision dressed CDI     Significant Labs:  Recent Labs   Lab 05/12/25 0221 05/12/25  0620 05/12/25 2016 05/13/25  0131 05/13/25  0909   *  --   --  115*  --      139   < > 143 142 144   K 4.8  --   --  3.7  --      --   --  114*  --    CO2 25  --   --  19*  --    BUN 20  --   --  15  --    CREATININE 0.8  --   --  0.6  --    CALCIUM 9.6  --   --  8.3*  --    MG 2.3  --   --  1.9  --     < > = values in this interval not displayed.     Recent Labs   Lab 05/12/25 0221 05/13/25  0131   WBC 14.22* 13.25*   HGB 11.0* 10.1*   HCT 35.9* 33.0*    370     No results for  "input(s): "LABPT", "INR", "APTT" in the last 48 hours.    Microbiology Results (last 7 days)       Procedure Component Value Units Date/Time    Culture, Respiratory with Gram Stain [[id number]]  (Abnormal)  (Susceptibility) Collected: 05/10/25 1120    Order Status: Completed Specimen: Respiratory from Sputum, Expectorated Updated: 05/13/25 0946     Respiratory Culture No S aureus or Pseudomonas isolated      Few KLEBSIELLA AEROGENES     Comment: with normal respiratory chichi        GRAM STAIN <10 Epithelial Cells/LPF      Rare WBC seen      Many Gram positive cocci      Many Gram Negative Rods    Culture, Respiratory with Gram Stain [[id number]]  (Abnormal)  (Susceptibility) Collected: 05/10/25 2035    Order Status: Completed Specimen: Respiratory from Sputum Updated: 05/13/25 0945     Respiratory Culture No S aureus or Pseudomonas isolated      Few KLEBSIELLA AEROGENES     Comment: with normal respiratory chichi        GRAM STAIN <10 Epithelial Cells/LPF      Rare WBC seen      Many Gram positive cocci      Moderate Gram Negative Rods    CSF culture [[id number]] Collected: 05/12/25 1729    Order Status: Completed Specimen: CSF (Spinal Fluid) from CSF Tap, Tube 3 Updated: 05/13/25 0011     GRAM STAIN Rare WBC seen      No organisms seen    Gram stain [[id number]] Collected: 05/12/25 1729    Order Status: Canceled Specimen: CSF (Spinal Fluid) from CSF Tap, Tube 3 Updated: 05/12/25 1909          All pertinent labs from the last 24 hours have been reviewed.    Significant Diagnostics:  CT: CT Head Without Contrast  Result Date: 5/11/2025  1. Compared to prior head CT performed 05/06/2025, 20:13 hours, evolving relatively large right cerebellar infarct without macroscopic hemorrhage within the infarct territory.  Questionable hypoattenuation involving the brainstem which may be artifactual given coil artifact, however additional areas of ischemia not excluded.  MRI may be considered for further characterization.  Further " effacement of the 4th ventricle since the prior study, however there has been slight decompression of the lateral and 3rd ventricles since the prior exam. 2. Relatively similar subarachnoid and interventricular hemorrhage.  No new or enlarging areas of intracranial hemorrhage appreciated. This report was flagged in Epic as abnormal. Electronically signed by: Mich Downs Date:    05/11/2025 Time:    02:09    MRI: No results found in the last 24 hours.

## 2025-05-13 NOTE — EICU
REIU Night Rounds Checklist  24H Vital Sign Range:  Temp:  [97.6 °F (36.4 °C)-99.6 °F (37.6 °C)]   Pulse:  []   Resp:  [13-35]   BP: ()/()   SpO2:  [94 %-100 %]     Video rounds and LDA reconciliation

## 2025-05-13 NOTE — PROGRESS NOTES
[facility] - Neuro Critical Care  Neurocritical Care  Progress Note    Admit Date: 5/6/2025  Service Date: 05/13/2025  Length of Stay: 7    Subjective:     Chief Complaint: SAH (subarachnoid hemorrhage)    History of Present Illness: History obtained via chart review and daughter at bedside as patient intubated on arrival.     Per ED note:  49 y.o. female, PMH HTN and anemia,  presenting as a transfer for neurosurgical evaluation with newly diagnosed ICH from outside hospital.  Patient was transferred from [facility].  Daughter at bedside helps provide history.  She states that she was at a grocery store when she had a syncopal event.  She was unsure if she hit her head.  She states that people on the scene called her to let her know what happened and after she was seen in the ED they told her that she had bleeding inside of her head.  She states that while in the ED her mother was answering questions appropriately and acting like her normal self except frequently falling asleep.  Patient was intubated for airway protection prior to arrival.  Patient and reversal with [facility] prior to transfer.  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 Distal location suspicious for mycotic aneurysm     [place] [facility] consulted, EVD placed and admitted to neurocritical care    Hospital Course: 5/7/2025: extubated today to NC, SBP liberalized to 220, d/c jay cath,   5/8/2025: EVD per NSSGY, TCD's no vasospasm, DVT prophylaxis initiated, pending to hear from NSGY team when ok to start AC  05/09/2025: repeat CXR today, plan for CT chest, resp cx , add IS, EVD @10 per NSGY, TCDs today no vasospasm  5/10: Discussed with Dr. Chase and Neurosurgery, Heparin gtt started for R brachial DVT and hx arterial occlusion, will need repeat CTH when therapeutic. Hold off on transitioning to Coumadin until cleared by  NSGY. CT Chest negative for DVT, but concerning for PNA. Repeat sputum cx sent. Regular diet started. Repeat Na improved to 138.   5/11: right cerebellar CVA on imaging, HTS, MRI, family updated at bedside, place andreia, hold heparin gtt   05/12/2025 EVD @ 10. SOC watch. PBD7- plan for MRA c/s contrast for reevaluation of coiled aneurysm. Dc keppra. TCDs negative for spasm. Given 500NS for euvolemia. Given 3% HTS 250ml bolus and continue 2% HTS gtt @ 75. Na checks q6h for goal > 145. Started valsartan 80. Lower extremity arterial US ordered given hx RLE arterial occlusion, plan to restart heparin gtt pending results. SLP consult for diet, plan for FEES tomorrow. Started lovenox. Started seoquel 25qHS. Resp cx w GNR, many GPCs- has low grade temps possibly 2/2 DVT and stable mild leukocytosis so will hold off on abx for now.   05/13/2025 Delirium overnight requiring zyprexa x2. Restarted on precedex today and increased seroquel to 50qHS. Required 500NS bolus for hypotension after zyprexa last night. RLE arterial US negative for occlusion, only showing moderate stenosis. Decided not to continue heparin gtt given brachial DVT is in upper extremity. Will start plavix for DAPT. Respiratory cx w Klebsiella aerogenes x2, but not symptomatic. Transitioned from Ancef to cefepime. Bedside FEES completed, started regular thin diet. Given 3%  bolus x2 for Na goal > 145 and remains on 2% HTS @ 75. Prn valium for neck and back pain.     Review of Systems   Constitutional:  Negative for chills and fever.   Respiratory:  Negative for shortness of breath.    Cardiovascular:  Negative for chest pain.   Gastrointestinal:  Negative for abdominal pain.   Genitourinary:  Negative for difficulty urinating.   Musculoskeletal:  Positive for back pain and neck stiffness. Negative for neck pain.   Neurological:  Positive for headaches. Negative for weakness and numbness.       Objective:     Vitals:  Temp: 98.4 °F (36.9 °C)  Pulse: (!)  116  Rhythm: sinus tachycardia  BP: (!) 140/96  MAP (mmHg): 111  ICP Mean (mmHg): 14 mmHg  Resp: (!) 51  SpO2: 100 %    Temp  Min: 97.6 °F (36.4 °C)  Max: 99 °F (37.2 °C)  Pulse  Min: 101  Max: 139  BP  Min: 93/50  Max: 178/133  MAP (mmHg)  Min: 66  Max: 145  ICP Mean (mmHg)  Min: 4 mmHg  Max: 17 mmHg  Resp  Min: 13  Max: 51  SpO2  Min: 95 %  Max: 100 %    05/12 0701 - 05/13 0700  In: 3449.2 [P.O.:390; I.V.:2085.4]  Out: 3810 [Urine:3520; Drains:290]   Unmeasured Output  Unmeasured Urine Occurrence: 1  Unmeasured Stool Occurrence: 1  Pad Count: 1        Physical Exam  Vitals and nursing note reviewed.   Constitutional:       General: She is not in acute distress.     Appearance: She is obese.   HENT:      Head:      Comments: EVD site c/d/i     Right Ear: External ear normal.      Left Ear: External ear normal.      Nose: Nose normal.      Mouth/Throat:      Mouth: Mucous membranes are moist.      Pharynx: Oropharynx is clear.   Eyes:      Pupils: Pupils are equal, round, and reactive to light.   Cardiovascular:      Rate and Rhythm: Normal rate and regular rhythm.   Pulmonary:      Effort: Pulmonary effort is normal. No respiratory distress.   Abdominal:      General: There is no distension.      Palpations: Abdomen is soft.      Tenderness: There is no abdominal tenderness.   Musculoskeletal:      Cervical back: Normal range of motion. No rigidity or tenderness.   Skin:     General: Skin is warm and dry.   Neurological:      Mental Status: She is alert.      Comments: E4 V4 M6  Mental status: Alert. Oriented x person, place, but intermittently to time. Delirious. Speech fluent. No dysarthria. Following commands.   CN II-XII grossly intact, specifically:  PERRL  No facial asymmetry.   Tongue midline.   Shoulder shrug symmetric  Motor:  RUE 5/5  RLE 5/5  LUE 5/5  LLE 5/5  R dysmetria   Sensation intact and symmetric throughout    Psychiatric:         Mood and Affect: Mood normal.             Medications:  ContinuousdexmedeTOMIDine (Precedex) infusion (titrating)  sodium chloride (23.4%) HYPERTONIC 4 mEq/mL 172 mEq in sterile water 500 mL (sodium chloride 2%) infusion, Last Rate: 75 mL/hr at 05/13/25 1301    Scheduledalbuterol-ipratropium, 3 mL, Q6H  aspirin, 81 mg, Daily  atorvastatin, 40 mg, Daily  ceFEPime IV (PEDS and ADULTS), 2 g, Q8H  clopidogreL, 75 mg, Daily  enoxparin, 40 mg, Daily  mupirocin, , BID  niMODipine, 60 mg, Q4H  QUEtiapine, 50 mg, QHS  senna-docusate, 1 tablet, BID  valsartan, 80 mg, Daily    PRNacetaminophen, 650 mg, Q6H PRN  bisacodyL, 10 mg, Daily PRN  diazePAM, 10 mg, Q6H PRN  gabapentin, 300 mg, Q8H PRN  hydrALAZINE, 10 mg, Q6H PRN  labetalol, 10 mg, Q4H PRN  melatonin, 6 mg, Nightly PRN  methocarbamoL, 750 mg, Q6H PRN  oxyCODONE, 5 mg, Q6H PRN  potassium bicarbonate, 35 mEq, PRN  potassium bicarbonate, 50 mEq, PRN  potassium bicarbonate, 60 mEq, PRN  potassium, sodium phosphates, 2 packet, PRN  potassium, sodium phosphates, 2 packet, PRN  potassium, sodium phosphates, 2 packet, PRN  sodium chloride 0.9%, 10 mL, PRN      Today I personally reviewed pertinent medications, lines/drains/airways, imaging, cardiology results, laboratory results, microbiology results, notably:    5/12 BLE Arterial US     Right QUIANA 0.65, is suggestive of moderate right lower extremity arterial disease.    Right profunda femoral artery stenosis, 50-75%.    Right below-knee arteries display blunted monophasic waveforms with low peak systolic velocities.    Right posterior tibial artery stenosis, 50-75%.    Left QUIANA 1.0, is normal.    Duplex ultrasound shows left lower extremity arteries with no hemodynamically significant stenosis.    5/11 CTH   1. Compared to prior head CT performed 05/06/2025, 20:13 hours, evolving relatively large right cerebellar infarct without macroscopic hemorrhage within the infarct territory.  Questionable hypoattenuation involving the brainstem which may be artifactual  given coil artifact, however additional areas of ischemia not excluded.  MRI may be considered for further characterization.  Further effacement of the 4th ventricle since the prior study, however there has been slight decompression of the lateral and 3rd ventricles since the prior exam.  2. Relatively similar subarachnoid and interventricular hemorrhage.  No new or enlarging areas of intracranial hemorrhage appreciated.      Microbiology Results (last 7 days)       Procedure Component Value Units Date/Time    Culture, Respiratory with Gram Stain [[id number]]  (Abnormal)  (Susceptibility) Collected: 05/10/25 1120    Order Status: Completed Specimen: Respiratory from Sputum, Expectorated Updated: 05/13/25 0946     Respiratory Culture No S aureus or Pseudomonas isolated      Few KLEBSIELLA AEROGENES     Comment: with normal respiratory chichi        GRAM STAIN <10 Epithelial Cells/LPF      Rare WBC seen      Many Gram positive cocci      Many Gram Negative Rods    Culture, Respiratory with Gram Stain [[id number]]  (Abnormal)  (Susceptibility) Collected: 05/10/25 2035    Order Status: Completed Specimen: Respiratory from Sputum Updated: 05/13/25 0945     Respiratory Culture No S aureus or Pseudomonas isolated      Few KLEBSIELLA AEROGENES     Comment: with normal respiratory chichi        GRAM STAIN <10 Epithelial Cells/LPF      Rare WBC seen      Many Gram positive cocci      Moderate Gram Negative Rods    CSF culture [[id number]] Collected: 05/12/25 1729    Order Status: Completed Specimen: CSF (Spinal Fluid) from CSF Tap, Tube 3 Updated: 05/13/25 0011     GRAM STAIN Rare WBC seen      No organisms seen    Gram stain [[id number]] Collected: 05/12/25 1729    Order Status: Canceled Specimen: CSF (Spinal Fluid) from CSF Tap, Tube 3 Updated: 05/12/25 1909           Laboratory:  CBC:  Recent Labs   Lab 05/13/25  0131   WBC 13.25*   RBC 3.63*   HGB 10.1*   HCT 33.0*      MCV 91   MCH 27.8   MCHC 30.6*  "      CMP:  Recent Labs   Lab 05/13/25  0131 05/13/25  0909   CALCIUM 8.3*  --    ALBUMIN 2.8*  --    PROT 6.9  --     144   K 3.7  --    CO2 19*  --    *  --    BUN 15  --    CREATININE 0.6  --    ALKPHOS 82  --    ALT 11  --    AST 24  --    BILITOT 0.4  --      Recent Labs   Lab 05/13/25  0131   MG 1.9   PHOS 1.5*       Coagulation:  Recent Labs   Lab 05/10/25  1734 05/11/25  0031 05/11/25  0724   INR 1.0  --   --    APTT 22.0   < > 45.5*    < > = values in this interval not displayed.       Lipid Panel:  No results for input(s): "CHOL", "HDL", "LDLCALC", "TRIG" in the last 168 hours.    Endocrine:  No results for input(s): "HGBA1C", "TSH" in the last 72 hours.    ABG:  No results for input(s): "PH", "PCO2", "HCO3", "POCSATURATED", "BE" in the last 72 hours.    Urinalysis:  No results for input(s): "COLORU", "CLARITYU", "SPECGRAV", "PHUR", "OCCULTUA", "RBCUA", "WBCUA", "BACTERIA", "GLUCUA", "KETONESU", "PROTEINUA", "BILIRUBINUA", "MUCUS" in the last 168 hours.      Diet  Diet Adult Regular Thin  Diet Adult Regular Thin      Assessment/Plan:     Neuro  * SAH (subarachnoid hemorrhage)  Patient is a 49-year-old female with past medical history of hypertension presenting via transfer from [place] for higher level of care after being diagnosed with subarachnoid hemorrhage.    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  S/p EVD 5/6  S/p Coil emolization 5/6    - EVD open at 10  - Neurosurgery consulted  - Vascular Neurology consulted  - SBP< 160   - nimodipine 60 mg q4h   - daily TCDs - negative for vasospasm thus far  - Aspirin 81 and Plavix 75 qd  - atorvastatin 40 mg  - euvolemia  - Na > 145  - CTH 5/11 with new R PICA/ superior cerebellar infarct.  SOC watch  - PBD7- plan for MRA c/s contrast for reevaluation of coiled aneurysm  - SLP consult for diet, plan for FEES tomorrow.   - SCDs; lovenox for VTE ppx  - Delirium precautions. Started seoquel 50qHS. "   -Precedex gtt   -PT/OT    Brain edema  See cerebellar stroke     Cerebellar stroke, acute  CT on 5/11 revealed R cerebellar infarct. Likely uriah-angio  Family aware and imaging reviewed  Atorvastatin 40  ASA 81 and Plavix 75mg qd  RLE arterial US negative for occlusion, only showing moderate stenosis. Decided not to continue heparin gtt given brachial DVT is in upper extremity. Will continue w DAPT and DVT ppx w lovenox instead of full AC.   Concern for brain compression in post fossa  SOC watch, NSGY following   Given 3% HTS 250ml bolus x2  2% HTS gtt @ 75  Na checks q6h for goal > 145    Acute encephalopathy  See primary problem    Obstructive hydrocephalus  EVD @ 10  R Cerebellar infarct, SOC watch.      Brain compression  monitor EVD output closely  HTS therapy- 2%HTS @ 75 and prn 3% HTS boluses   Na goal > 145  Neurochecks  CTH w R cerebellar infarct   SOC watch    Brain aneurysm  See primary problem    Cardiac/Vascular  Essential hypertension  SBP <160  Valsartan 80 mg    Hematology  History of DVT in adulthood  -- Pt on Warfarin outpatient, currently held  -- Pt was on a Heparin gtt for for R brachial DVT and hx arterial occlusion. 5/11/25 CTH showed large R cerebellar infarct and heparin gtt was held on 5/11.   Lower extremity arterial US ordered given hx RLE arterial occlusion, RLE arterial US negative for occlusion, only showing moderate stenosis (50-75%). Decided not to continue heparin gtt given brachial DVT is in upper extremity. Will continue with only DAPT.    Continue lovenox for VTE ppx    Palliative Care  Endotracheal tube present-resolved as of 5/10/2025  - extubated 5/8, on RA          The patient is being Prophylaxed for:  Venous Thromboembolism with: Mechanical or Chemical  Stress Ulcer with: Not Applicable   Ventilator Pneumonia with: not applicable    Activity Orders            Diet Adult Regular Thin: Regular starting at 05/13 1127    Progressive Mobility Protocol (mobilize patient to  their highest level of functioning at least twice daily) starting at 05/06 2000    Turn patient starting at 05/06 0800          Full Code    Critical care time spent on the evaluation and treatment of severe organ dysfunction, review of pertinent labs and imaging studies, discussions with consulting providers and discussions with patient/family: 35 minutes.    Asiya Mast, PA-C  Neurocritical Care  [facility] - Neuro Critical Care

## 2025-05-13 NOTE — PT/OT/SLP PROGRESS
Speech Language Pathology      Waldo Emmanuel  MRN: [id number]    Patient seen for objective swallow exam at the bedside. Full report to follow. Pt otherwise safe to resume regular unrestricted diet and thin liquids meds whole 1 at a time . Speech will continue to monitor for diet tolerance.     Ryanne Hargrove MS, CCC-SLP  Speech Language Pathologist    5/13/2025

## 2025-05-13 NOTE — ASSESSMENT & PLAN NOTE
Patient is a 49-year-old female with past medical history of hypertension presenting via transfer from [place] for higher level of care after being diagnosed with subarachnoid hemorrhage.    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  S/p EVD 5/6  S/p Coil emolization 5/6    - EVD open at 10  - Neurosurgery consulted  - Vascular Neurology consulted  - SBP< 160   - nimodipine 60 mg q4h   - daily TCDs - negative for vasospasm thus far  - Aspirin 81 and Plavix 75 qd  - atorvastatin 40 mg  - euvolemia  - Na > 145  - CTH 5/11 with new R PICA/ superior cerebellar infarct.  SOC watch  - PBD7- plan for MRA c/s contrast for reevaluation of coiled aneurysm  - SLP consult for diet, plan for FEES tomorrow.   - SCDs; lovenox for VTE ppx  - Delirium precautions. Started seoquel 50qHS.   -Precedex gtt   -PT/OT

## 2025-05-13 NOTE — PLAN OF CARE
[facility] Care Plan  POC reviewed with Waldo Emmanuel and family at 1400. Patient and Family verbalized understanding. Questions and concerns addressed. No acute events today. Pt progressing toward goals. Will continue to monitor. See below and flowsheets for full assessment and VS info.     Patient very agitated during the day.  Restart Plavix, aspirin and DVT prf  EVD open at 10        Is this a stroke patient? yes- Stroke booklet reviewed with family and is at bedside.   Care Plan Individualization:     Neuro:  [place] Coma Scale  Best Eye Response: 4-->(E4) spontaneous  Best Motor Response: 6-->(M6) obeys commands  Best Verbal Response: 5-->(V5) oriented  [place] Coma Scale Score: 15  Assessment Qualifiers: patient not sedated/intubated  Pupil PERRLA: yes     24 hr Temp:  [97.6 °F (36.4 °C)-99 °F (37.2 °C)]     CV:   Rhythm: sinus tachycardia  BP goals:   SBP < 160  MAP > 65    Resp:      Vent Mode: Spont  Set Rate: 20 BPM  Oxygen Concentration (%): 40  Vt Set: 375 mL  PEEP/CPAP: 5 cmH20  Pressure Support: 5 cmH20    Plan: N/A    GI/:     Diet/Nutrition Received: NPO  Last Bowel Movement: 05/13/25  Voiding Characteristics: voids spontaneously without difficulty    Intake/Output Summary (Last 24 hours) at 5/13/2025 1546  Last data filed at 5/13/2025 1501  Gross per 24 hour   Intake 2811.18 ml   Output 3563 ml   Net -751.82 ml     Unmeasured Output  Unmeasured Urine Occurrence: 1  Unmeasured Stool Occurrence: 1  Pad Count: 1    Labs/Accuchecks:  Recent Labs   Lab 05/13/25  0131   WBC 13.25*   RBC 3.63*   HGB 10.1*   HCT 33.0*         Recent Labs   Lab 05/13/25  0131 05/13/25  0909 05/13/25  1351      < > 145   K 3.7  --   --    CO2 19*  --   --    *  --   --    BUN 15  --   --    CREATININE 0.6  --   --    ALKPHOS 82  --   --    ALT 11  --   --    AST 24  --   --    BILITOT 0.4  --   --     < > = values in this interval not displayed.      Recent Labs   Lab 05/10/25  1734 05/11/25  0031  "25  0724   PROTIME 11.4  --   --    INR 1.0  --   --    APTT 22.0   < > 45.5*    < > = values in this interval not displayed.    No results for input(s): "CPK", "CPKMB", "TROPONINI", "MB" in the last 168 hours.    Electrolytes: Electrolytes replaced  Accuchecks: none    Gtts:   dexmedeTOMIDine (Precedex) infusion (titrating)  0-1.4 mcg/kg/hr Intravenous Continuous        sodium chloride (23.4%) HYPERTONIC 4 mEq/mL 172 mEq in sterile water 500 mL (sodium chloride 2%) infusion   Intravenous Continuous   Stopped at 25 1501       LDA/Wounds:    Ivan Risk Assessment  Sensory Perception: 3-->slightly limited  Moisture: 3-->occasionally moist  Activity: 2-->chairfast  Mobility: 3-->slightly limited  Nutrition: 2-->probably inadequate  Friction and Shear: 3-->no apparent problem  Ivan Score: 16    Is your vian score 12 or less? no            Restraints:   Restraint Order  Length of Order: Order good for next 24 hours or when removed.  Date that the current order will : 25  Time that the current order will : 1131  Order Upon Application: Yes    [facility]   "

## 2025-05-13 NOTE — ASSESSMENT & PLAN NOTE
CT on 5/11 revealed R cerebellar infarct. Likely uriah-angio  Family aware and imaging reviewed  Atorvastatin 40  ASA 81 and Plavix 75mg qd  RLE arterial US negative for occlusion, only showing moderate stenosis. Decided not to continue heparin gtt given brachial DVT is in upper extremity. Will continue w DAPT and DVT ppx w lovenox instead of full AC.   Concern for brain compression in post fossa  SOC watch, NSGY following   Given 3% HTS 250ml bolus x2  2% HTS gtt @ 75  Na checks q6h for goal > 145

## 2025-05-13 NOTE — PLAN OF CARE
[facility] Care Plan    POC reviewed with Waldo Alegriaer and family at 0300. Patient and Family verbalized understanding. Questions and concerns addressed. No acute events today. Pt progressing toward goals. Will continue to monitor. See below and flowsheets for full assessment and VS info.     - EVD open at 10; ICP 4-16; CSF 1-23  - Oxy x1, Tylenol x1, Robaxin x1 and Gabapentin x1 for back, neck and R leg pain  - Zyprexa 5mg IVP x1 for restlessness and agitation  - Zyprexa 10mg IVP x1 for restlessness and agitation  - Continued 2% HTS x 75 mL/hr  - 3% HTS 250mL bolus x1 for Na <145  - NS 250mL bolus x2  - Pt sinus tachycardic overnight b/w 100-120s  - New PIV 20g 1/2 in L upper arm  - Bath completed  - K 3.7; replaced  - Phos 1.5; ongoing replacement    Is this a stroke patient? Yes - Stroke booklet w/ family and pt    Neuro:  [place] Coma Scale  Best Eye Response: 4-->(E4) spontaneous  Best Motor Response: 6-->(M6) obeys commands  Best Verbal Response: 5-->(V5) oriented  [place] Coma Scale Score: 15  Assessment Qualifiers: patient not sedated/intubated  Pupil PERRLA: yes     24 hr Temp:  [97.6 °F (36.4 °C)-99.6 °F (37.6 °C)]     CV:   Rhythm: sinus tachycardia  BP goals:   SBP < 160  MAP > 65    Resp: RA    Plan: N/A    GI/:     Diet/Nutrition Received: NPO  Last Bowel Movement: 05/13/25  Voiding Characteristics: incontinence, external catheter    Intake/Output Summary (Last 24 hours) at 5/13/2025 0701  Last data filed at 5/13/2025 0601  Gross per 24 hour   Intake 3378.86 ml   Output 3546 ml   Net -167.14 ml     Unmeasured Output  Unmeasured Urine Occurrence: 1  Unmeasured Stool Occurrence: 1  Pad Count: 1    Labs/Accuchecks:  Recent Labs   Lab 05/13/25  0131   WBC 13.25*   RBC 3.63*   HGB 10.1*   HCT 33.0*         Recent Labs   Lab 05/13/25 0131      K 3.7   CO2 19*   *   BUN 15   CREATININE 0.6   ALKPHOS 82   ALT 11   AST 24   BILITOT 0.4      Recent Labs   Lab 05/10/25  173  "05/11/25  0031 05/11/25  0724   PROTIME 11.4  --   --    INR 1.0  --   --    APTT 22.0   < > 45.5*    < > = values in this interval not displayed.    No results for input(s): "CPK", "CPKMB", "TROPONINI", "MB" in the last 168 hours.    Electrolytes: Electrolytes replaced  Accuchecks: none    Gtts:   dexmedeTOMIDine (Precedex) infusion (titrating)  0-0.8 mcg/kg/hr Intravenous Continuous   Stopped at 05/12/25 0701    sodium chloride (23.4%) HYPERTONIC 4 mEq/mL 172 mEq in sterile water 500 mL (sodium chloride 2%) infusion   Intravenous Continuous 75 mL/hr at 05/13/25 0401 Rate Verify at 05/13/25 0401       LDA/Wounds:    Ivan Risk Assessment  Sensory Perception: 3-->slightly limited  Moisture: 3-->occasionally moist  Activity: 2-->chairfast  Mobility: 3-->slightly limited  Nutrition: 2-->probably inadequate  Friction and Shear: 3-->no apparent problem  Ivan Score: 16  Is your ivan score 12 or less? no    Restraints: N/A    WCTM      Problem: Infection  Goal: Absence of Infection Signs and Symptoms  Outcome: Progressing     Problem: Skin Injury Risk Increased  Goal: Skin Health and Integrity  Outcome: Progressing     Problem: Adult Inpatient Plan of Care  Goal: Plan of Care Review  Outcome: Progressing  Goal: Optimal Comfort and Wellbeing  Outcome: Progressing     Problem: Stroke, Intracerebral Hemorrhage  Goal: Optimal Coping  Outcome: Progressing  Goal: Optimal Cerebral Tissue Perfusion  Outcome: Progressing  Goal: Optimal Cognitive Function  Outcome: Progressing  Goal: Optimal Functional Ability  Outcome: Progressing  Goal: Optimal Pain Control and Function  Outcome: Progressing  Goal: Effective Oxygenation and Ventilation  Outcome: Progressing  Goal: Improved Sensorimotor Function  Outcome: Progressing  Goal: Safe and Effective Swallow  Outcome: Progressing  Goal: Effective Urinary Elimination  Outcome: Progressing     Problem: Fall Injury Risk  Goal: Absence of Fall and Fall-Related Injury  Outcome: " Progressing     Problem: Wound  Goal: Skin Health and Integrity  Outcome: Progressing  Goal: Optimal Wound Healing  Outcome: Progressing

## 2025-05-13 NOTE — ASSESSMENT & PLAN NOTE
-- Pt on Warfarin outpatient, currently held  -- Pt was on a Heparin gtt for for R brachial DVT and hx arterial occlusion. 5/11/25 CTH showed large R cerebellar infarct and heparin gtt was held on 5/11.   Lower extremity arterial US ordered given hx RLE arterial occlusion, RLE arterial US negative for occlusion, only showing moderate stenosis (50-75%). Decided not to continue heparin gtt given brachial DVT is in upper extremity. Will continue with only DAPT.    Continue lovenox for VTE ppx   [de-identified] : Feb 16, 2022 \par \par ROSY MANSFIELD MD\par \par Patient with some change in bowel movement, some constipation usually better with fiber, some thinning of stool\par \par Mr. SHELLEY FISHMAN 65 year is referred for colon cancer screening.  The patient denies any  blood per rectum, abdominal, pelvic or rectal discomfort.   \par \par There is no family history of colon cancer or other gastrointestinal cancers.\par \par The patient denies any unexplained weight loss, fever chills or night sweats.\par \par This is the 2nd screening colonoscopy for the patient.\par \par There is no significant cardiac or pulmonary history.\par \par No complaints of chest pain, shortness of breath, palpitations, cough.\par \par The patient is feeling quite well.\par \par The patient is on no significant anticoagulant therapy or anti platelet therapy. \par \par No adverse reaction to anesthesia in the past.\par \par

## 2025-05-13 NOTE — PROGRESS NOTES
[facility] - [facility] Critical Care  Neurosurgery  Progress Note    Subjective:     History of Present Illness: 49f presenting after syncope with CTH demonstrating SAH. CTA without clear vascular malformation. Intubated prior to arrival to [facility] ED. Discussed expected hospital course and imaging with daughter in room    Post-Op Info:  * No surgery found *       Interval History: 5/13 naeon, exam stable. Plan for MRA w contrast when able    Medications:  Continuous Infusions:   sodium chloride (23.4%) HYPERTONIC 4 mEq/mL 172 mEq in sterile water 500 mL (sodium chloride 2%) infusion   Intravenous Continuous 75 mL/hr at 05/13/25 1101 Rate Verify at 05/13/25 1101     Scheduled Meds:   albuterol-ipratropium  3 mL Nebulization Q6H    aspirin  81 mg Oral Daily    atorvastatin  40 mg Oral Daily    ceFEPime IV (PEDS and ADULTS)  2 g Intravenous Q8H    clopidogreL  75 mg Oral Daily    enoxparin  40 mg Subcutaneous Daily    mupirocin   Nasal BID    niMODipine  60 mg Oral Q4H    QUEtiapine  50 mg Oral QHS    senna-docusate  1 tablet Oral BID    valsartan  80 mg Oral Daily     PRN Meds:  Current Facility-Administered Medications:     acetaminophen, 650 mg, Oral, Q6H PRN    bisacodyL, 10 mg, Rectal, Daily PRN    diazePAM, 10 mg, Oral, Q6H PRN    gabapentin, 300 mg, Oral, Q8H PRN    hydrALAZINE, 10 mg, Intravenous, Q6H PRN    labetalol, 10 mg, Intravenous, Q4H PRN    melatonin, 6 mg, Oral, Nightly PRN    methocarbamoL, 750 mg, Oral, Q6H PRN    oxyCODONE, 5 mg, Oral, Q6H PRN    potassium bicarbonate, 35 mEq, Oral, PRN    potassium bicarbonate, 50 mEq, Oral, PRN    potassium bicarbonate, 60 mEq, Oral, PRN    potassium, sodium phosphates, 2 packet, Oral, PRN    potassium, sodium phosphates, 2 packet, Oral, PRN    potassium, sodium phosphates, 2 packet, Oral, PRN    sodium chloride 0.9%, 10 mL, Intravenous, PRN     Review of Systems  Objective:     Weight: 100.2 kg (221 lb)  Body mass index is 39.15 kg/m².  Vital Signs (Most  Recent):  Temp: 98.4 °F (36.9 °C) (05/13/25 1101)  Pulse: (!) 134 (05/13/25 1101)  Resp: (!) 26 (05/13/25 1101)  BP: (!) 167/73 (05/13/25 1101)  SpO2: 100 % (05/13/25 1101) Vital Signs (24h Range):  Temp:  [97.6 °F (36.4 °C)-99 °F (37.2 °C)] 98.4 °F (36.9 °C)  Pulse:  [] 134  Resp:  [13-33] 26  SpO2:  [95 %-100 %] 100 %  BP: ()/() 167/73     Date 05/13/25 0700 - 05/14/25 0659   Shift 8935-3412 0746-4271 5114-7712 24 Hour Total   INTAKE   I.V.(mL/kg) 455.7(4.5)   455.7(4.5)   IV Piggyback 0   0   Shift Total(mL/kg) 455.7(4.5)   455.7(4.5)   OUTPUT   Drains 74   74   Shift Total(mL/kg) 74(0.7)   74(0.7)   Weight (kg) 100.2 100.2 100.2 100.2                            ICP/Ventriculostomy 05/06/25 0920 Right Parietal region (Active)   Level of Ventriculostomy (cm above) 10 05/10/25 1905   Status Open to drainage 05/11/25 0205   Site Assessment Clean;Dry 05/11/25 0205   Site Drainage No drainage 05/11/25 0205   Waveform normal waveform 05/10/25 1905   Output (mL) 15 mL 05/10/25 2205   CSF Color red 05/11/25 0205   Dressing Status Clean;Dry;Intact 05/11/25 0205   Interventions HOB degrees;bed controls locked;zeroed 05/10/25 0605       Female External Urinary Catheter w/ Suction 05/07/25 1732 (Active)   Skin no redness;no breakdown 05/10/25 1701   Tolerance no signs/symptoms of discomfort 05/10/25 1701   Suction Continuous suction at 40 mmHg 05/08/25 1905   Date of last wick change 05/08/25 05/08/25 1905   Time of last wick change 1905 05/08/25 1905   Output (mL) 250 mL 05/10/25 1001          Physical Exam         Neurosurgery Physical Exam    [id number]  Drowsy but easily arousable, alert, Ox4  Cni  Follows commands x4 grossly full strength throughout  SILT  R dysmetria     EVD Incision dressed CDI     Significant Labs:  Recent Labs   Lab 05/12/25  0221 05/12/25  0620 05/12/25 2016 05/13/25  0131 05/13/25  0909   *  --   --  115*  --      139   < > 143 142 144   K 4.8  --   --  3.7  --    CL  "106  --   --  114*  --    CO2 25  --   --  19*  --    BUN 20  --   --  15  --    CREATININE 0.8  --   --  0.6  --    CALCIUM 9.6  --   --  8.3*  --    MG 2.3  --   --  1.9  --     < > = values in this interval not displayed.     Recent Labs   Lab 05/12/25  0221 05/13/25  0131   WBC 14.22* 13.25*   HGB 11.0* 10.1*   HCT 35.9* 33.0*    370     No results for input(s): "LABPT", "INR", "APTT" in the last 48 hours.    Microbiology Results (last 7 days)       Procedure Component Value Units Date/Time    Culture, Respiratory with Gram Stain [[id number]]  (Abnormal)  (Susceptibility) Collected: 05/10/25 1120    Order Status: Completed Specimen: Respiratory from Sputum, Expectorated Updated: 05/13/25 0946     Respiratory Culture No S aureus or Pseudomonas isolated      Few KLEBSIELLA AEROGENES     Comment: with normal respiratory chichi        GRAM STAIN <10 Epithelial Cells/LPF      Rare WBC seen      Many Gram positive cocci      Many Gram Negative Rods    Culture, Respiratory with Gram Stain [[id number]]  (Abnormal)  (Susceptibility) Collected: 05/10/25 2035    Order Status: Completed Specimen: Respiratory from Sputum Updated: 05/13/25 0945     Respiratory Culture No S aureus or Pseudomonas isolated      Few KLEBSIELLA AEROGENES     Comment: with normal respiratory chichi        GRAM STAIN <10 Epithelial Cells/LPF      Rare WBC seen      Many Gram positive cocci      Moderate Gram Negative Rods    CSF culture [[id number]] Collected: 05/12/25 1729    Order Status: Completed Specimen: CSF (Spinal Fluid) from CSF Tap, Tube 3 Updated: 05/13/25 0011     GRAM STAIN Rare WBC seen      No organisms seen    Gram stain [[id number]] Collected: 05/12/25 1729    Order Status: Canceled Specimen: CSF (Spinal Fluid) from CSF Tap, Tube 3 Updated: 05/12/25 1909          All pertinent labs from the last 24 hours have been reviewed.    Significant Diagnostics:  CT: CT Head Without Contrast  Result Date: 5/11/2025  1. Compared to prior " head CT performed 05/06/2025, 20:13 hours, evolving relatively large right cerebellar infarct without macroscopic hemorrhage within the infarct territory.  Questionable hypoattenuation involving the brainstem which may be artifactual given coil artifact, however additional areas of ischemia not excluded.  MRI may be considered for further characterization.  Further effacement of the 4th ventricle since the prior study, however there has been slight decompression of the lateral and 3rd ventricles since the prior exam. 2. Relatively similar subarachnoid and interventricular hemorrhage.  No new or enlarging areas of intracranial hemorrhage appreciated. This report was flagged in Epic as abnormal. Electronically signed by: Mich Downs Date:    05/11/2025 Time:    02:09    MRI: No results found in the last 24 hours.  Assessment/Plan:     * SAH (subarachnoid hemorrhage)  49f presenting after syncope with CTH demonstrating SAH. CTA with AICA aneurysm R. Intubated prior to arrival to [facility] ED.     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 Barney Almendarez MD  Neurosurgery  [facility] - Neuro Critical Care

## 2025-05-13 NOTE — ASSESSMENT & PLAN NOTE
monitor EVD output closely  HTS therapy- 2%HTS @ 75 and prn 3% HTS boluses   Na goal > 145  Neurochecks  CTH w R cerebellar infarct   SOC watch

## 2025-05-13 NOTE — PROGRESS NOTES
"[facility] - Neuro Critical Care  Adult Nutrition  Progress Note    SUMMARY       Recommendations  --Continue Regular diet as tolerated and clinically indicated   --Encourage good intakes   --Nursing: please continue to document % meal eaten on flowsheet   --RD to monitor weight, PO intake     Goals: Meet % EEN/EPN by next RD follow-up  Nutrition Goal Status: new  Communication of RD Recs:  (POC)    Nutrition Discharge Planning    Nutrition Discharge Planning: General healthy diet    Reason for Assessment    Reason For Assessment: RD follow-up  Diagnosis: hemorrhage (SAH (subarachnoid hemorrhage))  General Information Comments: 49f presenting after syncope with CTH demonstrating SAH. RD follow-up. Spoke to pt and daughter at bedside. Pt did not have an appetite yesterday, only had liquids and a spoonful of mashed potatoes. Pt has appetite today but did not eat anything for breakfast or lunch d/t not liking what she is being brought. Pt states she might eat if her order is taken. Daughter reports looking to order food from outside facility since pt does not like food here at facility. Weight stable - no concerns for malnutrition at this time.     Nutrition Related Social Determinants of Health: SDOH: Adequate food in home environment     Food Insecurity: No Food Insecurity (5/10/2025)    Hunger Vital Sign     Worried About Running Out of Food in the Last Year: Never true     Ran Out of Food in the Last Year: Never true       Nutrition/Diet History    Spiritual, Cultural Beliefs, Sabianist Practices, Values that Affect Care: no  Food Allergies: NKFA  Factors Affecting Nutritional Intake: None identified at this time    Anthropometrics    Height: 5' 3" (160 cm)  Height (inches): 63 in  Height Method: Stated  Weight: 100.2 kg (220 lb 14.4 oz)  Weight (lb): 220.9 lb  Weight Method: Standard Scale  Ideal Body Weight (IBW), Female: 115 lb  % Ideal Body Weight, Female (lb): 192.09 %  BMI (Calculated): 39.1  BMI Grade: " 35 - 39.9 - obesity - grade II       Lab/Procedures/Meds    Pertinent Labs Reviewed: reviewed - glucose 115, P 1.5    Pertinent Medications Reviewed: reviewed - aspirin, atorvastatin, enoxaparin, senna-docusate    Estimated/Assessed Needs    Weight Used For Calorie Calculations: 100.2 kg (220 lb 14.4 oz)  Energy Calorie Requirements (kcal): 1591 kcal/day (x 1.0 AF)  Energy Need Method: [place]-St Jeor  Protein Requirements: 105 g/day (2.0 g/kg IBW)  Weight Used For Protein Calculations: 52.3 kg (115 lb 4.8 oz)     Estimated Fluid Requirement Method: RDA Method  RDA Method (mL): 1591         Nutrition Prescription Ordered    Current Diet Order: Regular    Evaluation of Received Nutrient/Fluid Intake    Energy Calories Required: not meeting needs  Protein Required: not meeting needs  Fluid Required:  (Per MD)  Comments: LBM 5/12  % Intake of Estimated Energy Needs: 0 - 25 %   % Meal Intake: 0 - 25 %    PES Statement  Inadequate enteral nutrition infusion related to  (Infusion volume not reached) as evidenced by  (Inadequate EN volume compared to estimated requirements)  Status: Resolved    Nutrition Risk    Level of Risk/Frequency of Follow-up: moderate - high     Monitor and Evaluation    Monitor and Evaluation: Energy intake, Food and beverage intake, Diet order, Food and nutrition knowledge, Electrolyte and renal panel, Gastrointestinal profile, Glucose/endocrine profile, Inflammatory profile, Lipid profile, Nutrition focused physical findings, Skin, Weight     Nutrition Follow-Up    RD Follow-up?: Yes

## 2025-05-13 NOTE — PT/OT/SLP EVAL
Speech Language Pathology  [facility]  Fiberoptic Endoscopic Evaluation of Swallowing (FEES)    Patient Name:  Waldo Emmanuel   MRN:  [id number]    Impression:     The patient tolerated the procedure and the equipment was removed. Findings were consistent with the following swallow diagnoses following swallow diagnosis and severity: Functional oral and pharyngeal phases Dysphagia is further characterized by intact swallow function without evidence of penetration or aspiration with all consistencies presented.      Recommendations:       General Recommendations: Dysphagia therapy and Cognitive-linguistic therapy  Diet Recommendations:  Regular Diet - IDDSI Level 7, Thin liquids - IDDSI Level 0   Medications: One at a time   Aspiration Precautions: Feed as upright as possible with EVD placement, Frequent oral care, HOB to 90 degrees, Meds whole 1 at a time, Monitor for s/s of aspiration, and Standard aspiration precautions  General Precautions: Standard, aspiration, fall  Communication Strategies: none    Referral:     Reason for Referral  Patient was referred for a Fiberoptic Endoscopic Evaluation of a Swallow (FEES) to assess the efficiency of swallow function, rule out aspiration and make recommendations regarding safe dietary consistencies, effective compensatory strategies, and safe eating environment. Please refer to initial assessments and H&P for detailed/pertinent past medical history.    Diagnosis: SAH (subarachnoid hemorrhage)     History:           Past Medical History:   Diagnosis Date    ADHD (attention deficit hyperactivity disorder)     Anemia     Fibroids     Genital herpes     GERD (gastroesophageal reflux disease)     H/O total hysterectomy 08/03/2021    Hypertension     Labral tear of hip joint     Ovarian cyst     Right common arterial occlusion     Routine general medical examination at a health care facility 06/26/2017    Total Right Arterial Occlusion         Subjective:     General Appearance:       Behavior/State: pleasant, cooperative, confused, and distractable     Feeding tube present: No      Tracheostomy present: No     Respiratory Status: Room air    Pain: Pain Rating 1: 0/10  Pain Rating Post-Intervention 1: 0/10    Additional Information: in bed and bilateral wrist restraints in place    Objective:     Waldo was seen today for a fiberoptic endoscopic evaluation of swallowing (FEES). The patient was positioned upright in bed with EVD clamped by nursing prior to HOB elevated.    The patient's name and medical record number were verified via verbal consent and/or visualization of wristband. The purpose, procedure, and risks of FEES were explained to the patient. The patient expressed an understanding of potential risks and verbally consented to the procedure. Patient without known allergy to foods or synthetic food dyes offered during today's assessment.    Flexible Fiberoptic Endoscope was advanced transnasally through the most patent nasal cavity to the level of the velopharynx and larynx. A video of the examination was recorded.    Oral Mechanism Examination  Oral Musculature: general weakness  Dentition: present and adequate  Secretion Management: adequate  Mucosal Quality: adequate  Mandibular Strength and Mobility: WNL  Oral Labial Strength and Mobility: WNL  Lingual Strength and Mobility: WNL  Volitional Cough: present and poductive  Volitional Swallow: elicited  Voice Prior to PO Intake: mildly hoarse, low intensity    Scope Utilized: Pentax 0194    Nares Entry: right nostril entered using no topical anesthetic    Visualization of Anatomy:      Velopharynx: Visualization limited and Unable to assess   Epiglottis: Intact   Arytenoids: Intact and symmetric mobility appreciated   True vocal folds: Intact, symmetric mobility appreciated, and erythema   Secretion Management: adequate   Glottis: adequate airway patency at the level of the glottis         Abducted vocal folds    Adducted vocal folds    Oral phase:  The oral phase cannot be directly observed. The following behaviors are determined by the manner in which the bolus enters the pharynx.     WFL- Pt with adequate bolus acceptance, containment, control and timely A-P transfer across consistencies     Pharyngeal Phase:     Consistency Method/Volume Observation Compensatory Strategy   Thin Straw and Cyclic x3 Adequate airway protection  , No penetration observed , and No aspiration observed   Delayed swallow initiation triggered at the level of the valleculae N/A   Solid Bites of ambar cracker Adequate airway protection  , No penetration observed , No aspiration observed , and delayed swallow initiation triggered at the level of the valleculae N/A       During/Post-Procedure Respiratory Status: Room air    Additional Information: in bed, bilateral wrist restraints in place, all lines intact, and RN notified of exam termination and recommendations. Patient left in quiet comfortable state.    Prognosis:     Excellent for PO intake    Prognostic Barriers: medical diagnosis and medical prognosis    Education:     Results reviewed with: patient and RN    Additional education provided regarding: recommendations, swallow anatomy and physiology, aspiration precautions, safe swallow strategies, dysphagia diet, and plan of care    Plan:     Speech service will continue to closely monitor to ensure diet tolerance    Time Tracking:     SLP Treatment Date: 05/13/25  Speech Start Time: 1045  Speech Stop Time: 1130     Speech Total Time (min): 45 min    Billable Minutes: FEES Billable Minutes: Endoscopy Swallow Test 15 and Self Care/Home Management Training 30    Notable to mention mentoring SLP, Ryanne Hargrove MS CCC-SLP present throughout duration of exam.     05/13/2025

## 2025-05-14 ENCOUNTER — HOSPITAL ENCOUNTER (OUTPATIENT)
Dept: ENDOSCOPY | Facility: HOSPITAL | Age: 50
Discharge: HOME | End: 2025-05-14
Attending: INTERNAL MEDICINE

## 2025-05-14 LAB
ABSOLUTE EOSINOPHIL (OHS): 0.04 K/UL
ABSOLUTE MONOCYTE (OHS): 0.96 K/UL (ref 0.3–1)
ABSOLUTE NEUTROPHIL COUNT (OHS): 9.86 K/UL (ref 1.8–7.7)
ALBUMIN SERPL BCP-MCNC: 2.9 G/DL (ref 3.5–5.2)
ALP SERPL-CCNC: 81 UNIT/L (ref 40–150)
ALT SERPL W/O P-5'-P-CCNC: 16 UNIT/L (ref 10–44)
ANION GAP (OHS): 8 MMOL/L (ref 8–16)
AST SERPL-CCNC: 21 UNIT/L (ref 11–45)
BASOPHILS # BLD AUTO: 0.05 K/UL
BASOPHILS NFR BLD AUTO: 0.3 %
BILIRUB SERPL-MCNC: 0.5 MG/DL (ref 0.1–1)
BUN SERPL-MCNC: 20 MG/DL (ref 6–20)
CALCIUM SERPL-MCNC: 9.2 MG/DL (ref 8.7–10.5)
CHLORIDE SERPL-SCNC: 117 MMOL/L (ref 95–110)
CO2 SERPL-SCNC: 24 MMOL/L (ref 23–29)
CREAT SERPL-MCNC: 0.7 MG/DL (ref 0.5–1.4)
CRP SERPL-MCNC: 17.97 MG/L
ERYTHROCYTE [DISTWIDTH] IN BLOOD BY AUTOMATED COUNT: 14.1 % (ref 11.5–14.5)
ERYTHROCYTE [SEDIMENTATION RATE] IN BLOOD BY PHOTOMETRIC METHOD: 107 MM/HR
GFR SERPLBLD CREATININE-BSD FMLA CKD-EPI: >60 ML/MIN/1.73/M2
GLUCOSE SERPL-MCNC: 138 MG/DL (ref 70–110)
HCT VFR BLD AUTO: 31 % (ref 37–48.5)
HGB BLD-MCNC: 9.2 GM/DL (ref 12–16)
IMM GRANULOCYTES # BLD AUTO: 0.16 K/UL (ref 0–0.04)
IMM GRANULOCYTES NFR BLD AUTO: 1 % (ref 0–0.5)
LYMPHOCYTES # BLD AUTO: 4.39 K/UL (ref 1–4.8)
MAGNESIUM SERPL-MCNC: 1.9 MG/DL (ref 1.6–2.6)
MCH RBC QN AUTO: 27 PG (ref 27–31)
MCHC RBC AUTO-ENTMCNC: 29.7 G/DL (ref 32–36)
MCV RBC AUTO: 91 FL (ref 82–98)
NUCLEATED RBC (/100WBC) (OHS): 0 /100 WBC
PHOSPHATE SERPL-MCNC: 2.4 MG/DL (ref 2.7–4.5)
PLATELET # BLD AUTO: 389 K/UL (ref 150–450)
PMV BLD AUTO: 9.3 FL (ref 9.2–12.9)
POTASSIUM SERPL-SCNC: 4.2 MMOL/L (ref 3.5–5.1)
PROT SERPL-MCNC: 7 GM/DL (ref 6–8.4)
RBC # BLD AUTO: 3.41 M/UL (ref 4–5.4)
RELATIVE EOSINOPHIL (OHS): 0.3 %
RELATIVE LYMPHOCYTE (OHS): 28.4 % (ref 18–48)
RELATIVE MONOCYTE (OHS): 6.2 % (ref 4–15)
RELATIVE NEUTROPHIL (OHS): 63.8 % (ref 38–73)
SODIUM SERPL-SCNC: 143 MMOL/L (ref 136–145)
SODIUM SERPL-SCNC: 144 MMOL/L (ref 136–145)
SODIUM SERPL-SCNC: 144 MMOL/L (ref 136–145)
SODIUM SERPL-SCNC: 148 MMOL/L (ref 136–145)
SODIUM SERPL-SCNC: 149 MMOL/L (ref 136–145)
SODIUM SERPL-SCNC: 149 MMOL/L (ref 136–145)
WBC # BLD AUTO: 15.46 K/UL (ref 3.9–12.7)

## 2025-05-14 PROCEDURE — 85306 CLOT INHIBIT PROT S FREE: CPT

## 2025-05-14 PROCEDURE — 86146 BETA-2 GLYCOPROTEIN ANTIBODY: CPT

## 2025-05-14 PROCEDURE — 84295 ASSAY OF SERUM SODIUM: CPT

## 2025-05-14 PROCEDURE — 20000000 HC ICU ROOM

## 2025-05-14 PROCEDURE — 86038 ANTINUCLEAR ANTIBODIES: CPT

## 2025-05-14 PROCEDURE — A4217 STERILE WATER/SALINE, 500 ML: HCPCS

## 2025-05-14 PROCEDURE — 97112 NEUROMUSCULAR REEDUCATION: CPT

## 2025-05-14 PROCEDURE — 80321 ALCOHOLS BIOMARKERS 1OR 2: CPT

## 2025-05-14 PROCEDURE — 63600175 PHARM REV CODE 636 W HCPCS

## 2025-05-14 PROCEDURE — 85307 ASSAY ACTIVATED PROTEIN C: CPT

## 2025-05-14 PROCEDURE — 99900035 HC TECH TIME PER 15 MIN (STAT)

## 2025-05-14 PROCEDURE — 94640 AIRWAY INHALATION TREATMENT: CPT

## 2025-05-14 PROCEDURE — 25000003 PHARM REV CODE 250: Performed by: PSYCHIATRY & NEUROLOGY

## 2025-05-14 PROCEDURE — 97530 THERAPEUTIC ACTIVITIES: CPT | Mod: CQ

## 2025-05-14 PROCEDURE — 86147 CARDIOLIPIN ANTIBODY EA IG: CPT

## 2025-05-14 PROCEDURE — 92523 SPEECH SOUND LANG COMPREHEN: CPT

## 2025-05-14 PROCEDURE — 25000003 PHARM REV CODE 250

## 2025-05-14 PROCEDURE — 85303 CLOT INHIBIT PROT C ACTIVITY: CPT

## 2025-05-14 PROCEDURE — 97535 SELF CARE MNGMENT TRAINING: CPT

## 2025-05-14 PROCEDURE — 97116 GAIT TRAINING THERAPY: CPT | Mod: CQ

## 2025-05-14 PROCEDURE — 25000242 PHARM REV CODE 250 ALT 637 W/ HCPCS: Performed by: NURSE PRACTITIONER

## 2025-05-14 PROCEDURE — 85025 COMPLETE CBC W/AUTO DIFF WBC: CPT | Performed by: PSYCHIATRY & NEUROLOGY

## 2025-05-14 PROCEDURE — 99233 SBSQ HOSP IP/OBS HIGH 50: CPT | Mod: ,,, | Performed by: NEUROLOGICAL SURGERY

## 2025-05-14 PROCEDURE — 81240 F2 GENE: CPT

## 2025-05-14 PROCEDURE — 83735 ASSAY OF MAGNESIUM: CPT

## 2025-05-14 PROCEDURE — 81241 F5 GENE: CPT

## 2025-05-14 PROCEDURE — 94761 N-INVAS EAR/PLS OXIMETRY MLT: CPT

## 2025-05-14 PROCEDURE — 83695 ASSAY OF LIPOPROTEIN(A): CPT

## 2025-05-14 PROCEDURE — 85652 RBC SED RATE AUTOMATED: CPT

## 2025-05-14 PROCEDURE — 84100 ASSAY OF PHOSPHORUS: CPT

## 2025-05-14 PROCEDURE — 85300 ANTITHROMBIN III ACTIVITY: CPT

## 2025-05-14 PROCEDURE — 86141 C-REACTIVE PROTEIN HS: CPT

## 2025-05-14 PROCEDURE — 99291 CRITICAL CARE FIRST HOUR: CPT | Mod: FS,,, | Performed by: PSYCHIATRY & NEUROLOGY

## 2025-05-14 PROCEDURE — 25000003 PHARM REV CODE 250: Performed by: NURSE PRACTITIONER

## 2025-05-14 PROCEDURE — 85613 RUSSELL VIPER VENOM DILUTED: CPT

## 2025-05-14 RX ORDER — CEFEPIME HYDROCHLORIDE 2 G/1
2 INJECTION, POWDER, FOR SOLUTION INTRAVENOUS
Status: COMPLETED | OUTPATIENT
Start: 2025-05-14 | End: 2025-05-19

## 2025-05-14 RX ADMIN — DEXMEDETOMIDINE HYDROCHLORIDE 0.3 MCG/KG/HR: 4 INJECTION INTRAVENOUS at 12:05

## 2025-05-14 RX ADMIN — VASOPRESSIN: 20 INJECTION, SOLUTION INTRAVENOUS at 09:05

## 2025-05-14 RX ADMIN — DEXMEDETOMIDINE HYDROCHLORIDE 0.6 MCG/KG/HR: 4 INJECTION INTRAVENOUS at 08:05

## 2025-05-14 RX ADMIN — SODIUM CHLORIDE 250 ML: 3 INJECTION, SOLUTION INTRAVENOUS at 04:05

## 2025-05-14 RX ADMIN — ASPIRIN 81 MG CHEWABLE TABLET 81 MG: 81 TABLET CHEWABLE at 08:05

## 2025-05-14 RX ADMIN — DEXMEDETOMIDINE HYDROCHLORIDE 1 MCG/KG/HR: 4 INJECTION INTRAVENOUS at 05:05

## 2025-05-14 RX ADMIN — VASOPRESSIN: 20 INJECTION, SOLUTION INTRAVENOUS at 04:05

## 2025-05-14 RX ADMIN — MUPIROCIN 1 G: 20 OINTMENT TOPICAL at 08:05

## 2025-05-14 RX ADMIN — MUPIROCIN: 20 OINTMENT TOPICAL at 09:05

## 2025-05-14 RX ADMIN — QUETIAPINE FUMARATE 50 MG: 25 TABLET ORAL at 09:05

## 2025-05-14 RX ADMIN — IPRATROPIUM BROMIDE AND ALBUTEROL SULFATE 3 ML: 2.5; .5 SOLUTION RESPIRATORY (INHALATION) at 02:05

## 2025-05-14 RX ADMIN — IPRATROPIUM BROMIDE AND ALBUTEROL SULFATE 3 ML: 2.5; .5 SOLUTION RESPIRATORY (INHALATION) at 07:05

## 2025-05-14 RX ADMIN — POTASSIUM & SODIUM PHOSPHATES POWDER PACK 280-160-250 MG 2 PACKET: 280-160-250 PACK at 04:05

## 2025-05-14 RX ADMIN — ENOXAPARIN SODIUM 40 MG: 40 INJECTION SUBCUTANEOUS at 06:05

## 2025-05-14 RX ADMIN — CLOPIDOGREL 75 MG: 75 TABLET ORAL at 08:05

## 2025-05-14 RX ADMIN — POTASSIUM & SODIUM PHOSPHATES POWDER PACK 280-160-250 MG 2 PACKET: 280-160-250 PACK at 11:05

## 2025-05-14 RX ADMIN — IPRATROPIUM BROMIDE AND ALBUTEROL SULFATE 3 ML: 2.5; .5 SOLUTION RESPIRATORY (INHALATION) at 01:05

## 2025-05-14 RX ADMIN — QUETIAPINE FUMARATE 50 MG: 25 TABLET ORAL at 08:05

## 2025-05-14 RX ADMIN — NIMODIPINE 60 MG: 30 CAPSULE, LIQUID FILLED ORAL at 02:05

## 2025-05-14 RX ADMIN — NIMODIPINE 60 MG: 30 CAPSULE, LIQUID FILLED ORAL at 06:05

## 2025-05-14 RX ADMIN — VALSARTAN 80 MG: 80 TABLET, FILM COATED ORAL at 08:05

## 2025-05-14 RX ADMIN — SODIUM CHLORIDE 250 ML: 3 INJECTION, SOLUTION INTRAVENOUS at 09:05

## 2025-05-14 RX ADMIN — ATORVASTATIN CALCIUM 40 MG: 40 TABLET, FILM COATED ORAL at 08:05

## 2025-05-14 RX ADMIN — NIMODIPINE 60 MG: 30 CAPSULE, LIQUID FILLED ORAL at 09:05

## 2025-05-14 RX ADMIN — NIMODIPINE 60 MG: 30 CAPSULE, LIQUID FILLED ORAL at 10:05

## 2025-05-14 RX ADMIN — ACETAMINOPHEN 650 MG: 325 TABLET ORAL at 01:05

## 2025-05-14 RX ADMIN — SENNOSIDES AND DOCUSATE SODIUM 1 TABLET: 50; 8.6 TABLET ORAL at 08:05

## 2025-05-14 RX ADMIN — SENNOSIDES AND DOCUSATE SODIUM 1 TABLET: 50; 8.6 TABLET ORAL at 09:05

## 2025-05-14 RX ADMIN — VASOPRESSIN: 20 INJECTION, SOLUTION INTRAVENOUS at 01:05

## 2025-05-14 RX ADMIN — CEFEPIME 2 G: 2 INJECTION, POWDER, FOR SOLUTION INTRAVENOUS at 03:05

## 2025-05-14 RX ADMIN — CEFEPIME 2 G: 2 INJECTION, POWDER, FOR SOLUTION INTRAVENOUS at 11:05

## 2025-05-14 RX ADMIN — CEFEPIME 2 G: 2 INJECTION, POWDER, FOR SOLUTION INTRAVENOUS at 07:05

## 2025-05-14 RX ADMIN — OXYCODONE 5 MG: 5 TABLET ORAL at 11:05

## 2025-05-14 NOTE — SUBJECTIVE & OBJECTIVE
Interval History: 5/14: NAEON. MRA w contrast today. Neuro stable. Continue EVD @ 10    Medications:  Continuous Infusions:   dexmedeTOMIDine (Precedex) infusion (titrating)  0-1.4 mcg/kg/hr Intravenous Continuous 25.05 mL/hr at 05/14/25 0601 1 mcg/kg/hr at 05/14/25 0601    sodium chloride (23.4%) HYPERTONIC 4 mEq/mL 172 mEq in sterile water 500 mL (sodium chloride 2%) infusion   Intravenous Continuous 45 mL/hr at 05/14/25 0601 Rate Verify at 05/14/25 0601     Scheduled Meds:   albuterol-ipratropium  3 mL Nebulization Q6H    aspirin  81 mg Oral Daily    atorvastatin  40 mg Oral Daily    ceFEPime IV (PEDS and ADULTS)  2 g Intravenous Q8H    clopidogreL  75 mg Oral Daily    enoxparin  40 mg Subcutaneous Daily    mupirocin   Nasal BID    niMODipine  60 mg Oral Q4H    QUEtiapine  50 mg Oral BID    senna-docusate  1 tablet Oral BID    valsartan  80 mg Oral Daily     PRN Meds:  Current Facility-Administered Medications:     acetaminophen, 650 mg, Oral, Q6H PRN    bisacodyL, 10 mg, Rectal, Daily PRN    diazePAM, 10 mg, Oral, Q6H PRN    gabapentin, 300 mg, Oral, Q8H PRN    hydrALAZINE, 10 mg, Intravenous, Q6H PRN    labetalol, 10 mg, Intravenous, Q4H PRN    melatonin, 6 mg, Oral, Nightly PRN    methocarbamoL, 750 mg, Oral, Q6H PRN    oxyCODONE, 5 mg, Oral, Q6H PRN    potassium bicarbonate, 35 mEq, Oral, PRN    potassium bicarbonate, 50 mEq, Oral, PRN    potassium bicarbonate, 60 mEq, Oral, PRN    potassium, sodium phosphates, 2 packet, Oral, PRN    potassium, sodium phosphates, 2 packet, Oral, PRN    potassium, sodium phosphates, 2 packet, Oral, PRN    sodium chloride 0.9%, 10 mL, Intravenous, PRN     Review of Systems  Objective:     Weight: 100.2 kg (220 lb 14.4 oz)  Body mass index is 39.13 kg/m².  Vital Signs (Most Recent):  Temp: 98.2 °F (36.8 °C) (05/14/25 0301)  Pulse: 74 (05/14/25 0601)  Resp: (!) 27 (05/14/25 0601)  BP: 134/80 (05/14/25 0631)  SpO2: 96 % (05/14/25 0601) Vital Signs (24h Range):  Temp:  [98 °F (36.7  "°C)-98.5 °F (36.9 °C)] 98.2 °F (36.8 °C)  Pulse:  [] 74  Resp:  [16-51] 27  SpO2:  [94 %-100 %] 96 %  BP: (104-185)/() 134/80                                ICP/Ventriculostomy 05/06/25 0920 Right Parietal region (Active)   Level of Ventriculostomy (cm above) 10 05/10/25 1905   Status Open to drainage 05/11/25 0205   Site Assessment Clean;Dry 05/11/25 0205   Site Drainage No drainage 05/11/25 0205   Waveform normal waveform 05/10/25 1905   Output (mL) 15 mL 05/10/25 2205   CSF Color red 05/11/25 0205   Dressing Status Clean;Dry;Intact 05/11/25 0205   Interventions HOB degrees;bed controls locked;zeroed 05/10/25 0605       Female External Urinary Catheter w/ Suction 05/07/25 1732 (Active)   Skin no redness;no breakdown 05/10/25 1701   Tolerance no signs/symptoms of discomfort 05/10/25 1701   Suction Continuous suction at 40 mmHg 05/08/25 1905   Date of last wick change 05/08/25 05/08/25 1905   Time of last wick change 1905 05/08/25 1905   Output (mL) 250 mL 05/10/25 1001          Physical Exam         Neurosurgery Physical Exam    [id number]  Drowsy but easily arousable, alert, Ox4  Cni  Follows commands x4 grossly full strength throughout  SILT  R dysmetria     EVD Incision dressed CDI     Significant Labs:  Recent Labs   Lab 05/13/25  0131 05/13/25  0909 05/13/25  1939 05/14/25  0225 05/14/25  0312 05/14/25  0340   *  --   --  138*  --   --       < > 142 149*  149*  --  148*   K 3.7  --   --  4.2  --   --    *  --   --  117*  --   --    CO2 19*  --   --  24  --   --    BUN 15  --   --  20  --   --    CREATININE 0.6  --   --  0.7  --   --    CALCIUM 8.3*  --   --  9.2  --   --    MG 1.9  --   --   --  1.9  --     < > = values in this interval not displayed.     Recent Labs   Lab 05/13/25  0131 05/14/25  0225   WBC 13.25* 15.46*   HGB 10.1* 9.2*   HCT 33.0* 31.0*    389     No results for input(s): "LABPT", "INR", "APTT" in the last 48 hours.    Microbiology Results (last 7 days)  "      Procedure Component Value Units Date/Time    CSF culture [[id number]] Collected: 05/12/25 1729    Order Status: Completed Specimen: CSF (Spinal Fluid) from CSF Tap, Tube 3 Updated: 05/14/25 0708     CULTURE, CSF No Growth To Date     GRAM STAIN Rare WBC seen      No organisms seen    Culture, Respiratory with Gram Stain [[id number]]  (Abnormal)  (Susceptibility) Collected: 05/10/25 1120    Order Status: Completed Specimen: Respiratory from Sputum, Expectorated Updated: 05/13/25 0946     Respiratory Culture No S aureus or Pseudomonas isolated      Few KLEBSIELLA AEROGENES     Comment: with normal respiratory chichi        GRAM STAIN <10 Epithelial Cells/LPF      Rare WBC seen      Many Gram positive cocci      Many Gram Negative Rods    Culture, Respiratory with Gram Stain [[id number]]  (Abnormal)  (Susceptibility) Collected: 05/10/25 2035    Order Status: Completed Specimen: Respiratory from Sputum Updated: 05/13/25 0945     Respiratory Culture No S aureus or Pseudomonas isolated      Few KLEBSIELLA AEROGENES     Comment: with normal respiratory chichi        GRAM STAIN <10 Epithelial Cells/LPF      Rare WBC seen      Many Gram positive cocci      Moderate Gram Negative Rods    Gram stain [[id number]] Collected: 05/12/25 1729    Order Status: Canceled Specimen: CSF (Spinal Fluid) from CSF Tap, Tube 3 Updated: 05/12/25 1909          All pertinent labs from the last 24 hours have been reviewed.    Significant Diagnostics:  CT: CT Head Without Contrast  Result Date: 5/11/2025  1. Compared to prior head CT performed 05/06/2025, 20:13 hours, evolving relatively large right cerebellar infarct without macroscopic hemorrhage within the infarct territory.  Questionable hypoattenuation involving the brainstem which may be artifactual given coil artifact, however additional areas of ischemia not excluded.  MRI may be considered for further characterization.  Further effacement of the 4th ventricle since the prior study,  however there has been slight decompression of the lateral and 3rd ventricles since the prior exam. 2. Relatively similar subarachnoid and interventricular hemorrhage.  No new or enlarging areas of intracranial hemorrhage appreciated. This report was flagged in Epic as abnormal. Electronically signed by: Mich Downs Date:    05/11/2025 Time:    02:09    MRI: No results found in the last 24 hours.

## 2025-05-14 NOTE — PT/OT/SLP EVAL
Speech Language Pathology Evaluation  Cognitive Communication    Patient Name:  aWldo Emmanuel   MRN:  [id number]  Admitting Diagnosis: SAH (subarachnoid hemorrhage)    Recommendations:     Recommendations:                General Recommendations:  Cognitive-linguistic therapy  Diet recommendations:  Regular, Thin   Aspiration Precautions: Feed only when awake/alert, HOB to 90 degrees, Meds whole 1 at a time, and Standard aspiration precautions   General Precautions: Standard, aspiration, fall  Communication strategies:  provide increased time to answer    Assessment:     Waldo Emmanuel is a 49 y.o. female with an SLP diagnosis of Cognitive-Linguistic Impairment.      History:     Past Medical History:   Diagnosis Date    ADHD (attention deficit hyperactivity disorder)     Anemia     Fibroids     Genital herpes     GERD (gastroesophageal reflux disease)     H/O total hysterectomy 2021    Hypertension     Labral tear of hip joint     Ovarian cyst     Right common arterial occlusion     Routine general medical examination at a [facility] care facility 2017    Total Right Arterial Occlusion        Past Surgical History:   Procedure Laterality Date    ANGIOGRAM, ABDOMINAL AORTA W/ EXTREMITY RUNOFF N/A 2025    Procedure: Angiogram, Abdominal Aorta W/ Extremity Runoff: Right lower extremity angiogram;  Surgeon: Lennox Zendejas MD;  Location: [facility] CATH LAB;  Service: Vascular;  Laterality: N/A;    CARPAL TUNNEL RELEASE       SECTION      CHOLECYSTECTOMY      DILATION AND CURETTAGE OF UTERUS      TOTAL ABDOMINAL HYSTERECTOMY W/ BILATERAL SALPINGOOPHORECTOMY  2021    menorrhagia       Social History: Patient lives with spouse.      Prior diet: regular.    Occupation/hobbies/homemaking: does not work      Subjective     Daughter present  Patient goals: did not state     Pain/Comfort:  Pain Rating 1: 0/10  Pain Rating Post-Intervention 1: 0/10    Respiratory Status: Room  air    Objective:     Cognitive Status:    Pt. was oriented to month, year and place with fair recall of recent and remote temporal and general information.  Immediate/delayed verbal recall was wfl with pt. Repeating 7 digits and 5 words without difficulty.    Responses to hypothetical verbal problem solving tasks were accurate and complete for simple tasks.  Pt. Compared but did not contrast objects and generated 2/3 solutions to problems given increased time and cues.  Thought organization and categorization skills were impaired with pt. Naming 4 animals given one minute when 15-20 are wnl.  Pt. Responded to functional math and time calculations on 3/5 tasks      Receptive Language:   Comprehension:   Pt. Followed 2 step commands and responded to complex yes/no questions with 80% accuracy with delays in responding noted    Pragmatics:    Poor eye contact  Delays in responding noted with need for repetition at times to elicit responses    Expressive Language:  Verbal:    Verbal language skills were wfl to communicate wants and needs.        Motor Speech:  wfl    Voice:   Wfl    Visual-Spatial:  tba    Reading:   tba     Written Expression:   tba    Treatment: Ongoing education provided to pt and daughter re slp plan of care.  Goals:   Multidisciplinary Problems       SLP Goals          Problem: SLP    Goal Priority Disciplines Outcome   SLP Goal     SLP Progressing   Description: Speech Language Pathology Goals  Goals expected to be met by 5/15/25    1. Pt will participate in ongoing assessment of swallow function to determine safest, least restrictive means of nutrition/hydration  2. Pt will participate in full assessment of cognitive-linguistic skills to determine ongoing POC needs  3.  Educate Pt and family on aspiration precautions and S/S aspiration                          Plan:   Patient to be seen:  4 x/week   Plan of Care expires:  06/07/25  Plan of Care reviewed with:  patient, daughter   SLP Follow-Up:   Yes       Discharge recommendations:  Therapy Intensity Recommendations at Discharge: High Intensity Therapy   Barriers to Discharge:  None    Time Tracking:     SLP Treatment Date:   05/14/25  Speech Start Time:  0955  Speech Stop Time:  1007     Speech Total Time (min):  12 min    Billable Minutes: Eval 12 05/14/2025

## 2025-05-14 NOTE — PLAN OF CARE
[facility] Care Plan  POC reviewed with Waldo Emmanuel and family at 1400. Patient and Family verbalized understanding. Questions and concerns addressed. No acute events today. Pt progressing toward goals. Will continue to monitor. See below and flowsheets for full assessment and VS info. Pt remains labile in personality and agitated at times, on precedex, will go to MRA tomorrow as an anesthesia case            Is this a stroke patient? yes- Stroke booklet reviewed with family and is at bedside.   Care Plan Individualization:     Neuro:  Winner Coma Scale  Best Eye Response: 4-->(E4) spontaneous  Best Motor Response: 6-->(M6) obeys commands  Best Verbal Response: 5-->(V5) oriented  Roro Coma Scale Score: 15  Assessment Qualifiers: patient chemically sedated or paralyzed  Pupil PERRLA: yes     24 hr Temp:  [97.8 °F (36.6 °C)-99.1 °F (37.3 °C)]     CV:   Rhythm: normal sinus rhythm  BP goals:   SBP < 160  MAP > 65    Resp:      Vent Mode: Spont  Set Rate: 20 BPM  Oxygen Concentration (%): 40  Vt Set: 375 mL  PEEP/CPAP: 5 cmH20  Pressure Support: 5 cmH20    Plan: N/A    GI/:     Diet/Nutrition Received: regular  Last Bowel Movement: 05/13/25  Voiding Characteristics: external catheter    Intake/Output Summary (Last 24 hours) at 5/14/2025 1733  Last data filed at 5/14/2025 1720  Gross per 24 hour   Intake 1707.78 ml   Output 1916 ml   Net -208.22 ml     Unmeasured Output  Unmeasured Urine Occurrence: 1  Unmeasured Stool Occurrence: 1  Pad Count: 1    Labs/Accuchecks:  Recent Labs   Lab 05/14/25 0225   WBC 15.46*   RBC 3.41*   HGB 9.2*   HCT 31.0*         Recent Labs   Lab 05/14/25  0225 05/14/25  0340 05/14/25  1423   *  149*   < > 143   K 4.2  --   --    CO2 24  --   --    *  --   --    BUN 20  --   --    CREATININE 0.7  --   --    ALKPHOS 81  --   --    ALT 16  --   --    AST 21  --   --    BILITOT 0.5  --   --     < > = values in this interval not displayed.      Recent Labs   Lab  "05/10/25  1734 25  0031 25  0724   PROTIME 11.4  --   --    INR 1.0  --   --    APTT 22.0   < > 45.5*    < > = values in this interval not displayed.    No results for input(s): "CPK", "CPKMB", "TROPONINI", "MB" in the last 168 hours.    Electrolytes: Electrolytes replaced  Accuchecks: none    Gtts:   dexmedeTOMIDine (Precedex) infusion (titrating)  0-1.4 mcg/kg/hr Intravenous Continuous 15.03 mL/hr at 25 1720 0.6 mcg/kg/hr at 25 1720    sodium chloride (23.4%) HYPERTONIC 4 mEq/mL 172 mEq in sterile water 500 mL (sodium chloride 2%) infusion   Intravenous Continuous 75 mL/hr at 25 1720 Rate Verify at 25 1720       LDA/Wounds:    Ivan Risk Assessment  Sensory Perception: 3-->slightly limited  Moisture: 3-->occasionally moist  Activity: 2-->chairfast  Mobility: 3-->slightly limited  Nutrition: 2-->probably inadequate  Friction and Shear: 3-->no apparent problem  Ivan Score: 16    Is your ivan score 12 or less? no            Restraints:   Restraint Order  Length of Order: Order good for next 24 hours or when removed.  Date that the current order will : 05/15/25  Time that the current order will : 1151  Order Upon Application: Yes    [facility]    "

## 2025-05-14 NOTE — ASSESSMENT & PLAN NOTE
49f presenting after syncope with CTH demonstrating SAH. CTA with AICA aneurysm R. Intubated prior to arrival to [facility] ED.     S/p R frontal EVD on 5/6 and s/p DSA with coil embolization of R AICA aneurysm 5/6    Plan:  Admitted to ICU  EVD dropped to 10 post coil, abx while in place  Daily ASA 81  No dvt in BLE on ultrasound, R brachial vein dvt+ - on Asa/plavix  Will plan on MRA w/ con today, could require open clipping pending MRA result   MRI 5/11 with R Cb and parmaedian pontine small infarct  SAH protocol (euvolemia, SBP liberalized, nimotop, keppra, TCDs)    Discussed with Dr. Corley

## 2025-05-14 NOTE — EICU
Intervention Initiated From:  COR / MELISAU    Kendall intervened regarding:  Rounding (Video assessment)  VICU Night Rounds Checklist  24H Vital Sign Range:  Temp:  [97.6 °F (36.4 °C)-99 °F (37.2 °C)]   Pulse:  []   Resp:  [20-51]   BP: ()/(50-98)   SpO2:  [95 %-100 %]     Video rounds

## 2025-05-14 NOTE — PT/OT/SLP PROGRESS
Occupational Therapy   Cp-Treatment    Name: Waldo Emmanuel  MRN: [id number]  Admitting Diagnosis:  SAH (subarachnoid hemorrhage)       Recommendations:     Discharge Recommendations: High Intensity Therapy  Discharge Equipment Recommendations:  bedside commode, bath bench, wheelchair  Barriers to discharge:  None    Assessment:     Waldo Emmanuel is a 49 y.o. female with a medical diagnosis of SAH (subarachnoid hemorrhage).  She presents with the following performance deficits affecting function:  weakness, impaired endurance, impaired self care skills, impaired functional mobility, gait instability, impaired balance, impaired cognition, decreased coordination, impaired fine motor, decreased lower extremity function, decreased upper extremity function, impaired coordination, decreased safety awareness, pain, impaired cardiopulmonary response to activity.     Pt agreeable to therapy and tolerated the session well. RN clamped EVD prior to mobility. At this time, Pt is mostly limited by impulsiveness and tachycardia as she increased ~ 60 points with activity. Pt able to perform oral care sitting EOB with SBA before progressing to standing activities. Pt performed static and dynamic standing including dancing to preferred song with Min A x 2. Pt took a seated rest break due to an increase in HR, but after ~ 3 minutes was able to progress to taking lateral and forwards/backwards steps with Min A x 2. Pt would benefit from continued skilled acute OT services during this admission in order to maximize independence and safety with ADLs and functional mobility to ensure safe return to PLOF in the least restrictive environment. Patient presents with good participation and motivation to return to prior level of function with high intensity therapy. The patient demonstrates appropriate endurance and strength to participate in up to 3 hours or 15hrs of combined therapy post acute.    Rehab Prognosis:  Good;  patient would benefit from acute skilled OT services to address these deficits and reach maximum level of function.       Plan:     Patient to be seen 4 x/week to address the above listed problems via self-care/home management, therapeutic activities, therapeutic exercises, neuromuscular re-education  Plan of Care Expires: 06/11/25  Plan of Care Reviewed with: patient, daughter    Subjective     Chief Complaint: pain  Patient/Family Comments/goals: To return to PLOF  Pain/Comfort:  Pain Rating 1: 10/10  Location 1: back  Pain Addressed 1: Reposition, Distraction  Pain Rating Post-Intervention 1: 0/10    Objective:     Co-evaluation/treatment performed due to patient's multiple deficits requiring two skilled therapists to appropriately and safely assess patient's strength and endurance while facilitating functional tasks in addition to accommodating for patient's activity tolerance.     Communicated with: RN prior to session.  Patient found HOB elevated with pulse ox (continuous), telemetry, blood pressure cuff, PureWick, external ventricular drain, peripheral IV upon OT entry to room.    General Precautions: Standard, fall, aspiration    Orthopedic Precautions:N/A  Braces: N/A  Respiratory Status: Room air     Occupational Performance:     Bed Mobility:    Patient completed Rolling/Turning to Left with  moderate assistance  Patient completed Scooting/Bridging with moderate assistance  Patient completed Supine to Sit with moderate assistance and 2 persons  Patient completed Sit to Supine with contact guard assistance - Pt impulsively returning self to bed    Pt sat EOB with SBA-CGA     Functional Mobility/Transfers:  Patient completed Sit <> Stand Transfer with minimum assistance  with  hand-held assist   Functional Mobility: Pt engaging in functional mobility to simulate household/community distances (several lateral steps and several steps forwards/backwards) with Min A x 2 and utilizing B HHA in order to maximize  functional activity tolerance and standing balance required for engagement in occupations of choice.  Performed static standing progressing to weight shifting, marching, and then performing preferred line dance to favorite song. Pt requires Min A x 2 and B HHA during activity for safety due to impulsive tendencies.     Activities of Daily Living:  Grooming: stand by assistance : to perform oral care sitting EOB   Upper Body Dressing: moderate assistance : to don gown around the back as a robe     AMPAC 6 Click ADL: 14    Treatment & Education:  Therapist provided facilitation and instruction of proper body mechanics and fall prevention strategies during tasks listed above.  Instructed patient to sit in bedside chair daily to increase OOB/activity tolerance.  Instructed patient to use call light to have nursing staff assist with needs/transfers.  Discussed OT POC and answered all questions within OT scope of practice.  Whiteboard updated       Patient left HOB elevated with all lines intact, call button in reach, and family present    GOALS:   Multidisciplinary Problems       Occupational Therapy Goals          Problem: Occupational Therapy    Goal Priority Disciplines Outcome Interventions   Occupational Therapy Goal     OT, PT/OT Progressing    Description: Goals to be met by: 6/11/2025     Patient will increase functional independence with ADLs by performing:    UE Dressing with Supervision.  LE Dressing with Supervision.  Grooming while standing at sink with Supervision.  Toileting from toilet with Supervision for hygiene and clothing management.   Toilet transfer to toilet with Supervision.                         Time Tracking:     OT Date of Treatment: 05/14/25  OT Start Time: 1207  OT Stop Time: 1243  OT Total Time (min): 36 min    Billable Minutes:Self Care/Home Management 15  Neuromuscular Re-education 21    OT/ALEXUS: OT          5/14/2025

## 2025-05-14 NOTE — PLAN OF CARE
[facility] Care Plan    POC reviewed with Hennykomal Karen Emmanuel and family at 0300. Patient and family verbalized understanding. Questions and concerns addressed. No acute events today. Pt progressing toward goals. Will continue to monitor. See below and flowsheets for full assessment and VS info.     Precedex @ 0.6 mcg/kg/hr  See nurse note regarding MRA  2% @ 45ml/hr  Lower extremity bilateral ultrasound complete      Is this a stroke patient? yes- Stroke booklet reviewed with patient and family is at bedside.   Care Plan Individualization:     Neuro:  Roro Coma Scale  Best Eye Response: 4-->(E4) spontaneous  Best Motor Response: 6-->(M6) obeys commands  Best Verbal Response: 5-->(V5) oriented  Roro Coma Scale Score: 15  Assessment Qualifiers: patient not sedated/intubated  Pupil PERRLA: yes     24 hr Temp:  [98 °F (36.7 °C)-98.5 °F (36.9 °C)]     CV:   Rhythm: sinus tachycardia  BP goals:   SBP < 160  MAP > 65    Resp:      Vent Mode: Spont  Set Rate: 20 BPM  Oxygen Concentration (%): 40  Vt Set: 375 mL  PEEP/CPAP: 5 cmH20  Pressure Support: 5 cmH20    Plan: N/A    GI/:     Diet/Nutrition Received: regular  Last Bowel Movement: 05/13/25  Voiding Characteristics: external catheter    Intake/Output Summary (Last 24 hours) at 5/14/2025 0537  Last data filed at 5/14/2025 0501  Gross per 24 hour   Intake 1791.97 ml   Output 1409 ml   Net 382.97 ml     Unmeasured Output  Unmeasured Urine Occurrence: 1  Unmeasured Stool Occurrence: 1  Pad Count: 1    Labs/Accuchecks:  Recent Labs   Lab 05/14/25 0225   WBC 15.46*   RBC 3.41*   HGB 9.2*   HCT 31.0*         Recent Labs   Lab 05/14/25 0225 05/14/25  0340   *  149* 148*   K 4.2  --    CO2 24  --    *  --    BUN 20  --    CREATININE 0.7  --    ALKPHOS 81  --    ALT 16  --    AST 21  --    BILITOT 0.5  --       Recent Labs   Lab 05/10/25  1734 05/11/25  0031 05/11/25  0724   PROTIME 11.4  --   --    INR 1.0  --   --    APTT 22.0   < > 45.5*    < > =  "values in this interval not displayed.    No results for input(s): "CPK", "CPKMB", "TROPONINI", "MB" in the last 168 hours.    Electrolytes: N/A  Accuchecks: No    Gtts:   dexmedeTOMIDine (Precedex) infusion (titrating)  0-1.4 mcg/kg/hr Intravenous Continuous 25.05 mL/hr at 25 0503 1 mcg/kg/hr at 25 0503    sodium chloride (23.4%) HYPERTONIC 4 mEq/mL 172 mEq in sterile water 500 mL (sodium chloride 2%) infusion   Intravenous Continuous 45 mL/hr at 25 0501 Rate Verify at 25 0501       LDA/Wounds:    Ivan Risk Assessment  Sensory Perception: 3-->slightly limited  Moisture: 3-->occasionally moist  Activity: 2-->chairfast  Mobility: 3-->slightly limited  Nutrition: 2-->probably inadequate  Friction and Shear: 3-->no apparent problem  Ivan Score: 16  Is your ivan score 12 or less? No          Restraints:   Restraint Order  Length of Order: Order good for next 24 hours or when removed.  Date that the current order will : 25  Time that the current order will : 1131  Order Upon Application: Yes    [facility]      "

## 2025-05-14 NOTE — ASSESSMENT & PLAN NOTE
CT on 5/11 revealed R cerebellar infarct. Likely uriah-angio  Family aware and imaging reviewed  Atorvastatin 40  ASA 81 and Plavix 75mg qd  RLE arterial US negative for occlusion, only showing moderate stenosis. Decided not to continue heparin gtt given brachial DVT is in upper extremity. Will continue w DAPT and DVT ppx w lovenox instead of full AC.   Concern for brain compression in post fossa  SOC watch, NSGY following   Given 3% HTS 250ml bolus x1 overnight  2% HTS gtt @ 75  Na checks q6h for goal > 145

## 2025-05-14 NOTE — PROGRESS NOTES
[facility] - Neuro Critical Care  Neurosurgery  Progress Note    Subjective:     History of Present Illness: 49f presenting after syncope with CTH demonstrating SAH. CTA without clear vascular malformation. Intubated prior to arrival to [facility] ED. Discussed expected hospital course and imaging with daughter in room    Post-Op Info:  * No surgery found *       Interval History: 5/14: NAEON. MRA w contrast today. Neuro stable. Continue EVD @ 10    Medications:  Continuous Infusions:   dexmedeTOMIDine (Precedex) infusion (titrating)  0-1.4 mcg/kg/hr Intravenous Continuous 25.05 mL/hr at 05/14/25 0601 1 mcg/kg/hr at 05/14/25 0601    sodium chloride (23.4%) HYPERTONIC 4 mEq/mL 172 mEq in sterile water 500 mL (sodium chloride 2%) infusion   Intravenous Continuous 45 mL/hr at 05/14/25 0601 Rate Verify at 05/14/25 0601     Scheduled Meds:   albuterol-ipratropium  3 mL Nebulization Q6H    aspirin  81 mg Oral Daily    atorvastatin  40 mg Oral Daily    ceFEPime IV (PEDS and ADULTS)  2 g Intravenous Q8H    clopidogreL  75 mg Oral Daily    enoxparin  40 mg Subcutaneous Daily    mupirocin   Nasal BID    niMODipine  60 mg Oral Q4H    QUEtiapine  50 mg Oral BID    senna-docusate  1 tablet Oral BID    valsartan  80 mg Oral Daily     PRN Meds:  Current Facility-Administered Medications:     acetaminophen, 650 mg, Oral, Q6H PRN    bisacodyL, 10 mg, Rectal, Daily PRN    diazePAM, 10 mg, Oral, Q6H PRN    gabapentin, 300 mg, Oral, Q8H PRN    hydrALAZINE, 10 mg, Intravenous, Q6H PRN    labetalol, 10 mg, Intravenous, Q4H PRN    melatonin, 6 mg, Oral, Nightly PRN    methocarbamoL, 750 mg, Oral, Q6H PRN    oxyCODONE, 5 mg, Oral, Q6H PRN    potassium bicarbonate, 35 mEq, Oral, PRN    potassium bicarbonate, 50 mEq, Oral, PRN    potassium bicarbonate, 60 mEq, Oral, PRN    potassium, sodium phosphates, 2 packet, Oral, PRN    potassium, sodium phosphates, 2 packet, Oral, PRN    potassium, sodium phosphates, 2 packet, Oral, PRN    sodium chloride  0.9%, 10 mL, Intravenous, PRN     Review of Systems  Objective:     Weight: 100.2 kg (220 lb 14.4 oz)  Body mass index is 39.13 kg/m².  Vital Signs (Most Recent):  Temp: 98.2 °F (36.8 °C) (05/14/25 0301)  Pulse: 74 (05/14/25 0601)  Resp: (!) 27 (05/14/25 0601)  BP: 134/80 (05/14/25 0631)  SpO2: 96 % (05/14/25 0601) Vital Signs (24h Range):  Temp:  [98 °F (36.7 °C)-98.5 °F (36.9 °C)] 98.2 °F (36.8 °C)  Pulse:  [] 74  Resp:  [16-51] 27  SpO2:  [94 %-100 %] 96 %  BP: (104-185)/() 134/80                                ICP/Ventriculostomy 05/06/25 0920 Right Parietal region (Active)   Level of Ventriculostomy (cm above) 10 05/10/25 1905   Status Open to drainage 05/11/25 0205   Site Assessment Clean;Dry 05/11/25 0205   Site Drainage No drainage 05/11/25 0205   Waveform normal waveform 05/10/25 1905   Output (mL) 15 mL 05/10/25 2205   CSF Color red 05/11/25 0205   Dressing Status Clean;Dry;Intact 05/11/25 0205   Interventions HOB degrees;bed controls locked;zeroed 05/10/25 0605       Female External Urinary Catheter w/ Suction 05/07/25 1732 (Active)   Skin no redness;no breakdown 05/10/25 1701   Tolerance no signs/symptoms of discomfort 05/10/25 1701   Suction Continuous suction at 40 mmHg 05/08/25 1905   Date of last wick change 05/08/25 05/08/25 1905   Time of last wick change 1905 05/08/25 1905   Output (mL) 250 mL 05/10/25 1001          Physical Exam         Neurosurgery Physical Exam    [id number]  Drowsy but easily arousable, alert, Ox4  Cni  Follows commands x4 grossly full strength throughout  SILT  R dysmetria     EVD Incision dressed CDI     Significant Labs:  Recent Labs   Lab 05/13/25  0131 05/13/25  0909 05/13/25  1939 05/14/25 0225 05/14/25 0312 05/14/25  0340   *  --   --  138*  --   --       < > 142 149*  149*  --  148*   K 3.7  --   --  4.2  --   --    *  --   --  117*  --   --    CO2 19*  --   --  24  --   --    BUN 15  --   --  20  --   --    CREATININE 0.6  --   --  0.7   "--   --    CALCIUM 8.3*  --   --  9.2  --   --    MG 1.9  --   --   --  1.9  --     < > = values in this interval not displayed.     Recent Labs   Lab 05/13/25  0131 05/14/25  0225   WBC 13.25* 15.46*   HGB 10.1* 9.2*   HCT 33.0* 31.0*    389     No results for input(s): "LABPT", "INR", "APTT" in the last 48 hours.    Microbiology Results (last 7 days)       Procedure Component Value Units Date/Time    CSF culture [[id number]] Collected: 05/12/25 1729    Order Status: Completed Specimen: CSF (Spinal Fluid) from CSF Tap, Tube 3 Updated: 05/14/25 0708     CULTURE, CSF No Growth To Date     GRAM STAIN Rare WBC seen      No organisms seen    Culture, Respiratory with Gram Stain [[id number]]  (Abnormal)  (Susceptibility) Collected: 05/10/25 1120    Order Status: Completed Specimen: Respiratory from Sputum, Expectorated Updated: 05/13/25 0946     Respiratory Culture No S aureus or Pseudomonas isolated      Few KLEBSIELLA AEROGENES     Comment: with normal respiratory chichi        GRAM STAIN <10 Epithelial Cells/LPF      Rare WBC seen      Many Gram positive cocci      Many Gram Negative Rods    Culture, Respiratory with Gram Stain [[id number]]  (Abnormal)  (Susceptibility) Collected: 05/10/25 2035    Order Status: Completed Specimen: Respiratory from Sputum Updated: 05/13/25 0945     Respiratory Culture No S aureus or Pseudomonas isolated      Few KLEBSIELLA AEROGENES     Comment: with normal respiratory chichi        GRAM STAIN <10 Epithelial Cells/LPF      Rare WBC seen      Many Gram positive cocci      Moderate Gram Negative Rods    Gram stain [[id number]] Collected: 05/12/25 1729    Order Status: Canceled Specimen: CSF (Spinal Fluid) from CSF Tap, Tube 3 Updated: 05/12/25 1909          All pertinent labs from the last 24 hours have been reviewed.    Significant Diagnostics:  CT: CT Head Without Contrast  Result Date: 5/11/2025  1. Compared to prior head CT performed 05/06/2025, 20:13 hours, evolving relatively " large right cerebellar infarct without macroscopic hemorrhage within the infarct territory.  Questionable hypoattenuation involving the brainstem which may be artifactual given coil artifact, however additional areas of ischemia not excluded.  MRI may be considered for further characterization.  Further effacement of the 4th ventricle since the prior study, however there has been slight decompression of the lateral and 3rd ventricles since the prior exam. 2. Relatively similar subarachnoid and interventricular hemorrhage.  No new or enlarging areas of intracranial hemorrhage appreciated. This report was flagged in Epic as abnormal. Electronically signed by: Mich Downs Date:    05/11/2025 Time:    02:09    MRI: No results found in the last 24 hours.  Assessment/Plan:     * SAH (subarachnoid hemorrhage)  49f presenting after syncope with CTH demonstrating SAH. CTA with AICA aneurysm R. Intubated prior to arrival to [facility] ED.     S/p R frontal EVD on 5/6 and s/p DSA with coil embolization of R AICA aneurysm 5/6    Plan:  Admitted to ICU  EVD dropped to 10 post coil, abx while in place  Daily ASA 81  No dvt in BLE on ultrasound, R brachial vein dvt+ - on Asa/plavix  Will plan on MRA w/ con today, could require open clipping pending MRA result   MRI 5/11 with R Cb and parmaedian pontine small infarct  SAH protocol (euvolemia, SBP liberalized, nimotop, keppra, TCDs)    Discussed with Dr. Barney Yu MD  Neurosurgery  [facility] - Neuro Critical Care

## 2025-05-14 NOTE — SUBJECTIVE & OBJECTIVE
Review of Systems   Constitutional:  Negative for chills and fever.   Respiratory:  Negative for shortness of breath.    Cardiovascular:  Negative for chest pain.   Gastrointestinal:  Negative for abdominal pain.   Genitourinary:  Negative for difficulty urinating.   Musculoskeletal:  Positive for back pain and neck stiffness. Negative for neck pain.   Neurological:  Positive for headaches. Negative for weakness and numbness.   Psychiatric/Behavioral:  Positive for decreased concentration.        Objective:     Vitals:  Temp: 98.6 °F (37 °C)  Pulse: 97  Rhythm: sinus tachycardia  BP: (!) 153/85  MAP (mmHg): 111  ICP Mean (mmHg): 6 mmHg  Resp: (!) 22  SpO2: 99 %    Temp  Min: 97.8 °F (36.6 °C)  Max: 98.6 °F (37 °C)  Pulse  Min: 74  Max: 147  BP  Min: 104/68  Max: 185/85  MAP (mmHg)  Min: 82  Max: 125  ICP Mean (mmHg)  Min: 3 mmHg  Max: 21 mmHg  Resp  Min: 16  Max: 35  SpO2  Min: 94 %  Max: 100 %    05/13 0701 - 05/14 0700  In: 1729.3 [I.V.:1729.3]  Out: 679 [Urine:400; Drains:277]   Unmeasured Output  Unmeasured Urine Occurrence: 1  Unmeasured Stool Occurrence: 1  Pad Count: 1        Physical Exam  Vitals and nursing note reviewed.   Constitutional:       General: She is not in acute distress.     Appearance: She is obese.   HENT:      Head:      Comments: EVD site c/d/i     Right Ear: External ear normal.      Left Ear: External ear normal.      Nose: Nose normal.      Mouth/Throat:      Mouth: Mucous membranes are moist.      Pharynx: Oropharynx is clear.   Eyes:      Pupils: Pupils are equal, round, and reactive to light.   Cardiovascular:      Rate and Rhythm: Normal rate and regular rhythm.   Pulmonary:      Effort: Pulmonary effort is normal. No respiratory distress.   Abdominal:      General: There is no distension.      Palpations: Abdomen is soft.      Tenderness: There is no abdominal tenderness.   Musculoskeletal:      Cervical back: Normal range of motion. No rigidity or tenderness.      Right lower leg:  No edema.      Left lower leg: No edema.   Skin:     General: Skin is warm and dry.   Neurological:      Mental Status: She is alert.      Comments: E4 V4 M6  Mental status: Alert. Oriented x person, place, but intermittently to time. Delirious. Higher order confusion. Speech fluent. No dysarthria. Following commands.   CN II-XII grossly intact, specifically:  PERRL  No facial asymmetry.   Tongue midline.   Shoulder shrug symmetric  Motor:  RUE 5/5  RLE 5/5  LUE 5/5  LLE 5/5  R dysmetria   Sensation intact and symmetric throughout    Psychiatric:         Mood and Affect: Mood normal.            Medications:  ContinuousdexmedeTOMIDine (Precedex) infusion (titrating), Last Rate: 0.4 mcg/kg/hr (05/14/25 1416)  sodium chloride (23.4%) HYPERTONIC 4 mEq/mL 172 mEq in sterile water 500 mL (sodium chloride 2%) infusion, Last Rate: 75 mL/hr at 05/14/25 1416    Scheduledalbuterol-ipratropium, 3 mL, Q6H  atorvastatin, 40 mg, Daily  ceFEPime IV (PEDS and ADULTS), 2 g, Q8H  clopidogreL, 75 mg, Daily  enoxparin, 40 mg, Daily  mupirocin, , BID  niMODipine, 60 mg, Q4H  QUEtiapine, 50 mg, BID  senna-docusate, 1 tablet, BID  valsartan, 80 mg, Daily    PRNacetaminophen, 650 mg, Q6H PRN  bisacodyL, 10 mg, Daily PRN  diazePAM, 10 mg, Q6H PRN  gabapentin, 300 mg, Q8H PRN  hydrALAZINE, 10 mg, Q6H PRN  labetalol, 10 mg, Q4H PRN  melatonin, 6 mg, Nightly PRN  methocarbamoL, 750 mg, Q6H PRN  oxyCODONE, 5 mg, Q6H PRN  potassium bicarbonate, 35 mEq, PRN  potassium bicarbonate, 50 mEq, PRN  potassium bicarbonate, 60 mEq, PRN  potassium, sodium phosphates, 2 packet, PRN  potassium, sodium phosphates, 2 packet, PRN  potassium, sodium phosphates, 2 packet, PRN  sodium chloride 0.9%, 10 mL, PRN      Today I personally reviewed pertinent medications, lines/drains/airways, imaging, cardiology results, laboratory results, microbiology results, notably:    BLE DVT US negative     5/12 BLE Arterial US     Right QUIANA 0.65, is suggestive of moderate right  lower extremity arterial disease.    Right profunda femoral artery stenosis, 50-75%.    Right below-knee arteries display blunted monophasic waveforms with low peak systolic velocities.    Right posterior tibial artery stenosis, 50-75%.    Left QUIANA 1.0, is normal.    Duplex ultrasound shows left lower extremity arteries with no hemodynamically significant stenosis.    5/11 CTH   1. Compared to prior head CT performed 05/06/2025, 20:13 hours, evolving relatively large right cerebellar infarct without macroscopic hemorrhage within the infarct territory.  Questionable hypoattenuation involving the brainstem which may be artifactual given coil artifact, however additional areas of ischemia not excluded.  MRI may be considered for further characterization.  Further effacement of the 4th ventricle since the prior study, however there has been slight decompression of the lateral and 3rd ventricles since the prior exam.  2. Relatively similar subarachnoid and interventricular hemorrhage.  No new or enlarging areas of intracranial hemorrhage appreciated.      Microbiology Results (last 7 days)       Procedure Component Value Units Date/Time    CSF culture [[id number]] Collected: 05/12/25 1729    Order Status: Completed Specimen: CSF (Spinal Fluid) from CSF Tap, Tube 3 Updated: 05/14/25 0708     CULTURE, CSF No Growth To Date     GRAM STAIN Rare WBC seen      No organisms seen    Culture, Respiratory with Gram Stain [[id number]]  (Abnormal)  (Susceptibility) Collected: 05/10/25 1120    Order Status: Completed Specimen: Respiratory from Sputum, Expectorated Updated: 05/13/25 0946     Respiratory Culture No S aureus or Pseudomonas isolated      Few KLEBSIELLA AEROGENES     Comment: with normal respiratory chichi        GRAM STAIN <10 Epithelial Cells/LPF      Rare WBC seen      Many Gram positive cocci      Many Gram Negative Rods    Culture, Respiratory with Gram Stain [[id number]]  (Abnormal)  (Susceptibility) Collected:  "05/10/25 2035    Order Status: Completed Specimen: Respiratory from Sputum Updated: 05/13/25 0945     Respiratory Culture No S aureus or Pseudomonas isolated      Few KLEBSIELLA AEROGENES     Comment: with normal respiratory chichi        GRAM STAIN <10 Epithelial Cells/LPF      Rare WBC seen      Many Gram positive cocci      Moderate Gram Negative Rods    Gram stain [[id number]] Collected: 05/12/25 1729    Order Status: Canceled Specimen: CSF (Spinal Fluid) from CSF Tap, Tube 3 Updated: 05/12/25 1909           Laboratory:  CBC:  Recent Labs   Lab 05/14/25 0225   WBC 15.46*   RBC 3.41*   HGB 9.2*   HCT 31.0*      MCV 91   MCH 27.0   MCHC 29.7*       CMP:  Recent Labs   Lab 05/14/25 0225 05/14/25  0340 05/14/25  0837   CALCIUM 9.2  --   --    ALBUMIN 2.9*  --   --    PROT 7.0  --   --    *  149*   < > 144   K 4.2  --   --    CO2 24  --   --    *  --   --    BUN 20  --   --    CREATININE 0.7  --   --    ALKPHOS 81  --   --    ALT 16  --   --    AST 21  --   --    BILITOT 0.5  --   --     < > = values in this interval not displayed.     Recent Labs   Lab 05/14/25  0312   MG 1.9   PHOS 2.4*       Coagulation:  Recent Labs   Lab 05/10/25  1734 05/11/25  0031 05/11/25  0724   INR 1.0  --   --    APTT 22.0   < > 45.5*    < > = values in this interval not displayed.       Lipid Panel:  No results for input(s): "CHOL", "HDL", "LDLCALC", "TRIG" in the last 168 hours.    Endocrine:  No results for input(s): "HGBA1C", "TSH" in the last 72 hours.    ABG:  No results for input(s): "PH", "PCO2", "HCO3", "POCSATURATED", "BE" in the last 72 hours.    Urinalysis:  No results for input(s): "COLORU", "CLARITYU", "SPECGRAV", "PHUR", "OCCULTUA", "RBCUA", "WBCUA", "BACTERIA", "GLUCUA", "KETONESU", "PROTEINUA", "BILIRUBINUA", "MUCUS" in the last 168 hours.      Diet  Diet Adult Regular Thin  Diet NPO Except for: Medication  Diet Adult Regular Thin  Diet NPO Except for: Medication      "

## 2025-05-14 NOTE — ASSESSMENT & PLAN NOTE
Patient is a 49-year-old female with past medical history of hypertension presenting via transfer from [place] for higher level of care after being diagnosed with subarachnoid hemorrhage.    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  S/p EVD 5/6  S/p Coil emolization 5/6    - EVD open at 10  - Neurosurgery consulted  - Vascular Neurology consulted  - SBP< 160   - nimodipine 60 mg q4h   - daily TCDs - negative for vasospasm thus far  - Aspirin 81 and Plavix 75 qd  - atorvastatin 40 mg  - euvolemia  - Na > 145  - CTH 5/11 with new R PICA/ superior cerebellar infarct.  SOC watch  - PBD7  MRA c/s contrast for reevaluation of coiled aneurysm- Unable to obtain MRA while on precedex 1.4 and given versed due to pt restlessness and difficulty to sedate. Ordered MRA w anesthesia, likely tomorrow.   - NPO midnight.   - Regular thin diet   - SCDs; lovenox for VTE ppx  - Delirium precautions.   - seoquel 50mg BID   -Precedex gtt   -PT/OT

## 2025-05-14 NOTE — ASSESSMENT & PLAN NOTE
-- Pt on Warfarin outpatient, currently held  -- Pt was on a Heparin gtt for for R brachial DVT and hx arterial occlusion. 5/11/25 CTH showed large R cerebellar infarct and heparin gtt was held on 5/11.   Lower extremity arterial US ordered given hx RLE arterial occlusion, RLE arterial US negative for occlusion, only showing moderate stenosis (50-75%). Decided not to continue heparin gtt given brachial DVT is in upper extremity. Will continue with only DAPT.    Continue lovenox for VTE ppx

## 2025-05-14 NOTE — PT/OT/SLP PROGRESS
"Physical Therapy Treatment  Co Treatment with Occupational Therapy    Patient Name:  Waldo Emmanuel   MRN:  [id number]    Recommendations:     Discharge Recommendations: High Intensity Therapy  Discharge Equipment Recommendations: bath bench, bedside commode, wheelchair  Barriers to discharge: Increased level of assistance     Assessment:     Waldo Emmanuel is a 49 y.o. female admitted with a medical diagnosis of SAH (subarachnoid hemorrhage).  She presents with the following impairments/functional limitations: weakness, impaired balance, decreased safety awareness, impaired cognition, impaired sensation, impaired self care skills, impaired functional mobility, decreased upper extremity function, decreased lower extremity function, impaired endurance, gait instability.    Pt met with HOB elevated and agreeable to session. Pt pleasantly confused and mildly impulsive requiring redirection. Pt tolerates session well with emphasis on bed mobility, transfers, and gait training. Pt tolerates pre gait activities and several small gait trials this session. Session limited by pt tachycardic with HR elevating to 167 BPM. Seated rest break required between activities. Pt will continue to benefit from skilled therapy services.    Rehab Prognosis: Good; patient would benefit from acute skilled PT services to address these deficits and reach maximum level of function.    Recent Surgery: Procedure(s) (LRB):  MRI (Magnetic Resonance Imagine) (N/A)      Plan:     During this hospitalization, patient to be seen 4 x/week to address the identified rehab impairments via gait training, therapeutic activities, therapeutic exercises, neuromuscular re-education and progress toward the following goals:    Plan of Care Expires:  06/11/25    Subjective     Chief Complaint: 10/10 back pain  Patient/Family Comments/goals: "it's in my upper back going down to my lower back" "Can I walk to the bathroom?"   Pain/Comfort:  Pain Rating " "1: 10/10  Location - Orientation 1:  ("upper going down to lower")  Location 1: back  Pain Addressed 1: Reposition, Distraction  Pain Rating Post-Intervention 1: 0/10      Objective:     Communicated with RN (Miriam) prior to session.  Patient found HOB elevated with telemetry, pulse ox (continuous), blood pressure cuff, PureWick, peripheral IV, external ventricular drain, daughter present upon PTA entry to room.     General Precautions: Standard, aspiration, fall  Orthopedic Precautions: N/A  Braces: N/A  Respiratory Status: Room air     Functional Mobility:  Bed Mobility:     Scooting: anteriorly to EOB moderate assistance  Supine to Sit: moderate assistance x2 persons   Sit to Supine: contact guard assistance  Transfers:     Sit to Stand:  x2 trials from EOB minimum assistance  with hand-held assist  Gait:   Pt ambulates steps L, 3 steps R, 3 steps L, 4 steps forward, 4 steps backward  with minimum assistance x2 persons  and BHHA  Deviations noted: unsteady gait, narrow OLIVIA, posterior lean,postural sway, decreased step length, decreased tyler, decreased gait speed  Verbal cues required for increased OLIVIA  All lines remained intact and gait belt utilized.  Balance:   Sitting Balance at EOB:  Level of Assist Required: Stand-by Assistance-Contact Guard Assistance  Deviations noted: slight posterior lean  Total Time Sitting EOB:15 minutes  Standing Balance:  Level of Assist Required:   Dynamic standing: Minimal Assistance x2 persons  Deviations noted: unsteady, posterior lean, postural sway  Pt performs marches and weight shifting in place to music for pre-gait activity.  Total Time Standing: ~5 minutes      AM-PAC 6 CLICK MOBILITY  Turning over in bed (including adjusting bedclothes, sheets and blankets)?: 3  Sitting down on and standing up from a chair with arms (e.g., wheelchair, bedside commode, etc.): 3  Moving from lying on back to sitting on the side of the bed?: 3  Moving to and from a bed to a chair " (including a wheelchair)?: 3  Need to walk in hospital room?: 2  Climbing 3-5 steps with a railing?: 1  Basic Mobility Total Score: 15       Treatment & Education:  Patient provided with daily orientation and goals of this PT session.     Pt educated to call for assistance and to transfer with hospital staff only.  Also, pt was educated on the effects of prolonged immobility and the importance of performing OOB activity and exercises to promote healing and reduce recovery time.    Co tx performed with OT due to patient need for 2 skilled therapist to ensure patient and staff safety and to accommondate for activity tolerance.    Patient left HOB elevated with all lines intact, call button in reach, bed alarm on, RN notified, and daughter present.    GOALS:   Multidisciplinary Problems       Physical Therapy Goals          Problem: Physical Therapy    Goal Priority Disciplines Outcome Interventions   Physical Therapy Goal     PT, PT/OT Progressing    Description: Goals to be met by: 2025     Patient will increase functional independence with mobility by performin. Supine to sit with Contact Guard Assistance  2. Sit to supine with Contact Guard Assistance  3. Sit to stand transfer with Contact Guard Assistance  4. Bed to chair transfer with Minimal Assistance using LRAD  5. Gait  x 25 feet with Minimal Assistance using LRAD.   6. Stand for 2 minutes with Contact Guard Assistance using LRAD  7. Lower extremity exercise program x15 reps per handout, with independence                         DME Justifications:   Waldo requires a commode for home use because she is confined to a single room.  Waldo Emmanuel has a mobility limitation that significantly impairs her ability to participate in one or more mobility related activities of daily living (MRADLs) such as toileting, feeding, dressing, grooming, and bathing in customary locations in the home.  The mobility limitation cannot be sufficiently resolved by  the use of a cane or walker.   Patients upper body strength is not sufficient to propel a standard WC. The use of a lightweight wheelchair will significantly improve the patients ability to participate in MRADLS and the patient will use it on regular basis in the home.   She has a caregiver who is available, willing, and able to provide assistance with the wheelchair when needed.     Time Tracking:     PT Received On: 05/14/25  PT Start Time: 1210     PT Stop Time: 1243  PT Total Time (min): 33 min     Billable Minutes: Gait Training 15 and Therapeutic Activity 18    Treatment Type: Treatment  PT/PTA: PTA     Number of PTA visits since last PT visit: 1 05/14/2025

## 2025-05-14 NOTE — PLAN OF CARE
Recommendations  --Continue Regular diet as tolerated and clinically indicated   --Encourage good intakes   --Nursing: please continue to document % meal eaten on flowsheet   --RD to monitor weight, PO intake      Goals: Meet % EEN/EPN by next RD follow-up  Nutrition Goal Status: new  Communication of RD Recs:  (POC)

## 2025-05-14 NOTE — PROGRESS NOTES
[facility] - Neuro Critical Care  Neurocritical Care  Progress Note    Admit Date: 5/6/2025  Service Date: 05/14/2025  Length of Stay: 8    Subjective:     Chief Complaint: SAH (subarachnoid hemorrhage)    History of Present Illness: History obtained via chart review and daughter at bedside as patient intubated on arrival.     Per ED note:  49 y.o. female, PMH HTN and anemia,  presenting as a transfer for neurosurgical evaluation with newly diagnosed ICH from outside hospital.  Patient was transferred from [facility].  Daughter at bedside helps provide history.  She states that she was at a grocery store when she had a syncopal event.  She was unsure if she hit her head.  She states that people on the scene called her to let her know what happened and after she was seen in the ED they told her that she had bleeding inside of her head.  She states that while in the ED her mother was answering questions appropriately and acting like her normal self except frequently falling asleep.  Patient was intubated for airway protection prior to arrival.  Patient and reversal with [facility] prior to transfer.  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 Distal location suspicious for mycotic aneurysm     [place] [facility] [place]gery consulted, EVD placed and admitted to neurocritical care    Hospital Course: 5/7/2025: extubated today to NC, SBP liberalized to 220, d/c jay cath,   5/8/2025: EVD per NSSGY, TCD's no vasospasm, DVT prophylaxis initiated, pending to hear from NSGY team when ok to start AC  05/09/2025: repeat CXR today, plan for CT chest, resp cx , add IS, EVD @10 per NSGY, TCDs today no vasospasm  5/10: Discussed with Dr. Chase and Neurosurgery, Heparin gtt started for R brachial DVT and hx arterial occlusion, will need repeat CTH when therapeutic. Hold off on transitioning to Coumadin until cleared by  NSGY. CT Chest negative for DVT, but concerning for PNA. Repeat sputum cx sent. Regular diet started. Repeat Na improved to 138.   5/11: right cerebellar CVA on imaging, HTS, MRI, family updated at bedside, place andreia, hold heparin gtt   05/12/2025 EVD @ 10. SOC watch. PBD7- plan for MRA c/s contrast for reevaluation of coiled aneurysm. Dc keppra. TCDs negative for spasm. Given 500NS for euvolemia. Given 3% HTS 250ml bolus and continue 2% HTS gtt @ 75. Na checks q6h for goal > 145. Started valsartan 80. Lower extremity arterial US ordered given hx RLE arterial occlusion, plan to restart heparin gtt pending results. SLP consult for diet, plan for FEES tomorrow. Started lovenox. Started seoquel 25qHS. Resp cx w GNR, many GPCs- has low grade temps possibly 2/2 DVT and stable mild leukocytosis so will hold off on abx for now.   05/13/2025 Delirium overnight requiring zyprexa x2. Restarted on precedex today and increased seroquel to 50qHS. Required 500NS bolus for hypotension after zyprexa last night. RLE arterial US negative for occlusion, only showing moderate stenosis. Decided not to continue heparin gtt given brachial DVT is in upper extremity. Will start plavix for DAPT. Respiratory cx w Klebsiella aerogenes x2, but not symptomatic. Transitioned from Ancef to cefepime. Bedside FEES completed, started regular thin diet. Given 3%  bolus x2 for Na goal > 145 and remains on 2% HTS @ 75. Prn valium for neck and back pain.   Pt has R brachial DVT and LUE 2% gtt infiltrated PIV and given hyaluronidase. L femoral CVC placed as pt had no access.   05/14/2025 Given 3%HTS bolus for Na > 145. TCDs negative for spasm. CXR reviewed. Increase cefepime course to 7d. Unable to obtain MRA while on precedex 1.4 and given versed due to pt restlessness and difficulty to sedate. Ordered MRA w anesthesia, likely tomorrow. NPO midnight. Sending full hypercoaguable w/u- likely start minimal intensity heparin gtt after sending off  w/u.    Review of Systems   Constitutional:  Negative for chills and fever.   Respiratory:  Negative for shortness of breath.    Cardiovascular:  Negative for chest pain.   Gastrointestinal:  Negative for abdominal pain.   Genitourinary:  Negative for difficulty urinating.   Musculoskeletal:  Positive for back pain and neck stiffness. Negative for neck pain.   Neurological:  Positive for headaches. Negative for weakness and numbness.   Psychiatric/Behavioral:  Positive for decreased concentration.        Objective:     Vitals:  Temp: 98.6 °F (37 °C)  Pulse: 97  Rhythm: sinus tachycardia  BP: (!) 153/85  MAP (mmHg): 111  ICP Mean (mmHg): 6 mmHg  Resp: (!) 22  SpO2: 99 %    Temp  Min: 97.8 °F (36.6 °C)  Max: 98.6 °F (37 °C)  Pulse  Min: 74  Max: 147  BP  Min: 104/68  Max: 185/85  MAP (mmHg)  Min: 82  Max: 125  ICP Mean (mmHg)  Min: 3 mmHg  Max: 21 mmHg  Resp  Min: 16  Max: 35  SpO2  Min: 94 %  Max: 100 %    05/13 0701 - 05/14 0700  In: 1729.3 [I.V.:1729.3]  Out: 679 [Urine:400; Drains:277]   Unmeasured Output  Unmeasured Urine Occurrence: 1  Unmeasured Stool Occurrence: 1  Pad Count: 1        Physical Exam  Vitals and nursing note reviewed.   Constitutional:       General: She is not in acute distress.     Appearance: She is obese.   HENT:      Head:      Comments: EVD site c/d/i     Right Ear: External ear normal.      Left Ear: External ear normal.      Nose: Nose normal.      Mouth/Throat:      Mouth: Mucous membranes are moist.      Pharynx: Oropharynx is clear.   Eyes:      Pupils: Pupils are equal, round, and reactive to light.   Cardiovascular:      Rate and Rhythm: Normal rate and regular rhythm.   Pulmonary:      Effort: Pulmonary effort is normal. No respiratory distress.   Abdominal:      General: There is no distension.      Palpations: Abdomen is soft.      Tenderness: There is no abdominal tenderness.   Musculoskeletal:      Cervical back: Normal range of motion. No rigidity or tenderness.      Right  lower leg: No edema.      Left lower leg: No edema.   Skin:     General: Skin is warm and dry.   Neurological:      Mental Status: She is alert.      Comments: E4 V4 M6  Mental status: Alert. Oriented x person, place, but intermittently to time. Delirious. Higher order confusion. Speech fluent. No dysarthria. Following commands.   CN II-XII grossly intact, specifically:  PERRL  No facial asymmetry.   Tongue midline.   Shoulder shrug symmetric  Motor:  RUE 5/5  RLE 5/5  LUE 5/5  LLE 5/5  R dysmetria   Sensation intact and symmetric throughout    Psychiatric:         Mood and Affect: Mood normal.            Medications:  ContinuousdexmedeTOMIDine (Precedex) infusion (titrating), Last Rate: 0.4 mcg/kg/hr (05/14/25 1416)  sodium chloride (23.4%) HYPERTONIC 4 mEq/mL 172 mEq in sterile water 500 mL (sodium chloride 2%) infusion, Last Rate: 75 mL/hr at 05/14/25 1416    Scheduledalbuterol-ipratropium, 3 mL, Q6H  atorvastatin, 40 mg, Daily  ceFEPime IV (PEDS and ADULTS), 2 g, Q8H  clopidogreL, 75 mg, Daily  enoxparin, 40 mg, Daily  mupirocin, , BID  niMODipine, 60 mg, Q4H  QUEtiapine, 50 mg, BID  senna-docusate, 1 tablet, BID  valsartan, 80 mg, Daily    PRNacetaminophen, 650 mg, Q6H PRN  bisacodyL, 10 mg, Daily PRN  diazePAM, 10 mg, Q6H PRN  gabapentin, 300 mg, Q8H PRN  hydrALAZINE, 10 mg, Q6H PRN  labetalol, 10 mg, Q4H PRN  melatonin, 6 mg, Nightly PRN  methocarbamoL, 750 mg, Q6H PRN  oxyCODONE, 5 mg, Q6H PRN  potassium bicarbonate, 35 mEq, PRN  potassium bicarbonate, 50 mEq, PRN  potassium bicarbonate, 60 mEq, PRN  potassium, sodium phosphates, 2 packet, PRN  potassium, sodium phosphates, 2 packet, PRN  potassium, sodium phosphates, 2 packet, PRN  sodium chloride 0.9%, 10 mL, PRN      Today I personally reviewed pertinent medications, lines/drains/airways, imaging, cardiology results, laboratory results, microbiology results, notably:    BLE DVT US negative     5/12 BLE Arterial US     Right QUIANA 0.65, is suggestive of  moderate right lower extremity arterial disease.    Right profunda femoral artery stenosis, 50-75%.    Right below-knee arteries display blunted monophasic waveforms with low peak systolic velocities.    Right posterior tibial artery stenosis, 50-75%.    Left QUIANA 1.0, is normal.    Duplex ultrasound shows left lower extremity arteries with no hemodynamically significant stenosis.    5/11 CTH   1. Compared to prior head CT performed 05/06/2025, 20:13 hours, evolving relatively large right cerebellar infarct without macroscopic hemorrhage within the infarct territory.  Questionable hypoattenuation involving the brainstem which may be artifactual given coil artifact, however additional areas of ischemia not excluded.  MRI may be considered for further characterization.  Further effacement of the 4th ventricle since the prior study, however there has been slight decompression of the lateral and 3rd ventricles since the prior exam.  2. Relatively similar subarachnoid and interventricular hemorrhage.  No new or enlarging areas of intracranial hemorrhage appreciated.      Microbiology Results (last 7 days)       Procedure Component Value Units Date/Time    CSF culture [[id number]] Collected: 05/12/25 1729    Order Status: Completed Specimen: CSF (Spinal Fluid) from CSF Tap, Tube 3 Updated: 05/14/25 0708     CULTURE, CSF No Growth To Date     GRAM STAIN Rare WBC seen      No organisms seen    Culture, Respiratory with Gram Stain [[id number]]  (Abnormal)  (Susceptibility) Collected: 05/10/25 1120    Order Status: Completed Specimen: Respiratory from Sputum, Expectorated Updated: 05/13/25 0946     Respiratory Culture No S aureus or Pseudomonas isolated      Few KLEBSIELLA AEROGENES     Comment: with normal respiratory chichi        GRAM STAIN <10 Epithelial Cells/LPF      Rare WBC seen      Many Gram positive cocci      Many Gram Negative Rods    Culture, Respiratory with Gram Stain [[id number]]  (Abnormal)  (Susceptibility)  "Collected: 05/10/25 2035    Order Status: Completed Specimen: Respiratory from Sputum Updated: 05/13/25 0945     Respiratory Culture No S aureus or Pseudomonas isolated      Few KLEBSIELLA AEROGENES     Comment: with normal respiratory chichi        GRAM STAIN <10 Epithelial Cells/LPF      Rare WBC seen      Many Gram positive cocci      Moderate Gram Negative Rods    Gram stain [[id number]] Collected: 05/12/25 1729    Order Status: Canceled Specimen: CSF (Spinal Fluid) from CSF Tap, Tube 3 Updated: 05/12/25 1909           Laboratory:  CBC:  Recent Labs   Lab 05/14/25 0225   WBC 15.46*   RBC 3.41*   HGB 9.2*   HCT 31.0*      MCV 91   MCH 27.0   MCHC 29.7*       CMP:  Recent Labs   Lab 05/14/25 0225 05/14/25  0340 05/14/25  0837   CALCIUM 9.2  --   --    ALBUMIN 2.9*  --   --    PROT 7.0  --   --    *  149*   < > 144   K 4.2  --   --    CO2 24  --   --    *  --   --    BUN 20  --   --    CREATININE 0.7  --   --    ALKPHOS 81  --   --    ALT 16  --   --    AST 21  --   --    BILITOT 0.5  --   --     < > = values in this interval not displayed.     Recent Labs   Lab 05/14/25  0312   MG 1.9   PHOS 2.4*       Coagulation:  Recent Labs   Lab 05/10/25  1734 05/11/25  0031 05/11/25  0724   INR 1.0  --   --    APTT 22.0   < > 45.5*    < > = values in this interval not displayed.       Lipid Panel:  No results for input(s): "CHOL", "HDL", "LDLCALC", "TRIG" in the last 168 hours.    Endocrine:  No results for input(s): "HGBA1C", "TSH" in the last 72 hours.    ABG:  No results for input(s): "PH", "PCO2", "HCO3", "POCSATURATED", "BE" in the last 72 hours.    Urinalysis:  No results for input(s): "COLORU", "CLARITYU", "SPECGRAV", "PHUR", "OCCULTUA", "RBCUA", "WBCUA", "BACTERIA", "GLUCUA", "KETONESU", "PROTEINUA", "BILIRUBINUA", "MUCUS" in the last 168 hours.      Diet  Diet Adult Regular Thin  Diet NPO Except for: Medication  Diet Adult Regular Thin  Diet NPO Except for: Medication      Assessment/Plan: "     Neuro  * SAH (subarachnoid hemorrhage)  Patient is a 49-year-old female with past medical history of hypertension presenting via transfer from [place] for higher level of care after being diagnosed with subarachnoid hemorrhage.    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  S/p EVD 5/6  S/p Coil emolization 5/6    - EVD open at 10  - Neurosurgery consulted  - Vascular Neurology consulted  - SBP< 160   - nimodipine 60 mg q4h   - daily TCDs - negative for vasospasm thus far  - Aspirin 81 and Plavix 75 qd  - atorvastatin 40 mg  - euvolemia  - Na > 145  - CTH 5/11 with new R PICA/ superior cerebellar infarct.  SOC watch  - PBD7  MRA c/s contrast for reevaluation of coiled aneurysm- Unable to obtain MRA while on precedex 1.4 and given versed due to pt restlessness and difficulty to sedate. Ordered MRA w anesthesia, likely tomorrow.   - NPO midnight.   - Regular thin diet   - SCDs; lovenox for VTE ppx  - Delirium precautions.   - seoquel 50mg BID   -Precedex gtt   -PT/OT    Brain edema  See cerebellar stroke     Cerebellar stroke, acute  CT on 5/11 revealed R cerebellar infarct. Likely uriah-angio  Family aware and imaging reviewed  Atorvastatin 40  ASA 81 and Plavix 75mg qd  RLE arterial US negative for occlusion, only showing moderate stenosis. Decided not to continue heparin gtt given brachial DVT is in upper extremity. Will continue w DAPT and DVT ppx w lovenox instead of full AC.   Concern for brain compression in post fossa  SOC watch, NSGY following   Given 3% HTS 250ml bolus x1 overnight  2% HTS gtt @ 75  Na checks q6h for goal > 145    Acute encephalopathy  See primary problem    Obstructive hydrocephalus  EVD @ 10  R Cerebellar infarct, SOC watch.      Brain compression  monitor EVD output closely  HTS therapy- 2%HTS @ 75 and prn 3% HTS boluses   Na goal > 145  Neurochecks  CTH w R cerebellar infarct   SOC watch    Brain aneurysm  See primary  problem    Cardiac/Vascular  Essential hypertension  SBP <160  Valsartan 80 mg    Hematology  History of DVT in adulthood  Pt has a hx of RLE DVT and was started on xarelto. 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 She has been seen by heme onc in the past.      Warfarin currently held  Pt was on a Heparin gtt for for R brachial DVT and hx arterial occlusion. 5/11/25 CTH showed large R cerebellar infarct and heparin gtt was held on 5/11. Lower extremity arterial US was negative for occlusion, only showing moderate stenosis (50-75%). BLE DVT US was also negative for DVT. Decided not to continue heparin gtt given brachial DVT is in upper extremity. Was only continuing with DAPT and then lovenox for VTE ppx  Sending full hypercoaguable w/u   Plan to start minimal intensity heparin gtt after sending off w/u    Palliative Care  Endotracheal tube present-resolved as of 5/10/2025  - extubated 5/8, on RA          The patient is being Prophylaxed for:  Venous Thromboembolism with: Mechanical or Chemical  Stress Ulcer with: Not Applicable   Ventilator Pneumonia with: not applicable    Activity Orders            Diet NPO Except for: Medication: NPO starting at 05/15 0001    Diet Adult Regular Thin: Regular starting at 05/13 1127    Progressive Mobility Protocol (mobilize patient to their highest level of functioning at least twice daily) starting at 05/06 2000    Turn patient starting at 05/06 0800          Full Code    Critical care time spent on the evaluation and treatment of severe organ dysfunction, review of pertinent labs and imaging studies, discussions with consulting providers and discussions with patient/family: 35 minutes.    Asiya Mast, PA-C  Neurocritical Care  [facility] - Neuro Critical Care

## 2025-05-14 NOTE — NURSING
Accompanied primary nurse to MRI for patient transport. Patient was agitated and repeatedly attempted to remove central line (only vascular access) and EVD. Patient was on maximum dose of Precedex at 1.4mcg/kg/hr. MRI was restarted multiple time due to excessive movement.     EDEN Desai, was notified of the events. A telephone order was received for 2mg Versed, which was administered. MRI was attempted again, but after 10 minutes the patient remained agitated, and imaging could not be completed.     EDEN Desai, was updated. Risk vs benefits of continued attempts discussed with the [facility] team. Decision was made to abort MRI at this time due to patient noncompliance and safety concerns.

## 2025-05-15 ENCOUNTER — ANESTHESIA EVENT (OUTPATIENT)
Dept: ENDOSCOPY | Facility: HOSPITAL | Age: 50
End: 2025-05-15
Payer: COMMERCIAL

## 2025-05-15 ENCOUNTER — ANESTHESIA (OUTPATIENT)
Dept: ENDOSCOPY | Facility: HOSPITAL | Age: 50
End: 2025-05-15
Payer: COMMERCIAL

## 2025-05-15 LAB
ABSOLUTE EOSINOPHIL (OHS): 0.06 K/UL
ABSOLUTE EOSINOPHIL (OHS): 0.07 K/UL
ABSOLUTE EOSINOPHIL (OHS): 0.07 K/UL
ABSOLUTE MONOCYTE (OHS): 0.52 K/UL (ref 0.3–1)
ABSOLUTE MONOCYTE (OHS): 0.58 K/UL (ref 0.3–1)
ABSOLUTE MONOCYTE (OHS): 0.94 K/UL (ref 0.3–1)
ABSOLUTE NEUTROPHIL COUNT (OHS): 11.94 K/UL (ref 1.8–7.7)
ABSOLUTE NEUTROPHIL COUNT (OHS): 8.84 K/UL (ref 1.8–7.7)
ABSOLUTE NEUTROPHIL COUNT (OHS): 9 K/UL (ref 1.8–7.7)
ALBUMIN SERPL BCP-MCNC: 2.8 G/DL (ref 3.5–5.2)
ALP SERPL-CCNC: 83 UNIT/L (ref 40–150)
ALT SERPL W/O P-5'-P-CCNC: 19 UNIT/L (ref 10–44)
ANA (OHS): NORMAL
ANION GAP (OHS): 8 MMOL/L (ref 8–16)
APTT PPP: 21.5 SECONDS (ref 21–32)
APTT PPP: 38.3 SECONDS (ref 21–32)
APTT PPP: 44.4 SECONDS (ref 21–32)
AST SERPL-CCNC: 25 UNIT/L (ref 11–45)
AT III ACT/NOR PPP CHRO: 114 % (ref 82–139)
BASOPHILS # BLD AUTO: 0.03 K/UL
BASOPHILS # BLD AUTO: 0.03 K/UL
BASOPHILS # BLD AUTO: 0.04 K/UL
BASOPHILS NFR BLD AUTO: 0.2 %
BASOPHILS NFR BLD AUTO: 0.2 %
BASOPHILS NFR BLD AUTO: 0.3 %
BILIRUB SERPL-MCNC: 0.5 MG/DL (ref 0.1–1)
BUN SERPL-MCNC: 13 MG/DL (ref 6–20)
CALCIUM SERPL-MCNC: 8.5 MG/DL (ref 8.7–10.5)
CHLORIDE SERPL-SCNC: 113 MMOL/L (ref 95–110)
CO2 SERPL-SCNC: 23 MMOL/L (ref 23–29)
CREAT SERPL-MCNC: 0.6 MG/DL (ref 0.5–1.4)
ERYTHROCYTE [DISTWIDTH] IN BLOOD BY AUTOMATED COUNT: 13.8 % (ref 11.5–14.5)
ERYTHROCYTE [DISTWIDTH] IN BLOOD BY AUTOMATED COUNT: 14 % (ref 11.5–14.5)
ERYTHROCYTE [DISTWIDTH] IN BLOOD BY AUTOMATED COUNT: 14.1 % (ref 11.5–14.5)
GFR SERPLBLD CREATININE-BSD FMLA CKD-EPI: >60 ML/MIN/1.73/M2
GLUCOSE SERPL-MCNC: 123 MG/DL (ref 70–110)
HCT VFR BLD AUTO: 23.3 % (ref 37–48.5)
HCT VFR BLD AUTO: 25.2 % (ref 37–48.5)
HCT VFR BLD AUTO: 29.8 % (ref 37–48.5)
HGB BLD-MCNC: 6.9 GM/DL (ref 12–16)
HGB BLD-MCNC: 7.7 GM/DL (ref 12–16)
HGB BLD-MCNC: 8.9 GM/DL (ref 12–16)
IMM GRANULOCYTES # BLD AUTO: 0.11 K/UL (ref 0–0.04)
IMM GRANULOCYTES # BLD AUTO: 0.11 K/UL (ref 0–0.04)
IMM GRANULOCYTES # BLD AUTO: 0.14 K/UL (ref 0–0.04)
IMM GRANULOCYTES NFR BLD AUTO: 0.8 % (ref 0–0.5)
IMM GRANULOCYTES NFR BLD AUTO: 0.8 % (ref 0–0.5)
IMM GRANULOCYTES NFR BLD AUTO: 0.9 % (ref 0–0.5)
INR PPP: 1.1 (ref 0.8–1.2)
LYMPHOCYTES # BLD AUTO: 3.34 K/UL (ref 1–4.8)
LYMPHOCYTES # BLD AUTO: 3.74 K/UL (ref 1–4.8)
LYMPHOCYTES # BLD AUTO: 4.4 K/UL (ref 1–4.8)
MAGNESIUM SERPL-MCNC: 1.8 MG/DL (ref 1.6–2.6)
MCH RBC QN AUTO: 27.1 PG (ref 27–31)
MCH RBC QN AUTO: 27.5 PG (ref 27–31)
MCH RBC QN AUTO: 28.1 PG (ref 27–31)
MCHC RBC AUTO-ENTMCNC: 29.6 G/DL (ref 32–36)
MCHC RBC AUTO-ENTMCNC: 29.9 G/DL (ref 32–36)
MCHC RBC AUTO-ENTMCNC: 30.6 G/DL (ref 32–36)
MCV RBC AUTO: 91 FL (ref 82–98)
MCV RBC AUTO: 92 FL (ref 82–98)
MCV RBC AUTO: 92 FL (ref 82–98)
NUCLEATED RBC (/100WBC) (OHS): 0 /100 WBC
PHOSPHATE SERPL-MCNC: 2.4 MG/DL (ref 2.7–4.5)
PLATELET # BLD AUTO: 298 K/UL (ref 150–450)
PLATELET # BLD AUTO: 322 K/UL (ref 150–450)
PLATELET # BLD AUTO: 375 K/UL (ref 150–450)
PMV BLD AUTO: 9.2 FL (ref 9.2–12.9)
PMV BLD AUTO: 9.3 FL (ref 9.2–12.9)
PMV BLD AUTO: 9.4 FL (ref 9.2–12.9)
POTASSIUM SERPL-SCNC: 3.9 MMOL/L (ref 3.5–5.1)
PROT SERPL-MCNC: 6.7 GM/DL (ref 6–8.4)
PROTHROMBIN TIME: 11.7 SECONDS (ref 9–12.5)
RBC # BLD AUTO: 2.55 M/UL (ref 4–5.4)
RBC # BLD AUTO: 2.74 M/UL (ref 4–5.4)
RBC # BLD AUTO: 3.24 M/UL (ref 4–5.4)
RELATIVE EOSINOPHIL (OHS): 0.4 %
RELATIVE EOSINOPHIL (OHS): 0.5 %
RELATIVE EOSINOPHIL (OHS): 0.5 %
RELATIVE LYMPHOCYTE (OHS): 25.1 % (ref 18–48)
RELATIVE LYMPHOCYTE (OHS): 25.9 % (ref 18–48)
RELATIVE LYMPHOCYTE (OHS): 27.6 % (ref 18–48)
RELATIVE MONOCYTE (OHS): 4 % (ref 4–15)
RELATIVE MONOCYTE (OHS): 4.3 % (ref 4–15)
RELATIVE MONOCYTE (OHS): 5.4 % (ref 4–15)
RELATIVE NEUTROPHIL (OHS): 66.5 % (ref 38–73)
RELATIVE NEUTROPHIL (OHS): 68.1 % (ref 38–73)
RELATIVE NEUTROPHIL (OHS): 68.5 % (ref 38–73)
SODIUM SERPL-SCNC: 140 MMOL/L (ref 136–145)
SODIUM SERPL-SCNC: 144 MMOL/L (ref 136–145)
SODIUM SERPL-SCNC: 144 MMOL/L (ref 136–145)
SODIUM SERPL-SCNC: 145 MMOL/L (ref 136–145)
WBC # BLD AUTO: 12.9 K/UL (ref 3.9–12.7)
WBC # BLD AUTO: 13.54 K/UL (ref 3.9–12.7)
WBC # BLD AUTO: 17.52 K/UL (ref 3.9–12.7)

## 2025-05-15 PROCEDURE — 85025 COMPLETE CBC W/AUTO DIFF WBC: CPT | Performed by: PSYCHIATRY & NEUROLOGY

## 2025-05-15 PROCEDURE — 37000008 HC ANESTHESIA 1ST 15 MINUTES

## 2025-05-15 PROCEDURE — 85025 COMPLETE CBC W/AUTO DIFF WBC: CPT

## 2025-05-15 PROCEDURE — 85610 PROTHROMBIN TIME: CPT

## 2025-05-15 PROCEDURE — 25000003 PHARM REV CODE 250

## 2025-05-15 PROCEDURE — 63600175 PHARM REV CODE 636 W HCPCS

## 2025-05-15 PROCEDURE — A4217 STERILE WATER/SALINE, 500 ML: HCPCS | Performed by: NURSE PRACTITIONER

## 2025-05-15 PROCEDURE — 80053 COMPREHEN METABOLIC PANEL: CPT

## 2025-05-15 PROCEDURE — 99499 UNLISTED E&M SERVICE: CPT | Mod: ,,, | Performed by: NURSE PRACTITIONER

## 2025-05-15 PROCEDURE — 37000009 HC ANESTHESIA EA ADD 15 MINS

## 2025-05-15 PROCEDURE — A4217 STERILE WATER/SALINE, 500 ML: HCPCS

## 2025-05-15 PROCEDURE — 99291 CRITICAL CARE FIRST HOUR: CPT | Mod: FS,,, | Performed by: PSYCHIATRY & NEUROLOGY

## 2025-05-15 PROCEDURE — 84100 ASSAY OF PHOSPHORUS: CPT

## 2025-05-15 PROCEDURE — 25000003 PHARM REV CODE 250: Performed by: NURSE PRACTITIONER

## 2025-05-15 PROCEDURE — 99900035 HC TECH TIME PER 15 MIN (STAT)

## 2025-05-15 PROCEDURE — 85730 THROMBOPLASTIN TIME PARTIAL: CPT

## 2025-05-15 PROCEDURE — 25000242 PHARM REV CODE 250 ALT 637 W/ HCPCS: Performed by: NURSE PRACTITIONER

## 2025-05-15 PROCEDURE — 94761 N-INVAS EAR/PLS OXIMETRY MLT: CPT

## 2025-05-15 PROCEDURE — 97530 THERAPEUTIC ACTIVITIES: CPT

## 2025-05-15 PROCEDURE — A9585 GADOBUTROL INJECTION: HCPCS | Performed by: PSYCHIATRY & NEUROLOGY

## 2025-05-15 PROCEDURE — 94640 AIRWAY INHALATION TREATMENT: CPT

## 2025-05-15 PROCEDURE — 20000000 HC ICU ROOM

## 2025-05-15 PROCEDURE — 83735 ASSAY OF MAGNESIUM: CPT

## 2025-05-15 PROCEDURE — 84295 ASSAY OF SERUM SODIUM: CPT

## 2025-05-15 PROCEDURE — 85730 THROMBOPLASTIN TIME PARTIAL: CPT | Performed by: NURSE PRACTITIONER

## 2025-05-15 PROCEDURE — 85730 THROMBOPLASTIN TIME PARTIAL: CPT | Performed by: PSYCHIATRY & NEUROLOGY

## 2025-05-15 PROCEDURE — 63600175 PHARM REV CODE 636 W HCPCS: Performed by: NURSE PRACTITIONER

## 2025-05-15 PROCEDURE — 99233 SBSQ HOSP IP/OBS HIGH 50: CPT | Mod: FS,,, | Performed by: NEUROLOGICAL SURGERY

## 2025-05-15 PROCEDURE — 25000003 PHARM REV CODE 250: Performed by: PSYCHIATRY & NEUROLOGY

## 2025-05-15 PROCEDURE — 63600175 PHARM REV CODE 636 W HCPCS: Performed by: NURSE ANESTHETIST, CERTIFIED REGISTERED

## 2025-05-15 PROCEDURE — 25500020 PHARM REV CODE 255: Performed by: PSYCHIATRY & NEUROLOGY

## 2025-05-15 PROCEDURE — 63600175 PHARM REV CODE 636 W HCPCS: Performed by: PSYCHIATRY & NEUROLOGY

## 2025-05-15 RX ORDER — PROPOFOL 10 MG/ML
VIAL (ML) INTRAVENOUS
Status: DISCONTINUED | OUTPATIENT
Start: 2025-05-15 | End: 2025-05-15

## 2025-05-15 RX ORDER — HEPARIN SODIUM,PORCINE/D5W 25000/250
0-40 INTRAVENOUS SOLUTION INTRAVENOUS CONTINUOUS
Status: DISCONTINUED | OUTPATIENT
Start: 2025-05-15 | End: 2025-05-24

## 2025-05-15 RX ORDER — NAPROXEN SODIUM 220 MG/1
81 TABLET, FILM COATED ORAL DAILY
Status: DISCONTINUED | OUTPATIENT
Start: 2025-05-15 | End: 2025-05-26 | Stop reason: HOSPADM

## 2025-05-15 RX ORDER — OLANZAPINE 10 MG/2ML
5 INJECTION, POWDER, FOR SOLUTION INTRAMUSCULAR ONCE AS NEEDED
Status: DISCONTINUED | OUTPATIENT
Start: 2025-05-15 | End: 2025-05-16

## 2025-05-15 RX ORDER — GADOBUTROL 604.72 MG/ML
10 INJECTION INTRAVENOUS
Status: COMPLETED | OUTPATIENT
Start: 2025-05-15 | End: 2025-05-15

## 2025-05-15 RX ADMIN — NIMODIPINE 60 MG: 30 CAPSULE, LIQUID FILLED ORAL at 06:05

## 2025-05-15 RX ADMIN — VASOPRESSIN: 20 INJECTION, SOLUTION INTRAVENOUS at 11:05

## 2025-05-15 RX ADMIN — IPRATROPIUM BROMIDE AND ALBUTEROL SULFATE 3 ML: 2.5; .5 SOLUTION RESPIRATORY (INHALATION) at 07:05

## 2025-05-15 RX ADMIN — NIMODIPINE 60 MG: 30 CAPSULE, LIQUID FILLED ORAL at 02:05

## 2025-05-15 RX ADMIN — NIMODIPINE 60 MG: 30 CAPSULE, LIQUID FILLED ORAL at 09:05

## 2025-05-15 RX ADMIN — GADOBUTROL 10 ML: 604.72 INJECTION INTRAVENOUS at 12:05

## 2025-05-15 RX ADMIN — ACETAMINOPHEN 650 MG: 325 TABLET ORAL at 08:05

## 2025-05-15 RX ADMIN — SENNOSIDES AND DOCUSATE SODIUM 1 TABLET: 50; 8.6 TABLET ORAL at 09:05

## 2025-05-15 RX ADMIN — CEFEPIME 2 G: 2 INJECTION, POWDER, FOR SOLUTION INTRAVENOUS at 02:05

## 2025-05-15 RX ADMIN — CLOPIDOGREL 75 MG: 75 TABLET ORAL at 09:05

## 2025-05-15 RX ADMIN — DEXMEDETOMIDINE HYDROCHLORIDE 0.6 MCG/KG/HR: 4 INJECTION INTRAVENOUS at 10:05

## 2025-05-15 RX ADMIN — VASOPRESSIN: 20 INJECTION, SOLUTION INTRAVENOUS at 04:05

## 2025-05-15 RX ADMIN — ACETAMINOPHEN 650 MG: 325 TABLET ORAL at 06:05

## 2025-05-15 RX ADMIN — PROPOFOL 100 MCG/KG/MIN: 10 INJECTION, EMULSION INTRAVENOUS at 12:05

## 2025-05-15 RX ADMIN — OXYCODONE 5 MG: 5 TABLET ORAL at 10:05

## 2025-05-15 RX ADMIN — IPRATROPIUM BROMIDE AND ALBUTEROL SULFATE 3 ML: 2.5; .5 SOLUTION RESPIRATORY (INHALATION) at 01:05

## 2025-05-15 RX ADMIN — ASPIRIN 81 MG CHEWABLE TABLET 81 MG: 81 TABLET CHEWABLE at 11:05

## 2025-05-15 RX ADMIN — DEXMEDETOMIDINE HYDROCHLORIDE 0.4 MCG/KG/HR: 4 INJECTION INTRAVENOUS at 03:05

## 2025-05-15 RX ADMIN — SODIUM CHLORIDE 250 ML: 3 INJECTION, SOLUTION INTRAVENOUS at 04:05

## 2025-05-15 RX ADMIN — CEFEPIME 2 G: 2 INJECTION, POWDER, FOR SOLUTION INTRAVENOUS at 11:05

## 2025-05-15 RX ADMIN — CEFEPIME 2 G: 2 INJECTION, POWDER, FOR SOLUTION INTRAVENOUS at 07:05

## 2025-05-15 RX ADMIN — NIMODIPINE 60 MG: 30 CAPSULE, LIQUID FILLED ORAL at 11:05

## 2025-05-15 RX ADMIN — PROPOFOL 75 MG: 10 INJECTION, EMULSION INTRAVENOUS at 12:05

## 2025-05-15 RX ADMIN — VALSARTAN 80 MG: 80 TABLET, FILM COATED ORAL at 09:05

## 2025-05-15 RX ADMIN — SODIUM CHLORIDE 250 ML: 3 INJECTION, SOLUTION INTRAVENOUS at 09:05

## 2025-05-15 RX ADMIN — OXYCODONE 5 MG: 5 TABLET ORAL at 04:05

## 2025-05-15 RX ADMIN — ATORVASTATIN CALCIUM 40 MG: 40 TABLET, FILM COATED ORAL at 09:05

## 2025-05-15 RX ADMIN — DEXMEDETOMIDINE HYDROCHLORIDE 0.4 MCG/KG/HR: 4 INJECTION INTRAVENOUS at 06:05

## 2025-05-15 RX ADMIN — QUETIAPINE FUMARATE 50 MG: 25 TABLET ORAL at 09:05

## 2025-05-15 RX ADMIN — MUPIROCIN: 20 OINTMENT TOPICAL at 09:05

## 2025-05-15 RX ADMIN — ACETAMINOPHEN 650 MG: 325 TABLET ORAL at 02:05

## 2025-05-15 RX ADMIN — NIMODIPINE 60 MG: 30 CAPSULE, LIQUID FILLED ORAL at 03:05

## 2025-05-15 RX ADMIN — DIAZEPAM 10 MG: 5 TABLET ORAL at 02:05

## 2025-05-15 RX ADMIN — HEPARIN SODIUM AND DEXTROSE 12 UNITS/KG/HR: 10000; 5 INJECTION INTRAVENOUS at 06:05

## 2025-05-15 RX ADMIN — OXYCODONE 5 MG: 5 TABLET ORAL at 09:05

## 2025-05-15 RX ADMIN — SODIUM PHOSPHATE, MONOBASIC, MONOHYDRATE AND SODIUM PHOSPHATE, DIBASIC, ANHYDROUS 15 MMOL: 142; 276 INJECTION, SOLUTION INTRAVENOUS at 06:05

## 2025-05-15 NOTE — EICU
Virtual ICU Quality Rounds    Admit Date: 5/6/2025  Hospital Day: 9    ICU Day: 9d 7h    24H Vital Sign Range:  Temp:  [98.1 °F (36.7 °C)-98.8 °F (37.1 °C)]   Pulse:  []   Resp:  [3-43]   BP: ()/(50-98)   SpO2:  [96 %-100 %]     VICU Surveillance Screening    Daily review of  line necessity(optional): Central line(s) in place    Central line type (optional): Triple lumen catheter    Central line indications : Poor IV access    LDA reconciliation : Yes

## 2025-05-15 NOTE — PLAN OF CARE
[facility] - Neuro Critical Care  Discharge Reassessment    Primary Care Provider: Ansley Vivas MD    Expected Discharge Date: 5/20/2025    Patient is not medically ready for discharge. Patient is pending mra today with anesthesia. Patient is high intensity therapy level, 0 referral out at this time, pending more definitive medical plan vs discharge plan.    Discharge Plan A and Plan B have been determined by review of patient's clinical status, future medical and therapeutic needs, and coverage/benefits for post-acute care in coordination with multidisciplinary team members.     Reassessment (most recent)       Discharge Reassessment - 05/15/25 1257          Discharge Reassessment    Assessment Type Discharge Planning Reassessment     Did the patient's condition or plan change since previous assessment? No     Communicated ELIEL with patient/caregiver Date not available/Unable to determine     Discharge Plan A Rehab     Discharge Plan B Home with family;Home Health     DME Needed Upon Discharge  other (see comments)   tbd    Why the patient remains in the hospital Requires continued medical care        Post-Acute Status    Discharge Delays None known at this time

## 2025-05-15 NOTE — SUBJECTIVE & OBJECTIVE
Interval History: 5/15 improved delirium today, exam stable. Plan for MRA w contrast today w anesthesia    Medications:  Continuous Infusions:   dexmedeTOMIDine (Precedex) infusion (titrating)  0-1.4 mcg/kg/hr Intravenous Continuous 15.03 mL/hr at 05/15/25 1201 0.6 mcg/kg/hr at 05/15/25 1201    heparin (porcine) in D5W  0-40 Units/kg/hr Intravenous Continuous 12 mL/hr at 05/15/25 1201 12 Units/kg/hr at 05/15/25 1201    sodium chloride (23.4%) HYPERTONIC 4 mEq/mL 172 mEq in sterile water 500 mL (sodium chloride 2%) infusion   Intravenous Continuous 100 mL/hr at 05/15/25 1201 Rate Verify at 05/15/25 1201     Scheduled Meds:   albuterol-ipratropium  3 mL Nebulization Q6H    aspirin  81 mg Oral Daily    atorvastatin  40 mg Oral Daily    ceFEPime IV (PEDS and ADULTS)  2 g Intravenous Q8H    niMODipine  60 mg Oral Q4H    QUEtiapine  50 mg Oral BID    senna-docusate  1 tablet Oral BID    valsartan  80 mg Oral Daily     PRN Meds:  Current Facility-Administered Medications:     acetaminophen, 650 mg, Oral, Q6H PRN    bisacodyL, 10 mg, Rectal, Daily PRN    diazePAM, 10 mg, Oral, Q6H PRN    gadobutroL, 10 mL, Intravenous, ONCE PRN    hydrALAZINE, 10 mg, Intravenous, Q6H PRN    labetalol, 10 mg, Intravenous, Q4H PRN    melatonin, 6 mg, Oral, Nightly PRN    methocarbamoL, 750 mg, Oral, Q6H PRN    OLANZapine, 5 mg, Intramuscular, Once PRN    oxyCODONE, 5 mg, Oral, Q6H PRN    potassium bicarbonate, 35 mEq, Oral, PRN    potassium bicarbonate, 50 mEq, Oral, PRN    potassium bicarbonate, 60 mEq, Oral, PRN    potassium, sodium phosphates, 2 packet, Oral, PRN    potassium, sodium phosphates, 2 packet, Oral, PRN    potassium, sodium phosphates, 2 packet, Oral, PRN    sodium chloride 0.9%, 10 mL, Intravenous, PRN     Review of Systems  Objective:     Weight: 100.2 kg (220 lb 14.4 oz)  Body mass index is 39.13 kg/m².  Vital Signs (Most Recent):  Temp: 98.7 °F (37.1 °C) (05/15/25 0701)  Pulse: 87 (05/15/25 1101)  Resp: 20 (05/15/25  1101)  BP: 125/83 (05/15/25 1101)  SpO2: 100 % (05/15/25 1101) Vital Signs (24h Range):  Temp:  [98.5 °F (36.9 °C)-99.1 °F (37.3 °C)] 98.7 °F (37.1 °C)  Pulse:  [] 87  Resp:  [3-43] 20  SpO2:  [93 %-100 %] 100 %  BP: ()/(50-98) 125/83     Date 05/15/25 0700 - 05/16/25 0659   Shift 4603-1856 7944-3064 6626-2085 24 Hour Total   INTAKE   I.V.(mL/kg) 597(6)   597(6)   IV Piggyback 248.5   248.5   Shift Total(mL/kg) 845.5(8.4)   845.5(8.4)   OUTPUT   Drains 68   68   Shift Total(mL/kg) 68(0.7)   68(0.7)   Weight (kg) 100.2 100.2 100.2 100.2                              ICP/Ventriculostomy 05/06/25 0920 Right Parietal region (Active)   Level of Ventriculostomy (cm above) 10 05/10/25 1905   Status Open to drainage 05/11/25 0205   Site Assessment Clean;Dry 05/11/25 0205   Site Drainage No drainage 05/11/25 0205   Waveform normal waveform 05/10/25 1905   Output (mL) 15 mL 05/10/25 2205   CSF Color red 05/11/25 0205   Dressing Status Clean;Dry;Intact 05/11/25 0205   Interventions HOB degrees;bed controls locked;zeroed 05/10/25 0605       Female External Urinary Catheter w/ Suction 05/07/25 1732 (Active)   Skin no redness;no breakdown 05/10/25 1701   Tolerance no signs/symptoms of discomfort 05/10/25 1701   Suction Continuous suction at 40 mmHg 05/08/25 1905   Date of last wick change 05/08/25 05/08/25 1905   Time of last wick change 1905 05/08/25 1905   Output (mL) 250 mL 05/10/25 1001          Physical Exam         Neurosurgery Physical Exam    [id number]  Drowsy but easily arousable, alert, Ox4  Cni  Follows commands x4 grossly full strength throughout  SILT  R dysmetria     EVD Incision dressed CDI     Significant Labs:  Recent Labs   Lab 05/14/25  0225 05/14/25  0312 05/14/25  0340 05/14/25  1940 05/15/25  0243 05/15/25  0940   *  --   --   --  123*  --    *  149*  --    < > 144 144 144   K 4.2  --   --   --  3.9  --    *  --   --   --  113*  --    CO2 24  --   --   --  23  --    BUN 20  --    --   --  13  --    CREATININE 0.7  --   --   --  0.6  --    CALCIUM 9.2  --   --   --  8.5*  --    MG  --  1.9  --   --  1.8  --     < > = values in this interval not displayed.     Recent Labs   Lab 05/15/25  0243 05/15/25  0530 05/15/25  0606   WBC 17.52* 12.90* 13.54*   HGB 8.9* 6.9* 7.7*   HCT 29.8* 23.3* 25.2*    298 322     Recent Labs   Lab 05/15/25  0530   INR 1.1   APTT 21.5       Microbiology Results (last 7 days)       Procedure Component Value Units Date/Time    CSF culture [[id number]] Collected: 05/12/25 1729    Order Status: Completed Specimen: CSF (Spinal Fluid) from CSF Tap, Tube 3 Updated: 05/15/25 0748     CULTURE, CSF No Growth To Date     GRAM STAIN Rare WBC seen      No organisms seen    Culture, Respiratory with Gram Stain [[id number]]  (Abnormal)  (Susceptibility) Collected: 05/10/25 1120    Order Status: Completed Specimen: Respiratory from Sputum, Expectorated Updated: 05/13/25 0946     Respiratory Culture No S aureus or Pseudomonas isolated      Few KLEBSIELLA AEROGENES     Comment: with normal respiratory chichi        GRAM STAIN <10 Epithelial Cells/LPF      Rare WBC seen      Many Gram positive cocci      Many Gram Negative Rods    Culture, Respiratory with Gram Stain [[id number]]  (Abnormal)  (Susceptibility) Collected: 05/10/25 2035    Order Status: Completed Specimen: Respiratory from Sputum Updated: 05/13/25 0945     Respiratory Culture No S aureus or Pseudomonas isolated      Few KLEBSIELLA AEROGENES     Comment: with normal respiratory chichi        GRAM STAIN <10 Epithelial Cells/LPF      Rare WBC seen      Many Gram positive cocci      Moderate Gram Negative Rods    Gram stain [[id number]] Collected: 05/12/25 1729    Order Status: Canceled Specimen: CSF (Spinal Fluid) from CSF Tap, Tube 3 Updated: 05/12/25 1909          All pertinent labs from the last 24 hours have been reviewed.    Significant Diagnostics:  CT: CT Head Without Contrast  Result Date: 5/11/2025  1. Compared  to prior head CT performed 05/06/2025, 20:13 hours, evolving relatively large right cerebellar infarct without macroscopic hemorrhage within the infarct territory.  Questionable hypoattenuation involving the brainstem which may be artifactual given coil artifact, however additional areas of ischemia not excluded.  MRI may be considered for further characterization.  Further effacement of the 4th ventricle since the prior study, however there has been slight decompression of the lateral and 3rd ventricles since the prior exam. 2. Relatively similar subarachnoid and interventricular hemorrhage.  No new or enlarging areas of intracranial hemorrhage appreciated. This report was flagged in Epic as abnormal. Electronically signed by: Mich Downs Date:    05/11/2025 Time:    02:09    MRI: No results found in the last 24 hours.

## 2025-05-15 NOTE — PT/OT/SLP PROGRESS
Occupational Therapy   Treatment    Name: Waldo Emmanuel  MRN: [id number]  Admitting Diagnosis:  SAH (subarachnoid hemorrhage)  * Day of Surgery *    Recommendations:     Discharge Recommendations: High Intensity Therapy  Discharge Equipment Recommendations:  bedside commode, bath bench, wheelchair  Barriers to discharge:  None    *Patient cleared for BSC use with RW     Assessment:     Waldo Emmanuel is a 49 y.o. female with a medical diagnosis of SAH (subarachnoid hemorrhage).  She presents with decrease functional status secondary to medical diagnosis. Performance deficits affecting function are weakness, impaired endurance, impaired self care skills, impaired functional mobility, gait instability, impaired balance, impaired cognition, decreased coordination, impaired fine motor, decreased lower extremity function, decreased upper extremity function, impaired coordination, decreased safety awareness, pain, impaired cardiopulmonary response to activity. Patient with good functional tolerance to OT session demonstrating improved ambulation at this time. Patient HR improved from prior OT sessions with maximal HR at 105 this date; however, requiring increased RB due to dizziness during session. Patient showing good stability requiring standby- contact guard assistance with use of RW. Patient verbalizing feeling increased balance and more comfortable with AD, demonstrating good insight into deficits. Patient remains appropriate candidate for acute OT services.     Rehab Prognosis:  Good; patient would benefit from acute skilled OT services to address these deficits and reach maximum level of function.       Plan:     Patient to be seen 4 x/week to address the above listed problems via self-care/home management, therapeutic activities, therapeutic exercises, neuromuscular re-education  Plan of Care Expires: 06/11/25  Plan of Care Reviewed with: patient, daughter    Subjective     Chief Complaint: none    Patient/Family Comments/goals: DC   Pain/Comfort:  Pain Rating 1: 2/10  Location 1: cervical spine  Pain Addressed 1: Reposition, Distraction    Objective:     Communicated with: RN prior to session.  Patient found supine with pulse ox (continuous), telemetry, blood pressure cuff, PureWick, external ventricular drain, peripheral IV upon OT entry to room.    General Precautions: Standard, aspiration, fall    Orthopedic Precautions:N/A  Braces: N/A  Respiratory Status: Room air     Occupational Performance:     Bed Mobility:    Patient completed Supine to Sit with minimum assistance for trunk management   Patient completed Sit to Supine with stand by assistance   Patient sat EOB with standby-minimal assistance     Functional Mobility/Transfers:  Patient completed Sit <> Stand Transfer with contact guard assistance  with  rolling walker   Functional Mobility: Patient ambulated 12' x2 with CGA-standby assistance and RW, patient with one posterior LOB requiring moderate assistance for recovery     Activities of Daily Living:  Lower Body Dressing: maximal assistance to don socks supine in bed     [facility] 6 Click ADL: 15    Treatment & Education:  Provided education regarding role of OT, POC, & discharge recommendations.  Pt with no further questions/concerns at this time. OT provided education on home recommendations and fall prevention in preparation for D/C.     Patient left supine with all lines intact and call button in reach    GOALS:   Multidisciplinary Problems       Occupational Therapy Goals          Problem: Occupational Therapy    Goal Priority Disciplines Outcome Interventions   Occupational Therapy Goal     OT, PT/OT Progressing    Description: Goals to be met by: 6/11/2025     Patient will increase functional independence with ADLs by performing:    UE Dressing with Supervision.  LE Dressing with Supervision.  Grooming while standing at sink with Supervision.  Toileting from toilet with Supervision for  hygiene and clothing management.   Toilet transfer to toilet with Supervision.                         DME Justifications:   Waldo's mobility limitation cannot be sufficiently resolved by the use of a cane. Her functional mobility deficit can be sufficiently resolved with the use of a Rolling Walker. Patient's mobility limitation significantly impairs their ability to participate in one of more activities of daily living.  The use of a RW will significantly improve the patient's ability to participate in MRADLS and the patient will use it on regular basis in the home.    Time Tracking:     OT Date of Treatment: 05/15/25  OT Start Time: 1415  OT Stop Time: 1502  OT Total Time (min): 47 min    Billable Minutes:Therapeutic Activity 47    OT/ALEXUS: OT          5/15/2025

## 2025-05-15 NOTE — PLAN OF CARE
"[facility] Care Plan    POC reviewed with Waldo Alegriaer and family at 0300. Patient and Family verbalized understanding. Questions and concerns addressed. No acute events today. Pt progressing toward goals. Will continue to monitor. See below and flowsheets for full assessment and VS info.     Hypertonic 3% bolus x2  Diazepam x1  Oxycodone x1  Acetaminophen x1  NPO at midnight   Replaced Phos       Is this a stroke patient? yes- Stroke booklet reviewed with family and is at bedside.   Care Plan Individualization:     Neuro:  Roro Coma Scale  Best Eye Response: 4-->(E4) spontaneous  Best Motor Response: 6-->(M6) obeys commands  Best Verbal Response: 5-->(V5) oriented  Roro Coma Scale Score: 15  Assessment Qualifiers: patient not sedated/intubated  Pupil PERRLA: yes     24 hr Temp:  [97.8 °F (36.6 °C)-99.1 °F (37.3 °C)]     CV:   Rhythm: sinus tachycardia  BP goals:   SBP < 160  MAP > 65    Resp:      Vent Mode: Spont  Set Rate: 20 BPM  Oxygen Concentration (%): 40  Vt Set: 375 mL  PEEP/CPAP: 5 cmH20  Pressure Support: 5 cmH20    Plan: N/A    GI/:     Diet/Nutrition Received: regular  Last Bowel Movement: 05/13/25  Voiding Characteristics: external catheter    Intake/Output Summary (Last 24 hours) at 5/15/2025 0443  Last data filed at 5/15/2025 0401  Gross per 24 hour   Intake 2752.42 ml   Output 3671 ml   Net -918.58 ml     Unmeasured Output  Unmeasured Urine Occurrence: 1  Unmeasured Stool Occurrence: 0  Pad Count: 1    Labs/Accuchecks:  Recent Labs   Lab 05/15/25  0243   WBC 17.52*   RBC 3.24*   HGB 8.9*   HCT 29.8*         Recent Labs   Lab 05/15/25  0243      K 3.9   CO2 23   *   BUN 13   CREATININE 0.6   ALKPHOS 83   ALT 19   AST 25   BILITOT 0.5      Recent Labs   Lab 05/10/25  1734 05/11/25  0031 05/11/25  0724   PROTIME 11.4  --   --    INR 1.0  --   --    APTT 22.0   < > 45.5*    < > = values in this interval not displayed.    No results for input(s): "CPK", "CPKMB", " ""TROPONINI", "MB" in the last 168 hours.    Electrolytes: Electrolytes replaced  Accuchecks: none    Gtts:   dexmedeTOMIDine (Precedex) infusion (titrating)  0-1.4 mcg/kg/hr Intravenous Continuous 15.03 mL/hr at 05/15/25 0401 0.6 mcg/kg/hr at 05/15/25 0401    sodium chloride (23.4%) HYPERTONIC 4 mEq/mL 172 mEq in sterile water 500 mL (sodium chloride 2%) infusion   Intravenous Continuous 75 mL/hr at 05/15/25 0421 New Bag at 05/15/25 0421       LDA/Wounds:    Ivan Risk Assessment  Sensory Perception: 4-->no impairment  Moisture: 3-->occasionally moist  Activity: 1-->bedfast  Mobility: 3-->slightly limited  Nutrition: 3-->adequate  Friction and Shear: 3-->no apparent problem  Ivan Score: 17  Is your ivan score 12 or less? no          Restraints:   Restraint Order  Length of Order: Order good for next 24 hours or when removed.  Date that the current order will : 05/15/25  Time that the current order will : 1151  Order Upon Application: Yes    WC      "

## 2025-05-15 NOTE — EICU
Intervention Initiated From:  COR / MELISAU    Kendall intervened regarding:  Rounding (Video assessment)  VICU Night Rounds Checklist  24H Vital Sign Range:  Temp:  [97.8 °F (36.6 °C)-99.1 °F (37.3 °C)]   Pulse:  []   Resp:  [15-35]   BP: (104-166)/(66-98)   SpO2:  [93 %-100 %]     Video rounds

## 2025-05-15 NOTE — EICU
Virtual ICU Quality Rounds    Admit Date: 5/6/2025  Hospital Day: 9    ICU Day: 9d 5h    24H Vital Sign Range:  Temp:  [98.5 °F (36.9 °C)-99.1 °F (37.3 °C)]   Pulse:  []   Resp:  [3-43]   BP: ()/(50-98)   SpO2:  [93 %-100 %]     VICU Surveillance Screening    Daily review of  line necessity(optional): Central line(s) in place    Central line type (optional): Triple lumen catheter    Central line indications : Poor IV access    LDA reconciliation : Yes

## 2025-05-15 NOTE — ASSESSMENT & PLAN NOTE
Patient is a 49-year-old female with past medical history of hypertension presenting via transfer from [place] for higher level of care after being diagnosed with subarachnoid hemorrhage.    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  S/p EVD 5/6  S/p Coil emolization 5/6    - EVD open at 10  - Neurosurgery consulted  - Vascular Neurology consulted  - SBP< 160   - nimodipine 60 mg q4h   - daily TCDs - negative for vasospasm thus far  - Aspirin 81 and Plavix 75 qd  - atorvastatin 40 mg  - euvolemia  - Na > 145  - CTH 5/11 with new R PICA/ superior cerebellar infarct.  SOC watch  - PBD7  MRA c/s contrast for reevaluation of coiled aneurysm- Unable to obtain MRA while on precedex 1.4 and given versed due to pt restlessness and difficulty to sedate. Ordered MRA w anesthesia, likely tomorrow.   - NPO midnight.   - Regular thin diet   - SCDs; lovenox for VTE ppx  - Delirium precautions.   - seoquel 50mg BID   -Precedex gtt   -PT/OT  5/15/2025: MRA brain w w/o con today   EVD per NSGY  Follow CT head tomorrow AM  Switch eplavix to baby ASA  2% increased to 100 ml/hr and follow Na (Na goal >145)

## 2025-05-15 NOTE — ANESTHESIA POSTPROCEDURE EVALUATION
Anesthesia Post Evaluation    Patient: Waldo Emmanuel    Procedure(s) Performed: Procedure(s) (LRB):  MRI (Magnetic Resonance Imagine) (N/A)    Final Anesthesia Type: general      Patient location during evaluation: ICU  Patient participation: Yes- Able to Participate  Level of consciousness: awake and alert  Post-procedure vital signs: reviewed and stable  Pain management: adequate  Airway patency: patent    PONV status at discharge: No PONV  Anesthetic complications: no      Cardiovascular status: blood pressure returned to baseline and hemodynamically stable  Respiratory status: spontaneous ventilation  Hydration status: euvolemic  Follow-up not needed.              Vitals Value Taken Time   /67 05/15/25 13:15   Temp 37.1 °C (98.7 °F) 05/15/25 07:01   Pulse 74 05/15/25 13:27   Resp 29 05/15/25 13:27   SpO2 97 % 05/15/25 13:27   Vitals shown include unfiled device data.      No case tracking events are documented in the log.      Pain/Louis Score: Pain Rating Prior to Med Admin: 5 (5/15/2025  9:13 AM)

## 2025-05-15 NOTE — ANESTHESIA PREPROCEDURE EVALUATION
05/15/2025  Waldo Emmanuel is a 49 y.o., female with a PMHx that includes obesity, GERD, HTN, DVT, and SAH who presents for MRI      Pre-op Assessment    I have reviewed the Patient Summary Reports.     I have reviewed the Nursing Notes. I have reviewed the NPO Status.   I have reviewed the Medications.     Review of Systems  Anesthesia Hx:  No problems with previous Anesthesia               Denies Personal Hx of Anesthesia complications.                    Social:  Non-Smoker, No Alcohol Use       Hematology/Oncology:  Hematology Normal   Oncology Normal                                   Cardiovascular:     Hypertension                                          Pulmonary:  Pulmonary Normal                       Renal/:  Renal/ Normal                 Hepatic/GI:     GERD                Musculoskeletal:  Musculoskeletal Normal                Neurological:   CVA                                    Endocrine:        Obesity / BMI > 30  Psych:  Psychiatric History                  Physical Exam  General: Well nourished, Cooperative, Alert and Oriented    Airway:  Mallampati: I   Mouth Opening: Normal  TM Distance: Normal  Tongue: Normal  Neck ROM: Normal ROM    Dental:  Intact, Braces    Chest/Lungs:  Clear to auscultation, Normal Respiratory Rate    Heart:  Rate: Normal  Rhythm: Regular Rhythm        Anesthesia Plan  Type of Anesthesia, risks & benefits discussed:    Anesthesia Type: Gen ETT, Gen Supraglottic Airway, Gen Natural Airway  Intra-op Monitoring Plan: Standard ASA Monitors and Central Line  Post Op Pain Control Plan: multimodal analgesia and IV/PO Opioids PRN  Induction:  IV  Airway Plan: Direct, Post-Induction  Informed Consent: Informed consent signed with the Patient and all parties understand the risks and agree with anesthesia plan.  All questions answered.   ASA Score: 4  Day of  Surgery Review of History & Physical: H&P Update referred to the surgeon/provider.    Ready For Surgery From Anesthesia Perspective.     .

## 2025-05-15 NOTE — PROGRESS NOTES
[facility] - Neuro Critical Care  Neurosurgery  Progress Note    Subjective:     History of Present Illness: 49f presenting after syncope with CTH demonstrating SAH. CTA without clear vascular malformation. Intubated prior to arrival to [facility] ED. Discussed expected hospital course and imaging with daughter in room    Post-Op Info:  Procedure(s) (LRB):  MRI (Magnetic Resonance Imagine) (N/A)   * Day of Surgery *   Interval History: 5/15 improved delirium today, exam stable. Plan for MRA w contrast today w anesthesia    Medications:  Continuous Infusions:   dexmedeTOMIDine (Precedex) infusion (titrating)  0-1.4 mcg/kg/hr Intravenous Continuous 15.03 mL/hr at 05/15/25 1201 0.6 mcg/kg/hr at 05/15/25 1201    heparin (porcine) in D5W  0-40 Units/kg/hr Intravenous Continuous 12 mL/hr at 05/15/25 1201 12 Units/kg/hr at 05/15/25 1201    sodium chloride (23.4%) HYPERTONIC 4 mEq/mL 172 mEq in sterile water 500 mL (sodium chloride 2%) infusion   Intravenous Continuous 100 mL/hr at 05/15/25 1201 Rate Verify at 05/15/25 1201     Scheduled Meds:   albuterol-ipratropium  3 mL Nebulization Q6H    aspirin  81 mg Oral Daily    atorvastatin  40 mg Oral Daily    ceFEPime IV (PEDS and ADULTS)  2 g Intravenous Q8H    niMODipine  60 mg Oral Q4H    QUEtiapine  50 mg Oral BID    senna-docusate  1 tablet Oral BID    valsartan  80 mg Oral Daily     PRN Meds:  Current Facility-Administered Medications:     acetaminophen, 650 mg, Oral, Q6H PRN    bisacodyL, 10 mg, Rectal, Daily PRN    diazePAM, 10 mg, Oral, Q6H PRN    gadobutroL, 10 mL, Intravenous, ONCE PRN    hydrALAZINE, 10 mg, Intravenous, Q6H PRN    labetalol, 10 mg, Intravenous, Q4H PRN    melatonin, 6 mg, Oral, Nightly PRN    methocarbamoL, 750 mg, Oral, Q6H PRN    OLANZapine, 5 mg, Intramuscular, Once PRN    oxyCODONE, 5 mg, Oral, Q6H PRN    potassium bicarbonate, 35 mEq, Oral, PRN    potassium bicarbonate, 50 mEq, Oral, PRN    potassium bicarbonate, 60 mEq, Oral, PRN    potassium,  sodium phosphates, 2 packet, Oral, PRN    potassium, sodium phosphates, 2 packet, Oral, PRN    potassium, sodium phosphates, 2 packet, Oral, PRN    sodium chloride 0.9%, 10 mL, Intravenous, PRN     Review of Systems  Objective:     Weight: 100.2 kg (220 lb 14.4 oz)  Body mass index is 39.13 kg/m².  Vital Signs (Most Recent):  Temp: 98.7 °F (37.1 °C) (05/15/25 0701)  Pulse: 87 (05/15/25 1101)  Resp: 20 (05/15/25 1101)  BP: 125/83 (05/15/25 1101)  SpO2: 100 % (05/15/25 1101) Vital Signs (24h Range):  Temp:  [98.5 °F (36.9 °C)-99.1 °F (37.3 °C)] 98.7 °F (37.1 °C)  Pulse:  [] 87  Resp:  [3-43] 20  SpO2:  [93 %-100 %] 100 %  BP: ()/(50-98) 125/83     Date 05/15/25 0700 - 05/16/25 0659   Shift 7949-3699 8626-8358 8668-7000 24 Hour Total   INTAKE   I.V.(mL/kg) 597(6)   597(6)   IV Piggyback 248.5   248.5   Shift Total(mL/kg) 845.5(8.4)   845.5(8.4)   OUTPUT   Drains 68   68   Shift Total(mL/kg) 68(0.7)   68(0.7)   Weight (kg) 100.2 100.2 100.2 100.2                              ICP/Ventriculostomy 05/06/25 0920 Right Parietal region (Active)   Level of Ventriculostomy (cm above) 10 05/10/25 1905   Status Open to drainage 05/11/25 0205   Site Assessment Clean;Dry 05/11/25 0205   Site Drainage No drainage 05/11/25 0205   Waveform normal waveform 05/10/25 1905   Output (mL) 15 mL 05/10/25 2205   CSF Color red 05/11/25 0205   Dressing Status Clean;Dry;Intact 05/11/25 0205   Interventions HOB degrees;bed controls locked;zeroed 05/10/25 0605       Female External Urinary Catheter w/ Suction 05/07/25 1732 (Active)   Skin no redness;no breakdown 05/10/25 1701   Tolerance no signs/symptoms of discomfort 05/10/25 1701   Suction Continuous suction at 40 mmHg 05/08/25 1905   Date of last wick change 05/08/25 05/08/25 1905   Time of last wick change 1905 05/08/25 1905   Output (mL) 250 mL 05/10/25 1001          Physical Exam         Neurosurgery Physical Exam    [id number]  Drowsy but easily arousable, alert, Ox4  Cni  Follows  commands x4 grossly full strength throughout  SILT  R dysmetria     EVD Incision dressed CDI     Significant Labs:  Recent Labs   Lab 05/14/25  0225 05/14/25  0312 05/14/25  0340 05/14/25  1940 05/15/25  0243 05/15/25  0940   *  --   --   --  123*  --    *  149*  --    < > 144 144 144   K 4.2  --   --   --  3.9  --    *  --   --   --  113*  --    CO2 24  --   --   --  23  --    BUN 20  --   --   --  13  --    CREATININE 0.7  --   --   --  0.6  --    CALCIUM 9.2  --   --   --  8.5*  --    MG  --  1.9  --   --  1.8  --     < > = values in this interval not displayed.     Recent Labs   Lab 05/15/25  0243 05/15/25  0530 05/15/25  0606   WBC 17.52* 12.90* 13.54*   HGB 8.9* 6.9* 7.7*   HCT 29.8* 23.3* 25.2*    298 322     Recent Labs   Lab 05/15/25  0530   INR 1.1   APTT 21.5       Microbiology Results (last 7 days)       Procedure Component Value Units Date/Time    CSF culture [[id number]] Collected: 05/12/25 1729    Order Status: Completed Specimen: CSF (Spinal Fluid) from CSF Tap, Tube 3 Updated: 05/15/25 0748     CULTURE, CSF No Growth To Date     GRAM STAIN Rare WBC seen      No organisms seen    Culture, Respiratory with Gram Stain [[id number]]  (Abnormal)  (Susceptibility) Collected: 05/10/25 1120    Order Status: Completed Specimen: Respiratory from Sputum, Expectorated Updated: 05/13/25 0946     Respiratory Culture No S aureus or Pseudomonas isolated      Few KLEBSIELLA AEROGENES     Comment: with normal respiratory chichi        GRAM STAIN <10 Epithelial Cells/LPF      Rare WBC seen      Many Gram positive cocci      Many Gram Negative Rods    Culture, Respiratory with Gram Stain [[id number]]  (Abnormal)  (Susceptibility) Collected: 05/10/25 2035    Order Status: Completed Specimen: Respiratory from Sputum Updated: 05/13/25 0945     Respiratory Culture No S aureus or Pseudomonas isolated      Few KLEBSIELLA AEROGENES     Comment: with normal respiratory chichi        GRAM STAIN <10  Epithelial Cells/LPF      Rare WBC seen      Many Gram positive cocci      Moderate Gram Negative Rods    Gram stain [[id number]] Collected: 05/12/25 1729    Order Status: Canceled Specimen: CSF (Spinal Fluid) from CSF Tap, Tube 3 Updated: 05/12/25 1909          All pertinent labs from the last 24 hours have been reviewed.    Significant Diagnostics:  CT: CT Head Without Contrast  Result Date: 5/11/2025  1. Compared to prior head CT performed 05/06/2025, 20:13 hours, evolving relatively large right cerebellar infarct without macroscopic hemorrhage within the infarct territory.  Questionable hypoattenuation involving the brainstem which may be artifactual given coil artifact, however additional areas of ischemia not excluded.  MRI may be considered for further characterization.  Further effacement of the 4th ventricle since the prior study, however there has been slight decompression of the lateral and 3rd ventricles since the prior exam. 2. Relatively similar subarachnoid and interventricular hemorrhage.  No new or enlarging areas of intracranial hemorrhage appreciated. This report was flagged in Epic as abnormal. Electronically signed by: Mich Downs Date:    05/11/2025 Time:    02:09    MRI: No results found in the last 24 hours.  Assessment/Plan:     * SAH (subarachnoid hemorrhage)  49f presenting after syncope with CTH demonstrating SAH. CTA with AICA aneurysm R. Intubated prior to arrival to [facility] ED.     S/p R frontal EVD on 5/6 and s/p DSA with coil embolization of R AICA aneurysm 5/6    Plan:  Admitted to ICU  EVD dropped to 10 post coil, abx while in place  Daily ASA 81  No dvt in BLE on ultrasound, R brachial vein dvt+ - on Asa/plavix  Will plan on MRA w/ con today, could require open clipping pending MRA result   MRI 5/11 with R Cb and parmaedian pontine small infarct  SAH protocol (euvolemia, SBP liberalized, nimotop, keppra, TCDs)    Discussed with Dr. Barney Almendarez,  MD  Neurosurgery  [facility] - Neuro Critical Care

## 2025-05-15 NOTE — NURSING
Pt arrived back to room following MRI        Pt was escorted by RN and CRNA on cardiac monitoring, ambu bag, and IV pole.        Patient placed back on bedside monitor with alarms audible, bed in low position with bed alarm on, call light within reach. IRENA.

## 2025-05-15 NOTE — SUBJECTIVE & OBJECTIVE
Past Medical History:   Diagnosis Date    ADHD (attention deficit hyperactivity disorder)     Anemia     Fibroids     Genital herpes     GERD (gastroesophageal reflux disease)     H/O total hysterectomy 2021    Hypertension     Labral tear of hip joint     Ovarian cyst     Right common arterial occlusion     Routine general medical examination at a health care facility 2017    Total Right Arterial Occlusion      Past Surgical History:   Procedure Laterality Date    ANGIOGRAM, ABDOMINAL AORTA W/ EXTREMITY RUNOFF N/A 2025    Procedure: Angiogram, Abdominal Aorta W/ Extremity Runoff: Right lower extremity angiogram;  Surgeon: Lennox Zendejas MD;  Location: [facility] CATH LAB;  Service: Vascular;  Laterality: N/A;    CARPAL TUNNEL RELEASE       SECTION      CHOLECYSTECTOMY      DILATION AND CURETTAGE OF UTERUS      TOTAL ABDOMINAL HYSTERECTOMY W/ BILATERAL SALPINGOOPHORECTOMY  2021    menorrhagia      Medications Ordered Prior to Encounter[1]   Allergies: Metformin  Family History   Problem Relation Name Age of Onset    Hypertension Maternal Grandmother      Stroke Maternal Grandmother      Hypertension Father      Stroke Father      Stroke Mother      Breast cancer Paternal Cousin      Prostate cancer Paternal Uncle       Social History[2]  Review of Systems  Objective:     Vitals:    Temp: 98.7 °F (37.1 °C)  Pulse: 77  Rhythm: sinus tachycardia  BP: 125/83  MAP (mmHg): 98  ICP Mean (mmHg): 9 mmHg  Resp: (!) 25  SpO2: 97 %    Temp  Min: 98.5 °F (36.9 °C)  Max: 99.1 °F (37.3 °C)  Pulse  Min: 77  Max: 117  BP  Min: 93/50  Max: 166/74  MAP (mmHg)  Min: 69  Max: 117  ICP Mean (mmHg)  Min: 4 mmHg  Max: 11 mmHg  Resp  Min: 3  Max: 43  SpO2  Min: 93 %  Max: 100 %     0701 - 05/15 0700  In: 3012.9 [P.O.:620; I.V.:2392.9]  Out: 3658 [Urine:3350; Drains:308]   Unmeasured Output  Unmeasured Urine Occurrence: 1  Unmeasured Stool Occurrence: 0  Pad Count: 1        Physical Exam  Vitals reviewed.    Constitutional:       Appearance: She is obese. She is ill-appearing.   HENT:      Head:      Comments: EVD     Nose: Nose normal.      Mouth/Throat:      Mouth: Mucous membranes are moist.   Eyes:      Pupils: Pupils are equal, round, and reactive to light.   Cardiovascular:      Rate and Rhythm: Normal rate.   Pulmonary:      Effort: Pulmonary effort is normal.   Abdominal:      Palpations: Abdomen is soft.   Skin:     General: Skin is warm and dry.   Neurological:      Mental Status: She is alert.   Psychiatric:         Mood and Affect: Mood normal.       Neuro:  --GCS: E4 V5 M6  --Mental Status:  awake, oriented X4, follows all commands  --Pupils reactive   --Corneal reflex, gag, cough intact.  --URENA spont         Today I personally reviewed pertinent medications, lines/drains/airways, imaging, cardiology results, laboratory results, microbiology results,              [1]   No current facility-administered medications on file prior to encounter.     Current Outpatient Medications on File Prior to Encounter   Medication Sig Dispense Refill    aspirin (ECOTRIN) 81 MG EC tablet Take 81 mg by mouth.      cetirizine (ZYRTEC) 10 MG tablet Take 10 mg by mouth.      cilostazoL (PLETAL) 50 MG Tab Take 1 tablet (50 mg total) by mouth 2 (two) times daily. 60 tablet 11    lisdexamfetamine (VYVANSE) 60 MG capsule Take 1 capsule (60 mg total) by mouth every morning. 30 capsule 0    multivitamin capsule Take 1 capsule by mouth once daily.      omega-3 acid ethyl esters (LOVAZA) 1 gram capsule Take 1 capsule (1 g total) by mouth once daily. 90 capsule 1    pantoprazole (PROTONIX) 40 MG tablet Take 1 tablet (40 mg total) by mouth 2 (two) times daily. 180 tablet 1    rosuvastatin (CRESTOR) 20 MG tablet Take 1 tablet (20 mg total) by mouth every evening. 90 tablet 1    tirzepatide, weight loss, (ZEPBOUND) 5 mg/0.5 mL PnIj Inject 5 mg into the skin every 7 days. 2 mL 0    tirzepatide, weight loss, (ZEPBOUND) 7.5 mg/0.5 mL PnIj  Inject 7.5 mg into the skin every 7 days. 2 mL 0    topiramate (TOPAMAX) 50 MG tablet Take 1 tablet by mouth twice daily (dose correction) 180 tablet 1    valACYclovir (VALTREX) 1000 MG tablet Take 1 tablet (1,000 mg total) by mouth once daily. 90 tablet 1    valsartan (DIOVAN) 320 MG tablet Take 1 tablet (320 mg total) by mouth once daily. 90 tablet 1    warfarin (COUMADIN) 5 MG tablet Take by mouth. Takes 2.5 mg on Tues and Thur; and 5 mg all other days.     [2]   Social History  Tobacco Use    Smoking status: Never    Smokeless tobacco: Never   Substance Use Topics    Alcohol use: Yes     Comment: Social - Rare     Drug use: No

## 2025-05-15 NOTE — ASSESSMENT & PLAN NOTE
CT on 5/11 revealed R cerebellar infarct. Likely uriah-angio  Family aware and imaging reviewed  Atorvastatin 40  ASA 81 and Plavix 75mg qd  RLE arterial US negative for occlusion, only showing moderate stenosis. Decided not to continue heparin gtt given brachial DVT is in upper extremity. Will continue w DAPT and DVT ppx w lovenox instead of full AC.   Concern for brain compression in post fossa  SOC watch, NSGY following   Given 3% HTS 250ml bolus x2 overnight  2% HTS gtt @ 100  Na checks q6h for goal > 145

## 2025-05-15 NOTE — ASSESSMENT & PLAN NOTE
monitor EVD output closely  HTS therapy- 2%HTS @ 100 and prn 3% HTS boluses   Na goal > 145  Neurochecks  CTH w R cerebellar infarct   5/15/2025 Follow CT head tomorrow AM

## 2025-05-15 NOTE — ASSESSMENT & PLAN NOTE
Pt has a hx of RLE DVT and was started on xarelto. 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 She has been seen by heme onc in the past.      Warfarin currently held  Pt was on a Heparin gtt for for R brachial DVT and hx arterial occlusion. 5/11/25 CTH showed large R cerebellar infarct and heparin gtt was held on 5/11. Lower extremity arterial US was negative for occlusion, only showing moderate stenosis (50-75%). BLE DVT US was also negative for DVT. Decided not to continue heparin gtt given brachial DVT is in upper extremity. Was only continuing with DAPT and then lovenox for VTE ppx  Sending full hypercoaguable w/u   Continue  minimal intensity heparin gtt

## 2025-05-15 NOTE — TRANSFER OF CARE
"Anesthesia Transfer of Care Note    Patient: Waldo Emmanuel    Procedure(s) Performed: Procedure(s) (LRB):  MRI (Magnetic Resonance Imagine) (N/A)    Patient location: PACU    Anesthesia Type: general    Transport from OR: Transported from OR on 6-10 L/min O2 by face mask with adequate spontaneous ventilation    Post pain: adequate analgesia    Post assessment: tolerated procedure well and no apparent anesthetic complications    Post vital signs: stable    Level of consciousness: awake    Nausea/Vomiting: no nausea/vomiting    Complications: none    Transfer of care protocol was followed      Last vitals: Visit Vitals  /83   Pulse 87   Temp 37.1 °C (98.7 °F) (Oral)   Resp 20   Ht 5' 3" (1.6 m)   Wt 100.2 kg (220 lb 14.4 oz)   LMP 10/10/2020 (Exact Date)   SpO2 100%   Breastfeeding No   BMI 39.13 kg/m²     "

## 2025-05-16 PROBLEM — I70.209 ARTERIAL OCCLUSION, LOWER EXTREMITY: Status: ACTIVE | Noted: 2025-05-16

## 2025-05-16 LAB
ABSOLUTE EOSINOPHIL (OHS): 0.2 K/UL
ABSOLUTE MONOCYTE (OHS): 0.77 K/UL (ref 0.3–1)
ABSOLUTE NEUTROPHIL COUNT (OHS): 12.38 K/UL (ref 1.8–7.7)
ALBUMIN SERPL BCP-MCNC: 2.7 G/DL (ref 3.5–5.2)
ALP SERPL-CCNC: 92 UNIT/L (ref 40–150)
ALT SERPL W/O P-5'-P-CCNC: 20 UNIT/L (ref 10–44)
ANION GAP (OHS): 9 MMOL/L (ref 8–16)
APCR PPP-IMP: NORMAL
APCR PPP: 2.9 {RATIO}
APTT PPP: 48 SECONDS (ref 21–32)
AST SERPL-CCNC: 20 UNIT/L (ref 11–45)
BASOPHILS # BLD AUTO: 0.08 K/UL
BASOPHILS NFR BLD AUTO: 0.4 %
BILIRUB SERPL-MCNC: 0.4 MG/DL (ref 0.1–1)
BUN SERPL-MCNC: 9 MG/DL (ref 6–20)
CA-I BLD-SCNC: 1.13 MMOL/L (ref 1.06–1.42)
CALCIUM SERPL-MCNC: 8.5 MG/DL (ref 8.7–10.5)
CHLORIDE SERPL-SCNC: 111 MMOL/L (ref 95–110)
CO2 SERPL-SCNC: 23 MMOL/L (ref 23–29)
CREAT SERPL-MCNC: 0.6 MG/DL (ref 0.5–1.4)
ERYTHROCYTE [DISTWIDTH] IN BLOOD BY AUTOMATED COUNT: 13.8 % (ref 11.5–14.5)
GFR SERPLBLD CREATININE-BSD FMLA CKD-EPI: >60 ML/MIN/1.73/M2
GLUCOSE SERPL-MCNC: 118 MG/DL (ref 70–110)
HCT VFR BLD AUTO: 27.3 % (ref 37–48.5)
HGB BLD-MCNC: 8.2 GM/DL (ref 12–16)
IMM GRANULOCYTES # BLD AUTO: 0.14 K/UL (ref 0–0.04)
IMM GRANULOCYTES NFR BLD AUTO: 0.8 % (ref 0–0.5)
LYMPHOCYTES # BLD AUTO: 5.09 K/UL (ref 1–4.8)
MAGNESIUM SERPL-MCNC: 1.7 MG/DL (ref 1.6–2.6)
MCH RBC QN AUTO: 27.3 PG (ref 27–31)
MCHC RBC AUTO-ENTMCNC: 30 G/DL (ref 32–36)
MCV RBC AUTO: 91 FL (ref 82–98)
NUCLEATED RBC (/100WBC) (OHS): 0 /100 WBC
PHOSPHATE SERPL-MCNC: 2.6 MG/DL (ref 2.7–4.5)
PLATELET # BLD AUTO: 387 K/UL (ref 150–450)
PMV BLD AUTO: 9.4 FL (ref 9.2–12.9)
POTASSIUM SERPL-SCNC: 3.7 MMOL/L (ref 3.5–5.1)
PROT C ACT/NOR PPP CHRO: 131 % (ref 70–150)
PROT S ACT/NOR PPP: 78 % (ref 65–150)
PROT SERPL-MCNC: 6.5 GM/DL (ref 6–8.4)
RBC # BLD AUTO: 3 M/UL (ref 4–5.4)
RELATIVE EOSINOPHIL (OHS): 1.1 %
RELATIVE LYMPHOCYTE (OHS): 27.3 % (ref 18–48)
RELATIVE MONOCYTE (OHS): 4.1 % (ref 4–15)
RELATIVE NEUTROPHIL (OHS): 66.3 % (ref 38–73)
SODIUM SERPL-SCNC: 141 MMOL/L (ref 136–145)
SODIUM SERPL-SCNC: 143 MMOL/L (ref 136–145)
WBC # BLD AUTO: 18.66 K/UL (ref 3.9–12.7)

## 2025-05-16 PROCEDURE — 97530 THERAPEUTIC ACTIVITIES: CPT

## 2025-05-16 PROCEDURE — 25000003 PHARM REV CODE 250

## 2025-05-16 PROCEDURE — 97535 SELF CARE MNGMENT TRAINING: CPT

## 2025-05-16 PROCEDURE — 63600175 PHARM REV CODE 636 W HCPCS

## 2025-05-16 PROCEDURE — 99233 SBSQ HOSP IP/OBS HIGH 50: CPT | Mod: ,,, | Performed by: PSYCHIATRY & NEUROLOGY

## 2025-05-16 PROCEDURE — 99900035 HC TECH TIME PER 15 MIN (STAT)

## 2025-05-16 PROCEDURE — 99291 CRITICAL CARE FIRST HOUR: CPT | Mod: FS,,, | Performed by: PSYCHIATRY & NEUROLOGY

## 2025-05-16 PROCEDURE — 94640 AIRWAY INHALATION TREATMENT: CPT

## 2025-05-16 PROCEDURE — 84100 ASSAY OF PHOSPHORUS: CPT

## 2025-05-16 PROCEDURE — 25000003 PHARM REV CODE 250: Performed by: NURSE PRACTITIONER

## 2025-05-16 PROCEDURE — 97112 NEUROMUSCULAR REEDUCATION: CPT

## 2025-05-16 PROCEDURE — 85025 COMPLETE CBC W/AUTO DIFF WBC: CPT | Performed by: PSYCHIATRY & NEUROLOGY

## 2025-05-16 PROCEDURE — 99499 UNLISTED E&M SERVICE: CPT | Mod: ,,,

## 2025-05-16 PROCEDURE — 84295 ASSAY OF SERUM SODIUM: CPT

## 2025-05-16 PROCEDURE — 99233 SBSQ HOSP IP/OBS HIGH 50: CPT | Mod: ,,, | Performed by: NEUROLOGICAL SURGERY

## 2025-05-16 PROCEDURE — 85730 THROMBOPLASTIN TIME PARTIAL: CPT

## 2025-05-16 PROCEDURE — 83735 ASSAY OF MAGNESIUM: CPT

## 2025-05-16 PROCEDURE — 20000000 HC ICU ROOM

## 2025-05-16 PROCEDURE — 92507 TX SP LANG VOICE COMM INDIV: CPT

## 2025-05-16 PROCEDURE — 25000242 PHARM REV CODE 250 ALT 637 W/ HCPCS: Performed by: NURSE PRACTITIONER

## 2025-05-16 PROCEDURE — 63600175 PHARM REV CODE 636 W HCPCS: Performed by: PSYCHIATRY & NEUROLOGY

## 2025-05-16 PROCEDURE — 76937 US GUIDE VASCULAR ACCESS: CPT

## 2025-05-16 PROCEDURE — 25000003 PHARM REV CODE 250: Performed by: PSYCHIATRY & NEUROLOGY

## 2025-05-16 PROCEDURE — 82040 ASSAY OF SERUM ALBUMIN: CPT

## 2025-05-16 PROCEDURE — 82330 ASSAY OF CALCIUM: CPT

## 2025-05-16 PROCEDURE — 94761 N-INVAS EAR/PLS OXIMETRY MLT: CPT

## 2025-05-16 RX ORDER — LANOLIN ALCOHOL/MO/W.PET/CERES
800 CREAM (GRAM) TOPICAL
Status: DISCONTINUED | OUTPATIENT
Start: 2025-05-16 | End: 2025-05-21

## 2025-05-16 RX ORDER — POLYETHYLENE GLYCOL 3350 17 G/17G
17 POWDER, FOR SOLUTION ORAL 2 TIMES DAILY
Status: DISCONTINUED | OUTPATIENT
Start: 2025-05-16 | End: 2025-05-16

## 2025-05-16 RX ORDER — HYDRALAZINE HYDROCHLORIDE 20 MG/ML
10 INJECTION INTRAMUSCULAR; INTRAVENOUS EVERY 4 HOURS PRN
Status: DISCONTINUED | OUTPATIENT
Start: 2025-05-16 | End: 2025-05-26 | Stop reason: HOSPADM

## 2025-05-16 RX ORDER — DEXAMETHASONE SODIUM PHOSPHATE 4 MG/ML
2 INJECTION, SOLUTION INTRA-ARTICULAR; INTRALESIONAL; INTRAMUSCULAR; INTRAVENOUS; SOFT TISSUE EVERY 8 HOURS
Status: COMPLETED | OUTPATIENT
Start: 2025-05-16 | End: 2025-05-17

## 2025-05-16 RX ORDER — FAMOTIDINE 20 MG/1
20 TABLET, FILM COATED ORAL 2 TIMES DAILY
Status: DISCONTINUED | OUTPATIENT
Start: 2025-05-16 | End: 2025-05-17

## 2025-05-16 RX ORDER — OXYCODONE HYDROCHLORIDE 5 MG/1
5 TABLET ORAL EVERY 4 HOURS PRN
Refills: 0 | Status: DISCONTINUED | OUTPATIENT
Start: 2025-05-16 | End: 2025-05-26 | Stop reason: HOSPADM

## 2025-05-16 RX ORDER — FAMOTIDINE 20 MG/1
20 TABLET, FILM COATED ORAL 2 TIMES DAILY
Status: DISCONTINUED | OUTPATIENT
Start: 2025-05-16 | End: 2025-05-16

## 2025-05-16 RX ADMIN — OXYCODONE 5 MG: 5 TABLET ORAL at 08:05

## 2025-05-16 RX ADMIN — NIMODIPINE 60 MG: 30 CAPSULE, LIQUID FILLED ORAL at 01:05

## 2025-05-16 RX ADMIN — OXYCODONE 5 MG: 5 TABLET ORAL at 04:05

## 2025-05-16 RX ADMIN — ASPIRIN 81 MG CHEWABLE TABLET 81 MG: 81 TABLET CHEWABLE at 08:05

## 2025-05-16 RX ADMIN — DIAZEPAM 10 MG: 5 TABLET ORAL at 09:05

## 2025-05-16 RX ADMIN — CEFEPIME 2 G: 2 INJECTION, POWDER, FOR SOLUTION INTRAVENOUS at 07:05

## 2025-05-16 RX ADMIN — DEXAMETHASONE SODIUM PHOSPHATE 2 MG: 4 INJECTION INTRA-ARTICULAR; INTRALESIONAL; INTRAMUSCULAR; INTRAVENOUS; SOFT TISSUE at 01:05

## 2025-05-16 RX ADMIN — NIMODIPINE 60 MG: 30 CAPSULE, LIQUID FILLED ORAL at 10:05

## 2025-05-16 RX ADMIN — METHOCARBAMOL 750 MG: 750 TABLET ORAL at 07:05

## 2025-05-16 RX ADMIN — POTASSIUM & SODIUM PHOSPHATES POWDER PACK 280-160-250 MG 2 PACKET: 280-160-250 PACK at 06:05

## 2025-05-16 RX ADMIN — POTASSIUM & SODIUM PHOSPHATES POWDER PACK 280-160-250 MG 2 PACKET: 280-160-250 PACK at 10:05

## 2025-05-16 RX ADMIN — DIAZEPAM 10 MG: 5 TABLET ORAL at 03:05

## 2025-05-16 RX ADMIN — METHOCARBAMOL 750 MG: 750 TABLET ORAL at 01:05

## 2025-05-16 RX ADMIN — IPRATROPIUM BROMIDE AND ALBUTEROL SULFATE 3 ML: 2.5; .5 SOLUTION RESPIRATORY (INHALATION) at 08:05

## 2025-05-16 RX ADMIN — ACETAMINOPHEN 650 MG: 325 TABLET ORAL at 01:05

## 2025-05-16 RX ADMIN — OXYCODONE 5 MG: 5 TABLET ORAL at 10:05

## 2025-05-16 RX ADMIN — SENNOSIDES AND DOCUSATE SODIUM 1 TABLET: 50; 8.6 TABLET ORAL at 09:05

## 2025-05-16 RX ADMIN — QUETIAPINE FUMARATE 50 MG: 25 TABLET ORAL at 08:05

## 2025-05-16 RX ADMIN — VALSARTAN 80 MG: 80 TABLET, FILM COATED ORAL at 10:05

## 2025-05-16 RX ADMIN — FAMOTIDINE 20 MG: 20 TABLET, FILM COATED ORAL at 09:05

## 2025-05-16 RX ADMIN — IPRATROPIUM BROMIDE AND ALBUTEROL SULFATE 3 ML: 2.5; .5 SOLUTION RESPIRATORY (INHALATION) at 01:05

## 2025-05-16 RX ADMIN — SODIUM CHLORIDE 250 ML: 3 INJECTION, SOLUTION INTRAVENOUS at 04:05

## 2025-05-16 RX ADMIN — QUETIAPINE FUMARATE 50 MG: 25 TABLET ORAL at 09:05

## 2025-05-16 RX ADMIN — ACETAMINOPHEN 650 MG: 325 TABLET ORAL at 07:05

## 2025-05-16 RX ADMIN — DEXAMETHASONE SODIUM PHOSPHATE 2 MG: 4 INJECTION INTRA-ARTICULAR; INTRALESIONAL; INTRAMUSCULAR; INTRAVENOUS; SOFT TISSUE at 09:05

## 2025-05-16 RX ADMIN — CEFEPIME 2 G: 2 INJECTION, POWDER, FOR SOLUTION INTRAVENOUS at 04:05

## 2025-05-16 RX ADMIN — HEPARIN SODIUM AND DEXTROSE 13 UNITS/KG/HR: 10000; 5 INJECTION INTRAVENOUS at 09:05

## 2025-05-16 RX ADMIN — NIMODIPINE 60 MG: 30 CAPSULE, LIQUID FILLED ORAL at 06:05

## 2025-05-16 RX ADMIN — FAMOTIDINE 20 MG: 20 TABLET, FILM COATED ORAL at 12:05

## 2025-05-16 RX ADMIN — POTASSIUM BICARBONATE 50 MEQ: 978 TABLET, EFFERVESCENT ORAL at 06:05

## 2025-05-16 RX ADMIN — ACETAMINOPHEN 650 MG: 325 TABLET ORAL at 12:05

## 2025-05-16 RX ADMIN — ATORVASTATIN CALCIUM 40 MG: 40 TABLET, FILM COATED ORAL at 08:05

## 2025-05-16 RX ADMIN — CEFEPIME 2 G: 2 INJECTION, POWDER, FOR SOLUTION INTRAVENOUS at 11:05

## 2025-05-16 NOTE — NURSING
Pt. Transported to CT with cardiac monitor and Ambu Bag via bed. No acute events during transfer. O2 device used room air. Pt had nurse assist present. Pt returned to [facility] rm [id number] and room monitor reapplied. VSS. RN WCTM

## 2025-05-16 NOTE — ASSESSMENT & PLAN NOTE
Patient is a 49-year-old female with past medical history of hypertension presenting via transfer from [place] for higher level of care after being diagnosed with subarachnoid hemorrhage.    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  S/p EVD 5/6  S/p Coil emolization 5/6  CTH 5/11 with new R PICA/ superior cerebellar infarct.      - EVD open at 10  - Neurosurgery, vascular neurology consulted  - SBP< 160   - nimodipine 60 mg q4h   - daily TCDs - negative for vasospasm thus far  - Aspirin 81 and Plavix 75 qd  - atorvastatin 40 mg  - euvolemia  - SCDs; lovenox for VTE ppx  - Delirium precautions.   - Precedex gtt   - PT/OT      MRA brain overnight reviewed

## 2025-05-16 NOTE — SUBJECTIVE & OBJECTIVE
Interval History:  NAEON.     Review of Systems   HENT:  Positive for ear pain.        Objective:     Vitals:  Temp: 97.8 °F (36.6 °C)  Pulse: 96  Rhythm: normal sinus rhythm  BP: (!) 158/77  MAP (mmHg): 110  ICP Mean (mmHg): 14 mmHg  Resp: 20  SpO2: 96 %    Temp  Min: 97.8 °F (36.6 °C)  Max: 98.8 °F (37.1 °C)  Pulse  Min: 72  Max: 108  BP  Min: 97/61  Max: 158/77  MAP (mmHg)  Min: 72  Max: 110  ICP Mean (mmHg)  Min: 4 mmHg  Max: 14 mmHg  Resp  Min: 16  Max: 53  SpO2  Min: 93 %  Max: 100 %    05/15 0701 - 05/16 0700  In: 3542.5 [P.O.:240; I.V.:3054]  Out: 4530 [Urine:4250; Drains:280]   Unmeasured Output  Unmeasured Urine Occurrence: 1  Unmeasured Stool Occurrence: 0  Pad Count: 1        Physical Exam  Vitals reviewed.   Constitutional:       Appearance: She is obese. She is ill-appearing.   HENT:      Head:      Comments: EVD  No clicking with open/closing of jaw     Right Ear: Tympanic membrane and external ear normal.      Left Ear: Tympanic membrane and external ear normal.      Nose: Nose normal.      Mouth/Throat:      Mouth: Mucous membranes are moist.   Eyes:      Pupils: Pupils are equal, round, and reactive to light.   Cardiovascular:      Rate and Rhythm: Normal rate.   Pulmonary:      Effort: Pulmonary effort is normal.   Abdominal:      Palpations: Abdomen is soft.   Skin:     General: Skin is warm and dry.   Neurological:      Comments: --GCS: E4 V5 M6  --Mental Status:  awake, oriented x 4  --Pivots from bed to bedside commode  --Follows all commands  --Pupils reactive   --URENA spont              Medications:  ContinuousdexmedeTOMIDine (Precedex) infusion (titrating), Last Rate: 0.3 mcg/kg/hr (05/16/25 0948)  heparin (porcine) in D5W, Last Rate: 13 Units/kg/hr (05/16/25 0601)    Scheduledaspirin, 81 mg, Daily  atorvastatin, 40 mg, Daily  ceFEPime IV (PEDS and ADULTS), 2 g, Q8H  dexAMETHasone injection, 2 mg, Q8H  niMODipine, 60 mg, Q4H  QUEtiapine, 50 mg, BID  senna-docusate, 1 tablet, BID  valsartan,  80 mg, Daily    PRNacetaminophen, 650 mg, Q6H PRN  bisacodyL, 10 mg, Daily PRN  diazePAM, 10 mg, Q6H PRN  hydrALAZINE, 10 mg, Q4H PRN  labetalol, 10 mg, Q4H PRN  magnesium oxide, 800 mg, PRN  magnesium oxide, 800 mg, PRN  melatonin, 6 mg, Nightly PRN  methocarbamoL, 750 mg, Q6H PRN  oxyCODONE, 5 mg, Q6H PRN  potassium bicarbonate, 35 mEq, PRN  potassium bicarbonate, 50 mEq, PRN  potassium bicarbonate, 60 mEq, PRN  potassium, sodium phosphates, 2 packet, PRN  potassium, sodium phosphates, 2 packet, PRN  potassium, sodium phosphates, 2 packet, PRN  sodium chloride 0.9%, 10 mL, PRN      Today I personally reviewed pertinent medications, lines/drains/airways, imaging, cardiology results, laboratory results, microbiology results, notably:    Diet  Diet Adult Regular  Diet Adult Regular

## 2025-05-16 NOTE — PT/OT/SLP PROGRESS
Physical Therapy Co-Treatment    Patient Name: Waldo Emmanuel   MRN: [id number]    Co-treatment performed for this visit due to patient need for two skilled therapists to ensure patient and staff safety and to accommodate for patient activity tolerance/pain management   Recommendations:     Discharge Recommendations: High Intensity Therapy  Discharge Equipment Recommendations: bedside commode, bath bench, walker, rolling  Barriers to Discharge: Increased level of assist  Safest Mobility Level with Nursing: Stand pivot transfer with minimal assistance    Assessment:     Waldo Emmanuel is a 49 y.o. female admitted with a medical diagnosis of SAH (subarachnoid hemorrhage). She presents with the following impairments/functional limitations: weakness, impaired endurance, impaired self care skills, impaired functional mobility, gait instability, impaired balance, impaired cognition, decreased upper extremity function, decreased lower extremity function, decreased safety awareness, pain, impaired coordination. Pt presenting with HOB elevated and agreeable to completing therapy session. Pt eager and highly motivated to complete OOB mobility and self-care tasks. Pt continues to require increased assistance to complete all visualized functional mobility 2/2 poor awareness of functional deficits, decreased activity tolerance, generalized BLE weakness, impaired postural control, and impulsivity. Pt remains appropriate for high intensity therapy following discharge once medically stable. Pt would continue to benefit from skilled acute PT in order to address current deficits and progress functional mobility.     Rehab Prognosis: Good; patient continues to benefit from acute skilled PT services to address these deficits and reach maximum level of function.  Recent Surgery: Procedure(s) (LRB):  MRI (Magnetic Resonance Imagine) (N/A) 1 Day Post-Op    Plan:     During this hospitalization, patient to be seen 4 x/week to  "address the identified rehab impairments via gait training, therapeutic activities, therapeutic exercises, neuromuscular re-education and progress toward the following goals:    Plan of Care Expires:  06/11/25    Subjective     Chief Complaint: None verbalized  Patient/Family Comments/Goals: "Do you think my ear hurting had to do with my head?"  Pain/Comfort:  0/10    Objective:     Communicated with RN prior to session. Patient found HOB elevated with blood pressure cuff, pulse ox (continuous), telemetry, external ventricular drain, peripheral IV, central line upon PT entry to room.     EVD clamped by RN prior to mobility.    General Precautions: Standard, fall, aspiration  Orthopedic Precautions: N/A  Braces: N/A     Functional Mobility:  Bed Mobility:  Verbal cues for sequencing and technique  Supine to Sit: moderate assistance of 2 persons for LE management and trunk management  Transfers:   Sit to Stand: x3 reps  1st trial - minimal assistance from EOB using RW with cues for hand placement  2nd trial - minimal assistance of 2 persons from bedside chair using RW  3rd trial - minimal assistance of 2 persons from bedside chair using RW  Bedside Chair to BSC: minimal assistance of 2 persons using no AD with cues for hand placement  BSC to Bedside Chair: minimal assistance of 2 persons using HHA with cues for foot placement and upright posture  Gait: Patient ambulated 14' with rolling walker and minimum assistance.   Patient demonstrates occasional unsteady gait, decreased step length, narrow base of support, decreased weight shift, decreased foot clearance, ambulates outside OLIVIA of RW, flexed posture, decreased tyler, and inconsistent bilateral foot placement.  Patient required cues for upright posture, gluteal activation, sequencing, rolling walker management, safe rolling walker usage, to ambulate within OLIVIA of RW, increased step size, and increased foot clearance.  All lines remained intact throughout " ambulation trial.  Balance:   Static Sitting: Good, able to maintain for 4 minute(s) with stand by assistance  Dynamic Sitting: Good: Patient accepts moderate challenge, contact guard assistance  Pt completed UE dressing while seated at EOB  Static Standing: Good, able to maintain for 4 minute(s) with contact guard assistance  Verbal cues for upright posture, increased hip extension, increased knee extension, and maintenance of midline orientation  Pt completed oral care while standing at sink with OT  BLE instability noted   Dynamic Standing: Fair: Patient accepts minimal challenge, minimum assistance    AM-PAC 6 CLICK MOBILITY  Turning over in bed (including adjusting bedclothes, sheets and blankets)?: 2  Sitting down on and standing up from a chair with arms (e.g., wheelchair, bedside commode, etc.): 2  Moving from lying on back to sitting on the side of the bed?: 1  Moving to and from a bed to a chair (including a wheelchair)?: 2  Need to walk in hospital room?: 2  Climbing 3-5 steps with a railing?: 1  Basic Mobility Total Score: 10     Therapeutic Activities and Exercises:  Patient educated on role of acute care PT and PT POC, safety while in hospital including calling nurse for mobility, and call light usage  Pt educated on the effects of bed rest and the importance of OOB activity. Pt encouraged to sit UIC majority of day as tolerated and continue daily transfers with nursing assist. Pt verbalized understanding.  Pt educated on importance of maximal participation in therapy session in order to reduce negative effects of prolonged sedentary positioning.   Answered all questions within PT scope of practice and addressed functional mobility concerns.    Patient left up in chair with all lines intact, call button in reach, RN notified, chair alarm on, and daughter present.    GOALS:   Multidisciplinary Problems       Physical Therapy Goals          Problem: Physical Therapy    Goal Priority Disciplines Outcome  Interventions   Physical Therapy Goal     PT, PT/OT Progressing    Description: Goals to be met by: 2025     Patient will increase functional independence with mobility by performin. Supine to sit with Contact Guard Assistance  2. Sit to supine with Contact Guard Assistance  3. Sit to stand transfer with Contact Guard Assistance  4. Bed to chair transfer with Minimal Assistance using LRAD  5. Gait  x 25 feet with Minimal Assistance using LRAD.   6. Stand for 2 minutes with Contact Guard Assistance using LRAD  7. Lower extremity exercise program x15 reps per handout, with independence                         DME Justifications:  Patient has a mobility limitation that significantly impairs their ability to participate in one or more mobility related activities of daily living, including toileting. This deficit can be resolved by using a bedside commode. Patient demonstrates mobility limitations that will cause them to be confined to one room at home without bathroom access for up to 30 days. Using a bedside commode will greatly improve the patient's ability to participate in MRADLs.    Patient demonstrates a mobility limitation that significantly impairs their ability to participate in one or more mobility related activities of daily living. Patient's mobility limitation cannot be sufficiently resolved with the use of a cane, but can be sufficiently resolved with the use of a rolling walker.The use of a rolling walker will considerably improve their ability to participate in MRADLs. Patient will use the walker on a regular basis at home.     Time Tracking:     PT Received On: 25  PT Start Time: 1040     PT Stop Time: 1110  PT Total Time (min): 30 min     Billable Minutes: Therapeutic Activity 15 and Neuromuscular Re-education 15     Treatment Type: Treatment  PT/PTA: PT     Number of PTA visits since last PT visit: 0     2025

## 2025-05-16 NOTE — ASSESSMENT & PLAN NOTE
49f presenting after syncope with CTH demonstrating SAH. CTA with AICA aneurysm R. Intubated prior to arrival to [facility] ED.     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 Corley

## 2025-05-16 NOTE — PT/OT/SLP PROGRESS
Occupational Therapy  Co- Treatment    Name: Waldo Emmanuel  MRN: [id number]  Admitting Diagnosis:  SAH (subarachnoid hemorrhage)  1 Day Post-Op    Recommendations:     Discharge Recommendations: High Intensity Therapy  Discharge Equipment Recommendations:  bedside commode, bath bench, wheelchair  Barriers to discharge:  None    Assessment:     Waldo Emmanuel is a 49 y.o. female with a medical diagnosis of SAH (subarachnoid hemorrhage).  She presents with decrease functional status secondary to medical diagnosis. Performance deficits affecting function are weakness, impaired endurance, impaired self care skills, impaired functional mobility, gait instability, impaired balance, impaired cognition, decreased coordination, decreased upper extremity function, decreased lower extremity function, impaired fine motor, impaired coordination, decreased safety awareness, impaired cardiopulmonary response to activity. Patient with good tolerance to OT session limited by increased dizziness when completing standing ADLs requiring seated RB. Patient demonstrating increased functional capacity at this time able to complete grooming and toileting ADLs with contact guard assistance for standing balance. Patient remains appropriate candidate for acute OT services.      Rehab Prognosis:  Good; patient would benefit from acute skilled OT services to address these deficits and reach maximum level of function.       Plan:     Patient to be seen 4 x/week to address the above listed problems via self-care/home management, therapeutic activities, therapeutic exercises, neuromuscular re-education  Plan of Care Expires: 06/11/25  Plan of Care Reviewed with: patient, daughter    Subjective     Chief Complaint: none   Patient/Family Comments/goals: DC   Pain/Comfort:  Pain Rating 1: 5/10  Location 1: head  Pain Addressed 1: Distraction, Nurse notified  Pain Rating Post-Intervention 1: 0/10    Objective:     Communicated with: RN  prior to session.  Patient found supine with pulse ox (continuous), blood pressure cuff, telemetry, PureWick, external ventricular drain, peripheral IV upon OT entry to room.    General Precautions: Standard, aspiration, fall    Orthopedic Precautions:N/A  Braces: N/A  Respiratory Status: Room air     Occupational Performance:     Bed Mobility:    Patient completed Supine to Sit with moderate assistance and 2 persons for trunk/LE management   Patient sat EOB with standby-CGA     Functional Mobility/Transfers:  Patient completed Sit <> Stand Transfer with minimum assistance  with  rolling walker   X1 chair min x2   X1 BSC min x2   Functional Mobility:   Patient ambulated with minimal assistance in hospital room with RW   Patient completed chair <> BSC with min x2 assistance     Activities of Daily Living:  Grooming: stand by assistance to complete ADL standing at sink   CGA required for standing balance at sink   Toileting: stand by assistance at BSC to complete voiding       AMPAC 6 Click ADL: 18    Treatment & Education:  Co-Treatment conducted due to patient medical instability and to promote patient safety. Provided education regarding role of OT, POC, & discharge recommendations.  Pt with no further questions/concerns at this time. OT provided education on home recommendations and fall prevention in preparation for D/C.     Patient left supine with all lines intact and call button in reach    GOALS:   Multidisciplinary Problems       Occupational Therapy Goals          Problem: Occupational Therapy    Goal Priority Disciplines Outcome Interventions   Occupational Therapy Goal     OT, PT/OT Progressing    Description: Goals to be met by: 6/11/2025     Patient will increase functional independence with ADLs by performing:    UE Dressing with Supervision.  LE Dressing with Supervision.  Grooming while standing at sink with Supervision.  Toileting from toilet with Supervision for hygiene and clothing management.    Toilet transfer to toilet with Supervision.                         DME Justifications:   Waldo's mobility limitation cannot be sufficiently resolved by the use of a cane. Her functional mobility deficit can be sufficiently resolved with the use of a Rolling Walker. Patient's mobility limitation significantly impairs their ability to participate in one of more activities of daily living.  The use of a RW will significantly improve the patient's ability to participate in MRADLS and the patient will use it on regular basis in the home.    Time Tracking:     OT Date of Treatment: 05/16/25  OT Start Time: 1040  OT Stop Time: 1112  OT Total Time (min): 32 min    Billable Minutes:Self Care/Home Management 32    OT/ALEXUS: OT          5/16/2025

## 2025-05-16 NOTE — PROGRESS NOTES
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 [place] [facility] [place] DSA performed 5/6/25 demonstrated the above aneurysm, which was secured with coiling.     5/16/2025 s/p EVD and DSA with coil embolization. Imaging obtained on 5/11 and significant for R cerebellar infarct , likely periprocedural. MRA completed yesterday, no plans for open clipping currently. Patient complaining of ear pain and headache. Remains with EVD on precedex.       Antithrombotics for secondary stroke prevention: Antiplatelets: Aspirin: 81 mg daily    Statins for secondary stroke prevention and hyperlipidemia, if present:   Statins: Atorvastatin- 40 mg daily    Aggressive risk factor modification: HTN, HLD, Diet, Exercise, Obesity     Rehab efforts: The patient has been evaluated by a stroke team provider and the therapy needs have been fully considered based off the presenting complaints and exam findings. The following therapy evaluations are needed: PT evaluate and treat, OT evaluate and treat, SLP evaluate and treat    Diagnostics ordered/pending: None     VTE prophylaxis: None: Reason for No Pharmacological VTE Prophylaxis: Known or suspected cerebral aneurysm or AVM    BP parameters: SAH: Secured aneurysm, no target, increase BP to prevent vasospasm if needed       Cerebellar stroke, acute  -- See plan for SAH        Obstructive hydrocephalus  EVD in place at 10    Brain aneurysm  See "SAH"    Essential hypertension  -Stroke risk factor  -SBP goal No target, increase BP to prevnt vasospasm if needed, maintain MAP >65  -BP range in the last 24 hrs: BP  Min: 97/61  Max: 158/77  -Current " antihypertensive regimen:   --PRN labetalol/hydralazine           5/16/2025 s/p EVD and DSA with coil embolization. Imaging obtained on 5/11 and significant for R cerebellar infarct , likely periprocedural. MRA completed yesterday, no plans for open clipping currently. Patient complaining of ear pain and headache. Remains with EVD on precedex.     STROKE DOCUMENTATION        NIH Scale:  1a. Level of Consciousness: 0-->Alert, keenly responsive  1b. LOC Questions: 0-->Answers both questions correctly  1c. LOC Commands: 0-->Performs both tasks correctly  2. Best Gaze: 0-->Normal  3. Visual: 0-->No visual loss  4. Facial Palsy: 0-->Normal symmetrical movements  5a. Motor Arm, Left: 0-->No drift, limb holds 90 (or 45) degrees for full 10 secs  5b. Motor Arm, Right: 0-->No drift, limb holds 90 (or 45) degrees for full 10 secs  6a. Motor Leg, Left: 0-->No drift, leg holds 30 degree position for full 5 secs  6b. Motor Leg, Right: 0-->No drift, leg holds 30 degree position for full 5 secs  7. Limb Ataxia: 0-->Absent  8. Sensory: 0-->Normal, no sensory loss  9. Best Language: 0-->No aphasia, normal  10. Dysarthria: 0-->Normal  11. Extinction and Inattention (formerly Neglect): 0-->No abnormality  Total (NIH Stroke Scale): 0       Modified DeWitt    Cascade Coma Scale:    ABCD2 Score:    [id number] Score:   HAS -BLED Score:   ICH Score:   Hunt & Castillo Classification:      Hemorrhagic change of an Ischemic Stroke: Does this patient have an ischemic stroke with hemorrhagic changes? No     Neurologic Chief Complaint: SAH    Subjective:     Interval History: Patient is seen for follow-up neurological assessment and treatment recommendations: See hospital course.     HPI, Past Medical, Family, and Social History remains the same as documented in the initial encounter.     Review of Systems   HENT:  Positive for ear pain.    Neurological:  Positive for headaches.     Scheduled Meds:   aspirin  81 mg Oral Daily    atorvastatin  40  mg Oral Daily    ceFEPime IV (PEDS and ADULTS)  2 g Intravenous Q8H    dexAMETHasone injection  2 mg Intravenous Q8H    famotidine  20 mg Oral BID    niMODipine  60 mg Oral Q4H    QUEtiapine  50 mg Oral BID    senna-docusate  1 tablet Oral BID    valsartan  80 mg Oral Daily     Continuous Infusions:   dexmedeTOMIDine (Precedex) infusion (titrating)  0-0.6 mcg/kg/hr Intravenous Continuous 7.52 mL/hr at 05/16/25 0948 0.3 mcg/kg/hr at 05/16/25 0948    heparin (porcine) in D5W  0-40 Units/kg/hr Intravenous Continuous 13 mL/hr at 05/16/25 0601 13 Units/kg/hr at 05/16/25 0601     PRN Meds:  Current Facility-Administered Medications:     acetaminophen, 650 mg, Oral, Q6H PRN    bisacodyL, 10 mg, Rectal, Daily PRN    diazePAM, 10 mg, Oral, Q6H PRN    hydrALAZINE, 10 mg, Intravenous, Q4H PRN    labetalol, 10 mg, Intravenous, Q4H PRN    magnesium oxide, 800 mg, Oral, PRN    magnesium oxide, 800 mg, Oral, PRN    melatonin, 6 mg, Oral, Nightly PRN    methocarbamoL, 750 mg, Oral, Q6H PRN    oxyCODONE, 5 mg, Oral, Q6H PRN    potassium bicarbonate, 35 mEq, Oral, PRN    potassium bicarbonate, 50 mEq, Oral, PRN    potassium bicarbonate, 60 mEq, Oral, PRN    potassium, sodium phosphates, 2 packet, Oral, PRN    potassium, sodium phosphates, 2 packet, Oral, PRN    potassium, sodium phosphates, 2 packet, Oral, PRN    sodium chloride 0.9%, 10 mL, Intravenous, PRN    Objective:     Vital Signs (Most Recent):  Temp: 97.8 °F (36.6 °C) (05/16/25 0301)  Pulse: 96 (05/16/25 0808)  Resp: 20 (05/16/25 0830)  BP: (!) 158/77 (05/16/25 0603)  SpO2: 96 % (05/16/25 0808)  BP Location: Left forearm    Vital Signs Range (Last 24H):  Temp:  [97.8 °F (36.6 °C)-98.8 °F (37.1 °C)]   Pulse:  []   Resp:  [16-53]   BP: ()/(60-86)   SpO2:  [93 %-100 %]   BP Location: Left forearm       Physical Exam  HENT:      Mouth/Throat:      Mouth: Mucous membranes are moist.   Eyes:      Extraocular Movements: Extraocular movements intact.       "Conjunctiva/sclera: Conjunctivae normal.   Cardiovascular:      Rate and Rhythm: Normal rate.   Pulmonary:      Effort: Pulmonary effort is normal.   Skin:     General: Skin is warm and dry.   Neurological:      Mental Status: She is alert and oriented to person, place, and time.      Motor: No weakness.              Neurological Exam:   LOC: alert  Attention Span: Good   Language: No aphasia  Articulation: No dysarthria  Orientation: Person, Place, Time   Visual Fields: Full  EOM (CN III, IV, VI): Full/intact  Facial Movement (CN VII): Symmetric facial expression    Motor: Arm left  Normal 5/5  Leg left  Normal 5/5  Arm right  Normal 5/5  Leg right Normal 5/5  Sensation: Intact to light touch, temperature and vibration    Laboratory:  CMP:   Recent Labs   Lab 05/16/25 0211 05/16/25  1028   CALCIUM 8.5*  --    ALBUMIN 2.7*  --    PROT 6.5  --     141   K 3.7  --    CO2 23  --    *  --    BUN 9  --    CREATININE 0.6  --    ALKPHOS 92  --    ALT 20  --    AST 20  --    BILITOT 0.4  --      BMP:   Recent Labs   Lab 05/16/25 0211 05/16/25  1028    141   K 3.7  --    *  --    CO2 23  --    BUN 9  --    CREATININE 0.6  --    CALCIUM 8.5*  --      CBC:   Recent Labs   Lab 05/16/25 0211   WBC 18.66*   RBC 3.00*   HGB 8.2*   HCT 27.3*      MCV 91   MCH 27.3   MCHC 30.0*     Lipid Panel: No results for input(s): "CHOL", "LDLCALC", "HDL", "TRIG" in the last 168 hours.  Coagulation:   Recent Labs   Lab 05/15/25  0530 05/15/25  1342 05/16/25 0211   INR 1.1  --   --    APTT 21.5   < > 48.0*    < > = values in this interval not displayed.     Platelet Aggregation Study: No results for input(s): "PLTAGG", "PLTAGINTERP", "PLTAGREGLACO", "ADPPLTAGGREG" in the last 168 hours.  Hgb A1C: No results for input(s): "HGBA1C" in the last 168 hours.  TSH: No results for input(s): "TSH" in the last 168 hours.    Diagnostic Results     Brain Imaging/Vessel imaging  CTH without 05/16/2025  Compared to prior MR " and CT imaging of 05/11/2025, continued maturation of large recent infarction involving the right cerebellum, as discussed.     Similar mass effect in the posterior fossa with essentially unchanged size and configuration of the lateral 3rd ventricles.  No new transependymal CSF egress.  CT     Continued decreased visualization of existing subarachnoid hemorrhage.  No new or enlarging areas of intracranial hemorrhage appreciated.    MRI Brain w/wo 05/15/2025  Status post coiling of a PICA aneurysm.  No evidence of residual aneurysm filling.     No high-grade stenosis or other aneurysm identified.    MRI Brain without 05/11/2025  Allowing for significant distortion examination by artifact from dental metal there is large area acute/recent infarction within the right cerebellum with probable small component in the right paramedian micah.  Please note this is near completely obscured on diffusion and gradient imaging secondary artifact from dental metal.     Mass effect from the area of infarction effacing the 4th ventricle with stable caliber lateral and 3rd ventricles without hydrocephalus with stable right frontal ventricular catheter.     There is T1 hyperintensity within the region of the right superior cerebellar peduncle corresponding to hyperdensity seen on CT concerning for component of hemorrhagic conversion.     Scattered small volume subarachnoid hemorrhage cerebral sulci.     Clinical correlation and continued follow-up recommended.    CTH without 05/11/2025  1. Compared to prior head CT performed 05/06/2025, 20:13 hours, evolving relatively large right cerebellar infarct without macroscopic hemorrhage within the infarct territory.  Questionable hypoattenuation involving the brainstem which may be artifactual given coil artifact, however additional areas of ischemia not excluded.  MRI may be considered for further characterization.  Further effacement of the 4th ventricle since the prior study, however there  has been slight decompression of the lateral and 3rd ventricles since the prior exam.  2. Relatively similar subarachnoid and interventricular hemorrhage.  No new or enlarging areas of intracranial hemorrhage appreciated.  This report was flagged in Epic as abnormal.    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     Evolving scattered subarachnoid and intraventricular hemorrhage.  No definite new hemorrhage     Stable right frontal coursing ventricular catheter with slight reduced caliber of the ventricles without evidence for worsening hydrocephalus.     Continued slight crowding cerebral sulci and basilar cisterns with small caliber 4th ventricle.  Question component of inferior extension of the cerebellar tonsils below the foramen magnum.  This could be better assessed with MR imaging if patient compatible.     No significant new abnormal parenchymal attenuation.  Clinical correlation and further evaluation with MRI as warranted.    CTH without 05/06/2025 0949  Subarachnoid and intraventricular hemorrhage, similar to recent exam.     Interval placement of right frontal EVD without apparent intracranial complication.  Ventricles remain mildly dilated.  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  Distal location suspicious for mycotic aneurysm.     No intracranial large vessel occlusion or hemodynamically significant stenosis.     The cervical carotid and vertebral arteries are patent without hemodynamically significant stenosis.     Right upper lobe, right middle lobe and left lower lobe consolidative opacities.     Endotracheal tube terminates just superior to the charis recommend slight retraction.       CTH without 05/05/2025  Subarachnoid hemorrhage.  Moderate volume of intraventricular hemorrhage.  Mild hydrocephalus.  Recommend CTA.  Findings reported by [facility] Rad radiologist at 01:06      All CT scans at this facility use dose modulation, iterative reconstruction, and/or weight based dosing when appropriate to reduce radiation dose to as low as reasonable achievable.    Cardiac Imaging   TTE 05/07/2025       Left Ventricle: The left ventricle is normal in size. Normal wall thickness. Normal wall motion. There is normal systolic function with a visually estimated ejection fraction of 65 - 70%. Ejection fraction is approximately 68%. There is indeterminate diastolic function.    Right Ventricle: The right ventricle is normal in size. Wall thickness is normal. Systolic function is normal.    Left Atrium: Agitated saline study of the atrial septum is negative after vasalva maneuver, suggesting absence of intracardiac shunt at the atrial level. No patent foramen ovale confirmed by agitated saline contrast.    Tricuspid Valve: There is mild to moderate regurgitation.    IVC/SVC: Patient is ventilated, cannot use IVC diameter to estimate right atrial pressure.       Lexis Morrison PA-C  [facility]  Department of Vascular Neurology   [facility] - Neuro Critical [facility]

## 2025-05-16 NOTE — CONSULTS
NIAS consulted for PICC placement/ Right UE with DVT. Left UE cephalic non-compressible. Image taken. Left brachial cover by muscle and nerve bundle. Unable to access. Left basilic too small to access. Image taken.  Bedside RN and Primary team notified.

## 2025-05-16 NOTE — PROGRESS NOTES
[facility] - Neuro Critical Care  Neurosurgery  Progress Note    Subjective:     History of Present Illness: 49f presenting after syncope with CTH demonstrating SAH. CTA without clear vascular malformation. Intubated prior to arrival to [facility] ED. Discussed expected hospital course and imaging with daughter in room    Post-Op Info:  Procedure(s) (LRB):  MRI (Magnetic Resonance Imagine) (N/A)   1 Day Post-Op   Interval History: NAEON. Pt exam stable this mroning. 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 MRA completed yesterday with no acute findings.     Medications:  Continuous Infusions:   dexmedeTOMIDine (Precedex) infusion (titrating)  0-0.6 mcg/kg/hr Intravenous Continuous 7.52 mL/hr at 05/16/25 0948 0.3 mcg/kg/hr at 05/16/25 0948    heparin (porcine) in D5W  0-40 Units/kg/hr Intravenous Continuous 13 mL/hr at 05/16/25 0601 13 Units/kg/hr at 05/16/25 0601     Scheduled Meds:   aspirin  81 mg Oral Daily    atorvastatin  40 mg Oral Daily    ceFEPime IV (PEDS and ADULTS)  2 g Intravenous Q8H    dexAMETHasone injection  2 mg Intravenous Q8H    famotidine  20 mg Oral BID    niMODipine  60 mg Oral Q4H    QUEtiapine  50 mg Oral BID    senna-docusate  1 tablet Oral BID    valsartan  80 mg Oral Daily     PRN Meds:  Current Facility-Administered Medications:     acetaminophen, 650 mg, Oral, Q6H PRN    bisacodyL, 10 mg, Rectal, Daily PRN    diazePAM, 10 mg, Oral, Q6H PRN    hydrALAZINE, 10 mg, Intravenous, Q4H PRN    labetalol, 10 mg, Intravenous, Q4H PRN    magnesium oxide, 800 mg, Oral, PRN    magnesium oxide, 800 mg, Oral, PRN    melatonin, 6 mg, Oral, Nightly PRN    methocarbamoL, 750 mg, Oral, Q6H PRN    oxyCODONE, 5 mg, Oral, Q6H PRN    potassium bicarbonate, 35 mEq, Oral, PRN    potassium bicarbonate, 50 mEq, Oral, PRN    potassium bicarbonate, 60 mEq, Oral, PRN    potassium, sodium phosphates, 2 packet, Oral, PRN    potassium, sodium phosphates, 2 packet, Oral, PRN    potassium, sodium phosphates, 2 packet, Oral, PRN    sodium  chloride 0.9%, 10 mL, Intravenous, PRN     Review of Systems  Objective:     Weight: 100.2 kg (220 lb 14.4 oz)  Body mass index is 39.13 kg/m².  Vital Signs (Most Recent):  Temp: 97.8 °F (36.6 °C) (05/16/25 0301)  Pulse: 96 (05/16/25 0808)  Resp: 20 (05/16/25 0830)  BP: (!) 158/77 (05/16/25 0603)  SpO2: 96 % (05/16/25 0808) Vital Signs (24h Range):  Temp:  [97.8 °F (36.6 °C)-98.8 °F (37.1 °C)] 97.8 °F (36.6 °C)  Pulse:  [] 96  Resp:  [16-53] 20  SpO2:  [93 %-100 %] 96 %  BP: ()/(60-86) 158/77     Date 05/16/25 0700 - 05/17/25 0659   Shift 5270-5087 4660-3979 9355-5427 24 Hour Total   INTAKE   P.O. 240   240   Shift Total(mL/kg) 240(2.4)   240(2.4)   OUTPUT   Urine(mL/kg/hr) 550   550   Drains 53   53   Shift Total(mL/kg) 603(6)   603(6)   Weight (kg) 100.2 100.2 100.2 100.2                            ICP/Ventriculostomy 05/06/25 0920 Right Parietal region (Active)   Level of Ventriculostomy (cm above) 10 05/15/25 0301   Status Open to drainage 05/16/25 0701   Site Assessment Clean;Dry 05/16/25 0701   Site Drainage No drainage 05/16/25 0701   Waveform normal waveform 05/15/25 1901   Output (mL) 19 mL 05/16/25 1001   CSF Color yellow 05/16/25 0701   Dressing Status Clean;Dry;Intact 05/16/25 0701   Interventions HOB degrees;level adjusted per order;zeroed 05/16/25 0701       Female External Urinary Catheter w/ Suction 05/07/25 1732 (Active)   Skin no redness;no breakdown;perineum cleansed w/ soap and water;female external urine collection device repositioned 05/16/25 0701   Tolerance no signs/symptoms of discomfort 05/16/25 0701   Suction Continuous suction at 70 mmHg 05/15/25 1901   Date of last wick change 05/15/25 05/15/25 1901   Time of last wick change 0801 05/15/25 0701   Output (mL) 550 mL 05/16/25 0801          Physical Exam         Neurosurgery Physical Exam  [id number]  alert, Ox4  Cni  Follows commands x4 grossly full strength throughout  SILT  R dysmetria     EVD Incision dressed CDI   Significant  Labs:  Recent Labs   Lab 05/15/25  0243 05/15/25  0940 05/15/25  2004 05/16/25  0211 05/16/25  1028   *  --   --  118*  --       < > 140 143 141   K 3.9  --   --  3.7  --    *  --   --  111*  --    CO2 23  --   --  23  --    BUN 13  --   --  9  --    CREATININE 0.6  --   --  0.6  --    CALCIUM 8.5*  --   --  8.5*  --    MG 1.8  --   --  1.7  --     < > = values in this interval not displayed.     Recent Labs   Lab 05/15/25  0530 05/15/25  0606 05/16/25  0211   WBC 12.90* 13.54* 18.66*   HGB 6.9* 7.7* 8.2*   HCT 23.3* 25.2* 27.3*    322 387     Recent Labs   Lab 05/15/25  0530 05/15/25  1342 05/15/25  2004 05/16/25  0211   INR 1.1  --   --   --    APTT 21.5 38.3* 44.4* 48.0*     Microbiology Results (last 7 days)       Procedure Component Value Units Date/Time    CSF culture [[id number]] Collected: 05/12/25 1729    Order Status: Completed Specimen: CSF (Spinal Fluid) from CSF Tap, Tube 3 Updated: 05/16/25 0730     CULTURE, CSF No Growth To Date     GRAM STAIN Rare WBC seen      No organisms seen    Culture, Respiratory with Gram Stain [[id number]]  (Abnormal)  (Susceptibility) Collected: 05/10/25 1120    Order Status: Completed Specimen: Respiratory from Sputum, Expectorated Updated: 05/13/25 0946     Respiratory Culture No S aureus or Pseudomonas isolated      Few KLEBSIELLA AEROGENES     Comment: with normal respiratory chichi        GRAM STAIN <10 Epithelial Cells/LPF      Rare WBC seen      Many Gram positive cocci      Many Gram Negative Rods    Culture, Respiratory with Gram Stain [[id number]]  (Abnormal)  (Susceptibility) Collected: 05/10/25 2035    Order Status: Completed Specimen: Respiratory from Sputum Updated: 05/13/25 0945     Respiratory Culture No S aureus or Pseudomonas isolated      Few KLEBSIELLA AEROGENES     Comment: with normal respiratory chichi        GRAM STAIN <10 Epithelial Cells/LPF      Rare WBC seen      Many Gram positive cocci      Moderate Gram Negative Rods     Gram stain [[id number]] Collected: 05/12/25 1729    Order Status: Canceled Specimen: CSF (Spinal Fluid) from CSF Tap, Tube 3 Updated: 05/12/25 1909          All pertinent labs from the last 24 hours have been reviewed.    Significant Diagnostics:  CT: CT Head Without Contrast  Result Date: 5/16/2025  Compared to prior MR and CT imaging of 05/11/2025, continued maturation of large recent infarction involving the right cerebellum, as discussed. Similar mass effect in the posterior fossa with essentially unchanged size and configuration of the lateral 3rd ventricles.  No new transependymal CSF egress.  CT Continued decreased visualization of existing subarachnoid hemorrhage.  No new or enlarging areas of intracranial hemorrhage appreciated. Electronically signed by: Mich Downs Date:    05/16/2025 Time:    07:03    I have reviewed all pertinent imaging results/findings within the past 24 hours.  Assessment/Plan:     * SAH (subarachnoid hemorrhage)  49f presenting after syncope with CTH demonstrating SAH. CTA with AICA aneurysm R. Intubated prior to arrival to [facility] ED.     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 Barney Sims MD  Neurosurgery  [facility] - Neuro Critical Care

## 2025-05-16 NOTE — PLAN OF CARE
[facility] Care Plan    POC reviewed with Waldo Alegriaer and family at 0300. Patient and Family verbalized understanding. Questions and concerns addressed. No acute events today. Pt progressing toward goals. Will continue to monitor. See below and flowsheets for full assessment and VS info.     - CTH completed.  - PRN oxycodone, tylenol, and robaxin x 1 given for back pain.  - PRN valium given for CT.  - Potassium replaced. Phos replacement initiated.  - 250 ml 3% bolus given x 2 for sodium < 145.  - Free water fluid restriction initiated.  - Precedex gtt at 0.4.  - EVD open at 10.  - Heparin gtt @ 13. Aptt therapeutic.         Is this a stroke patient? yes- Stroke booklet reviewed with family and is at bedside.   Care Plan Individualization: Lights dimmed. Care clustered. Multiple blankets.     Neuro:  [place] Coma Scale  Best Eye Response: 4-->(E4) spontaneous  Best Motor Response: 6-->(M6) obeys commands  Best Verbal Response: 5-->(V5) oriented  [place] Coma Scale Score: 15  Assessment Qualifiers: patient chemically sedated or paralyzed  Pupil PERRLA: yes     24 hr Temp:  [97.8 °F (36.6 °C)-98.8 °F (37.1 °C)]     CV:   Rhythm: normal sinus rhythm  BP goals:   SBP < 160  MAP > 65    Resp:      Vent Mode: Spont  Set Rate: 20 BPM  Oxygen Concentration (%): 40  Vt Set: 375 mL  PEEP/CPAP: 5 cmH20  Pressure Support: 5 cmH20    Plan: N/A    GI/:     Diet/Nutrition Received: regular  Last Bowel Movement: 05/13/25  Voiding Characteristics: external catheter    Intake/Output Summary (Last 24 hours) at 5/16/2025 0720  Last data filed at 5/16/2025 0601  Gross per 24 hour   Intake 3401.64 ml   Output 4314 ml   Net -912.36 ml     Unmeasured Output  Unmeasured Urine Occurrence: 1  Unmeasured Stool Occurrence: 0  Pad Count: 1    Labs/Accuchecks:  Recent Labs   Lab 05/16/25  0211   WBC 18.66*   RBC 3.00*   HGB 8.2*   HCT 27.3*         Recent Labs   Lab 05/16/25 0211      K 3.7   CO2 23   *   BUN 9  "  CREATININE 0.6   ALKPHOS 92   ALT 20   AST 20   BILITOT 0.4      Recent Labs   Lab 05/15/25  0530 05/15/25  1342 25  0211   PROTIME 11.7  --   --    INR 1.1  --   --    APTT 21.5   < > 48.0*    < > = values in this interval not displayed.    No results for input(s): "CPK", "CPKMB", "TROPONINI", "MB" in the last 168 hours.    Electrolytes: Electrolytes replaced  Accuchecks: none    Gtts:   dexmedeTOMIDine (Precedex) infusion (titrating)  0-1.4 mcg/kg/hr Intravenous Continuous 10.02 mL/hr at 25 06 0.4 mcg/kg/hr at 25 06    heparin (porcine) in D5W  0-40 Units/kg/hr Intravenous Continuous 13 mL/hr at 25 06 13 Units/kg/hr at 25    sodium chloride (23.4%) HYPERTONIC 4 mEq/mL 172 mEq in sterile water 500 mL (sodium chloride 2%) infusion   Intravenous Continuous 100 mL/hr at 25 06 Rate Verify at 25       LDA/Wounds:    Ivan Risk Assessment  Sensory Perception: 4-->no impairment  Moisture: 3-->occasionally moist  Activity: 3-->walks occasionally  Mobility: 3-->slightly limited  Nutrition: 3-->adequate  Friction and Shear: 3-->no apparent problem  Ivan Score: 19  Is your ivan score 12 or less? no          Restraints:   Restraint Order  Length of Order: Order good for next 24 hours or when removed.  Date that the current order will : 05/15/25  Time that the current order will : 1151  Order Upon Application: Yes    [facility]   Problem: Infection  Goal: Absence of Infection Signs and Symptoms  Intervention: Prevent or Manage Infection  Flowsheets (Taken 2025)  Fever Reduction/Comfort Measures:   lightweight bedding   lightweight clothing  Infection Management: aseptic technique maintained  Isolation Precautions:   precautions maintained   protective     Problem: Skin Injury Risk Increased  Goal: Skin Health and Integrity  Intervention: Optimize Skin Protection  Flowsheets (Taken 2025)  Pressure Reduction Techniques:   frequent weight " shift encouraged   weight shift assistance provided   positioned off wounds  Pressure Reduction Devices:   alternating pressure pump (SOUTH)   positioning supports utilized   specialty bed utilized  Skin Protection: incontinence pads utilized  Activity Management:   Arm raise - L1   Rolling - L1  Head of Bed (HOB) Positioning: HOB at 30-45 degrees     Problem: Adult Inpatient Plan of Care  Goal: Absence of Hospital-Acquired Illness or Injury  Intervention: Prevent Infection  Flowsheets (Taken 5/16/2025 0715)  Infection Prevention:   environmental surveillance performed   hand hygiene promoted   rest/sleep promoted     Problem: Stroke, Intracerebral Hemorrhage  Goal: Optimal Cerebral Tissue Perfusion  Intervention: Protect and Optimize Cerebral Perfusion  Flowsheets (Taken 5/16/2025 0715)  Sensory Stimulation Regulation:   care clustered   lighting decreased  Cerebral Perfusion Promotion:   blood pressure monitored   normothermia promoted  Fluid/Electrolyte Management: electrolyte supplement initiated  Stabilization Measures: airway opened     Problem: Fall Injury Risk  Goal: Absence of Fall and Fall-Related Injury  Intervention: Identify and Manage Contributors  Flowsheets (Taken 5/16/2025 0715)  Medication Review/Management: medications reviewed     Problem: Wound  Goal: Absence of Infection Signs and Symptoms  Intervention: Prevent or Manage Infection  Flowsheets (Taken 5/16/2025 0715)  Fever Reduction/Comfort Measures:   lightweight bedding   lightweight clothing  Infection Management: aseptic technique maintained  Isolation Precautions:   precautions maintained   protective  Goal: Optimal Pain Control and Function  Intervention: Prevent or Manage Pain  Flowsheets (Taken 5/16/2025 0715)  Sleep/Rest Enhancement: awakenings minimized  Pain Management Interventions:   awakened for pain meds per patient request   care clustered   heat applied   medication offered but refused

## 2025-05-16 NOTE — EICU
REIU Night Rounds Checklist  24H Vital Sign Range:  Temp:  [97.9 °F (36.6 °C)-98.8 °F (37.1 °C)]   Pulse:  []   Resp:  [3-43]   BP: ()/(50-86)   SpO2:  [94 %-100 %]     Video rounds and LDA reconciliation

## 2025-05-16 NOTE — HOSPITAL COURSE
5/16/2025 s/p EVD and DSA with coil embolization. Imaging obtained on 5/11 and significant for R cerebellar infarct , likely periprocedural. MRA completed yesterday, no plans for open clipping currently. Patient complaining of ear pain and headache. Remains with EVD on precedex.   5-17-25 EVD in place, on Precedex, R brachial DVT on ASA and heparin drip  05/18/2025 NAEON. Agitation management continues with precedex.   5/19/2025 NAEON, neuro exam stable. EVD clamped today, repeat CTH in AM. On precedex overnight for agitation, off since 1051.  05/20/2025 NAEON. Neuro exam stable. TCD negative for spasm. EVD removed. HA resolved. Repeat CTH pending.   05/21/2025 NAEON. Neuro exam stable. Repeat CTH stable. Home Coumadin started. US BLE negative for DVT. Pt stepped down to NSGY. No further recommendations from vascular neurology standpoint. VN will sign off at this time.

## 2025-05-16 NOTE — ASSESSMENT & PLAN NOTE
-Stroke risk factor  -SBP goal No target, increase BP to prevnt vasospasm if needed, maintain MAP >65  -BP range in the last 24 hrs: BP  Min: 97/61  Max: 158/77  -Current antihypertensive regimen:   --PRN labetalol/hydralazine

## 2025-05-16 NOTE — SUBJECTIVE & OBJECTIVE
Interval History: NAEON. Pt exam stable this mrjerri. 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 MRA completed yesterday with no acute findings.     Medications:  Continuous Infusions:   dexmedeTOMIDine (Precedex) infusion (titrating)  0-0.6 mcg/kg/hr Intravenous Continuous 7.52 mL/hr at 05/16/25 0948 0.3 mcg/kg/hr at 05/16/25 0948    heparin (porcine) in D5W  0-40 Units/kg/hr Intravenous Continuous 13 mL/hr at 05/16/25 0601 13 Units/kg/hr at 05/16/25 0601     Scheduled Meds:   aspirin  81 mg Oral Daily    atorvastatin  40 mg Oral Daily    ceFEPime IV (PEDS and ADULTS)  2 g Intravenous Q8H    dexAMETHasone injection  2 mg Intravenous Q8H    famotidine  20 mg Oral BID    niMODipine  60 mg Oral Q4H    QUEtiapine  50 mg Oral BID    senna-docusate  1 tablet Oral BID    valsartan  80 mg Oral Daily     PRN Meds:  Current Facility-Administered Medications:     acetaminophen, 650 mg, Oral, Q6H PRN    bisacodyL, 10 mg, Rectal, Daily PRN    diazePAM, 10 mg, Oral, Q6H PRN    hydrALAZINE, 10 mg, Intravenous, Q4H PRN    labetalol, 10 mg, Intravenous, Q4H PRN    magnesium oxide, 800 mg, Oral, PRN    magnesium oxide, 800 mg, Oral, PRN    melatonin, 6 mg, Oral, Nightly PRN    methocarbamoL, 750 mg, Oral, Q6H PRN    oxyCODONE, 5 mg, Oral, Q6H PRN    potassium bicarbonate, 35 mEq, Oral, PRN    potassium bicarbonate, 50 mEq, Oral, PRN    potassium bicarbonate, 60 mEq, Oral, PRN    potassium, sodium phosphates, 2 packet, Oral, PRN    potassium, sodium phosphates, 2 packet, Oral, PRN    potassium, sodium phosphates, 2 packet, Oral, PRN    sodium chloride 0.9%, 10 mL, Intravenous, PRN     Review of Systems  Objective:     Weight: 100.2 kg (220 lb 14.4 oz)  Body mass index is 39.13 kg/m².  Vital Signs (Most Recent):  Temp: 97.8 °F (36.6 °C) (05/16/25 0301)  Pulse: 96 (05/16/25 0808)  Resp: 20 (05/16/25 0830)  BP: (!) 158/77 (05/16/25 0603)  SpO2: 96 % (05/16/25 0808) Vital Signs (24h Range):  Temp:  [97.8 °F (36.6 °C)-98.8 °F (37.1 °C)] 97.8 °F (36.6  °C)  Pulse:  [] 96  Resp:  [16-53] 20  SpO2:  [93 %-100 %] 96 %  BP: ()/(60-86) 158/77     Date 05/16/25 0700 - 05/17/25 0659   Shift 2332-7585 5930-0308 7573-5420 24 Hour Total   INTAKE   P.O. 240   240   Shift Total(mL/kg) 240(2.4)   240(2.4)   OUTPUT   Urine(mL/kg/hr) 550   550   Drains 53   53   Shift Total(mL/kg) 603(6)   603(6)   Weight (kg) 100.2 100.2 100.2 100.2                            ICP/Ventriculostomy 05/06/25 0920 Right Parietal region (Active)   Level of Ventriculostomy (cm above) 10 05/15/25 0301   Status Open to drainage 05/16/25 0701   Site Assessment Clean;Dry 05/16/25 0701   Site Drainage No drainage 05/16/25 0701   Waveform normal waveform 05/15/25 1901   Output (mL) 19 mL 05/16/25 1001   CSF Color yellow 05/16/25 0701   Dressing Status Clean;Dry;Intact 05/16/25 0701   Interventions HOB degrees;level adjusted per order;zeroed 05/16/25 0701       Female External Urinary Catheter w/ Suction 05/07/25 1732 (Active)   Skin no redness;no breakdown;perineum cleansed w/ soap and water;female external urine collection device repositioned 05/16/25 0701   Tolerance no signs/symptoms of discomfort 05/16/25 0701   Suction Continuous suction at 70 mmHg 05/15/25 1901   Date of last wick change 05/15/25 05/15/25 1901   Time of last wick change 0801 05/15/25 0701   Output (mL) 550 mL 05/16/25 0801          Physical Exam         Neurosurgery Physical Exam  [id number]  alert, Ox4  Cni  Follows commands x4 grossly full strength throughout  SILT  R dysmetria     EVD Incision dressed CDI   Significant Labs:  Recent Labs   Lab 05/15/25  0243 05/15/25  0940 05/15/25  2004 05/16/25  0211 05/16/25  1028   *  --   --  118*  --       < > 140 143 141   K 3.9  --   --  3.7  --    *  --   --  111*  --    CO2 23  --   --  23  --    BUN 13  --   --  9  --    CREATININE 0.6  --   --  0.6  --    CALCIUM 8.5*  --   --  8.5*  --    MG 1.8  --   --  1.7  --     < > = values in this interval not  displayed.     Recent Labs   Lab 05/15/25  0530 05/15/25  0606 05/16/25  0211   WBC 12.90* 13.54* 18.66*   HGB 6.9* 7.7* 8.2*   HCT 23.3* 25.2* 27.3*    322 387     Recent Labs   Lab 05/15/25  0530 05/15/25  1342 05/15/25  2004 05/16/25  0211   INR 1.1  --   --   --    APTT 21.5 38.3* 44.4* 48.0*     Microbiology Results (last 7 days)       Procedure Component Value Units Date/Time    CSF culture [[id number]] Collected: 05/12/25 1729    Order Status: Completed Specimen: CSF (Spinal Fluid) from CSF Tap, Tube 3 Updated: 05/16/25 0730     CULTURE, CSF No Growth To Date     GRAM STAIN Rare WBC seen      No organisms seen    Culture, Respiratory with Gram Stain [[id number]]  (Abnormal)  (Susceptibility) Collected: 05/10/25 1120    Order Status: Completed Specimen: Respiratory from Sputum, Expectorated Updated: 05/13/25 0946     Respiratory Culture No S aureus or Pseudomonas isolated      Few KLEBSIELLA AEROGENES     Comment: with normal respiratory chichi        GRAM STAIN <10 Epithelial Cells/LPF      Rare WBC seen      Many Gram positive cocci      Many Gram Negative Rods    Culture, Respiratory with Gram Stain [[id number]]  (Abnormal)  (Susceptibility) Collected: 05/10/25 2035    Order Status: Completed Specimen: Respiratory from Sputum Updated: 05/13/25 0945     Respiratory Culture No S aureus or Pseudomonas isolated      Few KLEBSIELLA AEROGENES     Comment: with normal respiratory chichi        GRAM STAIN <10 Epithelial Cells/LPF      Rare WBC seen      Many Gram positive cocci      Moderate Gram Negative Rods    Gram stain [[id number]] Collected: 05/12/25 1729    Order Status: Canceled Specimen: CSF (Spinal Fluid) from CSF Tap, Tube 3 Updated: 05/12/25 1909          All pertinent labs from the last 24 hours have been reviewed.    Significant Diagnostics:  CT: CT Head Without Contrast  Result Date: 5/16/2025  Compared to prior MR and CT imaging of 05/11/2025, continued maturation of large recent infarction  involving the right cerebellum, as discussed. Similar mass effect in the posterior fossa with essentially unchanged size and configuration of the lateral 3rd ventricles.  No new transependymal CSF egress.  CT Continued decreased visualization of existing subarachnoid hemorrhage.  No new or enlarging areas of intracranial hemorrhage appreciated. Electronically signed by: Mich Downs Date:    05/16/2025 Time:    07:03    I have reviewed all pertinent imaging results/findings within the past 24 hours.

## 2025-05-16 NOTE — SUBJECTIVE & OBJECTIVE
Neurologic Chief Complaint: SAH    Subjective:     Interval History: Patient is seen for follow-up neurological assessment and treatment recommendations: See hospital course.     HPI, Past Medical, Family, and Social History remains the same as documented in the initial encounter.     Review of Systems   HENT:  Positive for ear pain.    Neurological:  Positive for headaches.     Scheduled Meds:   aspirin  81 mg Oral Daily    atorvastatin  40 mg Oral Daily    ceFEPime IV (PEDS and ADULTS)  2 g Intravenous Q8H    dexAMETHasone injection  2 mg Intravenous Q8H    famotidine  20 mg Oral BID    niMODipine  60 mg Oral Q4H    QUEtiapine  50 mg Oral BID    senna-docusate  1 tablet Oral BID    valsartan  80 mg Oral Daily     Continuous Infusions:   dexmedeTOMIDine (Precedex) infusion (titrating)  0-0.6 mcg/kg/hr Intravenous Continuous 7.52 mL/hr at 05/16/25 0948 0.3 mcg/kg/hr at 05/16/25 0948    heparin (porcine) in D5W  0-40 Units/kg/hr Intravenous Continuous 13 mL/hr at 05/16/25 0601 13 Units/kg/hr at 05/16/25 0601     PRN Meds:  Current Facility-Administered Medications:     acetaminophen, 650 mg, Oral, Q6H PRN    bisacodyL, 10 mg, Rectal, Daily PRN    diazePAM, 10 mg, Oral, Q6H PRN    hydrALAZINE, 10 mg, Intravenous, Q4H PRN    labetalol, 10 mg, Intravenous, Q4H PRN    magnesium oxide, 800 mg, Oral, PRN    magnesium oxide, 800 mg, Oral, PRN    melatonin, 6 mg, Oral, Nightly PRN    methocarbamoL, 750 mg, Oral, Q6H PRN    oxyCODONE, 5 mg, Oral, Q6H PRN    potassium bicarbonate, 35 mEq, Oral, PRN    potassium bicarbonate, 50 mEq, Oral, PRN    potassium bicarbonate, 60 mEq, Oral, PRN    potassium, sodium phosphates, 2 packet, Oral, PRN    potassium, sodium phosphates, 2 packet, Oral, PRN    potassium, sodium phosphates, 2 packet, Oral, PRN    sodium chloride 0.9%, 10 mL, Intravenous, PRN    Objective:     Vital Signs (Most Recent):  Temp: 97.8 °F (36.6 °C) (05/16/25 0301)  Pulse: 96 (05/16/25 0808)  Resp: 20 (05/16/25  "0830)  BP: (!) 158/77 (05/16/25 0603)  SpO2: 96 % (05/16/25 0808)  BP Location: Left forearm    Vital Signs Range (Last 24H):  Temp:  [97.8 °F (36.6 °C)-98.8 °F (37.1 °C)]   Pulse:  []   Resp:  [16-53]   BP: ()/(60-86)   SpO2:  [93 %-100 %]   BP Location: Left forearm       Physical Exam  HENT:      Mouth/Throat:      Mouth: Mucous membranes are moist.   Eyes:      Extraocular Movements: Extraocular movements intact.      Conjunctiva/sclera: Conjunctivae normal.   Cardiovascular:      Rate and Rhythm: Normal rate.   Pulmonary:      Effort: Pulmonary effort is normal.   Skin:     General: Skin is warm and dry.   Neurological:      Mental Status: She is alert and oriented to person, place, and time.      Motor: No weakness.              Neurological Exam:   LOC: alert  Attention Span: Good   Language: No aphasia  Articulation: No dysarthria  Orientation: Person, Place, Time   Visual Fields: Full  EOM (CN III, IV, VI): Full/intact  Facial Movement (CN VII): Symmetric facial expression    Motor: Arm left  Normal 5/5  Leg left  Normal 5/5  Arm right  Normal 5/5  Leg right Normal 5/5  Sensation: Intact to light touch, temperature and vibration    Laboratory:  CMP:   Recent Labs   Lab 05/16/25 0211 05/16/25  1028   CALCIUM 8.5*  --    ALBUMIN 2.7*  --    PROT 6.5  --     141   K 3.7  --    CO2 23  --    *  --    BUN 9  --    CREATININE 0.6  --    ALKPHOS 92  --    ALT 20  --    AST 20  --    BILITOT 0.4  --      BMP:   Recent Labs   Lab 05/16/25 0211 05/16/25  1028    141   K 3.7  --    *  --    CO2 23  --    BUN 9  --    CREATININE 0.6  --    CALCIUM 8.5*  --      CBC:   Recent Labs   Lab 05/16/25 0211   WBC 18.66*   RBC 3.00*   HGB 8.2*   HCT 27.3*      MCV 91   MCH 27.3   MCHC 30.0*     Lipid Panel: No results for input(s): "CHOL", "LDLCALC", "HDL", "TRIG" in the last 168 hours.  Coagulation:   Recent Labs   Lab 05/15/25  0530 05/15/25  1342 05/16/25  0211   INR 1.1  --   -- " "   APTT 21.5   < > 48.0*    < > = values in this interval not displayed.     Platelet Aggregation Study: No results for input(s): "PLTAGG", "PLTAGINTERP", "PLTAGREGLACO", "ADPPLTAGGREG" in the last 168 hours.  Hgb A1C: No results for input(s): "HGBA1C" in the last 168 hours.  TSH: No results for input(s): "TSH" in the last 168 hours.    Diagnostic Results     Brain Imaging/Vessel imaging  CTH without 05/16/2025  Compared to prior MR and CT imaging of 05/11/2025, continued maturation of large recent infarction involving the right cerebellum, as discussed.     Similar mass effect in the posterior fossa with essentially unchanged size and configuration of the lateral 3rd ventricles.  No new transependymal CSF egress.  CT     Continued decreased visualization of existing subarachnoid hemorrhage.  No new or enlarging areas of intracranial hemorrhage appreciated.    MRI Brain w/wo 05/15/2025  Status post coiling of a PICA aneurysm.  No evidence of residual aneurysm filling.     No high-grade stenosis or other aneurysm identified.    MRI Brain without 05/11/2025  Allowing for significant distortion examination by artifact from dental metal there is large area acute/recent infarction within the right cerebellum with probable small component in the right paramedian micah.  Please note this is near completely obscured on diffusion and gradient imaging secondary artifact from dental metal.     Mass effect from the area of infarction effacing the 4th ventricle with stable caliber lateral and 3rd ventricles without hydrocephalus with stable right frontal ventricular catheter.     There is T1 hyperintensity within the region of the right superior cerebellar peduncle corresponding to hyperdensity seen on CT concerning for component of hemorrhagic conversion.     Scattered small volume subarachnoid hemorrhage cerebral sulci.     Clinical correlation and continued follow-up recommended.    CTH without 05/11/2025  1. Compared to prior " head CT performed 05/06/2025, 20:13 hours, evolving relatively large right cerebellar infarct without macroscopic hemorrhage within the infarct territory.  Questionable hypoattenuation involving the brainstem which may be artifactual given coil artifact, however additional areas of ischemia not excluded.  MRI may be considered for further characterization.  Further effacement of the 4th ventricle since the prior study, however there has been slight decompression of the lateral and 3rd ventricles since the prior exam.  2. Relatively similar subarachnoid and interventricular hemorrhage.  No new or enlarging areas of intracranial hemorrhage appreciated.  This report was flagged in Epic as abnormal.    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     Evolving scattered subarachnoid and intraventricular hemorrhage.  No definite new hemorrhage     Stable right frontal coursing ventricular catheter with slight reduced caliber of the ventricles without evidence for worsening hydrocephalus.     Continued slight crowding cerebral sulci and basilar cisterns with small caliber 4th ventricle.  Question component of inferior extension of the cerebellar tonsils below the foramen magnum.  This could be better assessed with MR imaging if patient compatible.     No significant new abnormal parenchymal attenuation.  Clinical correlation and further evaluation with MRI as warranted.    CTH without 05/06/2025 0949  Subarachnoid and intraventricular hemorrhage, similar to recent exam.     Interval placement of right frontal EVD without apparent intracranial complication.  Ventricles remain mildly dilated.  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  Distal location suspicious for mycotic aneurysm.     No intracranial large vessel occlusion or hemodynamically significant stenosis.     The cervical carotid and vertebral  arteries are patent without hemodynamically significant stenosis.     Right upper lobe, right middle lobe and left lower lobe consolidative opacities.     Endotracheal tube terminates just superior to the charis recommend slight retraction.       CTH without 05/05/2025  Subarachnoid hemorrhage.  Moderate volume of intraventricular hemorrhage.  Mild hydrocephalus.  Recommend CTA.  Findings reported by [facility] radiologist at 01:06     All CT scans at this facility use dose modulation, iterative reconstruction, and/or weight based dosing when appropriate to reduce radiation dose to as low as reasonable achievable.    Cardiac Imaging   TTE 05/07/2025       Left Ventricle: The left ventricle is normal in size. Normal wall thickness. Normal wall motion. There is normal systolic function with a visually estimated ejection fraction of 65 - 70%. Ejection fraction is approximately 68%. There is indeterminate diastolic function.    Right Ventricle: The right ventricle is normal in size. Wall thickness is normal. Systolic function is normal.    Left Atrium: Agitated saline study of the atrial septum is negative after vasalva maneuver, suggesting absence of intracardiac shunt at the atrial level. No patent foramen ovale confirmed by agitated saline contrast.    Tricuspid Valve: There is mild to moderate regurgitation.    IVC/SVC: Patient is ventilated, cannot use IVC diameter to estimate right atrial pressure.

## 2025-05-16 NOTE — ASSESSMENT & PLAN NOTE
CT on 5/11 revealed R cerebellar infarct. Likely uriah-angio  Family aware and imaging reviewed  Atorvastatin 40  ASA 81 and Plavix 75mg qd  RLE arterial US negative for occlusion, only showing moderate stenosis. Decided not to continue heparin gtt given brachial DVT is in upper extremity. Will continue w DAPT and DVT ppx w lovenox instead of full AC.   Concern for brain compression in post fossa  SOC watch, NSGY following

## 2025-05-16 NOTE — PROGRESS NOTES
[facility] - Neuro Critical Care  Neurocritical Care  Progress Note    Admit Date: 5/6/2025  Service Date: 05/16/2025  Length of Stay: 10    Subjective:     Chief Complaint: SAH (subarachnoid hemorrhage)    History of Present Illness: History obtained via chart review and daughter at bedside as patient intubated on arrival.     Per ED note:  49 y.o. female, PMH HTN and anemia,  presenting as a transfer for neurosurgical evaluation with newly diagnosed ICH from outside hospital.  Patient was transferred from [facility].  Daughter at bedside helps provide history.  She states that she was at a grocery store when she had a syncopal event.  She was unsure if she hit her head.  She states that people on the scene called her to let her know what happened and after she was seen in the ED they told her that she had bleeding inside of her head.  She states that while in the ED her mother was answering questions appropriately and acting like her normal self except frequently falling asleep.  Patient was intubated for airway protection prior to arrival.  Patient and reversal with [facility] prior to transfer.  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 Distal location suspicious for mycotic aneurysm     On arrival to [facility]: Neursurgery consulted, EVD placed and admitted to neurocritical care    Hospital Course: 5/7/2025: extubated today to NC, SBP liberalized to 220, d/c jay cath,   5/8/2025: EVD per NSSGY, TCD's no vasospasm, DVT prophylaxis initiated, pending to hear from NSGY team when ok to start AC  05/09/2025: repeat CXR today, plan for CT chest, resp cx , add IS, EVD @10 per NSGY, TCDs today no vasospasm  5/10: Discussed with Dr. Chase and Neurosurgery, Heparin gtt started for R brachial DVT and hx arterial occlusion, will need repeat CTH when therapeutic. Hold off on transitioning to Coumadin until cleared  by NSGY. CT Chest negative for DVT, but concerning for PNA. Repeat sputum cx sent. Regular diet started. Repeat Na improved to 138.   5/11: right cerebellar CVA on imaging, HTS, MRI, family updated at bedside, place andreia, hold heparin gtt   05/12/2025 EVD @ 10. SOC watch. PBD7- plan for MRA c/s contrast for reevaluation of coiled aneurysm. Dc keppra. TCDs negative for spasm. Given 500NS for euvolemia. Given 3% HTS 250ml bolus and continue 2% HTS gtt @ 75. Na checks q6h for goal > 145. Started valsartan 80. Lower extremity arterial US ordered given hx RLE arterial occlusion, plan to restart heparin gtt pending results. SLP consult for diet, plan for FEES tomorrow. Started lovenox. Started seoquel 25qHS. Resp cx w GNR, many GPCs- has low grade temps possibly 2/2 DVT and stable mild leukocytosis so will hold off on abx for now.   05/13/2025 Delirium overnight requiring zyprexa x2. Restarted on precedex today and increased seroquel to 50qHS. Required 500NS bolus for hypotension after zyprexa last night. RLE arterial US negative for occlusion, only showing moderate stenosis. Decided not to continue heparin gtt given brachial DVT is in upper extremity. Will start plavix for DAPT. Respiratory cx w Klebsiella aerogenes x2, but not symptomatic. Transitioned from Ancef to cefepime. Bedside FEES completed, started regular thin diet. Given 3%  bolus x2 for Na goal > 145 and remains on 2% HTS @ 75. Prn valium for neck and back pain.   Pt has R brachial DVT and LUE 2% gtt infiltrated PIV and given hyaluronidase. L femoral CVC placed as pt had no access.   05/14/2025 Given 3%HTS bolus for Na > 145. TCDs negative for spasm. CXR reviewed. Increase cefepime course to 7d. Unable to obtain MRA while on precedex 1.4 and given versed due to pt restlessness and difficulty to sedate. Ordered MRA w anesthesia, likely tomorrow. NPO midnight. Sending full hypercoaguable w/u- likely start minimal intensity heparin gtt after sending off  w/u.  5/15/2025: MRA brain w/wout con today, switched plavix to baby ASA, follow CT head tomorrow AM, increase 2% gtt to 100 ml/hr (Na goal > 145), Zyprexa once PRN available,  05/16/2025 D/c 2% and sodium goal. Max precedex to 0.6. Add miralax. Complaints of bilateral intermittent ear pain (R>L). Trial steroids.        Interval History:  NAEON.     Review of Systems   HENT:  Positive for ear pain.        Objective:     Vitals:  Temp: 97.8 °F (36.6 °C)  Pulse: 96  Rhythm: normal sinus rhythm  BP: (!) 158/77  MAP (mmHg): 110  ICP Mean (mmHg): 14 mmHg  Resp: 20  SpO2: 96 %    Temp  Min: 97.8 °F (36.6 °C)  Max: 98.8 °F (37.1 °C)  Pulse  Min: 72  Max: 108  BP  Min: 97/61  Max: 158/77  MAP (mmHg)  Min: 72  Max: 110  ICP Mean (mmHg)  Min: 4 mmHg  Max: 14 mmHg  Resp  Min: 16  Max: 53  SpO2  Min: 93 %  Max: 100 %    05/15 0701 - 05/16 0700  In: 3542.5 [P.O.:240; I.V.:3054]  Out: 4530 [Urine:4250; Drains:280]   Unmeasured Output  Unmeasured Urine Occurrence: 1  Unmeasured Stool Occurrence: 0  Pad Count: 1        Physical Exam  Vitals reviewed.   Constitutional:       Appearance: She is obese. She is ill-appearing.   HENT:      Head:      Comments: EVD  No clicking with open/closing of jaw     Right Ear: Tympanic membrane and external ear normal.      Left Ear: Tympanic membrane and external ear normal.      Nose: Nose normal.      Mouth/Throat:      Mouth: Mucous membranes are moist.   Eyes:      Pupils: Pupils are equal, round, and reactive to light.   Cardiovascular:      Rate and Rhythm: Normal rate.   Pulmonary:      Effort: Pulmonary effort is normal.   Abdominal:      Palpations: Abdomen is soft.   Skin:     General: Skin is warm and dry.   Neurological:      Comments: --GCS: E4 V5 M6  --Mental Status:  awake, oriented x 4  --Pivots from bed to bedside commode  --Follows all commands  --Pupils reactive   --URENA spont              Medications:  ContinuousdexmedeTOMIDine (Precedex) infusion (titrating), Last Rate: 0.3  mcg/kg/hr (05/16/25 0948)  heparin (porcine) in D5W, Last Rate: 13 Units/kg/hr (05/16/25 0601)    Scheduledaspirin, 81 mg, Daily  atorvastatin, 40 mg, Daily  ceFEPime IV (PEDS and ADULTS), 2 g, Q8H  dexAMETHasone injection, 2 mg, Q8H  niMODipine, 60 mg, Q4H  QUEtiapine, 50 mg, BID  senna-docusate, 1 tablet, BID  valsartan, 80 mg, Daily    PRNacetaminophen, 650 mg, Q6H PRN  bisacodyL, 10 mg, Daily PRN  diazePAM, 10 mg, Q6H PRN  hydrALAZINE, 10 mg, Q4H PRN  labetalol, 10 mg, Q4H PRN  magnesium oxide, 800 mg, PRN  magnesium oxide, 800 mg, PRN  melatonin, 6 mg, Nightly PRN  methocarbamoL, 750 mg, Q6H PRN  oxyCODONE, 5 mg, Q6H PRN  potassium bicarbonate, 35 mEq, PRN  potassium bicarbonate, 50 mEq, PRN  potassium bicarbonate, 60 mEq, PRN  potassium, sodium phosphates, 2 packet, PRN  potassium, sodium phosphates, 2 packet, PRN  potassium, sodium phosphates, 2 packet, PRN  sodium chloride 0.9%, 10 mL, PRN      Today I personally reviewed pertinent medications, lines/drains/airways, imaging, cardiology results, laboratory results, microbiology results, notably:    Diet  Diet Adult Regular  Diet Adult Regular        Assessment/Plan:     Neuro  * SAH (subarachnoid hemorrhage)  Patient is a 49-year-old female with past medical history of hypertension presenting via transfer from [place] for higher level of care after being diagnosed with subarachnoid hemorrhage.    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  S/p EVD 5/6  S/p Coil emolization 5/6  CTH 5/11 with new R PICA/ superior cerebellar infarct.      - EVD open at 10  - Neurosurgery, vascular neurology consulted  - SBP< 160   - nimodipine 60 mg q4h   - daily TCDs - negative for vasospasm thus far  - Aspirin 81 and Plavix 75 qd  - atorvastatin 40 mg  - euvolemia  - SCDs; lovenox for VTE ppx  - Delirium precautions.   - Precedex gtt   - PT/OT      MRA brain overnight reviewed      Brain edema  See cerebellar stroke      Cerebellar stroke, acute  CT on 5/11 revealed R cerebellar infarct. Likely uriah-angio  Family aware and imaging reviewed  Atorvastatin 40  ASA 81 and Plavix 75mg qd  RLE arterial US negative for occlusion, only showing moderate stenosis. Decided not to continue heparin gtt given brachial DVT is in upper extremity. Will continue w DAPT and DVT ppx w lovenox instead of full AC.   Concern for brain compression in post fossa  SOC watch, NSGY following       Obstructive hydrocephalus  EVD @ 10  R Cerebellar infarct, SOC watch.      Brain compression  monitor EVD output closely  Neurochecks  See primary problem       Cardiac/Vascular  Essential hypertension  SBP <160  PRN labetalol, hydralazine  Valsartan 80 mg    Hematology  History of DVT in adulthood  Pt has a hx of RLE DVT and was started on xarelto. 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 She has been seen by heme onc in the past.      Warfarin currently held  Pt was on a Heparin gtt for for R brachial DVT and hx arterial occlusion. 5/11/25 CTH showed large R cerebellar infarct and heparin gtt was held on 5/11. Lower extremity arterial US was negative for occlusion, only showing moderate stenosis (50-75%). BLE DVT US was also negative for DVT. Decided not to continue heparin gtt given brachial DVT is in upper extremity. Was only continuing with DAPT and then lovenox for VTE ppx  Sending full hypercoaguable w/u   Continue  minimal intensity heparin gtt           The patient is being Prophylaxed for:  Venous Thromboembolism with: Mechanical or Chemical  Stress Ulcer with: H2B  Ventilator Pneumonia with: none    Activity Orders            Diet Adult Regular: Regular starting at 05/15 2025    Progressive Mobility Protocol (mobilize patient to their highest level of functioning at least twice daily) starting at 05/06 2000    Turn patient starting at 05/06 0800          Full Code     47 minutes of Critical care  time was spent personally by me on the following activities: development of treatment plan with patient or surrogate and bedside caregivers, discussions with consultants, evaluation of patient's response to treatment, examination of patient, ordering and performing treatments and interventions, ordering and review of laboratory studies, ordering and review of radiographic studies, pulse oximetry, antibiotic titration if applicable, vasopressor titration if applicable, re-evaluation of patient's condition. This critical care time did not overlap with that of any other provider or involve time for any procedures. There is high probability for acute neurological change leading to clinical and possibly life-threatening deterioration requiring highest level of provider preparedness for urgent intervention.     Liana Irizarry PA-C  Neurocritical Care  [facility] - Neuro Critical Care

## 2025-05-16 NOTE — PT/OT/SLP PROGRESS
"Speech Language Pathology Treatment    Patient Name:  Waldo Emmanuel   MRN:  [id number]  Admitting Diagnosis: SAH (subarachnoid hemorrhage)    Recommendations:                 General Recommendations:  Cognitive-linguistic therapy  Diet recommendations:  Regular, Liquid Diet Level: Thin   Aspiration Precautions: Feed only when awake/alert, upright with all PO, Supervision with all PO, Meds whole 1 at a time, and Standard aspiration precautions   General Precautions: Standard, aspiration, fall  Communication strategies:  provide increased time to answer    Assessment:     Waldo Emmanuel is a 49 y.o. female with an SLP diagnosis of Cognitive-Linguistic Impairment.  She presents with increased c/o R ear pain, RN and MD team aware.    Subjective     SLP reviewed Pt with RN   Pt presents lethargic  She asks, "How much chalk is still left on the floor?"     Pain/Comfort:  Pain Rating 1: 5/10  Location - Side 1: Right  Location 1: ear  Pain Addressed 1: Nurse notified, Other (see comments) (RN notified PA)  Pain Rating Post-Intervention 1: other (see comments) (did not rate)  Pain Rating 2: 5/10  Location - Orientation 2: generalized  Location 2: head  Pain Addressed 2: Nurse notified  Pain Rating Post-Intervention 2: 5/10    Respiratory Status: Room air    Objective:     Has the patient been evaluated by SLP for swallowing?   Yes  Keep patient NPO? No     Pt found asleep, reclined in bed with EVD in place. RN and MD in and out of room during session. Pt with decreased sustained attention and easily transitioned to sleep state when not provided consistent cues. Pt oriented to name and CRUZ. She required mod cues for immediate recall for place.  Pt with language of confusion and decreased organization of thoughts t/o conversational speech tasks. RN in room and aware. Her voice was clear with low intensity. Her speech was precise and with mildly fast rate of speech t/o conversational speech tasks which required " occasional cues to clarify. She closed her eyes t/o most of session 2/2 c/o diplopia and sensitivity to light. Ongoing assessment of writing deferred 2/2 pain, decreased attention and diplopia. She completed fx math tasks WNL. She provided incomplete/partial responses to problem solving tasks which required mod cues to clarify. Pt with decreased sustained attention and asked questions about medical POC as session progressed, questions reviewed with RN and deferred to MD team.  Pt and Daughter were educated on ongoing safety precautions and SLP POC. No additional questions. Pt remained in bed with her RN in the room upon SLP exit.     Goals:   Multidisciplinary Problems       SLP Goals          Problem: SLP    Goal Priority Disciplines Outcome   SLP Goal     SLP Progressing   Description: Speech Language Pathology Goals  Goals expected to be met by 5/21/25    1. TOlerate regular diet with thin liquids with no s/s of airway compromise  2.  Assess functional reading and writing skills.    3.  Respond to problem solving/categorization tasks with 90% accuracy                       Plan:     Patient to be seen:  4 x/week   Plan of Care expires:  06/07/25  Plan of Care reviewed with:  patient, daughter   SLP Follow-Up:  Yes       Discharge recommendations:  High Intensity Therapy     Time Tracking:     SLP Treatment Date:   05/16/25  Speech Start Time:  0952  Speech Stop Time:  1016     Speech Total Time (min):  24 min    Billable Minutes: Speech Therapy Individual 8 and Self Care/Home Management Training 13    05/16/2025

## 2025-05-16 NOTE — ASSESSMENT & PLAN NOTE
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 Patient transferred to [facility] for IR evaluation. DSA performed 5/6/25 demonstrated the above aneurysm, which was secured with coiling.     5/16/2025 s/p EVD and DSA with coil embolization. Imaging obtained on 5/11 and significant for R cerebellar infarct , likely periprocedural. MRA completed yesterday, no plans for open clipping currently. Patient complaining of ear pain and headache. Remains with EVD on precedex.       Antithrombotics for secondary stroke prevention: Antiplatelets: Aspirin: 81 mg daily    Statins for secondary stroke prevention and hyperlipidemia, if present:   Statins: Atorvastatin- 40 mg daily    Aggressive risk factor modification: HTN, HLD, Diet, Exercise, Obesity     Rehab efforts: The patient has been evaluated by a stroke team provider and the therapy needs have been fully considered based off the presenting complaints and exam findings. The following therapy evaluations are needed: PT evaluate and treat, OT evaluate and treat, SLP evaluate and treat    Diagnostics ordered/pending: None     VTE prophylaxis: None: Reason for No Pharmacological VTE Prophylaxis: Known or suspected cerebral aneurysm or AVM    BP parameters: SAH: Secured aneurysm, no target, increase BP to prevent vasospasm if needed

## 2025-05-16 NOTE — EICU
Virtual ICU Quality Rounds    Admit Date: 5/6/2025  Hospital Day: 10    ICU Day: 10d 8h    24H Vital Sign Range:  Temp:  [97 °F (36.1 °C)-98.4 °F (36.9 °C)]   Pulse:  []   Resp:  [16-53]   BP: ()/(60-90)   SpO2:  [93 %-100 %]     VICU Surveillance Screening    Daily review of  line necessity(optional): Central line(s) in place    Central line type (optional): Triple lumen catheter    Central line indications : Multiple infusions    LDA reconciliation : Yes

## 2025-05-16 NOTE — PLAN OF CARE
[facility] Care Plan  POC reviewed with Waldo Emmanuel and family at 1400. Patient and Family verbalized understanding. Questions and concerns addressed. No acute events today. Pt progressing toward goals. See below and flowsheets for full assessment and VS info.     - MRA completed with anesthesia team  - Pt ok to stand and pivot with stand-by assist  - EVD open at 10  - Oxy given x 2 for back pain and Headache  - Tylenol given x 2 for back pain and Headache  - Heparin at 13  - 2% NacL at 100  - Precedex at 0.4  - Linens changed          Is this a stroke patient? yes- Stroke booklet reviewed with family and is at bedside.   Care Plan Individualization:     Neuro:  [place] Coma Scale  Best Eye Response: 4-->(E4) spontaneous  Best Motor Response: 6-->(M6) obeys commands  Best Verbal Response: 5-->(V5) oriented  [place] Coma Scale Score: 15  Assessment Qualifiers: patient chemically sedated or paralyzed, no eye obstruction present  Pupil PERRLA: yes     24 hr Temp:  [98.1 °F (36.7 °C)-98.8 °F (37.1 °C)]     CV:   Rhythm: normal sinus rhythm  BP goals:   SBP < 160  MAP > 65    Resp:      Vent Mode: Spont  Set Rate: 20 BPM  Oxygen Concentration (%): 40  Vt Set: 375 mL  PEEP/CPAP: 5 cmH20  Pressure Support: 5 cmH20    Plan: N/A    GI/:     Diet/Nutrition Received: NPO  Last Bowel Movement: 05/13/25  Voiding Characteristics: external catheter    Intake/Output Summary (Last 24 hours) at 5/15/2025 2013  Last data filed at 5/15/2025 1801  Gross per 24 hour   Intake 3114.15 ml   Output 4058 ml   Net -943.85 ml     Unmeasured Output  Unmeasured Urine Occurrence: 1  Unmeasured Stool Occurrence: 0  Pad Count: 1    Labs/Accuchecks:  Recent Labs   Lab 05/15/25  0606   WBC 13.54*   RBC 2.74*   HGB 7.7*   HCT 25.2*         Recent Labs   Lab 05/15/25  0243 05/15/25  0940 05/15/25  1342      < > 145   K 3.9  --   --    CO2 23  --   --    *  --   --    BUN 13  --   --    CREATININE 0.6  --   --    ALKPHOS 83  --   " --    ALT 19  --   --    AST 25  --   --    BILITOT 0.5  --   --     < > = values in this interval not displayed.      Recent Labs   Lab 05/15/25  0530 05/15/25  1342   PROTIME 11.7  --    INR 1.1  --    APTT 21.5 38.3*    No results for input(s): "CPK", "CPKMB", "TROPONINI", "MB" in the last 168 hours.    Electrolytes: Electrolytes replaced  Accuchecks: none    Gtts:   dexmedeTOMIDine (Precedex) infusion (titrating)  0-1.4 mcg/kg/hr Intravenous Continuous 10.02 mL/hr at 05/15/25 1832 0.4 mcg/kg/hr at 05/15/25 1832    heparin (porcine) in D5W  0-40 Units/kg/hr Intravenous Continuous 13 mL/hr at 05/15/25 1801 13 Units/kg/hr at 05/15/25 1801    sodium chloride (23.4%) HYPERTONIC 4 mEq/mL 172 mEq in sterile water 500 mL (sodium chloride 2%) infusion   Intravenous Continuous 100 mL/hr at 05/15/25 1801 Rate Verify at 05/15/25 1801       LDA/Wounds:    Ivan Risk Assessment  Sensory Perception: 4-->no impairment  Moisture: 3-->occasionally moist  Activity: 3-->walks occasionally  Mobility: 3-->slightly limited  Nutrition: 2-->probably inadequate  Friction and Shear: 3-->no apparent problem  Ivan Score: 18    Is your ivan score 12 or less? no            Restraints:   Restraint Order  Length of Order: Order good for next 24 hours or when removed.  Date that the current order will : 05/15/25  Time that the current order will : 1151  Order Upon Application: Yes      Problem: Infection  Goal: Absence of Infection Signs and Symptoms  Outcome: Progressing     Problem: Skin Injury Risk Increased  Goal: Skin Health and Integrity  Outcome: Progressing     Problem: Adult Inpatient Plan of Care  Goal: Plan of Care Review  Outcome: Progressing  Goal: Patient-Specific Goal (Individualized)  Outcome: Progressing  Goal: Absence of Hospital-Acquired Illness or Injury  Outcome: Progressing  Goal: Optimal Comfort and Wellbeing  Outcome: Progressing  Goal: Readiness for Transition of Care  Outcome: Progressing     Problem: " Stroke, Intracerebral Hemorrhage  Goal: Optimal Coping  Outcome: Progressing  Goal: Effective Bowel Elimination  Outcome: Progressing  Goal: Optimal Cerebral Tissue Perfusion  Outcome: Progressing  Goal: Optimal Cognitive Function  Outcome: Progressing  Goal: Effective Communication Skills  Outcome: Progressing  Goal: Optimal Functional Ability  Outcome: Progressing  Goal: Optimal Nutrition Intake  Outcome: Progressing  Goal: Optimal Pain Control and Function  Outcome: Progressing  Goal: Effective Oxygenation and Ventilation  Outcome: Progressing  Goal: Improved Sensorimotor Function  Outcome: Progressing  Goal: Safe and Effective Swallow  Outcome: Progressing  Goal: Effective Urinary Elimination  Outcome: Progressing     Problem: Fall Injury Risk  Goal: Absence of Fall and Fall-Related Injury  Outcome: Progressing     Problem: Wound  Goal: Optimal Coping  Outcome: Progressing  Goal: Optimal Functional Ability  Outcome: Progressing  Goal: Absence of Infection Signs and Symptoms  Outcome: Progressing  Goal: Improved Oral Intake  Outcome: Progressing  Goal: Optimal Pain Control and Function  Outcome: Progressing  Goal: Skin Health and Integrity  Outcome: Progressing  Goal: Optimal Wound Healing  Outcome: Progressing

## 2025-05-17 PROBLEM — I60.6: Status: ACTIVE | Noted: 2025-05-06

## 2025-05-17 LAB
ABSOLUTE EOSINOPHIL (OHS): 0.03 K/UL
ABSOLUTE MONOCYTE (OHS): 0.52 K/UL (ref 0.3–1)
ABSOLUTE NEUTROPHIL COUNT (OHS): 15.22 K/UL (ref 1.8–7.7)
ALBUMIN SERPL BCP-MCNC: 3 G/DL (ref 3.5–5.2)
ALP SERPL-CCNC: 91 UNIT/L (ref 40–150)
ALT SERPL W/O P-5'-P-CCNC: 20 UNIT/L (ref 10–44)
ANION GAP (OHS): 8 MMOL/L (ref 8–16)
APTT PPP: 45.6 SECONDS (ref 21–32)
AST SERPL-CCNC: 19 UNIT/L (ref 11–45)
BASOPHILS # BLD AUTO: 0.04 K/UL
BASOPHILS NFR BLD AUTO: 0.2 %
BILIRUB SERPL-MCNC: 0.4 MG/DL (ref 0.1–1)
BUN SERPL-MCNC: 9 MG/DL (ref 6–20)
CALCIUM SERPL-MCNC: 9.3 MG/DL (ref 8.7–10.5)
CHLORIDE SERPL-SCNC: 106 MMOL/L (ref 95–110)
CO2 SERPL-SCNC: 24 MMOL/L (ref 23–29)
CREAT SERPL-MCNC: 0.6 MG/DL (ref 0.5–1.4)
ERYTHROCYTE [DISTWIDTH] IN BLOOD BY AUTOMATED COUNT: 13.8 % (ref 11.5–14.5)
GFR SERPLBLD CREATININE-BSD FMLA CKD-EPI: >60 ML/MIN/1.73/M2
GLUCOSE SERPL-MCNC: 127 MG/DL (ref 70–110)
HCT VFR BLD AUTO: 27.3 % (ref 37–48.5)
HGB BLD-MCNC: 8.3 GM/DL (ref 12–16)
IMM GRANULOCYTES # BLD AUTO: 0.2 K/UL (ref 0–0.04)
IMM GRANULOCYTES NFR BLD AUTO: 1.1 % (ref 0–0.5)
LYMPHOCYTES # BLD AUTO: 2.63 K/UL (ref 1–4.8)
MAGNESIUM SERPL-MCNC: 1.7 MG/DL (ref 1.6–2.6)
MCH RBC QN AUTO: 27.6 PG (ref 27–31)
MCHC RBC AUTO-ENTMCNC: 30.4 G/DL (ref 32–36)
MCV RBC AUTO: 91 FL (ref 82–98)
NUCLEATED RBC (/100WBC) (OHS): 0 /100 WBC
PHOSPHATE SERPL-MCNC: 3.1 MG/DL (ref 2.7–4.5)
PLATELET # BLD AUTO: 433 K/UL (ref 150–450)
PLPETH BLD-MCNC: <10 NG/ML
PMV BLD AUTO: 9.6 FL (ref 9.2–12.9)
POPETH BLD-MCNC: <10 NG/ML
POTASSIUM SERPL-SCNC: 3.9 MMOL/L (ref 3.5–5.1)
PROT SERPL-MCNC: 7.2 GM/DL (ref 6–8.4)
RBC # BLD AUTO: 3.01 M/UL (ref 4–5.4)
RELATIVE EOSINOPHIL (OHS): 0.2 %
RELATIVE LYMPHOCYTE (OHS): 14.1 % (ref 18–48)
RELATIVE MONOCYTE (OHS): 2.8 % (ref 4–15)
RELATIVE NEUTROPHIL (OHS): 81.6 % (ref 38–73)
SODIUM SERPL-SCNC: 138 MMOL/L (ref 136–145)
TOXICOLOGIST REVIEW: NORMAL
WBC # BLD AUTO: 18.64 K/UL (ref 3.9–12.7)

## 2025-05-17 PROCEDURE — 25000003 PHARM REV CODE 250

## 2025-05-17 PROCEDURE — 25000003 PHARM REV CODE 250: Performed by: PSYCHIATRY & NEUROLOGY

## 2025-05-17 PROCEDURE — 94761 N-INVAS EAR/PLS OXIMETRY MLT: CPT

## 2025-05-17 PROCEDURE — 99499 UNLISTED E&M SERVICE: CPT | Mod: ,,,

## 2025-05-17 PROCEDURE — 84100 ASSAY OF PHOSPHORUS: CPT

## 2025-05-17 PROCEDURE — 87529 HSV DNA AMP PROBE: CPT

## 2025-05-17 PROCEDURE — 99233 SBSQ HOSP IP/OBS HIGH 50: CPT | Mod: ,,, | Performed by: PSYCHIATRY & NEUROLOGY

## 2025-05-17 PROCEDURE — 63600175 PHARM REV CODE 636 W HCPCS: Performed by: PSYCHIATRY & NEUROLOGY

## 2025-05-17 PROCEDURE — 97530 THERAPEUTIC ACTIVITIES: CPT

## 2025-05-17 PROCEDURE — 25000003 PHARM REV CODE 250: Performed by: NURSE PRACTITIONER

## 2025-05-17 PROCEDURE — 83735 ASSAY OF MAGNESIUM: CPT

## 2025-05-17 PROCEDURE — 85730 THROMBOPLASTIN TIME PARTIAL: CPT | Performed by: PSYCHIATRY & NEUROLOGY

## 2025-05-17 PROCEDURE — 82435 ASSAY OF BLOOD CHLORIDE: CPT

## 2025-05-17 PROCEDURE — 63600175 PHARM REV CODE 636 W HCPCS

## 2025-05-17 PROCEDURE — 20000000 HC ICU ROOM

## 2025-05-17 PROCEDURE — 99233 SBSQ HOSP IP/OBS HIGH 50: CPT | Mod: ,,, | Performed by: NEUROLOGICAL SURGERY

## 2025-05-17 PROCEDURE — 97535 SELF CARE MNGMENT TRAINING: CPT

## 2025-05-17 PROCEDURE — 85025 COMPLETE CBC W/AUTO DIFF WBC: CPT | Performed by: PSYCHIATRY & NEUROLOGY

## 2025-05-17 PROCEDURE — 99291 CRITICAL CARE FIRST HOUR: CPT | Mod: FS,,, | Performed by: PSYCHIATRY & NEUROLOGY

## 2025-05-17 RX ORDER — QUETIAPINE FUMARATE 25 MG/1
100 TABLET, FILM COATED ORAL NIGHTLY
Status: DISCONTINUED | OUTPATIENT
Start: 2025-05-17 | End: 2025-05-19

## 2025-05-17 RX ORDER — PREGABALIN 75 MG/1
75 CAPSULE ORAL 2 TIMES DAILY
Status: DISCONTINUED | OUTPATIENT
Start: 2025-05-17 | End: 2025-05-26 | Stop reason: HOSPADM

## 2025-05-17 RX ORDER — QUETIAPINE FUMARATE 25 MG/1
50 TABLET, FILM COATED ORAL DAILY
Status: DISCONTINUED | OUTPATIENT
Start: 2025-05-18 | End: 2025-05-20

## 2025-05-17 RX ADMIN — HEPARIN SODIUM AND DEXTROSE 13 UNITS/KG/HR: 10000; 5 INJECTION INTRAVENOUS at 05:05

## 2025-05-17 RX ADMIN — CEFEPIME 2 G: 2 INJECTION, POWDER, FOR SOLUTION INTRAVENOUS at 08:05

## 2025-05-17 RX ADMIN — ACETAMINOPHEN 650 MG: 325 TABLET ORAL at 06:05

## 2025-05-17 RX ADMIN — FAMOTIDINE 20 MG: 20 TABLET, FILM COATED ORAL at 09:05

## 2025-05-17 RX ADMIN — VALSARTAN 80 MG: 80 TABLET, FILM COATED ORAL at 09:05

## 2025-05-17 RX ADMIN — NIMODIPINE 60 MG: 30 CAPSULE, LIQUID FILLED ORAL at 10:05

## 2025-05-17 RX ADMIN — PREGABALIN 75 MG: 75 CAPSULE ORAL at 12:05

## 2025-05-17 RX ADMIN — NIMODIPINE 60 MG: 30 CAPSULE, LIQUID FILLED ORAL at 09:05

## 2025-05-17 RX ADMIN — NIMODIPINE 60 MG: 30 CAPSULE, LIQUID FILLED ORAL at 02:05

## 2025-05-17 RX ADMIN — ACETAMINOPHEN 650 MG: 325 TABLET ORAL at 12:05

## 2025-05-17 RX ADMIN — CEFEPIME 2 G: 2 INJECTION, POWDER, FOR SOLUTION INTRAVENOUS at 03:05

## 2025-05-17 RX ADMIN — ASPIRIN 81 MG CHEWABLE TABLET 81 MG: 81 TABLET CHEWABLE at 09:05

## 2025-05-17 RX ADMIN — NIMODIPINE 60 MG: 30 CAPSULE, LIQUID FILLED ORAL at 05:05

## 2025-05-17 RX ADMIN — CEFEPIME 2 G: 2 INJECTION, POWDER, FOR SOLUTION INTRAVENOUS at 12:05

## 2025-05-17 RX ADMIN — ATORVASTATIN CALCIUM 40 MG: 40 TABLET, FILM COATED ORAL at 09:05

## 2025-05-17 RX ADMIN — OXYCODONE 5 MG: 5 TABLET ORAL at 06:05

## 2025-05-17 RX ADMIN — ACETAMINOPHEN 650 MG: 325 TABLET ORAL at 02:05

## 2025-05-17 RX ADMIN — DEXMEDETOMIDINE HYDROCHLORIDE 0.2 MCG/KG/HR: 4 INJECTION INTRAVENOUS at 01:05

## 2025-05-17 RX ADMIN — PREGABALIN 75 MG: 75 CAPSULE ORAL at 08:05

## 2025-05-17 RX ADMIN — NIMODIPINE 60 MG: 30 CAPSULE, LIQUID FILLED ORAL at 06:05

## 2025-05-17 RX ADMIN — Medication 800 MG: at 06:05

## 2025-05-17 RX ADMIN — OXYCODONE 5 MG: 5 TABLET ORAL at 02:05

## 2025-05-17 RX ADMIN — DEXMEDETOMIDINE HYDROCHLORIDE 0.3 MCG/KG/HR: 4 INJECTION INTRAVENOUS at 02:05

## 2025-05-17 RX ADMIN — OXYCODONE 5 MG: 5 TABLET ORAL at 12:05

## 2025-05-17 RX ADMIN — DEXMEDETOMIDINE HYDROCHLORIDE 0.3 MCG/KG/HR: 4 INJECTION INTRAVENOUS at 05:05

## 2025-05-17 RX ADMIN — DEXAMETHASONE SODIUM PHOSPHATE 2 MG: 4 INJECTION INTRA-ARTICULAR; INTRALESIONAL; INTRAMUSCULAR; INTRAVENOUS; SOFT TISSUE at 06:05

## 2025-05-17 RX ADMIN — SENNOSIDES AND DOCUSATE SODIUM 1 TABLET: 50; 8.6 TABLET ORAL at 09:05

## 2025-05-17 NOTE — PT/OT/SLP PROGRESS
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  She presents with the following performance deficits affecting function:  weakness, impaired endurance, impaired self care skills, impaired functional mobility, gait instability, impaired balance, decreased upper extremity function, decreased lower extremity function, decreased safety awareness, impaired cognition.     Pt agreeable to therapy and tolerated the session well. RN clamped EVD prior to start of the session. Pt worked on dynamic standing balance for ~ 10 minutes while performing grooming tasks at the bedside table. Pt demonstrating improvements with standing now only requiring SBA for the entire 10 minutes. Pt did require frequent cues for safety not to walk away from her lines, but is easily redirectable. Pt then transferred to the bedside chair with CGA where she was left to sit up and visit with family. Pt would benefit from continued skilled acute OT services during this admission in order to maximize independence and safety with ADLs and functional mobility to ensure safe return to PLOF in the least restrictive environment. Patient presents with good participation and motivation to return to prior level of function with high intensity therapy. The patient demonstrates appropriate endurance and strength to participate in up to 3 hours or 15hrs of combined therapy post acute.      Rehab Prognosis:  Good; patient would benefit from acute skilled OT services to address these  deficits and reach maximum level of function.       Plan:     Patient to be seen 4 x/week to address the above listed problems via self-care/home management, therapeutic activities, therapeutic exercises, neuromuscular re-education  Plan of Care Expires: 06/11/25  Plan of Care Reviewed with: patient    Subjective     Chief Complaint: none stated   Patient/Family Comments/goals: To return to PLOF  Pain/Comfort:  Pain Rating 1: 0/10  Pain Rating 2: 0/10    Objective:     Communicated with: RN prior to session.  Patient found HOB elevated with pulse ox (continuous), telemetry, blood pressure cuff, external ventricular drain, peripheral IV, central line upon OT entry to room.    General Precautions: Standard, fall    Orthopedic Precautions:N/A  Braces: N/A  Respiratory Status: Room air     Occupational Performance:     Bed Mobility:    Patient completed Scooting/Bridging with contact guard assistance  Patient completed Supine to Sit with moderate assistance     Functional Mobility/Transfers:  Patient completed Sit <> Stand Transfer with contact guard assistance  with  no assistive device   Patient completed Bed <> Chair Transfer using Step Transfer technique with contact guard assistance with hand-held assist  Pt stood for 10 minutes with SBA     Activities of Daily Living:  Grooming: stand by assistance : to complete oral care in standing at the bedside table   Lower Body Dressing: maximal assistance : To don socks at bed level       [facility] 6 Click ADL: 19    Treatment & Education:  Therapist provided facilitation and instruction of proper body mechanics and fall prevention strategies during tasks listed above.  Instructed patient to sit in bedside chair daily to increase OOB/activity tolerance.  Instructed patient to use call light to have nursing staff assist with needs/transfers.  Discussed OT POC and answered all questions within OT scope of practice.  Whiteboard updated         Patient left up in chair with all  lines intact, call button in reach, chair alarm on, and RN notified    GOALS:   Multidisciplinary Problems       Occupational Therapy Goals          Problem: Occupational Therapy    Goal Priority Disciplines Outcome Interventions   Occupational Therapy Goal     OT, PT/OT Progressing    Description: Goals to be met by: 6/11/2025     Patient will increase functional independence with ADLs by performing:    UE Dressing with Supervision.  LE Dressing with Supervision.  Grooming while standing at sink with Supervision.  Toileting from toilet with Supervision for hygiene and clothing management.   Toilet transfer to toilet with Supervision.                         Time Tracking:     OT Date of Treatment: 05/17/25  OT Start Time: 0934  OT Stop Time: 1012  OT Total Time (min): 38 min    Billable Minutes:Self Care/Home Management 28  Therapeutic Activity 10    OT/ALEXUS: OT          5/17/2025

## 2025-05-17 NOTE — NURSING
"Another RN changed pt linens and gown and informed RN of a "blister like lesion on pt bottom".  When RN asked to see and document area, pt declined.   "

## 2025-05-17 NOTE — PLAN OF CARE
[facility] Care Plan    POC reviewed with Waldo Emmanuel at 0000. Patient verbalized understanding; but needs reinforcement. Questions and concerns addressed. No acute events today. Pt progressing toward goals. Will continue to monitor. See below and flowsheets for full assessment and VS info.     - Precedex gtt started d/t pt restlessness and trying to get out of bed  - Mag replaced  - Heparin gtt remains @ 13 units; pt tolerating well  - EVD remains in place  - EVD output 0-30  - ICPs between 2-11  - See notes for events overnight          Is this a stroke patient? yes- Stroke booklet reviewed with patient and is at bedside.   Care Plan Individualization:     Neuro:  [place] Coma Scale  Best Eye Response: 4-->(E4) spontaneous  Best Motor Response: 6-->(M6) obeys commands  Best Verbal Response: 5-->(V5) oriented  [place] Coma Scale Score: 15  Assessment Qualifiers: patient chemically sedated or paralyzed  Pupil PERRLA: yes     24 hr Temp:  [97 °F (36.1 °C)-98.5 °F (36.9 °C)]     CV:   Rhythm: normal sinus rhythm  BP goals:   SBP < 160  MAP > 65    Resp:      Vent Mode: Spont  Set Rate: 20 BPM  Oxygen Concentration (%): 40  Vt Set: 375 mL  PEEP/CPAP: 5 cmH20  Pressure Support: 5 cmH20    Plan: N/A    GI/:     Diet/Nutrition Received: regular  Last Bowel Movement: 05/16/25  Voiding Characteristics: voids spontaneously without difficulty    Intake/Output Summary (Last 24 hours) at 5/17/2025 0430  Last data filed at 5/17/2025 0405  Gross per 24 hour   Intake 1870.26 ml   Output 3096 ml   Net -1225.74 ml     Unmeasured Output  Unmeasured Urine Occurrence: 1  Unmeasured Stool Occurrence: 0  Pad Count: 1    Labs/Accuchecks:  Recent Labs   Lab 05/17/25  0217   WBC 18.64*   RBC 3.01*   HGB 8.3*   HCT 27.3*         Recent Labs   Lab 05/17/25  0217      K 3.9   CO2 24      BUN 9   CREATININE 0.6   ALKPHOS 91   ALT 20   AST 19   BILITOT 0.4      Recent Labs   Lab 05/15/25  0530 05/15/25  1342  "25  0211   PROTIME 11.7  --   --    INR 1.1  --   --    APTT 21.5   < > 48.0*    < > = values in this interval not displayed.    No results for input(s): "CPK", "CPKMB", "TROPONINI", "MB" in the last 168 hours.    Electrolytes: Electrolytes replaced  Accuchecks: none    Gtts:   dexmedeTOMIDine (Precedex) infusion (titrating)  0-0.6 mcg/kg/hr Intravenous Continuous 7.52 mL/hr at 25 040 0.3 mcg/kg/hr at 25    heparin (porcine) in D5W  0-40 Units/kg/hr Intravenous Continuous 13 mL/hr at 25 13 Units/kg/hr at 25       LDA/Wounds:    Ivan Risk Assessment  Sensory Perception: 4-->no impairment  Moisture: 3-->occasionally moist  Activity: 3-->walks occasionally  Mobility: 3-->slightly limited  Nutrition: 3-->adequate  Friction and Shear: 3-->no apparent problem  Ivan Score: 19  Is your ivan score 12 or less? no          Restraints:   Restraint Order  Length of Order: Order good for next 24 hours or when removed.  Date that the current order will : 05/15/25  Time that the current order will : 1151  Order Upon Application: Yes    WCTM   "

## 2025-05-17 NOTE — EICU
Virtual ICU Quality Rounds    Admit Date: 5/6/2025  Hospital Day: 11    ICU Day: 11d 5h    24H Vital Sign Range:  Temp:  [97.9 °F (36.6 °C)-98.5 °F (36.9 °C)]   Pulse:  []   Resp:  [10-36]   BP: (101-151)/()   SpO2:  [93 %-100 %]     VICU Surveillance Screening                    LDA reconciliation : Yes

## 2025-05-17 NOTE — ASSESSMENT & PLAN NOTE
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 Patient transferred to [facility] for IR evaluation. DSA performed 5/6/25 demonstrated the above aneurysm, which was secured with coiling.     5/16/2025 s/p EVD and DSA with coil embolization. Imaging obtained on 5/11 and significant for R cerebellar infarct , likely periprocedural. MRA completed yesterday, no plans for open clipping currently. Patient complaining of ear pain and headache. Remains with EVD on precedex.       Antithrombotics for secondary stroke prevention: Antiplatelets: Aspirin: 81 mg daily  Anticoagulants: Heparin protocol without bolus    Statins for secondary stroke prevention and hyperlipidemia, if present:   Statins: Atorvastatin- 40 mg daily    Aggressive risk factor modification: HTN, HLD, Diet, Exercise, Obesity     Rehab efforts: The patient has been evaluated by a stroke team provider and the therapy needs have been fully considered based off the presenting complaints and exam findings. The following therapy evaluations are needed: PT evaluate and treat, OT evaluate and treat, SLP evaluate and treat    Diagnostics ordered/pending: None     VTE prophylaxis: Mechanical prophylaxis: Place SCDs  Heparin drip    BP parameters: SAH: Secured aneurysm, no target, increase BP to prevent vasospasm if needed

## 2025-05-17 NOTE — EICU
Intervention Initiated From:  COR / MELISAU    Kendall intervened regarding:  Rounding (Video assessment)    VICU Night Rounds Checklist  24H Vital Sign Range:  Temp:  [97 °F (36.1 °C)-98.4 °F (36.9 °C)]   Pulse:  []   Resp:  [18-53]   BP: ()/()   SpO2:  [93 %-100 %]     Video rounds and LDA reconciliation

## 2025-05-17 NOTE — ASSESSMENT & PLAN NOTE
Patient is a 49-year-old female with past medical history of hypertension presenting via transfer from [place] for higher level of care after being diagnosed with subarachnoid hemorrhage.    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  S/p EVD 5/6  S/p Coil emolization 5/6  CTH 5/11 with new R PICA/ superior cerebellar infarct.    MRA 5/16 without evidence of residual aneurysm filling    - EVD open at 10-->20  - Neurosurgery, vascular neurology consulted  - SBP< 160   - nimodipine 60 mg q4h   - daily TCDs - negative for vasospasm thus far  - Aspirin 81 and Plavix 75 qd  - atorvastatin 40 mg  - euvolemia  - SCDs; lovenox for VTE ppx  - Delirium precautions.   - Precedex gtt   - PT/OT

## 2025-05-17 NOTE — ASSESSMENT & PLAN NOTE
-Stroke risk factor  -SBP goal No target, increase BP to prevnt vasospasm if needed, maintain MAP >65  -BP range in the last 24 hrs: BP  Min: 101/56  Max: 151/109  -Current antihypertensive regimen:   --PRN labetalol/hydralazine

## 2025-05-17 NOTE — PROGRESS NOTES
[facility] - Neuro Critical Care  Neurocritical Care  Progress Note    Admit Date: 5/6/2025  Service Date: 05/17/2025  Length of Stay: 11    Subjective:     Chief Complaint: Nontraumatic subarachnoid hemorrhage from other intracranial arteries    History of Present Illness: History obtained via chart review and daughter at bedside as patient intubated on arrival.     Per ED note:  49 y.o. female, PMH HTN and anemia,  presenting as a transfer for neurosurgical evaluation with newly diagnosed ICH from outside hospital.  Patient was transferred from [facility].  Daughter at bedside helps provide history.  She states that she was at a grocery store when she had a syncopal event.  She was unsure if she hit her head.  She states that people on the scene called her to let her know what happened and after she was seen in the ED they told her that she had bleeding inside of her head.  She states that while in the ED her mother was answering questions appropriately and acting like her normal self except frequently falling asleep.  Patient was intubated for airway protection prior to arrival.  Patient and reversal with [facility] prior to transfer.  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 Distal location suspicious for mycotic aneurysm     On arrival to [facility]: Neursurgery consulted, EVD placed and admitted to neurocritical care    Hospital Course: 5/7/2025: extubated today to NC, SBP liberalized to 220, d/c jay cath,   5/8/2025: EVD per NSSGY, TCD's no vasospasm, DVT prophylaxis initiated, pending to hear from NSGY team when ok to start AC  05/09/2025: repeat CXR today, plan for CT chest, resp cx , add IS, EVD @10 per NSGY, TCDs today no vasospasm  5/10: Discussed with Dr. Chase and Neurosurgery, Heparin gtt started for R brachial DVT and hx arterial occlusion, will need repeat CTH when therapeutic. Hold off on  transitioning to Coumadin until cleared by NSGY. CT Chest negative for DVT, but concerning for PNA. Repeat sputum cx sent. Regular diet started. Repeat Na improved to 138.   5/11: right cerebellar CVA on imaging, HTS, MRI, family updated at bedside, place andreia, hold heparin gtt   05/12/2025 EVD @ 10. SOC watch. PBD7- plan for MRA c/s contrast for reevaluation of coiled aneurysm. Dc keppra. TCDs negative for spasm. Given 500NS for euvolemia. Given 3% HTS 250ml bolus and continue 2% HTS gtt @ 75. Na checks q6h for goal > 145. Started valsartan 80. Lower extremity arterial US ordered given hx RLE arterial occlusion, plan to restart heparin gtt pending results. SLP consult for diet, plan for FEES tomorrow. Started lovenox. Started seoquel 25qHS. Resp cx w GNR, many GPCs- has low grade temps possibly 2/2 DVT and stable mild leukocytosis so will hold off on abx for now.   05/13/2025 Delirium overnight requiring zyprexa x2. Restarted on precedex today and increased seroquel to 50qHS. Required 500NS bolus for hypotension after zyprexa last night. RLE arterial US negative for occlusion, only showing moderate stenosis. Decided not to continue heparin gtt given brachial DVT is in upper extremity. Will start plavix for DAPT. Respiratory cx w Klebsiella aerogenes x2, but not symptomatic. Transitioned from Ancef to cefepime. Bedside FEES completed, started regular thin diet. Given 3%  bolus x2 for Na goal > 145 and remains on 2% HTS @ 75. Prn valium for neck and back pain.   Pt has R brachial DVT and LUE 2% gtt infiltrated PIV and given hyaluronidase. L femoral CVC placed as pt had no access.   05/14/2025 Given 3%HTS bolus for Na > 145. TCDs negative for spasm. CXR reviewed. Increase cefepime course to 7d. Unable to obtain MRA while on precedex 1.4 and given versed due to pt restlessness and difficulty to sedate. Ordered MRA w anesthesia, likely tomorrow. NPO midnight. Sending full hypercoaguable w/u- likely start minimal  intensity heparin gtt after sending off w/u.  5/15/2025: MRA brain w/wout con today, switched plavix to baby ASA, follow CT head tomorrow AM, increase 2% gtt to 100 ml/hr (Na goal > 145), Zyprexa once PRN available,  05/16/2025 D/c 2% and sodium goal. Max precedex to 0.6. Add miralax. Complaints of bilateral intermittent ear pain (R>L). Trial steroids.   05/17/2025 Patient clamped EVD overnight and later attempted to get out of bed on her own to shower. 30 cc of output. NSGY aware. NSGY raised EVD from 10 to 20. Add pregabalin for nerve pain.       Interval History:  See above.     Objective:     Vitals:  Temp: 98.5 °F (36.9 °C)  Pulse: 65  Rhythm: normal sinus rhythm  BP: 114/69  MAP (mmHg): 87  ICP Mean (mmHg): 4 mmHg  Resp: 20  SpO2: 96 %    Temp  Min: 97.9 °F (36.6 °C)  Max: 98.5 °F (36.9 °C)  Pulse  Min: 65  Max: 145  BP  Min: 101/56  Max: 151/109  MAP (mmHg)  Min: 72  Max: 124  ICP Mean (mmHg)  Min: 2 mmHg  Max: 14 mmHg  Resp  Min: 10  Max: 36  SpO2  Min: 93 %  Max: 100 %    05/16 0701 - 05/17 0700  In: 1530.2 [P.O.:790; I.V.:740.2]  Out: 2006 [Urine:1750; Drains:256]   Unmeasured Output  Unmeasured Urine Occurrence: 1  Unmeasured Stool Occurrence: 0  Pad Count: 1        Physical Exam  Vitals reviewed.   Constitutional:       Appearance: She is obese. She is ill-appearing.   HENT:      Head:      Comments: EVD  No clicking with open/closing of jaw     Right Ear: Tympanic membrane and external ear normal.      Left Ear: Tympanic membrane and external ear normal.      Nose: Nose normal.      Mouth/Throat:      Mouth: Mucous membranes are moist.   Eyes:      Pupils: Pupils are equal, round, and reactive to light.   Cardiovascular:      Rate and Rhythm: Normal rate.   Pulmonary:      Effort: Pulmonary effort is normal.   Abdominal:      Palpations: Abdomen is soft.   Skin:     General: Skin is warm and dry.   Neurological:      Comments: --GCS: E4 V5 M6  --Mental Status:  awake, oriented x 4  --Pivots from bed to  bedside commode  --Follows all commands  --Pupils reactive   --URENA spont                Medications:  ContinuousdexmedeTOMIDine (Precedex) infusion (titrating), Last Rate: 0.3 mcg/kg/hr (05/17/25 0901)  heparin (porcine) in D5W, Last Rate: 13 Units/kg/hr (05/17/25 0901)    Scheduledaspirin, 81 mg, Daily  atorvastatin, 40 mg, Daily  ceFEPime IV (PEDS and ADULTS), 2 g, Q8H  famotidine, 20 mg, BID  niMODipine, 60 mg, Q4H  pregabalin, 75 mg, BID  QUEtiapine, 100 mg, QHS  [START ON 5/18/2025] QUEtiapine, 50 mg, Daily  valsartan, 80 mg, Daily    PRNacetaminophen, 650 mg, Q6H PRN  bisacodyL, 10 mg, Daily PRN  diazePAM, 10 mg, Q6H PRN  hydrALAZINE, 10 mg, Q4H PRN  labetalol, 10 mg, Q4H PRN  magnesium oxide, 800 mg, PRN  magnesium oxide, 800 mg, PRN  melatonin, 6 mg, Nightly PRN  methocarbamoL, 750 mg, Q6H PRN  oxyCODONE, 5 mg, Q4H PRN  potassium bicarbonate, 35 mEq, PRN  potassium bicarbonate, 50 mEq, PRN  potassium bicarbonate, 60 mEq, PRN  potassium, sodium phosphates, 2 packet, PRN  potassium, sodium phosphates, 2 packet, PRN  potassium, sodium phosphates, 2 packet, PRN  sodium chloride 0.9%, 10 mL, PRN      Today I personally reviewed pertinent medications, lines/drains/airways, imaging, cardiology results, laboratory results, microbiology results, notably:    Imaging Results              IR Angiogram Carotid Cerebral Bilateral (Final result)  Result time 05/14/25 09:51:26      Final result by Piyush Silverio MD (05/14/25 09:51:26)                   Impression:        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  No other aneurysms were identified.    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  The parent artery remained patent on follow-up angiograms.  There were no immediate postprocedure complications.    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    Electronically signed by resident: Nadia Correa  Date:    05/08/2025  Time:    07:18    Electronically signed by: Piyush Silverio MD  Date:    05/14/2025  Time:    09:51               Narrative:      EXAM:    Cerebral angiogram.  Aneurysm embolization.    CPT CODES:    Bilateral internal carotid artery: 36224 x 2    Bilateral external carotid artery: 36227 x 2    Selective intracranial branch of the right vertebral artery: 41271    Bilateral vertebral artery: 36226 x 2    Closure device placement:     3D Reconstruction w independent workstation: [id number]    Embolization CNS permanent: [id number], [id number]    F/U Angio post embolization: 75898 x 1    CLINICAL HISTORY AND INDICATION:    Patient is a 50 y/o Female, referred for endovascular embolization of right anterior inferior cerebellar artery aneurysm.    PROCEDURE COMMENT:    Informed consent was obtained from the patient's family prior to the examination. A timeout was performed.    Sedation: Endotracheal intubation and sedation with monitoring was provided by the anesthesia department.    Initially we attempted to access the right groin under US guidance using a micropuncture needle. The microwire would not pass be external iliac artery so needle and wire were removed and hemostasis was achieved. Next, under US guidance, A 6 F sheath was placed into the left femoral artery and a 5F Robert catheter was advanced into the aortic arch.  Bilateral CCA/ICA/ECA and vertebral arteries were subselected and angiography of the brain was performed after injection into each of these vessels.    FINDINGS:  The right vertebral artery was selected and an angiogram was performed. Angiogram demonstrates excellent flow into the intracranial segments with opacification of all the branches and the vertebrobasilar junction with competitive flow from the left vertebral artery.  Small outpouching of the distal aspect of the right anterior inferior cerebellar artery although  decreased in size when compared to CTA suggesting partial thrombosis..    Also, 3D spin was performed with reconstruction at separate workstation to obtain more details from right vertebral artery and further characterize the aneurysm. It appears partially thrombosed in the beginning of the study.    The right common carotid artery was selected and an angiogram was performed. Angiogram demonstrates excellent flow the bifurcation without significant plaque buildup or stenosis.    The right internal carotid artery was selected and an angiogram was performed. Angiogram demonstrates good flow into the intracranial segments with opacification of both the anterior and middle cerebral arteries. . There are no significant stenosis.  No aneurysm identified on the right internal carotid artery injection.    The right external carotid artery was selected and angiogram was performed.  Angiogram demonstrates good flow into all the branches with no abnormal anastomosis to internal carotid artery.  No AV fistula.    The left common carotid artery was selected and an angiogram was performed. Angiogram demonstrates excellent flow at the bifurcation without significant plaque buildup or stenosis.    The left internal carotid artery was selected and an angiogram was performed. Angiogram demonstrates excellent flow into the intracranial segments with opacification of both the anterior and middle cerebral arteries.  There are no aneurysms or arteriovenous malformation is identified.  There is no significant stenosis.    The left external carotid artery was selected and angiogram was performed. Angiogram demonstrates good flow into all the branches with no abnormal anastomosis to internal carotid artery.  No AV fistula.    The left vertebral artery was selected and an angiogram was performed. Angiogram demonstrates excellent flow into the intracranial segments with opacification of all the branches of the vertebral artery as well as the  basilar artery.  There is competitive flow from the right vertebral artery.  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  There is no significant stenosis.    The venous drainage pattern were within normal limits.    PROCEDURE:    Given that the aneurysm was not well visualized from the vertebral artery injection, it was decided to perform sub selective angiograms of the basilar and right anterior inferior cerebellar artery.    For intervention purposes, Benchmark 071- 95 cm long, Jeane 5 Hungarian- 125 cm long, Headway duo 156 cm, Synchro microwire.    The right anterior inferior cerebellar artery was selected and arteriogram was performed.  There was likely an AICA/PICA complex.  Arteriograms were performed in multiple projections through the microcatheter in the AICa.  The aneurysm was difficult to measure due to partial thrombosis.    Consultation was made with neuro surgery about potential treatment options including endovascular embolization and clipping.  Risks were discussed of potential thrombus embolization endovascular treatment.  Risks of rupture with open surgery was also discussed.  Ultimately, we decided to proceed with endovascular embolization.    For the above findings, we decided to proceed with the coil embolization of the right anterior inferior cerebellar artery aneurysm.  Using a penumbra select catheter/035 glidewire system through a Benchmark 71, the right vertebral artery was selected.  Next, under roadmap guidance, advanced 5 Hungarian Jeane intermediate catheter over Headway Duo 156 cm microcatheter and 014 synchro microwire.  This was gently navigated and positioned tip of Headway Duo microcatheter to the center of the aneurysm and the Jeane catheter in the right V 4 segment.  Micro angiography was performed within the anterior inferior cerebellar artery.  Under road mapping  guidance, Cerepak coils 5 mm * 10 cm were deployed.   For follow-up angiograms were performed after coil placements, and these revealed good distribution within the aneurysm after coil deployment with progressive occlusion.  There was no compromise of the parent artery.  Final angiogram demonstrates good coil packing in the dome of the aneurysm, and there was no residual minimal filling.    The catheter was removed and an angiogram was performed through the femoral sheath demonstrating an access site appropriate for closure device.    Hemostasis was obtained in the right groin using 6 Slovak Vascade device.    The total contrast dose for the procedure was: 120 cc of Visipaque.    Radiation dose:    Radiation DAP 65488 CGycm2    Reference air kerma was 4059 mGy    Fluoro time was 32.8 minutes    Physicians in the procedure:  Dr. Piyush Silverio MD    Images and report were permanently recorded.                                       Radiology (Final result)  Result time 05/09/25 15:15:25      Final result by Unknown User (05/09/25 15:15:25)                                          Diet  Diet Adult Regular  Diet Adult Regular        Assessment/Plan:     Neuro  * Nontraumatic subarachnoid hemorrhage from cerebellar artery  Patient is a 49-year-old female with past medical history of hypertension presenting via transfer from [place] for higher level of care after being diagnosed with subarachnoid hemorrhage.    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  S/p EVD 5/6  S/p Coil emolization 5/6  CTH 5/11 with new R PICA/ superior cerebellar infarct.    MRA 5/16 without evidence of residual aneurysm filling    - EVD open at 10-->20  - Neurosurgery, vascular neurology consulted  - SBP< 160   - nimodipine 60 mg q4h   - daily TCDs - negative for vasospasm thus far  - Aspirin 81 and Plavix 75 qd  - atorvastatin 40 mg  - euvolemia  - SCDs; lovenox for VTE ppx  - Delirium precautions.    - Precedex gtt   - PT/OT      Brain edema  See cerebellar stroke     Cerebellar stroke, acute  CT on 5/11 revealed R cerebellar infarct. Likely uriah-angio  Family aware and imaging reviewed  Atorvastatin 40  ASA 81 and Plavix 75mg qd  RLE arterial US negative for occlusion, only showing moderate stenosis. Decided not to continue heparin gtt given brachial DVT is in upper extremity. Will continue w DAPT and DVT ppx w lovenox instead of full AC.   Concern for brain compression in post fossa  SOC watch, NSGY following       Acute encephalopathy  See primary problem    Obstructive hydrocephalus  EVD @ 10-->20  R Cerebellar infarct, SOC watch.      Brain compression  monitor EVD output closely  Neurochecks  See primary problem       Brain aneurysm  See primary problem    Cardiac/Vascular  Essential hypertension  SBP <160  PRN labetalol, hydralazine  Valsartan 80 mg    Hematology  History of DVT in adulthood  Pt has a hx of RLE DVT and was started on xarelto. 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 She has been seen by heme onc in the past.      Warfarin currently held  Pt was on a Heparin gtt for for R brachial DVT and hx arterial occlusion. 5/11/25 CTH showed large R cerebellar infarct and heparin gtt was held on 5/11. Lower extremity arterial US was negative for occlusion, only showing moderate stenosis (50-75%). BLE DVT US was also negative for DVT. Decided not to continue heparin gtt given brachial DVT is in upper extremity. Was only continuing with DAPT and then lovenox for VTE ppx  Sending full hypercoaguable w/u   Continue  minimal intensity heparin gtt           The patient is being Prophylaxed for:  Venous Thromboembolism with: Mechanical or Chemical  Stress Ulcer with: None  Ventilator Pneumonia with: none    Activity Orders            Diet Adult Regular: Regular starting at 05/15 2025    Progressive Mobility Protocol (mobilize patient to their highest  level of functioning at least twice daily) starting at 05/06 2000    Turn patient starting at 05/06 0800          Full Code     43 minutes of Critical care time was spent personally by me on the following activities: development of treatment plan with patient or surrogate and bedside caregivers, discussions with consultants, evaluation of patient's response to treatment, examination of patient, ordering and performing treatments and interventions, ordering and review of laboratory studies, ordering and review of radiographic studies, pulse oximetry, antibiotic titration if applicable, vasopressor titration if applicable, re-evaluation of patient's condition. This critical care time did not overlap with that of any other provider or involve time for any procedures. There is high probability for acute neurological change leading to clinical and possibly life-threatening deterioration requiring highest level of provider preparedness for urgent intervention.     Liana Irizarry PA-C  Neurocritical Care  [facility] - Neuro Critical Care

## 2025-05-17 NOTE — NURSING
EVD not draining and no waveform present when EVD was working perfectly approximately 15-30 minutes prior. NSGY called and at bedside. Pt had accidentally clamped the EVD to her. EVD unclamped, waveform present; no new orders from NSGY.

## 2025-05-17 NOTE — SUBJECTIVE & OBJECTIVE
Neurologic Chief Complaint: SAH    Subjective:     Interval History: Patient is seen for follow-up neurological assessment and treatment recommendations: See hospital course.     HPI, Past Medical, Family, and Social History remains the same as documented in the initial encounter.     Review of Systems   Constitutional:  Negative for chills and fever.   HENT:  Positive for ear pain.    Neurological:  Positive for headaches. Negative for weakness.     Scheduled Meds:   aspirin  81 mg Oral Daily    atorvastatin  40 mg Oral Daily    ceFEPime IV (PEDS and ADULTS)  2 g Intravenous Q8H    famotidine  20 mg Oral BID    niMODipine  60 mg Oral Q4H    QUEtiapine  50 mg Oral BID    senna-docusate  1 tablet Oral BID    valsartan  80 mg Oral Daily     Continuous Infusions:   dexmedeTOMIDine (Precedex) infusion (titrating)  0-0.6 mcg/kg/hr Intravenous Continuous 7.52 mL/hr at 05/17/25 0901 0.3 mcg/kg/hr at 05/17/25 0901    heparin (porcine) in D5W  0-40 Units/kg/hr Intravenous Continuous 13 mL/hr at 05/17/25 0901 13 Units/kg/hr at 05/17/25 0901     PRN Meds:  Current Facility-Administered Medications:     acetaminophen, 650 mg, Oral, Q6H PRN    bisacodyL, 10 mg, Rectal, Daily PRN    diazePAM, 10 mg, Oral, Q6H PRN    hydrALAZINE, 10 mg, Intravenous, Q4H PRN    labetalol, 10 mg, Intravenous, Q4H PRN    magnesium oxide, 800 mg, Oral, PRN    magnesium oxide, 800 mg, Oral, PRN    melatonin, 6 mg, Oral, Nightly PRN    methocarbamoL, 750 mg, Oral, Q6H PRN    oxyCODONE, 5 mg, Oral, Q4H PRN    potassium bicarbonate, 35 mEq, Oral, PRN    potassium bicarbonate, 50 mEq, Oral, PRN    potassium bicarbonate, 60 mEq, Oral, PRN    potassium, sodium phosphates, 2 packet, Oral, PRN    potassium, sodium phosphates, 2 packet, Oral, PRN    potassium, sodium phosphates, 2 packet, Oral, PRN    sodium chloride 0.9%, 10 mL, Intravenous, PRN    Objective:     Vital Signs (Most Recent):  Temp: 98.5 °F (36.9 °C) (05/17/25 0305)  Pulse: 68 (05/17/25  "0801)  Resp: (!) 22 (05/17/25 0801)  BP: 105/68 (05/17/25 0801)  SpO2: 97 % (05/17/25 0801)  BP Location: Left forearm    Vital Signs Range (Last 24H):  Temp:  [97.9 °F (36.6 °C)-98.5 °F (36.9 °C)]   Pulse:  []   Resp:  [10-36]   BP: (101-151)/()   SpO2:  [93 %-100 %]   BP Location: Left forearm       Physical Exam  Vitals and nursing note reviewed.   Constitutional:       Appearance: Normal appearance.   Neurological:      Mental Status: She is alert and oriented to person, place, and time.      Motor: No weakness.              Neurological Exam:   LOC: alert  Attention Span: Good   Language: No aphasia  Articulation: No dysarthria  Orientation: Person, Place, Time   Facial Movement (CN VII): Symmetric facial expression    Motor: Arm left  Normal 5/5  Leg left  Normal 5/5  Arm right  Normal 5/5  Leg right Normal 5/5  Sensation: Intact to light touch, temperature and vibration    Laboratory:  CMP:   Recent Labs   Lab 05/17/25  0217   CALCIUM 9.3   ALBUMIN 3.0*   PROT 7.2      K 3.9   CO2 24      BUN 9   CREATININE 0.6   ALKPHOS 91   ALT 20   AST 19   BILITOT 0.4     BMP:   Recent Labs   Lab 05/17/25  0217      K 3.9      CO2 24   BUN 9   CREATININE 0.6   CALCIUM 9.3     CBC:   Recent Labs   Lab 05/17/25 0217   WBC 18.64*   RBC 3.01*   HGB 8.3*   HCT 27.3*      MCV 91   MCH 27.6   MCHC 30.4*     Lipid Panel: No results for input(s): "CHOL", "LDLCALC", "HDL", "TRIG" in the last 168 hours.  Coagulation:   Recent Labs   Lab 05/15/25  0530 05/15/25  1342 05/16/25  0211   INR 1.1  --   --    APTT 21.5   < > 48.0*    < > = values in this interval not displayed.     Platelet Aggregation Study: No results for input(s): "PLTAGG", "PLTAGINTERP", "PLTAGREGLACO", "ADPPLTAGGREG" in the last 168 hours.  Hgb A1C: No results for input(s): "HGBA1C" in the last 168 hours.  TSH: No results for input(s): "TSH" in the last 168 hours.    Diagnostic Results     Brain Imaging/Vessel imaging  CT " without 05/16/2025  Compared to prior MR and CT imaging of 05/11/2025, continued maturation of large recent infarction involving the right cerebellum, as discussed.     Similar mass effect in the posterior fossa with essentially unchanged size and configuration of the lateral 3rd ventricles.  No new transependymal CSF egress.  CT     Continued decreased visualization of existing subarachnoid hemorrhage.  No new or enlarging areas of intracranial hemorrhage appreciated.    MRI Brain w/wo 05/15/2025  Status post coiling of a PICA aneurysm.  No evidence of residual aneurysm filling.     No high-grade stenosis or other aneurysm identified.    MRI Brain without 05/11/2025  Allowing for significant distortion examination by artifact from dental metal there is large area acute/recent infarction within the right cerebellum with probable small component in the right paramedian micah.  Please note this is near completely obscured on diffusion and gradient imaging secondary artifact from dental metal.     Mass effect from the area of infarction effacing the 4th ventricle with stable caliber lateral and 3rd ventricles without hydrocephalus with stable right frontal ventricular catheter.     There is T1 hyperintensity within the region of the right superior cerebellar peduncle corresponding to hyperdensity seen on CT concerning for component of hemorrhagic conversion.     Scattered small volume subarachnoid hemorrhage cerebral sulci.     Clinical correlation and continued follow-up recommended.    CTH without 05/11/2025  1. Compared to prior head CT performed 05/06/2025, 20:13 hours, evolving relatively large right cerebellar infarct without macroscopic hemorrhage within the infarct territory.  Questionable hypoattenuation involving the brainstem which may be artifactual given coil artifact, however additional areas of ischemia not excluded.  MRI may be considered for further characterization.  Further effacement of the 4th  ventricle since the prior study, however there has been slight decompression of the lateral and 3rd ventricles since the prior exam.  2. Relatively similar subarachnoid and interventricular hemorrhage.  No new or enlarging areas of intracranial hemorrhage appreciated.  This report was flagged in Epic as abnormal.    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     Evolving scattered subarachnoid and intraventricular hemorrhage.  No definite new hemorrhage     Stable right frontal coursing ventricular catheter with slight reduced caliber of the ventricles without evidence for worsening hydrocephalus.     Continued slight crowding cerebral sulci and basilar cisterns with small caliber 4th ventricle.  Question component of inferior extension of the cerebellar tonsils below the foramen magnum.  This could be better assessed with MR imaging if patient compatible.     No significant new abnormal parenchymal attenuation.  Clinical correlation and further evaluation with MRI as warranted.    CTH without 05/06/2025 0949  Subarachnoid and intraventricular hemorrhage, similar to recent exam.     Interval placement of right frontal EVD without apparent intracranial complication.  Ventricles remain mildly dilated.  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  Distal location suspicious for mycotic aneurysm.     No intracranial large vessel occlusion or hemodynamically significant stenosis.     The cervical carotid and vertebral arteries are patent without hemodynamically significant stenosis.     Right upper lobe, right middle lobe and left lower lobe consolidative opacities.     Endotracheal tube terminates just superior to the charis recommend slight retraction.       CTH without 05/05/2025  Subarachnoid hemorrhage.  Moderate volume of intraventricular hemorrhage.  Mild hydrocephalus.  Recommend CTA.  Findings  reported by [facility] radiologist at 01:06     All CT scans at this facility use dose modulation, iterative reconstruction, and/or weight based dosing when appropriate to reduce radiation dose to as low as reasonable achievable.    Cardiac Imaging   TTE 05/07/2025       Left Ventricle: The left ventricle is normal in size. Normal wall thickness. Normal wall motion. There is normal systolic function with a visually estimated ejection fraction of 65 - 70%. Ejection fraction is approximately 68%. There is indeterminate diastolic function.    Right Ventricle: The right ventricle is normal in size. Wall thickness is normal. Systolic function is normal.    Left Atrium: Agitated saline study of the atrial septum is negative after vasalva maneuver, suggesting absence of intracardiac shunt at the atrial level. No patent foramen ovale confirmed by agitated saline contrast.    Tricuspid Valve: There is mild to moderate regurgitation.    IVC/SVC: Patient is ventilated, cannot use IVC diameter to estimate right atrial pressure.

## 2025-05-17 NOTE — ASSESSMENT & PLAN NOTE
49f presenting after syncope with CTH demonstrating SAH. CTA with AICA aneurysm R. Intubated prior to arrival to [facility] ED.     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 Corley

## 2025-05-17 NOTE — PLAN OF CARE
[facility] Care Plan  POC reviewed with Waldo Emmanuel and family at 1400. Patient and Family verbalized understanding. Questions and concerns addressed. No acute events today. Pt progressing toward goals. See below and flowsheets for full assessment and VS info.     - PT up to chair with PT/OT  - Pt able to pivot with 1 assist to bedside commode  - Discontinued 2% NacL and Precedex  - Heparin at 13  - Family at bedside  - Oxy given x 2 and Tylenol given x 1 for back and ear pain; and headache  - CHG wipes given to family and bath completed  - Linens and gown changed  - TCDs completed by Technician      Is this a stroke patient? yes- Stroke booklet reviewed with family and is at bedside.   Care Plan Individualization:     Neuro:  [place] Coma Scale  Best Eye Response: 4-->(E4) spontaneous  Best Motor Response: 6-->(M6) obeys commands  Best Verbal Response: 5-->(V5) oriented  [place] Coma Scale Score: 15  Assessment Qualifiers: patient chemically sedated or paralyzed, no eye obstruction present  Pupil PERRLA: yes     24 hr Temp:  [97 °F (36.1 °C)-98.4 °F (36.9 °C)]     CV:   Rhythm: normal sinus rhythm  BP goals:   SBP < 160  MAP > 65    Resp:      Vent Mode: Spont  Set Rate: 20 BPM  Oxygen Concentration (%): 40  Vt Set: 375 mL  PEEP/CPAP: 5 cmH20  Pressure Support: 5 cmH20    Plan: N/A    GI/:     Diet/Nutrition Received: regular  Last Bowel Movement: 05/16/25  Voiding Characteristics: external catheter    Intake/Output Summary (Last 24 hours) at 5/16/2025 1944  Last data filed at 5/16/2025 1801  Gross per 24 hour   Intake 2375.73 ml   Output 2988 ml   Net -612.27 ml     Unmeasured Output  Unmeasured Urine Occurrence: 1  Unmeasured Stool Occurrence: 1  Pad Count: 1    Labs/Accuchecks:  Recent Labs   Lab 05/16/25  0211   WBC 18.66*   RBC 3.00*   HGB 8.2*   HCT 27.3*         Recent Labs   Lab 05/16/25  0211 05/16/25  1028    141   K 3.7  --    CO2 23  --    *  --    BUN 9  --    CREATININE 0.6   "--    ALKPHOS 92  --    ALT 20  --    AST 20  --    BILITOT 0.4  --       Recent Labs   Lab 05/15/25  0530 05/15/25  1342 25  0211   PROTIME 11.7  --   --    INR 1.1  --   --    APTT 21.5   < > 48.0*    < > = values in this interval not displayed.    No results for input(s): "CPK", "CPKMB", "TROPONINI", "MB" in the last 168 hours.    Electrolytes: Electrolytes replaced  Accuchecks: none    Gtts:   dexmedeTOMIDine (Precedex) infusion (titrating)  0-0.6 mcg/kg/hr Intravenous Continuous 7.52 mL/hr at 25 0948 0.3 mcg/kg/hr at 25 0948    heparin (porcine) in D5W  0-40 Units/kg/hr Intravenous Continuous 13 mL/hr at 25 0601 13 Units/kg/hr at 25 0601       LDA/Wounds:    Ivan Risk Assessment  Sensory Perception: 4-->no impairment  Moisture: 3-->occasionally moist  Activity: 3-->walks occasionally  Mobility: 3-->slightly limited  Nutrition: 3-->adequate  Friction and Shear: 3-->no apparent problem  Ivan Score: 19    Is your ivan score 12 or less? no            Restraints:   Restraint Order  Length of Order: Order good for next 24 hours or when removed.  Date that the current order will : 05/15/25  Time that the current order will : 1151  Order Upon Application: Yes      Problem: Infection  Goal: Absence of Infection Signs and Symptoms  Outcome: Progressing     Problem: Skin Injury Risk Increased  Goal: Skin Health and Integrity  Outcome: Progressing     Problem: Adult Inpatient Plan of Care  Goal: Plan of Care Review  Outcome: Progressing  Goal: Patient-Specific Goal (Individualized)  Outcome: Progressing  Goal: Absence of Hospital-Acquired Illness or Injury  Outcome: Progressing  Goal: Optimal Comfort and Wellbeing  Outcome: Progressing  Goal: Readiness for Transition of Care  Outcome: Progressing     Problem: Stroke, Intracerebral Hemorrhage  Goal: Optimal Coping  Outcome: Progressing  Goal: Effective Bowel Elimination  Outcome: Progressing  Goal: Optimal Cerebral Tissue " Perfusion  Outcome: Progressing  Goal: Optimal Cognitive Function  Outcome: Progressing  Goal: Effective Communication Skills  Outcome: Progressing  Goal: Optimal Functional Ability  Outcome: Progressing  Goal: Optimal Nutrition Intake  Outcome: Progressing  Goal: Optimal Pain Control and Function  Outcome: Progressing  Goal: Effective Oxygenation and Ventilation  Outcome: Progressing  Goal: Improved Sensorimotor Function  Outcome: Progressing  Goal: Safe and Effective Swallow  Outcome: Progressing  Goal: Effective Urinary Elimination  Outcome: Progressing     Problem: Fall Injury Risk  Goal: Absence of Fall and Fall-Related Injury  Outcome: Progressing     Problem: Wound  Goal: Optimal Coping  Outcome: Progressing  Goal: Optimal Functional Ability  Outcome: Progressing  Goal: Absence of Infection Signs and Symptoms  Outcome: Progressing  Goal: Improved Oral Intake  Outcome: Progressing  Goal: Optimal Pain Control and Function  Outcome: Progressing  Goal: Skin Health and Integrity  Outcome: Progressing  Goal: Optimal Wound Healing  Outcome: Progressing

## 2025-05-17 NOTE — PROGRESS NOTES
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 [place] [facility] [place] DSA performed 5/6/25 demonstrated the above aneurysm, which was secured with coiling.     5/16/2025 s/p EVD and DSA with coil embolization. Imaging obtained on 5/11 and significant for R cerebellar infarct , likely periprocedural. MRA completed yesterday, no plans for open clipping currently. Patient complaining of ear pain and headache. Remains with EVD on precedex.       Antithrombotics for secondary stroke prevention: Antiplatelets: Aspirin: 81 mg daily  Anticoagulants: Heparin protocol without bolus    Statins for secondary stroke prevention and hyperlipidemia, if present:   Statins: Atorvastatin- 40 mg daily    Aggressive risk factor modification: HTN, HLD, Diet, Exercise, Obesity     Rehab efforts: The patient has been evaluated by a stroke team provider and the therapy needs have been fully considered based off the presenting complaints and exam findings. The following therapy evaluations are needed: PT evaluate and treat, OT evaluate and treat, SLP evaluate and treat    Diagnostics ordered/pending: None     VTE prophylaxis: Mechanical prophylaxis: Place SCDs  Heparin drip    BP parameters: SAH: Secured aneurysm, no target, increase BP to prevent vasospasm if needed       Essential hypertension  -Stroke risk factor  -SBP goal No target, increase BP to prevnt vasospasm if needed, maintain MAP >65  -BP range in the last 24 hrs: BP  Min: 101/56  Max: 151/109  -Current antihypertensive regimen:   --PRN labetalol/hydralazine      Obstructive hydrocephalus  EVD in place at  "10    Cerebellar stroke, acute  -- See plan for SAH        Brain aneurysm  See "SAH"         5/16/2025 s/p EVD and DSA with coil embolization. Imaging obtained on 5/11 and significant for R cerebellar infarct , likely periprocedural. MRA completed yesterday, no plans for open clipping currently. Patient complaining of ear pain and headache. Remains with EVD on precedex.   5-17-25 EVD in place, on Precedex, R brachial DVT on ASA and heparin drip    STROKE DOCUMENTATION        NIH Scale:  1a. Level of Consciousness: 0-->Alert, keenly responsive  1b. LOC Questions: 0-->Answers both questions correctly  1c. LOC Commands: 0-->Performs both tasks correctly  2. Best Gaze: 0-->Normal  3. Visual: 0-->No visual loss  4. Facial Palsy: 0-->Normal symmetrical movements  5a. Motor Arm, Left: 0-->No drift, limb holds 90 (or 45) degrees for full 10 secs  5b. Motor Arm, Right: 0-->No drift, limb holds 90 (or 45) degrees for full 10 secs  6a. Motor Leg, Left: 0-->No drift, leg holds 30 degree position for full 5 secs  6b. Motor Leg, Right: 0-->No drift, leg holds 30 degree position for full 5 secs  7. Limb Ataxia: 0-->Absent  8. Sensory: 0-->Normal, no sensory loss  9. Best Language: 0-->No aphasia, normal  10. Dysarthria: 0-->Normal  11. Extinction and Inattention (formerly Neglect): 0-->No abnormality  Total (NIH Stroke Scale): 0       Modified [place]    [place] Coma Scale:    ABCD2 Score:    [id number] Score:   HAS -BLED Score:   ICH Score:   Hunt & Castillo Classification:      Hemorrhagic change of an Ischemic Stroke: Does this patient have an ischemic stroke with hemorrhagic changes? No     Neurologic Chief Complaint: SAH    Subjective:     Interval History: Patient is seen for follow-up neurological assessment and treatment recommendations: See hospital course.     HPI, Past Medical, Family, and Social History remains the same as documented in the initial encounter.     Review of Systems   Constitutional:  Negative for chills " and fever.   HENT:  Positive for ear pain.    Neurological:  Positive for headaches. Negative for weakness.     Scheduled Meds:   aspirin  81 mg Oral Daily    atorvastatin  40 mg Oral Daily    ceFEPime IV (PEDS and ADULTS)  2 g Intravenous Q8H    famotidine  20 mg Oral BID    niMODipine  60 mg Oral Q4H    QUEtiapine  50 mg Oral BID    senna-docusate  1 tablet Oral BID    valsartan  80 mg Oral Daily     Continuous Infusions:   dexmedeTOMIDine (Precedex) infusion (titrating)  0-0.6 mcg/kg/hr Intravenous Continuous 7.52 mL/hr at 05/17/25 0901 0.3 mcg/kg/hr at 05/17/25 0901    heparin (porcine) in D5W  0-40 Units/kg/hr Intravenous Continuous 13 mL/hr at 05/17/25 0901 13 Units/kg/hr at 05/17/25 0901     PRN Meds:  Current Facility-Administered Medications:     acetaminophen, 650 mg, Oral, Q6H PRN    bisacodyL, 10 mg, Rectal, Daily PRN    diazePAM, 10 mg, Oral, Q6H PRN    hydrALAZINE, 10 mg, Intravenous, Q4H PRN    labetalol, 10 mg, Intravenous, Q4H PRN    magnesium oxide, 800 mg, Oral, PRN    magnesium oxide, 800 mg, Oral, PRN    melatonin, 6 mg, Oral, Nightly PRN    methocarbamoL, 750 mg, Oral, Q6H PRN    oxyCODONE, 5 mg, Oral, Q4H PRN    potassium bicarbonate, 35 mEq, Oral, PRN    potassium bicarbonate, 50 mEq, Oral, PRN    potassium bicarbonate, 60 mEq, Oral, PRN    potassium, sodium phosphates, 2 packet, Oral, PRN    potassium, sodium phosphates, 2 packet, Oral, PRN    potassium, sodium phosphates, 2 packet, Oral, PRN    sodium chloride 0.9%, 10 mL, Intravenous, PRN    Objective:     Vital Signs (Most Recent):  Temp: 98.5 °F (36.9 °C) (05/17/25 0305)  Pulse: 68 (05/17/25 0801)  Resp: (!) 22 (05/17/25 0801)  BP: 105/68 (05/17/25 0801)  SpO2: 97 % (05/17/25 0801)  BP Location: Left forearm    Vital Signs Range (Last 24H):  Temp:  [97.9 °F (36.6 °C)-98.5 °F (36.9 °C)]   Pulse:  []   Resp:  [10-36]   BP: (101-151)/()   SpO2:  [93 %-100 %]   BP Location: Left forearm       Physical Exam  Vitals and nursing  "note reviewed.   Constitutional:       Appearance: Normal appearance.   Neurological:      Mental Status: She is alert and oriented to person, place, and time.      Motor: No weakness.              Neurological Exam:   LOC: alert  Attention Span: Good   Language: No aphasia  Articulation: No dysarthria  Orientation: Person, Place, Time   Facial Movement (CN VII): Symmetric facial expression    Motor: Arm left  Normal 5/5  Leg left  Normal 5/5  Arm right  Normal 5/5  Leg right Normal 5/5  Sensation: Intact to light touch, temperature and vibration    Laboratory:  CMP:   Recent Labs   Lab 05/17/25 0217   CALCIUM 9.3   ALBUMIN 3.0*   PROT 7.2      K 3.9   CO2 24      BUN 9   CREATININE 0.6   ALKPHOS 91   ALT 20   AST 19   BILITOT 0.4     BMP:   Recent Labs   Lab 05/17/25 0217      K 3.9      CO2 24   BUN 9   CREATININE 0.6   CALCIUM 9.3     CBC:   Recent Labs   Lab 05/17/25 0217   WBC 18.64*   RBC 3.01*   HGB 8.3*   HCT 27.3*      MCV 91   MCH 27.6   MCHC 30.4*     Lipid Panel: No results for input(s): "CHOL", "LDLCALC", "HDL", "TRIG" in the last 168 hours.  Coagulation:   Recent Labs   Lab 05/15/25  0530 05/15/25  1342 05/16/25  0211   INR 1.1  --   --    APTT 21.5   < > 48.0*    < > = values in this interval not displayed.     Platelet Aggregation Study: No results for input(s): "PLTAGG", "PLTAGINTERP", "PLTAGREGLACO", "ADPPLTAGGREG" in the last 168 hours.  Hgb A1C: No results for input(s): "HGBA1C" in the last 168 hours.  TSH: No results for input(s): "TSH" in the last 168 hours.    Diagnostic Results     Brain Imaging/Vessel imaging  CTH without 05/16/2025  Compared to prior MR and CT imaging of 05/11/2025, continued maturation of large recent infarction involving the right cerebellum, as discussed.     Similar mass effect in the posterior fossa with essentially unchanged size and configuration of the lateral 3rd ventricles.  No new transependymal CSF egress.  CT     Continued " decreased visualization of existing subarachnoid hemorrhage.  No new or enlarging areas of intracranial hemorrhage appreciated.    MRI Brain w/wo 05/15/2025  Status post coiling of a PICA aneurysm.  No evidence of residual aneurysm filling.     No high-grade stenosis or other aneurysm identified.    MRI Brain without 05/11/2025  Allowing for significant distortion examination by artifact from dental metal there is large area acute/recent infarction within the right cerebellum with probable small component in the right paramedian micah.  Please note this is near completely obscured on diffusion and gradient imaging secondary artifact from dental metal.     Mass effect from the area of infarction effacing the 4th ventricle with stable caliber lateral and 3rd ventricles without hydrocephalus with stable right frontal ventricular catheter.     There is T1 hyperintensity within the region of the right superior cerebellar peduncle corresponding to hyperdensity seen on CT concerning for component of hemorrhagic conversion.     Scattered small volume subarachnoid hemorrhage cerebral sulci.     Clinical correlation and continued follow-up recommended.    CTH without 05/11/2025  1. Compared to prior head CT performed 05/06/2025, 20:13 hours, evolving relatively large right cerebellar infarct without macroscopic hemorrhage within the infarct territory.  Questionable hypoattenuation involving the brainstem which may be artifactual given coil artifact, however additional areas of ischemia not excluded.  MRI may be considered for further characterization.  Further effacement of the 4th ventricle since the prior study, however there has been slight decompression of the lateral and 3rd ventricles since the prior exam.  2. Relatively similar subarachnoid and interventricular hemorrhage.  No new or enlarging areas of intracranial hemorrhage appreciated.  This report was flagged in Epic as abnormal.    CTH without 05/06/2025  2015  Interval operative change left AICA endovascular aneurysm coiling.     Evolving scattered subarachnoid and intraventricular hemorrhage.  No definite new hemorrhage     Stable right frontal coursing ventricular catheter with slight reduced caliber of the ventricles without evidence for worsening hydrocephalus.     Continued slight crowding cerebral sulci and basilar cisterns with small caliber 4th ventricle.  Question component of inferior extension of the cerebellar tonsils below the foramen magnum.  This could be better assessed with MR imaging if patient compatible.     No significant new abnormal parenchymal attenuation.  Clinical correlation and further evaluation with MRI as warranted.    CTH without 05/06/2025 0949  Subarachnoid and intraventricular hemorrhage, similar to recent exam.     Interval placement of right frontal EVD without apparent intracranial complication.  Ventricles remain mildly dilated.  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  Distal location suspicious for mycotic aneurysm.     No intracranial large vessel occlusion or hemodynamically significant stenosis.     The cervical carotid and vertebral arteries are patent without hemodynamically significant stenosis.     Right upper lobe, right middle lobe and left lower lobe consolidative opacities.     Endotracheal tube terminates just superior to the charis recommend slight retraction.       CTH without 05/05/2025  Subarachnoid hemorrhage.  Moderate volume of intraventricular hemorrhage.  Mild hydrocephalus.  Recommend CTA.  Findings reported by Stat Rad radiologist at 01:06     All CT scans at this facility use dose modulation, iterative reconstruction, and/or weight based dosing when appropriate to reduce radiation dose to as low as reasonable achievable.    Cardiac Imaging   TTE 05/07/2025       Left Ventricle: The left ventricle is normal in size. Normal wall  thickness. Normal wall motion. There is normal systolic function with a visually estimated ejection fraction of 65 - 70%. Ejection fraction is approximately 68%. There is indeterminate diastolic function.    Right Ventricle: The right ventricle is normal in size. Wall thickness is normal. Systolic function is normal.    Left Atrium: Agitated saline study of the atrial septum is negative after vasalva maneuver, suggesting absence of intracardiac shunt at the atrial level. No patent foramen ovale confirmed by agitated saline contrast.    Tricuspid Valve: There is mild to moderate regurgitation.    IVC/SVC: Patient is ventilated, cannot use IVC diameter to estimate right atrial pressure.       Gaby Soto, NP  [facility]  Department of Vascular Neurology   [facility] - Neuro Critical Care

## 2025-05-17 NOTE — PROGRESS NOTES
[facility] - Neuro Critical Care  Neurosurgery  Progress Note    Subjective:     History of Present Illness: 49f presenting after syncope with CTH demonstrating SAH. CTA without clear vascular malformation. Intubated prior to arrival to [facility] ED. Discussed expected hospital course and imaging with daughter in room    Post-Op Info:  Procedure(s) (LRB):  MRI (Magnetic Resonance Imagine) (N/A)   2 Days Post-Op   Interval History:5/17 naeon, exam improved. On hep gtt. Will move EVD to 20 today for wean trial    Medications:  Continuous Infusions:   dexmedeTOMIDine (Precedex) infusion (titrating)  0-0.6 mcg/kg/hr Intravenous Continuous 7.52 mL/hr at 05/17/25 0605 0.3 mcg/kg/hr at 05/17/25 0605    heparin (porcine) in D5W  0-40 Units/kg/hr Intravenous Continuous 13 mL/hr at 05/17/25 0605 13 Units/kg/hr at 05/17/25 0605     Scheduled Meds:   aspirin  81 mg Oral Daily    atorvastatin  40 mg Oral Daily    ceFEPime IV (PEDS and ADULTS)  2 g Intravenous Q8H    famotidine  20 mg Oral BID    niMODipine  60 mg Oral Q4H    QUEtiapine  50 mg Oral BID    senna-docusate  1 tablet Oral BID    valsartan  80 mg Oral Daily     PRN Meds:  Current Facility-Administered Medications:     acetaminophen, 650 mg, Oral, Q6H PRN    bisacodyL, 10 mg, Rectal, Daily PRN    diazePAM, 10 mg, Oral, Q6H PRN    hydrALAZINE, 10 mg, Intravenous, Q4H PRN    labetalol, 10 mg, Intravenous, Q4H PRN    magnesium oxide, 800 mg, Oral, PRN    magnesium oxide, 800 mg, Oral, PRN    melatonin, 6 mg, Oral, Nightly PRN    methocarbamoL, 750 mg, Oral, Q6H PRN    oxyCODONE, 5 mg, Oral, Q4H PRN    potassium bicarbonate, 35 mEq, Oral, PRN    potassium bicarbonate, 50 mEq, Oral, PRN    potassium bicarbonate, 60 mEq, Oral, PRN    potassium, sodium phosphates, 2 packet, Oral, PRN    potassium, sodium phosphates, 2 packet, Oral, PRN    potassium, sodium phosphates, 2 packet, Oral, PRN    sodium chloride 0.9%, 10 mL, Intravenous, PRN     Review of Systems  Objective:      Weight: 100.2 kg (220 lb 14.4 oz)  Body mass index is 39.13 kg/m².  Vital Signs (Most Recent):  Temp: 98.5 °F (36.9 °C) (05/17/25 0305)  Pulse: 73 (05/17/25 0605)  Resp: (!) 21 (05/17/25 0624)  BP: 126/60 (05/17/25 0624)  SpO2: 95 % (05/17/25 0605) Vital Signs (24h Range):  Temp:  [97.9 °F (36.6 °C)-98.5 °F (36.9 °C)] 98.5 °F (36.9 °C)  Pulse:  [] 73  Resp:  [10-36] 21  SpO2:  [93 %-100 %] 95 %  BP: (102-151)/() 126/60                                ICP/Ventriculostomy 05/06/25 0920 Right Parietal region (Active)   Level of Ventriculostomy (cm above) 10 05/15/25 0301   Status Open to drainage 05/16/25 0701   Site Assessment Clean;Dry 05/16/25 0701   Site Drainage No drainage 05/16/25 0701   Waveform normal waveform 05/15/25 1901   Output (mL) 19 mL 05/16/25 1001   CSF Color yellow 05/16/25 0701   Dressing Status Clean;Dry;Intact 05/16/25 0701   Interventions HOB degrees;level adjusted per order;zeroed 05/16/25 0701       Female External Urinary Catheter w/ Suction 05/07/25 1732 (Active)   Skin no redness;no breakdown;perineum cleansed w/ soap and water;female external urine collection device repositioned 05/16/25 0701   Tolerance no signs/symptoms of discomfort 05/16/25 0701   Suction Continuous suction at 70 mmHg 05/15/25 1901   Date of last wick change 05/15/25 05/15/25 1901   Time of last wick change 0801 05/15/25 0701   Output (mL) 550 mL 05/16/25 0801          Physical Exam         Neurosurgery Physical Exam  [id number]  alert, Ox4  Cni  Follows commands x4 grossly full strength throughout  SILT  R dysmetria     EVD Incision dressed CDI   Significant Labs:  Recent Labs   Lab 05/16/25 0211 05/16/25  1028 05/17/25 0217   *  --  127*    141 138   K 3.7  --  3.9   *  --  106   CO2 23  --  24   BUN 9  --  9   CREATININE 0.6  --  0.6   CALCIUM 8.5*  --  9.3   MG 1.7  --  1.7     Recent Labs   Lab 05/16/25 0211 05/17/25 0217   WBC 18.66* 18.64*   HGB 8.2* 8.3*   HCT 27.3* 27.3*   PLT  387 433     Recent Labs   Lab 05/15/25  1342 05/15/25  2004 05/16/25  0211   APTT 38.3* 44.4* 48.0*     Microbiology Results (last 7 days)       Procedure Component Value Units Date/Time    CSF culture [[id number]] Collected: 05/12/25 1729    Order Status: Completed Specimen: CSF (Spinal Fluid) from CSF Tap, Tube 3 Updated: 05/16/25 0730     CULTURE, CSF No Growth To Date     GRAM STAIN Rare WBC seen      No organisms seen    Culture, Respiratory with Gram Stain [[id number]]  (Abnormal)  (Susceptibility) Collected: 05/10/25 1120    Order Status: Completed Specimen: Respiratory from Sputum, Expectorated Updated: 05/13/25 0946     Respiratory Culture No S aureus or Pseudomonas isolated      Few KLEBSIELLA AEROGENES     Comment: with normal respiratory chichi        GRAM STAIN <10 Epithelial Cells/LPF      Rare WBC seen      Many Gram positive cocci      Many Gram Negative Rods    Culture, Respiratory with Gram Stain [[id number]]  (Abnormal)  (Susceptibility) Collected: 05/10/25 2035    Order Status: Completed Specimen: Respiratory from Sputum Updated: 05/13/25 0945     Respiratory Culture No S aureus or Pseudomonas isolated      Few KLEBSIELLA AEROGENES     Comment: with normal respiratory chichi        GRAM STAIN <10 Epithelial Cells/LPF      Rare WBC seen      Many Gram positive cocci      Moderate Gram Negative Rods    Gram stain [[id number]] Collected: 05/12/25 1729    Order Status: Canceled Specimen: CSF (Spinal Fluid) from CSF Tap, Tube 3 Updated: 05/12/25 1909          All pertinent labs from the last 24 hours have been reviewed.    Significant Diagnostics:  CT: CT Head Without Contrast  Result Date: 5/16/2025  Compared to prior MR and CT imaging of 05/11/2025, continued maturation of large recent infarction involving the right cerebellum, as discussed. Similar mass effect in the posterior fossa with essentially unchanged size and configuration of the lateral 3rd ventricles.  No new transependymal CSF egress.  CT  Continued decreased visualization of existing subarachnoid hemorrhage.  No new or enlarging areas of intracranial hemorrhage appreciated. Electronically signed by: Mich Downs Date:    05/16/2025 Time:    07:03    I have reviewed all pertinent imaging results/findings within the past 24 hours.  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 CTA with AICA aneurysm R. Intubated prior to arrival to [facility] ED.     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 Barney Almendarez MD  Neurosurgery  [facility] - Neuro Critical Care

## 2025-05-17 NOTE — NURSING
Pt got up to go to the restroom, while RN was in the other pt's room. RN ran immediately to room upon hearing bed alarm. Pt was up and walking towards bathroom. Pt was wobbling and causing tension on all lines including EVD. CSF dumped 30mL. NSGY notified. EDEN Sidhu notified. RN and NP both reinforced safety teaching and the importance of pt not getting out of the bed without assistance. Pt now back in bed.

## 2025-05-17 NOTE — SUBJECTIVE & OBJECTIVE
Interval History:5/17 naeon, exam improved. On hep gtt. Will move EVD to 20 today for wean trial    Medications:  Continuous Infusions:   dexmedeTOMIDine (Precedex) infusion (titrating)  0-0.6 mcg/kg/hr Intravenous Continuous 7.52 mL/hr at 05/17/25 0605 0.3 mcg/kg/hr at 05/17/25 0605    heparin (porcine) in D5W  0-40 Units/kg/hr Intravenous Continuous 13 mL/hr at 05/17/25 0605 13 Units/kg/hr at 05/17/25 0605     Scheduled Meds:   aspirin  81 mg Oral Daily    atorvastatin  40 mg Oral Daily    ceFEPime IV (PEDS and ADULTS)  2 g Intravenous Q8H    famotidine  20 mg Oral BID    niMODipine  60 mg Oral Q4H    QUEtiapine  50 mg Oral BID    senna-docusate  1 tablet Oral BID    valsartan  80 mg Oral Daily     PRN Meds:  Current Facility-Administered Medications:     acetaminophen, 650 mg, Oral, Q6H PRN    bisacodyL, 10 mg, Rectal, Daily PRN    diazePAM, 10 mg, Oral, Q6H PRN    hydrALAZINE, 10 mg, Intravenous, Q4H PRN    labetalol, 10 mg, Intravenous, Q4H PRN    magnesium oxide, 800 mg, Oral, PRN    magnesium oxide, 800 mg, Oral, PRN    melatonin, 6 mg, Oral, Nightly PRN    methocarbamoL, 750 mg, Oral, Q6H PRN    oxyCODONE, 5 mg, Oral, Q4H PRN    potassium bicarbonate, 35 mEq, Oral, PRN    potassium bicarbonate, 50 mEq, Oral, PRN    potassium bicarbonate, 60 mEq, Oral, PRN    potassium, sodium phosphates, 2 packet, Oral, PRN    potassium, sodium phosphates, 2 packet, Oral, PRN    potassium, sodium phosphates, 2 packet, Oral, PRN    sodium chloride 0.9%, 10 mL, Intravenous, PRN     Review of Systems  Objective:     Weight: 100.2 kg (220 lb 14.4 oz)  Body mass index is 39.13 kg/m².  Vital Signs (Most Recent):  Temp: 98.5 °F (36.9 °C) (05/17/25 0305)  Pulse: 73 (05/17/25 0605)  Resp: (!) 21 (05/17/25 0624)  BP: 126/60 (05/17/25 0624)  SpO2: 95 % (05/17/25 0605) Vital Signs (24h Range):  Temp:  [97.9 °F (36.6 °C)-98.5 °F (36.9 °C)] 98.5 °F (36.9 °C)  Pulse:  [] 73  Resp:  [10-36] 21  SpO2:  [93 %-100 %] 95 %  BP:  (102-151)/() 126/60                                ICP/Ventriculostomy 05/06/25 0920 Right Parietal region (Active)   Level of Ventriculostomy (cm above) 10 05/15/25 0301   Status Open to drainage 05/16/25 0701   Site Assessment Clean;Dry 05/16/25 0701   Site Drainage No drainage 05/16/25 0701   Waveform normal waveform 05/15/25 1901   Output (mL) 19 mL 05/16/25 1001   CSF Color yellow 05/16/25 0701   Dressing Status Clean;Dry;Intact 05/16/25 0701   Interventions HOB degrees;level adjusted per order;zeroed 05/16/25 0701       Female External Urinary Catheter w/ Suction 05/07/25 1732 (Active)   Skin no redness;no breakdown;perineum cleansed w/ soap and water;female external urine collection device repositioned 05/16/25 0701   Tolerance no signs/symptoms of discomfort 05/16/25 0701   Suction Continuous suction at 70 mmHg 05/15/25 1901   Date of last wick change 05/15/25 05/15/25 1901   Time of last wick change 0801 05/15/25 0701   Output (mL) 550 mL 05/16/25 0801          Physical Exam         Neurosurgery Physical Exam  E4V5M6  alert, Ox4  Cni  Follows commands x4 grossly full strength throughout  SILT  R dysmetria     EVD Incision dressed CDI   Significant Labs:  Recent Labs   Lab 05/16/25 0211 05/16/25  1028 05/17/25 0217   *  --  127*    141 138   K 3.7  --  3.9   *  --  106   CO2 23  --  24   BUN 9  --  9   CREATININE 0.6  --  0.6   CALCIUM 8.5*  --  9.3   MG 1.7  --  1.7     Recent Labs   Lab 05/16/25 0211 05/17/25 0217   WBC 18.66* 18.64*   HGB 8.2* 8.3*   HCT 27.3* 27.3*    433     Recent Labs   Lab 05/15/25  1342 05/15/25  2004 05/16/25  0211   APTT 38.3* 44.4* 48.0*     Microbiology Results (last 7 days)       Procedure Component Value Units Date/Time    CSF culture [[id number]] Collected: 05/12/25 1729    Order Status: Completed Specimen: CSF (Spinal Fluid) from CSF Tap, Tube 3 Updated: 05/16/25 0730     CULTURE, CSF No Growth To Date     GRAM STAIN Rare WBC seen      No  organisms seen    Culture, Respiratory with Gram Stain [[id number]]  (Abnormal)  (Susceptibility) Collected: 05/10/25 1120    Order Status: Completed Specimen: Respiratory from Sputum, Expectorated Updated: 05/13/25 0946     Respiratory Culture No S aureus or Pseudomonas isolated      Few KLEBSIELLA AEROGENES     Comment: with normal respiratory chichi        GRAM STAIN <10 Epithelial Cells/LPF      Rare WBC seen      Many Gram positive cocci      Many Gram Negative Rods    Culture, Respiratory with Gram Stain [[id number]]  (Abnormal)  (Susceptibility) Collected: 05/10/25 2035    Order Status: Completed Specimen: Respiratory from Sputum Updated: 05/13/25 0945     Respiratory Culture No S aureus or Pseudomonas isolated      Few KLEBSIELLA AEROGENES     Comment: with normal respiratory chichi        GRAM STAIN <10 Epithelial Cells/LPF      Rare WBC seen      Many Gram positive cocci      Moderate Gram Negative Rods    Gram stain [[id number]] Collected: 05/12/25 1729    Order Status: Canceled Specimen: CSF (Spinal Fluid) from CSF Tap, Tube 3 Updated: 05/12/25 1909          All pertinent labs from the last 24 hours have been reviewed.    Significant Diagnostics:  CT: CT Head Without Contrast  Result Date: 5/16/2025  Compared to prior MR and CT imaging of 05/11/2025, continued maturation of large recent infarction involving the right cerebellum, as discussed. Similar mass effect in the posterior fossa with essentially unchanged size and configuration of the lateral 3rd ventricles.  No new transependymal CSF egress.  CT Continued decreased visualization of existing subarachnoid hemorrhage.  No new or enlarging areas of intracranial hemorrhage appreciated. Electronically signed by: Mich Downs Date:    05/16/2025 Time:    07:03    I have reviewed all pertinent imaging results/findings within the past 24 hours.

## 2025-05-17 NOTE — SUBJECTIVE & OBJECTIVE
Interval History:  See above.     Objective:     Vitals:  Temp: 98.5 °F (36.9 °C)  Pulse: 65  Rhythm: normal sinus rhythm  BP: 114/69  MAP (mmHg): 87  ICP Mean (mmHg): 4 mmHg  Resp: 20  SpO2: 96 %    Temp  Min: 97.9 °F (36.6 °C)  Max: 98.5 °F (36.9 °C)  Pulse  Min: 65  Max: 145  BP  Min: 101/56  Max: 151/109  MAP (mmHg)  Min: 72  Max: 124  ICP Mean (mmHg)  Min: 2 mmHg  Max: 14 mmHg  Resp  Min: 10  Max: 36  SpO2  Min: 93 %  Max: 100 %    05/16 0701 - 05/17 0700  In: 1530.2 [P.O.:790; I.V.:740.2]  Out: 2006 [Urine:1750; Drains:256]   Unmeasured Output  Unmeasured Urine Occurrence: 1  Unmeasured Stool Occurrence: 0  Pad Count: 1        Physical Exam  Vitals reviewed.   Constitutional:       Appearance: She is obese. She is ill-appearing.   HENT:      Head:      Comments: EVD  No clicking with open/closing of jaw     Right Ear: Tympanic membrane and external ear normal.      Left Ear: Tympanic membrane and external ear normal.      Nose: Nose normal.      Mouth/Throat:      Mouth: Mucous membranes are moist.   Eyes:      Pupils: Pupils are equal, round, and reactive to light.   Cardiovascular:      Rate and Rhythm: Normal rate.   Pulmonary:      Effort: Pulmonary effort is normal.   Abdominal:      Palpations: Abdomen is soft.   Skin:     General: Skin is warm and dry.   Neurological:      Comments: --GCS: E4 V5 M6  --Mental Status:  awake, oriented x 4  --Pivots from bed to bedside commode  --Follows all commands  --Pupils reactive   --URENA spont                Medications:  ContinuousdexmedeTOMIDine (Precedex) infusion (titrating), Last Rate: 0.3 mcg/kg/hr (05/17/25 0901)  heparin (porcine) in D5W, Last Rate: 13 Units/kg/hr (05/17/25 0901)    Scheduledaspirin, 81 mg, Daily  atorvastatin, 40 mg, Daily  ceFEPime IV (PEDS and ADULTS), 2 g, Q8H  famotidine, 20 mg, BID  niMODipine, 60 mg, Q4H  pregabalin, 75 mg, BID  QUEtiapine, 100 mg, QHS  [START ON 5/18/2025] QUEtiapine, 50 mg, Daily  valsartan, 80 mg,  Daily    PRNacetaminophen, 650 mg, Q6H PRN  bisacodyL, 10 mg, Daily PRN  diazePAM, 10 mg, Q6H PRN  hydrALAZINE, 10 mg, Q4H PRN  labetalol, 10 mg, Q4H PRN  magnesium oxide, 800 mg, PRN  magnesium oxide, 800 mg, PRN  melatonin, 6 mg, Nightly PRN  methocarbamoL, 750 mg, Q6H PRN  oxyCODONE, 5 mg, Q4H PRN  potassium bicarbonate, 35 mEq, PRN  potassium bicarbonate, 50 mEq, PRN  potassium bicarbonate, 60 mEq, PRN  potassium, sodium phosphates, 2 packet, PRN  potassium, sodium phosphates, 2 packet, PRN  potassium, sodium phosphates, 2 packet, PRN  sodium chloride 0.9%, 10 mL, PRN      Today I personally reviewed pertinent medications, lines/drains/airways, imaging, cardiology results, laboratory results, microbiology results, notably:    Imaging Results              IR Angiogram Carotid Cerebral Bilateral (Final result)  Result time 05/14/25 09:51:26      Final result by Piyush Silverio MD (05/14/25 09:51:26)                   Impression:        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  No other aneurysms were identified.    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  The parent artery remained patent on follow-up angiograms.  There were no immediate postprocedure complications.    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    Electronically signed by resident: Nadia Correa  Date:    05/08/2025  Time:    07:18    Electronically signed by: Piyush Silverio MD  Date:    05/14/2025  Time:    09:51               Narrative:      EXAM:    Cerebral angiogram.  Aneurysm embolization.    CPT CODES:    Bilateral internal carotid artery: 36224 x 2    Bilateral external carotid artery: 36227 x 2    Selective intracranial branch of the right vertebral artery: 62199    Bilateral vertebral artery: 36226 x 2    Closure device placement:     3D  Reconstruction w independent workstation: [id number]    Embolization CNS permanent: 09353, 13310    F/U Angio post embolization: 75898 x 1    CLINICAL HISTORY AND INDICATION:    Patient is a 48 y/o Female, referred for endovascular embolization of right anterior inferior cerebellar artery aneurysm.    PROCEDURE COMMENT:    Informed consent was obtained from the patient's family prior to the examination. A timeout was performed.    Sedation: Endotracheal intubation and sedation with monitoring was provided by the anesthesia department.    Initially we attempted to access the right groin under US guidance using a micropuncture needle. The microwire would not pass be external iliac artery so needle and wire were removed and hemostasis was achieved. Next, under US guidance, A 6 F sheath was placed into the left femoral artery and a 5F Robert catheter was advanced into the aortic arch.  Bilateral CCA/ICA/ECA and vertebral arteries were subselected and angiography of the brain was performed after injection into each of these vessels.    FINDINGS:  The right vertebral artery was selected and an angiogram was performed. Angiogram demonstrates excellent flow into the intracranial segments with opacification of all the branches and the vertebrobasilar junction with competitive flow from the left vertebral artery.  Small outpouching of the distal aspect of the right anterior inferior cerebellar artery although decreased in size when compared to CTA suggesting partial thrombosis..    Also, 3D spin was performed with reconstruction at separate workstation to obtain more details from right vertebral artery and further characterize the aneurysm. It appears partially thrombosed in the beginning of the study.    The right common carotid artery was selected and an angiogram was performed. Angiogram demonstrates excellent flow the bifurcation without significant plaque buildup or stenosis.    The right internal carotid artery was selected  and an angiogram was performed. Angiogram demonstrates good flow into the intracranial segments with opacification of both the anterior and middle cerebral arteries. . There are no significant stenosis.  No aneurysm identified on the right internal carotid artery injection.    The right external carotid artery was selected and angiogram was performed.  Angiogram demonstrates good flow into all the branches with no abnormal anastomosis to internal carotid artery.  No AV fistula.    The left common carotid artery was selected and an angiogram was performed. Angiogram demonstrates excellent flow at the bifurcation without significant plaque buildup or stenosis.    The left internal carotid artery was selected and an angiogram was performed. Angiogram demonstrates excellent flow into the intracranial segments with opacification of both the anterior and middle cerebral arteries.  There are no aneurysms or arteriovenous malformation is identified.  There is no significant stenosis.    The left external carotid artery was selected and angiogram was performed. Angiogram demonstrates good flow into all the branches with no abnormal anastomosis to internal carotid artery.  No AV fistula.    The left vertebral artery was selected and an angiogram was performed. Angiogram demonstrates excellent flow into the intracranial segments with opacification of all the branches of the vertebral artery as well as the basilar artery.  There is competitive flow from the right vertebral artery.  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  There is no significant stenosis.    The venous drainage pattern were within normal limits.    PROCEDURE:    Given that the aneurysm was not well visualized from the vertebral artery injection, it was decided to perform sub selective angiograms of the basilar and right anterior inferior  cerebellar artery.    For intervention purposes, Benchmark 071- 95 cm long, Jeane 5 Urdu- 125 cm long, Headway duo 156 cm, Synchro microwire.    The right anterior inferior cerebellar artery was selected and arteriogram was performed.  There was likely an AICA/PICA complex.  Arteriograms were performed in multiple projections through the microcatheter in the AICa.  The aneurysm was difficult to measure due to partial thrombosis.    Consultation was made with neuro surgery about potential treatment options including endovascular embolization and clipping.  Risks were discussed of potential thrombus embolization endovascular treatment.  Risks of rupture with open surgery was also discussed.  Ultimately, we decided to proceed with endovascular embolization.    For the above findings, we decided to proceed with the coil embolization of the right anterior inferior cerebellar artery aneurysm.  Using a Veebox select catheter/035 glidewire system through a Benchmark 71, the right vertebral artery was selected.  Next, under roadmap guidance, advanced 5 Urdu Jeane intermediate catheter over Headway Duo 156 cm microcatheter and 014 synchro microwire.  This was gently navigated and positioned tip of Headway Duo microcatheter to the center of the aneurysm and the Jeane catheter in the right V 4 segment.  Micro angiography was performed within the anterior inferior cerebellar artery.  Under road mapping guidance, Cerepak coils 5 mm * 10 cm were deployed.   For follow-up angiograms were performed after coil placements, and these revealed good distribution within the aneurysm after coil deployment with progressive occlusion.  There was no compromise of the parent artery.  Final angiogram demonstrates good coil packing in the dome of the aneurysm, and there was no residual minimal filling.    The catheter was removed and an angiogram was performed through the femoral sheath demonstrating an access site appropriate for closure  device.    Hemostasis was obtained in the right groin using 6 Malagasy Vascade device.    The total contrast dose for the procedure was: 120 cc of Visipaque.    Radiation dose:    Radiation DAP 13939 CGycm2    Reference air kerma was 4059 mGy    Fluoro time was 32.8 minutes    Physicians in the procedure:  Dr. Piyush Silverio MD    Images and report were permanently recorded.                                       Radiology (Final result)  Result time 05/09/25 15:15:25      Final result by Unknown User (05/09/25 15:15:25)                                          Diet  Diet Adult Regular  Diet Adult Regular

## 2025-05-18 PROBLEM — B00.9 HERPES SIMPLEX VIRUS (HSV) INFECTION: Status: ACTIVE | Noted: 2025-05-18

## 2025-05-18 LAB
ABSOLUTE EOSINOPHIL (OHS): 0.24 K/UL
ABSOLUTE MONOCYTE (OHS): 0.99 K/UL (ref 0.3–1)
ABSOLUTE NEUTROPHIL COUNT (OHS): 13.75 K/UL (ref 1.8–7.7)
ALBUMIN SERPL BCP-MCNC: 2.9 G/DL (ref 3.5–5.2)
ALP SERPL-CCNC: 86 UNIT/L (ref 40–150)
ALT SERPL W/O P-5'-P-CCNC: 18 UNIT/L (ref 10–44)
ANION GAP (OHS): 9 MMOL/L (ref 8–16)
ANTI-XA QUALITATIVE INTERPRETATION: NORMAL
ANTICOAGULANT MEDICATION NEUTRALIZATION: NORMAL
APTT PPP: 38.9 SECONDS (ref 21–32)
APTT PPP: 39.9 SECONDS (ref 21–32)
APTT PPP: 56.4 SECONDS (ref 21–32)
AR DRVVT 1:1 MIX RATIO: NORMAL
AR DRVVT CONFIRMATION RATIO: NORMAL
AR HEXAGONAL PHOSPHOLIPID CONFIRMATION: NORMAL S
AR NEUTRALIZED PTT-LA RATIO: NORMAL
AR THROMBIN TIME (TT): NORMAL S
AST SERPL-CCNC: 19 UNIT/L (ref 11–45)
BACTERIA CSF CULT: NO GROWTH
BASOPHILS # BLD AUTO: 0.07 K/UL
BASOPHILS NFR BLD AUTO: 0.4 %
BILIRUB SERPL-MCNC: 0.4 MG/DL (ref 0.1–1)
BUN SERPL-MCNC: 13 MG/DL (ref 6–20)
CALCIUM SERPL-MCNC: 9.2 MG/DL (ref 8.7–10.5)
CHLORIDE SERPL-SCNC: 107 MMOL/L (ref 95–110)
CO2 SERPL-SCNC: 25 MMOL/L (ref 23–29)
CREAT SERPL-MCNC: 0.6 MG/DL (ref 0.5–1.4)
DRVVT SCREEN RATIO: NORMAL
ERYTHROCYTE [DISTWIDTH] IN BLOOD BY AUTOMATED COUNT: 14 % (ref 11.5–14.5)
GFR SERPLBLD CREATININE-BSD FMLA CKD-EPI: >60 ML/MIN/1.73/M2
GLUCOSE SERPL-MCNC: 120 MG/DL (ref 70–110)
GRAM STN SPEC: NORMAL
GRAM STN SPEC: NORMAL
HCT VFR BLD AUTO: 29.5 % (ref 37–48.5)
HGB BLD-MCNC: 8.7 GM/DL (ref 12–16)
IMM GRANULOCYTES # BLD AUTO: 0.2 K/UL (ref 0–0.04)
IMM GRANULOCYTES NFR BLD AUTO: 1 % (ref 0–0.5)
LA 3 SCREEN W REFLEX-IMP: NORMAL
LYMPHOCYTES # BLD AUTO: 4.69 K/UL (ref 1–4.8)
MAGNESIUM SERPL-MCNC: 1.8 MG/DL (ref 1.6–2.6)
MCH RBC QN AUTO: 27.3 PG (ref 27–31)
MCHC RBC AUTO-ENTMCNC: 29.5 G/DL (ref 32–36)
MCV RBC AUTO: 93 FL (ref 82–98)
NEUTRALIZED DRVVT SCREEN RATIO: NORMAL
NUCLEATED RBC (/100WBC) (OHS): 0 /100 WBC
PHOSPHATE SERPL-MCNC: 3.3 MG/DL (ref 2.7–4.5)
PLATELET # BLD AUTO: 466 K/UL (ref 150–450)
PMV BLD AUTO: 9.6 FL (ref 9.2–12.9)
POTASSIUM SERPL-SCNC: 3.5 MMOL/L (ref 3.5–5.1)
PROT SERPL-MCNC: 6.9 GM/DL (ref 6–8.4)
PROTHROMBIN TIME: NORMAL S
PTT-LA RATIO: NORMAL
RBC # BLD AUTO: 3.19 M/UL (ref 4–5.4)
RELATIVE EOSINOPHIL (OHS): 1.2 %
RELATIVE LYMPHOCYTE (OHS): 23.5 % (ref 18–48)
RELATIVE MONOCYTE (OHS): 5 % (ref 4–15)
RELATIVE NEUTROPHIL (OHS): 68.9 % (ref 38–73)
SODIUM SERPL-SCNC: 141 MMOL/L (ref 136–145)
WBC # BLD AUTO: 19.94 K/UL (ref 3.9–12.7)

## 2025-05-18 PROCEDURE — 63600175 PHARM REV CODE 636 W HCPCS: Performed by: PSYCHIATRY & NEUROLOGY

## 2025-05-18 PROCEDURE — 83735 ASSAY OF MAGNESIUM: CPT

## 2025-05-18 PROCEDURE — 80053 COMPREHEN METABOLIC PANEL: CPT

## 2025-05-18 PROCEDURE — 99233 SBSQ HOSP IP/OBS HIGH 50: CPT | Mod: ,,, | Performed by: NEUROLOGICAL SURGERY

## 2025-05-18 PROCEDURE — 25000003 PHARM REV CODE 250

## 2025-05-18 PROCEDURE — 63600175 PHARM REV CODE 636 W HCPCS

## 2025-05-18 PROCEDURE — 85730 THROMBOPLASTIN TIME PARTIAL: CPT | Performed by: PSYCHIATRY & NEUROLOGY

## 2025-05-18 PROCEDURE — 25000003 PHARM REV CODE 250: Performed by: NURSE PRACTITIONER

## 2025-05-18 PROCEDURE — 85025 COMPLETE CBC W/AUTO DIFF WBC: CPT | Performed by: PSYCHIATRY & NEUROLOGY

## 2025-05-18 PROCEDURE — 94761 N-INVAS EAR/PLS OXIMETRY MLT: CPT

## 2025-05-18 PROCEDURE — 99233 SBSQ HOSP IP/OBS HIGH 50: CPT | Mod: ,,, | Performed by: PSYCHIATRY & NEUROLOGY

## 2025-05-18 PROCEDURE — 84100 ASSAY OF PHOSPHORUS: CPT

## 2025-05-18 PROCEDURE — 25000003 PHARM REV CODE 250: Performed by: PSYCHIATRY & NEUROLOGY

## 2025-05-18 PROCEDURE — 85730 THROMBOPLASTIN TIME PARTIAL: CPT

## 2025-05-18 PROCEDURE — 20000000 HC ICU ROOM

## 2025-05-18 PROCEDURE — 99291 CRITICAL CARE FIRST HOUR: CPT | Mod: ,,,

## 2025-05-18 RX ORDER — VALACYCLOVIR HYDROCHLORIDE 500 MG/1
1000 TABLET, FILM COATED ORAL DAILY
Status: DISCONTINUED | OUTPATIENT
Start: 2025-05-18 | End: 2025-05-26 | Stop reason: HOSPADM

## 2025-05-18 RX ADMIN — VALACYCLOVIR HYDROCHLORIDE 1000 MG: 500 TABLET, FILM COATED ORAL at 12:05

## 2025-05-18 RX ADMIN — NIMODIPINE 60 MG: 30 CAPSULE, LIQUID FILLED ORAL at 09:05

## 2025-05-18 RX ADMIN — CEFEPIME 2 G: 2 INJECTION, POWDER, FOR SOLUTION INTRAVENOUS at 02:05

## 2025-05-18 RX ADMIN — OXYCODONE 5 MG: 5 TABLET ORAL at 04:05

## 2025-05-18 RX ADMIN — OXYCODONE 5 MG: 5 TABLET ORAL at 01:05

## 2025-05-18 RX ADMIN — NIMODIPINE 60 MG: 30 CAPSULE, LIQUID FILLED ORAL at 01:05

## 2025-05-18 RX ADMIN — OXYCODONE 5 MG: 5 TABLET ORAL at 09:05

## 2025-05-18 RX ADMIN — ACETAMINOPHEN 650 MG: 325 TABLET ORAL at 09:05

## 2025-05-18 RX ADMIN — POTASSIUM BICARBONATE 50 MEQ: 978 TABLET, EFFERVESCENT ORAL at 06:05

## 2025-05-18 RX ADMIN — CEFEPIME 2 G: 2 INJECTION, POWDER, FOR SOLUTION INTRAVENOUS at 12:05

## 2025-05-18 RX ADMIN — NIMODIPINE 60 MG: 30 CAPSULE, LIQUID FILLED ORAL at 06:05

## 2025-05-18 RX ADMIN — NIMODIPINE 60 MG: 30 CAPSULE, LIQUID FILLED ORAL at 02:05

## 2025-05-18 RX ADMIN — PREGABALIN 75 MG: 75 CAPSULE ORAL at 08:05

## 2025-05-18 RX ADMIN — HEPARIN SODIUM AND DEXTROSE 11 UNITS/KG/HR: 10000; 5 INJECTION INTRAVENOUS at 02:05

## 2025-05-18 RX ADMIN — ASPIRIN 81 MG CHEWABLE TABLET 81 MG: 81 TABLET CHEWABLE at 09:05

## 2025-05-18 RX ADMIN — CEFEPIME 2 G: 2 INJECTION, POWDER, FOR SOLUTION INTRAVENOUS at 08:05

## 2025-05-18 RX ADMIN — VALSARTAN 80 MG: 80 TABLET, FILM COATED ORAL at 09:05

## 2025-05-18 RX ADMIN — QUETIAPINE FUMARATE 50 MG: 25 TABLET ORAL at 09:05

## 2025-05-18 RX ADMIN — ATORVASTATIN CALCIUM 40 MG: 40 TABLET, FILM COATED ORAL at 09:05

## 2025-05-18 RX ADMIN — ACETAMINOPHEN 650 MG: 325 TABLET ORAL at 01:05

## 2025-05-18 RX ADMIN — PREGABALIN 75 MG: 75 CAPSULE ORAL at 09:05

## 2025-05-18 RX ADMIN — ACETAMINOPHEN 650 MG: 325 TABLET ORAL at 04:05

## 2025-05-18 RX ADMIN — OXYCODONE 5 MG: 5 TABLET ORAL at 08:05

## 2025-05-18 NOTE — PLAN OF CARE
[facility] Care Plan  POC reviewed with Waldo Alegriaer and family at 1400. Patient verbalized understanding. Questions and concerns addressed. No acute events today. Pt progressing toward goals. See below and flowsheets for full assessment and VS info.     - Pt up to chair   - Heparin at 13  - Precedex at 0.3  - EVD advanced to 20 cm H2O; pt educated re: EVD  - Family at bedside  - aPTT therapeutic  - Tylenol and Oxy 5 given x 2 for headache  - Pt reported ear pain and area of right thigh numbness- alerted MD Misty      Is this a stroke patient? yes- Stroke booklet reviewed with patient and is at bedside.   Care Plan Individualization:     Neuro:  Blaine Coma Scale  Best Eye Response: 4-->(E4) spontaneous  Best Motor Response: 6-->(M6) obeys commands  Best Verbal Response: 5-->(V5) oriented  Roro Coma Scale Score: 15  Assessment Qualifiers: no eye obstruction present  Pupil PERRLA: yes     24 hr Temp:  [97.4 °F (36.3 °C)-98.5 °F (36.9 °C)]     CV:   Rhythm: normal sinus rhythm  BP goals:   SBP < 160  MAP > 65    Resp:      Vent Mode: Spont  Set Rate: 20 BPM  Oxygen Concentration (%): 40  Vt Set: 375 mL  PEEP/CPAP: 5 cmH20  Pressure Support: 5 cmH20    Plan: N/A    GI/:     Diet/Nutrition Received: regular  Last Bowel Movement: 05/16/25  Voiding Characteristics: external catheter    Intake/Output Summary (Last 24 hours) at 5/17/2025 2028  Last data filed at 5/17/2025 1902  Gross per 24 hour   Intake 392.42 ml   Output 1133 ml   Net -740.58 ml     Unmeasured Output  Unmeasured Urine Occurrence: 1  Unmeasured Stool Occurrence: 0  Pad Count: 1    Labs/Accuchecks:  Recent Labs   Lab 05/17/25  0217   WBC 18.64*   RBC 3.01*   HGB 8.3*   HCT 27.3*         Recent Labs   Lab 05/17/25  0217      K 3.9   CO2 24      BUN 9   CREATININE 0.6   ALKPHOS 91   ALT 20   AST 19   BILITOT 0.4      Recent Labs   Lab 05/15/25  0530 05/15/25  1342 05/17/25  1215   PROTIME 11.7  --   --    INR 1.1  --   --   "  APTT 21.5   < > 45.6*    < > = values in this interval not displayed.    No results for input(s): "CPK", "CPKMB", "TROPONINI", "MB" in the last 168 hours.    Electrolytes: Electrolytes replaced  Accuchecks: none    Gtts:   dexmedeTOMIDine (Precedex) infusion (titrating)  0-0.6 mcg/kg/hr Intravenous Continuous 7.52 mL/hr at 25 180 0.3 mcg/kg/hr at 25 180    heparin (porcine) in D5W  0-40 Units/kg/hr Intravenous Continuous 13 mL/hr at 25 1801 13 Units/kg/hr at 25 180       LDA/Wounds:    Ivan Risk Assessment  Sensory Perception: 3-->slightly limited  Moisture: 4-->rarely moist  Activity: 2-->chairfast  Mobility: 3-->slightly limited  Nutrition: 3-->adequate  Friction and Shear: 3-->no apparent problem  Ivan Score: 18    Is your ivan score 12 or less? no            Restraints:   Restraint Order  Length of Order: Order good for next 24 hours or when removed.  Date that the current order will : 05/15/25  Time that the current order will : 1151  Order Upon Application: Yes    Problem: Infection  Goal: Absence of Infection Signs and Symptoms  Outcome: Progressing     Problem: Skin Injury Risk Increased  Goal: Skin Health and Integrity  Outcome: Progressing     Problem: Adult Inpatient Plan of Care  Goal: Plan of Care Review  Outcome: Progressing  Goal: Patient-Specific Goal (Individualized)  Outcome: Progressing  Goal: Absence of Hospital-Acquired Illness or Injury  Outcome: Progressing  Goal: Optimal Comfort and Wellbeing  Outcome: Progressing  Goal: Readiness for Transition of Care  Outcome: Progressing     Problem: Stroke, Intracerebral Hemorrhage  Goal: Optimal Coping  Outcome: Progressing  Goal: Effective Bowel Elimination  Outcome: Progressing  Goal: Optimal Cerebral Tissue Perfusion  Outcome: Progressing  Goal: Optimal Cognitive Function  Outcome: Progressing  Goal: Effective Communication Skills  Outcome: Progressing  Goal: Optimal Functional Ability  Outcome: " Progressing  Goal: Optimal Nutrition Intake  Outcome: Progressing  Goal: Optimal Pain Control and Function  Outcome: Progressing  Goal: Effective Oxygenation and Ventilation  Outcome: Progressing  Goal: Improved Sensorimotor Function  Outcome: Progressing  Goal: Safe and Effective Swallow  Outcome: Progressing  Goal: Effective Urinary Elimination  Outcome: Progressing     Problem: Fall Injury Risk  Goal: Absence of Fall and Fall-Related Injury  Outcome: Progressing     Problem: Wound  Goal: Optimal Coping  Outcome: Progressing  Goal: Optimal Functional Ability  Outcome: Progressing  Goal: Absence of Infection Signs and Symptoms  Outcome: Progressing  Goal: Improved Oral Intake  Outcome: Progressing  Goal: Optimal Pain Control and Function  Outcome: Progressing  Goal: Skin Health and Integrity  Outcome: Progressing  Goal: Optimal Wound Healing  Outcome: Progressing

## 2025-05-18 NOTE — EICU
[facility] Night Rounds Checklist  24H Vital Sign Range:  Temp:  [97 °F (36.1 °C)-98.1 °F (36.7 °C)]   Pulse:  []   Resp:  [16-35]   BP: ()/(56-86)   SpO2:  [93 %-100 %]     Video rounds

## 2025-05-18 NOTE — ASSESSMENT & PLAN NOTE
-Stroke risk factor  -SBP goal No target, increase BP to prevent vasospasm, maintain MAP >65  -BP range in the last 24 hrs: BP  Min: 90/56  Max: 143/62  -Current antihypertensive regimen:   --PRN labetalol/hydralazine  --valsartan 80mg QD

## 2025-05-18 NOTE — ASSESSMENT & PLAN NOTE
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 [place] [facility] [place] DSA performed 5/6/25 demonstrated the above aneurysm, which was secured with coiling.     05/18/2025 NAEON. Agitation management continues with precedex.     Antithrombotics for secondary stroke prevention: Antiplatelets: Aspirin: 81 mg daily  Anticoagulants: Heparin protocol without bolus    Statins for secondary stroke prevention and hyperlipidemia, if present:   Statins: Atorvastatin- 40 mg daily    Aggressive risk factor modification: HTN, HLD, Diet, Exercise, Obesity     Rehab efforts: The patient has been evaluated by a stroke team provider and the therapy needs have been fully considered based off the presenting complaints and exam findings. The following therapy evaluations are needed: PT evaluate and treat, OT evaluate and treat, SLP evaluate and treat    Diagnostics ordered/pending: None     VTE prophylaxis: Mechanical prophylaxis: Place SCDs  Heparin drip    BP parameters: SAH: Secured aneurysm, no target, increase BP to prevent vasospasm if needed

## 2025-05-18 NOTE — PLAN OF CARE
[facility] Care Plan  POC reviewed with Waldo Emmanuel and family at 1400. Patient verbalized understanding. Questions and concerns addressed. No acute events today. Pt progressing toward goals. See below and flowsheets for full assessment and VS info.     - CHG wipes given; Linens and Gown changed  - Tylenol and Oxy given x 2 for Headache  - Dr. Pardo (Neurosurgery) contacted regarding EVD leaking at insertion  - Pt up to chair and bedside commode today  - Precedex off  - Heparin at 11 (aPTT therapeutic)      Is this a stroke patient? yes- Stroke booklet reviewed with patient and is at bedside.   Care Plan Individualization:     Neuro:  [place] Coma Scale  Best Eye Response: 4-->(E4) spontaneous  Best Motor Response: 6-->(M6) obeys commands  Best Verbal Response: 5-->(V5) oriented  [place] Coma Scale Score: 15  Assessment Qualifiers: patient chemically sedated or paralyzed, no eye obstruction present  Pupil PERRLA: yes     24 hr Temp:  [96.8 °F (36 °C)-98.2 °F (36.8 °C)]     CV:   Rhythm: normal sinus rhythm  BP goals:   SBP < 160  MAP > 65    Resp:      Vent Mode: Spont  Set Rate: 20 BPM  Oxygen Concentration (%): 40  Vt Set: 375 mL  PEEP/CPAP: 5 cmH20  Pressure Support: 5 cmH20    Plan: N/A    GI/:     Diet/Nutrition Received: regular  Last Bowel Movement: 05/16/25  Voiding Characteristics: voids spontaneously without difficulty    Intake/Output Summary (Last 24 hours) at 5/18/2025 1739  Last data filed at 5/18/2025 1701  Gross per 24 hour   Intake 1423.17 ml   Output 811 ml   Net 612.17 ml     Unmeasured Output  Unmeasured Urine Occurrence: 1  Unmeasured Stool Occurrence: 0  Pad Count: 1    Labs/Accuchecks:  Recent Labs   Lab 05/18/25  0106   WBC 19.94*   RBC 3.19*   HGB 8.7*   HCT 29.5*   *      Recent Labs   Lab 05/18/25  0106      K 3.5   CO2 25      BUN 13   CREATININE 0.6   ALKPHOS 86   ALT 18   AST 19   BILITOT 0.4      Recent Labs   Lab 05/15/25  0530 05/15/25  1342 05/18/25  1009  "  PROTIME 11.7  --   --    INR 1.1  --   --    APTT 21.5   < > 38.9*    < > = values in this interval not displayed.    No results for input(s): "CPK", "CPKMB", "TROPONINI", "MB" in the last 168 hours.    Electrolytes: Electrolytes replaced  Accuchecks: none    Gtts:   dexmedeTOMIDine (Precedex) infusion (titrating)  0-0.6 mcg/kg/hr Intravenous Continuous   Stopped at 25 1657    heparin (porcine) in D5W  0-40 Units/kg/hr Intravenous Continuous 11 mL/hr at 25 1701 11 Units/kg/hr at 25 1701       LDA/Wounds:    Ivan Risk Assessment  Sensory Perception: 4-->no impairment  Moisture: 4-->rarely moist  Activity: 2-->chairfast  Mobility: 3-->slightly limited  Nutrition: 3-->adequate  Friction and Shear: 2-->potential problem  Ivan Score: 18    Is your ivan score 12 or less? no            Restraints:   Restraint Order  Length of Order: Order good for next 24 hours or when removed.  Date that the current order will : 05/15/25  Time that the current order will : 1151  Order Upon Application: Yes      Problem: Infection  Goal: Absence of Infection Signs and Symptoms  Outcome: Progressing     Problem: Skin Injury Risk Increased  Goal: Skin Health and Integrity  Outcome: Progressing     Problem: Adult Inpatient Plan of Care  Goal: Plan of Care Review  Outcome: Progressing  Goal: Patient-Specific Goal (Individualized)  Outcome: Progressing  Goal: Absence of Hospital-Acquired Illness or Injury  Outcome: Progressing  Goal: Optimal Comfort and Wellbeing  Outcome: Progressing  Goal: Readiness for Transition of Care  Outcome: Progressing     Problem: Stroke, Intracerebral Hemorrhage  Goal: Optimal Coping  Outcome: Progressing  Goal: Effective Bowel Elimination  Outcome: Progressing  Goal: Optimal Cerebral Tissue Perfusion  Outcome: Progressing  Goal: Optimal Cognitive Function  Outcome: Progressing  Goal: Effective Communication Skills  Outcome: Progressing  Goal: Optimal Functional " Ability  Outcome: Progressing  Goal: Optimal Nutrition Intake  Outcome: Progressing  Goal: Optimal Pain Control and Function  Outcome: Progressing  Goal: Effective Oxygenation and Ventilation  Outcome: Progressing  Goal: Improved Sensorimotor Function  Outcome: Progressing  Goal: Safe and Effective Swallow  Outcome: Progressing  Goal: Effective Urinary Elimination  Outcome: Progressing     Problem: Fall Injury Risk  Goal: Absence of Fall and Fall-Related Injury  Outcome: Progressing     Problem: Wound  Goal: Optimal Coping  Outcome: Progressing  Goal: Optimal Functional Ability  Outcome: Progressing  Goal: Absence of Infection Signs and Symptoms  Outcome: Progressing  Goal: Improved Oral Intake  Outcome: Progressing  Goal: Optimal Pain Control and Function  Outcome: Progressing  Goal: Skin Health and Integrity  Outcome: Progressing  Goal: Optimal Wound Healing  Outcome: Progressing

## 2025-05-18 NOTE — SUBJECTIVE & OBJECTIVE
Interval History:  NAEON.     Objective:     Vitals:  Temp: 98.2 °F (36.8 °C)  Pulse: 99  Rhythm: normal sinus rhythm  BP: (!) 91/58  MAP (mmHg): 70  ICP Mean (mmHg): 11 mmHg  Resp: 20  SpO2: 95 %    Temp  Min: 96.8 °F (36 °C)  Max: 98.2 °F (36.8 °C)  Pulse  Min: 66  Max: 104  BP  Min: 90/56  Max: 143/62  MAP (mmHg)  Min: 68  Max: 105  ICP Mean (mmHg)  Min: 10 mmHg  Max: 17 mmHg  Resp  Min: 16  Max: 28  SpO2  Min: 94 %  Max: 100 %    05/17 0701 - 05/18 0700  In: 1149.8 [P.O.:720; I.V.:429.8]  Out: 607 [Urine:550; Drains:57]   Unmeasured Output  Unmeasured Urine Occurrence: 1  Unmeasured Stool Occurrence: 0  Pad Count: 1        Physical Exam  Vitals reviewed.   Constitutional:       Appearance: She is obese. She is ill-appearing.   HENT:      Head:      Comments: EVD       Right Ear: Tympanic membrane and external ear normal.      Left Ear: Tympanic membrane and external ear normal.      Nose: Nose normal.      Mouth/Throat:      Mouth: Mucous membranes are moist.   Eyes:      Pupils: Pupils are equal, round, and reactive to light.   Cardiovascular:      Rate and Rhythm: Normal rate.   Pulmonary:      Effort: Pulmonary effort is normal.   Abdominal:      Palpations: Abdomen is soft.   Skin:     General: Skin is warm and dry.   Neurological:      Comments: --GCS: E4 V5 M6  --Mental Status:  awake, oriented x 4  --Pivots from bed to bedside commode  --Follows all commands  --Pupils reactive   --URENA spont              Medications:  ContinuousdexmedeTOMIDine (Precedex) infusion (titrating), Last Rate: 0.1 mcg/kg/hr (05/18/25 1101)  heparin (porcine) in D5W, Last Rate: 11 Units/kg/hr (05/18/25 1101)    Scheduledaspirin, 81 mg, Daily  atorvastatin, 40 mg, Daily  ceFEPime IV (PEDS and ADULTS), 2 g, Q8H  niMODipine, 60 mg, Q4H  pregabalin, 75 mg, BID  QUEtiapine, 100 mg, QHS  QUEtiapine, 50 mg, Daily  valACYclovir, 1,000 mg, Daily  valsartan, 80 mg, Daily    PRNacetaminophen, 650 mg, Q6H PRN  bisacodyL, 10 mg, Daily  PRN  diazePAM, 10 mg, Q6H PRN  hydrALAZINE, 10 mg, Q4H PRN  labetalol, 10 mg, Q4H PRN  magnesium oxide, 800 mg, PRN  magnesium oxide, 800 mg, PRN  melatonin, 6 mg, Nightly PRN  methocarbamoL, 750 mg, Q6H PRN  oxyCODONE, 5 mg, Q4H PRN  potassium bicarbonate, 35 mEq, PRN  potassium bicarbonate, 50 mEq, PRN  potassium bicarbonate, 60 mEq, PRN  potassium, sodium phosphates, 2 packet, PRN  potassium, sodium phosphates, 2 packet, PRN  potassium, sodium phosphates, 2 packet, PRN  sodium chloride 0.9%, 10 mL, PRN      Today I personally reviewed pertinent medications, lines/drains/airways, imaging, cardiology results, laboratory results, microbiology results, notably:    Imaging Results              IR Angiogram Carotid Cerebral Bilateral (Final result)  Result time 05/14/25 09:51:26      Final result by Piyush Silverio MD (05/14/25 09:51:26)                   Impression:        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  No other aneurysms were identified.    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  The parent artery remained patent on follow-up angiograms.  There were no immediate postprocedure complications.    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    Electronically signed by resident: Nadia Correa  Date:    05/08/2025  Time:    07:18    Electronically signed by: Piyush Silverio MD  Date:    05/14/2025  Time:    09:51               Narrative:      EXAM:    Cerebral angiogram.  Aneurysm embolization.    CPT CODES:    Bilateral internal carotid artery: 36224 x 2    Bilateral external carotid artery: 36227 x 2    Selective intracranial branch of the right vertebral artery: 29541    Bilateral vertebral artery: 36226 x 2    Closure device placement:     3D Reconstruction w independent workstation: [id number]    Embolization CNS  permanent: [id number], [id number]    F/U Angio post embolization: 75898 x 1    CLINICAL HISTORY AND INDICATION:    Patient is a 50 y/o Female, referred for endovascular embolization of right anterior inferior cerebellar artery aneurysm.    PROCEDURE COMMENT:    Informed consent was obtained from the patient's family prior to the examination. A timeout was performed.    Sedation: Endotracheal intubation and sedation with monitoring was provided by the anesthesia department.    Initially we attempted to access the right groin under US guidance using a micropuncture needle. The microwire would not pass be external iliac artery so needle and wire were removed and hemostasis was achieved. Next, under US guidance, A 6 F sheath was placed into the left femoral artery and a 5F Robert catheter was advanced into the aortic arch.  Bilateral CCA/ICA/ECA and vertebral arteries were subselected and angiography of the brain was performed after injection into each of these vessels.    FINDINGS:  The right vertebral artery was selected and an angiogram was performed. Angiogram demonstrates excellent flow into the intracranial segments with opacification of all the branches and the vertebrobasilar junction with competitive flow from the left vertebral artery.  Small outpouching of the distal aspect of the right anterior inferior cerebellar artery although decreased in size when compared to CTA suggesting partial thrombosis..    Also, 3D spin was performed with reconstruction at separate workstation to obtain more details from right vertebral artery and further characterize the aneurysm. It appears partially thrombosed in the beginning of the study.    The right common carotid artery was selected and an angiogram was performed. Angiogram demonstrates excellent flow the bifurcation without significant plaque buildup or stenosis.    The right internal carotid artery was selected and an angiogram was performed. Angiogram demonstrates good flow into  the intracranial segments with opacification of both the anterior and middle cerebral arteries. . There are no significant stenosis.  No aneurysm identified on the right internal carotid artery injection.    The right external carotid artery was selected and angiogram was performed.  Angiogram demonstrates good flow into all the branches with no abnormal anastomosis to internal carotid artery.  No AV fistula.    The left common carotid artery was selected and an angiogram was performed. Angiogram demonstrates excellent flow at the bifurcation without significant plaque buildup or stenosis.    The left internal carotid artery was selected and an angiogram was performed. Angiogram demonstrates excellent flow into the intracranial segments with opacification of both the anterior and middle cerebral arteries.  There are no aneurysms or arteriovenous malformation is identified.  There is no significant stenosis.    The left external carotid artery was selected and angiogram was performed. Angiogram demonstrates good flow into all the branches with no abnormal anastomosis to internal carotid artery.  No AV fistula.    The left vertebral artery was selected and an angiogram was performed. Angiogram demonstrates excellent flow into the intracranial segments with opacification of all the branches of the vertebral artery as well as the basilar artery.  There is competitive flow from the right vertebral artery.  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  There is no significant stenosis.    The venous drainage pattern were within normal limits.    PROCEDURE:    Given that the aneurysm was not well visualized from the vertebral artery injection, it was decided to perform sub selective angiograms of the basilar and right anterior inferior cerebellar artery.    For intervention purposes, Benchmark 071- 95 cm  long, Jeane 5 Croatian- 125 cm long, Headway duo 156 cm, Synchro microwire.    The right anterior inferior cerebellar artery was selected and arteriogram was performed.  There was likely an AICA/PICA complex.  Arteriograms were performed in multiple projections through the microcatheter in the AICa.  The aneurysm was difficult to measure due to partial thrombosis.    Consultation was made with neuro surgery about potential treatment options including endovascular embolization and clipping.  Risks were discussed of potential thrombus embolization endovascular treatment.  Risks of rupture with open surgery was also discussed.  Ultimately, we decided to proceed with endovascular embolization.    For the above findings, we decided to proceed with the coil embolization of the right anterior inferior cerebellar artery aneurysm.  Using a enavu select catheter/035 glidewire system through a Benchmark 71, the right vertebral artery was selected.  Next, under roadmap guidance, advanced 5 Croatian Jeane intermediate catheter over Headway Duo 156 cm microcatheter and 014 synchro microwire.  This was gently navigated and positioned tip of Headway Duo microcatheter to the center of the aneurysm and the Jeane catheter in the right V 4 segment.  Micro angiography was performed within the anterior inferior cerebellar artery.  Under road mapping guidance, Cerepak coils 5 mm * 10 cm were deployed.   For follow-up angiograms were performed after coil placements, and these revealed good distribution within the aneurysm after coil deployment with progressive occlusion.  There was no compromise of the parent artery.  Final angiogram demonstrates good coil packing in the dome of the aneurysm, and there was no residual minimal filling.    The catheter was removed and an angiogram was performed through the femoral sheath demonstrating an access site appropriate for closure device.    Hemostasis was obtained in the right groin using 6 Croatian  Vascade device.    The total contrast dose for the procedure was: 120 cc of Visipaque.    Radiation dose:    Radiation DAP 52660 CGycm2    Reference air kerma was 4059 mGy    Fluoro time was 32.8 minutes    Physicians in the procedure:  Dr. Piyush Silverio MD    Images and report were permanently recorded.                                       Radiology (Final result)  Result time 05/09/25 15:15:25      Final result by Unknown User (05/09/25 15:15:25)                                          Diet  Diet Adult Regular  Diet Adult Regular

## 2025-05-18 NOTE — PROGRESS NOTES
[facility] - Neuro Critical Care  Neurosurgery  Progress Note    Subjective:     History of Present Illness: 49f presenting after syncope with CTH demonstrating SAH. CTA without clear vascular malformation. Intubated prior to arrival to [facility] ED. Discussed expected hospital course and imaging with daughter in room    Post-Op Info:  Procedure(s) (LRB):  MRI (Magnetic Resonance Imagine) (N/A)   3 Days Post-Op   Interval History: 5/18 naeon, exam stable. Keep EVD at 20 today. Continue ICU and SAH protocol    Medications:  Continuous Infusions:   dexmedeTOMIDine (Precedex) infusion (titrating)  0-0.6 mcg/kg/hr Intravenous Continuous 2.51 mL/hr at 05/18/25 0602 0.1 mcg/kg/hr at 05/18/25 0602    heparin (porcine) in D5W  0-40 Units/kg/hr Intravenous Continuous 11 mL/hr at 05/18/25 0602 11 Units/kg/hr at 05/18/25 0602     Scheduled Meds:   aspirin  81 mg Oral Daily    atorvastatin  40 mg Oral Daily    ceFEPime IV (PEDS and ADULTS)  2 g Intravenous Q8H    niMODipine  60 mg Oral Q4H    pregabalin  75 mg Oral BID    QUEtiapine  100 mg Oral QHS    QUEtiapine  50 mg Oral Daily    valACYclovir  1,000 mg Oral Daily    valsartan  80 mg Oral Daily     PRN Meds:  Current Facility-Administered Medications:     acetaminophen, 650 mg, Oral, Q6H PRN    bisacodyL, 10 mg, Rectal, Daily PRN    diazePAM, 10 mg, Oral, Q6H PRN    hydrALAZINE, 10 mg, Intravenous, Q4H PRN    labetalol, 10 mg, Intravenous, Q4H PRN    magnesium oxide, 800 mg, Oral, PRN    magnesium oxide, 800 mg, Oral, PRN    melatonin, 6 mg, Oral, Nightly PRN    methocarbamoL, 750 mg, Oral, Q6H PRN    oxyCODONE, 5 mg, Oral, Q4H PRN    potassium bicarbonate, 35 mEq, Oral, PRN    potassium bicarbonate, 50 mEq, Oral, PRN    potassium bicarbonate, 60 mEq, Oral, PRN    potassium, sodium phosphates, 2 packet, Oral, PRN    potassium, sodium phosphates, 2 packet, Oral, PRN    potassium, sodium phosphates, 2 packet, Oral, PRN    sodium chloride 0.9%, 10 mL, Intravenous, PRN     Review of  Systems  Objective:     Weight: 100.2 kg (220 lb 14.4 oz)  Body mass index is 39.13 kg/m².  Vital Signs (Most Recent):  Temp: 96.8 °F (36 °C) (05/18/25 0302)  Pulse: 79 (05/18/25 0829)  Resp: 20 (05/18/25 0943)  BP: 115/71 (05/18/25 0602)  SpO2: 95 % (05/18/25 0829) Vital Signs (24h Range):  Temp:  [96.8 °F (36 °C)-98.1 °F (36.7 °C)] 96.8 °F (36 °C)  Pulse:  [] 79  Resp:  [16-28] 20  SpO2:  [94 %-100 %] 95 %  BP: ()/(56-86) 115/71                                ICP/Ventriculostomy 05/06/25 0920 Right Parietal region (Active)   Level of Ventriculostomy (cm above) 10 05/15/25 0301   Status Open to drainage 05/16/25 0701   Site Assessment Clean;Dry 05/16/25 0701   Site Drainage No drainage 05/16/25 0701   Waveform normal waveform 05/15/25 1901   Output (mL) 19 mL 05/16/25 1001   CSF Color yellow 05/16/25 0701   Dressing Status Clean;Dry;Intact 05/16/25 0701   Interventions HOB degrees;level adjusted per order;zeroed 05/16/25 0701       Female External Urinary Catheter w/ Suction 05/07/25 1732 (Active)   Skin no redness;no breakdown;perineum cleansed w/ soap and water;female external urine collection device repositioned 05/16/25 0701   Tolerance no signs/symptoms of discomfort 05/16/25 0701   Suction Continuous suction at 70 mmHg 05/15/25 1901   Date of last wick change 05/15/25 05/15/25 1901   Time of last wick change 0801 05/15/25 0701   Output (mL) 550 mL 05/16/25 0801          Physical Exam         Neurosurgery Physical Exam  E4V5M6  alert, Ox4  Cni  Follows commands x4 grossly full strength throughout  SILT  R dysmetria     EVD Incision dressed CDI     Significant Labs:  Recent Labs   Lab 05/17/25  0217 05/18/25  0106   * 120*    141   K 3.9 3.5    107   CO2 24 25   BUN 9 13   CREATININE 0.6 0.6   CALCIUM 9.3 9.2   MG 1.7 1.8     Recent Labs   Lab 05/17/25  0217 05/18/25  0106   WBC 18.64* 19.94*   HGB 8.3* 8.7*   HCT 27.3* 29.5*    466*     Recent Labs   Lab 05/17/25  1215  05/18/25  0106 05/18/25  1009   APTT 45.6* 56.4* 38.9*     Microbiology Results (last 7 days)       Procedure Component Value Units Date/Time    CSF culture [[id number]] Collected: 05/12/25 1729    Order Status: Completed Specimen: CSF (Spinal Fluid) from CSF Tap, Tube 3 Updated: 05/18/25 0703     CULTURE, CSF No Growth     GRAM STAIN Rare WBC seen      No organisms seen    Culture, Respiratory with Gram Stain [[id number]]  (Abnormal)  (Susceptibility) Collected: 05/10/25 1120    Order Status: Completed Specimen: Respiratory from Sputum, Expectorated Updated: 05/13/25 0946     Respiratory Culture No S aureus or Pseudomonas isolated      Few KLEBSIELLA AEROGENES     Comment: with normal respiratory chichi        GRAM STAIN <10 Epithelial Cells/LPF      Rare WBC seen      Many Gram positive cocci      Many Gram Negative Rods    Culture, Respiratory with Gram Stain [[id number]]  (Abnormal)  (Susceptibility) Collected: 05/10/25 2035    Order Status: Completed Specimen: Respiratory from Sputum Updated: 05/13/25 0945     Respiratory Culture No S aureus or Pseudomonas isolated      Few KLEBSIELLA AEROGENES     Comment: with normal respiratory chichi        GRAM STAIN <10 Epithelial Cells/LPF      Rare WBC seen      Many Gram positive cocci      Moderate Gram Negative Rods    Gram stain [[id number]] Collected: 05/12/25 1729    Order Status: Canceled Specimen: CSF (Spinal Fluid) from CSF Tap, Tube 3 Updated: 05/12/25 1909          All pertinent labs from the last 24 hours have been reviewed.    Significant Diagnostics:  CT: CT Head Without Contrast  Result Date: 5/16/2025  Compared to prior MR and CT imaging of 05/11/2025, continued maturation of large recent infarction involving the right cerebellum, as discussed. Similar mass effect in the posterior fossa with essentially unchanged size and configuration of the lateral 3rd ventricles.  No new transependymal CSF egress.  CT Continued decreased visualization of existing  subarachnoid hemorrhage.  No new or enlarging areas of intracranial hemorrhage appreciated. Electronically signed by: Mich Downs Date:    05/16/2025 Time:    07:03    I have reviewed all pertinent imaging results/findings within the past 24 hours.  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 CTA with AICA aneurysm R. Intubated prior to arrival to [facility] ED.     S/p R frontal EVD on 5/6 and s/p DSA with coil embolization of R AICA aneurysm 5/6    Plan:  Admitted to ICU  EVD open at 20 abx while in place  FU CTH Monday 5/19 for interval imaging  Daily ASA 81  No dvt in BLE on ultrasound, R brachial vein dvt+ - on Asa/ hep gtt  MRA completed 5/15 without any residual aneurysmal filling.  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 Barney Almendarez MD  Neurosurgery  [facility] - Neuro Critical Care

## 2025-05-18 NOTE — PROGRESS NOTES
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 Patient transferred to [facility] for IR evaluation. DSA performed 5/6/25 demonstrated the above aneurysm, which was secured with coiling.     05/18/2025 NAEON. Agitation management continues with precedex.     Antithrombotics for secondary stroke prevention: Antiplatelets: Aspirin: 81 mg daily  Anticoagulants: Heparin protocol without bolus    Statins for secondary stroke prevention and hyperlipidemia, if present:   Statins: Atorvastatin- 40 mg daily    Aggressive risk factor modification: HTN, HLD, Diet, Exercise, Obesity     Rehab efforts: The patient has been evaluated by a stroke team provider and the therapy needs have been fully considered based off the presenting complaints and exam findings. The following therapy evaluations are needed: PT evaluate and treat, OT evaluate and treat, SLP evaluate and treat    Diagnostics ordered/pending: None     VTE prophylaxis: Mechanical prophylaxis: Place SCDs  Heparin drip    BP parameters: SAH: Secured aneurysm, no target, increase BP to prevent vasospasm if needed       Cerebellar stroke, acute  -- See plan for SAH        Obstructive hydrocephalus  EVD in place at 10  NSGY following    Brain aneurysm  See "SAH"    Essential hypertension  -Stroke risk factor  -SBP goal No target, increase BP to prevent vasospasm, maintain MAP >65  -BP range in the last 24 hrs: BP  Min: 90/56  Max: 143/62  -Current antihypertensive regimen:   --PRN labetalol/hydralazine  --valsartan 80mg QD           5/16/2025 s/p EVD and DSA with coil embolization. Imaging obtained on 5/11 and significant for R " cerebellar infarct , likely periprocedural. MRA completed yesterday, no plans for open clipping currently. Patient complaining of ear pain and headache. Remains with EVD on precedex.   5-17-25 EVD in place, on Precedex, R brachial DVT on ASA and heparin drip  05/18/2025 NAEON. Agitation management continues with precedex.     STROKE DOCUMENTATION        NIH Scale:  1a. Level of Consciousness: 0-->Alert, keenly responsive  1b. LOC Questions: 0-->Answers both questions correctly  1c. LOC Commands: 0-->Performs both tasks correctly  2. Best Gaze: 0-->Normal  3. Visual: 0-->No visual loss  4. Facial Palsy: 0-->Normal symmetrical movements  5a. Motor Arm, Left: 0-->No drift, limb holds 90 (or 45) degrees for full 10 secs  5b. Motor Arm, Right: 0-->No drift, limb holds 90 (or 45) degrees for full 10 secs  6a. Motor Leg, Left: 0-->No drift, leg holds 30 degree position for full 5 secs  6b. Motor Leg, Right: 0-->No drift, leg holds 30 degree position for full 5 secs  7. Limb Ataxia: 0-->Absent  8. Sensory: 0-->Normal, no sensory loss  9. Best Language: 0-->No aphasia, normal  10. Dysarthria: 0-->Normal  11. Extinction and Inattention (formerly Neglect): 0-->No abnormality  Total (NIH Stroke Scale): 0       Modified Hamilton    Champion Coma Scale:    ABCD2 Score:    [id number] Score:   HAS -BLED Score:   ICH Score:   Hunt & Castillo Classification:      Hemorrhagic change of an Ischemic Stroke: Does this patient have an ischemic stroke with hemorrhagic changes? No     Neurologic Chief Complaint: SAH    Subjective:     Interval History: Patient is seen for follow-up neurological assessment and treatment recommendations: See hospital course.     HPI, Past Medical, Family, and Social History remains the same as documented in the initial encounter.     Review of Systems   Constitutional:  Negative for chills and fever.   Neurological:  Positive for headaches. Negative for weakness.     Scheduled Meds:   aspirin  81 mg Oral Daily     atorvastatin  40 mg Oral Daily    ceFEPime IV (PEDS and ADULTS)  2 g Intravenous Q8H    niMODipine  60 mg Oral Q4H    pregabalin  75 mg Oral BID    QUEtiapine  100 mg Oral QHS    QUEtiapine  50 mg Oral Daily    valACYclovir  1,000 mg Oral Daily    valsartan  80 mg Oral Daily     Continuous Infusions:   dexmedeTOMIDine (Precedex) infusion (titrating)  0-0.6 mcg/kg/hr Intravenous Continuous 2.51 mL/hr at 05/18/25 1401 0.1 mcg/kg/hr at 05/18/25 1401    heparin (porcine) in D5W  0-40 Units/kg/hr Intravenous Continuous 11 mL/hr at 05/18/25 1426 11 Units/kg/hr at 05/18/25 1426     PRN Meds:  Current Facility-Administered Medications:     acetaminophen, 650 mg, Oral, Q6H PRN    bisacodyL, 10 mg, Rectal, Daily PRN    diazePAM, 10 mg, Oral, Q6H PRN    hydrALAZINE, 10 mg, Intravenous, Q4H PRN    labetalol, 10 mg, Intravenous, Q4H PRN    magnesium oxide, 800 mg, Oral, PRN    magnesium oxide, 800 mg, Oral, PRN    melatonin, 6 mg, Oral, Nightly PRN    methocarbamoL, 750 mg, Oral, Q6H PRN    oxyCODONE, 5 mg, Oral, Q4H PRN    potassium bicarbonate, 35 mEq, Oral, PRN    potassium bicarbonate, 50 mEq, Oral, PRN    potassium bicarbonate, 60 mEq, Oral, PRN    potassium, sodium phosphates, 2 packet, Oral, PRN    potassium, sodium phosphates, 2 packet, Oral, PRN    potassium, sodium phosphates, 2 packet, Oral, PRN    sodium chloride 0.9%, 10 mL, Intravenous, PRN    Objective:     Vital Signs (Most Recent):  Temp: 98.2 °F (36.8 °C) (05/18/25 0701)  Pulse: 86 (05/18/25 1301)  Resp: 11 (05/18/25 1301)  BP: (!) 139/100 (05/18/25 1301)  SpO2: 97 % (05/18/25 1301)  BP Location: Left forearm    Vital Signs Range (Last 24H):  Temp:  [96.8 °F (36 °C)-98.2 °F (36.8 °C)]   Pulse:  []   Resp:  [11-28]   BP: ()/()   SpO2:  [94 %-100 %]   BP Location: Left forearm       Physical Exam  Vitals and nursing note reviewed.   Constitutional:       Appearance: Normal appearance.   Neurological:      Mental Status: She is alert and oriented  to person, place, and time.      Motor: No weakness.              Neurological Exam:   LOC: alert  Attention Span: Good   Language: No aphasia  Articulation: No dysarthria  Orientation: Person, Place, Time   Facial Movement (CN VII): Symmetric facial expression    Motor: Arm left  Normal 5/5  Leg left  Normal 5/5  Arm right  Normal 5/5  Leg right Normal 5/5  Sensation: Intact to light touch, temperature and vibration    Laboratory:  CMP:   Recent Labs   Lab 05/18/25  0106   CALCIUM 9.2   ALBUMIN 2.9*   PROT 6.9      K 3.5   CO2 25      BUN 13   CREATININE 0.6   ALKPHOS 86   ALT 18   AST 19   BILITOT 0.4     BMP:   Recent Labs   Lab 05/18/25  0106      K 3.5      CO2 25   BUN 13   CREATININE 0.6   CALCIUM 9.2     CBC:   Recent Labs   Lab 05/18/25  0106   WBC 19.94*   RBC 3.19*   HGB 8.7*   HCT 29.5*   *   MCV 93   MCH 27.3   MCHC 29.5*     Coagulation:   Recent Labs   Lab 05/15/25  0530 05/15/25  1342 05/18/25  1009   INR 1.1  --   --    APTT 21.5   < > 38.9*    < > = values in this interval not displayed.       Diagnostic Results     Brain Imaging/Vessel imaging  CTH without 05/16/2025  Compared to prior MR and CT imaging of 05/11/2025, continued maturation of large recent infarction involving the right cerebellum, as discussed.     Similar mass effect in the posterior fossa with essentially unchanged size and configuration of the lateral 3rd ventricles.  No new transependymal CSF egress.  CT     Continued decreased visualization of existing subarachnoid hemorrhage.  No new or enlarging areas of intracranial hemorrhage appreciated.    MRI Brain w/wo 05/15/2025  Status post coiling of a PICA aneurysm.  No evidence of residual aneurysm filling.     No high-grade stenosis or other aneurysm identified.    MRI Brain without 05/11/2025  Allowing for significant distortion examination by artifact from dental metal there is large area acute/recent infarction within the right cerebellum with  probable small component in the right paramedian micah.  Please note this is near completely obscured on diffusion and gradient imaging secondary artifact from dental metal.     Mass effect from the area of infarction effacing the 4th ventricle with stable caliber lateral and 3rd ventricles without hydrocephalus with stable right frontal ventricular catheter.     There is T1 hyperintensity within the region of the right superior cerebellar peduncle corresponding to hyperdensity seen on CT concerning for component of hemorrhagic conversion.     Scattered small volume subarachnoid hemorrhage cerebral sulci.     Clinical correlation and continued follow-up recommended.    CTH without 05/11/2025  1. Compared to prior head CT performed 05/06/2025, 20:13 hours, evolving relatively large right cerebellar infarct without macroscopic hemorrhage within the infarct territory.  Questionable hypoattenuation involving the brainstem which may be artifactual given coil artifact, however additional areas of ischemia not excluded.  MRI may be considered for further characterization.  Further effacement of the 4th ventricle since the prior study, however there has been slight decompression of the lateral and 3rd ventricles since the prior exam.  2. Relatively similar subarachnoid and interventricular hemorrhage.  No new or enlarging areas of intracranial hemorrhage appreciated.  This report was flagged in Epic as abnormal.    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     Evolving scattered subarachnoid and intraventricular hemorrhage.  No definite new hemorrhage     Stable right frontal coursing ventricular catheter with slight reduced caliber of the ventricles without evidence for worsening hydrocephalus.     Continued slight crowding cerebral sulci and basilar cisterns with small caliber 4th ventricle.  Question component of inferior extension of the cerebellar tonsils below the  foramen magnum.  This could be better assessed with MR imaging if patient compatible.     No significant new abnormal parenchymal attenuation.  Clinical correlation and further evaluation with MRI as warranted.    CTH without 05/06/2025 0949  Subarachnoid and intraventricular hemorrhage, similar to recent exam.     Interval placement of right frontal EVD without apparent intracranial complication.  Ventricles remain mildly dilated.  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  Distal location suspicious for mycotic aneurysm.     No intracranial large vessel occlusion or hemodynamically significant stenosis.     The cervical carotid and vertebral arteries are patent without hemodynamically significant stenosis.     Right upper lobe, right middle lobe and left lower lobe consolidative opacities.     Endotracheal tube terminates just superior to the charis recommend slight retraction.       CTH without 05/05/2025  Subarachnoid hemorrhage.  Moderate volume of intraventricular hemorrhage.  Mild hydrocephalus.  Recommend CTA.  Findings reported by [facility] radiologist at 01:06     All CT scans at this facility use dose modulation, iterative reconstruction, and/or weight based dosing when appropriate to reduce radiation dose to as low as reasonable achievable.    Cardiac Imaging   TTE 05/07/2025       Left Ventricle: The left ventricle is normal in size. Normal wall thickness. Normal wall motion. There is normal systolic function with a visually estimated ejection fraction of 65 - 70%. Ejection fraction is approximately 68%. There is indeterminate diastolic function.    Right Ventricle: The right ventricle is normal in size. Wall thickness is normal. Systolic function is normal.    Left Atrium: Agitated saline study of the atrial septum is negative after vasalva maneuver, suggesting absence of intracardiac shunt at the atrial level. No patent foramen ovale  confirmed by agitated saline contrast.    Tricuspid Valve: There is mild to moderate regurgitation.    IVC/SVC: Patient is ventilated, cannot use IVC diameter to estimate right atrial pressure.       Ramesh Reeves MD  [facility]  Department of Vascular Neurology   [facility] - Neuro Critical Care

## 2025-05-18 NOTE — SUBJECTIVE & OBJECTIVE
Interval History: 5/18 naeon, exam stable. Keep EVD at 20 today. Continue ICU and SAH protocol    Medications:  Continuous Infusions:   dexmedeTOMIDine (Precedex) infusion (titrating)  0-0.6 mcg/kg/hr Intravenous Continuous 2.51 mL/hr at 05/18/25 0602 0.1 mcg/kg/hr at 05/18/25 0602    heparin (porcine) in D5W  0-40 Units/kg/hr Intravenous Continuous 11 mL/hr at 05/18/25 0602 11 Units/kg/hr at 05/18/25 0602     Scheduled Meds:   aspirin  81 mg Oral Daily    atorvastatin  40 mg Oral Daily    ceFEPime IV (PEDS and ADULTS)  2 g Intravenous Q8H    niMODipine  60 mg Oral Q4H    pregabalin  75 mg Oral BID    QUEtiapine  100 mg Oral QHS    QUEtiapine  50 mg Oral Daily    valACYclovir  1,000 mg Oral Daily    valsartan  80 mg Oral Daily     PRN Meds:  Current Facility-Administered Medications:     acetaminophen, 650 mg, Oral, Q6H PRN    bisacodyL, 10 mg, Rectal, Daily PRN    diazePAM, 10 mg, Oral, Q6H PRN    hydrALAZINE, 10 mg, Intravenous, Q4H PRN    labetalol, 10 mg, Intravenous, Q4H PRN    magnesium oxide, 800 mg, Oral, PRN    magnesium oxide, 800 mg, Oral, PRN    melatonin, 6 mg, Oral, Nightly PRN    methocarbamoL, 750 mg, Oral, Q6H PRN    oxyCODONE, 5 mg, Oral, Q4H PRN    potassium bicarbonate, 35 mEq, Oral, PRN    potassium bicarbonate, 50 mEq, Oral, PRN    potassium bicarbonate, 60 mEq, Oral, PRN    potassium, sodium phosphates, 2 packet, Oral, PRN    potassium, sodium phosphates, 2 packet, Oral, PRN    potassium, sodium phosphates, 2 packet, Oral, PRN    sodium chloride 0.9%, 10 mL, Intravenous, PRN     Review of Systems  Objective:     Weight: 100.2 kg (220 lb 14.4 oz)  Body mass index is 39.13 kg/m².  Vital Signs (Most Recent):  Temp: 96.8 °F (36 °C) (05/18/25 0302)  Pulse: 79 (05/18/25 0829)  Resp: 20 (05/18/25 0943)  BP: 115/71 (05/18/25 0602)  SpO2: 95 % (05/18/25 0829) Vital Signs (24h Range):  Temp:  [96.8 °F (36 °C)-98.1 °F (36.7 °C)] 96.8 °F (36 °C)  Pulse:  [] 79  Resp:  [16-28] 20  SpO2:  [94 %-100 %]  95 %  BP: ()/(56-86) 115/71                                ICP/Ventriculostomy 05/06/25 0920 Right Parietal region (Active)   Level of Ventriculostomy (cm above) 10 05/15/25 0301   Status Open to drainage 05/16/25 0701   Site Assessment Clean;Dry 05/16/25 0701   Site Drainage No drainage 05/16/25 0701   Waveform normal waveform 05/15/25 1901   Output (mL) 19 mL 05/16/25 1001   CSF Color yellow 05/16/25 0701   Dressing Status Clean;Dry;Intact 05/16/25 0701   Interventions HOB degrees;level adjusted per order;zeroed 05/16/25 0701       Female External Urinary Catheter w/ Suction 05/07/25 1732 (Active)   Skin no redness;no breakdown;perineum cleansed w/ soap and water;female external urine collection device repositioned 05/16/25 0701   Tolerance no signs/symptoms of discomfort 05/16/25 0701   Suction Continuous suction at 70 mmHg 05/15/25 1901   Date of last wick change 05/15/25 05/15/25 1901   Time of last wick change 0801 05/15/25 0701   Output (mL) 550 mL 05/16/25 0801          Physical Exam         Neurosurgery Physical Exam  [id number]  alert, Ox4  Cni  Follows commands x4 grossly full strength throughout  SILT  R dysmetria     EVD Incision dressed CDI     Significant Labs:  Recent Labs   Lab 05/17/25  0217 05/18/25  0106   * 120*    141   K 3.9 3.5    107   CO2 24 25   BUN 9 13   CREATININE 0.6 0.6   CALCIUM 9.3 9.2   MG 1.7 1.8     Recent Labs   Lab 05/17/25  0217 05/18/25  0106   WBC 18.64* 19.94*   HGB 8.3* 8.7*   HCT 27.3* 29.5*    466*     Recent Labs   Lab 05/17/25  1215 05/18/25  0106 05/18/25  1009   APTT 45.6* 56.4* 38.9*     Microbiology Results (last 7 days)       Procedure Component Value Units Date/Time    CSF culture [[id number]] Collected: 05/12/25 1729    Order Status: Completed Specimen: CSF (Spinal Fluid) from CSF Tap, Tube 3 Updated: 05/18/25 0703     CULTURE, CSF No Growth     GRAM STAIN Rare WBC seen      No organisms seen    Culture, Respiratory with Gram Stain  [[id number]]  (Abnormal)  (Susceptibility) Collected: 05/10/25 1120    Order Status: Completed Specimen: Respiratory from Sputum, Expectorated Updated: 05/13/25 0946     Respiratory Culture No S aureus or Pseudomonas isolated      Few KLEBSIELLA AEROGENES     Comment: with normal respiratory chichi        GRAM STAIN <10 Epithelial Cells/LPF      Rare WBC seen      Many Gram positive cocci      Many Gram Negative Rods    Culture, Respiratory with Gram Stain [[id number]]  (Abnormal)  (Susceptibility) Collected: 05/10/25 2035    Order Status: Completed Specimen: Respiratory from Sputum Updated: 05/13/25 0945     Respiratory Culture No S aureus or Pseudomonas isolated      Few KLEBSIELLA AEROGENES     Comment: with normal respiratory chichi        GRAM STAIN <10 Epithelial Cells/LPF      Rare WBC seen      Many Gram positive cocci      Moderate Gram Negative Rods    Gram stain [[id number]] Collected: 05/12/25 1729    Order Status: Canceled Specimen: CSF (Spinal Fluid) from CSF Tap, Tube 3 Updated: 05/12/25 1909          All pertinent labs from the last 24 hours have been reviewed.    Significant Diagnostics:  CT: CT Head Without Contrast  Result Date: 5/16/2025  Compared to prior MR and CT imaging of 05/11/2025, continued maturation of large recent infarction involving the right cerebellum, as discussed. Similar mass effect in the posterior fossa with essentially unchanged size and configuration of the lateral 3rd ventricles.  No new transependymal CSF egress.  CT Continued decreased visualization of existing subarachnoid hemorrhage.  No new or enlarging areas of intracranial hemorrhage appreciated. Electronically signed by: Mich Downs Date:    05/16/2025 Time:    07:03    I have reviewed all pertinent imaging results/findings within the past 24 hours.

## 2025-05-18 NOTE — SUBJECTIVE & OBJECTIVE
Neurologic Chief Complaint: SAH    Subjective:     Interval History: Patient is seen for follow-up neurological assessment and treatment recommendations: See hospital course.     HPI, Past Medical, Family, and Social History remains the same as documented in the initial encounter.     Review of Systems   Constitutional:  Negative for chills and fever.   Neurological:  Positive for headaches. Negative for weakness.     Scheduled Meds:   aspirin  81 mg Oral Daily    atorvastatin  40 mg Oral Daily    ceFEPime IV (PEDS and ADULTS)  2 g Intravenous Q8H    niMODipine  60 mg Oral Q4H    pregabalin  75 mg Oral BID    QUEtiapine  100 mg Oral QHS    QUEtiapine  50 mg Oral Daily    valACYclovir  1,000 mg Oral Daily    valsartan  80 mg Oral Daily     Continuous Infusions:   dexmedeTOMIDine (Precedex) infusion (titrating)  0-0.6 mcg/kg/hr Intravenous Continuous 2.51 mL/hr at 05/18/25 1401 0.1 mcg/kg/hr at 05/18/25 1401    heparin (porcine) in D5W  0-40 Units/kg/hr Intravenous Continuous 11 mL/hr at 05/18/25 1426 11 Units/kg/hr at 05/18/25 1426     PRN Meds:  Current Facility-Administered Medications:     acetaminophen, 650 mg, Oral, Q6H PRN    bisacodyL, 10 mg, Rectal, Daily PRN    diazePAM, 10 mg, Oral, Q6H PRN    hydrALAZINE, 10 mg, Intravenous, Q4H PRN    labetalol, 10 mg, Intravenous, Q4H PRN    magnesium oxide, 800 mg, Oral, PRN    magnesium oxide, 800 mg, Oral, PRN    melatonin, 6 mg, Oral, Nightly PRN    methocarbamoL, 750 mg, Oral, Q6H PRN    oxyCODONE, 5 mg, Oral, Q4H PRN    potassium bicarbonate, 35 mEq, Oral, PRN    potassium bicarbonate, 50 mEq, Oral, PRN    potassium bicarbonate, 60 mEq, Oral, PRN    potassium, sodium phosphates, 2 packet, Oral, PRN    potassium, sodium phosphates, 2 packet, Oral, PRN    potassium, sodium phosphates, 2 packet, Oral, PRN    sodium chloride 0.9%, 10 mL, Intravenous, PRN    Objective:     Vital Signs (Most Recent):  Temp: 98.2 °F (36.8 °C) (05/18/25 0701)  Pulse: 86 (05/18/25  1301)  Resp: 11 (05/18/25 1301)  BP: (!) 139/100 (05/18/25 1301)  SpO2: 97 % (05/18/25 1301)  BP Location: Left forearm    Vital Signs Range (Last 24H):  Temp:  [96.8 °F (36 °C)-98.2 °F (36.8 °C)]   Pulse:  []   Resp:  [11-28]   BP: ()/()   SpO2:  [94 %-100 %]   BP Location: Left forearm       Physical Exam  Vitals and nursing note reviewed.   Constitutional:       Appearance: Normal appearance.   Neurological:      Mental Status: She is alert and oriented to person, place, and time.      Motor: No weakness.              Neurological Exam:   LOC: alert  Attention Span: Good   Language: No aphasia  Articulation: No dysarthria  Orientation: Person, Place, Time   Facial Movement (CN VII): Symmetric facial expression    Motor: Arm left  Normal 5/5  Leg left  Normal 5/5  Arm right  Normal 5/5  Leg right Normal 5/5  Sensation: Intact to light touch, temperature and vibration    Laboratory:  CMP:   Recent Labs   Lab 05/18/25  0106   CALCIUM 9.2   ALBUMIN 2.9*   PROT 6.9      K 3.5   CO2 25      BUN 13   CREATININE 0.6   ALKPHOS 86   ALT 18   AST 19   BILITOT 0.4     BMP:   Recent Labs   Lab 05/18/25  0106      K 3.5      CO2 25   BUN 13   CREATININE 0.6   CALCIUM 9.2     CBC:   Recent Labs   Lab 05/18/25  0106   WBC 19.94*   RBC 3.19*   HGB 8.7*   HCT 29.5*   *   MCV 93   MCH 27.3   MCHC 29.5*     Coagulation:   Recent Labs   Lab 05/15/25  0530 05/15/25  1342 05/18/25  1009   INR 1.1  --   --    APTT 21.5   < > 38.9*    < > = values in this interval not displayed.       Diagnostic Results     Brain Imaging/Vessel imaging  CTH without 05/16/2025  Compared to prior MR and CT imaging of 05/11/2025, continued maturation of large recent infarction involving the right cerebellum, as discussed.     Similar mass effect in the posterior fossa with essentially unchanged size and configuration of the lateral 3rd ventricles.  No new transependymal CSF egress.  CT     Continued decreased  visualization of existing subarachnoid hemorrhage.  No new or enlarging areas of intracranial hemorrhage appreciated.    MRI Brain w/wo 05/15/2025  Status post coiling of a PICA aneurysm.  No evidence of residual aneurysm filling.     No high-grade stenosis or other aneurysm identified.    MRI Brain without 05/11/2025  Allowing for significant distortion examination by artifact from dental metal there is large area acute/recent infarction within the right cerebellum with probable small component in the right paramedian micah.  Please note this is near completely obscured on diffusion and gradient imaging secondary artifact from dental metal.     Mass effect from the area of infarction effacing the 4th ventricle with stable caliber lateral and 3rd ventricles without hydrocephalus with stable right frontal ventricular catheter.     There is T1 hyperintensity within the region of the right superior cerebellar peduncle corresponding to hyperdensity seen on CT concerning for component of hemorrhagic conversion.     Scattered small volume subarachnoid hemorrhage cerebral sulci.     Clinical correlation and continued follow-up recommended.    CTH without 05/11/2025  1. Compared to prior head CT performed 05/06/2025, 20:13 hours, evolving relatively large right cerebellar infarct without macroscopic hemorrhage within the infarct territory.  Questionable hypoattenuation involving the brainstem which may be artifactual given coil artifact, however additional areas of ischemia not excluded.  MRI may be considered for further characterization.  Further effacement of the 4th ventricle since the prior study, however there has been slight decompression of the lateral and 3rd ventricles since the prior exam.  2. Relatively similar subarachnoid and interventricular hemorrhage.  No new or enlarging areas of intracranial hemorrhage appreciated.  This report was flagged in Epic as abnormal.    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     Evolving scattered subarachnoid and intraventricular hemorrhage.  No definite new hemorrhage     Stable right frontal coursing ventricular catheter with slight reduced caliber of the ventricles without evidence for worsening hydrocephalus.     Continued slight crowding cerebral sulci and basilar cisterns with small caliber 4th ventricle.  Question component of inferior extension of the cerebellar tonsils below the foramen magnum.  This could be better assessed with MR imaging if patient compatible.     No significant new abnormal parenchymal attenuation.  Clinical correlation and further evaluation with MRI as warranted.    CTH without 05/06/2025 0949  Subarachnoid and intraventricular hemorrhage, similar to recent exam.     Interval placement of right frontal EVD without apparent intracranial complication.  Ventricles remain mildly dilated.  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  Distal location suspicious for mycotic aneurysm.     No intracranial large vessel occlusion or hemodynamically significant stenosis.     The cervical carotid and vertebral arteries are patent without hemodynamically significant stenosis.     Right upper lobe, right middle lobe and left lower lobe consolidative opacities.     Endotracheal tube terminates just superior to the charis recommend slight retraction.       CTH without 05/05/2025  Subarachnoid hemorrhage.  Moderate volume of intraventricular hemorrhage.  Mild hydrocephalus.  Recommend CTA.  Findings reported by [facility] radiologist at 01:06     All CT scans at this facility use dose modulation, iterative reconstruction, and/or weight based dosing when appropriate to reduce radiation dose to as low as reasonable achievable.    Cardiac Imaging   TTE 05/07/2025       Left Ventricle: The left ventricle is normal in size. Normal wall thickness. Normal  wall motion. There is normal systolic function with a visually estimated ejection fraction of 65 - 70%. Ejection fraction is approximately 68%. There is indeterminate diastolic function.    Right Ventricle: The right ventricle is normal in size. Wall thickness is normal. Systolic function is normal.    Left Atrium: Agitated saline study of the atrial septum is negative after vasalva maneuver, suggesting absence of intracardiac shunt at the atrial level. No patent foramen ovale confirmed by agitated saline contrast.    Tricuspid Valve: There is mild to moderate regurgitation.    IVC/SVC: Patient is ventilated, cannot use IVC diameter to estimate right atrial pressure.

## 2025-05-18 NOTE — ASSESSMENT & PLAN NOTE
Patient is a 49-year-old female with past medical history of hypertension presenting via transfer from [facility] for higher level of care after being diagnosed with subarachnoid hemorrhage.    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  S/p EVD 5/6  S/p Coil emolization 5/6  CTH 5/11 with new R PICA/ superior cerebellar infarct.    MRA 5/16 without evidence of residual aneurysm filling    - EVD open at 10-->20  - Neurosurgery, vascular neurology consulted  - SBP< 160   - nimodipine 60 mg q4h   - daily TCDs - negative for vasospasm thus far  - Aspirin 81 and Plavix 75 qd  - atorvastatin 40 mg  - euvolemia  - SCDs; lovenox for VTE ppx  - Delirium precautions.   - Precedex gtt   - PT/OT

## 2025-05-18 NOTE — PROGRESS NOTES
[facility] - Neuro Critical Care  Neurocritical Care  Progress Note    Admit Date: 5/6/2025  Service Date: 05/18/2025  Length of Stay: 12    Subjective:     Chief Complaint: Nontraumatic subarachnoid hemorrhage from other intracranial arteries    History of Present Illness: History obtained via chart review and daughter at bedside as patient intubated on arrival.     Per ED note:  49 y.o. female, PMH HTN and anemia,  presenting as a transfer for neurosurgical evaluation with newly diagnosed ICH from outside hospital.  Patient was transferred from [facility].  Daughter at bedside helps provide history.  She states that she was at a grocery store when she had a syncopal event.  She was unsure if she hit her head.  She states that people on the scene called her to let her know what happened and after she was seen in the ED they told her that she had bleeding inside of her head.  She states that while in the ED her mother was answering questions appropriately and acting like her normal self except frequently falling asleep.  Patient was intubated for airway protection prior to arrival.  Patient and reversal with [facility] prior to transfer.  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 Distal location suspicious for mycotic aneurysm     On arrival to [facility] [place]: Neursurgery consulted, EVD placed and admitted to neurocritical care    Hospital Course: 5/7/2025: extubated today to NC, SBP liberalized to 220, d/c jay cath,   5/8/2025: EVD per NSSGY, TCD's no vasospasm, DVT prophylaxis initiated, pending to hear from NSGY team when ok to start AC  05/09/2025: repeat CXR today, plan for CT chest, resp cx , add IS, EVD @10 per NSGY, TCDs today no vasospasm  5/10: Discussed with Dr. Chase and Neurosurgery, Heparin gtt started for R brachial DVT and hx arterial occlusion, will need repeat CTH when therapeutic. Hold off on  transitioning to Coumadin until cleared by NSGY. CT Chest negative for DVT, but concerning for PNA. Repeat sputum cx sent. Regular diet started. Repeat Na improved to 138.   5/11: right cerebellar CVA on imaging, HTS, MRI, family updated at bedside, place andreia, hold heparin gtt   05/12/2025 EVD @ 10. SOC watch. PBD7- plan for MRA c/s contrast for reevaluation of coiled aneurysm. Dc keppra. TCDs negative for spasm. Given 500NS for euvolemia. Given 3% HTS 250ml bolus and continue 2% HTS gtt @ 75. Na checks q6h for goal > 145. Started valsartan 80. Lower extremity arterial US ordered given hx RLE arterial occlusion, plan to restart heparin gtt pending results. SLP consult for diet, plan for FEES tomorrow. Started lovenox. Started seoquel 25qHS. Resp cx w GNR, many GPCs- has low grade temps possibly 2/2 DVT and stable mild leukocytosis so will hold off on abx for now.   05/13/2025 Delirium overnight requiring zyprexa x2. Restarted on precedex today and increased seroquel to 50qHS. Required 500NS bolus for hypotension after zyprexa last night. RLE arterial US negative for occlusion, only showing moderate stenosis. Decided not to continue heparin gtt given brachial DVT is in upper extremity. Will start plavix for DAPT. Respiratory cx w Klebsiella aerogenes x2, but not symptomatic. Transitioned from Ancef to cefepime. Bedside FEES completed, started regular thin diet. Given 3%  bolus x2 for Na goal > 145 and remains on 2% HTS @ 75. Prn valium for neck and back pain.   Pt has R brachial DVT and LUE 2% gtt infiltrated PIV and given hyaluronidase. L femoral CVC placed as pt had no access.   05/14/2025 Given 3%HTS bolus for Na > 145. TCDs negative for spasm. CXR reviewed. Increase cefepime course to 7d. Unable to obtain MRA while on precedex 1.4 and given versed due to pt restlessness and difficulty to sedate. Ordered MRA w anesthesia, likely tomorrow. NPO midnight. Sending full hypercoaguable w/u- likely start minimal  intensity heparin gtt after sending off w/u.  5/15/2025: MRA brain w/wout con today, switched plavix to baby ASA, follow CT head tomorrow AM, increase 2% gtt to 100 ml/hr (Na goal > 145), Zyprexa once PRN available,  05/16/2025 D/c 2% and sodium goal. Max precedex to 0.6. Add miralax. Complaints of bilateral intermittent ear pain (R>L). Trial steroids.   05/17/2025 Patient clamped EVD overnight and later attempted to get out of bed on her own to shower. 30 cc of output. NSGY aware. NSGY raised EVD from 10 to 20. Add pregabalin for nerve pain.   05/18/2025 Continue EVD @ 20. Patient declining seroquel. Continue precedex and encouragement of seroquel.         Interval History:  NAEON.     Objective:     Vitals:  Temp: 98.2 °F (36.8 °C)  Pulse: 99  Rhythm: normal sinus rhythm  BP: (!) 91/58  MAP (mmHg): 70  ICP Mean (mmHg): 11 mmHg  Resp: 20  SpO2: 95 %    Temp  Min: 96.8 °F (36 °C)  Max: 98.2 °F (36.8 °C)  Pulse  Min: 66  Max: 104  BP  Min: 90/56  Max: 143/62  MAP (mmHg)  Min: 68  Max: 105  ICP Mean (mmHg)  Min: 10 mmHg  Max: 17 mmHg  Resp  Min: 16  Max: 28  SpO2  Min: 94 %  Max: 100 %    05/17 0701 - 05/18 0700  In: 1149.8 [P.O.:720; I.V.:429.8]  Out: 607 [Urine:550; Drains:57]   Unmeasured Output  Unmeasured Urine Occurrence: 1  Unmeasured Stool Occurrence: 0  Pad Count: 1        Physical Exam  Vitals reviewed.   Constitutional:       Appearance: She is obese. She is ill-appearing.   HENT:      Head:      Comments: EVD       Right Ear: Tympanic membrane and external ear normal.      Left Ear: Tympanic membrane and external ear normal.      Nose: Nose normal.      Mouth/Throat:      Mouth: Mucous membranes are moist.   Eyes:      Pupils: Pupils are equal, round, and reactive to light.   Cardiovascular:      Rate and Rhythm: Normal rate.   Pulmonary:      Effort: Pulmonary effort is normal.   Abdominal:      Palpations: Abdomen is soft.   Skin:     General: Skin is warm and dry.   Neurological:      Comments: --GCS:  E4 V5 M6  --Mental Status:  awake, oriented x 4  --Pivots from bed to bedside commode  --Follows all commands  --Pupils reactive   --URENA spont              Medications:  ContinuousdexmedeTOMIDine (Precedex) infusion (titrating), Last Rate: 0.1 mcg/kg/hr (05/18/25 1101)  heparin (porcine) in D5W, Last Rate: 11 Units/kg/hr (05/18/25 1101)    Scheduledaspirin, 81 mg, Daily  atorvastatin, 40 mg, Daily  ceFEPime IV (PEDS and ADULTS), 2 g, Q8H  niMODipine, 60 mg, Q4H  pregabalin, 75 mg, BID  QUEtiapine, 100 mg, QHS  QUEtiapine, 50 mg, Daily  valACYclovir, 1,000 mg, Daily  valsartan, 80 mg, Daily    PRNacetaminophen, 650 mg, Q6H PRN  bisacodyL, 10 mg, Daily PRN  diazePAM, 10 mg, Q6H PRN  hydrALAZINE, 10 mg, Q4H PRN  labetalol, 10 mg, Q4H PRN  magnesium oxide, 800 mg, PRN  magnesium oxide, 800 mg, PRN  melatonin, 6 mg, Nightly PRN  methocarbamoL, 750 mg, Q6H PRN  oxyCODONE, 5 mg, Q4H PRN  potassium bicarbonate, 35 mEq, PRN  potassium bicarbonate, 50 mEq, PRN  potassium bicarbonate, 60 mEq, PRN  potassium, sodium phosphates, 2 packet, PRN  potassium, sodium phosphates, 2 packet, PRN  potassium, sodium phosphates, 2 packet, PRN  sodium chloride 0.9%, 10 mL, PRN      Today I personally reviewed pertinent medications, lines/drains/airways, imaging, cardiology results, laboratory results, microbiology results, notably:    Imaging Results              IR Angiogram Carotid Cerebral Bilateral (Final result)  Result time 05/14/25 09:51:26      Final result by Piyush Silverio MD (05/14/25 09:51:26)                   Impression:        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  No other aneurysms were identified.    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  The parent artery remained patent on follow-up angiograms.  There were no immediate  postprocedure complications.    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    Electronically signed by resident: Nadia Correa  Date:    05/08/2025  Time:    07:18    Electronically signed by: Piyush Silverio MD  Date:    05/14/2025  Time:    09:51               Narrative:      EXAM:    Cerebral angiogram.  Aneurysm embolization.    CPT CODES:    Bilateral internal carotid artery: 36224 x 2    Bilateral external carotid artery: 36227 x 2    Selective intracranial branch of the right vertebral artery: 84945    Bilateral vertebral artery: 36226 x 2    Closure device placement:     3D Reconstruction w independent workstation: [id number]    Embolization CNS permanent: 87525, 42452    F/U Angio post embolization: 75898 x 1    CLINICAL HISTORY AND INDICATION:    Patient is a 50 y/o Female, referred for endovascular embolization of right anterior inferior cerebellar artery aneurysm.    PROCEDURE COMMENT:    Informed consent was obtained from the patient's family prior to the examination. A timeout was performed.    Sedation: Endotracheal intubation and sedation with monitoring was provided by the anesthesia department.    Initially we attempted to access the right groin under US guidance using a micropuncture needle. The microwire would not pass be external iliac artery so needle and wire were removed and hemostasis was achieved. Next, under US guidance, A 6 F sheath was placed into the left femoral artery and a 5F Robert catheter was advanced into the aortic arch.  Bilateral CCA/ICA/ECA and vertebral arteries were subselected and angiography of the brain was performed after injection into each of these vessels.    FINDINGS:  The right vertebral artery was selected and an angiogram was performed. Angiogram demonstrates excellent flow into the intracranial segments with opacification of all the branches and the vertebrobasilar junction with competitive flow from the left vertebral artery.  Small outpouching of the distal  aspect of the right anterior inferior cerebellar artery although decreased in size when compared to CTA suggesting partial thrombosis..    Also, 3D spin was performed with reconstruction at separate workstation to obtain more details from right vertebral artery and further characterize the aneurysm. It appears partially thrombosed in the beginning of the study.    The right common carotid artery was selected and an angiogram was performed. Angiogram demonstrates excellent flow the bifurcation without significant plaque buildup or stenosis.    The right internal carotid artery was selected and an angiogram was performed. Angiogram demonstrates good flow into the intracranial segments with opacification of both the anterior and middle cerebral arteries. . There are no significant stenosis.  No aneurysm identified on the right internal carotid artery injection.    The right external carotid artery was selected and angiogram was performed.  Angiogram demonstrates good flow into all the branches with no abnormal anastomosis to internal carotid artery.  No AV fistula.    The left common carotid artery was selected and an angiogram was performed. Angiogram demonstrates excellent flow at the bifurcation without significant plaque buildup or stenosis.    The left internal carotid artery was selected and an angiogram was performed. Angiogram demonstrates excellent flow into the intracranial segments with opacification of both the anterior and middle cerebral arteries.  There are no aneurysms or arteriovenous malformation is identified.  There is no significant stenosis.    The left external carotid artery was selected and angiogram was performed. Angiogram demonstrates good flow into all the branches with no abnormal anastomosis to internal carotid artery.  No AV fistula.    The left vertebral artery was selected and an angiogram was performed. Angiogram demonstrates excellent flow into the intracranial segments with  opacification of all the branches of the vertebral artery as well as the basilar artery.  There is competitive flow from the right vertebral artery.  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  There is no significant stenosis.    The venous drainage pattern were within normal limits.    PROCEDURE:    Given that the aneurysm was not well visualized from the vertebral artery injection, it was decided to perform sub selective angiograms of the basilar and right anterior inferior cerebellar artery.    For intervention purposes, Benchmark 071- 95 cm long, Jeane 5 Solomon Islander- 125 cm long, Headway duo 156 cm, Synchro microwire.    The right anterior inferior cerebellar artery was selected and arteriogram was performed.  There was likely an AICA/PICA complex.  Arteriograms were performed in multiple projections through the microcatheter in the AICa.  The aneurysm was difficult to measure due to partial thrombosis.    Consultation was made with neuro surgery about potential treatment options including endovascular embolization and clipping.  Risks were discussed of potential thrombus embolization endovascular treatment.  Risks of rupture with open surgery was also discussed.  Ultimately, we decided to proceed with endovascular embolization.    For the above findings, we decided to proceed with the coil embolization of the right anterior inferior cerebellar artery aneurysm.  Using a penumbra select catheter/035 glidewire system through a Benchmark 71, the right vertebral artery was selected.  Next, under roadmap guidance, advanced 5 Solomon Islander Jeane intermediate catheter over Headway Duo 156 cm microcatheter and 014 synchro microwire.  This was gently navigated and positioned tip of Headway Duo microcatheter to the center of the aneurysm and the Jeane catheter in the right V 4 segment.  Micro angiography was  performed within the anterior inferior cerebellar artery.  Under road mapping guidance, Cerepak coils 5 mm * 10 cm were deployed.   For follow-up angiograms were performed after coil placements, and these revealed good distribution within the aneurysm after coil deployment with progressive occlusion.  There was no compromise of the parent artery.  Final angiogram demonstrates good coil packing in the dome of the aneurysm, and there was no residual minimal filling.    The catheter was removed and an angiogram was performed through the femoral sheath demonstrating an access site appropriate for closure device.    Hemostasis was obtained in the right groin using 6 Panamanian Vascade device.    The total contrast dose for the procedure was: 120 cc of Visipaque.    Radiation dose:    Radiation DAP 38420 CGycm2    Reference air kerma was 4059 mGy    Fluoro time was 32.8 minutes    Physicians in the procedure:  Dr. Piyush Silverio MD    Images and report were permanently recorded.                                       Radiology (Final result)  Result time 05/09/25 15:15:25      Final result by Unknown User (05/09/25 15:15:25)                                          Diet  Diet Adult Regular  Diet Adult Regular      Assessment/Plan:     Neuro  * Nontraumatic subarachnoid hemorrhage from cerebellar artery  Patient is a 49-year-old female with past medical history of hypertension presenting via transfer from [place] for higher level of care after being diagnosed with subarachnoid hemorrhage.    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  S/p EVD 5/6  S/p Coil emolization 5/6  CTH 5/11 with new R PICA/ superior cerebellar infarct.    MRA 5/16 without evidence of residual aneurysm filling    - EVD open at 10-->20  - Neurosurgery, vascular neurology consulted  - SBP< 160   - nimodipine 60 mg q4h   - daily TCDs - negative for vasospasm thus far  - Aspirin 81 and Plavix 75 qd  -  atorvastatin 40 mg  - euvolemia  - SCDs; lovenox for VTE ppx  - Delirium precautions.   - Precedex gtt   - PT/OT      Brain edema  See cerebellar stroke     Cerebellar stroke, acute  CT on 5/11 revealed R cerebellar infarct. Likely uriah-angio  Family aware and imaging reviewed  Atorvastatin 40  ASA 81 and Plavix 75mg qd  RLE arterial US negative for occlusion, only showing moderate stenosis. Decided not to continue heparin gtt given brachial DVT is in upper extremity. Will continue w DAPT and DVT ppx w lovenox instead of full AC.   Concern for brain compression in post fossa  SOC watch, NSGY following       Acute encephalopathy  See primary problem    Obstructive hydrocephalus  EVD @ 10-->20  R Cerebellar infarct, SOC watch.      Brain compression  monitor EVD output closely  Neurochecks  See primary problem       Brain aneurysm  See primary problem    Cardiac/Vascular  Essential hypertension  SBP <160  PRN labetalol, hydralazine  Valsartan 80 mg    ID  Herpes simplex virus (HSV) infection  PCR pending  Resume home valacyclovir    Hematology  History of DVT in adulthood  Pt has a hx of RLE DVT and was started on xarelto. 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 She has been seen by heme onc in the past.      Warfarin currently held  Pt was on a Heparin gtt for for R brachial DVT and hx arterial occlusion. 5/11/25 CTH showed large R cerebellar infarct and heparin gtt was held on 5/11. Lower extremity arterial US was negative for occlusion, only showing moderate stenosis (50-75%). BLE DVT US was also negative for DVT. Decided not to continue heparin gtt given brachial DVT is in upper extremity. Was only continuing with DAPT and then lovenox for VTE ppx  Sending full hypercoaguable w/u   Continue minimal intensity heparin gtt           The patient is being Prophylaxed for:  Venous Thromboembolism with: Mechanical or Chemical  Stress Ulcer with: None  Ventilator  Pneumonia with: none    Activity Orders            Diet Adult Regular: Regular starting at 05/15 2025    Progressive Mobility Protocol (mobilize patient to their highest level of functioning at least twice daily) starting at 05/06 2000    Turn patient starting at 05/06 0800          Full Code     45 minutes of Critical care time was spent personally by me on the following activities: development of treatment plan with patient or surrogate and bedside caregivers, discussions with consultants, evaluation of patient's response to treatment, examination of patient, ordering and performing treatments and interventions, ordering and review of laboratory studies, ordering and review of radiographic studies, pulse oximetry, antibiotic titration if applicable, vasopressor titration if applicable, re-evaluation of patient's condition. This critical care time did not overlap with that of any other provider or involve time for any procedures. There is high probability for acute neurological change leading to clinical and possibly life-threatening deterioration requiring highest level of provider preparedness for urgent intervention.     Liana Irizarry PA-C  Neurocritical Care  [facility] - Neuro Critical Care

## 2025-05-18 NOTE — EICU
Virtual ICU Quality Rounds    Admit Date: 5/6/2025  Hospital Day: 12    ICU Day: 12d 3h    24H Vital Sign Range:  Temp:  [96.8 °F (36 °C)-98.2 °F (36.8 °C)]   Pulse:  []   Resp:  [16-28]   BP: ()/(56-86)   SpO2:  [94 %-100 %]     VICU Surveillance Screening    Daily review of  line necessity(optional): Central line(s) in place    Central line type (optional): Triple lumen catheter    Central line indications : Multiple infusions        LDA reconciliation : Yes

## 2025-05-18 NOTE — ASSESSMENT & PLAN NOTE
49f presenting after syncope with CTH demonstrating SAH. CTA with AICA aneurysm R. Intubated prior to arrival to [facility] ED.     S/p R frontal EVD on 5/6 and s/p DSA with coil embolization of R AICA aneurysm 5/6    Plan:  Admitted to ICU  EVD open at 20 abx while in place  FU CTH Monday 5/19 for interval imaging  Daily ASA 81  No dvt in BLE on ultrasound, R brachial vein dvt+ - on Asa/ hep gtt  MRA completed 5/15 without any residual aneurysmal filling.  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 Corley

## 2025-05-18 NOTE — PLAN OF CARE
[facility] Care Plan    POC reviewed with Waldo Karen Emmanuel and family at 0300. Patient and Family verbalized understanding. Questions and concerns addressed. No acute events today. Pt progressing toward goals. Will continue to monitor. See below and flowsheets for full assessment and VS info.     - SBP < 160 maintained, no prn needed  - prn tylenol x1, prn oxy x1 for headache  - EVD open at 20; output: 0-8, ICP: 10-15  - heparin gtt decreased from 13 to 11 this am; APTT recheck at 0800   - Precedex gtt titrated to maintain RAAS 0/-1    Is this a stroke patient? yes- Stroke booklet reviewed with patient and is at bedside.   Care Plan Individualization:     Neuro:  Roro Coma Scale  Best Eye Response: 4-->(E4) spontaneous  Best Motor Response: 6-->(M6) obeys commands  Best Verbal Response: 5-->(V5) oriented  Roro Coma Scale Score: 15  Assessment Qualifiers: no eye obstruction present  Pupil PERRLA: yes     24 hr Temp:  [97 °F (36.1 °C)-98.5 °F (36.9 °C)]     CV:   Rhythm: normal sinus rhythm  BP goals:   SBP < 160  MAP > 65    Resp:      Vent Mode: Spont  Set Rate: 20 BPM  Oxygen Concentration (%): 40  Vt Set: 375 mL  PEEP/CPAP: 5 cmH20  Pressure Support: 5 cmH20    Plan: N/A    GI/:     Diet/Nutrition Received: regular  Last Bowel Movement: 05/16/25  Voiding Characteristics: external catheter    Intake/Output Summary (Last 24 hours) at 5/18/2025 0211  Last data filed at 5/18/2025 0202  Gross per 24 hour   Intake 871.25 ml   Output 97 ml   Net 774.25 ml     Unmeasured Output  Unmeasured Urine Occurrence: 1  Unmeasured Stool Occurrence: 0  Pad Count: 1    Labs/Accuchecks:  Recent Labs   Lab 05/18/25  0106   WBC 19.94*   RBC 3.19*   HGB 8.7*   HCT 29.5*   *      Recent Labs   Lab 05/18/25  0106      K 3.5   CO2 25      BUN 13   CREATININE 0.6   ALKPHOS 86   ALT 18   AST 19   BILITOT 0.4      Recent Labs   Lab 05/15/25  0530 05/15/25  1342 05/18/25  0106   PROTIME 11.7  --   --    INR 1.1  --    "--    APTT 21.5   < > 56.4*    < > = values in this interval not displayed.    No results for input(s): "CPK", "CPKMB", "TROPONINI", "MB" in the last 168 hours.    Electrolytes: Electrolytes replaced  Accuchecks: none    Gtts:   dexmedeTOMIDine (Precedex) infusion (titrating)  0-0.6 mcg/kg/hr Intravenous Continuous   Stopped at 05/17/25 2245    heparin (porcine) in D5W  0-40 Units/kg/hr Intravenous Continuous 11 mL/hr at 05/18/25 0202 11 Units/kg/hr at 05/18/25 0202       LDA/Wounds:    Ivan Risk Assessment  Sensory Perception: 4-->no impairment  Moisture: 4-->rarely moist  Activity: 3-->walks occasionally  Mobility: 3-->slightly limited  Nutrition: 3-->adequate  Friction and Shear: 2-->potential problem  Ivan Score: 19  Is your ivan score 12 or less? no      WCTM    "

## 2025-05-19 LAB
ABSOLUTE EOSINOPHIL (OHS): 0.14 K/UL
ABSOLUTE MONOCYTE (OHS): 0.91 K/UL (ref 0.3–1)
ABSOLUTE NEUTROPHIL COUNT (OHS): 10.66 K/UL (ref 1.8–7.7)
ALBUMIN SERPL BCP-MCNC: 2.8 G/DL (ref 3.5–5.2)
ALP SERPL-CCNC: 95 UNIT/L (ref 40–150)
ALT SERPL W/O P-5'-P-CCNC: 18 UNIT/L (ref 10–44)
ANION GAP (OHS): 8 MMOL/L (ref 8–16)
APTT PPP: 39.9 SECONDS (ref 21–32)
AST SERPL-CCNC: 16 UNIT/L (ref 11–45)
BASOPHILS # BLD AUTO: 0.04 K/UL
BASOPHILS NFR BLD AUTO: 0.3 %
BILIRUB SERPL-MCNC: 0.3 MG/DL (ref 0.1–1)
BUN SERPL-MCNC: 15 MG/DL (ref 6–20)
CALCIUM SERPL-MCNC: 9.1 MG/DL (ref 8.7–10.5)
CHLORIDE SERPL-SCNC: 107 MMOL/L (ref 95–110)
CLARITY CSF: ABNORMAL
CO2 SERPL-SCNC: 25 MMOL/L (ref 23–29)
COLOR CSF: ABNORMAL
CREAT SERPL-MCNC: 0.6 MG/DL (ref 0.5–1.4)
CSF TUBE NUMBER (OHS): 1
CSF TUBE NUMBER (OHS): 1
ERYTHROCYTE [DISTWIDTH] IN BLOOD BY AUTOMATED COUNT: 14.3 % (ref 11.5–14.5)
GFR SERPLBLD CREATININE-BSD FMLA CKD-EPI: >60 ML/MIN/1.73/M2
GLUCOSE CSF-MCNC: 80 MG/DL (ref 40–70)
GLUCOSE SERPL-MCNC: 133 MG/DL (ref 70–110)
HCT VFR BLD AUTO: 27.6 % (ref 37–48.5)
HGB BLD-MCNC: 8.3 GM/DL (ref 12–16)
IMM GRANULOCYTES # BLD AUTO: 0.09 K/UL (ref 0–0.04)
IMM GRANULOCYTES NFR BLD AUTO: 0.6 % (ref 0–0.5)
LYMPHOCYTES # BLD AUTO: 4.03 K/UL (ref 1–4.8)
LYMPHOCYTES NFR CSF MANUAL: 64 % (ref 40–80)
MAGNESIUM SERPL-MCNC: 2 MG/DL (ref 1.6–2.6)
MCH RBC QN AUTO: 27.9 PG (ref 27–31)
MCHC RBC AUTO-ENTMCNC: 30.1 G/DL (ref 32–36)
MCV RBC AUTO: 93 FL (ref 82–98)
MONOS+MACROS NFR CSF MANUAL: 33 % (ref 15–45)
NEUTROPHILS NFR CSF MANUAL: 3 %
NUCLEATED RBC (/100WBC) (OHS): 0 /100 WBC
PHOSPHATE SERPL-MCNC: 2.6 MG/DL (ref 2.7–4.5)
PLATELET # BLD AUTO: 467 K/UL (ref 150–450)
PMV BLD AUTO: 10 FL (ref 9.2–12.9)
POTASSIUM SERPL-SCNC: 4.2 MMOL/L (ref 3.5–5.1)
PROT CSF-MCNC: 56 MG/DL (ref 15–40)
PROT SERPL-MCNC: 6.9 GM/DL (ref 6–8.4)
RBC # BLD AUTO: 2.98 M/UL (ref 4–5.4)
RBC # CSF: 632 /CU MM
RELATIVE EOSINOPHIL (OHS): 0.9 %
RELATIVE LYMPHOCYTE (OHS): 25.4 % (ref 18–48)
RELATIVE MONOCYTE (OHS): 5.7 % (ref 4–15)
RELATIVE NEUTROPHIL (OHS): 67.1 % (ref 38–73)
SODIUM SERPL-SCNC: 140 MMOL/L (ref 136–145)
SPECIMEN VOL CSF: 3.5 ML
W BETA-2 GLYCOPROTEIN 1 IGA AB: 1.5 U/ML
W CARDIOLIPIN IGG AB: 2 GPL
W CARDIOLIPIN IGM AB: 2.4 MPL
W FACTOR V LEIDEN MUTATION: NEGATIVE
W LP(A): 33 NMOL/L
W PROTHROMBIN 20210A MUTATION ANALYSIS: NEGATIVE
WBC # BLD AUTO: 15.87 K/UL (ref 3.9–12.7)
WBC # CSF: 56 /CU MM

## 2025-05-19 PROCEDURE — 85730 THROMBOPLASTIN TIME PARTIAL: CPT | Performed by: PSYCHIATRY & NEUROLOGY

## 2025-05-19 PROCEDURE — 94761 N-INVAS EAR/PLS OXIMETRY MLT: CPT

## 2025-05-19 PROCEDURE — 80053 COMPREHEN METABOLIC PANEL: CPT

## 2025-05-19 PROCEDURE — 84157 ASSAY OF PROTEIN OTHER: CPT

## 2025-05-19 PROCEDURE — 84100 ASSAY OF PHOSPHORUS: CPT

## 2025-05-19 PROCEDURE — 25000003 PHARM REV CODE 250

## 2025-05-19 PROCEDURE — 83735 ASSAY OF MAGNESIUM: CPT

## 2025-05-19 PROCEDURE — 89051 BODY FLUID CELL COUNT: CPT

## 2025-05-19 PROCEDURE — 25000003 PHARM REV CODE 250: Performed by: NURSE PRACTITIONER

## 2025-05-19 PROCEDURE — 63600175 PHARM REV CODE 636 W HCPCS

## 2025-05-19 PROCEDURE — 63600175 PHARM REV CODE 636 W HCPCS: Performed by: PSYCHIATRY & NEUROLOGY

## 2025-05-19 PROCEDURE — 20000000 HC ICU ROOM

## 2025-05-19 PROCEDURE — 85025 COMPLETE CBC W/AUTO DIFF WBC: CPT

## 2025-05-19 PROCEDURE — 25000003 PHARM REV CODE 250: Performed by: PSYCHIATRY & NEUROLOGY

## 2025-05-19 PROCEDURE — 99291 CRITICAL CARE FIRST HOUR: CPT | Mod: ,,, | Performed by: NURSE PRACTITIONER

## 2025-05-19 PROCEDURE — 97535 SELF CARE MNGMENT TRAINING: CPT

## 2025-05-19 PROCEDURE — 87205 SMEAR GRAM STAIN: CPT

## 2025-05-19 PROCEDURE — 99233 SBSQ HOSP IP/OBS HIGH 50: CPT | Mod: ,,, | Performed by: PSYCHIATRY & NEUROLOGY

## 2025-05-19 PROCEDURE — 82945 GLUCOSE OTHER FLUID: CPT

## 2025-05-19 RX ORDER — QUETIAPINE FUMARATE 25 MG/1
50 TABLET, FILM COATED ORAL NIGHTLY
Status: DISCONTINUED | OUTPATIENT
Start: 2025-05-20 | End: 2025-05-26 | Stop reason: HOSPADM

## 2025-05-19 RX ORDER — PREGABALIN 75 MG/1
75 CAPSULE ORAL ONCE
Status: COMPLETED | OUTPATIENT
Start: 2025-05-19 | End: 2025-05-19

## 2025-05-19 RX ORDER — POLYETHYLENE GLYCOL 3350 17 G/17G
17 POWDER, FOR SOLUTION ORAL DAILY
Status: DISCONTINUED | OUTPATIENT
Start: 2025-05-19 | End: 2025-05-20

## 2025-05-19 RX ORDER — AMOXICILLIN 250 MG
1 CAPSULE ORAL DAILY
Status: DISCONTINUED | OUTPATIENT
Start: 2025-05-19 | End: 2025-05-20

## 2025-05-19 RX ADMIN — OXYCODONE 5 MG: 5 TABLET ORAL at 03:05

## 2025-05-19 RX ADMIN — NIMODIPINE 60 MG: 30 CAPSULE, LIQUID FILLED ORAL at 09:05

## 2025-05-19 RX ADMIN — HEPARIN SODIUM AND DEXTROSE 11 UNITS/KG/HR: 10000; 5 INJECTION INTRAVENOUS at 12:05

## 2025-05-19 RX ADMIN — OXYCODONE 5 MG: 5 TABLET ORAL at 09:05

## 2025-05-19 RX ADMIN — POTASSIUM & SODIUM PHOSPHATES POWDER PACK 280-160-250 MG 2 PACKET: 280-160-250 PACK at 09:05

## 2025-05-19 RX ADMIN — DIAZEPAM 10 MG: 5 TABLET ORAL at 02:05

## 2025-05-19 RX ADMIN — VALACYCLOVIR HYDROCHLORIDE 1000 MG: 500 TABLET, FILM COATED ORAL at 09:05

## 2025-05-19 RX ADMIN — SODIUM CHLORIDE 500 ML: 9 INJECTION, SOLUTION INTRAVENOUS at 10:05

## 2025-05-19 RX ADMIN — NIMODIPINE 60 MG: 30 CAPSULE, LIQUID FILLED ORAL at 05:05

## 2025-05-19 RX ADMIN — OXYCODONE 5 MG: 5 TABLET ORAL at 07:05

## 2025-05-19 RX ADMIN — ASPIRIN 81 MG CHEWABLE TABLET 81 MG: 81 TABLET CHEWABLE at 09:05

## 2025-05-19 RX ADMIN — ACETAMINOPHEN 650 MG: 325 TABLET ORAL at 03:05

## 2025-05-19 RX ADMIN — NIMODIPINE 60 MG: 30 CAPSULE, LIQUID FILLED ORAL at 06:05

## 2025-05-19 RX ADMIN — NIMODIPINE 60 MG: 30 CAPSULE, LIQUID FILLED ORAL at 02:05

## 2025-05-19 RX ADMIN — PREGABALIN 75 MG: 75 CAPSULE ORAL at 09:05

## 2025-05-19 RX ADMIN — CEFEPIME 2 G: 2 INJECTION, POWDER, FOR SOLUTION INTRAVENOUS at 02:05

## 2025-05-19 RX ADMIN — NIMODIPINE 60 MG: 30 CAPSULE, LIQUID FILLED ORAL at 01:05

## 2025-05-19 RX ADMIN — ACETAMINOPHEN 650 MG: 325 TABLET ORAL at 06:05

## 2025-05-19 RX ADMIN — VALSARTAN 80 MG: 80 TABLET, FILM COATED ORAL at 09:05

## 2025-05-19 RX ADMIN — ACETAMINOPHEN 650 MG: 325 TABLET ORAL at 09:05

## 2025-05-19 RX ADMIN — CEFEPIME 2 G: 2 INJECTION, POWDER, FOR SOLUTION INTRAVENOUS at 11:05

## 2025-05-19 RX ADMIN — QUETIAPINE FUMARATE 50 MG: 25 TABLET ORAL at 09:05

## 2025-05-19 RX ADMIN — PREGABALIN 75 MG: 75 CAPSULE ORAL at 04:05

## 2025-05-19 RX ADMIN — SENNOSIDES AND DOCUSATE SODIUM 1 TABLET: 50; 8.6 TABLET ORAL at 09:05

## 2025-05-19 RX ADMIN — QUETIAPINE FUMARATE 100 MG: 25 TABLET ORAL at 09:05

## 2025-05-19 RX ADMIN — POTASSIUM & SODIUM PHOSPHATES POWDER PACK 280-160-250 MG 2 PACKET: 280-160-250 PACK at 06:05

## 2025-05-19 RX ADMIN — ATORVASTATIN CALCIUM 40 MG: 40 TABLET, FILM COATED ORAL at 09:05

## 2025-05-19 RX ADMIN — CEFEPIME 2 G: 2 INJECTION, POWDER, FOR SOLUTION INTRAVENOUS at 08:05

## 2025-05-19 NOTE — ASSESSMENT & PLAN NOTE
CT on 5/11 revealed R cerebellar infarct. Likely uriah-angio  Family aware and imaging reviewed  Atorvastatin 40  ASA 81 and Plavix 75mg qd  RLE arterial US negative for occlusion, only showing moderate stenosis. Decided not to continue heparin gtt given brachial DVT is in upper extremity. Will continue w DAPT and DVT ppx w lovenox instead of full AC.   Concern for brain compression in post fossa   NSGY following

## 2025-05-19 NOTE — PT/OT/SLP PROGRESS
Physical Therapy      Patient Name:  Waldo Emmanuel   MRN:  [id number]    Patient not seen today secondary to attempt at 10:44 am US present at bedside. Attempt at 11:03 am US still present. Attempt at 14:20 OT present at bedside. PTA unable to make fourth attempt.    Will follow-up 5/20/25.

## 2025-05-19 NOTE — PLAN OF CARE
05/19/25 1415   Post-Acute Status   Post-Acute Authorization Placement   Post-Acute Placement Status Referrals Sent   Coverage Payor: BLUE [employer]OSS [employer] - [employer] [facility] [place] -   Discharge Plan   Discharge Plan A Rehab   Discharge Plan B [facility]     Met with patients daughter Tj to review discharge recommendation of rehab and is agreeable to plan    Patient/family provided list of facilities in-network with patient's payor plan. Providers that are owned, operated, or affiliated with [facility] are included on the list.     Notified that referral sent to below listed facilities from in-network list based on proximity to home/family support:   [facility]  2. [facility]  3. [facility] ([facility]  4.  [facility]  5.  [facility]    Patient/family instructed to identify preference.    Preferred Facility: (if more than 1, listed in order of descending preference)  No preference but to stay in [facility].     If an additional preferred facility not listed above is identified, additional referral to be sent. If above facilities unable to accept, will send additional referrals to in-network providers.     Patient will have a 50% co-insurance payment for rehab.       2:30 PM  [facility] ( [place] accepted

## 2025-05-19 NOTE — ASSESSMENT & PLAN NOTE
-Stroke risk factor  -SBP goal No target, increase BP to prevent vasospasm, maintain MAP >65  -BP range in the last 24 hrs: BP  Min: 99/59  Max: 154/88  -Current antihypertensive regimen:   --PRN labetalol/hydralazine  --valsartan 80mg QD

## 2025-05-19 NOTE — NURSING
Mariluz NP notified midline team only able to obtain 1 PIV. Per NP, RNs to try with ultrasound for another PIV but OK for only 1 PIV at this time.

## 2025-05-19 NOTE — NURSING
Pt arrived back to room following CT        Pt was escorted by RN on cardiac monitoring, ambu bag, and IV pole.        Patient placed back on bedside monitor with alarms audible, bed in low position with bed alarm on, call light within reach. IRENA.

## 2025-05-19 NOTE — EICU
IKE Night Rounds Checklist  24H Vital Sign Range:  Temp:  [96.8 °F (36 °C)-98.2 °F (36.8 °C)]   Pulse:  []   Resp:  [11-28]   BP: ()/()   SpO2:  [93 %-99 %]     Video rounds

## 2025-05-19 NOTE — ASSESSMENT & PLAN NOTE
Ms. 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 Patient transferred to [facility] for IR evaluation. DSA performed 5/6/25 demonstrated the above aneurysm, which was secured with coiling. MRI brain showed large R cerebellar acute/recent infarct, suspected to be likely periprocedural. MRA completed, no plans for open clipping currently. TTE EF 6570%, no PFO/LAE/WMA.    5/19/2025 NAEON, neuro exam stable. EVD clamped today, repeat CTH in AM. On precedex overnight for agitation, off since 1051.      Antithrombotics for secondary stroke prevention: Antiplatelets: Aspirin: 81 mg daily  Anticoagulants: Heparin protocol without bolus    Statins for secondary stroke prevention and hyperlipidemia, if present: Statins: Atorvastatin- 40 mg daily    Aggressive risk factor modification: HTN, HLD, Diet, Exercise, Obesity     Rehab efforts: The patient has been evaluated by a stroke team provider and the therapy needs have been fully considered based off the presenting complaints and exam findings. The following therapy evaluations are needed: PT evaluate and treat, OT evaluate and treat, SLP evaluate and treat    Diagnostics ordered/pending: None     VTE prophylaxis: Mechanical prophylaxis: Place SCDs  Heparin drip    BP parameters: SAH: Secured aneurysm, no target, increase BP to prevent vasospasm if needed

## 2025-05-19 NOTE — PT/OT/SLP PROGRESS
Speech Language Pathology      Waldo Karen Emmanuel  MRN: [id number]    SLP attempted to see Patient for therapy.  Patient not seen today secondary to Patient unavailable upon SLP attempts (testing, therapy.) ST to continue to follow up and re-attempt 5/20/25 per ST POC.    5/19/2025

## 2025-05-19 NOTE — CONSULTS
[facility] VASCULAR ACCESS NOTE       Bed:9091/9091 A    20G x 1.75IN PIV [facility] using Ultrasound Guidance.    Indication: PVA  Attempts: 1    CHRISTI LUCAS RN

## 2025-05-19 NOTE — ASSESSMENT & PLAN NOTE
Patient is a 49-year-old female with past medical history of hypertension presenting via transfer from [place] for higher level of care after being diagnosed with subarachnoid hemorrhage.    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  S/p EVD 5/6  S/p Coil emolization 5/6  CTH 5/11 with new R PICA/ superior cerebellar infarct.    MRA 5/16 without evidence of residual aneurysm filling    - EVD open at 10-->20  - Neurosurgery, vascular neurology consulted  - SBP< 160   - nimodipine 60 mg q4h   - daily TCDs - negative for vasospasm thus far  - Aspirin 81 and Plavix 75 qd  - atorvastatin 40 mg  - euvolemia  - SCDs; lovenox for VTE ppx  - Delirium precautions.   - Precedex gtt   - PT/OT  5/19/2025: EVD clamped today per NSGY, follow CT head tomorrow AM  CTD yesterday no vasospasm, pending today

## 2025-05-19 NOTE — PROGRESS NOTES
[facility] - Neuro Critical Care  Neurocritical Care  Progress Note    Admit Date: 5/6/2025  Service Date: 05/19/2025  Length of Stay: 13    Subjective:     Chief Complaint: Nontraumatic subarachnoid hemorrhage from other intracranial arteries    History of Present Illness: History obtained via chart review and daughter at bedside as patient intubated on arrival.     Per ED note:  49 y.o. female, PMH HTN and anemia,  presenting as a transfer for neurosurgical evaluation with newly diagnosed ICH from outside hospital.  Patient was transferred from [facility].  Daughter at bedside helps provide history.  She states that she was at a grocery store when she had a syncopal event.  She was unsure if she hit her head.  She states that people on the scene called her to let her know what happened and after she was seen in the ED they told her that she had bleeding inside of her head.  She states that while in the ED her mother was answering questions appropriately and acting like her normal self except frequently falling asleep.  Patient was intubated for airway protection prior to arrival.  Patient and reversal with [facility] prior to transfer.  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 Distal location suspicious for mycotic aneurysm     On arrival to [facility]: Neursurgery consulted, EVD placed and admitted to neurocritical care    Hospital Course: 5/7/2025: extubated today to NC, SBP liberalized to 220, d/c jay cath,   5/8/2025: EVD per NSSGY, TCD's no vasospasm, DVT prophylaxis initiated, pending to hear from NSGY team when ok to start AC  05/09/2025: repeat CXR today, plan for CT chest, resp cx , add IS, EVD @10 per NSGY, TCDs today no vasospasm  5/10: Discussed with Dr. Chase and Neurosurgery, Heparin gtt started for R brachial DVT and hx arterial occlusion, will need repeat CTH when therapeutic. Hold off on  transitioning to Coumadin until cleared by NSGY. CT Chest negative for DVT, but concerning for PNA. Repeat sputum cx sent. Regular diet started. Repeat Na improved to 138.   5/11: right cerebellar CVA on imaging, HTS, MRI, family updated at bedside, place andreia, hold heparin gtt   05/12/2025 EVD @ 10. SOC watch. PBD7- plan for MRA c/s contrast for reevaluation of coiled aneurysm. Dc keppra. TCDs negative for spasm. Given 500NS for euvolemia. Given 3% HTS 250ml bolus and continue 2% HTS gtt @ 75. Na checks q6h for goal > 145. Started valsartan 80. Lower extremity arterial US ordered given hx RLE arterial occlusion, plan to restart heparin gtt pending results. SLP consult for diet, plan for FEES tomorrow. Started lovenox. Started seoquel 25qHS. Resp cx w GNR, many GPCs- has low grade temps possibly 2/2 DVT and stable mild leukocytosis so will hold off on abx for now.   05/13/2025 Delirium overnight requiring zyprexa x2. Restarted on precedex today and increased seroquel to 50qHS. Required 500NS bolus for hypotension after zyprexa last night. RLE arterial US negative for occlusion, only showing moderate stenosis. Decided not to continue heparin gtt given brachial DVT is in upper extremity. Will start plavix for DAPT. Respiratory cx w Klebsiella aerogenes x2, but not symptomatic. Transitioned from Ancef to cefepime. Bedside FEES completed, started regular thin diet. Given 3%  bolus x2 for Na goal > 145 and remains on 2% HTS @ 75. Prn valium for neck and back pain.   Pt has R brachial DVT and LUE 2% gtt infiltrated PIV and given hyaluronidase. L femoral CVC placed as pt had no access.   05/14/2025 Given 3%HTS bolus for Na > 145. TCDs negative for spasm. CXR reviewed. Increase cefepime course to 7d. Unable to obtain MRA while on precedex 1.4 and given versed due to pt restlessness and difficulty to sedate. Ordered MRA w anesthesia, likely tomorrow. NPO midnight. Sending full hypercoaguable w/u- likely start minimal  intensity heparin gtt after sending off w/u.  5/15/2025: MRA brain w/wout con today, switched plavix to baby ASA, follow CT head tomorrow AM, increase 2% gtt to 100 ml/hr (Na goal > 145), Zyprexa once PRN available,  05/16/2025 D/c 2% and sodium goal. Max precedex to 0.6. Add miralax. Complaints of bilateral intermittent ear pain (R>L). Trial steroids.   05/17/2025 Patient clamped EVD overnight and later attempted to get out of bed on her own to shower. 30 cc of output. NSGY aware. NSGY raised EVD from 10 to 20. Add pregabalin for nerve pain.   05/18/2025 Continue EVD @ 20. Patient declining seroquel. Continue precedex and encouragement of seroquel.   5/19/2025: EVD clapmped today per NSGY follow up CT head tomorrow AM, repeat US upper extremities, TCDs yesterday no vasospasm        Review of Systems  Constitutional: Denies fevers, weight loss, chills, or weakness.  Eyes: Denies changes in vision.  ENT: Denies dysphagia, nasal discharge, ear pain or discharge.  Cardiovascular: Denies chest pain, palpitations, orthopnea, or claudication.  Respiratory: Denies shortness of breath, cough, hemoptysis, or wheezing.  GI: Denies nausea/vomitting, hematochezia, melena, abd pain, or changes in appetite.  : Denies dysuria, incontinence, or hematuria.  Musculoskeletal: Denies joint pain or myalgias.  Skin/breast: Denies rashes, lumps, lesions, or discharge.  Neurologic: , dizziness, vertigo, or paresthesias. Complains  of headache  Psychiatric: Denies changes in mood or hallucinations.  Endocrine: Denies polyuria, polydipsia, heat/cold intolerance.  Hematologic/Lymph: Denies lymphadenopathy, easy bruising or easy bleeding.  Allergic/Immunologic: Denies rash, rhinitis.   Objective:     Vitals:  Temp: 98 °F (36.7 °C)  Pulse: 83  Rhythm: normal sinus rhythm  BP: 116/84  MAP (mmHg): 87  ICP Mean (mmHg): 17 mmHg  Resp: 20  SpO2: 98 %    Temp  Min: 97.8 °F (36.6 °C)  Max: 98.2 °F (36.8 °C)  Pulse  Min: 70  Max: 108  BP  Min: 91/58   Max: 154/88  MAP (mmHg)  Min: 70  Max: 116  ICP Mean (mmHg)  Min: 9 mmHg  Max: 19 mmHg  Resp  Min: 11  Max: 28  SpO2  Min: 92 %  Max: 99 %    05/18 0701 - 05/19 0700  In: 957 [P.O.:600; I.V.:357]  Out: 1181 [Urine:1100; Drains:81]   Unmeasured Output  Unmeasured Urine Occurrence: 1  Unmeasured Stool Occurrence: 0  Pad Count: 1        Physical Exam  Vitals reviewed.   Constitutional:       Appearance: She is obese. She is ill-appearing.   HENT:      Head: Normocephalic.      Comments: EVD catheter present      Nose: Nose normal.      Mouth/Throat:      Mouth: Mucous membranes are moist.   Cardiovascular:      Rate and Rhythm: Normal rate.   Pulmonary:      Effort: Pulmonary effort is normal.   Abdominal:      Palpations: Abdomen is soft.   Skin:     General: Skin is warm.   Neurological:      General: No focal deficit present.      Mental Status: She is alert.         Neuro:  --GCS: E4 V5 M6  --Mental Status:  awake, oriented X4, follows commands  --Pupils reactive  --Corneal reflex, gag, cough intact.  --URENA spont and against gravity     Medications:  ContinuousdexmedeTOMIDine (Precedex) infusion (titrating), Last Rate: 0.2 mcg/kg/hr (05/19/25 0901)  heparin (porcine) in D5W, Last Rate: 11 Units/kg/hr (05/19/25 0901)    Scheduledaspirin, 81 mg, Daily  atorvastatin, 40 mg, Daily  ceFEPime IV (PEDS and ADULTS), 2 g, Q8H  niMODipine, 60 mg, Q4H  polyethylene glycol, 17 g, Daily  pregabalin, 75 mg, BID  QUEtiapine, 100 mg, QHS  QUEtiapine, 50 mg, Daily  senna-docusate, 1 tablet, Daily  valACYclovir, 1,000 mg, Daily  valsartan, 80 mg, Daily    PRNacetaminophen, 650 mg, Q6H PRN  bisacodyL, 10 mg, Daily PRN  diazePAM, 10 mg, Q6H PRN  hydrALAZINE, 10 mg, Q4H PRN  labetalol, 10 mg, Q4H PRN  magnesium oxide, 800 mg, PRN  magnesium oxide, 800 mg, PRN  melatonin, 6 mg, Nightly PRN  methocarbamoL, 750 mg, Q6H PRN  oxyCODONE, 5 mg, Q4H PRN  potassium bicarbonate, 35 mEq, PRN  potassium bicarbonate, 50 mEq, PRN  potassium  bicarbonate, 60 mEq, PRN  potassium, sodium phosphates, 2 packet, PRN  potassium, sodium phosphates, 2 packet, PRN  potassium, sodium phosphates, 2 packet, PRN  sodium chloride 0.9%, 10 mL, PRN      Today I personally reviewed pertinent medications, lines/drains/airways, imaging, cardiology results, laboratory results, microbiology results,     Diet  Diet Adult Regular  Diet Adult Regular    Assessment/Plan:     Neuro  * Nontraumatic subarachnoid hemorrhage from cerebellar artery  Patient is a 49-year-old female with past medical history of hypertension presenting via transfer from [place] for higher level of care after being diagnosed with subarachnoid hemorrhage.    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  S/p EVD 5/6  S/p Coil emolization 5/6  CTH 5/11 with new R PICA/ superior cerebellar infarct.    MRA 5/16 without evidence of residual aneurysm filling    - EVD open at 10-->20  - Neurosurgery, vascular neurology consulted  - SBP< 160   - nimodipine 60 mg q4h   - daily TCDs - negative for vasospasm thus far  - Aspirin 81 and Plavix 75 qd  - atorvastatin 40 mg  - euvolemia  - SCDs; lovenox for VTE ppx  - Delirium precautions.   - Precedex gtt   - PT/OT  5/19/2025: EVD clamped today per NSGY, follow CT head tomorrow AM  CTD yesterday no vasospasm, pending today       Cerebellar stroke, acute  CT on 5/11 revealed R cerebellar infarct. Likely uriah-angio  Family aware and imaging reviewed  Atorvastatin 40  ASA 81 and Plavix 75mg qd  RLE arterial US negative for occlusion, only showing moderate stenosis. Decided not to continue heparin gtt given brachial DVT is in upper extremity. Will continue w DAPT and DVT ppx w lovenox instead of full AC.   Concern for brain compression in post fossa   NSGY following       Acute encephalopathy  See primary problem    Obstructive hydrocephalus  EVD clamped  R Cerebellar infarct    Brain compression  monitor EVD output  closely  Neurochecks  See primary problem       Cardiac/Vascular  Essential hypertension  SBP <160  PRN labetalol, hydralazine  Valsartan 80 mg    Hematology  History of DVT in adulthood  Pt has a hx of RLE DVT and was started on xarelto. 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 She has been seen by heme onc in the past.      Warfarin currently held  Pt was on a Heparin gtt for for R brachial DVT and hx arterial occlusion. 5/11/25 CTH showed large R cerebellar infarct and heparin gtt was held on 5/11. Lower extremity arterial US was negative for occlusion, only showing moderate stenosis (50-75%). BLE DVT US was also negative for DVT. Decided not to continue heparin gtt given brachial DVT is in upper extremity. Was only continuing with DAPT and then lovenox for VTE ppx  Sending full hypercoaguable w/u   Continue minimal intensity heparin gtt           The patient is being Prophylaxed for:  Venous Thromboembolism with: Mechanical  Stress Ulcer with: None  Ventilator Pneumonia with: not applicable    Activity Orders            Diet Adult Regular: Regular starting at 05/15 2025    Progressive Mobility Protocol (mobilize patient to their highest level of functioning at least twice daily) starting at 05/06 2000    Turn patient starting at 05/06 0800          Full Code    Mariluz Durand NP  Neurocritical Care  [facility] - Neuro Critical Care    Critical condition in that Patient has a condition that poses threat to life and bodily function: SAH, EVD     40 minutes of Critical care time was spent personally by me on the following activities: development of treatment plan with patient or surrogate and bedside caregivers, discussions with consultants, evaluation of patient's response to treatment, examination of patient, ordering and performing treatments and interventions, ordering and review of laboratory studies, ordering and review of radiographic studies, pulse  oximetry, antibiotic titration if applicable, vasopressor titration if applicable, re-evaluation of patient's condition. This critical care time did not overlap with that of any other provider or involve time for any procedures. There is high probability for acute neurological change leading to clinical and possibly life-threatening deterioration requiring highest level of physician preparedness for urgent intervention.

## 2025-05-19 NOTE — SUBJECTIVE & OBJECTIVE
Neurologic Chief Complaint: SAH    Subjective:     Interval History: Patient is seen for follow-up neurological assessment and treatment recommendations: See hospital course.     HPI, Past Medical, Family, and Social History remains the same as documented in the initial encounter.     Review of Systems   Constitutional:  Negative for chills and fever.   Eyes:  Negative for visual disturbance.   Neurological:  Positive for headaches. Negative for speech difficulty, weakness and numbness.   All other systems reviewed and are negative.    Scheduled Meds:   aspirin  81 mg Oral Daily    atorvastatin  40 mg Oral Daily    ceFEPime IV (PEDS and ADULTS)  2 g Intravenous Q8H    niMODipine  60 mg Oral Q4H    polyethylene glycol  17 g Oral Daily    pregabalin  75 mg Oral BID    QUEtiapine  100 mg Oral QHS    QUEtiapine  50 mg Oral Daily    senna-docusate  1 tablet Oral Daily    valACYclovir  1,000 mg Oral Daily    valsartan  80 mg Oral Daily     Continuous Infusions:   dexmedeTOMIDine (Precedex) infusion (titrating)  0-0.6 mcg/kg/hr Intravenous Continuous   Stopped at 05/19/25 1051    heparin (porcine) in D5W  0-40 Units/kg/hr Intravenous Continuous 11 mL/hr at 05/19/25 1300 11 Units/kg/hr at 05/19/25 1300     PRN Meds:  Current Facility-Administered Medications:     acetaminophen, 650 mg, Oral, Q6H PRN    bisacodyL, 10 mg, Rectal, Daily PRN    diazePAM, 10 mg, Oral, Q6H PRN    hydrALAZINE, 10 mg, Intravenous, Q4H PRN    labetalol, 10 mg, Intravenous, Q4H PRN    magnesium oxide, 800 mg, Oral, PRN    magnesium oxide, 800 mg, Oral, PRN    melatonin, 6 mg, Oral, Nightly PRN    methocarbamoL, 750 mg, Oral, Q6H PRN    oxyCODONE, 5 mg, Oral, Q4H PRN    potassium bicarbonate, 35 mEq, Oral, PRN    potassium bicarbonate, 50 mEq, Oral, PRN    potassium bicarbonate, 60 mEq, Oral, PRN    potassium, sodium phosphates, 2 packet, Oral, PRN    potassium, sodium phosphates, 2 packet, Oral, PRN    potassium, sodium phosphates, 2 packet, Oral,  PRN    sodium chloride 0.9%, 10 mL, Intravenous, PRN    Objective:     Vital Signs (Most Recent):  Temp: 97.8 °F (36.6 °C) (05/19/25 1101)  Pulse: 77 (05/19/25 1301)  Resp: 20 (05/19/25 1529)  BP: 119/88 (05/19/25 1301)  SpO2: (!) 94 % (05/19/25 1301)  BP Location: Left forearm    Vital Signs Range (Last 24H):  Temp:  [97.8 °F (36.6 °C)-98.2 °F (36.8 °C)]   Pulse:  []   Resp:  [14-28]   BP: ()/(55-90)   SpO2:  [92 %-99 %]   BP Location: Left forearm       Physical Exam  Vitals and nursing note reviewed.   Constitutional:       General: She is not in acute distress.     Appearance: Normal appearance.   HENT:      Head: Normocephalic.      Nose: Nose normal.   Eyes:      Extraocular Movements: Extraocular movements intact.      Conjunctiva/sclera: Conjunctivae normal.   Cardiovascular:      Rate and Rhythm: Normal rate.   Pulmonary:      Effort: Pulmonary effort is normal. No respiratory distress.   Skin:     General: Skin is warm.   Neurological:      Mental Status: She is alert and oriented to person, place, and time.      Motor: No weakness.      Comments: See below for neuro exam   Psychiatric:         Behavior: Behavior normal. Behavior is cooperative.              Neurological Exam:   LOC: alert  Attention Span: Good   Language: No aphasia  Articulation: No dysarthria  Orientation: Person, Place, Time   Facial Movement (CN VII): Symmetric facial expression    Motor: Arm left  Normal 5/5  Leg left  Normal 5/5  Arm right  Normal 5/5  Leg right Normal 5/5  Sensation: Intact to light touch, temperature and vibration    Laboratory:  CMP:   Recent Labs   Lab 05/19/25  0157   CALCIUM 9.1   ALBUMIN 2.8*   PROT 6.9      K 4.2   CO2 25      BUN 15   CREATININE 0.6   ALKPHOS 95   ALT 18   AST 16   BILITOT 0.3     BMP:   Recent Labs   Lab 05/19/25  0157      K 4.2      CO2 25   BUN 15   CREATININE 0.6   CALCIUM 9.1     CBC:   Recent Labs   Lab 05/19/25 0157   WBC 15.87*   RBC 2.98*   HGB  8.3*   HCT 27.6*   *   MCV 93   MCH 27.9   MCHC 30.1*     Coagulation:   Recent Labs   Lab 05/15/25  0530 05/15/25  1342 05/19/25  0157   INR 1.1  --   --    APTT 21.5   < > 39.9*    < > = values in this interval not displayed.       Diagnostic Results     Brain/Vessel Imaging  CTH without contrast 5/19/2025  Impression:  No evidence of acute detrimental change when compared to prior CT performed 05/16/2025.  Improving mass effect within posterior fossa in this patient with of all vein right cerebellar infarct.  There has been interval re-expansion of the 4th ventricle.  Grossly unchanged size and configuration of the lateral and 3rd ventricles.    CTH without 05/16/2025  Impression:  Compared to prior MR and CT imaging of 05/11/2025, continued maturation of large recent infarction involving the right cerebellum, as discussed.  Similar mass effect in the posterior fossa with essentially unchanged size and configuration of the lateral 3rd ventricles.  No new transependymal CSF egress.  CT  Continued decreased visualization of existing subarachnoid hemorrhage.  No new or enlarging areas of intracranial hemorrhage appreciated.    MRI Brain w/wo 05/15/2025  Impression:  Status post coiling of a PICA aneurysm.  No evidence of residual aneurysm filling.  No high-grade stenosis or other aneurysm identified.    MRI Brain without 05/11/2025  Impression:  Allowing for significant distortion examination by artifact from dental metal there is large area acute/recent infarction within the right cerebellum with probable small component in the right paramedian micah.  Please note this is near completely obscured on diffusion and gradient imaging secondary artifact from dental metal.  Mass effect from the area of infarction effacing the 4th ventricle with stable caliber lateral and 3rd ventricles without hydrocephalus with stable right frontal ventricular catheter.  There is T1 hyperintensity within the region of the right  superior cerebellar peduncle corresponding to hyperdensity seen on CT concerning for component of hemorrhagic conversion.  Scattered small volume subarachnoid hemorrhage cerebral sulci.  Clinical correlation and continued follow-up recommended.    CTH without 05/11/2025  Impression:  1. Compared to prior head CT performed 05/06/2025, 20:13 hours, evolving relatively large right cerebellar infarct without macroscopic hemorrhage within the infarct territory.  Questionable hypoattenuation involving the brainstem which may be artifactual given coil artifact, however additional areas of ischemia not excluded.  MRI may be considered for further characterization.  Further effacement of the 4th ventricle since the prior study, however there has been slight decompression of the lateral and 3rd ventricles since the prior exam.  2. Relatively similar subarachnoid and interventricular hemorrhage.  No new or enlarging areas of intracranial hemorrhage appreciated.  This report was flagged in Epic as abnormal.    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  Evolving scattered subarachnoid and intraventricular hemorrhage.  No definite new hemorrhage  Stable right frontal coursing ventricular catheter with slight reduced caliber of the ventricles without evidence for worsening hydrocephalus.  Continued slight crowding cerebral sulci and basilar cisterns with small caliber 4th ventricle.  Question component of inferior extension of the cerebellar tonsils below the foramen magnum.  This could be better assessed with MR imaging if patient compatible.  No significant new abnormal parenchymal attenuation.  Clinical correlation and further evaluation with MRI as warranted.    CTH without 05/06/2025 0949  Impression:  Subarachnoid and intraventricular hemorrhage, similar to recent exam.  Interval placement of right frontal EVD without apparent intracranial complication.   Ventricles remain mildly dilated.    CTA Head and Neck 05/06/2025  Impression:  8.6 mm aneurysm arising off the distal right anterior inferior cerebellar artery.  Distal location suspicious for mycotic aneurysm.  No intracranial large vessel occlusion or hemodynamically significant stenosis.  The cervical carotid and vertebral arteries are patent without hemodynamically significant stenosis.  Right upper lobe, right middle lobe and left lower lobe consolidative opacities.  Endotracheal tube terminates just superior to the charis recommend slight retraction.     CTH without 05/05/2025  Impression:  Subarachnoid hemorrhage.  Moderate volume of intraventricular hemorrhage.  Mild hydrocephalus.  Recommend CTA.  Findings reported by [facility] radiologist at 01:06  All CT scans at this facility use dose modulation, iterative reconstruction, and/or weight based dosing when appropriate to reduce radiation dose to as low as reasonable achievable.      Cardiac Imaging   TTE 05/07/2025    Left Ventricle: The left ventricle is normal in size. Normal wall thickness. Normal wall motion. There is normal systolic function with a visually estimated ejection fraction of 65 - 70%. Ejection fraction is approximately 68%. There is indeterminate diastolic function.    Right Ventricle: The right ventricle is normal in size. Wall thickness is normal. Systolic function is normal.    Left Atrium: Agitated saline study of the atrial septum is negative after vasalva maneuver, suggesting absence of intracardiac shunt at the atrial level. No patent foramen ovale confirmed by agitated saline contrast.    Tricuspid Valve: There is mild to moderate regurgitation.    IVC/SVC: Patient is ventilated, cannot use IVC diameter to estimate right atrial pressure.

## 2025-05-19 NOTE — PROGRESS NOTES
[facility] - Neuro Critical Care  Neurosurgery  Progress Note    Subjective:     History of Present Illness: 49f presenting after syncope with CTH demonstrating SAH. CTA without clear vascular malformation. Intubated prior to arrival to [facility] ED. Discussed expected hospital course and imaging with daughter in room    Post-Op Info:  Procedure(s) (LRB):  MRI (Magnetic Resonance Imagine) (N/A)   4 Days Post-Op   Interval History: 5/19: EVD clamp today , cth tomorrow    Medications:  Continuous Infusions:   dexmedeTOMIDine (Precedex) infusion (titrating)  0-0.6 mcg/kg/hr Intravenous Continuous 5.01 mL/hr at 05/19/25 0700 0.2 mcg/kg/hr at 05/19/25 0700    heparin (porcine) in D5W  0-40 Units/kg/hr Intravenous Continuous 11 mL/hr at 05/19/25 0700 11 Units/kg/hr at 05/19/25 0700     Scheduled Meds:   aspirin  81 mg Oral Daily    atorvastatin  40 mg Oral Daily    ceFEPime IV (PEDS and ADULTS)  2 g Intravenous Q8H    niMODipine  60 mg Oral Q4H    pregabalin  75 mg Oral BID    QUEtiapine  100 mg Oral QHS    QUEtiapine  50 mg Oral Daily    valACYclovir  1,000 mg Oral Daily    valsartan  80 mg Oral Daily     PRN Meds:  Current Facility-Administered Medications:     acetaminophen, 650 mg, Oral, Q6H PRN    bisacodyL, 10 mg, Rectal, Daily PRN    diazePAM, 10 mg, Oral, Q6H PRN    hydrALAZINE, 10 mg, Intravenous, Q4H PRN    labetalol, 10 mg, Intravenous, Q4H PRN    magnesium oxide, 800 mg, Oral, PRN    magnesium oxide, 800 mg, Oral, PRN    melatonin, 6 mg, Oral, Nightly PRN    methocarbamoL, 750 mg, Oral, Q6H PRN    oxyCODONE, 5 mg, Oral, Q4H PRN    potassium bicarbonate, 35 mEq, Oral, PRN    potassium bicarbonate, 50 mEq, Oral, PRN    potassium bicarbonate, 60 mEq, Oral, PRN    potassium, sodium phosphates, 2 packet, Oral, PRN    potassium, sodium phosphates, 2 packet, Oral, PRN    potassium, sodium phosphates, 2 packet, Oral, PRN    sodium chloride 0.9%, 10 mL, Intravenous, PRN     Review of Systems  Objective:     Weight: 100.2  kg (220 lb 14.4 oz)  Body mass index is 39.13 kg/m².  Vital Signs (Most Recent):  Temp: 98 °F (36.7 °C) (05/19/25 0701)  Pulse: 81 (05/19/25 0701)  Resp: 18 (05/19/25 0712)  BP: (!) 99/59 (05/19/25 0701)  SpO2: (!) 94 % (05/19/25 0701) Vital Signs (24h Range):  Temp:  [97.8 °F (36.6 °C)-98.2 °F (36.8 °C)] 98 °F (36.7 °C)  Pulse:  [] 81  Resp:  [11-28] 18  SpO2:  [92 %-99 %] 94 %  BP: ()/() 99/59     Date 05/19/25 0700 - 05/20/25 0659   Shift 6635-4546 5198-2025 8532-0555 24 Hour Total   INTAKE   I.V.(mL/kg) 15.5(0.2)   15.5(0.2)   Shift Total(mL/kg) 15.5(0.2)   15.5(0.2)   OUTPUT   Drains 8   8   Shift Total(mL/kg) 8(0.1)   8(0.1)   Weight (kg) 100.2 100.2 100.2 100.2                              ICP/Ventriculostomy 05/06/25 0920 Right Parietal region (Active)   Level of Ventriculostomy (cm above) 10 05/15/25 0301   Status Open to drainage 05/16/25 0701   Site Assessment Clean;Dry 05/16/25 0701   Site Drainage No drainage 05/16/25 0701   Waveform normal waveform 05/15/25 1901   Output (mL) 19 mL 05/16/25 1001   CSF Color yellow 05/16/25 0701   Dressing Status Clean;Dry;Intact 05/16/25 0701   Interventions HOB degrees;level adjusted per order;zeroed 05/16/25 0701       Female External Urinary Catheter w/ Suction 05/07/25 1732 (Active)   Skin no redness;no breakdown;perineum cleansed w/ soap and water;female external urine collection device repositioned 05/16/25 0701   Tolerance no signs/symptoms of discomfort 05/16/25 0701   Suction Continuous suction at 70 mmHg 05/15/25 1901   Date of last wick change 05/15/25 05/15/25 1901   Time of last wick change 0801 05/15/25 0701   Output (mL) 550 mL 05/16/25 0801          Physical Exam         Neurosurgery Physical Exam  E4V5M6  alert, Ox4  Cni  Follows commands x4 grossly full strength throughout  SILT  R dysmetria     EVD Incision dressed CDI     Significant Labs:  Recent Labs   Lab 05/18/25  0106 05/19/25  0157   * 133*    140   K 3.5 4.2     107   CO2 25 25   BUN 13 15   CREATININE 0.6 0.6   CALCIUM 9.2 9.1   MG 1.8 2.0     Recent Labs   Lab 05/18/25  0106 05/19/25  0157   WBC 19.94* 15.87*   HGB 8.7* 8.3*   HCT 29.5* 27.6*   * 467*     Recent Labs   Lab 05/18/25  1009 05/18/25 2015 05/19/25  0157   APTT 38.9* 39.9* 39.9*     Microbiology Results (last 7 days)       Procedure Component Value Units Date/Time    CSF culture [[id number]] Collected: 05/12/25 1729    Order Status: Completed Specimen: CSF (Spinal Fluid) from CSF Tap, Tube 3 Updated: 05/18/25 0703     CULTURE, CSF No Growth     GRAM STAIN Rare WBC seen      No organisms seen    Culture, Respiratory with Gram Stain [[id number]]  (Abnormal)  (Susceptibility) Collected: 05/10/25 1120    Order Status: Completed Specimen: Respiratory from Sputum, Expectorated Updated: 05/13/25 0946     Respiratory Culture No S aureus or Pseudomonas isolated      Few KLEBSIELLA AEROGENES     Comment: with normal respiratory chichi        GRAM STAIN <10 Epithelial Cells/LPF      Rare WBC seen      Many Gram positive cocci      Many Gram Negative Rods    Culture, Respiratory with Gram Stain [[id number]]  (Abnormal)  (Susceptibility) Collected: 05/10/25 2035    Order Status: Completed Specimen: Respiratory from Sputum Updated: 05/13/25 0945     Respiratory Culture No S aureus or Pseudomonas isolated      Few KLEBSIELLA AEROGENES     Comment: with normal respiratory chichi        GRAM STAIN <10 Epithelial Cells/LPF      Rare WBC seen      Many Gram positive cocci      Moderate Gram Negative Rods    Gram stain [[id number]] Collected: 05/12/25 1729    Order Status: Canceled Specimen: CSF (Spinal Fluid) from CSF Tap, Tube 3 Updated: 05/12/25 1909          All pertinent labs from the last 24 hours have been reviewed.    Significant Diagnostics:  CT: CT Head Without Contrast  Result Date: 5/16/2025  Compared to prior MR and CT imaging of 05/11/2025, continued maturation of large recent infarction involving the  right cerebellum, as discussed. Similar mass effect in the posterior fossa with essentially unchanged size and configuration of the lateral 3rd ventricles.  No new transependymal CSF egress.  CT Continued decreased visualization of existing subarachnoid hemorrhage.  No new or enlarging areas of intracranial hemorrhage appreciated. Electronically signed by: Mich Downs Date:    05/16/2025 Time:    07:03    I have reviewed all pertinent imaging results/findings within the past 24 hours.  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 CTA with AICA aneurysm R. Intubated prior to arrival to [facility] ED.     S/p R frontal EVD on 5/6 and s/p DSA with coil embolization of R AICA aneurysm 5/6    Plan:  Admitted to ICU  Daily ASA 81  No dvt in BLE on ultrasound, R brachial vein dvt+ - on Asa/ hep gtt   MRA completed 5/15 without any residual aneurysmal filling.  MRI 5/11 with R Cb and parmaedian pontine small infarct   EVD clamp today , cth tomorrow  SAH protocol (euvolemia, SBP liberalized, nimotop, keppra, TCDs)    Discussed with Dr. Barney Yu MD  Neurosurgery  [facility] - Neuro Critical Care

## 2025-05-19 NOTE — SUBJECTIVE & OBJECTIVE
Interval History: 5/19: EVD clamp today , cth tomorrow    Medications:  Continuous Infusions:   dexmedeTOMIDine (Precedex) infusion (titrating)  0-0.6 mcg/kg/hr Intravenous Continuous 5.01 mL/hr at 05/19/25 0700 0.2 mcg/kg/hr at 05/19/25 0700    heparin (porcine) in D5W  0-40 Units/kg/hr Intravenous Continuous 11 mL/hr at 05/19/25 0700 11 Units/kg/hr at 05/19/25 0700     Scheduled Meds:   aspirin  81 mg Oral Daily    atorvastatin  40 mg Oral Daily    ceFEPime IV (PEDS and ADULTS)  2 g Intravenous Q8H    niMODipine  60 mg Oral Q4H    pregabalin  75 mg Oral BID    QUEtiapine  100 mg Oral QHS    QUEtiapine  50 mg Oral Daily    valACYclovir  1,000 mg Oral Daily    valsartan  80 mg Oral Daily     PRN Meds:  Current Facility-Administered Medications:     acetaminophen, 650 mg, Oral, Q6H PRN    bisacodyL, 10 mg, Rectal, Daily PRN    diazePAM, 10 mg, Oral, Q6H PRN    hydrALAZINE, 10 mg, Intravenous, Q4H PRN    labetalol, 10 mg, Intravenous, Q4H PRN    magnesium oxide, 800 mg, Oral, PRN    magnesium oxide, 800 mg, Oral, PRN    melatonin, 6 mg, Oral, Nightly PRN    methocarbamoL, 750 mg, Oral, Q6H PRN    oxyCODONE, 5 mg, Oral, Q4H PRN    potassium bicarbonate, 35 mEq, Oral, PRN    potassium bicarbonate, 50 mEq, Oral, PRN    potassium bicarbonate, 60 mEq, Oral, PRN    potassium, sodium phosphates, 2 packet, Oral, PRN    potassium, sodium phosphates, 2 packet, Oral, PRN    potassium, sodium phosphates, 2 packet, Oral, PRN    sodium chloride 0.9%, 10 mL, Intravenous, PRN     Review of Systems  Objective:     Weight: 100.2 kg (220 lb 14.4 oz)  Body mass index is 39.13 kg/m².  Vital Signs (Most Recent):  Temp: 98 °F (36.7 °C) (05/19/25 0701)  Pulse: 81 (05/19/25 0701)  Resp: 18 (05/19/25 0712)  BP: (!) 99/59 (05/19/25 0701)  SpO2: (!) 94 % (05/19/25 0701) Vital Signs (24h Range):  Temp:  [97.8 °F (36.6 °C)-98.2 °F (36.8 °C)] 98 °F (36.7 °C)  Pulse:  [] 81  Resp:  [11-28] 18  SpO2:  [92 %-99 %] 94 %  BP: ()/()  99/59     Date 05/19/25 0700 - 05/20/25 0659   Shift 4730-4184 5101-0634 6767-3447 24 Hour Total   INTAKE   I.V.(mL/kg) 15.5(0.2)   15.5(0.2)   Shift Total(mL/kg) 15.5(0.2)   15.5(0.2)   OUTPUT   Drains 8   8   Shift Total(mL/kg) 8(0.1)   8(0.1)   Weight (kg) 100.2 100.2 100.2 100.2                              ICP/Ventriculostomy 05/06/25 0920 Right Parietal region (Active)   Level of Ventriculostomy (cm above) 10 05/15/25 0301   Status Open to drainage 05/16/25 0701   Site Assessment Clean;Dry 05/16/25 0701   Site Drainage No drainage 05/16/25 0701   Waveform normal waveform 05/15/25 1901   Output (mL) 19 mL 05/16/25 1001   CSF Color yellow 05/16/25 0701   Dressing Status Clean;Dry;Intact 05/16/25 0701   Interventions HOB degrees;level adjusted per order;zeroed 05/16/25 0701       Female External Urinary Catheter w/ Suction 05/07/25 1732 (Active)   Skin no redness;no breakdown;perineum cleansed w/ soap and water;female external urine collection device repositioned 05/16/25 0701   Tolerance no signs/symptoms of discomfort 05/16/25 0701   Suction Continuous suction at 70 mmHg 05/15/25 1901   Date of last wick change 05/15/25 05/15/25 1901   Time of last wick change 0801 05/15/25 0701   Output (mL) 550 mL 05/16/25 0801          Physical Exam         Neurosurgery Physical Exam  E4V5M6  alert, Ox4  Cni  Follows commands x4 grossly full strength throughout  SILT  R dysmetria     EVD Incision dressed CDI     Significant Labs:  Recent Labs   Lab 05/18/25 0106 05/19/25  0157   * 133*    140   K 3.5 4.2    107   CO2 25 25   BUN 13 15   CREATININE 0.6 0.6   CALCIUM 9.2 9.1   MG 1.8 2.0     Recent Labs   Lab 05/18/25 0106 05/19/25  0157   WBC 19.94* 15.87*   HGB 8.7* 8.3*   HCT 29.5* 27.6*   * 467*     Recent Labs   Lab 05/18/25  1009 05/18/25 2015 05/19/25  0157   APTT 38.9* 39.9* 39.9*     Microbiology Results (last 7 days)       Procedure Component Value Units Date/Time    CSF culture  [[id number]] Collected: 05/12/25 1729    Order Status: Completed Specimen: CSF (Spinal Fluid) from CSF Tap, Tube 3 Updated: 05/18/25 0703     CULTURE, CSF No Growth     GRAM STAIN Rare WBC seen      No organisms seen    Culture, Respiratory with Gram Stain [[id number]]  (Abnormal)  (Susceptibility) Collected: 05/10/25 1120    Order Status: Completed Specimen: Respiratory from Sputum, Expectorated Updated: 05/13/25 0946     Respiratory Culture No S aureus or Pseudomonas isolated      Few KLEBSIELLA AEROGENES     Comment: with normal respiratory chichi        GRAM STAIN <10 Epithelial Cells/LPF      Rare WBC seen      Many Gram positive cocci      Many Gram Negative Rods    Culture, Respiratory with Gram Stain [[id number]]  (Abnormal)  (Susceptibility) Collected: 05/10/25 2035    Order Status: Completed Specimen: Respiratory from Sputum Updated: 05/13/25 0945     Respiratory Culture No S aureus or Pseudomonas isolated      Few KLEBSIELLA AEROGENES     Comment: with normal respiratory chichi        GRAM STAIN <10 Epithelial Cells/LPF      Rare WBC seen      Many Gram positive cocci      Moderate Gram Negative Rods    Gram stain [[id number]] Collected: 05/12/25 1729    Order Status: Canceled Specimen: CSF (Spinal Fluid) from CSF Tap, Tube 3 Updated: 05/12/25 1909          All pertinent labs from the last 24 hours have been reviewed.    Significant Diagnostics:  CT: CT Head Without Contrast  Result Date: 5/16/2025  Compared to prior MR and CT imaging of 05/11/2025, continued maturation of large recent infarction involving the right cerebellum, as discussed. Similar mass effect in the posterior fossa with essentially unchanged size and configuration of the lateral 3rd ventricles.  No new transependymal CSF egress.  CT Continued decreased visualization of existing subarachnoid hemorrhage.  No new or enlarging areas of intracranial hemorrhage appreciated. Electronically signed by: Mich Downs Date:    05/16/2025  Time:    07:03    I have reviewed all pertinent imaging results/findings within the past 24 hours.

## 2025-05-19 NOTE — ASSESSMENT & PLAN NOTE
49f presenting after syncope with CTH demonstrating SAH. CTA with AICA aneurysm R. Intubated prior to arrival to [facility] ED.     S/p R frontal EVD on 5/6 and s/p DSA with coil embolization of R AICA aneurysm 5/6    Plan:  Admitted to ICU  Daily ASA 81  No dvt in BLE on ultrasound, R brachial vein dvt+ - on Asa/ hep gtt   MRA completed 5/15 without any residual aneurysmal filling.  MRI 5/11 with R Cb and parmaedian pontine small infarct   EVD clamp today , cth tomorrow  SAH protocol (euvolemia, SBP liberalized, nimotop, keppra, TCDs)    Discussed with Dr. Corley

## 2025-05-19 NOTE — EICU
Virtual ICU Quality Rounds    Admit Date: 5/6/2025  Hospital Day: 13    ICU Day: 13d 3h    24H Vital Sign Range:  Temp:  [97.8 °F (36.6 °C)-98.2 °F (36.8 °C)]   Pulse:  []   Resp:  [11-28]   BP: ()/()   SpO2:  [92 %-99 %]     VICU Surveillance Screening    Daily review of  line necessity(optional): Central line(s) in place    Central line type (optional): Tunneled catheter    Central line indications : Poor IV access

## 2025-05-19 NOTE — PT/OT/SLP PROGRESS
Occupational Therapy   Treatment    Name: Waldo Emmanuel  MRN: [id number]  Admitting Diagnosis:  Nontraumatic subarachnoid hemorrhage from other intracranial arteries  4 Days Post-Op    Recommendations:     Discharge Recommendations: High Intensity Therapy  Discharge Equipment Recommendations:  bedside commode, bath bench, walker, rolling  Barriers to discharge:  None    Assessment:     Waldo Emmanuel is a 49 y.o. 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  She presents with great participation with presented therapeutic interventions. Pt with generalized weakness and impaired activity tolerance during standing ADL training today, requiring a seated rest break. Pt with poor GMC during functional mobility this date with noted L lateral sway/L inattention. Pt with good insight to deficits and appropriate safety awareness. Pt's daughter at bedside and actively involved in pt's OT POC with demonstrated understanding of safe transfer and mobility skills. Pt is currently not at her PLOF and would greatly benefit from continued skilled OT intervention in efforts to participate in meaningful occupations of choice at the highest level of independence. Pt presents with appropriate activity tolerance and motivation to participate in 3 hrs of therapy 5x a week.  Performance deficits affecting function are weakness, impaired endurance, impaired self care skills, impaired functional mobility, impaired balance, gait instability, visual deficits, decreased safety awareness, impaired cardiopulmonary response to activity.     Rehab Prognosis:  Good; patient would benefit from acute skilled OT services to address these deficits and reach maximum level of function.       Plan:     Patient to be seen 4 x/week to address the above listed problems via self-care/home management, therapeutic activities, therapeutic exercises, neuromuscular re-education  Plan of  "Care Expires: 06/11/25  Plan of Care Reviewed with: patient, daughter    Subjective     Chief Complaint: back pain and headache  Patient/Family Comments/goals: "be honest with me how did I really do?"  Pain/Comfort:  Pain Rating 1: other (see comments) (not rated)  Location 1: head  Pain Addressed 1: Reposition, Distraction  Pain Rating Post-Intervention 1: other (see comments) (not rated)    Objective:     Communicated with: RN prior to session.  Patient found HOB elevated with pulse ox (continuous), telemetry, blood pressure cuff, external ventricular drain, peripheral IV, central line upon OT entry to room.    General Precautions: Standard, fall    Orthopedic Precautions:N/A  Braces: N/A  Respiratory Status: Room air     Occupational Performance:     Bed Mobility:    Patient completed Scooting/Bridging with stand by assistance  Patient completed Supine to Sit with stand by assistance     Functional Mobility/Transfers:  Patient completed Sit <> Stand Transfer with contact guard assistance  with  no assistive device (1 trial from EOB, 1 trial to bedside chair)  Patient completed Toilet Transfer Step Transfer technique with contact guard assistance with  hand-held assist and bedside commode  Functional Mobility: Pt engaging in functional mobility to simulate household/community distances approx 10 ft with CGA and utilizing RW in order to maximize functional activity tolerance and standing balance required for engagement in occupations of choice.     Activities of Daily Living:  Grooming: stand by assistance at [facility] for oral care and facial cleaning  Lower Body Dressing: moderate assistance to don purewick  Toileting: stand by assistance for perineal care seated/standing at [facility] 6 Click ADL: 19    Treatment & Education:  Pt educated on the following:  - role of OT and OT POC, including DC from OT. Pt verbalized understanding.  - importance of continued mobilization  - Safe transfer techniques and proper " body mechanics for fall prevention and improved independence with functional transfers   - Importance of OOB activities to increase endurance and tolerance for increased participation in daily ADLs.    - All pt questions within OT scope of practice addressed, pt verbalized understanding.     Patient left up in chair with all lines intact, call button in reach, RN notified, and daughter present    GOALS:   Multidisciplinary Problems       Occupational Therapy Goals          Problem: Occupational Therapy    Goal Priority Disciplines Outcome Interventions   Occupational Therapy Goal     OT, PT/OT Progressing    Description: Goals to be met by: 6/11/2025     Patient will increase functional independence with ADLs by performing:    UE Dressing with Supervision.  LE Dressing with Supervision.  Grooming while standing at sink with Supervision.  Toileting from toilet with Supervision for hygiene and clothing management.   Toilet transfer to toilet with Supervision.                         DME Justifications:   Waldo requires a commode for home use because she is confined to a single room.   Waldo's mobility limitation cannot be sufficiently resolved by the use of a cane. Her functional mobility deficit can be sufficiently resolved with the use of a Rolling Walker. Patient's mobility limitation significantly impairs their ability to participate in one of more activities of daily living.  The use of a RW will significantly improve the patient's ability to participate in MRADLS and the patient will use it on regular basis in the home.    Time Tracking:     OT Date of Treatment: 05/19/25  OT Start Time: 1405  OT Stop Time: 1430  OT Total Time (min): 25 min    Billable Minutes:Self Care/Home Management 25    OT/ALEXUS: OT          5/19/2025

## 2025-05-19 NOTE — PLAN OF CARE
[facility] Care Plan    POC reviewed with Hennykomal Karen Emmanuel and family at 0300. Patient verbalized understanding. Questions and concerns addressed. No acute events today. Pt progressing toward goals. Will continue to monitor. See below and flowsheets for full assessment and VS info.     - SBP < 160 maintained, no prn needed  - EVD open at 20; ICPs 15-19 , Output: 0-7  - heparin gtt continued at 11 and therapeutic; next APTT with am labs  - prn oxy x1, tylenol x1, diazepam x1 for head/neck pain  - CTH completed    Is this a stroke patient? yes- Stroke booklet reviewed with patient and is at bedside.   Care Plan Individualization:     Neuro:  [place] Coma Scale  Best Eye Response: 4-->(E4) spontaneous  Best Motor Response: 6-->(M6) obeys commands  Best Verbal Response: 5-->(V5) oriented  [place] Coma Scale Score: 15  Assessment Qualifiers: no eye obstruction present  Pupil PERRLA: yes     24 hr Temp:  [97.8 °F (36.6 °C)-98.2 °F (36.8 °C)]     CV:   Rhythm: normal sinus rhythm  BP goals:   SBP < 160  MAP > 65    Resp:      Vent Mode: Spont  Set Rate: 20 BPM  Oxygen Concentration (%): 40  Vt Set: 375 mL  PEEP/CPAP: 5 cmH20  Pressure Support: 5 cmH20    Plan: N/A    GI/:     Diet/Nutrition Received: regular  Last Bowel Movement: 05/16/25  Voiding Characteristics: voids spontaneously without difficulty    Intake/Output Summary (Last 24 hours) at 5/19/2025 0334  Last data filed at 5/19/2025 0302  Gross per 24 hour   Intake 1120.64 ml   Output 1335 ml   Net -214.36 ml     Unmeasured Output  Unmeasured Urine Occurrence: 1  Unmeasured Stool Occurrence: 0  Pad Count: 1    Labs/Accuchecks:  Recent Labs   Lab 05/19/25  0157   WBC 15.87*   RBC 2.98*   HGB 8.3*   HCT 27.6*   *      Recent Labs   Lab 05/19/25  0157      K 4.2   CO2 25      BUN 15   CREATININE 0.6   ALKPHOS 95   ALT 18   AST 16   BILITOT 0.3      Recent Labs   Lab 05/15/25  0530 05/15/25  1342 05/19/25  0157   PROTIME 11.7  --   --    INR 1.1  --  "  --    APTT 21.5   < > 39.9*    < > = values in this interval not displayed.    No results for input(s): "CPK", "CPKMB", "TROPONINI", "MB" in the last 168 hours.    Electrolytes: Electrolytes replaced  Accuchecks: none    Gtts:   dexmedeTOMIDine (Precedex) infusion (titrating)  0-0.6 mcg/kg/hr Intravenous Continuous 5.01 mL/hr at 05/19/25 0302 0.2 mcg/kg/hr at 05/19/25 0302    heparin (porcine) in D5W  0-40 Units/kg/hr Intravenous Continuous 11 mL/hr at 05/19/25 0302 11 Units/kg/hr at 05/19/25 0302       LDA/Wounds:    Ivan Risk Assessment  Sensory Perception: 4-->no impairment  Moisture: 3-->occasionally moist  Activity: 2-->chairfast  Mobility: 3-->slightly limited  Nutrition: 3-->adequate  Friction and Shear: 2-->potential problem  Ivan Score: 17  Is your ivan score 12 or less? no        WCTM    "

## 2025-05-19 NOTE — PROGRESS NOTES
"[facility]  Vascular Neurology  [facility]  Progress Note    Assessment/Plan:     * Nontraumatic subarachnoid hemorrhage from cerebellar artery  Ms. 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 Patient transferred to [facility] [place] for IR evaluation. DSA performed 5/6/25 demonstrated the above aneurysm, which was secured with coiling. MRI brain showed large R cerebellar acute/recent infarct, suspected to be likely periprocedural. MRA completed, no plans for open clipping currently. TTE EF 6570%, no PFO/LAE/WMA.    5/19/2025 NAEON, neuro exam stable. EVD clamped today, repeat CTH in AM. On precedex overnight for agitation, off since 1051.      Antithrombotics for secondary stroke prevention: Antiplatelets: Aspirin: 81 mg daily  Anticoagulants: Heparin protocol without bolus    Statins for secondary stroke prevention and hyperlipidemia, if present: Statins: Atorvastatin- 40 mg daily    Aggressive risk factor modification: HTN, HLD, Diet, Exercise, Obesity     Rehab efforts: The patient has been evaluated by a stroke team provider and the therapy needs have been fully considered based off the presenting complaints and exam findings. The following therapy evaluations are needed: PT evaluate and treat, OT evaluate and treat, SLP evaluate and treat    Diagnostics ordered/pending: None     VTE prophylaxis: Mechanical prophylaxis: Place SCDs  Heparin drip    BP parameters: SAH: Secured aneurysm, no target, increase BP to prevent vasospasm if needed       Cerebellar stroke, acute  -- See plan for SAH        Obstructive hydrocephalus  NSGY following  EVD in place, clamped as of 5/19    Brain aneurysm  See "SAH"    Essential hypertension  -Stroke risk factor  -SBP goal No target, increase BP to prevent vasospasm, " maintain MAP >65  -BP range in the last 24 hrs: BP  Min: 99/59  Max: 154/88  -Current antihypertensive regimen:   --PRN labetalol/hydralazine  --valsartan 80mg QD         Hospital Course:   5/16/2025 s/p EVD and DSA with coil embolization. Imaging obtained on 5/11 and significant for R cerebellar infarct , likely periprocedural. MRA completed yesterday, no plans for open clipping currently. Patient complaining of ear pain and headache. Remains with EVD on precedex.   5-17-25 EVD in place, on Precedex, R brachial DVT on ASA and heparin drip  05/18/2025 NAEON. Agitation management continues with precedex.   5/19/2025 NAEON, neuro exam stable. EVD clamped today, repeat CTH in AM. On precedex overnight for agitation, off since 1051.    STROKE DOCUMENTATION        NIH Scale:  1a. Level of Consciousness: 0-->Alert, keenly responsive  1b. LOC Questions: 0-->Answers both questions correctly  1c. LOC Commands: 0-->Performs both tasks correctly  2. Best Gaze: 0-->Normal  3. Visual: 0-->No visual loss  4. Facial Palsy: 0-->Normal symmetrical movements  5a. Motor Arm, Left: 0-->No drift, limb holds 90 (or 45) degrees for full 10 secs  5b. Motor Arm, Right: 0-->No drift, limb holds 90 (or 45) degrees for full 10 secs  6a. Motor Leg, Left: 0-->No drift, leg holds 30 degree position for full 5 secs  6b. Motor Leg, Right: 0-->No drift, leg holds 30 degree position for full 5 secs  7. Limb Ataxia: 0-->Absent  8. Sensory: 0-->Normal, no sensory loss  9. Best Language: 0-->No aphasia, normal  10. Dysarthria: 0-->Normal  11. Extinction and Inattention (formerly Neglect): 0-->No abnormality  Total (NIH Stroke Scale): 0       Modified Fam    [place] Coma Scale:    ABCD2 Score:    [id number] Score:   HAS -BLED Score:   ICH Score:   Hunt & Castillo Classification:      Hemorrhagic change of an Ischemic Stroke: Does this patient have an ischemic stroke with hemorrhagic changes? No     Neurologic Chief Complaint: SAH    Subjective:      Interval History: Patient is seen for follow-up neurological assessment and treatment recommendations: See hospital course.     HPI, Past Medical, Family, and Social History remains the same as documented in the initial encounter.     Review of Systems   Constitutional:  Negative for chills and fever.   Eyes:  Negative for visual disturbance.   Neurological:  Positive for headaches. Negative for speech difficulty, weakness and numbness.   All other systems reviewed and are negative.    Scheduled Meds:   aspirin  81 mg Oral Daily    atorvastatin  40 mg Oral Daily    ceFEPime IV (PEDS and ADULTS)  2 g Intravenous Q8H    niMODipine  60 mg Oral Q4H    polyethylene glycol  17 g Oral Daily    pregabalin  75 mg Oral BID    QUEtiapine  100 mg Oral QHS    QUEtiapine  50 mg Oral Daily    senna-docusate  1 tablet Oral Daily    valACYclovir  1,000 mg Oral Daily    valsartan  80 mg Oral Daily     Continuous Infusions:   dexmedeTOMIDine (Precedex) infusion (titrating)  0-0.6 mcg/kg/hr Intravenous Continuous   Stopped at 05/19/25 1051    heparin (porcine) in D5W  0-40 Units/kg/hr Intravenous Continuous 11 mL/hr at 05/19/25 1300 11 Units/kg/hr at 05/19/25 1300     PRN Meds:  Current Facility-Administered Medications:     acetaminophen, 650 mg, Oral, Q6H PRN    bisacodyL, 10 mg, Rectal, Daily PRN    diazePAM, 10 mg, Oral, Q6H PRN    hydrALAZINE, 10 mg, Intravenous, Q4H PRN    labetalol, 10 mg, Intravenous, Q4H PRN    magnesium oxide, 800 mg, Oral, PRN    magnesium oxide, 800 mg, Oral, PRN    melatonin, 6 mg, Oral, Nightly PRN    methocarbamoL, 750 mg, Oral, Q6H PRN    oxyCODONE, 5 mg, Oral, Q4H PRN    potassium bicarbonate, 35 mEq, Oral, PRN    potassium bicarbonate, 50 mEq, Oral, PRN    potassium bicarbonate, 60 mEq, Oral, PRN    potassium, sodium phosphates, 2 packet, Oral, PRN    potassium, sodium phosphates, 2 packet, Oral, PRN    potassium, sodium phosphates, 2 packet, Oral, PRN    sodium chloride 0.9%, 10 mL, Intravenous,  PRN    Objective:     Vital Signs (Most Recent):  Temp: 97.8 °F (36.6 °C) (05/19/25 1101)  Pulse: 77 (05/19/25 1301)  Resp: 20 (05/19/25 1529)  BP: 119/88 (05/19/25 1301)  SpO2: (!) 94 % (05/19/25 1301)  BP Location: Left forearm    Vital Signs Range (Last 24H):  Temp:  [97.8 °F (36.6 °C)-98.2 °F (36.8 °C)]   Pulse:  []   Resp:  [14-28]   BP: ()/(55-90)   SpO2:  [92 %-99 %]   BP Location: Left forearm       Physical Exam  Vitals and nursing note reviewed.   Constitutional:       General: She is not in acute distress.     Appearance: Normal appearance.   HENT:      Head: Normocephalic.      Nose: Nose normal.   Eyes:      Extraocular Movements: Extraocular movements intact.      Conjunctiva/sclera: Conjunctivae normal.   Cardiovascular:      Rate and Rhythm: Normal rate.   Pulmonary:      Effort: Pulmonary effort is normal. No respiratory distress.   Skin:     General: Skin is warm.   Neurological:      Mental Status: She is alert and oriented to person, place, and time.      Motor: No weakness.      Comments: See below for neuro exam   Psychiatric:         Behavior: Behavior normal. Behavior is cooperative.              Neurological Exam:   LOC: alert  Attention Span: Good   Language: No aphasia  Articulation: No dysarthria  Orientation: Person, Place, Time   Facial Movement (CN VII): Symmetric facial expression    Motor: Arm left  Normal 5/5  Leg left  Normal 5/5  Arm right  Normal 5/5  Leg right Normal 5/5  Sensation: Intact to light touch, temperature and vibration    Laboratory:  CMP:   Recent Labs   Lab 05/19/25  0157   CALCIUM 9.1   ALBUMIN 2.8*   PROT 6.9      K 4.2   CO2 25      BUN 15   CREATININE 0.6   ALKPHOS 95   ALT 18   AST 16   BILITOT 0.3     BMP:   Recent Labs   Lab 05/19/25  0157      K 4.2      CO2 25   BUN 15   CREATININE 0.6   CALCIUM 9.1     CBC:   Recent Labs   Lab 05/19/25 0157   WBC 15.87*   RBC 2.98*   HGB 8.3*   HCT 27.6*   *   MCV 93   MCH 27.9    MCHC 30.1*     Coagulation:   Recent Labs   Lab 05/15/25  0530 05/15/25  1342 05/19/25  0157   INR 1.1  --   --    APTT 21.5   < > 39.9*    < > = values in this interval not displayed.       Diagnostic Results     Brain/Vessel Imaging  CTH without contrast 5/19/2025  Impression:  No evidence of acute detrimental change when compared to prior CT performed 05/16/2025.  Improving mass effect within posterior fossa in this patient with of all vein right cerebellar infarct.  There has been interval re-expansion of the 4th ventricle.  Grossly unchanged size and configuration of the lateral and 3rd ventricles.    CTH without 05/16/2025  Impression:  Compared to prior MR and CT imaging of 05/11/2025, continued maturation of large recent infarction involving the right cerebellum, as discussed.  Similar mass effect in the posterior fossa with essentially unchanged size and configuration of the lateral 3rd ventricles.  No new transependymal CSF egress.  CT  Continued decreased visualization of existing subarachnoid hemorrhage.  No new or enlarging areas of intracranial hemorrhage appreciated.    MRI Brain w/wo 05/15/2025  Impression:  Status post coiling of a PICA aneurysm.  No evidence of residual aneurysm filling.  No high-grade stenosis or other aneurysm identified.    MRI Brain without 05/11/2025  Impression:  Allowing for significant distortion examination by artifact from dental metal there is large area acute/recent infarction within the right cerebellum with probable small component in the right paramedian micah.  Please note this is near completely obscured on diffusion and gradient imaging secondary artifact from dental metal.  Mass effect from the area of infarction effacing the 4th ventricle with stable caliber lateral and 3rd ventricles without hydrocephalus with stable right frontal ventricular catheter.  There is T1 hyperintensity within the region of the right superior cerebellar peduncle corresponding to  hyperdensity seen on CT concerning for component of hemorrhagic conversion.  Scattered small volume subarachnoid hemorrhage cerebral sulci.  Clinical correlation and continued follow-up recommended.    CTH without 05/11/2025  Impression:  1. Compared to prior head CT performed 05/06/2025, 20:13 hours, evolving relatively large right cerebellar infarct without macroscopic hemorrhage within the infarct territory.  Questionable hypoattenuation involving the brainstem which may be artifactual given coil artifact, however additional areas of ischemia not excluded.  MRI may be considered for further characterization.  Further effacement of the 4th ventricle since the prior study, however there has been slight decompression of the lateral and 3rd ventricles since the prior exam.  2. Relatively similar subarachnoid and interventricular hemorrhage.  No new or enlarging areas of intracranial hemorrhage appreciated.  This report was flagged in Epic as abnormal.    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  Evolving scattered subarachnoid and intraventricular hemorrhage.  No definite new hemorrhage  Stable right frontal coursing ventricular catheter with slight reduced caliber of the ventricles without evidence for worsening hydrocephalus.  Continued slight crowding cerebral sulci and basilar cisterns with small caliber 4th ventricle.  Question component of inferior extension of the cerebellar tonsils below the foramen magnum.  This could be better assessed with MR imaging if patient compatible.  No significant new abnormal parenchymal attenuation.  Clinical correlation and further evaluation with MRI as warranted.    CTH without 05/06/2025 0949  Impression:  Subarachnoid and intraventricular hemorrhage, similar to recent exam.  Interval placement of right frontal EVD without apparent intracranial complication.  Ventricles remain mildly dilated.    CTA Head and  Neck 05/06/2025  Impression:  8.6 mm aneurysm arising off the distal right anterior inferior cerebellar artery.  Distal location suspicious for mycotic aneurysm.  No intracranial large vessel occlusion or hemodynamically significant stenosis.  The cervical carotid and vertebral arteries are patent without hemodynamically significant stenosis.  Right upper lobe, right middle lobe and left lower lobe consolidative opacities.  Endotracheal tube terminates just superior to the charis recommend slight retraction.     CTH without 05/05/2025  Impression:  Subarachnoid hemorrhage.  Moderate volume of intraventricular hemorrhage.  Mild hydrocephalus.  Recommend CTA.  Findings reported by [facility] Rad radiologist at 01:06  All CT scans at this facility use dose modulation, iterative reconstruction, and/or weight based dosing when appropriate to reduce radiation dose to as low as reasonable achievable.      Cardiac Imaging   TTE 05/07/2025    Left Ventricle: The left ventricle is normal in size. Normal wall thickness. Normal wall motion. There is normal systolic function with a visually estimated ejection fraction of 65 - 70%. Ejection fraction is approximately 68%. There is indeterminate diastolic function.    Right Ventricle: The right ventricle is normal in size. Wall thickness is normal. Systolic function is normal.    Left Atrium: Agitated saline study of the atrial septum is negative after vasalva maneuver, suggesting absence of intracardiac shunt at the atrial level. No patent foramen ovale confirmed by agitated saline contrast.    Tricuspid Valve: There is mild to moderate regurgitation.    IVC/SVC: Patient is ventilated, cannot use IVC diameter to estimate right atrial pressure.       Huma Bell PA-C  [facility]  Department of Vascular Neurology   [facility]

## 2025-05-19 NOTE — PLAN OF CARE
[facility] Care Plan  POC reviewed with Waldo Emmanuel and family at 1400. Patient and Family verbalized understanding. Questions and concerns addressed. No acute events today. Pt progressing toward goals. Will continue to monitor. See below and flowsheets for full assessment and VS info.     -EVD clamped this AM, tolerating well. ICPs 0-17  -CTH overnight  -PRN oxycodone given x2   -PRN tylenol given x1  -Precedex turned off this AM  -US UE completed    Is this a stroke patient? yes- Stroke booklet reviewed with family and is at bedside.   Care Plan Individualization:     Neuro:  Roro Coma Scale  Best Eye Response: 4-->(E4) spontaneous  Best Motor Response: 6-->(M6) obeys commands  Best Verbal Response: 5-->(V5) oriented  [place]oro Coma Scale Score: 15  Assessment Qualifiers: no eye obstruction present, patient not sedated/intubated  Pupil PERRLA: yes     24 hr Temp:  [97.8 °F (36.6 °C)-98.2 °F (36.8 °C)]     CV:   Rhythm: normal sinus rhythm  BP goals:   SBP < 160  MAP > 65    Resp:      Vent Mode: Spont  Set Rate: 20 BPM  Oxygen Concentration (%): 40  Vt Set: 375 mL  PEEP/CPAP: 5 cmH20  Pressure Support: 5 cmH20    Plan: N/A    GI/:     Diet/Nutrition Received: regular  Last Bowel Movement: 05/16/25  Voiding Characteristics: voids spontaneously without difficulty    Intake/Output Summary (Last 24 hours) at 5/19/2025 1720  Last data filed at 5/19/2025 1700  Gross per 24 hour   Intake 1381.36 ml   Output 1954 ml   Net -572.64 ml     Unmeasured Output  Unmeasured Urine Occurrence: 1  Unmeasured Stool Occurrence: 0  Pad Count: 1    Labs/Accuchecks:  Recent Labs   Lab 05/19/25  0157   WBC 15.87*   RBC 2.98*   HGB 8.3*   HCT 27.6*   *      Recent Labs   Lab 05/19/25  0157      K 4.2   CO2 25      BUN 15   CREATININE 0.6   ALKPHOS 95   ALT 18   AST 16   BILITOT 0.3      Recent Labs   Lab 05/15/25  0530 05/15/25  1342 05/19/25  0157   PROTIME 11.7  --   --    INR 1.1  --   --    APTT 21.5   < >  "39.9*    < > = values in this interval not displayed.    No results for input(s): "CPK", "CPKMB", "TROPONINI", "MB" in the last 168 hours.    Electrolytes: Electrolytes replaced  Accuchecks: none    Gtts:   dexmedeTOMIDine (Precedex) infusion (titrating)  0-0.6 mcg/kg/hr Intravenous Continuous   Stopped at 25 1051    heparin (porcine) in D5W  0-40 Units/kg/hr Intravenous Continuous 11 mL/hr at 25 1700 11 Units/kg/hr at 25 1700       LDA/Wounds:    Ivan Risk Assessment  Sensory Perception: 4-->no impairment  Moisture: 3-->occasionally moist  Activity: 2-->chairfast  Mobility: 3-->slightly limited  Nutrition: 3-->adequate  Friction and Shear: 3-->no apparent problem  Ivan Score: 18    Is your ivan score 12 or less? no            Restraints:   Restraint Order  Length of Order: Order good for next 24 hours or when removed.  Date that the current order will : 05/15/25  Time that the current order will : 1151  Order Upon Application: Yes    [facility]   "

## 2025-05-19 NOTE — SUBJECTIVE & OBJECTIVE
Review of Systems  Constitutional: Denies fevers, weight loss, chills, or weakness.  Eyes: Denies changes in vision.  ENT: Denies dysphagia, nasal discharge, ear pain or discharge.  Cardiovascular: Denies chest pain, palpitations, orthopnea, or claudication.  Respiratory: Denies shortness of breath, cough, hemoptysis, or wheezing.  GI: Denies nausea/vomitting, hematochezia, melena, abd pain, or changes in appetite.  : Denies dysuria, incontinence, or hematuria.  Musculoskeletal: Denies joint pain or myalgias.  Skin/breast: Denies rashes, lumps, lesions, or discharge.  Neurologic: , dizziness, vertigo, or paresthesias. Complains  of headache  Psychiatric: Denies changes in mood or hallucinations.  Endocrine: Denies polyuria, polydipsia, heat/cold intolerance.  Hematologic/Lymph: Denies lymphadenopathy, easy bruising or easy bleeding.  Allergic/Immunologic: Denies rash, rhinitis.   Objective:     Vitals:  Temp: 98 °F (36.7 °C)  Pulse: 83  Rhythm: normal sinus rhythm  BP: 116/84  MAP (mmHg): 87  ICP Mean (mmHg): 17 mmHg  Resp: 20  SpO2: 98 %    Temp  Min: 97.8 °F (36.6 °C)  Max: 98.2 °F (36.8 °C)  Pulse  Min: 70  Max: 108  BP  Min: 91/58  Max: 154/88  MAP (mmHg)  Min: 70  Max: 116  ICP Mean (mmHg)  Min: 9 mmHg  Max: 19 mmHg  Resp  Min: 11  Max: 28  SpO2  Min: 92 %  Max: 99 %    05/18 0701 - 05/19 0700  In: 957 [P.O.:600; I.V.:357]  Out: 1181 [Urine:1100; Drains:81]   Unmeasured Output  Unmeasured Urine Occurrence: 1  Unmeasured Stool Occurrence: 0  Pad Count: 1        Physical Exam  Vitals reviewed.   Constitutional:       Appearance: She is obese. She is ill-appearing.   HENT:      Head: Normocephalic.      Comments: EVD catheter present      Nose: Nose normal.      Mouth/Throat:      Mouth: Mucous membranes are moist.   Cardiovascular:      Rate and Rhythm: Normal rate.   Pulmonary:      Effort: Pulmonary effort is normal.   Abdominal:      Palpations: Abdomen is soft.   Skin:     General: Skin is warm.    Neurological:      General: No focal deficit present.      Mental Status: She is alert.         Neuro:  --GCS: E4 V5 M6  --Mental Status:  awake, oriented X4, follows commands  --Pupils reactive  --Corneal reflex, gag, cough intact.  --URENA spont and against gravity     Medications:  ContinuousdexmedeTOMIDine (Precedex) infusion (titrating), Last Rate: 0.2 mcg/kg/hr (05/19/25 0901)  heparin (porcine) in D5W, Last Rate: 11 Units/kg/hr (05/19/25 0901)    Scheduledaspirin, 81 mg, Daily  atorvastatin, 40 mg, Daily  ceFEPime IV (PEDS and ADULTS), 2 g, Q8H  niMODipine, 60 mg, Q4H  polyethylene glycol, 17 g, Daily  pregabalin, 75 mg, BID  QUEtiapine, 100 mg, QHS  QUEtiapine, 50 mg, Daily  senna-docusate, 1 tablet, Daily  valACYclovir, 1,000 mg, Daily  valsartan, 80 mg, Daily    PRNacetaminophen, 650 mg, Q6H PRN  bisacodyL, 10 mg, Daily PRN  diazePAM, 10 mg, Q6H PRN  hydrALAZINE, 10 mg, Q4H PRN  labetalol, 10 mg, Q4H PRN  magnesium oxide, 800 mg, PRN  magnesium oxide, 800 mg, PRN  melatonin, 6 mg, Nightly PRN  methocarbamoL, 750 mg, Q6H PRN  oxyCODONE, 5 mg, Q4H PRN  potassium bicarbonate, 35 mEq, PRN  potassium bicarbonate, 50 mEq, PRN  potassium bicarbonate, 60 mEq, PRN  potassium, sodium phosphates, 2 packet, PRN  potassium, sodium phosphates, 2 packet, PRN  potassium, sodium phosphates, 2 packet, PRN  sodium chloride 0.9%, 10 mL, PRN      Today I personally reviewed pertinent medications, lines/drains/airways, imaging, cardiology results, laboratory results, microbiology results,     Diet  Diet Adult Regular  Diet Adult Regular

## 2025-05-20 LAB
ABSOLUTE EOSINOPHIL (OHS): 0.14 K/UL
ABSOLUTE MONOCYTE (OHS): 0.87 K/UL (ref 0.3–1)
ABSOLUTE NEUTROPHIL COUNT (OHS): 8.05 K/UL (ref 1.8–7.7)
ALBUMIN SERPL BCP-MCNC: 2.8 G/DL (ref 3.5–5.2)
ALP SERPL-CCNC: 95 UNIT/L (ref 40–150)
ALT SERPL W/O P-5'-P-CCNC: 22 UNIT/L (ref 10–44)
ANION GAP (OHS): 8 MMOL/L (ref 8–16)
APTT PPP: 39.5 SECONDS (ref 21–32)
AST SERPL-CCNC: 26 UNIT/L (ref 11–45)
BASOPHILS # BLD AUTO: 0.05 K/UL
BASOPHILS NFR BLD AUTO: 0.4 %
BILIRUB SERPL-MCNC: 0.3 MG/DL (ref 0.1–1)
BUN SERPL-MCNC: 13 MG/DL (ref 6–20)
CALCIUM SERPL-MCNC: 9.3 MG/DL (ref 8.7–10.5)
CHLORIDE SERPL-SCNC: 108 MMOL/L (ref 95–110)
CO2 SERPL-SCNC: 25 MMOL/L (ref 23–29)
CREAT SERPL-MCNC: 0.7 MG/DL (ref 0.5–1.4)
ERYTHROCYTE [DISTWIDTH] IN BLOOD BY AUTOMATED COUNT: 14.6 % (ref 11.5–14.5)
GFR SERPLBLD CREATININE-BSD FMLA CKD-EPI: >60 ML/MIN/1.73/M2
GLUCOSE SERPL-MCNC: 125 MG/DL (ref 70–110)
HCT VFR BLD AUTO: 29.7 % (ref 37–48.5)
HGB BLD-MCNC: 8.4 GM/DL (ref 12–16)
IMM GRANULOCYTES # BLD AUTO: 0.09 K/UL (ref 0–0.04)
IMM GRANULOCYTES NFR BLD AUTO: 0.7 % (ref 0–0.5)
INR PPP: 1.1 (ref 0.8–1.2)
LYMPHOCYTES # BLD AUTO: 3.46 K/UL (ref 1–4.8)
MAGNESIUM SERPL-MCNC: 2 MG/DL (ref 1.6–2.6)
MCH RBC QN AUTO: 26.9 PG (ref 27–31)
MCHC RBC AUTO-ENTMCNC: 28.3 G/DL (ref 32–36)
MCV RBC AUTO: 95 FL (ref 82–98)
NUCLEATED RBC (/100WBC) (OHS): 0 /100 WBC
PHOSPHATE SERPL-MCNC: 3.3 MG/DL (ref 2.7–4.5)
PLATELET # BLD AUTO: 471 K/UL (ref 150–450)
PMV BLD AUTO: 9.4 FL (ref 9.2–12.9)
POTASSIUM SERPL-SCNC: 3.9 MMOL/L (ref 3.5–5.1)
PROT SERPL-MCNC: 7 GM/DL (ref 6–8.4)
PROTHROMBIN TIME: 11.7 SECONDS (ref 9–12.5)
RBC # BLD AUTO: 3.12 M/UL (ref 4–5.4)
RELATIVE EOSINOPHIL (OHS): 1.1 %
RELATIVE LYMPHOCYTE (OHS): 27.3 % (ref 18–48)
RELATIVE MONOCYTE (OHS): 6.9 % (ref 4–15)
RELATIVE NEUTROPHIL (OHS): 63.6 % (ref 38–73)
SODIUM SERPL-SCNC: 141 MMOL/L (ref 136–145)
WBC # BLD AUTO: 12.66 K/UL (ref 3.9–12.7)

## 2025-05-20 PROCEDURE — 25000003 PHARM REV CODE 250

## 2025-05-20 PROCEDURE — 97116 GAIT TRAINING THERAPY: CPT

## 2025-05-20 PROCEDURE — 92507 TX SP LANG VOICE COMM INDIV: CPT

## 2025-05-20 PROCEDURE — 83735 ASSAY OF MAGNESIUM: CPT

## 2025-05-20 PROCEDURE — 25000003 PHARM REV CODE 250: Performed by: PSYCHIATRY & NEUROLOGY

## 2025-05-20 PROCEDURE — 85730 THROMBOPLASTIN TIME PARTIAL: CPT | Performed by: PSYCHIATRY & NEUROLOGY

## 2025-05-20 PROCEDURE — 99499 UNLISTED E&M SERVICE: CPT | Mod: ,,, | Performed by: PSYCHIATRY & NEUROLOGY

## 2025-05-20 PROCEDURE — 99233 SBSQ HOSP IP/OBS HIGH 50: CPT | Mod: ICN,,, | Performed by: PHYSICIAN ASSISTANT

## 2025-05-20 PROCEDURE — 25000003 PHARM REV CODE 250: Performed by: NURSE PRACTITIONER

## 2025-05-20 PROCEDURE — 99233 SBSQ HOSP IP/OBS HIGH 50: CPT | Mod: ,,, | Performed by: PSYCHIATRY & NEUROLOGY

## 2025-05-20 PROCEDURE — 97112 NEUROMUSCULAR REEDUCATION: CPT

## 2025-05-20 PROCEDURE — 82310 ASSAY OF CALCIUM: CPT

## 2025-05-20 PROCEDURE — 20000000 HC ICU ROOM

## 2025-05-20 PROCEDURE — 63600175 PHARM REV CODE 636 W HCPCS

## 2025-05-20 PROCEDURE — 94761 N-INVAS EAR/PLS OXIMETRY MLT: CPT

## 2025-05-20 PROCEDURE — 99233 SBSQ HOSP IP/OBS HIGH 50: CPT | Mod: ,,, | Performed by: NEUROLOGICAL SURGERY

## 2025-05-20 PROCEDURE — 97535 SELF CARE MNGMENT TRAINING: CPT

## 2025-05-20 PROCEDURE — 85025 COMPLETE CBC W/AUTO DIFF WBC: CPT

## 2025-05-20 PROCEDURE — 85610 PROTHROMBIN TIME: CPT | Performed by: PSYCHIATRY & NEUROLOGY

## 2025-05-20 PROCEDURE — 84100 ASSAY OF PHOSPHORUS: CPT

## 2025-05-20 RX ORDER — AMOXICILLIN 250 MG
2 CAPSULE ORAL 2 TIMES DAILY
Status: DISCONTINUED | OUTPATIENT
Start: 2025-05-20 | End: 2025-05-26 | Stop reason: HOSPADM

## 2025-05-20 RX ORDER — WARFARIN 2.5 MG/1
5 TABLET ORAL DAILY
Status: DISCONTINUED | OUTPATIENT
Start: 2025-05-20 | End: 2025-05-22

## 2025-05-20 RX ORDER — POLYETHYLENE GLYCOL 3350 17 G/17G
17 POWDER, FOR SOLUTION ORAL 2 TIMES DAILY
Status: DISCONTINUED | OUTPATIENT
Start: 2025-05-20 | End: 2025-05-26 | Stop reason: HOSPADM

## 2025-05-20 RX ADMIN — ATORVASTATIN CALCIUM 40 MG: 40 TABLET, FILM COATED ORAL at 08:05

## 2025-05-20 RX ADMIN — VALSARTAN 80 MG: 80 TABLET, FILM COATED ORAL at 08:05

## 2025-05-20 RX ADMIN — SENNOSIDES AND DOCUSATE SODIUM 2 TABLET: 50; 8.6 TABLET ORAL at 09:05

## 2025-05-20 RX ADMIN — PREGABALIN 75 MG: 75 CAPSULE ORAL at 09:05

## 2025-05-20 RX ADMIN — WARFARIN SODIUM 5 MG: 2.5 TABLET ORAL at 05:05

## 2025-05-20 RX ADMIN — QUETIAPINE FUMARATE 50 MG: 25 TABLET ORAL at 09:05

## 2025-05-20 RX ADMIN — SENNOSIDES AND DOCUSATE SODIUM 1 TABLET: 50; 8.6 TABLET ORAL at 08:05

## 2025-05-20 RX ADMIN — NIMODIPINE 60 MG: 30 CAPSULE, LIQUID FILLED ORAL at 05:05

## 2025-05-20 RX ADMIN — QUETIAPINE FUMARATE 50 MG: 25 TABLET ORAL at 08:05

## 2025-05-20 RX ADMIN — NIMODIPINE 60 MG: 30 CAPSULE, LIQUID FILLED ORAL at 10:05

## 2025-05-20 RX ADMIN — PREGABALIN 75 MG: 75 CAPSULE ORAL at 08:05

## 2025-05-20 RX ADMIN — NIMODIPINE 60 MG: 30 CAPSULE, LIQUID FILLED ORAL at 01:05

## 2025-05-20 RX ADMIN — METHOCARBAMOL 750 MG: 750 TABLET ORAL at 08:05

## 2025-05-20 RX ADMIN — VALACYCLOVIR HYDROCHLORIDE 1000 MG: 500 TABLET, FILM COATED ORAL at 08:05

## 2025-05-20 RX ADMIN — NIMODIPINE 60 MG: 30 CAPSULE, LIQUID FILLED ORAL at 09:05

## 2025-05-20 RX ADMIN — POLYETHYLENE GLYCOL 3350 17 G: 17 POWDER, FOR SOLUTION ORAL at 09:05

## 2025-05-20 RX ADMIN — NIMODIPINE 60 MG: 30 CAPSULE, LIQUID FILLED ORAL at 02:05

## 2025-05-20 RX ADMIN — HEPARIN SODIUM AND DEXTROSE 11 UNITS/KG/HR: 10000; 5 INJECTION INTRAVENOUS at 10:05

## 2025-05-20 RX ADMIN — ASPIRIN 81 MG CHEWABLE TABLET 81 MG: 81 TABLET CHEWABLE at 08:05

## 2025-05-20 RX ADMIN — DIAZEPAM 10 MG: 5 TABLET ORAL at 08:05

## 2025-05-20 RX ADMIN — OXYCODONE 5 MG: 5 TABLET ORAL at 07:05

## 2025-05-20 RX ADMIN — OXYCODONE 5 MG: 5 TABLET ORAL at 06:05

## 2025-05-20 RX ADMIN — OXYCODONE 5 MG: 5 TABLET ORAL at 02:05

## 2025-05-20 NOTE — PT/OT/SLP PROGRESS
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 female with an SLP diagnosis of Cognitive-Linguistic Impairment.  She presents with lethargy    Subjective     "My daughter"  Patient goals: did not state     Pain/Comfort:  Pain Rating 1: 7/10  Location 1: head  Pain Addressed 1: Pre-medicate for activity, Nurse notified, Cessation of Activity  Pain Rating Post-Intervention 1: 7/10    Respiratory Status: Room air    Objective:     Has the patient been evaluated by SLP for swallowing?   Yes  Keep patient NPO? No   Current Respiratory Status:        Pt cleared for session. Pt was lethargic and needed frequent cues to remain awake. Nursing reported pt given seroquel this am as well as a pain medication which both make her sleepy. Pt was oriented to month and with cues place and year. She was able to name her children and grandchildren and their ages given increased time. Session ended as pt unable to maintain alertness. Ongoing education provided to pt and daughter re: role of speech tx.      Goals:   Multidisciplinary Problems       SLP Goals          Problem: SLP    Goal Priority Disciplines Outcome   SLP Goal     SLP Progressing   Description: Speech Language Pathology Goals  Goals expected to be met by 5/21/25    1. TOlerate regular diet with thin liquids with no s/s of airway compromise  2.  Assess functional reading and writing skills.    3.  Respond to problem " solving/categorization tasks with 90% accuracy                       Plan:     Patient to be seen:  4 x/week   Plan of Care expires:  06/07/25  Plan of Care reviewed with:  patient, daughter   SLP Follow-Up:  Yes       Discharge recommendations:  High Intensity Therapy   Barriers to Discharge:  None    Time Tracking:     SLP Treatment Date:   05/20/25  Speech Start Time:  1005  Speech Stop Time:  1015     Speech Total Time (min):  10 min    Billable Minutes: Speech Therapy Individual 10    05/20/2025

## 2025-05-20 NOTE — PLAN OF CARE
"[facility] Care Plan  POC reviewed with Waldo Emmanuel and family at 1400. Patient and Family verbalized understanding. Questions and concerns addressed. No acute events today. Pt progressing toward goals. Will continue to monitor. See below and flowsheets for full assessment and VS info.     -EVD removed  -warfarin resumed tonight  -CT scheduled for tomorrow morning  -worked w/ PT/OT  -heparin continues at therapeutic level         Is this a stroke patient? yes- Stroke booklet reviewed with family and is at bedside.   Care Plan Individualization:     Neuro:  Roro Coma Scale  Best Eye Response: 4-->(E4) spontaneous  Best Motor Response: 6-->(M6) obeys commands  Best Verbal Response: 5-->(V5) oriented  [place] Coma Scale Score: 15  Assessment Qualifiers: no eye obstruction present  Pupil PERRLA: yes     24 hr Temp:  [97.7 °F (36.5 °C)-98.3 °F (36.8 °C)]     CV:   Rhythm: normal sinus rhythm  BP goals:   SBP < 160  MAP > 65    Resp:      Vent Mode: Spont  Set Rate: 20 BPM  Oxygen Concentration (%): 40  Vt Set: 375 mL  PEEP/CPAP: 5 cmH20  Pressure Support: 5 cmH20    Plan: N/A    GI/:     Diet/Nutrition Received: regular  Last Bowel Movement: 05/16/25  Voiding Characteristics: voids spontaneously without difficulty    Intake/Output Summary (Last 24 hours) at 5/20/2025 1435  Last data filed at 5/20/2025 1401  Gross per 24 hour   Intake 1233.35 ml   Output 1100 ml   Net 133.35 ml     Unmeasured Output  Unmeasured Urine Occurrence: 1  Unmeasured Stool Occurrence: 0  Pad Count: 1    Labs/Accuchecks:  Recent Labs   Lab 05/20/25  0124   WBC 12.66   RBC 3.12*   HGB 8.4*   HCT 29.7*   *      Recent Labs   Lab 05/20/25  0124      K 3.9   CO2 25      BUN 13   CREATININE 0.7   ALKPHOS 95   ALT 22   AST 26   BILITOT 0.3      Recent Labs   Lab 05/20/25  0124   PROTIME 11.7   INR 1.1   APTT 39.5*    No results for input(s): "CPK", "CPKMB", "TROPONINI", "MB" in the last 168 hours.    Electrolytes: N/A - " electrolytes WDL  Accuchecks: none    Gtts:   dexmedeTOMIDine (Precedex) infusion (titrating)  0-0.6 mcg/kg/hr Intravenous Continuous   Stopped at 25 1051    heparin (porcine) in D5W  0-40 Units/kg/hr Intravenous Continuous 11 mL/hr at 25 1401 11 Units/kg/hr at 25 1401       LDA/Wounds:    Ivan Risk Assessment  Sensory Perception: 4-->no impairment  Moisture: 4-->rarely moist  Activity: 1-->bedfast  Mobility: 3-->slightly limited  Nutrition: 3-->adequate  Friction and Shear: 2-->potential problem  Ivan Score: 17    Is your ivan score 12 or less? no            Restraints:   Restraint Order  Length of Order: Order good for next 24 hours or when removed.  Date that the current order will : 05/15/25  Time that the current order will : 1151  Order Upon Application: Yes    [facility]

## 2025-05-20 NOTE — PROGRESS NOTES
"[facility]  Vascular Neurology  Comprehensive Stroke Center  Progress Note    Assessment/Plan:     * Nontraumatic subarachnoid hemorrhage from cerebellar artery  Ms. 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 [place] [facility] [place] DSA performed 5/6/25 demonstrated the above aneurysm, which was secured with coiling. MRI brain showed large R cerebellar acute/recent infarct, suspected to be likely periprocedural. MRA completed, no plans for open clipping currently. TTE EF 6570%, no PFO/LAE/WMA.    NAEON. Neuro exam stable. TCD negative for spasm. EVD removed. HA resolved. Repeat CTH pending.       Antithrombotics for secondary stroke prevention: Antiplatelets: Aspirin: 81 mg daily  Anticoagulants: Heparin protocol without bolus, Home Coumadin added 5/20    Statins for secondary stroke prevention and hyperlipidemia, if present: Statins: Atorvastatin- 40 mg daily    Aggressive risk factor modification: HTN, HLD, Diet, Exercise, Obesity     Rehab efforts: The patient has been evaluated by a stroke team provider and the therapy needs have been fully considered based off the presenting complaints and exam findings. The following therapy evaluations are needed: PT evaluate and treat, OT evaluate and treat, SLP evaluate and treat    Diagnostics ordered/pending: None     VTE prophylaxis: Mechanical prophylaxis: Place SCDs  Heparin drip    BP parameters: SAH: Secured aneurysm, no target, increase BP to prevent vasospasm if needed       Cerebellar stroke, acute  -- See plan for SAH        History of DVT in adulthood  Home Coumadin added 5/20    Obstructive hydrocephalus  NSGY following  EVD removed 5/20    Brain aneurysm  See "SAH"    Essential hypertension  -Stroke risk factor  -SBP goal No target, " increase BP to prevent vasospasm, maintain MAP >65  -BP range in the last 24 hrs: BP  Min: 92/66  Max: 172/81  -Current antihypertensive regimen:   --PRN labetalol/hydralazine  --valsartan 80mg QD         5/16/2025 s/p EVD and DSA with coil embolization. Imaging obtained on 5/11 and significant for R cerebellar infarct , likely periprocedural. MRA completed yesterday, no plans for open clipping currently. Patient complaining of ear pain and headache. Remains with EVD on precedex.   5-17-25 EVD in place, on Precedex, R brachial DVT on ASA and heparin drip  05/18/2025 NAEON. Agitation management continues with precedex.   5/19/2025 NAEON, neuro exam stable. EVD clamped today, repeat CTH in AM. On precedex overnight for agitation, off since 1051.  05/20/2025 NAEON. Neuro exam stable. TCD negative for spasm. EVD removed. HA resolved. Repeat CTH pending.     STROKE DOCUMENTATION        NIH Scale:  1a. Level of Consciousness: 0-->Alert, keenly responsive  1b. LOC Questions: 0-->Answers both questions correctly  1c. LOC Commands: 0-->Performs both tasks correctly  2. Best Gaze: 0-->Normal  3. Visual: 0-->No visual loss  4. Facial Palsy: 0-->Normal symmetrical movements  5a. Motor Arm, Left: 0-->No drift, limb holds 90 (or 45) degrees for full 10 secs  5b. Motor Arm, Right: 0-->No drift, limb holds 90 (or 45) degrees for full 10 secs  6a. Motor Leg, Left: 0-->No drift, leg holds 30 degree position for full 5 secs  6b. Motor Leg, Right: 0-->No drift, leg holds 30 degree position for full 5 secs  7. Limb Ataxia: 0-->Absent  8. Sensory: 0-->Normal, no sensory loss  9. Best Language: 0-->No aphasia, normal  10. Dysarthria: 0-->Normal  11. Extinction and Inattention (formerly Neglect): 0-->No abnormality  Total (NIH Stroke Scale): 0       Modified [place]    [place] Coma Scale:14   ABCD2 Score:    [id number] Score:   HAS -BLED Score:   ICH Score:   Hunt & Castillo Classification:      Hemorrhagic change of an Ischemic Stroke: Does  this patient have an ischemic stroke with hemorrhagic changes? No     Neurologic Chief Complaint: SAH    Subjective:     Interval History: Patient is seen for follow-up neurological assessment and treatment recommendations: See hospital course.     HPI, Past Medical, Family, and Social History remains the same as documented in the initial encounter.     Review of Systems   Constitutional:  Negative for chills and fever.   Eyes:  Negative for visual disturbance.   Neurological:  Negative for speech difficulty, weakness, numbness and headaches.   All other systems reviewed and are negative.    Scheduled Meds:   aspirin  81 mg Oral Daily    atorvastatin  40 mg Oral Daily    niMODipine  60 mg Oral Q4H    polyethylene glycol  17 g Oral BID    pregabalin  75 mg Oral BID    QUEtiapine  50 mg Oral QHS    senna-docusate  2 tablet Oral BID    valACYclovir  1,000 mg Oral Daily    valsartan  80 mg Oral Daily    warfarin  5 mg Oral Daily     Continuous Infusions:   dexmedeTOMIDine (Precedex) infusion (titrating)  0-0.6 mcg/kg/hr Intravenous Continuous   Stopped at 05/19/25 1051    heparin (porcine) in D5W  0-40 Units/kg/hr Intravenous Continuous 11 mL/hr at 05/20/25 1501 11 Units/kg/hr at 05/20/25 1501     PRN Meds:  Current Facility-Administered Medications:     acetaminophen, 650 mg, Oral, Q6H PRN    bisacodyL, 10 mg, Rectal, Daily PRN    diazePAM, 10 mg, Oral, Q6H PRN    hydrALAZINE, 10 mg, Intravenous, Q4H PRN    labetalol, 10 mg, Intravenous, Q4H PRN    magnesium oxide, 800 mg, Oral, PRN    magnesium oxide, 800 mg, Oral, PRN    melatonin, 6 mg, Oral, Nightly PRN    methocarbamoL, 750 mg, Oral, Q6H PRN    oxyCODONE, 5 mg, Oral, Q4H PRN    potassium bicarbonate, 35 mEq, Oral, PRN    potassium bicarbonate, 50 mEq, Oral, PRN    potassium bicarbonate, 60 mEq, Oral, PRN    potassium, sodium phosphates, 2 packet, Oral, PRN    potassium, sodium phosphates, 2 packet, Oral, PRN    potassium, sodium phosphates, 2 packet, Oral, PRN     sodium chloride 0.9%, 10 mL, Intravenous, PRN    Objective:     Vital Signs (Most Recent):  Temp: 98.2 °F (36.8 °C) (05/20/25 1501)  Pulse: 83 (05/20/25 1501)  Resp: (!) 25 (05/20/25 1501)  BP: (!) 127/58 (05/20/25 1501)  SpO2: 97 % (05/20/25 1501)  BP Location: Right arm    Vital Signs Range (Last 24H):  Temp:  [97.7 °F (36.5 °C)-98.3 °F (36.8 °C)]   Pulse:  []   Resp:  [15-37]   BP: ()/(58-93)   SpO2:  [88 %-100 %]   BP Location: Right arm       Physical Exam  Vitals and nursing note reviewed.   Constitutional:       General: She is sleeping. She is not in acute distress.     Appearance: Normal appearance.   HENT:      Head: Normocephalic.      Nose: Nose normal.   Eyes:      Extraocular Movements: Extraocular movements intact.      Conjunctiva/sclera: Conjunctivae normal.   Cardiovascular:      Rate and Rhythm: Normal rate.   Pulmonary:      Effort: Pulmonary effort is normal. No respiratory distress.   Skin:     General: Skin is warm.   Neurological:      Mental Status: She is oriented to person, place, and time and easily aroused.      Motor: No weakness.      Comments: See below for neuro exam   Psychiatric:         Behavior: Behavior normal. Behavior is cooperative.              Neurological Exam:   LOC: alert  Attention Span: Good   Language: No aphasia  Articulation: No dysarthria  Orientation: Person, Place, Time   Facial Movement (CN VII): Symmetric facial expression    Motor: Arm left  Normal 5/5  Leg left  Normal 5/5  Arm right  Normal 5/5  Leg right Normal 5/5  Sensation: Intact to light touch, temperature and vibration    Laboratory:  CMP:   Recent Labs   Lab 05/20/25  0124   CALCIUM 9.3   ALBUMIN 2.8*   PROT 7.0      K 3.9   CO2 25      BUN 13   CREATININE 0.7   ALKPHOS 95   ALT 22   AST 26   BILITOT 0.3     BMP:   Recent Labs   Lab 05/20/25  0124      K 3.9      CO2 25   BUN 13   CREATININE 0.7   CALCIUM 9.3     CBC:   Recent Labs   Lab 05/20/25  0124   WBC 12.66    RBC 3.12*   HGB 8.4*   HCT 29.7*   *   MCV 95   MCH 26.9*   MCHC 28.3*     Coagulation:   Recent Labs   Lab 05/20/25  0124   INR 1.1   APTT 39.5*       Diagnostic Results     Brain/Vessel Imaging  CTH without contrast 5/19/2025  Impression:  No evidence of acute detrimental change when compared to prior CT performed 05/16/2025.  Improving mass effect within posterior fossa in this patient with of all vein right cerebellar infarct.  There has been interval re-expansion of the 4th ventricle.  Grossly unchanged size and configuration of the lateral and 3rd ventricles.    CTH without 05/16/2025  Impression:  Compared to prior MR and CT imaging of 05/11/2025, continued maturation of large recent infarction involving the right cerebellum, as discussed.  Similar mass effect in the posterior fossa with essentially unchanged size and configuration of the lateral 3rd ventricles.  No new transependymal CSF egress.  CT  Continued decreased visualization of existing subarachnoid hemorrhage.  No new or enlarging areas of intracranial hemorrhage appreciated.    MRI Brain w/wo 05/15/2025  Impression:  Status post coiling of a PICA aneurysm.  No evidence of residual aneurysm filling.  No high-grade stenosis or other aneurysm identified.    MRI Brain without 05/11/2025  Impression:  Allowing for significant distortion examination by artifact from dental metal there is large area acute/recent infarction within the right cerebellum with probable small component in the right paramedian micah.  Please note this is near completely obscured on diffusion and gradient imaging secondary artifact from dental metal.  Mass effect from the area of infarction effacing the 4th ventricle with stable caliber lateral and 3rd ventricles without hydrocephalus with stable right frontal ventricular catheter.  There is T1 hyperintensity within the region of the right superior cerebellar peduncle corresponding to hyperdensity seen on CT concerning  for component of hemorrhagic conversion.  Scattered small volume subarachnoid hemorrhage cerebral sulci.  Clinical correlation and continued follow-up recommended.    CTH without 05/11/2025  Impression:  1. Compared to prior head CT performed 05/06/2025, 20:13 hours, evolving relatively large right cerebellar infarct without macroscopic hemorrhage within the infarct territory.  Questionable hypoattenuation involving the brainstem which may be artifactual given coil artifact, however additional areas of ischemia not excluded.  MRI may be considered for further characterization.  Further effacement of the 4th ventricle since the prior study, however there has been slight decompression of the lateral and 3rd ventricles since the prior exam.  2. Relatively similar subarachnoid and interventricular hemorrhage.  No new or enlarging areas of intracranial hemorrhage appreciated.  This report was flagged in Epic as abnormal.    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  Evolving scattered subarachnoid and intraventricular hemorrhage.  No definite new hemorrhage  Stable right frontal coursing ventricular catheter with slight reduced caliber of the ventricles without evidence for worsening hydrocephalus.  Continued slight crowding cerebral sulci and basilar cisterns with small caliber 4th ventricle.  Question component of inferior extension of the cerebellar tonsils below the foramen magnum.  This could be better assessed with MR imaging if patient compatible.  No significant new abnormal parenchymal attenuation.  Clinical correlation and further evaluation with MRI as warranted.    CTH without 05/06/2025 0949  Impression:  Subarachnoid and intraventricular hemorrhage, similar to recent exam.  Interval placement of right frontal EVD without apparent intracranial complication.  Ventricles remain mildly dilated.    CTA Head and Neck 05/06/2025  Impression:  8.6  mm aneurysm arising off the distal right anterior inferior cerebellar artery.  Distal location suspicious for mycotic aneurysm.  No intracranial large vessel occlusion or hemodynamically significant stenosis.  The cervical carotid and vertebral arteries are patent without hemodynamically significant stenosis.  Right upper lobe, right middle lobe and left lower lobe consolidative opacities.  Endotracheal tube terminates just superior to the charis recommend slight retraction.     CTH without 05/05/2025  Impression:  Subarachnoid hemorrhage.  Moderate volume of intraventricular hemorrhage.  Mild hydrocephalus.  Recommend CTA.  Findings reported by [facility] radiologist at 01:06  All CT scans at this facility use dose modulation, iterative reconstruction, and/or weight based dosing when appropriate to reduce radiation dose to as low as reasonable achievable.      Cardiac Imaging   TTE 05/07/2025    Left Ventricle: The left ventricle is normal in size. Normal wall thickness. Normal wall motion. There is normal systolic function with a visually estimated ejection fraction of 65 - 70%. Ejection fraction is approximately 68%. There is indeterminate diastolic function.    Right Ventricle: The right ventricle is normal in size. Wall thickness is normal. Systolic function is normal.    Left Atrium: Agitated saline study of the atrial septum is negative after vasalva maneuver, suggesting absence of intracardiac shunt at the atrial level. No patent foramen ovale confirmed by agitated saline contrast.    Tricuspid Valve: There is mild to moderate regurgitation.    IVC/SVC: Patient is ventilated, cannot use IVC diameter to estimate right atrial pressure.       Diamond Zuñiga NP  [facility]  Department of Vascular Neurology   [facility] - Neuro Critical Care

## 2025-05-20 NOTE — ASSESSMENT & PLAN NOTE
CT on 5/11 revealed R cerebellar infarct. Likely uriah-angio  Family aware and imaging reviewed  Atorvastatin 40  ASA 81 mg daily  Heparin infusion, transition to warfarin today  Concern for brain compression in post fossa   NSGY following

## 2025-05-20 NOTE — PROGRESS NOTES
[facility] - Neuro Critical Care  Neurosurgery  Progress Note    Subjective:     History of Present Illness: 49f presenting after syncope with CTH demonstrating SAH. CTA without clear vascular malformation. Intubated prior to arrival to [facility] ED. Discussed expected hospital course and imaging with daughter in room    Post-Op Info:  Procedure(s) (LRB):  MRI (Magnetic Resonance Imagine) (N/A)   5 Days Post-Op   Interval History: 5/20 naeon, exam stable, EVD clamped over 24 hours with stable ICP and CT. Will remove today    Medications:  Continuous Infusions:   dexmedeTOMIDine (Precedex) infusion (titrating)  0-0.6 mcg/kg/hr Intravenous Continuous   Stopped at 05/19/25 1051    heparin (porcine) in D5W  0-40 Units/kg/hr Intravenous Continuous 11 mL/hr at 05/20/25 0602 11 Units/kg/hr at 05/20/25 0602     Scheduled Meds:   aspirin  81 mg Oral Daily    atorvastatin  40 mg Oral Daily    niMODipine  60 mg Oral Q4H    polyethylene glycol  17 g Oral Daily    pregabalin  75 mg Oral BID    QUEtiapine  50 mg Oral Daily    QUEtiapine  50 mg Oral QHS    senna-docusate  1 tablet Oral Daily    valACYclovir  1,000 mg Oral Daily    valsartan  80 mg Oral Daily     PRN Meds:  Current Facility-Administered Medications:     acetaminophen, 650 mg, Oral, Q6H PRN    bisacodyL, 10 mg, Rectal, Daily PRN    diazePAM, 10 mg, Oral, Q6H PRN    hydrALAZINE, 10 mg, Intravenous, Q4H PRN    labetalol, 10 mg, Intravenous, Q4H PRN    magnesium oxide, 800 mg, Oral, PRN    magnesium oxide, 800 mg, Oral, PRN    melatonin, 6 mg, Oral, Nightly PRN    methocarbamoL, 750 mg, Oral, Q6H PRN    oxyCODONE, 5 mg, Oral, Q4H PRN    potassium bicarbonate, 35 mEq, Oral, PRN    potassium bicarbonate, 50 mEq, Oral, PRN    potassium bicarbonate, 60 mEq, Oral, PRN    potassium, sodium phosphates, 2 packet, Oral, PRN    potassium, sodium phosphates, 2 packet, Oral, PRN    potassium, sodium phosphates, 2 packet, Oral, PRN    sodium chloride 0.9%, 10 mL, Intravenous, PRN      Review of Systems  Objective:     Weight: 100.2 kg (220 lb 14.4 oz)  Body mass index is 39.13 kg/m².  Vital Signs (Most Recent):  Temp: 97.9 °F (36.6 °C) (05/20/25 0302)  Pulse: 92 (05/20/25 0647)  Resp: (!) 25 (05/20/25 0647)  BP: (!) 148/65 (05/20/25 0647)  SpO2: (!) 92 % (05/20/25 0647) Vital Signs (24h Range):  Temp:  [97.7 °F (36.5 °C)-98.2 °F (36.8 °C)] 97.9 °F (36.6 °C)  Pulse:  [] 92  Resp:  [9-37] 25  SpO2:  [88 %-98 %] 92 %  BP: ()/(55-93) 148/65                                  ICP/Ventriculostomy 05/06/25 0920 Right Parietal region (Active)   Level of Ventriculostomy (cm above) 10 05/15/25 0301   Status Open to drainage 05/16/25 0701   Site Assessment Clean;Dry 05/16/25 0701   Site Drainage No drainage 05/16/25 0701   Waveform normal waveform 05/15/25 1901   Output (mL) 19 mL 05/16/25 1001   CSF Color yellow 05/16/25 0701   Dressing Status Clean;Dry;Intact 05/16/25 0701   Interventions HOB degrees;level adjusted per order;zeroed 05/16/25 0701       Female External Urinary Catheter w/ Suction 05/07/25 1732 (Active)   Skin no redness;no breakdown;perineum cleansed w/ soap and water;female external urine collection device repositioned 05/16/25 0701   Tolerance no signs/symptoms of discomfort 05/16/25 0701   Suction Continuous suction at 70 mmHg 05/15/25 1901   Date of last wick change 05/15/25 05/15/25 1901   Time of last wick change 0801 05/15/25 0701   Output (mL) 550 mL 05/16/25 0801          Physical Exam         Neurosurgery Physical Exam  [id number]  alert, Ox4  Cni  Follows commands x4 grossly full strength throughout  SILT  R dysmetria     EVD Incision dressed CDI     Significant Labs:  Recent Labs   Lab 05/19/25  0157 05/20/25  0124   * 125*    141   K 4.2 3.9    108   CO2 25 25   BUN 15 13   CREATININE 0.6 0.7   CALCIUM 9.1 9.3   MG 2.0 2.0     Recent Labs   Lab 05/19/25  0157 05/20/25  0124   WBC 15.87* 12.66   HGB 8.3* 8.4*   HCT 27.6* 29.7*   * 471*      Recent Labs   Lab 05/18/25 2015 05/19/25  0157 05/20/25  0124   APTT 39.9* 39.9* 39.5*     Microbiology Results (last 7 days)       Procedure Component Value Units Date/Time    CSF culture [[id number]] Collected: 05/19/25 0948    Order Status: Completed Specimen: CSF (Spinal Fluid) from CSF Tap, Tube 3 Updated: 05/19/25 1822     GRAM STAIN Cytospin indicates:      No WBCs      No organisms seen    Gram stain [[id number]] Collected: 05/19/25 0948    Order Status: Canceled Specimen: CSF (Spinal Fluid) from CSF Tap, Tube 3 Updated: 05/19/25 1411    CSF culture [[id number]] Collected: 05/12/25 1729    Order Status: Completed Specimen: CSF (Spinal Fluid) from CSF Tap, Tube 3 Updated: 05/18/25 0703     CULTURE, CSF No Growth     GRAM STAIN Rare WBC seen      No organisms seen    Culture, Respiratory with Gram Stain [[id number]]  (Abnormal)  (Susceptibility) Collected: 05/10/25 1120    Order Status: Completed Specimen: Respiratory from Sputum, Expectorated Updated: 05/13/25 0946     Respiratory Culture No S aureus or Pseudomonas isolated      Few KLEBSIELLA AEROGENES     Comment: with normal respiratory chichi        GRAM STAIN <10 Epithelial Cells/LPF      Rare WBC seen      Many Gram positive cocci      Many Gram Negative Rods    Culture, Respiratory with Gram Stain [[id number]]  (Abnormal)  (Susceptibility) Collected: 05/10/25 2035    Order Status: Completed Specimen: Respiratory from Sputum Updated: 05/13/25 0945     Respiratory Culture No S aureus or Pseudomonas isolated      Few KLEBSIELLA AEROGENES     Comment: with normal respiratory chichi        GRAM STAIN <10 Epithelial Cells/LPF      Rare WBC seen      Many Gram positive cocci      Moderate Gram Negative Rods          All pertinent labs from the last 24 hours have been reviewed.    Significant Diagnostics:  CT: CT Head Without Contrast  Result Date: 5/16/2025  Compared to prior MR and CT imaging of 05/11/2025, continued maturation of large recent infarction  involving the right cerebellum, as discussed. Similar mass effect in the posterior fossa with essentially unchanged size and configuration of the lateral 3rd ventricles.  No new transependymal CSF egress.  CT Continued decreased visualization of existing subarachnoid hemorrhage.  No new or enlarging areas of intracranial hemorrhage appreciated. Electronically signed by: Mich Downs Date:    05/16/2025 Time:    07:03    I have reviewed all pertinent imaging results/findings within the past 24 hours.  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 CTA with AICA aneurysm R. Intubated prior to arrival to [facility] ED.     S/p R frontal EVD on 5/6 and s/p DSA with coil embolization of R AICA aneurysm 5/6    Plan:  Admitted to ICU  Daily ASA 81  No dvt in BLE on ultrasound, R brachial vein dvt+ - on Asa/ hep gtt   MRA completed 5/15 without any residual aneurysmal filling.  MRI 5/11 with R Cb and parmaedian pontine small infarct   EVD clamp and CTH stable. Will remove today  SAH protocol (euvolemia, SBP liberalized, nimotop, keppra, TCDs)    Discussed with Dr. Barney Almendarez MD  Neurosurgery  [facility] - Neuro Critical Care

## 2025-05-20 NOTE — ASSESSMENT & PLAN NOTE
Patient is a 49-year-old female with past medical history of hypertension presenting via transfer from [place] for higher level of care after being diagnosed with subarachnoid hemorrhage.    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  S/p EVD 5/6  S/p Coil emolization 5/6  CTH 5/11 with new R PICA/ superior cerebellar infarct.    MRA 5/16 without evidence of residual aneurysm filling    - EVD open at 10-->20  - Neurosurgery, vascular neurology consulted  - SBP< 160   - nimodipine 60 mg q4h   - daily TCDs - negative for vasospasm thus far  - Aspirin 81 and Plavix 75 qd  - atorvastatin 40 mg  - euvolemia  - SCDs; lovenox for VTE ppx  - Delirium precautions.   - Precedex gtt   - PT/OT  EVD removed 5/21  CTH overnight

## 2025-05-20 NOTE — PLAN OF CARE
[facility] has been accepted to [facility]. Patient and daughter are agreeable. Patient and daughter are aware of and agreeable to the 50% responsibility of the bill post rehab. CM team to send [facility] updated clinicals and follow.     11:54 AM  [facility] called and stated they just realized patients insurance is out of network.     11:56 AM  [facility] accepted   [facility] accepted  Pending medical clearance and decision from family.

## 2025-05-20 NOTE — EICU
IKE Night Rounds Checklist  24H Vital Sign Range:  Temp:  [97.7 °F (36.5 °C)-98.2 °F (36.8 °C)]   Pulse:  []   Resp:  [9-37]   BP: ()/(55-93)   SpO2:  [93 %-98 %]     Video rounds

## 2025-05-20 NOTE — ASSESSMENT & PLAN NOTE
49f presenting after syncope with CTH demonstrating SAH. CTA with AICA aneurysm R. Intubated prior to arrival to [facility] ED.     S/p R frontal EVD on 5/6 and s/p DSA with coil embolization of R AICA aneurysm 5/6    Plan:  Admitted to ICU  Daily ASA 81  No dvt in BLE on ultrasound, R brachial vein dvt+ - on Asa/ hep gtt   MRA completed 5/15 without any residual aneurysmal filling.  MRI 5/11 with R Cb and parmaedian pontine small infarct   EVD clamp and CTH stable. Will remove today  SAH protocol (euvolemia, SBP liberalized, nimotop, keppra, TCDs)    Discussed with Dr. Corley

## 2025-05-20 NOTE — SUBJECTIVE & OBJECTIVE
Interval History: 5/20 naeon, exam stable, EVD clamped over 24 hours with stable ICP and CT. Will remove today    Medications:  Continuous Infusions:   dexmedeTOMIDine (Precedex) infusion (titrating)  0-0.6 mcg/kg/hr Intravenous Continuous   Stopped at 05/19/25 1051    heparin (porcine) in D5W  0-40 Units/kg/hr Intravenous Continuous 11 mL/hr at 05/20/25 0602 11 Units/kg/hr at 05/20/25 0602     Scheduled Meds:   aspirin  81 mg Oral Daily    atorvastatin  40 mg Oral Daily    niMODipine  60 mg Oral Q4H    polyethylene glycol  17 g Oral Daily    pregabalin  75 mg Oral BID    QUEtiapine  50 mg Oral Daily    QUEtiapine  50 mg Oral QHS    senna-docusate  1 tablet Oral Daily    valACYclovir  1,000 mg Oral Daily    valsartan  80 mg Oral Daily     PRN Meds:  Current Facility-Administered Medications:     acetaminophen, 650 mg, Oral, Q6H PRN    bisacodyL, 10 mg, Rectal, Daily PRN    diazePAM, 10 mg, Oral, Q6H PRN    hydrALAZINE, 10 mg, Intravenous, Q4H PRN    labetalol, 10 mg, Intravenous, Q4H PRN    magnesium oxide, 800 mg, Oral, PRN    magnesium oxide, 800 mg, Oral, PRN    melatonin, 6 mg, Oral, Nightly PRN    methocarbamoL, 750 mg, Oral, Q6H PRN    oxyCODONE, 5 mg, Oral, Q4H PRN    potassium bicarbonate, 35 mEq, Oral, PRN    potassium bicarbonate, 50 mEq, Oral, PRN    potassium bicarbonate, 60 mEq, Oral, PRN    potassium, sodium phosphates, 2 packet, Oral, PRN    potassium, sodium phosphates, 2 packet, Oral, PRN    potassium, sodium phosphates, 2 packet, Oral, PRN    sodium chloride 0.9%, 10 mL, Intravenous, PRN     Review of Systems  Objective:     Weight: 100.2 kg (220 lb 14.4 oz)  Body mass index is 39.13 kg/m².  Vital Signs (Most Recent):  Temp: 97.9 °F (36.6 °C) (05/20/25 0302)  Pulse: 92 (05/20/25 0647)  Resp: (!) 25 (05/20/25 0647)  BP: (!) 148/65 (05/20/25 0647)  SpO2: (!) 92 % (05/20/25 0647) Vital Signs (24h Range):  Temp:  [97.7 °F (36.5 °C)-98.2 °F (36.8 °C)] 97.9 °F (36.6 °C)  Pulse:  [] 92  Resp:   [9-37] 25  SpO2:  [88 %-98 %] 92 %  BP: ()/(55-93) 148/65                                  ICP/Ventriculostomy 05/06/25 0920 Right Parietal region (Active)   Level of Ventriculostomy (cm above) 10 05/15/25 0301   Status Open to drainage 05/16/25 0701   Site Assessment Clean;Dry 05/16/25 0701   Site Drainage No drainage 05/16/25 0701   Waveform normal waveform 05/15/25 1901   Output (mL) 19 mL 05/16/25 1001   CSF Color yellow 05/16/25 0701   Dressing Status Clean;Dry;Intact 05/16/25 0701   Interventions HOB degrees;level adjusted per order;zeroed 05/16/25 0701       Female External Urinary Catheter w/ Suction 05/07/25 1732 (Active)   Skin no redness;no breakdown;perineum cleansed w/ soap and water;female external urine collection device repositioned 05/16/25 0701   Tolerance no signs/symptoms of discomfort 05/16/25 0701   Suction Continuous suction at 70 mmHg 05/15/25 1901   Date of last wick change 05/15/25 05/15/25 1901   Time of last wick change 0801 05/15/25 0701   Output (mL) 550 mL 05/16/25 0801          Physical Exam         Neurosurgery Physical Exam  [id number]  alert, Ox4  Cni  Follows commands x4 grossly full strength throughout  SILT  R dysmetria     EVD Incision dressed CDI     Significant Labs:  Recent Labs   Lab 05/19/25 0157 05/20/25  0124   * 125*    141   K 4.2 3.9    108   CO2 25 25   BUN 15 13   CREATININE 0.6 0.7   CALCIUM 9.1 9.3   MG 2.0 2.0     Recent Labs   Lab 05/19/25 0157 05/20/25  0124   WBC 15.87* 12.66   HGB 8.3* 8.4*   HCT 27.6* 29.7*   * 471*     Recent Labs   Lab 05/18/25 2015 05/19/25 0157 05/20/25  0124   APTT 39.9* 39.9* 39.5*     Microbiology Results (last 7 days)       Procedure Component Value Units Date/Time    CSF culture [[id number]] Collected: 05/19/25 0948    Order Status: Completed Specimen: CSF (Spinal Fluid) from CSF Tap, Tube 3 Updated: 05/19/25 1827     GRAM STAIN Cytospin indicates:      No WBCs      No organisms seen    Gram stain  [[id number]] Collected: 05/19/25 0948    Order Status: Canceled Specimen: CSF (Spinal Fluid) from CSF Tap, Tube 3 Updated: 05/19/25 1411    CSF culture [[id number]] Collected: 05/12/25 1729    Order Status: Completed Specimen: CSF (Spinal Fluid) from CSF Tap, Tube 3 Updated: 05/18/25 0703     CULTURE, CSF No Growth     GRAM STAIN Rare WBC seen      No organisms seen    Culture, Respiratory with Gram Stain [[id number]]  (Abnormal)  (Susceptibility) Collected: 05/10/25 1120    Order Status: Completed Specimen: Respiratory from Sputum, Expectorated Updated: 05/13/25 0946     Respiratory Culture No S aureus or Pseudomonas isolated      Few KLEBSIELLA AEROGENES     Comment: with normal respiratory chichi        GRAM STAIN <10 Epithelial Cells/LPF      Rare WBC seen      Many Gram positive cocci      Many Gram Negative Rods    Culture, Respiratory with Gram Stain [[id number]]  (Abnormal)  (Susceptibility) Collected: 05/10/25 2035    Order Status: Completed Specimen: Respiratory from Sputum Updated: 05/13/25 0945     Respiratory Culture No S aureus or Pseudomonas isolated      Few KLEBSIELLA AEROGENES     Comment: with normal respiratory chichi        GRAM STAIN <10 Epithelial Cells/LPF      Rare WBC seen      Many Gram positive cocci      Moderate Gram Negative Rods          All pertinent labs from the last 24 hours have been reviewed.    Significant Diagnostics:  CT: CT Head Without Contrast  Result Date: 5/16/2025  Compared to prior MR and CT imaging of 05/11/2025, continued maturation of large recent infarction involving the right cerebellum, as discussed. Similar mass effect in the posterior fossa with essentially unchanged size and configuration of the lateral 3rd ventricles.  No new transependymal CSF egress.  CT Continued decreased visualization of existing subarachnoid hemorrhage.  No new or enlarging areas of intracranial hemorrhage appreciated. Electronically signed by: Mich Downs Date:    05/16/2025  Time:    07:03    I have reviewed all pertinent imaging results/findings within the past 24 hours.

## 2025-05-20 NOTE — PROGRESS NOTES
[facility] - Neuro Critical Care  Neurocritical Care  Progress Note    Admit Date: 5/6/2025  Service Date: 05/20/2025  Length of Stay: 14    Subjective:     Chief Complaint: Nontraumatic subarachnoid hemorrhage from other intracranial arteries    History of Present Illness: History obtained via chart review and daughter at bedside as patient intubated on arrival.     Per ED note:  49 y.o. female, PMH HTN and anemia,  presenting as a transfer for neurosurgical evaluation with newly diagnosed ICH from outside hospital.  Patient was transferred from [facility].  Daughter at bedside helps provide history.  She states that she was at a grocery store when she had a syncopal event.  She was unsure if she hit her head.  She states that people on the scene called her to let her know what happened and after she was seen in the ED they told her that she had bleeding inside of her head.  She states that while in the ED her mother was answering questions appropriately and acting like her normal self except frequently falling asleep.  Patient was intubated for airway protection prior to arrival.  Patient and reversal with [facility] prior to transfer.  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 Distal location suspicious for mycotic aneurysm     On arrival to [facility]: Neursurgery consulted, EVD placed and admitted to neurocritical care    Hospital Course: 5/7/2025: extubated today to NC, SBP liberalized to 220, d/c jay cath,   5/8/2025: EVD per NSSGY, TCD's no vasospasm, DVT prophylaxis initiated, pending to hear from NSGY team when ok to start AC  05/09/2025: repeat CXR today, plan for CT chest, resp cx , add IS, EVD @10 per NSGY, TCDs today no vasospasm  5/10: Discussed with Dr. Chase and Neurosurgery, Heparin gtt started for R brachial DVT and hx arterial occlusion, will need repeat CTH when therapeutic. Hold off on  transitioning to Coumadin until cleared by NSGY. CT Chest negative for DVT, but concerning for PNA. Repeat sputum cx sent. Regular diet started. Repeat Na improved to 138.   5/11: right cerebellar CVA on imaging, HTS, MRI, family updated at bedside, place andreia, hold heparin gtt   05/12/2025 EVD @ 10. SOC watch. PBD7- plan for MRA c/s contrast for reevaluation of coiled aneurysm. Dc keppra. TCDs negative for spasm. Given 500NS for euvolemia. Given 3% HTS 250ml bolus and continue 2% HTS gtt @ 75. Na checks q6h for goal > 145. Started valsartan 80. Lower extremity arterial US ordered given hx RLE arterial occlusion, plan to restart heparin gtt pending results. SLP consult for diet, plan for FEES tomorrow. Started lovenox. Started seoquel 25qHS. Resp cx w GNR, many GPCs- has low grade temps possibly 2/2 DVT and stable mild leukocytosis so will hold off on abx for now.   05/13/2025 Delirium overnight requiring zyprexa x2. Restarted on precedex today and increased seroquel to 50qHS. Required 500NS bolus for hypotension after zyprexa last night. RLE arterial US negative for occlusion, only showing moderate stenosis. Decided not to continue heparin gtt given brachial DVT is in upper extremity. Will start plavix for DAPT. Respiratory cx w Klebsiella aerogenes x2, but not symptomatic. Transitioned from Ancef to cefepime. Bedside FEES completed, started regular thin diet. Given 3%  bolus x2 for Na goal > 145 and remains on 2% HTS @ 75. Prn valium for neck and back pain.   Pt has R brachial DVT and LUE 2% gtt infiltrated PIV and given hyaluronidase. L femoral CVC placed as pt had no access.   05/14/2025 Given 3%HTS bolus for Na > 145. TCDs negative for spasm. CXR reviewed. Increase cefepime course to 7d. Unable to obtain MRA while on precedex 1.4 and given versed due to pt restlessness and difficulty to sedate. Ordered MRA w anesthesia, likely tomorrow. NPO midnight. Sending full hypercoaguable w/u- likely start minimal  intensity heparin gtt after sending off w/u.  5/15/2025: MRA brain w/wout con today, switched plavix to baby ASA, follow CT head tomorrow AM, increase 2% gtt to 100 ml/hr (Na goal > 145), Zyprexa once PRN available,  05/16/2025 D/c 2% and sodium goal. Max precedex to 0.6. Add miralax. Complaints of bilateral intermittent ear pain (R>L). Trial steroids.   05/17/2025 Patient clamped EVD overnight and later attempted to get out of bed on her own to shower. 30 cc of output. NSGY aware. NSGY raised EVD from 10 to 20. Add pregabalin for nerve pain.   05/18/2025 Continue EVD @ 20. Patient declining seroquel. Continue precedex and encouragement of seroquel.   5/19/2025: EVD clapmped today per NSGY follow up CT head tomorrow AM, repeat US upper extremities, TCDs yesterday no vasospasm  05/20/2025 EVD removed today.  CTH overnight.  Will begin warfarin this evening, and continue heparin until INR therapeutic.     Interval History: See hospital course.     Review of Systems: +headache, all other ROS negative      Vitals:   Temp: 98.1 °F (36.7 °C)  Pulse: 99  Rhythm: normal sinus rhythm  BP: (!) 149/66  MAP (mmHg): 95  ICP Mean (mmHg): 15 mmHg  Resp: (!) 22  SpO2: (!) 94 %    Temp  Min: 97.7 °F (36.5 °C)  Max: 98.3 °F (36.8 °C)  Pulse  Min: 68  Max: 117  BP  Min: 92/66  Max: 172/81  MAP (mmHg)  Min: 75  Max: 117  ICP Mean (mmHg)  Min: 0 mmHg  Max: 19 mmHg  Resp  Min: 9  Max: 37  SpO2  Min: 88 %  Max: 98 %    05/19 0701 - 05/20 0700  In: 1911.2 [P.O.:1140; I.V.:273.4]  Out: 2108 [Urine:2100; Drains:8]   Unmeasured Output  Unmeasured Urine Occurrence: 1  Unmeasured Stool Occurrence: 0  Pad Count: 1     Examination:   Constitutional: Well-nourished and -developed. No apparent distress.   Eyes: Conjunctiva clear, anicteric. Lids no lesions.  Head/Ears/Nose/Mouth/Throat/Neck: Moist mucous membranes. External ears, nose atraumatic.   Cardiovascular: Regular rhythm. No murmurs. No leg edema.  Respiratory: Comfortable respirations.  "Clear to auscultation.  Gastrointestinal: No hernia. Soft, nondistended, nontender. + bowel sounds.    Neurologic:  -GCS E4V5M6  -Alert. Oriented to person, place, and time. Speech fluent. Follows commands.  -PERRL  -Motor URENA and AG  -SILT      Medications:   ContinuousdexmedeTOMIDine (Precedex) infusion (titrating), Last Rate: Stopped (05/19/25 1051)  heparin (porcine) in D5W, Last Rate: 11 Units/kg/hr (05/20/25 1101)    Scheduledaspirin, 81 mg, Daily  atorvastatin, 40 mg, Daily  niMODipine, 60 mg, Q4H  polyethylene glycol, 17 g, BID  pregabalin, 75 mg, BID  QUEtiapine, 50 mg, QHS  senna-docusate, 2 tablet, BID  valACYclovir, 1,000 mg, Daily  valsartan, 80 mg, Daily  warfarin, 5 mg, Daily    PRNacetaminophen, 650 mg, Q6H PRN  bisacodyL, 10 mg, Daily PRN  diazePAM, 10 mg, Q6H PRN  hydrALAZINE, 10 mg, Q4H PRN  labetalol, 10 mg, Q4H PRN  magnesium oxide, 800 mg, PRN  magnesium oxide, 800 mg, PRN  melatonin, 6 mg, Nightly PRN  methocarbamoL, 750 mg, Q6H PRN  oxyCODONE, 5 mg, Q4H PRN  potassium bicarbonate, 35 mEq, PRN  potassium bicarbonate, 50 mEq, PRN  potassium bicarbonate, 60 mEq, PRN  potassium, sodium phosphates, 2 packet, PRN  potassium, sodium phosphates, 2 packet, PRN  potassium, sodium phosphates, 2 packet, PRN  sodium chloride 0.9%, 10 mL, PRN       Today I independently reviewed pertinent medications, lines/drains/airways, imaging, cardiology results, laboratory results, microbiology results,      ISTAT: No results for input(s): "PH", "PCO2", "PO2", "POCSATURATED", "HCO3", "BE", "POCNA", "POCK", "POCTCO2", "POCGLU", "POCICA", "POCLAC", "SAMPLE" in the last 24 hours.   Chem:   Recent Labs   Lab 05/20/25  0124      K 3.9      CO2 25   *   BUN 13   CREATININE 0.7   CALCIUM 9.3   MG 2.0   PHOS 3.3   ANIONGAP 8   PROT 7.0   ALBUMIN 2.8*   BILITOT 0.3   ALKPHOS 95   AST 26   ALT 22     Heme:   Recent Labs   Lab 05/20/25  0124   WBC 12.66   HGB 8.4*   HCT 29.7*   *   INR 1.1     Endo: No " "results for input(s): "POCTGLUCOSE" in the last 24 hours.       Assessment/Plan:     Neuro  * Nontraumatic subarachnoid hemorrhage from cerebellar artery  Patient is a 49-year-old female with past medical history of hypertension presenting via transfer from [place] for higher level of care after being diagnosed with subarachnoid hemorrhage.    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  S/p EVD 5/6  S/p Coil emolization 5/6  CTH 5/11 with new R PICA/ superior cerebellar infarct.    MRA 5/16 without evidence of residual aneurysm filling    - EVD open at 10-->20  - Neurosurgery, vascular neurology consulted  - SBP< 160   - nimodipine 60 mg q4h   - daily TCDs - negative for vasospasm thus far  - Aspirin 81 and Plavix 75 qd  - atorvastatin 40 mg  - euvolemia  - SCDs; lovenox for VTE ppx  - Delirium precautions.   - Precedex gtt   - PT/OT  EVD removed 5/21  CTH overnight       Cerebellar stroke, acute  CT on 5/11 revealed R cerebellar infarct. Likely uriah-angio  Family aware and imaging reviewed  Atorvastatin 40  ASA 81 mg daily  Heparin infusion, transition to warfarin today  Concern for brain compression in post fossa   NSGY following       Acute encephalopathy  See primary problem    Obstructive hydrocephalus  EVD removed 5.20  R Cerebellar infarct    Brain compression  monitor EVD output closely  Neurochecks  See primary problem       Brain aneurysm  See primary problem    Cardiac/Vascular  Essential hypertension  SBP <160  PRN labetalol, hydralazine  Valsartan 80 mg    ID  Herpes simplex virus (HSV) infection  PCR pending  Resume home valacyclovir    Hematology  History of DVT in adulthood  Pt has a hx of RLE DVT and was started on xarelto. 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 She has been seen by heme onc in the past.      Warfarin currently held  Pt was on a Heparin gtt for for R brachial DVT and hx arterial " occlusion. 5/11/25 CTH showed large R cerebellar infarct and heparin gtt was held on 5/11. Lower extremity arterial US was negative for occlusion, only showing moderate stenosis (50-75%). BLE DVT US was also negative for DVT. Decided not to continue heparin gtt given brachial DVT is in upper extremity. Was only continuing with DAPT and then lovenox for VTE ppx  Sending full hypercoaguable w/u   Continue minimal intensity heparin gtt, begin warfarin today and continue both until INR therapeutic          The patient is being Prophylaxed for:  Venous Thromboembolism with: Chemical  Stress Ulcer with: Not Applicable   Ventilator Pneumonia with: not applicable    Activity Orders            Diet Adult Regular: Regular starting at 05/15 2025    Progressive Mobility Protocol (mobilize patient to their highest level of functioning at least twice daily) starting at 05/06 2000    Turn patient starting at 05/06 0800          Full Code    Katie Yip PA-C  Neurocritical Care  [facility] - Neuro Critical Care

## 2025-05-20 NOTE — EICU
Intervention Initiated From:  [facility] / EICU    Kendall intervened regarding:  Rounding (Video assessment)    Virtual ICU Quality Rounds    Admit Date: 5/6/2025  Hospital Day: 14    ICU Day: 14d 3h    24H Vital Sign Range:  Temp:  [97.7 °F (36.5 °C)-98.3 °F (36.8 °C)]   Pulse:  []   Resp:  [9-37]   BP: ()/(57-93)   SpO2:  [88 %-98 %]     VICU Surveillance Screening    LDA reconciliation : Yes

## 2025-05-20 NOTE — ASSESSMENT & PLAN NOTE
-Stroke risk factor  -SBP goal No target, increase BP to prevent vasospasm, maintain MAP >65  -BP range in the last 24 hrs: BP  Min: 92/66  Max: 172/81  -Current antihypertensive regimen:   --PRN labetalol/hydralazine  --valsartan 80mg QD

## 2025-05-20 NOTE — ASSESSMENT & PLAN NOTE
Ms. 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 Patient transferred to [facility] [place] IR evaluation. DSA performed 5/6/25 demonstrated the above aneurysm, which was secured with coiling. MRI brain showed large R cerebellar acute/recent infarct, suspected to be likely periprocedural. MRA completed, no plans for open clipping currently. TTE EF 6570%, no PFO/LAE/WMA.    NAEON. Neuro exam stable. TCD negative for spasm. EVD removed. HA resolved. Repeat CTH pending.       Antithrombotics for secondary stroke prevention: Antiplatelets: Aspirin: 81 mg daily  Anticoagulants: Heparin protocol without bolus, Home Coumadin added 5/20    Statins for secondary stroke prevention and hyperlipidemia, if present: Statins: Atorvastatin- 40 mg daily    Aggressive risk factor modification: HTN, HLD, Diet, Exercise, Obesity     Rehab efforts: The patient has been evaluated by a stroke team provider and the therapy needs have been fully considered based off the presenting complaints and exam findings. The following therapy evaluations are needed: PT evaluate and treat, OT evaluate and treat, SLP evaluate and treat    Diagnostics ordered/pending: None     VTE prophylaxis: Mechanical prophylaxis: Place SCDs  Heparin drip    BP parameters: SAH: Secured aneurysm, no target, increase BP to prevent vasospasm if needed

## 2025-05-20 NOTE — ASSESSMENT & PLAN NOTE
Pt has a hx of RLE DVT and was started on xarelto. 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 She has been seen by heme onc in the past.      Warfarin currently held  Pt was on a Heparin gtt for for R brachial DVT and hx arterial occlusion. 5/11/25 CTH showed large R cerebellar infarct and heparin gtt was held on 5/11. Lower extremity arterial US was negative for occlusion, only showing moderate stenosis (50-75%). BLE DVT US was also negative for DVT. Decided not to continue heparin gtt given brachial DVT is in upper extremity. Was only continuing with DAPT and then lovenox for VTE ppx  Sending full hypercoaguable w/u   Continue minimal intensity heparin gtt, begin warfarin today and continue both until INR therapeutic

## 2025-05-20 NOTE — SUBJECTIVE & OBJECTIVE
Neurologic Chief Complaint: SAH    Subjective:     Interval History: Patient is seen for follow-up neurological assessment and treatment recommendations: See hospital course.     HPI, Past Medical, Family, and Social History remains the same as documented in the initial encounter.     Review of Systems   Constitutional:  Negative for chills and fever.   Eyes:  Negative for visual disturbance.   Neurological:  Negative for speech difficulty, weakness, numbness and headaches.   All other systems reviewed and are negative.    Scheduled Meds:   aspirin  81 mg Oral Daily    atorvastatin  40 mg Oral Daily    niMODipine  60 mg Oral Q4H    polyethylene glycol  17 g Oral BID    pregabalin  75 mg Oral BID    QUEtiapine  50 mg Oral QHS    senna-docusate  2 tablet Oral BID    valACYclovir  1,000 mg Oral Daily    valsartan  80 mg Oral Daily    warfarin  5 mg Oral Daily     Continuous Infusions:   dexmedeTOMIDine (Precedex) infusion (titrating)  0-0.6 mcg/kg/hr Intravenous Continuous   Stopped at 05/19/25 1051    heparin (porcine) in D5W  0-40 Units/kg/hr Intravenous Continuous 11 mL/hr at 05/20/25 1501 11 Units/kg/hr at 05/20/25 1501     PRN Meds:  Current Facility-Administered Medications:     acetaminophen, 650 mg, Oral, Q6H PRN    bisacodyL, 10 mg, Rectal, Daily PRN    diazePAM, 10 mg, Oral, Q6H PRN    hydrALAZINE, 10 mg, Intravenous, Q4H PRN    labetalol, 10 mg, Intravenous, Q4H PRN    magnesium oxide, 800 mg, Oral, PRN    magnesium oxide, 800 mg, Oral, PRN    melatonin, 6 mg, Oral, Nightly PRN    methocarbamoL, 750 mg, Oral, Q6H PRN    oxyCODONE, 5 mg, Oral, Q4H PRN    potassium bicarbonate, 35 mEq, Oral, PRN    potassium bicarbonate, 50 mEq, Oral, PRN    potassium bicarbonate, 60 mEq, Oral, PRN    potassium, sodium phosphates, 2 packet, Oral, PRN    potassium, sodium phosphates, 2 packet, Oral, PRN    potassium, sodium phosphates, 2 packet, Oral, PRN    sodium chloride 0.9%, 10 mL, Intravenous, PRN    Objective:     Vital  Signs (Most Recent):  Temp: 98.2 °F (36.8 °C) (05/20/25 1501)  Pulse: 83 (05/20/25 1501)  Resp: (!) 25 (05/20/25 1501)  BP: (!) 127/58 (05/20/25 1501)  SpO2: 97 % (05/20/25 1501)  BP Location: Right arm    Vital Signs Range (Last 24H):  Temp:  [97.7 °F (36.5 °C)-98.3 °F (36.8 °C)]   Pulse:  []   Resp:  [15-37]   BP: ()/(58-93)   SpO2:  [88 %-100 %]   BP Location: Right arm       Physical Exam  Vitals and nursing note reviewed.   Constitutional:       General: She is sleeping. She is not in acute distress.     Appearance: Normal appearance.   HENT:      Head: Normocephalic.      Nose: Nose normal.   Eyes:      Extraocular Movements: Extraocular movements intact.      Conjunctiva/sclera: Conjunctivae normal.   Cardiovascular:      Rate and Rhythm: Normal rate.   Pulmonary:      Effort: Pulmonary effort is normal. No respiratory distress.   Skin:     General: Skin is warm.   Neurological:      Mental Status: She is oriented to person, place, and time and easily aroused.      Motor: No weakness.      Comments: See below for neuro exam   Psychiatric:         Behavior: Behavior normal. Behavior is cooperative.              Neurological Exam:   LOC: alert  Attention Span: Good   Language: No aphasia  Articulation: No dysarthria  Orientation: Person, Place, Time   Facial Movement (CN VII): Symmetric facial expression    Motor: Arm left  Normal 5/5  Leg left  Normal 5/5  Arm right  Normal 5/5  Leg right Normal 5/5  Sensation: Intact to light touch, temperature and vibration    Laboratory:  CMP:   Recent Labs   Lab 05/20/25  0124   CALCIUM 9.3   ALBUMIN 2.8*   PROT 7.0      K 3.9   CO2 25      BUN 13   CREATININE 0.7   ALKPHOS 95   ALT 22   AST 26   BILITOT 0.3     BMP:   Recent Labs   Lab 05/20/25  0124      K 3.9      CO2 25   BUN 13   CREATININE 0.7   CALCIUM 9.3     CBC:   Recent Labs   Lab 05/20/25  0124   WBC 12.66   RBC 3.12*   HGB 8.4*   HCT 29.7*   *   MCV 95   MCH 26.9*    MCHC 28.3*     Coagulation:   Recent Labs   Lab 05/20/25  0124   INR 1.1   APTT 39.5*       Diagnostic Results     Brain/Vessel Imaging  CTH without contrast 5/19/2025  Impression:  No evidence of acute detrimental change when compared to prior CT performed 05/16/2025.  Improving mass effect within posterior fossa in this patient with of all vein right cerebellar infarct.  There has been interval re-expansion of the 4th ventricle.  Grossly unchanged size and configuration of the lateral and 3rd ventricles.    CTH without 05/16/2025  Impression:  Compared to prior MR and CT imaging of 05/11/2025, continued maturation of large recent infarction involving the right cerebellum, as discussed.  Similar mass effect in the posterior fossa with essentially unchanged size and configuration of the lateral 3rd ventricles.  No new transependymal CSF egress.  CT  Continued decreased visualization of existing subarachnoid hemorrhage.  No new or enlarging areas of intracranial hemorrhage appreciated.    MRI Brain w/wo 05/15/2025  Impression:  Status post coiling of a PICA aneurysm.  No evidence of residual aneurysm filling.  No high-grade stenosis or other aneurysm identified.    MRI Brain without 05/11/2025  Impression:  Allowing for significant distortion examination by artifact from dental metal there is large area acute/recent infarction within the right cerebellum with probable small component in the right paramedian micah.  Please note this is near completely obscured on diffusion and gradient imaging secondary artifact from dental metal.  Mass effect from the area of infarction effacing the 4th ventricle with stable caliber lateral and 3rd ventricles without hydrocephalus with stable right frontal ventricular catheter.  There is T1 hyperintensity within the region of the right superior cerebellar peduncle corresponding to hyperdensity seen on CT concerning for component of hemorrhagic conversion.  Scattered small volume  subarachnoid hemorrhage cerebral sulci.  Clinical correlation and continued follow-up recommended.    CTH without 05/11/2025  Impression:  1. Compared to prior head CT performed 05/06/2025, 20:13 hours, evolving relatively large right cerebellar infarct without macroscopic hemorrhage within the infarct territory.  Questionable hypoattenuation involving the brainstem which may be artifactual given coil artifact, however additional areas of ischemia not excluded.  MRI may be considered for further characterization.  Further effacement of the 4th ventricle since the prior study, however there has been slight decompression of the lateral and 3rd ventricles since the prior exam.  2. Relatively similar subarachnoid and interventricular hemorrhage.  No new or enlarging areas of intracranial hemorrhage appreciated.  This report was flagged in Epic as abnormal.    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  Evolving scattered subarachnoid and intraventricular hemorrhage.  No definite new hemorrhage  Stable right frontal coursing ventricular catheter with slight reduced caliber of the ventricles without evidence for worsening hydrocephalus.  Continued slight crowding cerebral sulci and basilar cisterns with small caliber 4th ventricle.  Question component of inferior extension of the cerebellar tonsils below the foramen magnum.  This could be better assessed with MR imaging if patient compatible.  No significant new abnormal parenchymal attenuation.  Clinical correlation and further evaluation with MRI as warranted.    CTH without 05/06/2025 0949  Impression:  Subarachnoid and intraventricular hemorrhage, similar to recent exam.  Interval placement of right frontal EVD without apparent intracranial complication.  Ventricles remain mildly dilated.    CTA Head and Neck 05/06/2025  Impression:  8.6 mm aneurysm arising off the distal right anterior inferior  cerebellar artery.  Distal location suspicious for mycotic aneurysm.  No intracranial large vessel occlusion or hemodynamically significant stenosis.  The cervical carotid and vertebral arteries are patent without hemodynamically significant stenosis.  Right upper lobe, right middle lobe and left lower lobe consolidative opacities.  Endotracheal tube terminates just superior to the charis recommend slight retraction.     CTH without 05/05/2025  Impression:  Subarachnoid hemorrhage.  Moderate volume of intraventricular hemorrhage.  Mild hydrocephalus.  Recommend CTA.  Findings reported by [facility] radiologist at 01:06  All CT scans at this facility use dose modulation, iterative reconstruction, and/or weight based dosing when appropriate to reduce radiation dose to as low as reasonable achievable.      Cardiac Imaging   TTE 05/07/2025    Left Ventricle: The left ventricle is normal in size. Normal wall thickness. Normal wall motion. There is normal systolic function with a visually estimated ejection fraction of 65 - 70%. Ejection fraction is approximately 68%. There is indeterminate diastolic function.    Right Ventricle: The right ventricle is normal in size. Wall thickness is normal. Systolic function is normal.    Left Atrium: Agitated saline study of the atrial septum is negative after vasalva maneuver, suggesting absence of intracardiac shunt at the atrial level. No patent foramen ovale confirmed by agitated saline contrast.    Tricuspid Valve: There is mild to moderate regurgitation.    IVC/SVC: Patient is ventilated, cannot use IVC diameter to estimate right atrial pressure.

## 2025-05-20 NOTE — PT/OT/SLP PROGRESS
"Occupational Therapy   Treatment    Name: Waldo Emmanuel  MRN: [id number]  Admitting Diagnosis:  Nontraumatic subarachnoid hemorrhage from other intracranial arteries  5 Days Post-Op    Recommendations:     Discharge Recommendations: High Intensity Therapy  Discharge Equipment Recommendations:  bedside commode, bath bench, walker, rolling  Barriers to discharge:  None    Assessment:     Waldo Emmanuel is a 49 y.o. 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  Performance deficits affecting function are weakness, impaired endurance, impaired self care skills, impaired functional mobility, impaired balance, gait instability, visual deficits, decreased safety awareness, impaired cardiopulmonary response to activity. Pt agreeable to therapy and tolerated well. EVD removed prior to OT arrival. Pt performed UB dressing, LB dressing, toileting, and home/community mobility this date. Pt continues to present with ataxia, requiring CGA <> Mod A for mobility and frequent verbal cueing for safety.  Pt remains limited in ADLs, functional mobility, and functional transfers. Patient has demonstrated sufficient progression to warrant high intensity therapy evidenced by objectives noted below.    RN present throughout session to assist with line management and chair follow     Rehab Prognosis:  Good; patient would benefit from acute skilled OT services to address these deficits and reach maximum level of function.       Plan:     Patient to be seen 4 x/week to address the above listed problems via self-care/home management, therapeutic activities, therapeutic exercises, neuromuscular re-education  Plan of Care Expires: 06/11/25  Plan of Care Reviewed with: patient, daughter    Subjective     Chief Complaint: none  Patient/Family Comments/goals: "You messy"  Pain/Comfort:  Pain Rating 1: 0/10  Pain Rating Post-Intervention 1: 0/10    Objective: "     Communicated with: Nurse prior to session.  Patient found HOB elevated with pulse ox (continuous), telemetry, blood pressure cuff, peripheral IV upon OT entry to room.    General Precautions: Standard, fall    Orthopedic Precautions:N/A  Braces: N/A  Respiratory Status: Room air     Occupational Performance:     Bed Mobility:    Patient completed Scooting/Bridging with minimum assistance  Patient completed Supine to Sit with minimum assistance     Functional Mobility/Transfers:  Patient completed Sit <> Stand Transfer with minimum assistance  with  hand-held assist   Patient completed Bed <> Chair Transfer using Step Transfer technique with CGA <> Mod A  with hand-held assist  Patient completed Toilet Transfer Step Transfer technique with minimum assistance with  hand-held assist  Functional Mobility: Pt engaged in functional mobility throughout hospital room bed > BSC and hallway with w/c t/f, nurse present with HHA and CGA> Min A to maximize functional endurance and standing balance required for home/community mobility and occupational engagement. Pt with 4 LOB, requiring Mod A for correction and postural righting. Pt cued to maintain wide base of support and to fully lift feet off of ground 2/2 pt sliding feet    Activities of Daily Living:  Upper Body Dressing: minimum assistance donned gown over back sitting EOB   Lower Body Dressing: moderate assistance pt able to adolfo L sock in figure 4 but unable to adolfo R sock  Toileting: contact guard assistance performed seated and in stance at [facility]       AMPA 6 Click ADL: 19    Treatment & Education:  -Education on energy conservation and task modification to maximize safety and (I) during ADLs and mobility  -Education on importance of OOB activity to improve overall activity tolerance and promote recovery  -Pt educated to call for assistance and to transfer with hospital staff only  -Provided education regarding role of OT, POC, & discharge recommendations with pt  and daughter verbalizing understanding.  Pt had no further questions & when asked whether there were any concerns pt reported none.      Patient left up in chair with all lines intact, call button in reach, chair alarm on, nurse notified, and nurse and daughter present    GOALS:   Multidisciplinary Problems       Occupational Therapy Goals          Problem: Occupational Therapy    Goal Priority Disciplines Outcome Interventions   Occupational Therapy Goal     OT, PT/OT Progressing    Description: Goals to be met by: 6/11/2025     Patient will increase functional independence with ADLs by performing:    UE Dressing with Supervision.  LE Dressing with Supervision.  Grooming while standing at sink with Supervision.  Toileting from toilet with Supervision for hygiene and clothing management.   Toilet transfer to toilet with Supervision.                         Time Tracking:     OT Date of Treatment: 05/20/25  OT Start Time: 1112  OT Stop Time: 1141  OT Total Time (min): 29 min    Billable Minutes:Self Care/Home Management 13 min  Neuromuscular Re-education 16 min    OT/ALEXUS: OT          5/20/2025

## 2025-05-20 NOTE — PLAN OF CARE
[facility] Care Plan    POC reviewed with Hennykomal Karen Emmanuel and family at 0300. Patient and Family verbalized understanding. Questions and concerns addressed. No acute events today. Pt progressing toward goals. Will continue to monitor. See below and flowsheets for full assessment and VS info.     - EVD clamped overnight; ICPs 15-19  - prn oxy x1, prn tylenol x1 for headache  - CTH completed   - prn 500cc NS bolus for hypotension after seroquel given; dose decreased for subsequent orders   - Heparin gtt continued at 11; APTT therapeutic, no changes made; next draw with am labs    Is this a stroke patient? yes- Stroke booklet reviewed with patient and is at bedside.   Care Plan Individualization:     Neuro:  Roro Coma Scale  Best Eye Response: 4-->(E4) spontaneous  Best Motor Response: 6-->(M6) obeys commands  Best Verbal Response: 5-->(V5) oriented  Roro Coma Scale Score: 15  Assessment Qualifiers: no eye obstruction present  Pupil PERRLA: yes     24 hr Temp:  [97.7 °F (36.5 °C)-98.2 °F (36.8 °C)]     CV:   Rhythm: normal sinus rhythm  BP goals:   SBP < 160  MAP > 65    Resp:      Vent Mode: Spont  Set Rate: 20 BPM  Oxygen Concentration (%): 40  Vt Set: 375 mL  PEEP/CPAP: 5 cmH20  Pressure Support: 5 cmH20    Plan: N/A    GI/:     Diet/Nutrition Received: regular  Last Bowel Movement: 05/16/25  Voiding Characteristics: voids spontaneously without difficulty    Intake/Output Summary (Last 24 hours) at 5/20/2025 0728  Last data filed at 5/20/2025 0602  Gross per 24 hour   Intake 1911.21 ml   Output 2100 ml   Net -188.79 ml     Unmeasured Output  Unmeasured Urine Occurrence: 1  Unmeasured Stool Occurrence: 0  Pad Count: 1    Labs/Accuchecks:  Recent Labs   Lab 05/20/25  0124   WBC 12.66   RBC 3.12*   HGB 8.4*   HCT 29.7*   *      Recent Labs   Lab 05/20/25  0124      K 3.9   CO2 25      BUN 13   CREATININE 0.7   ALKPHOS 95   ALT 22   AST 26   BILITOT 0.3      Recent Labs   Lab 05/15/25  0530  "05/15/25  1342 05/20/25  0124   PROTIME 11.7  --   --    INR 1.1  --   --    APTT 21.5   < > 39.5*    < > = values in this interval not displayed.    No results for input(s): "CPK", "CPKMB", "TROPONINI", "MB" in the last 168 hours.    Electrolytes: N/A - electrolytes WDL  Accuchecks: none    Gtts:   dexmedeTOMIDine (Precedex) infusion (titrating)  0-0.6 mcg/kg/hr Intravenous Continuous   Stopped at 05/19/25 1051    heparin (porcine) in D5W  0-40 Units/kg/hr Intravenous Continuous 11 mL/hr at 05/20/25 0602 11 Units/kg/hr at 05/20/25 0602       LDA/Wounds:    Ivan Risk Assessment  Sensory Perception: 4-->no impairment  Moisture: 4-->rarely moist  Activity: 2-->chairfast  Mobility: 3-->slightly limited  Nutrition: 3-->adequate  Friction and Shear: 2-->potential problem  Ivan Score: 18  Is your ivan score 12 or less? no        WCTM    "

## 2025-05-21 LAB
ABSOLUTE EOSINOPHIL (OHS): 0.17 K/UL
ABSOLUTE MONOCYTE (OHS): 1.07 K/UL (ref 0.3–1)
ABSOLUTE NEUTROPHIL COUNT (OHS): 8.06 K/UL (ref 1.8–7.7)
ALBUMIN SERPL BCP-MCNC: 2.9 G/DL (ref 3.5–5.2)
ALP SERPL-CCNC: 105 UNIT/L (ref 40–150)
ALT SERPL W/O P-5'-P-CCNC: 35 UNIT/L (ref 10–44)
ANION GAP (OHS): 10 MMOL/L (ref 8–16)
APTT PPP: 44.1 SECONDS (ref 21–32)
AST SERPL-CCNC: 32 UNIT/L (ref 11–45)
BASOPHILS # BLD AUTO: 0.05 K/UL
BASOPHILS NFR BLD AUTO: 0.4 %
BILIRUB SERPL-MCNC: 0.3 MG/DL (ref 0.1–1)
BUN SERPL-MCNC: 12 MG/DL (ref 6–20)
CALCIUM SERPL-MCNC: 9.5 MG/DL (ref 8.7–10.5)
CHLORIDE SERPL-SCNC: 106 MMOL/L (ref 95–110)
CO2 SERPL-SCNC: 26 MMOL/L (ref 23–29)
CREAT SERPL-MCNC: 0.6 MG/DL (ref 0.5–1.4)
ERYTHROCYTE [DISTWIDTH] IN BLOOD BY AUTOMATED COUNT: 14.6 % (ref 11.5–14.5)
GFR SERPLBLD CREATININE-BSD FMLA CKD-EPI: >60 ML/MIN/1.73/M2
GLUCOSE SERPL-MCNC: 123 MG/DL (ref 70–110)
HCT VFR BLD AUTO: 31.3 % (ref 37–48.5)
HGB BLD-MCNC: 8.9 GM/DL (ref 12–16)
HSV-1 DNA BY PCR: NEGATIVE
HSV-2 DNA BY PCR: NEGATIVE
IMM GRANULOCYTES # BLD AUTO: 0.06 K/UL (ref 0–0.04)
IMM GRANULOCYTES NFR BLD AUTO: 0.4 % (ref 0–0.5)
INR PPP: 1.1 (ref 0.8–1.2)
LYMPHOCYTES # BLD AUTO: 4.04 K/UL (ref 1–4.8)
MAGNESIUM SERPL-MCNC: 1.8 MG/DL (ref 1.6–2.6)
MCH RBC QN AUTO: 27.1 PG (ref 27–31)
MCHC RBC AUTO-ENTMCNC: 28.4 G/DL (ref 32–36)
MCV RBC AUTO: 95 FL (ref 82–98)
NUCLEATED RBC (/100WBC) (OHS): 0 /100 WBC
PHOSPHATE SERPL-MCNC: 3.2 MG/DL (ref 2.7–4.5)
PLATELET # BLD AUTO: 503 K/UL (ref 150–450)
PMV BLD AUTO: 9.5 FL (ref 9.2–12.9)
POTASSIUM SERPL-SCNC: 4.1 MMOL/L (ref 3.5–5.1)
PROT SERPL-MCNC: 7.3 GM/DL (ref 6–8.4)
PROTHROMBIN TIME: 11.5 SECONDS (ref 9–12.5)
RBC # BLD AUTO: 3.28 M/UL (ref 4–5.4)
RELATIVE EOSINOPHIL (OHS): 1.3 %
RELATIVE LYMPHOCYTE (OHS): 30 % (ref 18–48)
RELATIVE MONOCYTE (OHS): 8 % (ref 4–15)
RELATIVE NEUTROPHIL (OHS): 59.9 % (ref 38–73)
SODIUM SERPL-SCNC: 142 MMOL/L (ref 136–145)
WBC # BLD AUTO: 13.45 K/UL (ref 3.9–12.7)

## 2025-05-21 PROCEDURE — 25000003 PHARM REV CODE 250: Performed by: PHYSICIAN ASSISTANT

## 2025-05-21 PROCEDURE — 85025 COMPLETE CBC W/AUTO DIFF WBC: CPT

## 2025-05-21 PROCEDURE — 85730 THROMBOPLASTIN TIME PARTIAL: CPT | Performed by: PSYCHIATRY & NEUROLOGY

## 2025-05-21 PROCEDURE — 92507 TX SP LANG VOICE COMM INDIV: CPT

## 2025-05-21 PROCEDURE — 99233 SBSQ HOSP IP/OBS HIGH 50: CPT | Mod: FS,,, | Performed by: PSYCHIATRY & NEUROLOGY

## 2025-05-21 PROCEDURE — 94761 N-INVAS EAR/PLS OXIMETRY MLT: CPT

## 2025-05-21 PROCEDURE — 99499 UNLISTED E&M SERVICE: CPT | Mod: ,,, | Performed by: PHYSICIAN ASSISTANT

## 2025-05-21 PROCEDURE — 85610 PROTHROMBIN TIME: CPT | Performed by: PSYCHIATRY & NEUROLOGY

## 2025-05-21 PROCEDURE — 25000003 PHARM REV CODE 250

## 2025-05-21 PROCEDURE — 84100 ASSAY OF PHOSPHORUS: CPT

## 2025-05-21 PROCEDURE — 63600175 PHARM REV CODE 636 W HCPCS

## 2025-05-21 PROCEDURE — 25000003 PHARM REV CODE 250: Performed by: PSYCHIATRY & NEUROLOGY

## 2025-05-21 PROCEDURE — 83735 ASSAY OF MAGNESIUM: CPT

## 2025-05-21 PROCEDURE — 99233 SBSQ HOSP IP/OBS HIGH 50: CPT | Mod: ,,, | Performed by: NEUROLOGICAL SURGERY

## 2025-05-21 PROCEDURE — 80053 COMPREHEN METABOLIC PANEL: CPT

## 2025-05-21 PROCEDURE — 11000001 HC ACUTE MED/SURG PRIVATE ROOM

## 2025-05-21 PROCEDURE — 25000003 PHARM REV CODE 250: Performed by: NURSE PRACTITIONER

## 2025-05-21 RX ADMIN — QUETIAPINE FUMARATE 50 MG: 25 TABLET ORAL at 09:05

## 2025-05-21 RX ADMIN — POLYETHYLENE GLYCOL 3350 17 G: 17 POWDER, FOR SOLUTION ORAL at 09:05

## 2025-05-21 RX ADMIN — ASPIRIN 81 MG CHEWABLE TABLET 81 MG: 81 TABLET CHEWABLE at 09:05

## 2025-05-21 RX ADMIN — PREGABALIN 75 MG: 75 CAPSULE ORAL at 09:05

## 2025-05-21 RX ADMIN — VALACYCLOVIR HYDROCHLORIDE 1000 MG: 500 TABLET, FILM COATED ORAL at 09:05

## 2025-05-21 RX ADMIN — SENNOSIDES AND DOCUSATE SODIUM 2 TABLET: 50; 8.6 TABLET ORAL at 09:05

## 2025-05-21 RX ADMIN — NIMODIPINE 60 MG: 30 CAPSULE, LIQUID FILLED ORAL at 02:05

## 2025-05-21 RX ADMIN — NIMODIPINE 60 MG: 30 CAPSULE, LIQUID FILLED ORAL at 05:05

## 2025-05-21 RX ADMIN — NIMODIPINE 60 MG: 30 CAPSULE, LIQUID FILLED ORAL at 09:05

## 2025-05-21 RX ADMIN — WARFARIN SODIUM 5 MG: 2.5 TABLET ORAL at 05:05

## 2025-05-21 RX ADMIN — METHOCARBAMOL 750 MG: 750 TABLET ORAL at 02:05

## 2025-05-21 RX ADMIN — VALSARTAN 80 MG: 80 TABLET, FILM COATED ORAL at 09:05

## 2025-05-21 RX ADMIN — NIMODIPINE 60 MG: 30 CAPSULE, LIQUID FILLED ORAL at 01:05

## 2025-05-21 RX ADMIN — OXYCODONE 5 MG: 5 TABLET ORAL at 11:05

## 2025-05-21 RX ADMIN — DIAZEPAM 10 MG: 5 TABLET ORAL at 02:05

## 2025-05-21 RX ADMIN — ATORVASTATIN CALCIUM 40 MG: 40 TABLET, FILM COATED ORAL at 09:05

## 2025-05-21 RX ADMIN — ACETAMINOPHEN 650 MG: 325 TABLET ORAL at 05:05

## 2025-05-21 RX ADMIN — OXYCODONE 5 MG: 5 TABLET ORAL at 04:05

## 2025-05-21 RX ADMIN — OXYCODONE 5 MG: 5 TABLET ORAL at 07:05

## 2025-05-21 RX ADMIN — HEPARIN SODIUM AND DEXTROSE 11 UNITS/KG/HR: 10000; 5 INJECTION INTRAVENOUS at 12:05

## 2025-05-21 NOTE — EICU
Virtual ICU Quality Rounds    Admit Date: 5/6/2025  Hospital Day: 15    ICU Day: 15d 5h    24H Vital Sign Range:  Temp:  [97.8 °F (36.6 °C)-98.3 °F (36.8 °C)]   Pulse:  []   Resp:  [18-47]   BP: (107-178)/(57-94)   SpO2:  [90 %-100 %]     VICU Surveillance Screening    LDA reconciliation : Yes

## 2025-05-21 NOTE — ASSESSMENT & PLAN NOTE
49f presenting after syncope with CTH demonstrating SAH. CTA with AICA aneurysm R. Intubated prior to arrival to [facility] ED.     S/p R frontal EVD on 5/6 and s/p DSA with coil embolization of R AICA aneurysm 5/6    Post bleed day 15    Plan:  - Admitted to ICU, ok to step down to floor today, q4h neuro checks  - SAH protocol (euvolemia, SBP liberalized, nimotop, keppra, TCDs)  - MRA completed 5/15 without any residual aneurysmal filling  - MRI 5/11 with R Cb and parmaedian pontine small infarct  - EVD removed 5/20, CTH stable afterwards without any complication  - Daily ASA 81  - No dvt in BLE on ultrasound, R brachial vein dvt+ - on hep gtt - can transition to Eliquis now  - PT/OT/OOB    Dispo: step down, anticipate discharge soon pending PT recs    Discussed with Dr. Corley

## 2025-05-21 NOTE — PROGRESS NOTES
Tray Srivastava - Neurosurgery ([facility])  Vascular Neurology  Comprehensive Stroke Center  Progress Note    Assessment/Plan:     * Nontraumatic subarachnoid hemorrhage from cerebellar artery  Ms. 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 Patient transferred to [facility] [place] for IR evaluation. DSA performed 5/6/25 demonstrated the above aneurysm, which was secured with coiling. MRI brain showed large R cerebellar acute/recent infarct, suspected to be likely periprocedural. MRA completed, no plans for open clipping currently. TTE EF 6570%, no PFO/LAE/WMA.    NAEON. Neuro exam stable. Repeat CTH stable. Home Coumadin started. US BLE negative for DVT. Pt stepped down to NSGY. No further recommendations from vascular neurology standpoint. VN will sign off at this time.       Antithrombotics for secondary stroke prevention: Antiplatelets: Aspirin: 81 mg daily, Home Coumadin added 5/20    Statins for secondary stroke prevention and hyperlipidemia, if present: Statins: Atorvastatin- 40 mg daily    Aggressive risk factor modification: HTN, HLD, Diet, Exercise, Obesity     Rehab efforts: The patient has been evaluated by a stroke team provider and the therapy needs have been fully considered based off the presenting complaints and exam findings. The following therapy evaluations are needed: PT evaluate and treat, OT evaluate and treat, SLP evaluate and treat    Diagnostics ordered/pending: None     VTE prophylaxis: Mechanical prophylaxis: Place SCDs  Heparin drip    BP parameters: SAH: Secured aneurysm, no target, increase BP to prevent vasospasm if needed       Cerebellar stroke, acute  -- See plan for SAH        History of DVT in adulthood  Home Coumadin added 5/20    Obstructive hydrocephalus  S/D to NSGY 5/21  EVD removed 5/20    Brain aneurysm  See  ""SAH"    Essential hypertension  -Stroke risk factor  -SBP goal No target, increase BP to prevent vasospasm, maintain MAP >65  -BP range in the last 24 hrs: BP  Min: 92/66  Max: 172/81  -Current antihypertensive regimen:   --PRN labetalol/hydralazine  --valsartan 80mg QD         5/16/2025 s/p EVD and DSA with coil embolization. Imaging obtained on 5/11 and significant for R cerebellar infarct , likely periprocedural. MRA completed yesterday, no plans for open clipping currently. Patient complaining of ear pain and headache. Remains with EVD on precedex.   5-17-25 EVD in place, on Precedex, R brachial DVT on ASA and heparin drip  05/18/2025 NAEON. Agitation management continues with precedex.   5/19/2025 NAEON, neuro exam stable. EVD clamped today, repeat CTH in AM. On precedex overnight for agitation, off since 1051.  05/20/2025 NAEON. Neuro exam stable. TCD negative for spasm. EVD removed. HA resolved. Repeat CTH pending.   05/21/2025 NAEON. Neuro exam stable. Repeat CTH stable. Home Coumadin started. US BLE negative for DVT. Pt stepped down to NSGY. No further recommendations from vascular neurology standpoint. VN will sign off at this time.     STROKE DOCUMENTATION        NIH Scale:  1a. Level of Consciousness: 0-->Alert, keenly responsive  1b. LOC Questions: 0-->Answers both questions correctly  1c. LOC Commands: 0-->Performs both tasks correctly  2. Best Gaze: 0-->Normal  3. Visual: 0-->No visual loss  4. Facial Palsy: 0-->Normal symmetrical movements  5a. Motor Arm, Left: 0-->No drift, limb holds 90 (or 45) degrees for full 10 secs  5b. Motor Arm, Right: 0-->No drift, limb holds 90 (or 45) degrees for full 10 secs  6a. Motor Leg, Left: 0-->No drift, leg holds 30 degree position for full 5 secs  6b. Motor Leg, Right: 0-->No drift, leg holds 30 degree position for full 5 secs  7. Limb Ataxia: 0-->Absent  8. Sensory: 0-->Normal, no sensory loss  9. Best Language: 0-->No aphasia, normal  10. Dysarthria: " 0-->Normal  11. Extinction and Inattention (formerly Neglect): 0-->No abnormality  Total (NIH Stroke Scale): 0       [place] Coma Scale:15   ABCD2 Score:    [id number] Score:   HAS -BLED Score:   ICH Score:   Hunt & Castillo Classification:      Hemorrhagic change of an Ischemic Stroke: Does this patient have an ischemic stroke with hemorrhagic changes? No     Neurologic Chief Complaint: SAH    Subjective:     Interval History: Patient is seen for follow-up neurological assessment and treatment recommendations: See hospital course.     HPI, Past Medical, Family, and Social History remains the same as documented in the initial encounter.     Review of Systems   Constitutional:  Negative for chills and fever.   Eyes:  Negative for visual disturbance.   Neurological:  Negative for speech difficulty, weakness, numbness and headaches.   All other systems reviewed and are negative.    Scheduled Meds:   aspirin  81 mg Oral Daily    atorvastatin  40 mg Oral Daily    niMODipine  60 mg Oral Q4H    polyethylene glycol  17 g Oral BID    pregabalin  75 mg Oral BID    QUEtiapine  50 mg Oral QHS    senna-docusate  2 tablet Oral BID    valACYclovir  1,000 mg Oral Daily    valsartan  80 mg Oral Daily    warfarin  5 mg Oral Daily     Continuous Infusions:   heparin (porcine) in D5W  0-40 Units/kg/hr Intravenous Continuous 11 mL/hr at 05/21/25 1401 11 Units/kg/hr at 05/21/25 1401     PRN Meds:  Current Facility-Administered Medications:     acetaminophen, 650 mg, Oral, Q6H PRN    bisacodyL, 10 mg, Rectal, Daily PRN    diazePAM, 10 mg, Oral, Q6H PRN    hydrALAZINE, 10 mg, Intravenous, Q4H PRN    labetalol, 10 mg, Intravenous, Q4H PRN    melatonin, 6 mg, Oral, Nightly PRN    methocarbamoL, 750 mg, Oral, Q6H PRN    oxyCODONE, 5 mg, Oral, Q4H PRN    sodium chloride 0.9%, 10 mL, Intravenous, PRN    Objective:     Vital Signs (Most Recent):  Temp: 98.2 °F (36.8 °C) (05/21/25 1545)  Pulse: 88 (05/21/25 1545)  Resp: 19  (05/21/25 1623)  BP: 127/84 (05/21/25 1748)  SpO2: 96 % (05/21/25 1545)  BP Location: Left arm    Vital Signs Range (Last 24H):  Temp:  [97.8 °F (36.6 °C)-98.3 °F (36.8 °C)]   Pulse:  []   Resp:  [18-28]   BP: (107-178)/(57-94)   SpO2:  [95 %-100 %]   BP Location: Left arm       Physical Exam  Vitals and nursing note reviewed.   Constitutional:       General: She is sleeping. She is not in acute distress.     Appearance: Normal appearance.   HENT:      Head: Normocephalic.      Nose: Nose normal.   Eyes:      Extraocular Movements: Extraocular movements intact.      Conjunctiva/sclera: Conjunctivae normal.   Cardiovascular:      Rate and Rhythm: Normal rate.   Pulmonary:      Effort: Pulmonary effort is normal. No respiratory distress.   Skin:     General: Skin is warm.   Neurological:      Mental Status: She is oriented to person, place, and time and easily aroused.      Motor: No weakness.      Comments: See below for neuro exam   Psychiatric:         Behavior: Behavior normal. Behavior is cooperative.              Neurological Exam:   LOC: alert  Attention Span: Good   Language: No aphasia  Articulation: No dysarthria  Orientation: Person, Place, Time   Facial Movement (CN VII): Symmetric facial expression    Motor: Arm left  Normal 5/5  Leg left  Normal 5/5  Arm right  Normal 5/5  Leg right Normal 5/5  Sensation: Intact to light touch, temperature and vibration    Laboratory:  CMP:   Recent Labs   Lab 05/21/25 0034   CALCIUM 9.5   ALBUMIN 2.9*   PROT 7.3      K 4.1   CO2 26      BUN 12   CREATININE 0.6   ALKPHOS 105   ALT 35   AST 32   BILITOT 0.3     BMP:   Recent Labs   Lab 05/21/25 0034      K 4.1      CO2 26   BUN 12   CREATININE 0.6   CALCIUM 9.5     CBC:   Recent Labs   Lab 05/21/25 0034   WBC 13.45*   RBC 3.28*   HGB 8.9*   HCT 31.3*   *   MCV 95   MCH 27.1   MCHC 28.4*     Coagulation:   Recent Labs   Lab 05/21/25 0034   INR 1.1   APTT 44.1*       Diagnostic  Results     Brain/Vessel Imaging    CTH 5/21/25  Impression:     Interval removal of right frontal EVD with stable appearance of the ventricular system.  No hydrocephalus.     CTH 5/20/25  Impression:     No interval detrimental change from comparison CT 05/19/2025.     Evolving right cerebellar infarct with no significant worsening in mass effect or midline shift.  No new areas of acute infarct or intracranial hemorrhage.     Right frontal coursing extraventricular drain with tip in similar position.  Ventricles are overall stable in size and configuration without evidence for new or worsening hydrocephalus.  CTH without contrast 5/19/2025  Impression:  No evidence of acute detrimental change when compared to prior CT performed 05/16/2025.  Improving mass effect within posterior fossa in this patient with of all vein right cerebellar infarct.  There has been interval re-expansion of the 4th ventricle.  Grossly unchanged size and configuration of the lateral and 3rd ventricles.    CTH without 05/16/2025  Impression:  Compared to prior MR and CT imaging of 05/11/2025, continued maturation of large recent infarction involving the right cerebellum, as discussed.  Similar mass effect in the posterior fossa with essentially unchanged size and configuration of the lateral 3rd ventricles.  No new transependymal CSF egress.  CT  Continued decreased visualization of existing subarachnoid hemorrhage.  No new or enlarging areas of intracranial hemorrhage appreciated.    MRI Brain w/wo 05/15/2025  Impression:  Status post coiling of a PICA aneurysm.  No evidence of residual aneurysm filling.  No high-grade stenosis or other aneurysm identified.    MRI Brain without 05/11/2025  Impression:  Allowing for significant distortion examination by artifact from dental metal there is large area acute/recent infarction within the right cerebellum with probable small component in the right paramedian micah.  Please note this is near  completely obscured on diffusion and gradient imaging secondary artifact from dental metal.  Mass effect from the area of infarction effacing the 4th ventricle with stable caliber lateral and 3rd ventricles without hydrocephalus with stable right frontal ventricular catheter.  There is T1 hyperintensity within the region of the right superior cerebellar peduncle corresponding to hyperdensity seen on CT concerning for component of hemorrhagic conversion.  Scattered small volume subarachnoid hemorrhage cerebral sulci.  Clinical correlation and continued follow-up recommended.    CTH without 05/11/2025  Impression:  1. Compared to prior head CT performed 05/06/2025, 20:13 hours, evolving relatively large right cerebellar infarct without macroscopic hemorrhage within the infarct territory.  Questionable hypoattenuation involving the brainstem which may be artifactual given coil artifact, however additional areas of ischemia not excluded.  MRI may be considered for further characterization.  Further effacement of the 4th ventricle since the prior study, however there has been slight decompression of the lateral and 3rd ventricles since the prior exam.  2. Relatively similar subarachnoid and interventricular hemorrhage.  No new or enlarging areas of intracranial hemorrhage appreciated.  This report was flagged in Epic as abnormal.    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  Evolving scattered subarachnoid and intraventricular hemorrhage.  No definite new hemorrhage  Stable right frontal coursing ventricular catheter with slight reduced caliber of the ventricles without evidence for worsening hydrocephalus.  Continued slight crowding cerebral sulci and basilar cisterns with small caliber 4th ventricle.  Question component of inferior extension of the cerebellar tonsils below the foramen magnum.  This could be better assessed with MR imaging if patient  compatible.  No significant new abnormal parenchymal attenuation.  Clinical correlation and further evaluation with MRI as warranted.    CTH without 05/06/2025 0949  Impression:  Subarachnoid and intraventricular hemorrhage, similar to recent exam.  Interval placement of right frontal EVD without apparent intracranial complication.  Ventricles remain mildly dilated.    CTA Head and Neck 05/06/2025  Impression:  8.6 mm aneurysm arising off the distal right anterior inferior cerebellar artery.  Distal location suspicious for mycotic aneurysm.  No intracranial large vessel occlusion or hemodynamically significant stenosis.  The cervical carotid and vertebral arteries are patent without hemodynamically significant stenosis.  Right upper lobe, right middle lobe and left lower lobe consolidative opacities.  Endotracheal tube terminates just superior to the charis recommend slight retraction.     CTH without 05/05/2025  Impression:  Subarachnoid hemorrhage.  Moderate volume of intraventricular hemorrhage.  Mild hydrocephalus.  Recommend CTA.  Findings reported by [facility] radiologist at 01:06  All CT scans at this facility use dose modulation, iterative reconstruction, and/or weight based dosing when appropriate to reduce radiation dose to as low as reasonable achievable.      Cardiac Imaging   TTE 05/07/2025    Left Ventricle: The left ventricle is normal in size. Normal wall thickness. Normal wall motion. There is normal systolic function with a visually estimated ejection fraction of 65 - 70%. Ejection fraction is approximately 68%. There is indeterminate diastolic function.    Right Ventricle: The right ventricle is normal in size. Wall thickness is normal. Systolic function is normal.    Left Atrium: Agitated saline study of the atrial septum is negative after vasalva maneuver, suggesting absence of intracardiac shunt at the atrial level. No patent foramen ovale confirmed by agitated saline contrast.    Tricuspid  Valve: There is mild to moderate regurgitation.    IVC/SVC: Patient is ventilated, cannot use IVC diameter to estimate right atrial pressure.       Diamond Zuñiga NP  [facility]  Department of Vascular Neurology   [facility])

## 2025-05-21 NOTE — PLAN OF CARE
Problem: Infection  Goal: Absence of Infection Signs and Symptoms  Outcome: Progressing     Problem: Skin Injury Risk Increased  Goal: Skin Health and Integrity  Outcome: Progressing     Problem: Adult Inpatient Plan of Care  Goal: Plan of Care Review  Outcome: Progressing  Goal: Patient-Specific Goal (Individualized)  Outcome: Progressing  Goal: Absence of Hospital-Acquired Illness or Injury  Outcome: Progressing  Goal: Optimal Comfort and Wellbeing  Outcome: Progressing  Goal: Readiness for Transition of Care  Outcome: Progressing     Problem: Stroke, Intracerebral Hemorrhage  Goal: Optimal Coping  Outcome: Progressing  Goal: Effective Bowel Elimination  Outcome: Progressing  Goal: Optimal Cerebral Tissue Perfusion  Outcome: Progressing  Goal: Optimal Cognitive Function  Outcome: Progressing  Goal: Effective Communication Skills  Outcome: Progressing  Goal: Optimal Functional Ability  Outcome: Progressing  Goal: Optimal Nutrition Intake  Outcome: Progressing  Goal: Optimal Pain Control and Function  Outcome: Progressing  Goal: Effective Oxygenation and Ventilation  Outcome: Progressing  Goal: Improved Sensorimotor Function  Outcome: Progressing  Goal: Safe and Effective Swallow  Outcome: Progressing  Goal: Effective Urinary Elimination  Outcome: Progressing     Problem: Fall Injury Risk  Goal: Absence of Fall and Fall-Related Injury  Outcome: Progressing     Problem: Wound  Goal: Optimal Coping  Outcome: Progressing  Goal: Optimal Functional Ability  Outcome: Progressing  Goal: Absence of Infection Signs and Symptoms  Outcome: Progressing  Goal: Improved Oral Intake  Outcome: Progressing  Goal: Optimal Pain Control and Function  Outcome: Progressing  Goal: Skin Health and Integrity  Outcome: Progressing  Goal: Optimal Wound Healing  Outcome: Progressing

## 2025-05-21 NOTE — ASSESSMENT & PLAN NOTE
Ms. 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 Patient transferred to [facility] for IR evaluation. DSA performed 5/6/25 demonstrated the above aneurysm, which was secured with coiling. MRI brain showed large R cerebellar acute/recent infarct, suspected to be likely periprocedural. MRA completed, no plans for open clipping currently. TTE EF 6570%, no PFO/LAE/WMA.    NAEON. Neuro exam stable. Repeat CTH stable. Home Coumadin started. US BLE negative for DVT. Pt stepped down to NSGY. No further recommendations from vascular neurology standpoint. VN will sign off at this time.       Antithrombotics for secondary stroke prevention: Antiplatelets: Aspirin: 81 mg daily, Home Coumadin added 5/20    Statins for secondary stroke prevention and hyperlipidemia, if present: Statins: Atorvastatin- 40 mg daily    Aggressive risk factor modification: HTN, HLD, Diet, Exercise, Obesity     Rehab efforts: The patient has been evaluated by a stroke team provider and the therapy needs have been fully considered based off the presenting complaints and exam findings. The following therapy evaluations are needed: PT evaluate and treat, OT evaluate and treat, SLP evaluate and treat    Diagnostics ordered/pending: None     VTE prophylaxis: Mechanical prophylaxis: Place SCDs  Heparin drip    BP parameters: SAH: Secured aneurysm, no target, increase BP to prevent vasospasm if needed

## 2025-05-21 NOTE — EICU
Intervention Initiated From:  COR / MELISAU    Kendall intervened regarding:  Rounding (Video assessment)    VICU Night Rounds Checklist  24H Vital Sign Range:  Temp:  [97.7 °F (36.5 °C)-98.3 °F (36.8 °C)]   Pulse:  []   Resp:  [15-47]   BP: (127-172)/(58-93)   SpO2:  [88 %-100 %]     Video rounds and LDA reconciliation

## 2025-05-21 NOTE — NURSING
Nursing Transfer Note       Transfer To 952    Transfer via wheelchair    Transfer with cardiac monitoring    Transported by Adam    Medicines sent: Yes: heparin drip    Chart sent with patient: Yes    Belongings sent with patient: (specify belongings): 3 bags, snacks/drinks, stroke booklet, milagro bear, blankets, flowers, toiletries.      Notified: friend    Bedside Neuro assessment performed: Yes    Does the patient have altered skin integrity? no       Bedside Handoff given to: LINDSAY Gamble    Upon arrival to floor: cardiac monitor applied, patient oriented to room, call bell in reach, and bed in lowest position

## 2025-05-21 NOTE — PLAN OF CARE
"[facility] Care Plan    POC reviewed with Waldo Emmanuel and family at 0300. Patient and Family verbalized understanding. Questions and concerns addressed. No acute events today. Pt progressing toward goals. Will continue to monitor. See below and flowsheets for full assessment and VS info.     - SBP < 160 maintained, no prn needed  - CTH completed for post-EVD removal  - prn valium x1, methocarbamol x1  - bm x1    Is this a stroke patient? yes- Stroke booklet reviewed with patient and is at bedside.   Care Plan Individualization:     Neuro:  Media Coma Scale  Best Eye Response: 4-->(E4) spontaneous  Best Motor Response: 6-->(M6) obeys commands  Best Verbal Response: 5-->(V5) oriented  Media Coma Scale Score: 15  Assessment Qualifiers: no eye obstruction present  Pupil PERRLA: yes     24 hr Temp:  [97.9 °F (36.6 °C)-98.3 °F (36.8 °C)]     CV:   Rhythm: normal sinus rhythm  BP goals:   SBP < 160  MAP > 65    Resp:      Vent Mode: Spont  Set Rate: 20 BPM  Oxygen Concentration (%): 40  Vt Set: 375 mL  PEEP/CPAP: 5 cmH20  Pressure Support: 5 cmH20    Plan: N/A    GI/:     Diet/Nutrition Received: regular  Last Bowel Movement: 05/16/25  Voiding Characteristics: voids spontaneously without difficulty    Intake/Output Summary (Last 24 hours) at 5/21/2025 0514  Last data filed at 5/21/2025 0502  Gross per 24 hour   Intake 246.21 ml   Output 350 ml   Net -103.79 ml     Unmeasured Output  Unmeasured Urine Occurrence: 1  Unmeasured Stool Occurrence: 0  Pad Count: 1    Labs/Accuchecks:  Recent Labs   Lab 05/21/25 0034   WBC 13.45*   RBC 3.28*   HGB 8.9*   HCT 31.3*   *      Recent Labs   Lab 05/21/25 0034      K 4.1   CO2 26      BUN 12   CREATININE 0.6   ALKPHOS 105   ALT 35   AST 32   BILITOT 0.3      Recent Labs   Lab 05/21/25 0034   PROTIME 11.5   INR 1.1   APTT 44.1*    No results for input(s): "CPK", "CPKMB", "TROPONINI", "MB" in the last 168 hours.    Electrolytes: N/A - electrolytes " WDL  Accuchecks: none    Gtts:   dexmedeTOMIDine (Precedex) infusion (titrating)  0-0.6 mcg/kg/hr Intravenous Continuous   Stopped at 05/19/25 1051    heparin (porcine) in D5W  0-40 Units/kg/hr Intravenous Continuous 11 mL/hr at 05/21/25 0502 11 Units/kg/hr at 05/21/25 0502       LDA/Wounds:    Ivan Risk Assessment  Sensory Perception: 4-->no impairment  Moisture: 4-->rarely moist  Activity: 3-->walks occasionally  Mobility: 3-->slightly limited  Nutrition: 3-->adequate  Friction and Shear: 2-->potential problem  Ivan Score: 19  Is your ivan score 12 or less? no          WCTM

## 2025-05-21 NOTE — SUBJECTIVE & OBJECTIVE
Neurologic Chief Complaint: SAH    Subjective:     Interval History: Patient is seen for follow-up neurological assessment and treatment recommendations: See hospital course.     HPI, Past Medical, Family, and Social History remains the same as documented in the initial encounter.     Review of Systems   Constitutional:  Negative for chills and fever.   Eyes:  Negative for visual disturbance.   Neurological:  Negative for speech difficulty, weakness, numbness and headaches.   All other systems reviewed and are negative.    Scheduled Meds:   aspirin  81 mg Oral Daily    atorvastatin  40 mg Oral Daily    niMODipine  60 mg Oral Q4H    polyethylene glycol  17 g Oral BID    pregabalin  75 mg Oral BID    QUEtiapine  50 mg Oral QHS    senna-docusate  2 tablet Oral BID    valACYclovir  1,000 mg Oral Daily    valsartan  80 mg Oral Daily    warfarin  5 mg Oral Daily     Continuous Infusions:   heparin (porcine) in D5W  0-40 Units/kg/hr Intravenous Continuous 11 mL/hr at 05/21/25 1401 11 Units/kg/hr at 05/21/25 1401     PRN Meds:  Current Facility-Administered Medications:     acetaminophen, 650 mg, Oral, Q6H PRN    bisacodyL, 10 mg, Rectal, Daily PRN    diazePAM, 10 mg, Oral, Q6H PRN    hydrALAZINE, 10 mg, Intravenous, Q4H PRN    labetalol, 10 mg, Intravenous, Q4H PRN    melatonin, 6 mg, Oral, Nightly PRN    methocarbamoL, 750 mg, Oral, Q6H PRN    oxyCODONE, 5 mg, Oral, Q4H PRN    sodium chloride 0.9%, 10 mL, Intravenous, PRN    Objective:     Vital Signs (Most Recent):  Temp: 98.2 °F (36.8 °C) (05/21/25 1545)  Pulse: 88 (05/21/25 1545)  Resp: 19 (05/21/25 1623)  BP: 127/84 (05/21/25 1748)  SpO2: 96 % (05/21/25 1545)  BP Location: Left arm    Vital Signs Range (Last 24H):  Temp:  [97.8 °F (36.6 °C)-98.3 °F (36.8 °C)]   Pulse:  []   Resp:  [18-28]   BP: (107-178)/(57-94)   SpO2:  [95 %-100 %]   BP Location: Left arm       Physical Exam  Vitals and nursing note reviewed.   Constitutional:       General: She is sleeping.  She is not in acute distress.     Appearance: Normal appearance.   HENT:      Head: Normocephalic.      Nose: Nose normal.   Eyes:      Extraocular Movements: Extraocular movements intact.      Conjunctiva/sclera: Conjunctivae normal.   Cardiovascular:      Rate and Rhythm: Normal rate.   Pulmonary:      Effort: Pulmonary effort is normal. No respiratory distress.   Skin:     General: Skin is warm.   Neurological:      Mental Status: She is oriented to person, place, and time and easily aroused.      Motor: No weakness.      Comments: See below for neuro exam   Psychiatric:         Behavior: Behavior normal. Behavior is cooperative.              Neurological Exam:   LOC: alert  Attention Span: Good   Language: No aphasia  Articulation: No dysarthria  Orientation: Person, Place, Time   Facial Movement (CN VII): Symmetric facial expression    Motor: Arm left  Normal 5/5  Leg left  Normal 5/5  Arm right  Normal 5/5  Leg right Normal 5/5  Sensation: Intact to light touch, temperature and vibration    Laboratory:  CMP:   Recent Labs   Lab 05/21/25  0034   CALCIUM 9.5   ALBUMIN 2.9*   PROT 7.3      K 4.1   CO2 26      BUN 12   CREATININE 0.6   ALKPHOS 105   ALT 35   AST 32   BILITOT 0.3     BMP:   Recent Labs   Lab 05/21/25 0034      K 4.1      CO2 26   BUN 12   CREATININE 0.6   CALCIUM 9.5     CBC:   Recent Labs   Lab 05/21/25 0034   WBC 13.45*   RBC 3.28*   HGB 8.9*   HCT 31.3*   *   MCV 95   MCH 27.1   MCHC 28.4*     Coagulation:   Recent Labs   Lab 05/21/25 0034   INR 1.1   APTT 44.1*       Diagnostic Results     Brain/Vessel Imaging    CT 5/21/25  Impression:     Interval removal of right frontal EVD with stable appearance of the ventricular system.  No hydrocephalus.     CT 5/20/25  Impression:     No interval detrimental change from comparison CT 05/19/2025.     Evolving right cerebellar infarct with no significant worsening in mass effect or midline shift.  No new areas of acute  infarct or intracranial hemorrhage.     Right frontal coursing extraventricular drain with tip in similar position.  Ventricles are overall stable in size and configuration without evidence for new or worsening hydrocephalus.  CTH without contrast 5/19/2025  Impression:  No evidence of acute detrimental change when compared to prior CT performed 05/16/2025.  Improving mass effect within posterior fossa in this patient with of all vein right cerebellar infarct.  There has been interval re-expansion of the 4th ventricle.  Grossly unchanged size and configuration of the lateral and 3rd ventricles.    CTH without 05/16/2025  Impression:  Compared to prior MR and CT imaging of 05/11/2025, continued maturation of large recent infarction involving the right cerebellum, as discussed.  Similar mass effect in the posterior fossa with essentially unchanged size and configuration of the lateral 3rd ventricles.  No new transependymal CSF egress.  CT  Continued decreased visualization of existing subarachnoid hemorrhage.  No new or enlarging areas of intracranial hemorrhage appreciated.    MRI Brain w/wo 05/15/2025  Impression:  Status post coiling of a PICA aneurysm.  No evidence of residual aneurysm filling.  No high-grade stenosis or other aneurysm identified.    MRI Brain without 05/11/2025  Impression:  Allowing for significant distortion examination by artifact from dental metal there is large area acute/recent infarction within the right cerebellum with probable small component in the right paramedian micah.  Please note this is near completely obscured on diffusion and gradient imaging secondary artifact from dental metal.  Mass effect from the area of infarction effacing the 4th ventricle with stable caliber lateral and 3rd ventricles without hydrocephalus with stable right frontal ventricular catheter.  There is T1 hyperintensity within the region of the right superior cerebellar peduncle corresponding to hyperdensity  seen on CT concerning for component of hemorrhagic conversion.  Scattered small volume subarachnoid hemorrhage cerebral sulci.  Clinical correlation and continued follow-up recommended.    CTH without 05/11/2025  Impression:  1. Compared to prior head CT performed 05/06/2025, 20:13 hours, evolving relatively large right cerebellar infarct without macroscopic hemorrhage within the infarct territory.  Questionable hypoattenuation involving the brainstem which may be artifactual given coil artifact, however additional areas of ischemia not excluded.  MRI may be considered for further characterization.  Further effacement of the 4th ventricle since the prior study, however there has been slight decompression of the lateral and 3rd ventricles since the prior exam.  2. Relatively similar subarachnoid and interventricular hemorrhage.  No new or enlarging areas of intracranial hemorrhage appreciated.  This report was flagged in Epic as abnormal.    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  Evolving scattered subarachnoid and intraventricular hemorrhage.  No definite new hemorrhage  Stable right frontal coursing ventricular catheter with slight reduced caliber of the ventricles without evidence for worsening hydrocephalus.  Continued slight crowding cerebral sulci and basilar cisterns with small caliber 4th ventricle.  Question component of inferior extension of the cerebellar tonsils below the foramen magnum.  This could be better assessed with MR imaging if patient compatible.  No significant new abnormal parenchymal attenuation.  Clinical correlation and further evaluation with MRI as warranted.    CTH without 05/06/2025 0949  Impression:  Subarachnoid and intraventricular hemorrhage, similar to recent exam.  Interval placement of right frontal EVD without apparent intracranial complication.  Ventricles remain mildly dilated.    CTA Head and Neck  05/06/2025  Impression:  8.6 mm aneurysm arising off the distal right anterior inferior cerebellar artery.  Distal location suspicious for mycotic aneurysm.  No intracranial large vessel occlusion or hemodynamically significant stenosis.  The cervical carotid and vertebral arteries are patent without hemodynamically significant stenosis.  Right upper lobe, right middle lobe and left lower lobe consolidative opacities.  Endotracheal tube terminates just superior to the charis recommend slight retraction.     CTH without 05/05/2025  Impression:  Subarachnoid hemorrhage.  Moderate volume of intraventricular hemorrhage.  Mild hydrocephalus.  Recommend CTA.  Findings reported by [facility] radiologist at 01:06  All CT scans at this facility use dose modulation, iterative reconstruction, and/or weight based dosing when appropriate to reduce radiation dose to as low as reasonable achievable.      Cardiac Imaging   TTE 05/07/2025    Left Ventricle: The left ventricle is normal in size. Normal wall thickness. Normal wall motion. There is normal systolic function with a visually estimated ejection fraction of 65 - 70%. Ejection fraction is approximately 68%. There is indeterminate diastolic function.    Right Ventricle: The right ventricle is normal in size. Wall thickness is normal. Systolic function is normal.    Left Atrium: Agitated saline study of the atrial septum is negative after vasalva maneuver, suggesting absence of intracardiac shunt at the atrial level. No patent foramen ovale confirmed by agitated saline contrast.    Tricuspid Valve: There is mild to moderate regurgitation.    IVC/SVC: Patient is ventilated, cannot use IVC diameter to estimate right atrial pressure.

## 2025-05-21 NOTE — SUBJECTIVE & OBJECTIVE
Interval History: 5/21: EVD removed yesterday. CTH this am stable after removal. Patient doing well without issues. PBD 15 today, plan to step down to floor    Medications:  Continuous Infusions:   dexmedeTOMIDine (Precedex) infusion (titrating)  0-0.6 mcg/kg/hr Intravenous Continuous   Stopped at 05/19/25 1051    heparin (porcine) in D5W  0-40 Units/kg/hr Intravenous Continuous 11 mL/hr at 05/21/25 0602 11 Units/kg/hr at 05/21/25 0602     Scheduled Meds:   aspirin  81 mg Oral Daily    atorvastatin  40 mg Oral Daily    niMODipine  60 mg Oral Q4H    polyethylene glycol  17 g Oral BID    pregabalin  75 mg Oral BID    QUEtiapine  50 mg Oral QHS    senna-docusate  2 tablet Oral BID    valACYclovir  1,000 mg Oral Daily    valsartan  80 mg Oral Daily    warfarin  5 mg Oral Daily     PRN Meds:  Current Facility-Administered Medications:     acetaminophen, 650 mg, Oral, Q6H PRN    bisacodyL, 10 mg, Rectal, Daily PRN    diazePAM, 10 mg, Oral, Q6H PRN    hydrALAZINE, 10 mg, Intravenous, Q4H PRN    labetalol, 10 mg, Intravenous, Q4H PRN    magnesium oxide, 800 mg, Oral, PRN    magnesium oxide, 800 mg, Oral, PRN    melatonin, 6 mg, Oral, Nightly PRN    methocarbamoL, 750 mg, Oral, Q6H PRN    oxyCODONE, 5 mg, Oral, Q4H PRN    potassium bicarbonate, 35 mEq, Oral, PRN    potassium bicarbonate, 50 mEq, Oral, PRN    potassium bicarbonate, 60 mEq, Oral, PRN    potassium, sodium phosphates, 2 packet, Oral, PRN    potassium, sodium phosphates, 2 packet, Oral, PRN    potassium, sodium phosphates, 2 packet, Oral, PRN    sodium chloride 0.9%, 10 mL, Intravenous, PRN     Review of Systems  Objective:     Weight: 100.2 kg (220 lb 14.4 oz)  Body mass index is 39.13 kg/m².  Vital Signs (Most Recent):  Temp: 97.9 °F (36.6 °C) (05/21/25 0302)  Pulse: 88 (05/21/25 0602)  Resp: (!) 22 (05/21/25 0713)  BP: 134/75 (05/21/25 0602)  SpO2: 97 % (05/21/25 0602) Vital Signs (24h Range):  Temp:  [97.9 °F (36.6 °C)-98.3 °F (36.8 °C)] 97.9 °F (36.6  °C)  Pulse:  [] 88  Resp:  [15-47] 22  SpO2:  [90 %-100 %] 97 %  BP: (107-178)/(57-94) 134/75                                  ICP/Ventriculostomy 05/06/25 0920 Right Parietal region (Active)   Level of Ventriculostomy (cm above) 10 05/15/25 0301   Status Open to drainage 05/16/25 0701   Site Assessment Clean;Dry 05/16/25 0701   Site Drainage No drainage 05/16/25 0701   Waveform normal waveform 05/15/25 1901   Output (mL) 19 mL 05/16/25 1001   CSF Color yellow 05/16/25 0701   Dressing Status Clean;Dry;Intact 05/16/25 0701   Interventions HOB degrees;level adjusted per order;zeroed 05/16/25 0701       Female External Urinary Catheter w/ Suction 05/07/25 1732 (Active)   Skin no redness;no breakdown;perineum cleansed w/ soap and water;female external urine collection device repositioned 05/16/25 0701   Tolerance no signs/symptoms of discomfort 05/16/25 0701   Suction Continuous suction at 70 mmHg 05/15/25 1901   Date of last wick change 05/15/25 05/15/25 1901   Time of last wick change 0801 05/15/25 0701   Output (mL) 550 mL 05/16/25 0801          Physical Exam         Neurosurgery Physical Exam  [id number]  alert, Ox4  Cranial nerves intact  Follows commands x4 grossly full strength throughout  SILT  R dysmetria     EVD Incision clean/monocryl suture in place      Significant Labs:  Recent Labs   Lab 05/20/25 0124 05/21/25 0034   * 123*    142   K 3.9 4.1    106   CO2 25 26   BUN 13 12   CREATININE 0.7 0.6   CALCIUM 9.3 9.5   MG 2.0 1.8     Recent Labs   Lab 05/20/25 0124 05/21/25 0034   WBC 12.66 13.45*   HGB 8.4* 8.9*   HCT 29.7* 31.3*   * 503*     Recent Labs   Lab 05/20/25 0124 05/21/25  0034   INR 1.1 1.1   APTT 39.5* 44.1*     Microbiology Results (last 7 days)       Procedure Component Value Units Date/Time    CSF culture [[id number]] Collected: 05/19/25 0948    Order Status: Completed Specimen: CSF (Spinal Fluid) from CSF Tap, Tube 3 Updated: 05/20/25 0758     CULTURE, CSF No  Growth To Date     GRAM STAIN Cytospin indicates:      No WBCs      No organisms seen    Gram stain [[id number]] Collected: 05/19/25 0948    Order Status: Canceled Specimen: CSF (Spinal Fluid) from CSF Tap, Tube 3 Updated: 05/19/25 1411    CSF culture [[id number]] Collected: 05/12/25 1729    Order Status: Completed Specimen: CSF (Spinal Fluid) from CSF Tap, Tube 3 Updated: 05/18/25 0703     CULTURE, CSF No Growth     GRAM STAIN Rare WBC seen      No organisms seen          All pertinent labs from the last 24 hours have been reviewed.    Significant Diagnostics:  CT: CT Head Without Contrast  Result Date: 5/16/2025  Compared to prior MR and CT imaging of 05/11/2025, continued maturation of large recent infarction involving the right cerebellum, as discussed. Similar mass effect in the posterior fossa with essentially unchanged size and configuration of the lateral 3rd ventricles.  No new transependymal CSF egress.  CT Continued decreased visualization of existing subarachnoid hemorrhage.  No new or enlarging areas of intracranial hemorrhage appreciated. Electronically signed by: Mich Downs Date:    05/16/2025 Time:    07:03    I have reviewed all pertinent imaging results/findings within the past 24 hours.

## 2025-05-21 NOTE — PT/OT/SLP PROGRESS
"Speech Language Pathology Treatment    Patient Name:  Waldo Emmanuel   MRN:  [id number]  Admitting Diagnosis: Nontraumatic subarachnoid hemorrhage from other intracranial arteries    Recommendations:                 General Recommendations:  Cognitive-linguistic therapy  Diet recommendations:  Regular Diet - IDDSI Level 7, Liquid Diet Level: Thin liquids - IDDSI Level 0   Aspiration Precautions: Standard aspiration precautions   General Precautions: Standard, fall  Communication strategies:  provide increased time to answer    Assessment:     Waldo Emmanuel is a 49 y.o. female with an SLP diagnosis of Cognitive-Linguistic Impairment.  She presents with waxing/waning alertness.    Subjective     SLP reviewed Pt with RN, RN cleared for tx  Pt presents lethargic  She explains, "I just did today" (re: on her phone/tablet.)    Pain/Comfort:   Pt endorsed headache pain but did not rate, RN aware     Respiratory Status: Room air    Objective:     Has the patient been evaluated by SLP for swallowing?   Yes  Keep patient NPO? No     Patient found asleep in bed. She woke per simple verbal cues. She demonstrated minimal sustained eye contact and delayed response time. She repositioned herself upright in bed.  Her voice was clear with low intensity. She denied difficulty with meals or medications. She denied difficulty with reading/writing on her phone. She endorsed change in penmanship.  She completed fx writing tasks for name and phone number with dominant R hand I'ly. She demonstrated mild difficulty with clock drawing task as c/b incorrect hand placement. She was aware of difficulty and self-corrected on her own. She endorsed ongoing sensitivity to light and kept her eyes closed intermittently t/o session.  She was educated on ongoing safety precautions (call light/fall precautions), recommendations for ongoing assistance with any time/math management tasks and ongoing SLP POC. No additional questions. She " continued to transition to sleep state when not provided consistent, max verbal cues. No family present. RN in doorway for transport and Pt remained in bed with call light in reach upon SLP exit/handoff with RN.     Goals:   Multidisciplinary Problems       SLP Goals          Problem: SLP    Goal Priority Disciplines Outcome   SLP Goal     SLP Progressing   Description: Speech Language Pathology Goals  Goals expected to be met by 5/21/25    1. TOlerate regular diet with thin liquids with no s/s of airway compromise  2.  Assess functional reading and writing skills.    3.  Respond to problem solving/categorization tasks with 90% accuracy                     Plan:     Patient to be seen:  4 x/week   Plan of Care expires:  06/07/25  Plan of Care reviewed with:  patient   SLP Follow-Up:  Yes       Discharge recommendations:  High Intensity Therapy     Time Tracking:     SLP Treatment Date:   05/21/25  Speech Start Time:  1358  Speech Stop Time:  1410     Speech Total Time (min):  12 min    Billable Minutes: Speech Therapy Individual 12    05/21/2025

## 2025-05-21 NOTE — PLAN OF CARE
Recommendations  --Continue Regular diet as tolerated and clinically indicated   --Encourage good intakes   --Nursing: please continue to document % meal eaten on flowsheet   --RD to monitor weight, PO intake     Goals: Meet % EEN/EPN by next RD follow-up  Nutrition Goal Status: goal met  Communication of RD Recs:  (POC)

## 2025-05-21 NOTE — PROGRESS NOTES
[facility] - Neuro Critical Care  Neurocritical Care  Progress Note    Admit Date: 5/6/2025  Service Date: 05/21/2025  Length of Stay: 15    Subjective:     Chief Complaint: Nontraumatic subarachnoid hemorrhage from other intracranial arteries    History of Present Illness: History obtained via chart review and daughter at bedside as patient intubated on arrival.     Per ED note:  49 y.o. female, PMH HTN and anemia,  presenting as a transfer for neurosurgical evaluation with newly diagnosed ICH from outside hospital.  Patient was transferred from [facility].  Daughter at bedside helps provide history.  She states that she was at a grocery store when she had a syncopal event.  She was unsure if she hit her head.  She states that people on the scene called her to let her know what happened and after she was seen in the ED they told her that she had bleeding inside of her head.  She states that while in the ED her mother was answering questions appropriately and acting like her normal self except frequently falling asleep.  Patient was intubated for airway protection prior to arrival.  Patient and reversal with [facility] prior to transfer.  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 Distal location suspicious for mycotic aneurysm     On arrival to [facility]: Neursurgery consulted, EVD placed and admitted to neurocritical care    Hospital Course: 5/7/2025: extubated today to NC, SBP liberalized to 220, d/c jay cath,   5/8/2025: EVD per NSSGY, TCD's no vasospasm, DVT prophylaxis initiated, pending to hear from NSGY team when ok to start AC  05/09/2025: repeat CXR today, plan for CT chest, resp cx , add IS, EVD @10 per NSGY, TCDs today no vasospasm  5/10: Discussed with Dr. Chase and Neurosurgery, Heparin gtt started for R brachial DVT and hx arterial occlusion, will need repeat CTH when therapeutic. Hold off on  transitioning to Coumadin until cleared by NSGY. CT Chest negative for DVT, but concerning for PNA. Repeat sputum cx sent. Regular diet started. Repeat Na improved to 138.   5/11: right cerebellar CVA on imaging, HTS, MRI, family updated at bedside, place andreia, hold heparin gtt   05/12/2025 EVD @ 10. SOC watch. PBD7- plan for MRA c/s contrast for reevaluation of coiled aneurysm. Dc keppra. TCDs negative for spasm. Given 500NS for euvolemia. Given 3% HTS 250ml bolus and continue 2% HTS gtt @ 75. Na checks q6h for goal > 145. Started valsartan 80. Lower extremity arterial US ordered given hx RLE arterial occlusion, plan to restart heparin gtt pending results. SLP consult for diet, plan for FEES tomorrow. Started lovenox. Started seoquel 25qHS. Resp cx w GNR, many GPCs- has low grade temps possibly 2/2 DVT and stable mild leukocytosis so will hold off on abx for now.   05/13/2025 Delirium overnight requiring zyprexa x2. Restarted on precedex today and increased seroquel to 50qHS. Required 500NS bolus for hypotension after zyprexa last night. RLE arterial US negative for occlusion, only showing moderate stenosis. Decided not to continue heparin gtt given brachial DVT is in upper extremity. Will start plavix for DAPT. Respiratory cx w Klebsiella aerogenes x2, but not symptomatic. Transitioned from Ancef to cefepime. Bedside FEES completed, started regular thin diet. Given 3%  bolus x2 for Na goal > 145 and remains on 2% HTS @ 75. Prn valium for neck and back pain.   Pt has R brachial DVT and LUE 2% gtt infiltrated PIV and given hyaluronidase. L femoral CVC placed as pt had no access.   05/14/2025 Given 3%HTS bolus for Na > 145. TCDs negative for spasm. CXR reviewed. Increase cefepime course to 7d. Unable to obtain MRA while on precedex 1.4 and given versed due to pt restlessness and difficulty to sedate. Ordered MRA w anesthesia, likely tomorrow. NPO midnight. Sending full hypercoaguable w/u- likely start minimal  intensity heparin gtt after sending off w/u.  5/15/2025: MRA brain w/wout con today, switched plavix to baby ASA, follow CT head tomorrow AM, increase 2% gtt to 100 ml/hr (Na goal > 145), Zyprexa once PRN available,  05/16/2025 D/c 2% and sodium goal. Max precedex to 0.6. Add miralax. Complaints of bilateral intermittent ear pain (R>L). Trial steroids.   05/17/2025 Patient clamped EVD overnight and later attempted to get out of bed on her own to shower. 30 cc of output. NSGY aware. NSGY raised EVD from 10 to 20. Add pregabalin for nerve pain.   05/18/2025 Continue EVD @ 20. Patient declining seroquel. Continue precedex and encouragement of seroquel.   5/19/2025: EVD clapmped today per NSGY follow up CT head tomorrow AM, repeat US upper extremities, TCDs yesterday no vasospasm  05/20/2025 EVD removed today.  CTH overnight.  Will begin warfarin this evening, and continue heparin until INR therapeutic.   05/21/2025 Continue heparin while bridging to warfarin.  Daily and INR.  Transfer to [facility] today. Discussed with team.      Interval History: See hospital course.     Review of Systems: + headache, + neck pain     Vitals:   Temp: 97.8 °F (36.6 °C)  Pulse: 100  Rhythm: normal sinus rhythm  BP: 133/66  MAP (mmHg): 107  Resp: (!) 22  SpO2: 97 %    Temp  Min: 97.8 °F (36.6 °C)  Max: 98.3 °F (36.8 °C)  Pulse  Min: 76  Max: 102  BP  Min: 107/57  Max: 178/86  MAP (mmHg)  Min: 79  Max: 113  Resp  Min: 15  Max: 47  SpO2  Min: 90 %  Max: 100 %    05/20 0701 - 05/21 0700  In: 666.2 [P.O.:420; I.V.:246.2]  Out: 350 [Urine:350]   Unmeasured Output  Unmeasured Urine Occurrence: 1  Unmeasured Stool Occurrence: 1  Pad Count: 1     Examination:   Constitutional: Well-nourished and -developed. No apparent distress.   Eyes: Conjunctiva clear, anicteric. Lids no lesions.  Head/Ears/Nose/Mouth/Throat/Neck: Moist mucous membranes. External ears, nose atraumatic.   Cardiovascular: Regular rhythm. No murmurs. No leg edema.  Respiratory:  "Comfortable respirations. Clear to auscultation.  Gastrointestinal: No hernia. Soft, nondistended, nontender. + bowel sounds.    Neurologic:  -GCS E3V5M6  -Alert. Oriented to person, place, and time. Speech fluent. Follows commands.  -PERRL  -Motor URENA, AG  -SILT      Medications:   Continuousheparin (porcine) in D5W, Last Rate: 11 Units/kg/hr (05/21/25 1238)    Scheduledaspirin, 81 mg, Daily  atorvastatin, 40 mg, Daily  niMODipine, 60 mg, Q4H  polyethylene glycol, 17 g, BID  pregabalin, 75 mg, BID  QUEtiapine, 50 mg, QHS  senna-docusate, 2 tablet, BID  valACYclovir, 1,000 mg, Daily  valsartan, 80 mg, Daily  warfarin, 5 mg, Daily    PRNacetaminophen, 650 mg, Q6H PRN  bisacodyL, 10 mg, Daily PRN  diazePAM, 10 mg, Q6H PRN  hydrALAZINE, 10 mg, Q4H PRN  labetalol, 10 mg, Q4H PRN  magnesium oxide, 800 mg, PRN  magnesium oxide, 800 mg, PRN  melatonin, 6 mg, Nightly PRN  methocarbamoL, 750 mg, Q6H PRN  oxyCODONE, 5 mg, Q4H PRN  potassium bicarbonate, 35 mEq, PRN  potassium bicarbonate, 50 mEq, PRN  potassium bicarbonate, 60 mEq, PRN  potassium, sodium phosphates, 2 packet, PRN  potassium, sodium phosphates, 2 packet, PRN  potassium, sodium phosphates, 2 packet, PRN  sodium chloride 0.9%, 10 mL, PRN       Today I independently reviewed pertinent medications, lines/drains/airways, imaging, cardiology results, laboratory results, microbiology results,      ISTAT: No results for input(s): "PH", "PCO2", "PO2", "POCSATURATED", "HCO3", "BE", "POCNA", "POCK", "POCTCO2", "POCGLU", "POCICA", "POCLAC", "SAMPLE" in the last 24 hours.   Chem:   Recent Labs   Lab 05/21/25  0034      K 4.1      CO2 26   *   BUN 12   CREATININE 0.6   CALCIUM 9.5   MG 1.8   PHOS 3.2   ANIONGAP 10   PROT 7.3   ALBUMIN 2.9*   BILITOT 0.3   ALKPHOS 105   AST 32   ALT 35     Heme:   Recent Labs   Lab 05/21/25  0034   WBC 13.45*   HGB 8.9*   HCT 31.3*   *   INR 1.1     Endo: No results for input(s): "POCTGLUCOSE" in the last 24 hours. "       Assessment/Plan:     Neuro  * Nontraumatic subarachnoid hemorrhage from cerebellar artery  Patient is a 49-year-old female with past medical history of hypertension presenting via transfer from [place] for higher level of care after being diagnosed with subarachnoid hemorrhage.    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  S/p EVD 5/6  S/p Coil emolization 5/6  CTH 5/11 with new R PICA/ superior cerebellar infarct.    MRA 5/16 without evidence of residual aneurysm filling    - EVD open at 10-->20  - Neurosurgery, vascular neurology consulted  - SBP< 160   - nimodipine 60 mg q4h   - daily TCDs - negative for vasospasm thus far  - Aspirin 81 and Plavix 75 qd  - atorvastatin 40 mg  - euvolemia  - SCDs; lovenox for VTE ppx  - Delirium precautions.   - Precedex gtt   - PT/OT  EVD removed 5/21  CTH overnight stable       Brain edema  See cerebellar stroke     Cerebellar stroke, acute  CT on 5/11 revealed R cerebellar infarct. Likely uriah-angio  Family aware and imaging reviewed  Atorvastatin 40  ASA 81 mg daily  Heparin infusion, transition to warfarin today  Concern for brain compression in post fossa   NSGY following       Acute encephalopathy  See primary problem    Obstructive hydrocephalus  EVD removed 5/20    Cardiac/Vascular  Essential hypertension  SBP <160  PRN labetalol, hydralazine  Valsartan 80 mg    Hematology  History of DVT in adulthood  Pt has a hx of RLE DVT and was started on xarelto. 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 She has been seen by heme onc in the past.      Warfarin currently held  Pt was on a Heparin gtt for for R brachial DVT and hx arterial occlusion. 5/11/25 CTH showed large R cerebellar infarct and heparin gtt was held on 5/11. Lower extremity arterial US was negative for occlusion, only showing moderate stenosis (50-75%). BLE DVT US was also negative for DVT. Decided not to  continue heparin gtt given brachial DVT is in upper extremity. Was only continuing with DAPT and then lovenox for VTE ppx  Sending full hypercoaguable w/u   Continue minimal intensity heparin gtt, begin warfarin today and continue both until INR therapeutic  Daily INR and APTT  CTH when INR therapeutic          The patient is being Prophylaxed for:  Venous Thromboembolism with: Chemical  Stress Ulcer with: Not Applicable   Ventilator Pneumonia with: not applicable    Activity Orders            Diet Adult Regular: Regular starting at 05/15 2025    Progressive Mobility Protocol (mobilize patient to their highest level of functioning at least twice daily) starting at 05/06 2000    Turn patient starting at 05/06 0800          Full Code    Katie Yip PA-C  Neurocritical Care  [facility] - Neuro Critical Care

## 2025-05-21 NOTE — ASSESSMENT & PLAN NOTE
Pt has a hx of RLE DVT and was started on xarelto. 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 She has been seen by heme onc in the past.      Warfarin currently held  Pt was on a Heparin gtt for for R brachial DVT and hx arterial occlusion. 5/11/25 CTH showed large R cerebellar infarct and heparin gtt was held on 5/11. Lower extremity arterial US was negative for occlusion, only showing moderate stenosis (50-75%). BLE DVT US was also negative for DVT. Decided not to continue heparin gtt given brachial DVT is in upper extremity. Was only continuing with DAPT and then lovenox for VTE ppx  Sending full hypercoaguable w/u   Continue minimal intensity heparin gtt, begin warfarin today and continue both until INR therapeutic  Daily INR and APTT  CTH when INR therapeutic

## 2025-05-21 NOTE — PROGRESS NOTES
[facility] - Neuro Critical Care  Neurosurgery  Progress Note    Subjective:     History of Present Illness: 49f presenting after syncope with CTH demonstrating SAH. CTA without clear vascular malformation. Intubated prior to arrival to [facility] ED. Discussed expected hospital course and imaging with daughter in room    Post-Op Info:  Procedure(s) (LRB):  MRI (Magnetic Resonance Imagine) (N/A)   6 Days Post-Op   Interval History: 5/21: EVD removed yesterday. CTH this am stable after removal. Patient doing well without issues. PBD 15 today, plan to step down to floor    Medications:  Continuous Infusions:   dexmedeTOMIDine (Precedex) infusion (titrating)  0-0.6 mcg/kg/hr Intravenous Continuous   Stopped at 05/19/25 1051    heparin (porcine) in D5W  0-40 Units/kg/hr Intravenous Continuous 11 mL/hr at 05/21/25 0602 11 Units/kg/hr at 05/21/25 0602     Scheduled Meds:   aspirin  81 mg Oral Daily    atorvastatin  40 mg Oral Daily    niMODipine  60 mg Oral Q4H    polyethylene glycol  17 g Oral BID    pregabalin  75 mg Oral BID    QUEtiapine  50 mg Oral QHS    senna-docusate  2 tablet Oral BID    valACYclovir  1,000 mg Oral Daily    valsartan  80 mg Oral Daily    warfarin  5 mg Oral Daily     PRN Meds:  Current Facility-Administered Medications:     acetaminophen, 650 mg, Oral, Q6H PRN    bisacodyL, 10 mg, Rectal, Daily PRN    diazePAM, 10 mg, Oral, Q6H PRN    hydrALAZINE, 10 mg, Intravenous, Q4H PRN    labetalol, 10 mg, Intravenous, Q4H PRN    magnesium oxide, 800 mg, Oral, PRN    magnesium oxide, 800 mg, Oral, PRN    melatonin, 6 mg, Oral, Nightly PRN    methocarbamoL, 750 mg, Oral, Q6H PRN    oxyCODONE, 5 mg, Oral, Q4H PRN    potassium bicarbonate, 35 mEq, Oral, PRN    potassium bicarbonate, 50 mEq, Oral, PRN    potassium bicarbonate, 60 mEq, Oral, PRN    potassium, sodium phosphates, 2 packet, Oral, PRN    potassium, sodium phosphates, 2 packet, Oral, PRN    potassium, sodium phosphates, 2 packet, Oral, PRN    sodium  chloride 0.9%, 10 mL, Intravenous, PRN     Review of Systems  Objective:     Weight: 100.2 kg (220 lb 14.4 oz)  Body mass index is 39.13 kg/m².  Vital Signs (Most Recent):  Temp: 97.9 °F (36.6 °C) (05/21/25 0302)  Pulse: 88 (05/21/25 0602)  Resp: (!) 22 (05/21/25 0713)  BP: 134/75 (05/21/25 0602)  SpO2: 97 % (05/21/25 0602) Vital Signs (24h Range):  Temp:  [97.9 °F (36.6 °C)-98.3 °F (36.8 °C)] 97.9 °F (36.6 °C)  Pulse:  [] 88  Resp:  [15-47] 22  SpO2:  [90 %-100 %] 97 %  BP: (107-178)/(57-94) 134/75                                  ICP/Ventriculostomy 05/06/25 0920 Right Parietal region (Active)   Level of Ventriculostomy (cm above) 10 05/15/25 0301   Status Open to drainage 05/16/25 0701   Site Assessment Clean;Dry 05/16/25 0701   Site Drainage No drainage 05/16/25 0701   Waveform normal waveform 05/15/25 1901   Output (mL) 19 mL 05/16/25 1001   CSF Color yellow 05/16/25 0701   Dressing Status Clean;Dry;Intact 05/16/25 0701   Interventions HOB degrees;level adjusted per order;zeroed 05/16/25 0701       Female External Urinary Catheter w/ Suction 05/07/25 1732 (Active)   Skin no redness;no breakdown;perineum cleansed w/ soap and water;female external urine collection device repositioned 05/16/25 0701   Tolerance no signs/symptoms of discomfort 05/16/25 0701   Suction Continuous suction at 70 mmHg 05/15/25 1901   Date of last wick change 05/15/25 05/15/25 1901   Time of last wick change 0801 05/15/25 0701   Output (mL) 550 mL 05/16/25 0801          Physical Exam         Neurosurgery Physical Exam  E4V5M6  alert, Ox4  Cranial nerves intact  Follows commands x4 grossly full strength throughout  SILT  R dysmetria     EVD Incision clean/monocryl suture in place      Significant Labs:  Recent Labs   Lab 05/20/25  0124 05/21/25  0034   * 123*    142   K 3.9 4.1    106   CO2 25 26   BUN 13 12   CREATININE 0.7 0.6   CALCIUM 9.3 9.5   MG 2.0 1.8     Recent Labs   Lab 05/20/25  0124 05/21/25  0034    WBC 12.66 13.45*   HGB 8.4* 8.9*   HCT 29.7* 31.3*   * 503*     Recent Labs   Lab 05/20/25  0124 05/21/25  0034   INR 1.1 1.1   APTT 39.5* 44.1*     Microbiology Results (last 7 days)       Procedure Component Value Units Date/Time    CSF culture [[id number]] Collected: 05/19/25 0948    Order Status: Completed Specimen: CSF (Spinal Fluid) from CSF Tap, Tube 3 Updated: 05/20/25 0758     CULTURE, CSF No Growth To Date     GRAM STAIN Cytospin indicates:      No WBCs      No organisms seen    Gram stain [[id number]] Collected: 05/19/25 0948    Order Status: Canceled Specimen: CSF (Spinal Fluid) from CSF Tap, Tube 3 Updated: 05/19/25 1411    CSF culture [[id number]] Collected: 05/12/25 1729    Order Status: Completed Specimen: CSF (Spinal Fluid) from CSF Tap, Tube 3 Updated: 05/18/25 0703     CULTURE, CSF No Growth     GRAM STAIN Rare WBC seen      No organisms seen          All pertinent labs from the last 24 hours have been reviewed.    Significant Diagnostics:  CT: CT Head Without Contrast  Result Date: 5/16/2025  Compared to prior MR and CT imaging of 05/11/2025, continued maturation of large recent infarction involving the right cerebellum, as discussed. Similar mass effect in the posterior fossa with essentially unchanged size and configuration of the lateral 3rd ventricles.  No new transependymal CSF egress.  CT Continued decreased visualization of existing subarachnoid hemorrhage.  No new or enlarging areas of intracranial hemorrhage appreciated. Electronically signed by: Mich oDwns Date:    05/16/2025 Time:    07:03    I have reviewed all pertinent imaging results/findings within the past 24 hours.  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 CTA with AICA aneurysm R. Intubated prior to arrival to [facility] ED.     S/p R frontal EVD on 5/6 and s/p DSA with coil embolization of R AICA aneurysm 5/6    Post  bleed day 15    Plan:  - Admitted to ICU, ok to step down to floor today, q4h neuro checks  - SAH protocol (euvolemia, SBP liberalized, nimotop, keppra, TCDs)  - MRA completed 5/15 without any residual aneurysmal filling  - MRI 5/11 with R Cb and parmaedian pontine small infarct  - EVD removed 5/20, CTH stable afterwards without any complication  - Daily ASA 81  - No dvt in BLE on ultrasound, R brachial vein dvt+ - on hep gtt - can transition to Eliquis now  - PT/OT/OOB    Dispo: step down, anticipate discharge soon pending PT recs    Discussed with Dr. Barney Gaitan MD  Neurosurgery  [facility] - Neuro Critical Care

## 2025-05-21 NOTE — PROGRESS NOTES
"[facility] - Neuro Critical Care  Adult Nutrition  Progress Note    SUMMARY       Recommendations  --Continue Regular diet as tolerated and clinically indicated   --Encourage good intakes   --Nursing: please continue to document % meal eaten on flowsheet   --RD to monitor weight, PO intake     Goals: Meet % EEN/EPN by next RD follow-up  Nutrition Goal Status: goal met  Communication of RD Recs:  (POC)    Nutrition Discharge Planning    Nutrition Discharge Planning: General healthy diet    Reason for Assessment    Reason For Assessment: RD follow-up  Diagnosis: hemorrhage (SAH (subarachnoid hemorrhage))  General Information Comments:49f presenting after syncope with CTH demonstrating SAH. RD follow-up. Pt doing well without issues, plan to step down to floor. Appetite appears to be improving with 75% PO intake. Insignificant weight loss - no concerns for malnutrition at this time.     Nutrition Related Social Determinants of Health: SDOH: Adequate food in home environment     Food Insecurity: No Food Insecurity (5/10/2025)    Hunger Vital Sign     Worried About Running Out of Food in the Last Year: Never true     Ran Out of Food in the Last Year: Never true       Nutrition/Diet History    Spiritual, Cultural Beliefs, Christianity Practices, Values that Affect Care: no  Food Allergies: NKFA  Factors Affecting Nutritional Intake: None identified at this time    Anthropometrics    Height: 5' 3" (160 cm)  Height (inches): 63 in  Height Method: Stated  Weight: 100.2 kg (220 lb 14.4 oz)  Weight (lb): 220.9 lb  Weight Method: Standard Scale  Ideal Body Weight (IBW), Female: 115 lb  % Ideal Body Weight, Female (lb): 192.09 %  BMI (Calculated): 39.1  BMI Grade: 35 - 39.9 - obesity - grade II       Lab/Procedures/Meds    Pertinent Labs Reviewed: reviewed - glucose 123    Pertinent Medications Reviewed: reviewed - aspirin, atorvastatin, bowel reg, warfarin     Estimated/Assessed Needs    Weight Used For Calorie Calculations: " 100.2 kg (220 lb 14.4 oz)  Energy Calorie Requirements (kcal): 1591 kcal/day (x 1.0 AF)  Energy Need Method: Kelly Chanor  Protein Requirements: 105 g/day (2.0 g/kg IBW)  Weight Used For Protein Calculations: 52.3 kg (115 lb 4.8 oz)     Estimated Fluid Requirement Method: RDA Method  RDA Method (mL): 1591         Nutrition Prescription Ordered    Current Diet Order: Regular    Evaluation of Received Nutrient/Fluid Intake    Energy Calories Required: not meeting needs  Protein Required: not meeting needs  Fluid Required:  (Per MD)  Comments: LBM 5/21  % Intake of Estimated Energy Needs: 50 - 75 %  % Meal Intake: 50 - 75 %    PES Statement  Inadequate enteral nutrition infusion related to  (Infusion volume not reached) as evidenced by  (Inadequate EN volume compared to estimated requirements)  Status: Resolved    Nutrition Risk    Level of Risk/Frequency of Follow-up: moderate - high     Monitor and Evaluation    Monitor and Evaluation: Energy intake, Food and beverage intake, Diet order, Food and nutrition knowledge, Electrolyte and renal panel, Gastrointestinal profile, Glucose/endocrine profile, Inflammatory profile, Lipid profile, Nutrition focused physical findings, Skin, Weight     Nutrition Follow-Up    RD Follow-up?: Yes

## 2025-05-21 NOTE — PLAN OF CARE
[facility] - Neuro Critical Care  Discharge Reassessment    Primary Care Provider: Ansley Vivas MD    Expected Discharge Date: 5/27/2025    Patient is not medically ready for discharge. Patient is high intensity therapy level. Patient referrals are out for IPR.  Provider notes state that the patient may stepdown to floor today.    Discharge Plan A and Plan B have been determined by review of patient's clinical status, future medical and therapeutic needs, and coverage/benefits for post-acute care in coordination with multidisciplinary team members.         Reassessment (most recent)       Discharge Reassessment - 05/21/25 1426          Discharge Reassessment    Assessment Type Discharge Planning Reassessment     Did the patient's condition or plan change since previous assessment? No     Communicated ELIEL with patient/caregiver Date not available/Unable to determine     Discharge Plan A Rehab     Discharge Plan B Home with family;Home Health     DME Needed Upon Discharge  other (see comments)   tbd    Transition of Care Barriers None     Why the patient remains in the hospital Requires continued medical care        Post-Acute Status    Post-Acute Authorization Placement     Post-Acute Placement Status Referrals Sent     Discharge Delays None known at this time

## 2025-05-21 NOTE — NURSING
Called anesthesia resident for assistance with IV placement as pt on heparin drip and c/o pain/tenderness at site and only has 1 IV. Will come after shift change per resident. Attempted NSCC but they are unable at this time.

## 2025-05-21 NOTE — ASSESSMENT & PLAN NOTE
Patient is a 49-year-old female with past medical history of hypertension presenting via transfer from [place] for higher level of care after being diagnosed with subarachnoid hemorrhage.    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  S/p EVD 5/6  S/p Coil emolization 5/6  CTH 5/11 with new R PICA/ superior cerebellar infarct.    MRA 5/16 without evidence of residual aneurysm filling    - EVD open at 10-->20  - Neurosurgery, vascular neurology consulted  - SBP< 160   - nimodipine 60 mg q4h   - daily TCDs - negative for vasospasm thus far  - Aspirin 81 and Plavix 75 qd  - atorvastatin 40 mg  - euvolemia  - SCDs; lovenox for VTE ppx  - Delirium precautions.   - Precedex gtt   - PT/OT  EVD removed 5/21  CTH overnight stable

## 2025-05-21 NOTE — PT/OT/SLP PROGRESS
Physical Therapy Treatment    Patient Name:  Waldo Emmanuel   MRN:  [id number]    Recent Surgery: Procedure(s) (LRB):  MRI (Magnetic Resonance Imagine) (N/A) 6 Days Post-Op    Recommendations:     Discharge Recommendations:    High intensity therapy  Discharge Equipment Recommendations: bedside commode, bath bench, walker, rolling   Barriers to discharge: Increased level of assist    Highest Level of Mobility: Gait 85' x2 trials  Assistance Required: CGA-min(A) w/ RW     Assessment:     Waldo Emmanuel is a 49 y.o. 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    Pt met seated in bedside chair with daughter present and agreeable to PT treatment. Today's PT treatment focus was on continued gait training to improve endurance and overall function. Pt is A&Ox4, but is intermittently confused and has impaired STM throughout session. She ambulates with CGA-min(A) and RW and is tachycardic (140s) throughout. Pt is a high fall risk at this time.    Pt is progressing towards acute PT goals appropriately and continues to benefit from acute PT sessions.     Rehab Prognosis: Good; patient would benefit from acute skilled PT services to address these deficits and reach maximum level of function.      Plan:     During this hospitalization, patient to be seen 4 x/week to address the identified rehab impairments via gait training, therapeutic activities, therapeutic exercises, neuromuscular re-education and progress toward the following goals:    Plan of Care Expires:  06/11/25    This plan of care has been discussed with the patient/caregiver, who was included in its development and is in agreement with the identified goals and treatment plan.     Subjective     Communicated with RN prior to session.  Patient agreeable to participate.     Pain/Comfort:   0/10    Chief Complaint: SAH  Patient/Family Comments/goals: None stated      Objective:      Patient found up in chair with telemetry, blood pressure cuff, peripheral IV  upon PT entry to room.    General Precautions: Standard, fall   Orthopedic Precautions:N/A   Braces:           Exams:    Cognition:  Patient is oriented to Person, Place, Time, Situation  Follows one-step commands  Insight to deficits/safety awareness: impaired    Functional Mobility:    Bed Mobility:  Not assessed    Transfers:   Sit to Stand Transfer: Contact Guard Assistance  from bedside chair with RW             Gait:  Patient received gait training in hallway 85 feet x2 trials with Contact Guard Assistance and Minimal Assistance and rolling walker  Gait Assessment: occasional unsteady gait, decreased step length, narrow base of support, flexed posture, and decreased tyler  Gait Pattern Observed: Step-to  Comments: All lines remained intact throughout ambulation trial, gait belt utilized, chair follow for patient safety, portable monitor utilized. Pt required 1 seated rest break due to tachycardia. PT provided multiple cues for RW management as pt attempts to ambulate outside OLIVIA of AD    Balance:  Static Stand:   Contact-Guard Assist with Rolling walker  Dynamic Stand:  CGA-min(A) with Rolling walker      Therapeutic Activities/Exercises     Patient assisted with functional mobility as noted above  Patient educated on the importance of early mobility to prevent functional decline during hospital stay  Patient was instructed to utilize staff assistance for mobility/transfers.  Patient is appropriate to transfer with CGA-min(A) and RN/PCT assist  Patient educated on PT POC and role of PT in acute care  White board updated regarding patient's safest level of mobility with staff assistance, RN also updated.     AM-PAC 6 CLICK MOBILITY        DME Justifications:   Waldo requires a commode for home use because she is confined to a single room.   Waldo's mobility limitation cannot be sufficiently resolved by the use of a cane. Her  functional mobility deficit can be sufficiently resolved with the use of a Rolling Walker. Patient's mobility limitation significantly impairs their ability to participate in one of more activities of daily living.  The use of a RW will significantly improve the patient's ability to participate in MRADLS and the patient will use it on regular basis in the home.    Patient left up in chair with all lines intact, call button in reach, chair alarm on, and RN notified.      History/Goals:     PAST MEDICAL HISTORY:  Past Medical History:   Diagnosis Date    ADHD (attention deficit hyperactivity disorder)     Anemia     Fibroids     Genital herpes     GERD (gastroesophageal reflux disease)     H/O total hysterectomy 2021    Hypertension     Labral tear of hip joint     Ovarian cyst     Right common arterial occlusion     Routine general medical examination at a [facility] care facility 2017    Total Right Arterial Occlusion        Past Surgical History:   Procedure Laterality Date    ANGIOGRAM, ABDOMINAL AORTA W/ EXTREMITY RUNOFF N/A 2025    Procedure: Angiogram, Abdominal Aorta W/ Extremity Runoff: Right lower extremity angiogram;  Surgeon: Lennox Zendejas MD;  Location: [facility] CATH LAB;  Service: Vascular;  Laterality: N/A;    CARPAL TUNNEL RELEASE       SECTION      CHOLECYSTECTOMY      DILATION AND CURETTAGE OF UTERUS      MAGNETIC RESONANCE IMAGING N/A 5/15/2025    Procedure: MRI (Magnetic Resonance Imagine);  Surgeon: Obdulia Henning;  Location: [facility];  Service: Anesthesiology;  Laterality: N/A;    TOTAL ABDOMINAL HYSTERECTOMY W/ BILATERAL SALPINGOOPHORECTOMY  2021    menorrhagia       GOALS:   Multidisciplinary Problems       Physical Therapy Goals          Problem: Physical Therapy    Goal Priority Disciplines Outcome Interventions   Physical Therapy Goal     PT, PT/OT Progressing    Description: Goals to be met by: 2025     Patient will increase functional independence with  mobility by performin. Supine to sit with Contact Guard Assistance  2. Sit to supine with Contact Guard Assistance  3. Sit to stand transfer with Contact Guard Assistance  4. Bed to chair transfer with Minimal Assistance using LRAD  5. Gait  x 25 feet with Minimal Assistance using LRAD.   6. Stand for 2 minutes with Contact Guard Assistance using LRAD  7. Lower extremity exercise program x15 reps per handout, with independence                         Time Tracking:     PT Received On: 25  PT Start Time: 1214     PT Stop Time: 1242  PT Total Time (min): 28 min     Billable Minutes: Gait Training 28      Estela Rogers, PT  2025  Pager# [phone]

## 2025-05-22 PROBLEM — R45.1 AGITATION: Status: ACTIVE | Noted: 2025-05-22

## 2025-05-22 PROBLEM — I82.409 ACUTE DVT (DEEP VENOUS THROMBOSIS): Status: ACTIVE | Noted: 2020-07-23

## 2025-05-22 PROBLEM — R41.0 DELIRIUM: Status: ACTIVE | Noted: 2025-05-22

## 2025-05-22 LAB
ABSOLUTE EOSINOPHIL (OHS): 0.11 K/UL
ABSOLUTE MONOCYTE (OHS): 0.78 K/UL (ref 0.3–1)
ABSOLUTE NEUTROPHIL COUNT (OHS): 7.31 K/UL (ref 1.8–7.7)
ALBUMIN SERPL BCP-MCNC: 3 G/DL (ref 3.5–5.2)
ALP SERPL-CCNC: 119 UNIT/L (ref 40–150)
ALT SERPL W/O P-5'-P-CCNC: 24 UNIT/L (ref 10–44)
ANION GAP (OHS): 8 MMOL/L (ref 8–16)
APTT PPP: 25.4 SECONDS (ref 21–32)
APTT PPP: 25.8 SECONDS (ref 21–32)
APTT PPP: 45.6 SECONDS (ref 21–32)
APTT PPP: 50.7 SECONDS (ref 21–32)
AST SERPL-CCNC: 16 UNIT/L (ref 11–45)
BASOPHILS # BLD AUTO: 0.03 K/UL
BASOPHILS NFR BLD AUTO: 0.3 %
BILIRUB SERPL-MCNC: 0.3 MG/DL (ref 0.1–1)
BUN SERPL-MCNC: 11 MG/DL (ref 6–20)
CALCIUM SERPL-MCNC: 9.9 MG/DL (ref 8.7–10.5)
CHLORIDE SERPL-SCNC: 105 MMOL/L (ref 95–110)
CO2 SERPL-SCNC: 26 MMOL/L (ref 23–29)
CREAT SERPL-MCNC: 0.6 MG/DL (ref 0.5–1.4)
ERYTHROCYTE [DISTWIDTH] IN BLOOD BY AUTOMATED COUNT: 15 % (ref 11.5–14.5)
GFR SERPLBLD CREATININE-BSD FMLA CKD-EPI: >60 ML/MIN/1.73/M2
GLUCOSE SERPL-MCNC: 129 MG/DL (ref 70–110)
HCT VFR BLD AUTO: 32.8 % (ref 37–48.5)
HGB BLD-MCNC: 9.9 GM/DL (ref 12–16)
IMM GRANULOCYTES # BLD AUTO: 0.04 K/UL (ref 0–0.04)
IMM GRANULOCYTES NFR BLD AUTO: 0.4 % (ref 0–0.5)
INR PPP: 1.2 (ref 0.8–1.2)
LYMPHOCYTES # BLD AUTO: 2.18 K/UL (ref 1–4.8)
MAGNESIUM SERPL-MCNC: 1.8 MG/DL (ref 1.6–2.6)
MCH RBC QN AUTO: 28.4 PG (ref 27–31)
MCHC RBC AUTO-ENTMCNC: 30.2 G/DL (ref 32–36)
MCV RBC AUTO: 94 FL (ref 82–98)
NUCLEATED RBC (/100WBC) (OHS): 0 /100 WBC
PHOSPHATE SERPL-MCNC: 3.3 MG/DL (ref 2.7–4.5)
PLATELET # BLD AUTO: 495 K/UL (ref 150–450)
PMV BLD AUTO: 9.6 FL (ref 9.2–12.9)
POTASSIUM SERPL-SCNC: 4.2 MMOL/L (ref 3.5–5.1)
PROT SERPL-MCNC: 7.5 GM/DL (ref 6–8.4)
PROTHROMBIN TIME: 12.5 SECONDS (ref 9–12.5)
RBC # BLD AUTO: 3.48 M/UL (ref 4–5.4)
RELATIVE EOSINOPHIL (OHS): 1.1 %
RELATIVE LYMPHOCYTE (OHS): 20.9 % (ref 18–48)
RELATIVE MONOCYTE (OHS): 7.5 % (ref 4–15)
RELATIVE NEUTROPHIL (OHS): 69.8 % (ref 38–73)
SODIUM SERPL-SCNC: 139 MMOL/L (ref 136–145)
WBC # BLD AUTO: 10.45 K/UL (ref 3.9–12.7)

## 2025-05-22 PROCEDURE — 36415 COLL VENOUS BLD VENIPUNCTURE: CPT | Performed by: NEUROLOGICAL SURGERY

## 2025-05-22 PROCEDURE — 97530 THERAPEUTIC ACTIVITIES: CPT

## 2025-05-22 PROCEDURE — 25000003 PHARM REV CODE 250: Performed by: PHYSICIAN ASSISTANT

## 2025-05-22 PROCEDURE — 92507 TX SP LANG VOICE COMM INDIV: CPT

## 2025-05-22 PROCEDURE — 84100 ASSAY OF PHOSPHORUS: CPT | Performed by: PHYSICIAN ASSISTANT

## 2025-05-22 PROCEDURE — 11000001 HC ACUTE MED/SURG PRIVATE ROOM

## 2025-05-22 PROCEDURE — 85730 THROMBOPLASTIN TIME PARTIAL: CPT | Performed by: NEUROLOGICAL SURGERY

## 2025-05-22 PROCEDURE — 99233 SBSQ HOSP IP/OBS HIGH 50: CPT | Mod: ,,, | Performed by: NEUROLOGICAL SURGERY

## 2025-05-22 PROCEDURE — 97116 GAIT TRAINING THERAPY: CPT

## 2025-05-22 PROCEDURE — 36415 COLL VENOUS BLD VENIPUNCTURE: CPT | Performed by: PHYSICIAN ASSISTANT

## 2025-05-22 PROCEDURE — 80053 COMPREHEN METABOLIC PANEL: CPT | Performed by: PHYSICIAN ASSISTANT

## 2025-05-22 PROCEDURE — 85610 PROTHROMBIN TIME: CPT | Performed by: PHYSICIAN ASSISTANT

## 2025-05-22 PROCEDURE — 63600175 PHARM REV CODE 636 W HCPCS: Performed by: PHYSICIAN ASSISTANT

## 2025-05-22 PROCEDURE — 85025 COMPLETE CBC W/AUTO DIFF WBC: CPT | Performed by: PHYSICIAN ASSISTANT

## 2025-05-22 PROCEDURE — 83735 ASSAY OF MAGNESIUM: CPT | Performed by: PHYSICIAN ASSISTANT

## 2025-05-22 PROCEDURE — 25000003 PHARM REV CODE 250: Performed by: NEUROLOGICAL SURGERY

## 2025-05-22 PROCEDURE — 85730 THROMBOPLASTIN TIME PARTIAL: CPT | Performed by: PHYSICIAN ASSISTANT

## 2025-05-22 PROCEDURE — 97535 SELF CARE MNGMENT TRAINING: CPT

## 2025-05-22 RX ORDER — WARFARIN 2.5 MG/1
5 TABLET ORAL DAILY
Status: DISCONTINUED | OUTPATIENT
Start: 2025-05-23 | End: 2025-05-23

## 2025-05-22 RX ORDER — WARFARIN 7.5 MG/1
7.5 TABLET ORAL DAILY
Status: COMPLETED | OUTPATIENT
Start: 2025-05-22 | End: 2025-05-22

## 2025-05-22 RX ADMIN — HEPARIN SODIUM AND DEXTROSE 13 UNITS/KG/HR: 10000; 5 INJECTION INTRAVENOUS at 05:05

## 2025-05-22 RX ADMIN — OXYCODONE 5 MG: 5 TABLET ORAL at 07:05

## 2025-05-22 RX ADMIN — NIMODIPINE 60 MG: 30 CAPSULE, LIQUID FILLED ORAL at 06:05

## 2025-05-22 RX ADMIN — ASPIRIN 81 MG CHEWABLE TABLET 81 MG: 81 TABLET CHEWABLE at 09:05

## 2025-05-22 RX ADMIN — PREGABALIN 75 MG: 75 CAPSULE ORAL at 09:05

## 2025-05-22 RX ADMIN — VALSARTAN 80 MG: 80 TABLET, FILM COATED ORAL at 09:05

## 2025-05-22 RX ADMIN — PREGABALIN 75 MG: 75 CAPSULE ORAL at 08:05

## 2025-05-22 RX ADMIN — NIMODIPINE 60 MG: 30 CAPSULE, LIQUID FILLED ORAL at 08:05

## 2025-05-22 RX ADMIN — NIMODIPINE 60 MG: 30 CAPSULE, LIQUID FILLED ORAL at 01:05

## 2025-05-22 RX ADMIN — DIAZEPAM 10 MG: 5 TABLET ORAL at 08:05

## 2025-05-22 RX ADMIN — WARFARIN SODIUM 7.5 MG: 7.5 TABLET ORAL at 05:05

## 2025-05-22 RX ADMIN — ACETAMINOPHEN 650 MG: 325 TABLET ORAL at 07:05

## 2025-05-22 RX ADMIN — ATORVASTATIN CALCIUM 40 MG: 40 TABLET, FILM COATED ORAL at 09:05

## 2025-05-22 RX ADMIN — VALACYCLOVIR HYDROCHLORIDE 1000 MG: 500 TABLET, FILM COATED ORAL at 09:05

## 2025-05-22 RX ADMIN — QUETIAPINE FUMARATE 50 MG: 25 TABLET ORAL at 08:05

## 2025-05-22 RX ADMIN — NIMODIPINE 60 MG: 30 CAPSULE, LIQUID FILLED ORAL at 09:05

## 2025-05-22 NOTE — PT/OT/SLP PROGRESS
"Speech Language Pathology Treatment    Patient Name:  Waldo Emmanuel   MRN:  [id number]  Admitting Diagnosis: Nontraumatic subarachnoid hemorrhage from other intracranial arteries    Recommendations:                 General Recommendations:  Cognitive-linguistic therapy  Diet recommendations:  Regular Diet - IDDSI Level 7, Liquid Diet Level: Thin liquids - IDDSI Level 0   Aspiration Precautions: Standard aspiration precautions   General Precautions: Standard, fall, vision impaired  Communication strategies:  provide increased time to answer    Assessment:     Waldo Emmanuel is a 49 y.o. female with an SLP diagnosis of Cognitive-Linguistic Impairment and Visio-Spatial Impairment.  She presents with waxing/waning alertness and decreased endurance with reading and visiospatial tasks this service day. ST to continue to follow.     Subjective     Pt presents calm  She explains, "I walked today"     Pain/Comfort:  Pain Rating 1: 0/10    Respiratory Status: Room air    Objective:     Has the patient been evaluated by SLP for swallowing?   Yes  Keep patient NPO? No     Pt unavailable upon am attempt. Upon pm attempt, Pt found asleep in bed. Her daughter was in the room at start of the session then exited as session continued. She recalled events of the day WNL. She explained she felt it was hard to complete fx reading tasks on her phone 2/2 decreased font size. She completed reading comprehension tasks at the sentence level provided larger font WNL. She completed fx math reasoning tasks with average 80% accuracy provided mod visual and verbal cues. Pt was visibly lethargic and required mod-max cues to attend to structured tasks greater than 1 minute in length as session progressed. She was educated on call light/fall precautions, ongoing SLP POC and compensatory strategies for attention though she did not demonstrate understanding secondary to persistent lethargy. She remained in bed with call light in reach, " bed alarm intact, upon SLP exit.        Goals:   Multidisciplinary Problems       SLP Goals          Problem: SLP    Goal Priority Disciplines Outcome   SLP Goal     SLP Progressing   Description: Speech Language Pathology Goals  Goals expected to be met by 5/28/25    1. TOlerate regular diet with thin liquids with no s/s of airway compromise  2.  Assess functional reading and writing skills.    3.  Respond to problem solving/categorization tasks with 90% accuracy                       Plan:     Patient to be seen:  4 x/week   Plan of Care expires:  06/07/25  Plan of Care reviewed with:  patient   SLP Follow-Up:  Yes       Discharge recommendations:  High Intensity Therapy     Time Tracking:     SLP Treatment Date:   05/22/25  Speech Start Time:  1513  Speech Stop Time:  1525     Speech Total Time (min):  12 min    Billable Minutes: Speech Therapy Individual 12    05/22/2025

## 2025-05-22 NOTE — PLAN OF CARE
Tray Srivastava - Neurosurgery ([facility])  Discharge Reassessment    Primary Care Provider: Ansley Vivas MD    Expected Discharge Date: 5/27/2025    Reassessment (most recent)       Discharge Reassessment - 05/22/25 1345          Discharge Reassessment    Assessment Type Discharge Planning Reassessment     Did the patient's condition or plan change since previous assessment? No     Discharge Plan discussed with: Adult children;Patient   Tj Castaneda (daughter) [phone]    Communicated ELIEL with patient/caregiver Yes     Discharge Plan A Rehab   [facility]    Discharge Plan B Home Health     DME Needed Upon Discharge  other (see comments)   TBD    Transition of Care Barriers None     Why the patient remains in the hospital Requires continued medical care        Post-Acute Status    Post-Acute Authorization Placement     Post-Acute Placement Status Pending payor review/awaiting authorization (if required)                     Discharge Plan A and Plan B have been determined by review of patient's clinical status, future medical and therapeutic needs, and coverage/benefits for post-acute care in coordination with multidisciplinary team members.       Patient and her daughter were informed that [facility] has accepted Ms. Emmanuel and they will submit for insurance authorization. Patient's daughter Tj requested that her mother be screened for Medicaid. Email sent to [facility] for screening.      Yudith Bird RN  Ext [phone]

## 2025-05-22 NOTE — CARE UPDATE
I have reviewed the chart of Waldo Emmanuel who is hospitalized for the following:    Active Hospital Problems    Diagnosis    *Nontraumatic subarachnoid hemorrhage from cerebellar artery    Agitation    Delirium     Delirium overnight requiring zyprexa x2. Restarted on precedex today and increased seroquel to 50qHS.       Herpes simplex virus (HSV) infection    Arterial occlusion, lower extremity    Cerebellar stroke, acute    Brain edema    History of DVT in adulthood    Brain aneurysm    Brain compression    Obstructive hydrocephalus    Acute encephalopathy    Acute DVT (deep venous thrombosis)     R brachial vein dvt+   On hep gtt with bridge to coumadin      Essential hypertension        Libertad Aponte NP  Unit Based [facility]

## 2025-05-22 NOTE — CONSULTS
PHARMACY CONSULT NOTE: WARFARIN  Waldo Emmanuel is a 49 y.o. female on warfarin therapy for DVT. PharmD has been consulted for warfarin dosing.    Current order: 5 mg daily  Home dose: 5 mg daily except 2.5 mg T/Th  Coumadin clinic enrollment: Active  INR goal: 2-3    Lab Results   Component Value Date    INR 1.2 05/22/2025    INR 1.1 05/21/2025    INR 1.1 05/20/2025    PROTIME 12.5 05/22/2025    PROTIME 11.5 05/21/2025    PROTIME 11.7 05/20/2025     Significant drug interactions: none at this time  Diet: Adult Regular without supplements     Recommendation(s):   Per notes, patient previously was on Xarelto for RLE DVT but developed a R iliac arterial occlusion, failing anti-Xa treatment  Per review of anticoagulation clinic notes, it appears patient has done pretty well with the 5 mg daily except 2.5 mg T/Th regimen  Will plan to give 7.5 mg today instead of 5 mg given the INR still trending low  Recommend to continue with heparin drip until INR 2 or higher and to c/w daily INR labs  Pharmacy will continue to follow and monitor warfarin    Thank you for the consult,  Jordan Rivas, PharmD, [facility]   Extension [phone]    **Note: Consults are reviewed Monday-Friday 7:00am-3:30pm. THE ABOVE RECOMMENDATIONS ARE ONLY SUGGESTED.THE RECOMMENDATIONS SHOULD BE CONSIDERED IN CONJUNCTION WITH ALL PATIENT FACTORS. **

## 2025-05-22 NOTE — PT/OT/SLP PROGRESS
Occupational Therapy   Treatment    Name: Waldo Emmanuel  MRN: [id number]  Admitting Diagnosis:  Nontraumatic subarachnoid hemorrhage from other intracranial arteries  7 Days Post-Op    Recommendations:     Discharge Recommendations: High Intensity Therapy  Discharge Equipment Recommendations:  bedside commode, bath bench, walker, rolling  Barriers to discharge:  None    Assessment:     Waldo Emmanuel is a 49 y.o. 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  She presents with decrease functional status secondary to medical diagnosis. Performance deficits affecting function are weakness, impaired balance, gait instability, impaired endurance, impaired functional mobility, impaired self care skills, decreased lower extremity function, decreased upper extremity function. Patient with good tolerance to OT session demonstrating increased ability to complete functional tasks w/o use of RW. Patient requiring minimal assistance via hand held assistance due to gait instability, patient attempting to wall walk and utilize furniture for stability. Patient verbalizing want to shower, OT completed shower transfer training and safety education, patient safe to transfer to shower with one person assistance for safety. Patient remains appropriate candidate for acute OT services.     Rehab Prognosis:  Good; patient would benefit from acute skilled OT services to address these deficits and reach maximum level of function.       Plan:     Patient to be seen 4 x/week to address the above listed problems via self-care/home management, therapeutic activities, therapeutic exercises, neuromuscular re-education  Plan of Care Expires: 06/11/25  Plan of Care Reviewed with: patient    Subjective     Chief Complaint: none   Patient/Family Comments/goals: DC   Pain/Comfort:  Pain Rating 1: 0/10    Objective:     Communicated with: RN prior to session.  Patient found  supine with telemetry, blood pressure cuff, peripheral IV upon OT entry to room.    General Precautions: Standard, fall    Orthopedic Precautions:N/A  Braces: N/A  Respiratory Status: Room air     Occupational Performance:     Bed Mobility:    Patient completed Rolling/Turning to Right with stand by assistance  Patient completed Scooting/Bridging with stand by assistance  Patient completed Supine to Sit with minimum assistance for trunk management     Functional Mobility/Transfers:  Patient completed Sit <> Stand Transfer with minimum assistance  with  hand-held assist x1 from bed x1 from toilet   Functional Mobility:   Patient ambulated in hospital room with minimal assistance via hand held assist   Patient completed toilet transfer with minimal assistance via hand held assist   Patient completed shower transfer with minimal assistance and hand held assist     Activities of Daily Living:  Grooming: stand by assistance at sink to complete oral care and facial grooming  Toileting: stand by assistance to complete voiding at toilet       [facility] 6 Click ADL: 18    Treatment & Education:  Provided education regarding role of OT, POC, & discharge recommendations.  Pt with no further questions/concerns at this time. OT provided education on home recommendations and fall prevention in preparation for D/C.     Patient left supine with all lines intact and call button in reach    GOALS:   Multidisciplinary Problems       Occupational Therapy Goals          Problem: Occupational Therapy    Goal Priority Disciplines Outcome Interventions   Occupational Therapy Goal     OT, PT/OT Progressing    Description: Goals to be met by: 6/11/2025     Patient will increase functional independence with ADLs by performing:    UE Dressing with Supervision.  LE Dressing with Supervision.  Grooming while standing at sink with Supervision.  Toileting from toilet with Supervision for hygiene and clothing management.   Toilet transfer to toilet  with Supervision.                         DME Justifications:   Waldo's mobility limitation cannot be sufficiently resolved by the use of a cane. Her functional mobility deficit can be sufficiently resolved with the use of a Rolling Walker. Patient's mobility limitation significantly impairs their ability to participate in one of more activities of daily living.  The use of a RW will significantly improve the patient's ability to participate in MRADLS and the patient will use it on regular basis in the home.    Time Tracking:     OT Date of Treatment: 05/22/25  OT Start Time: 0822  OT Stop Time: 0903  OT Total Time (min): 41 min    Billable Minutes:Self Care/Home Management 15  Therapeutic Activity 26    OT/ALEXUS: OT          5/22/2025

## 2025-05-22 NOTE — PLAN OF CARE
Problem: Infection  Goal: Absence of Infection Signs and Symptoms  Outcome: Progressing     Problem: Skin Injury Risk Increased  Goal: Skin Health and Integrity  Outcome: Progressing     Problem: Adult Inpatient Plan of Care  Goal: Plan of Care Review  Outcome: Progressing  Goal: Patient-Specific Goal (Individualized)  Outcome: Progressing  Goal: Absence of Hospital-Acquired Illness or Injury  Outcome: Progressing  Goal: Optimal Comfort and Wellbeing  Outcome: Progressing  Goal: Readiness for Transition of Care  Outcome: Progressing     Problem: Stroke, Intracerebral Hemorrhage  Goal: Optimal Coping  Outcome: Progressing  Goal: Effective Bowel Elimination  Outcome: Progressing  Goal: Optimal Cerebral Tissue Perfusion  Outcome: Progressing  Goal: Optimal Cognitive Function  Outcome: Progressing  Goal: Effective Communication Skills  Outcome: Progressing  Goal: Optimal Functional Ability  Outcome: Progressing  Goal: Optimal Nutrition Intake  Outcome: Progressing  Goal: Optimal Pain Control and Function  Outcome: Progressing  Goal: Effective Oxygenation and Ventilation  Outcome: Progressing  Goal: Improved Sensorimotor Function  Outcome: Progressing  Goal: Safe and Effective Swallow  Outcome: Progressing  Goal: Effective Urinary Elimination  Outcome: Progressing     Problem: Fall Injury Risk  Goal: Absence of Fall and Fall-Related Injury  Outcome: Progressing     Problem: Wound  Goal: Optimal Coping  Outcome: Progressing  Goal: Optimal Functional Ability  Outcome: Progressing  Goal: Absence of Infection Signs and Symptoms  Outcome: Progressing  Goal: Improved Oral Intake  Outcome: Progressing  Goal: Optimal Pain Control and Function  Outcome: Progressing  Goal: Skin Health and Integrity  Outcome: Progressing  Goal: Optimal Wound Healing  Outcome: Progressing       POC updated and reviewed with the patient and son  at the bedside. Questions regarding POC were encouraged and addressed. VSS, see flowsheets. Tele  maintained per provider's order. Patient is AOX 4 at this time. NAEON. Safety precautions maintained, no signs of injury noted during shift. Patient repositioned independently in bed for comfort. Upon exiting room, patient's bed locked in low position, side rails up x 3, bed alarm on, with call light within reach. Instructed patient to call staff for mobility, verbalized understanding. Stroke book and stroke education reviewed with the patient and son at the bedside, see education flowsheets for details. No acute signs of distress noted at this time.

## 2025-05-22 NOTE — SUBJECTIVE & OBJECTIVE
Interval History: 5/22: NAEO. AFVSS. Patient downgraded from [facility]. Continues on Heparin gtt and Coumadin, Pharmacy consult placed for assistance with the bridge. INR 1.2 today. Exam stable.     Medications:  Continuous Infusions:   heparin (porcine) in D5W  0-40 Units/kg/hr Intravenous Continuous 11 mL/hr at 05/21/25 1401 11 Units/kg/hr at 05/21/25 1401     Scheduled Meds:   aspirin  81 mg Oral Daily    atorvastatin  40 mg Oral Daily    niMODipine  60 mg Oral Q4H    polyethylene glycol  17 g Oral BID    pregabalin  75 mg Oral BID    QUEtiapine  50 mg Oral QHS    senna-docusate  2 tablet Oral BID    valACYclovir  1,000 mg Oral Daily    valsartan  80 mg Oral Daily    warfarin  5 mg Oral Daily     PRN Meds:  Current Facility-Administered Medications:     acetaminophen, 650 mg, Oral, Q6H PRN    bisacodyL, 10 mg, Rectal, Daily PRN    diazePAM, 10 mg, Oral, Q6H PRN    hydrALAZINE, 10 mg, Intravenous, Q4H PRN    labetalol, 10 mg, Intravenous, Q4H PRN    melatonin, 6 mg, Oral, Nightly PRN    methocarbamoL, 750 mg, Oral, Q6H PRN    oxyCODONE, 5 mg, Oral, Q4H PRN    sodium chloride 0.9%, 10 mL, Intravenous, PRN     Objective:     Weight: 100.2 kg (220 lb 14.4 oz)  Body mass index is 39.13 kg/m².  Vital Signs (Most Recent):  Temp: 98 °F (36.7 °C) (05/22/25 0746)  Pulse: 93 (05/22/25 0730)  Resp: 20 (05/22/25 0747)  BP: 111/69 (05/22/25 0730)  SpO2: (!) 94 % (05/22/25 0730) Vital Signs (24h Range):  Temp:  [97.8 °F (36.6 °C)-98.9 °F (37.2 °C)] 98 °F (36.7 °C)  Pulse:  [] 93  Resp:  [18-28] 20  SpO2:  [94 %-100 %] 94 %  BP: (111-153)/(66-84) 111/69     Neurosurgery Physical Exam  Awake, alert, Ox4  Cranial nerves intact  Follows commands x4 grossly full strength throughout  SILT  R dysmetria      EVD Incision clean/monocryl suture in place    Significant Labs:  Recent Labs   Lab 05/21/25  0034 05/22/25  0706   * 129*    139   K 4.1 4.2    105   CO2 26 26   BUN 12 11   CREATININE 0.6 0.6   CALCIUM 9.5 9.9    MG 1.8 1.8     Recent Labs   Lab 05/21/25  0034 05/22/25  0706   WBC 13.45* 10.45   HGB 8.9* 9.9*   HCT 31.3* 32.8*   * 495*     Recent Labs   Lab 05/21/25  0034 05/22/25  0706 05/22/25  0707   INR 1.1  --  1.2   APTT 44.1* 25.8 25.4     Microbiology Results (last 7 days)       Procedure Component Value Units Date/Time    CSF culture [[id number]] Collected: 05/19/25 0948    Order Status: Completed Specimen: CSF (Spinal Fluid) from CSF Tap, Tube 3 Updated: 05/22/25 0734     CULTURE, CSF No Growth To Date     GRAM STAIN Cytospin indicates:      No WBCs      No organisms seen    Gram stain [[id number]] Collected: 05/19/25 0948    Order Status: Canceled Specimen: CSF (Spinal Fluid) from CSF Tap, Tube 3 Updated: 05/19/25 1411    CSF culture [[id number]] Collected: 05/12/25 1729    Order Status: Completed Specimen: CSF (Spinal Fluid) from CSF Tap, Tube 3 Updated: 05/18/25 0703     CULTURE, CSF No Growth     GRAM STAIN Rare WBC seen      No organisms seen          All pertinent labs from the last 24 hours have been reviewed.    Significant Diagnostics:  I have reviewed and interpreted all pertinent imaging results/findings within the past 24 hours.

## 2025-05-22 NOTE — PLAN OF CARE
Call received from Hannah at [facility] ([phone]) informing this CM that patient has been accepted and they are submitting for insurance authorization.    Yudith Bird RN  Ext [phone]

## 2025-05-22 NOTE — NURSING
Pt turned off her heparin drip because it was making noise and she couldn't sleep. Her pump was reset and neurosurgery was notified.Pt instructed not to touch the pump controls..

## 2025-05-22 NOTE — PLAN OF CARE
Pt progressing well, goals updated appropriately  Problem: Physical Therapy  Goal: Physical Therapy Goal  Description: Goals to be met by: 2025     Patient will increase functional independence with mobility by performin. Supine to sit with Contact Guard Assistance -MET  Update: w/ supervision  2. Sit to supine with Contact Guard Assistance -MET  Update: w/ supervision  3. Sit to stand transfer with Contact Guard Assistance -MET  Update: w/ supervision  4. Bed to chair transfer with Minimal Assistance using LRAD -MET  Update: w/ supervision  5. Gait  x 25 feet with Minimal Assistance using LRAD.  -MET  Update: w/ SBA  6. Stand for 2 minutes with Contact Guard Assistance using LRAD -MET  Update: w/ supervision  7. Lower extremity exercise program x15 reps per handout, with independence    Outcome: Progressing

## 2025-05-22 NOTE — PROGRESS NOTES
Tray Srivastava - Neurosurgery ([facility])  Neurosurgery  Progress Note    Subjective:     History of Present Illness: 49f presenting after syncope with CTH demonstrating SAH. CTA without clear vascular malformation. Intubated prior to arrival to [facility] ED. Discussed expected hospital course and imaging with daughter in room    Post-Op Info:  Procedure(s) (LRB):  MRI (Magnetic Resonance Imagine) (N/A)   7 Days Post-Op   Interval History: 5/22: NAEO. AFVSS. Patient downgraded from [facility]. Continues on Heparin gtt and Coumadin, Pharmacy consult placed for assistance with the bridge. INR 1.2 today. Exam stable.     Medications:  Continuous Infusions:   heparin (porcine) in D5W  0-40 Units/kg/hr Intravenous Continuous 11 mL/hr at 05/21/25 1401 11 Units/kg/hr at 05/21/25 1401     Scheduled Meds:   aspirin  81 mg Oral Daily    atorvastatin  40 mg Oral Daily    niMODipine  60 mg Oral Q4H    polyethylene glycol  17 g Oral BID    pregabalin  75 mg Oral BID    QUEtiapine  50 mg Oral QHS    senna-docusate  2 tablet Oral BID    valACYclovir  1,000 mg Oral Daily    valsartan  80 mg Oral Daily    warfarin  5 mg Oral Daily     PRN Meds:  Current Facility-Administered Medications:     acetaminophen, 650 mg, Oral, Q6H PRN    bisacodyL, 10 mg, Rectal, Daily PRN    diazePAM, 10 mg, Oral, Q6H PRN    hydrALAZINE, 10 mg, Intravenous, Q4H PRN    labetalol, 10 mg, Intravenous, Q4H PRN    melatonin, 6 mg, Oral, Nightly PRN    methocarbamoL, 750 mg, Oral, Q6H PRN    oxyCODONE, 5 mg, Oral, Q4H PRN    sodium chloride 0.9%, 10 mL, Intravenous, PRN     Objective:     Weight: 100.2 kg (220 lb 14.4 oz)  Body mass index is 39.13 kg/m².  Vital Signs (Most Recent):  Temp: 98 °F (36.7 °C) (05/22/25 0746)  Pulse: 93 (05/22/25 0730)  Resp: 20 (05/22/25 0747)  BP: 111/69 (05/22/25 0730)  SpO2: (!) 94 % (05/22/25 0730) Vital Signs (24h Range):  Temp:  [97.8 °F (36.6 °C)-98.9 °F (37.2 °C)] 98 °F (36.7 °C)  Pulse:  [] 93  Resp:  [18-28] 20  SpO2:  [94 %-100 %]  94 %  BP: (111-153)/(66-84) 111/69     Neurosurgery Physical Exam  Awake, alert, Ox4  Cranial nerves intact  Follows commands x4 grossly full strength throughout  SILT  R dysmetria      EVD Incision clean/monocryl suture in place    Significant Labs:  Recent Labs   Lab 05/21/25  0034 05/22/25  0706   * 129*    139   K 4.1 4.2    105   CO2 26 26   BUN 12 11   CREATININE 0.6 0.6   CALCIUM 9.5 9.9   MG 1.8 1.8     Recent Labs   Lab 05/21/25  0034 05/22/25  0706   WBC 13.45* 10.45   HGB 8.9* 9.9*   HCT 31.3* 32.8*   * 495*     Recent Labs   Lab 05/21/25 0034 05/22/25  0706 05/22/25  0707   INR 1.1  --  1.2   APTT 44.1* 25.8 25.4     Microbiology Results (last 7 days)       Procedure Component Value Units Date/Time    CSF culture [[id number]] Collected: 05/19/25 0948    Order Status: Completed Specimen: CSF (Spinal Fluid) from CSF Tap, Tube 3 Updated: 05/22/25 0734     CULTURE, CSF No Growth To Date     GRAM STAIN Cytospin indicates:      No WBCs      No organisms seen    Gram stain [[id number]] Collected: 05/19/25 0948    Order Status: Canceled Specimen: CSF (Spinal Fluid) from CSF Tap, Tube 3 Updated: 05/19/25 1411    CSF culture [[id number]] Collected: 05/12/25 1729    Order Status: Completed Specimen: CSF (Spinal Fluid) from CSF Tap, Tube 3 Updated: 05/18/25 0703     CULTURE, CSF No Growth     GRAM STAIN Rare WBC seen      No organisms seen          All pertinent labs from the last 24 hours have been reviewed.    Significant Diagnostics:  I have reviewed and interpreted all pertinent imaging results/findings within the past 24 hours.  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 CTA with AICA aneurysm R. Intubated prior to arrival to [facility] ED.     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

## 2025-05-22 NOTE — PLAN OF CARE
Problem: Infection  Goal: Absence of Infection Signs and Symptoms  Outcome: Progressing     Problem: Skin Injury Risk Increased  Goal: Skin Health and Integrity  Outcome: Progressing     Problem: Adult Inpatient Plan of Care  Goal: Plan of Care Review  Outcome: Progressing  Goal: Patient-Specific Goal (Individualized)  Outcome: Progressing  Goal: Absence of Hospital-Acquired Illness or Injury  Outcome: Progressing  Goal: Optimal Comfort and Wellbeing  Outcome: Progressing  Goal: Readiness for Transition of Care  Outcome: Progressing   POC and meds reviewed with patient,heparin drip infusing.Had oxycodone and tylenol X1 for HA , was effective.will continue to monitor

## 2025-05-22 NOTE — PT/OT/SLP PROGRESS
Physical Therapy Treatment    Patient Name:  Waldo Emmanuel   MRN:  [id number]    Recommendations:     Discharge Recommendations: High Intensity Therapy  Discharge Equipment Recommendations: walker, rolling, shower chair  Barriers to discharge: None    Assessment:     Waldo Emmanuel is a 49 y.o. 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  She presents with the following impairments/functional limitations: weakness, impaired self care skills, impaired functional mobility, gait instability, impaired balance, decreased upper extremity function, decreased lower extremity function, decreased safety awareness, pain, impaired cardiopulmonary response to activity. Pt w/ improved dynamic standing balance, able to ambulate in hallway w/ no AD and min-moderate postural sway. She has frequent LOB when turning head/distracted while ambulating but is able to use protective responses to regain balance w/ Char. Patient has demonstrated sufficient progression to warrant high intensity therapy evidenced by objectives noted below.     Rehab Prognosis: Good; patient would benefit from acute skilled PT services to address these deficits and reach maximum level of function.    Recent Surgery: Procedure(s) (LRB):  MRI (Magnetic Resonance Imagine) (N/A) 7 Days Post-Op    Plan:     During this hospitalization, patient to be seen 4 x/week to address the identified rehab impairments via gait training, therapeutic activities, therapeutic exercises, neuromuscular re-education and progress toward the following goals:    Plan of Care Expires:  06/11/25    Subjective     Chief Complaint: pain in R side of face; pain resolved once up and walking but returned once back in bed, she reports she hasn't paid close enough attention to it to know if the pain is position dependent but will monitor that going forward  Patient/Family Comments/goals: wants to be able to  "ambulate independently w/out "sway" as swaying side to side makes her dizzy over time as she walks  Pain/Comfort:  Pain Rating 1: 4/10  Location - Side 1: Right  Location - Orientation 1: generalized  Location 1: face  Pain Addressed 1: Reposition, Distraction  Pain Rating Post-Intervention 1: 4/10      Objective:     Communicated with RN prior to session.  Patient found HOB elevated with bed alarm upon PT entry to room.     General Precautions: Standard, fall  Orthopedic Precautions: N/A  Braces: N/A  Respiratory Status: Room air     Functional Mobility:  Bed Mobility:     Scooting: stand by assistance  Supine to Sit: stand by assistance  Sit to Supine: stand by assistance  Transfers:     Sit to Stand:  contact guard assistance with hand-held assist  Gait: 50'x1, 30' x 3, and 75' x1 w/ no AD Char w/ moderate postural sway, mild inc width of OLIVIA, dec arm    1x10' w/ L/R head turns w/ modA, scissoring steps, severe postural sway, and dec arm swing w/ excessive trunk rotation      AM-PAC 6 CLICK MOBILITY  Turning over in bed (including adjusting bedclothes, sheets and blankets)?: 4  Sitting down on and standing up from a chair with arms (e.g., wheelchair, bedside commode, etc.): 3  Moving from lying on back to sitting on the side of the bed?: 3  Moving to and from a bed to a chair (including a wheelchair)?: 3  Need to walk in hospital room?: 3  Climbing 3-5 steps with a railing?: 3  Basic Mobility Total Score: 19       Treatment & Education:  Pt educated on PT POC/goals, d/c recs, and continued treatment. All questions answered and pt in agreement w/ POC.  Assisted w/ donning posterior gown and socks while sitting EOB w/ SBA for dynamic sitting balance  Gait training w/ verbal, visual, and tactile cueing for step length, step width, step height, postural control, safety awareness, obstacle avoidance, width of OLIVIA, and attention to task     Patient left HOB elevated with all lines intact, call button in reach, bed " alarm on, and RN notified..    GOALS:   Multidisciplinary Problems       Physical Therapy Goals          Problem: Physical Therapy    Goal Priority Disciplines Outcome Interventions   Physical Therapy Goal     PT, PT/OT Progressing    Description: Goals to be met by: 2025     Patient will increase functional independence with mobility by performin. Supine to sit with Contact Guard Assistance -MET  Update: w/ supervision  2. Sit to supine with Contact Guard Assistance -MET  Update: w/ supervision  3. Sit to stand transfer with Contact Guard Assistance -MET  Update: w/ supervision  4. Bed to chair transfer with Minimal Assistance using LRAD -MET  Update: w/ supervision  5. Gait  x 25 feet with Minimal Assistance using LRAD.  -MET  Update: w/ SBA  6. Stand for 2 minutes with Contact Guard Assistance using LRAD -MET  Update: w/ supervision  7. Lower extremity exercise program x15 reps per handout, with independence                         DME Justifications:   Waldo's mobility limitation cannot be sufficiently resolved by the use of a cane. Her functional mobility deficit can be sufficiently resolved with the use of a Rolling Walker. Patient's mobility limitation significantly impairs their ability to participate in one of more activities of daily living.  The use of a RW will significantly improve the patient's ability to participate in MRADLS and the patient will use it on regular basis in the home.    Time Tracking:     PT Received On: 25  PT Start Time: 1152     PT Stop Time: 1222  PT Total Time (min): 30 min     Billable Minutes: Gait Training 20 and Therapeutic Activity 10    Treatment Type: Treatment  PT/PTA: PT     Number of PTA visits since last PT visit: 0     2025

## 2025-05-22 NOTE — ASSESSMENT & PLAN NOTE
49f presenting after syncope with CTH demonstrating SAH. CTA with AICA aneurysm R. Intubated prior to arrival to [facility] ED.     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 Corley

## 2025-05-23 LAB
ABSOLUTE EOSINOPHIL (OHS): 0.14 K/UL
ABSOLUTE MONOCYTE (OHS): 0.78 K/UL (ref 0.3–1)
ABSOLUTE NEUTROPHIL COUNT (OHS): 7.58 K/UL (ref 1.8–7.7)
ALBUMIN SERPL BCP-MCNC: 2.9 G/DL (ref 3.5–5.2)
ALP SERPL-CCNC: 111 UNIT/L (ref 40–150)
ALT SERPL W/O P-5'-P-CCNC: 18 UNIT/L (ref 10–44)
ANION GAP (OHS): 12 MMOL/L (ref 8–16)
APTT PPP: 39.7 SECONDS (ref 21–32)
AST SERPL-CCNC: 15 UNIT/L (ref 11–45)
BASOPHILS # BLD AUTO: 0.03 K/UL
BASOPHILS NFR BLD AUTO: 0.3 %
BILIRUB SERPL-MCNC: 0.2 MG/DL (ref 0.1–1)
BUN SERPL-MCNC: 15 MG/DL (ref 6–20)
CALCIUM SERPL-MCNC: 9.5 MG/DL (ref 8.7–10.5)
CHLORIDE SERPL-SCNC: 103 MMOL/L (ref 95–110)
CO2 SERPL-SCNC: 21 MMOL/L (ref 23–29)
CREAT SERPL-MCNC: 0.6 MG/DL (ref 0.5–1.4)
ERYTHROCYTE [DISTWIDTH] IN BLOOD BY AUTOMATED COUNT: 14.9 % (ref 11.5–14.5)
GFR SERPLBLD CREATININE-BSD FMLA CKD-EPI: >60 ML/MIN/1.73/M2
GLUCOSE SERPL-MCNC: 100 MG/DL (ref 70–110)
HCT VFR BLD AUTO: 31.3 % (ref 37–48.5)
HGB BLD-MCNC: 9.1 GM/DL (ref 12–16)
IMM GRANULOCYTES # BLD AUTO: 0.04 K/UL (ref 0–0.04)
IMM GRANULOCYTES NFR BLD AUTO: 0.3 % (ref 0–0.5)
INR PPP: 1.7 (ref 0.8–1.2)
LYMPHOCYTES # BLD AUTO: 2.92 K/UL (ref 1–4.8)
MAGNESIUM SERPL-MCNC: 1.6 MG/DL (ref 1.6–2.6)
MCH RBC QN AUTO: 27.7 PG (ref 27–31)
MCHC RBC AUTO-ENTMCNC: 29.1 G/DL (ref 32–36)
MCV RBC AUTO: 95 FL (ref 82–98)
NUCLEATED RBC (/100WBC) (OHS): 0 /100 WBC
PHOSPHATE SERPL-MCNC: 3.6 MG/DL (ref 2.7–4.5)
PLATELET # BLD AUTO: 385 K/UL (ref 150–450)
PMV BLD AUTO: 10 FL (ref 9.2–12.9)
POTASSIUM SERPL-SCNC: 3.8 MMOL/L (ref 3.5–5.1)
PROT SERPL-MCNC: 7.2 GM/DL (ref 6–8.4)
PROTHROMBIN TIME: 17.8 SECONDS (ref 9–12.5)
RBC # BLD AUTO: 3.29 M/UL (ref 4–5.4)
RELATIVE EOSINOPHIL (OHS): 1.2 %
RELATIVE LYMPHOCYTE (OHS): 25.4 % (ref 18–48)
RELATIVE MONOCYTE (OHS): 6.8 % (ref 4–15)
RELATIVE NEUTROPHIL (OHS): 66 % (ref 38–73)
SODIUM SERPL-SCNC: 136 MMOL/L (ref 136–145)
WBC # BLD AUTO: 11.49 K/UL (ref 3.9–12.7)

## 2025-05-23 PROCEDURE — 25000003 PHARM REV CODE 250: Performed by: PHYSICIAN ASSISTANT

## 2025-05-23 PROCEDURE — 85610 PROTHROMBIN TIME: CPT | Performed by: PHYSICIAN ASSISTANT

## 2025-05-23 PROCEDURE — 63600175 PHARM REV CODE 636 W HCPCS: Performed by: PHYSICIAN ASSISTANT

## 2025-05-23 PROCEDURE — 85730 THROMBOPLASTIN TIME PARTIAL: CPT | Performed by: PHYSICIAN ASSISTANT

## 2025-05-23 PROCEDURE — 84100 ASSAY OF PHOSPHORUS: CPT | Performed by: PHYSICIAN ASSISTANT

## 2025-05-23 PROCEDURE — 11000001 HC ACUTE MED/SURG PRIVATE ROOM

## 2025-05-23 PROCEDURE — 97535 SELF CARE MNGMENT TRAINING: CPT

## 2025-05-23 PROCEDURE — 83735 ASSAY OF MAGNESIUM: CPT | Performed by: PHYSICIAN ASSISTANT

## 2025-05-23 PROCEDURE — 99024 POSTOP FOLLOW-UP VISIT: CPT | Mod: ,,,

## 2025-05-23 PROCEDURE — 97530 THERAPEUTIC ACTIVITIES: CPT

## 2025-05-23 PROCEDURE — 36415 COLL VENOUS BLD VENIPUNCTURE: CPT | Performed by: PHYSICIAN ASSISTANT

## 2025-05-23 PROCEDURE — 85025 COMPLETE CBC W/AUTO DIFF WBC: CPT | Performed by: PHYSICIAN ASSISTANT

## 2025-05-23 PROCEDURE — 25000003 PHARM REV CODE 250: Performed by: NEUROLOGICAL SURGERY

## 2025-05-23 PROCEDURE — 80053 COMPREHEN METABOLIC PANEL: CPT | Performed by: PHYSICIAN ASSISTANT

## 2025-05-23 RX ORDER — WARFARIN 2.5 MG/1
5 TABLET ORAL DAILY
Status: COMPLETED | OUTPATIENT
Start: 2025-05-23 | End: 2025-05-23

## 2025-05-23 RX ORDER — WARFARIN 2.5 MG/1
2.5 TABLET ORAL
Status: DISCONTINUED | OUTPATIENT
Start: 2025-05-27 | End: 2025-05-25

## 2025-05-23 RX ORDER — WARFARIN 2.5 MG/1
2.5 TABLET ORAL DAILY
Status: COMPLETED | OUTPATIENT
Start: 2025-05-24 | End: 2025-05-24

## 2025-05-23 RX ORDER — WARFARIN 2.5 MG/1
5 TABLET ORAL
Status: DISCONTINUED | OUTPATIENT
Start: 2025-05-25 | End: 2025-05-25

## 2025-05-23 RX ADMIN — NIMODIPINE 60 MG: 30 CAPSULE, LIQUID FILLED ORAL at 10:05

## 2025-05-23 RX ADMIN — SENNOSIDES AND DOCUSATE SODIUM 2 TABLET: 50; 8.6 TABLET ORAL at 07:05

## 2025-05-23 RX ADMIN — METHOCARBAMOL 750 MG: 750 TABLET ORAL at 02:05

## 2025-05-23 RX ADMIN — NIMODIPINE 60 MG: 30 CAPSULE, LIQUID FILLED ORAL at 02:05

## 2025-05-23 RX ADMIN — QUETIAPINE FUMARATE 50 MG: 25 TABLET ORAL at 07:05

## 2025-05-23 RX ADMIN — ASPIRIN 81 MG CHEWABLE TABLET 81 MG: 81 TABLET CHEWABLE at 08:05

## 2025-05-23 RX ADMIN — HEPARIN SODIUM AND DEXTROSE 13 UNITS/KG/HR: 10000; 5 INJECTION INTRAVENOUS at 02:05

## 2025-05-23 RX ADMIN — Medication 6 MG: at 07:05

## 2025-05-23 RX ADMIN — OXYCODONE 5 MG: 5 TABLET ORAL at 10:05

## 2025-05-23 RX ADMIN — NIMODIPINE 60 MG: 30 CAPSULE, LIQUID FILLED ORAL at 05:05

## 2025-05-23 RX ADMIN — VALACYCLOVIR HYDROCHLORIDE 1000 MG: 500 TABLET, FILM COATED ORAL at 08:05

## 2025-05-23 RX ADMIN — DIAZEPAM 10 MG: 5 TABLET ORAL at 07:05

## 2025-05-23 RX ADMIN — METHOCARBAMOL 750 MG: 750 TABLET ORAL at 03:05

## 2025-05-23 RX ADMIN — WARFARIN SODIUM 5 MG: 2.5 TABLET ORAL at 05:05

## 2025-05-23 RX ADMIN — VALSARTAN 80 MG: 80 TABLET, FILM COATED ORAL at 08:05

## 2025-05-23 RX ADMIN — PREGABALIN 75 MG: 75 CAPSULE ORAL at 07:05

## 2025-05-23 RX ADMIN — ATORVASTATIN CALCIUM 40 MG: 40 TABLET, FILM COATED ORAL at 08:05

## 2025-05-23 RX ADMIN — OXYCODONE 5 MG: 5 TABLET ORAL at 03:05

## 2025-05-23 RX ADMIN — PREGABALIN 75 MG: 75 CAPSULE ORAL at 08:05

## 2025-05-23 RX ADMIN — ACETAMINOPHEN 650 MG: 325 TABLET ORAL at 08:05

## 2025-05-23 RX ADMIN — OXYCODONE 5 MG: 5 TABLET ORAL at 02:05

## 2025-05-23 RX ADMIN — OXYCODONE 5 MG: 5 TABLET ORAL at 07:05

## 2025-05-23 RX ADMIN — NIMODIPINE 60 MG: 30 CAPSULE, LIQUID FILLED ORAL at 07:05

## 2025-05-23 NOTE — PROGRESS NOTES
Tray Srivastava - Neurosurgery ([facility])  Neurosurgery  Progress Note    Subjective:     History of Present Illness: 49f presenting after syncope with CTH demonstrating SAH. CTA without clear vascular malformation. Intubated prior to arrival to [facility] ED. Discussed expected hospital course and imaging with daughter in room    Post-Op Info:  Procedure(s) (LRB):  MRI (Magnetic Resonance Imagine) (N/A)   8 Days Post-Op   Interval History: NAEON. AFVSS. Reports mild headache. Continues on hep gtt and Coumadin. INR 1.7. Per pharmacy, anticipate patient's INR will be therapeutic Saturday 5/24 and heparin drip will likely be able to be stopped.     Medications:  Continuous Infusions:   heparin (porcine) in D5W  0-40 Units/kg/hr Intravenous Continuous 13 mL/hr at 05/22/25 2204 13 Units/kg/hr at 05/22/25 2204     Scheduled Meds:   aspirin  81 mg Oral Daily    atorvastatin  40 mg Oral Daily    niMODipine  60 mg Oral Q4H    polyethylene glycol  17 g Oral BID    pregabalin  75 mg Oral BID    QUEtiapine  50 mg Oral QHS    senna-docusate  2 tablet Oral BID    valACYclovir  1,000 mg Oral Daily    valsartan  80 mg Oral Daily    [START ON 5/24/2025] warfarin  2.5 mg Oral Daily    [START ON 5/27/2025] warfarin  2.5 mg Oral Once per day on Tuesday Thursday    warfarin  5 mg Oral Daily    [START ON 5/25/2025] warfarin  5 mg Oral Once per day on Sunday Monday Wednesday Friday Saturday     PRN Meds:  Current Facility-Administered Medications:     acetaminophen, 650 mg, Oral, Q6H PRN    bisacodyL, 10 mg, Rectal, Daily PRN    diazePAM, 10 mg, Oral, Q6H PRN    hydrALAZINE, 10 mg, Intravenous, Q4H PRN    labetalol, 10 mg, Intravenous, Q4H PRN    melatonin, 6 mg, Oral, Nightly PRN    methocarbamoL, 750 mg, Oral, Q6H PRN    oxyCODONE, 5 mg, Oral, Q4H PRN    sodium chloride 0.9%, 10 mL, Intravenous, PRN     Review of Systems  Objective:     Weight: 100.2 kg (220 lb 14.4 oz)  Body mass index is 39.13 kg/m².  Vital Signs (Most Recent):  Temp: 98.8 °F  (37.1 °C) (05/23/25 0732)  Pulse: 102 (05/23/25 0732)  Resp: 18 (05/23/25 1040)  BP: 105/69 (05/23/25 1033)  SpO2: 96 % (05/23/25 0732) Vital Signs (24h Range):  Temp:  [98.3 °F (36.8 °C)-98.8 °F (37.1 °C)] 98.8 °F (37.1 °C)  Pulse:  [] 102  Resp:  [16-18] 18  SpO2:  [94 %-97 %] 96 %  BP: (105-133)/(69-88) 105/69               Neurosurgery Physical Exam  Awake, alert, Ox4  Cranial nerves intact  Follows commands x4 grossly full strength throughout  SILT  R dysmetria      EVD Incision clean/monocryl suture in place    Significant Labs:  Recent Labs   Lab 05/22/25  0706 05/23/25  0525   * 100    136   K 4.2 3.8    103   CO2 26 21*   BUN 11 15   CREATININE 0.6 0.6   CALCIUM 9.9 9.5   MG 1.8 1.6     Recent Labs   Lab 05/22/25  0706 05/23/25  0525   WBC 10.45 11.49   HGB 9.9* 9.1*   HCT 32.8* 31.3*   * 385     Recent Labs   Lab 05/22/25  0707 05/22/25  1535 05/22/25  2123 05/23/25  0525   INR 1.2  --   --  1.7*   APTT 25.4 45.6* 50.7* 39.7*     Microbiology Results (last 7 days)       Procedure Component Value Units Date/Time    CSF culture [[id number]] Collected: 05/19/25 0948    Order Status: Completed Specimen: CSF (Spinal Fluid) from CSF Tap, Tube 3 Updated: 05/23/25 0733     CULTURE, CSF No Growth To Date     GRAM STAIN Cytospin indicates:      No WBCs      No organisms seen    Gram stain [[id number]] Collected: 05/19/25 0948    Order Status: Canceled Specimen: CSF (Spinal Fluid) from CSF Tap, Tube 3 Updated: 05/19/25 1411    CSF culture [[id number]] Collected: 05/12/25 1729    Order Status: Completed Specimen: CSF (Spinal Fluid) from CSF Tap, Tube 3 Updated: 05/18/25 0703     CULTURE, CSF No Growth     GRAM STAIN Rare WBC seen      No organisms seen          All pertinent labs from the last 24 hours have been reviewed.    Significant Diagnostics:  I have reviewed and interpreted all pertinent imaging results/findings within the past 24 hours.  Assessment/Plan:     * Nontraumatic  subarachnoid hemorrhage from cerebellar artery  49f presenting after syncope with CTH demonstrating SAH. CTA with AICA aneurysm R.     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    Post bleed day 17.    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 Barney Tan PAChanoC  Neurosurgery  [facility] - Neurosurgery ([facility])

## 2025-05-23 NOTE — SUBJECTIVE & OBJECTIVE
Interval History: NAEON. AFVSS. Reports mild headache. Continues on hep gtt and Coumadin. INR 1.7. Per pharmacy, anticipate patient's INR will be therapeutic Saturday 5/24 and heparin drip will likely be able to be stopped.     Medications:  Continuous Infusions:   heparin (porcine) in D5W  0-40 Units/kg/hr Intravenous Continuous 13 mL/hr at 05/22/25 2204 13 Units/kg/hr at 05/22/25 2204     Scheduled Meds:   aspirin  81 mg Oral Daily    atorvastatin  40 mg Oral Daily    niMODipine  60 mg Oral Q4H    polyethylene glycol  17 g Oral BID    pregabalin  75 mg Oral BID    QUEtiapine  50 mg Oral QHS    senna-docusate  2 tablet Oral BID    valACYclovir  1,000 mg Oral Daily    valsartan  80 mg Oral Daily    [START ON 5/24/2025] warfarin  2.5 mg Oral Daily    [START ON 5/27/2025] warfarin  2.5 mg Oral Once per day on Tuesday Thursday    warfarin  5 mg Oral Daily    [START ON 5/25/2025] warfarin  5 mg Oral Once per day on Sunday Monday Wednesday Friday Saturday     PRN Meds:  Current Facility-Administered Medications:     acetaminophen, 650 mg, Oral, Q6H PRN    bisacodyL, 10 mg, Rectal, Daily PRN    diazePAM, 10 mg, Oral, Q6H PRN    hydrALAZINE, 10 mg, Intravenous, Q4H PRN    labetalol, 10 mg, Intravenous, Q4H PRN    melatonin, 6 mg, Oral, Nightly PRN    methocarbamoL, 750 mg, Oral, Q6H PRN    oxyCODONE, 5 mg, Oral, Q4H PRN    sodium chloride 0.9%, 10 mL, Intravenous, PRN     Review of Systems  Objective:     Weight: 100.2 kg (220 lb 14.4 oz)  Body mass index is 39.13 kg/m².  Vital Signs (Most Recent):  Temp: 98.8 °F (37.1 °C) (05/23/25 0732)  Pulse: 102 (05/23/25 0732)  Resp: 18 (05/23/25 1040)  BP: 105/69 (05/23/25 1033)  SpO2: 96 % (05/23/25 0732) Vital Signs (24h Range):  Temp:  [98.3 °F (36.8 °C)-98.8 °F (37.1 °C)] 98.8 °F (37.1 °C)  Pulse:  [] 102  Resp:  [16-18] 18  SpO2:  [94 %-97 %] 96 %  BP: (105-133)/(69-88) 105/69               Neurosurgery Physical Exam  Awake, alert, Ox4  Cranial nerves intact  Follows  commands x4 grossly full strength throughout  SILT  R dysmetria      EVD Incision clean/monocryl suture in place    Significant Labs:  Recent Labs   Lab 05/22/25  0706 05/23/25  0525   * 100    136   K 4.2 3.8    103   CO2 26 21*   BUN 11 15   CREATININE 0.6 0.6   CALCIUM 9.9 9.5   MG 1.8 1.6     Recent Labs   Lab 05/22/25  0706 05/23/25  0525   WBC 10.45 11.49   HGB 9.9* 9.1*   HCT 32.8* 31.3*   * 385     Recent Labs   Lab 05/22/25  0707 05/22/25  1535 05/22/25  2123 05/23/25  0525   INR 1.2  --   --  1.7*   APTT 25.4 45.6* 50.7* 39.7*     Microbiology Results (last 7 days)       Procedure Component Value Units Date/Time    CSF culture [[id number]] Collected: 05/19/25 0948    Order Status: Completed Specimen: CSF (Spinal Fluid) from CSF Tap, Tube 3 Updated: 05/23/25 0733     CULTURE, CSF No Growth To Date     GRAM STAIN Cytospin indicates:      No WBCs      No organisms seen    Gram stain [[id number]] Collected: 05/19/25 0948    Order Status: Canceled Specimen: CSF (Spinal Fluid) from CSF Tap, Tube 3 Updated: 05/19/25 1411    CSF culture [[id number]] Collected: 05/12/25 1729    Order Status: Completed Specimen: CSF (Spinal Fluid) from CSF Tap, Tube 3 Updated: 05/18/25 0703     CULTURE, CSF No Growth     GRAM STAIN Rare WBC seen      No organisms seen          All pertinent labs from the last 24 hours have been reviewed.    Significant Diagnostics:  I have reviewed and interpreted all pertinent imaging results/findings within the past 24 hours.

## 2025-05-23 NOTE — PT/OT/SLP PROGRESS
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 female with an SLP diagnosis of Cognitive-Linguistic Impairment and Visio-Spatial Impairment.  She presents with decreased memory and attention skills which impede safety awareness. RN aware patient attempting to pull peripheral IV.  ST to continue to follow.     Subjective     SLP reviewed Pt with RN  Pt presents alert  She explains, "I'm not at rehab?"  "you are going to have to remind me"     Pain/Comfort:  Pain Rating 1: 5/10  Location - Orientation 1: generalized  Location 1: head    Respiratory Status: Room air    Objective:     Has the patient been evaluated by SLP for swallowing?   Yes  Keep patient NPO? No     Pt unavailable upon earlier attempt (OT.)  Pt found upright in her chair with her friend in the room. She was alert and oriented to name and time.  She demonstrated overall increased sustained eye contact, response time and topic maintenance t/o conversational speech tasks.  Pt expressed she was starting to use phone more and further explained decreased sensitivity to light.  RN entered into the room to provide medications. RN reviewed lines (peripheral IV) and Pt confirmed she tried to remove 2/2 confusion/decreased orientation to place.  Pt confirmed she thought she had transferred to rehab facility and was not aware she was still at [facility]. RN and SLP provided education and " reorientation to place. Pt was further educated on safety considerations and memory strategies (including but not limited to visual aids, written cues rehearsal, routines, Supervision with tasks) and rationale for ongoing, intensive cognitive-linguistic therapy upon d/c from acute. She verbalized understanding. No additional questions. She remained in chair in position as found with call light in reach, family in room, upon SLP exit. RN aware.     Goals:   Multidisciplinary Problems       SLP Goals          Problem: SLP    Goal Priority Disciplines Outcome   SLP Goal     SLP Progressing   Description: Speech Language Pathology Goals  Goals expected to be met by 5/28/25    1. TOlerate regular diet with thin liquids with no s/s of airway compromise  2.  Assess functional reading and writing skills.    3.  Respond to problem solving/categorization tasks with 90% accuracy                       Plan:     Patient to be seen:  4 x/week   Plan of Care expires:  06/07/25  Plan of Care reviewed with:  patient, friend   SLP Follow-Up:  Yes       Discharge recommendations:  High Intensity Therapy     Time Tracking:     SLP Treatment Date:   05/23/25  Speech Start Time:  1042  Speech Stop Time:  1103     Speech Total Time (min):  21 min    Billable Minutes: Speech Therapy Individual 9 and Self Care/Home Management Training 10    05/23/2025

## 2025-05-23 NOTE — PT/OT/SLP PROGRESS
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 Performance deficits affecting function are weakness, impaired self care skills, impaired functional mobility, gait instability, impaired balance, decreased lower extremity function, decreased safety awareness, pain, impaired cardiopulmonary response to activity. Pt agreeable to therapy and tolerated well. Continues to present with STM deficits and poor safety awareness, requiring verbal cues throughout session. Improvements ion balance with toileting, dressing, grooming ADLs and home mobility performed this date. Pt remains limited in ADLs, functional mobility, and functional transfers. Patient has demonstrated sufficient progression to warrant high intensity therapy evidenced by objectives noted below.      Rehab Prognosis:  Good; patient would benefit from acute skilled OT services to address these deficits and reach maximum level of function.       Plan:     Patient to be seen 4 x/week to address the above listed problems via self-care/home management, therapeutic activities, therapeutic exercises, neuromuscular re-education  Plan of Care Expires: 06/11/25  Plan of Care Reviewed with: patient, friend    Subjective     Chief Complaint: headache  Patient/Family Comments/goals: "I didn't know I wasn't supposed to take my IV out"  Pain/Comfort:  Pain Rating 1:  (not rated)  Location 1:  (headache)    Objective:     Communicated with: Nurse prior to session.  Patient found right sidelying with bed alarm, " telemetry, PICC line upon OT entry to room.    General Precautions: Standard, fall, vision impaired    Orthopedic Precautions:N/A  Braces: N/A  Respiratory Status: Room air     Occupational Performance:     Bed Mobility:    Patient completed Scooting/Bridging with contact guard assistance  Patient completed Supine to Sit with contact guard assistance     Functional Mobility/Transfers:  Patient completed Sit <> Stand Transfer with contact guard assistance  with  rolling walker from EOB and from toilet   Patient completed Bed <> Chair Transfer using Step Transfer technique with contact guard assistance with rolling walker  Patient completed Toilet Transfer Step Transfer technique with contact guard assistance with  rolling walker  Functional Mobility: Pt engaged in functional mobility throughout hospital room and bathroom ~ 12 ft x 2 with RW and  CGA  to maximize functional endurance and standing balance required for home/community mobility and occupational engagement. '    Activities of Daily Living:  Grooming: contact guard assistance pt performed oral hygiene in stance at sink in RW   Upper Body Dressing: minimum assistance donned hospital gown sitting EOB with assist for line management  Lower Body Dressing: contact guard assistance pt donned socks sitting EOB in figure 4  Toileting: contact guard assistance pt performed perineal hygiene after voiding on toilet      [facility] 6 Click ADL: 18    Treatment & Education:  -Education on energy conservation and task modification to maximize safety and (I) during ADLs and mobility  -Education on importance of OOB activity to improve overall activity tolerance and promote recovery  -Pt educated to call for assistance and to transfer with hospital staff only  -Provided education regarding role of OT, POC, & discharge recommendations with pt and family verbalizing understanding.  Pt had no further questions & when asked whether there were any concerns pt reported  none.'    Patient left up in chair with all lines intact, call button in reach, chair alarm on, family notified, and family and nurse present    GOALS:   Multidisciplinary Problems       Occupational Therapy Goals          Problem: Occupational Therapy    Goal Priority Disciplines Outcome Interventions   Occupational Therapy Goal     OT, PT/OT Progressing    Description: Goals to be met by: 6/11/2025     Patient will increase functional independence with ADLs by performing:    UE Dressing with Supervision.  LE Dressing with Supervision.  Grooming while standing at sink with Supervision.  Toileting from toilet with Supervision for hygiene and clothing management.   Toilet transfer to toilet with Supervision.                         Time Tracking:     OT Date of Treatment: 05/23/25  OT Start Time: 0955  OT Stop Time: 1033  OT Total Time (min): 38 min    Billable Minutes:Self Care/Home Management 28 min  Therapeutic Activity 10 min    OT/ALEXUS: OT          5/23/2025

## 2025-05-23 NOTE — CONSULTS
PHARMACY CONSULT NOTE: WARFARIN  Waldo Emmanuel is a 49 y.o. female on warfarin therapy for DVT. PharmD has been consulted for warfarin dosing.     Current order: 5 mg daily  Home dose: 5 mg daily except 2.5 mg T/Th  Coumadin clinic enrollment: Active  INR goal: 2-3    Lab Results   Component Value Date    INR 1.7 (H) 05/23/2025    INR 1.2 05/22/2025    INR 1.1 05/21/2025    PROTIME 17.8 (H) 05/23/2025    PROTIME 12.5 05/22/2025    PROTIME 11.5 05/21/2025     Significant drug interactions: none at this time   Diet: Adult Regular without supplement     Recommendation(s):   Per notes, patient previously was on Xarelto for RLE DVT but developed a R iliac arterial occlusion, failing anti-Xa treatment   Per review of anticoagulation clinic notes, it appears patient has done pretty well with the 5 mg daily except 2.5 mg T/Th regimen   INR starting to trend up quickly - will plan to give 5 mg today. Anticipate patient's INR will be therapeutic Saturday 5/24 and heparin drip will likely be able to be stopped Saturday 5/24 morning  Will tentatively plan on giving 2.5 mg Saturday as I anticipate the INR to c/t trend up with another 5 mg dose Friday however this dose may need to be adjusted based on INR trend. Tentatively planned to restart home regimen of 5 mg daily except 2.5 mg T/Th on Sunday 5/25 as well but again, may need to be adjusted based on INR trend  Pharmacy will continue to follow and monitor warfarin    Thank you for the consult,  Jordan Rivas, PharmD, [facility]   Extension [phone]    **Note: Consults are reviewed Monday-Friday 7:00am-3:30pm. THE ABOVE RECOMMENDATIONS ARE ONLY SUGGESTED.THE RECOMMENDATIONS SHOULD BE CONSIDERED IN CONJUNCTION WITH ALL PATIENT FACTORS. **

## 2025-05-23 NOTE — ASSESSMENT & PLAN NOTE
49f presenting after syncope with CTH demonstrating SAH. CTA with AICA aneurysm R.     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    Post bleed day 17.    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 Corley

## 2025-05-24 LAB
ABSOLUTE EOSINOPHIL (OHS): 0.12 K/UL
ABSOLUTE MONOCYTE (OHS): 0.75 K/UL (ref 0.3–1)
ABSOLUTE NEUTROPHIL COUNT (OHS): 6.59 K/UL (ref 1.8–7.7)
ALBUMIN SERPL BCP-MCNC: 2.9 G/DL (ref 3.5–5.2)
ALP SERPL-CCNC: 114 UNIT/L (ref 40–150)
ALT SERPL W/O P-5'-P-CCNC: 15 UNIT/L (ref 10–44)
ANION GAP (OHS): 11 MMOL/L (ref 8–16)
APTT PPP: 57.4 SECONDS (ref 21–32)
AST SERPL-CCNC: 11 UNIT/L (ref 11–45)
BACTERIA CSF CULT: NO GROWTH
BASOPHILS # BLD AUTO: 0.03 K/UL
BASOPHILS NFR BLD AUTO: 0.3 %
BILIRUB SERPL-MCNC: 0.3 MG/DL (ref 0.1–1)
BUN SERPL-MCNC: 11 MG/DL (ref 6–20)
CALCIUM SERPL-MCNC: 9.7 MG/DL (ref 8.7–10.5)
CHLORIDE SERPL-SCNC: 105 MMOL/L (ref 95–110)
CO2 SERPL-SCNC: 25 MMOL/L (ref 23–29)
CREAT SERPL-MCNC: 0.7 MG/DL (ref 0.5–1.4)
ERYTHROCYTE [DISTWIDTH] IN BLOOD BY AUTOMATED COUNT: 15 % (ref 11.5–14.5)
GFR SERPLBLD CREATININE-BSD FMLA CKD-EPI: >60 ML/MIN/1.73/M2
GLUCOSE SERPL-MCNC: 93 MG/DL (ref 70–110)
GRAM STN SPEC: NORMAL
HCT VFR BLD AUTO: 31.9 % (ref 37–48.5)
HGB BLD-MCNC: 9.5 GM/DL (ref 12–16)
IMM GRANULOCYTES # BLD AUTO: 0.06 K/UL (ref 0–0.04)
IMM GRANULOCYTES NFR BLD AUTO: 0.5 % (ref 0–0.5)
INR PPP: 2.9 (ref 0.8–1.2)
LYMPHOCYTES # BLD AUTO: 3.58 K/UL (ref 1–4.8)
MAGNESIUM SERPL-MCNC: 1.7 MG/DL (ref 1.6–2.6)
MCH RBC QN AUTO: 28.1 PG (ref 27–31)
MCHC RBC AUTO-ENTMCNC: 29.8 G/DL (ref 32–36)
MCV RBC AUTO: 94 FL (ref 82–98)
NUCLEATED RBC (/100WBC) (OHS): 0 /100 WBC
PHOSPHATE SERPL-MCNC: 4.1 MG/DL (ref 2.7–4.5)
PLATELET # BLD AUTO: 464 K/UL (ref 150–450)
PMV BLD AUTO: 9.9 FL (ref 9.2–12.9)
POTASSIUM SERPL-SCNC: 3.8 MMOL/L (ref 3.5–5.1)
PROT SERPL-MCNC: 7.3 GM/DL (ref 6–8.4)
PROTHROMBIN TIME: 29.1 SECONDS (ref 9–12.5)
RBC # BLD AUTO: 3.38 M/UL (ref 4–5.4)
RELATIVE EOSINOPHIL (OHS): 1.1 %
RELATIVE LYMPHOCYTE (OHS): 32.2 % (ref 18–48)
RELATIVE MONOCYTE (OHS): 6.7 % (ref 4–15)
RELATIVE NEUTROPHIL (OHS): 59.2 % (ref 38–73)
SODIUM SERPL-SCNC: 141 MMOL/L (ref 136–145)
WBC # BLD AUTO: 11.13 K/UL (ref 3.9–12.7)

## 2025-05-24 PROCEDURE — 36415 COLL VENOUS BLD VENIPUNCTURE: CPT | Performed by: PHYSICIAN ASSISTANT

## 2025-05-24 PROCEDURE — 83735 ASSAY OF MAGNESIUM: CPT | Performed by: PHYSICIAN ASSISTANT

## 2025-05-24 PROCEDURE — 25000003 PHARM REV CODE 250: Performed by: PHYSICIAN ASSISTANT

## 2025-05-24 PROCEDURE — 85025 COMPLETE CBC W/AUTO DIFF WBC: CPT | Performed by: PHYSICIAN ASSISTANT

## 2025-05-24 PROCEDURE — 97530 THERAPEUTIC ACTIVITIES: CPT | Mod: CQ

## 2025-05-24 PROCEDURE — 11000001 HC ACUTE MED/SURG PRIVATE ROOM

## 2025-05-24 PROCEDURE — 84100 ASSAY OF PHOSPHORUS: CPT | Performed by: PHYSICIAN ASSISTANT

## 2025-05-24 PROCEDURE — 97116 GAIT TRAINING THERAPY: CPT | Mod: CQ

## 2025-05-24 PROCEDURE — 80053 COMPREHEN METABOLIC PANEL: CPT | Performed by: PHYSICIAN ASSISTANT

## 2025-05-24 PROCEDURE — 99233 SBSQ HOSP IP/OBS HIGH 50: CPT | Mod: ,,, | Performed by: PHYSICIAN ASSISTANT

## 2025-05-24 PROCEDURE — 85610 PROTHROMBIN TIME: CPT | Performed by: PHYSICIAN ASSISTANT

## 2025-05-24 PROCEDURE — 85730 THROMBOPLASTIN TIME PARTIAL: CPT | Performed by: PHYSICIAN ASSISTANT

## 2025-05-24 PROCEDURE — 25000003 PHARM REV CODE 250: Performed by: NEUROLOGICAL SURGERY

## 2025-05-24 RX ADMIN — ACETAMINOPHEN 650 MG: 325 TABLET ORAL at 02:05

## 2025-05-24 RX ADMIN — ATORVASTATIN CALCIUM 40 MG: 40 TABLET, FILM COATED ORAL at 08:05

## 2025-05-24 RX ADMIN — OXYCODONE 5 MG: 5 TABLET ORAL at 02:05

## 2025-05-24 RX ADMIN — METHOCARBAMOL 750 MG: 750 TABLET ORAL at 02:05

## 2025-05-24 RX ADMIN — NIMODIPINE 60 MG: 30 CAPSULE, LIQUID FILLED ORAL at 06:05

## 2025-05-24 RX ADMIN — QUETIAPINE FUMARATE 50 MG: 25 TABLET ORAL at 08:05

## 2025-05-24 RX ADMIN — DIAZEPAM 10 MG: 5 TABLET ORAL at 10:05

## 2025-05-24 RX ADMIN — OXYCODONE 5 MG: 5 TABLET ORAL at 09:05

## 2025-05-24 RX ADMIN — PREGABALIN 75 MG: 75 CAPSULE ORAL at 08:05

## 2025-05-24 RX ADMIN — NIMODIPINE 60 MG: 30 CAPSULE, LIQUID FILLED ORAL at 10:05

## 2025-05-24 RX ADMIN — METHOCARBAMOL 750 MG: 750 TABLET ORAL at 08:05

## 2025-05-24 RX ADMIN — ACETAMINOPHEN 650 MG: 325 TABLET ORAL at 08:05

## 2025-05-24 RX ADMIN — OXYCODONE 5 MG: 5 TABLET ORAL at 10:05

## 2025-05-24 RX ADMIN — SENNOSIDES AND DOCUSATE SODIUM 2 TABLET: 50; 8.6 TABLET ORAL at 08:05

## 2025-05-24 RX ADMIN — NIMODIPINE 60 MG: 30 CAPSULE, LIQUID FILLED ORAL at 02:05

## 2025-05-24 RX ADMIN — NIMODIPINE 60 MG: 30 CAPSULE, LIQUID FILLED ORAL at 07:05

## 2025-05-24 RX ADMIN — Medication 6 MG: at 08:05

## 2025-05-24 RX ADMIN — ASPIRIN 81 MG CHEWABLE TABLET 81 MG: 81 TABLET CHEWABLE at 08:05

## 2025-05-24 RX ADMIN — WARFARIN SODIUM 2.5 MG: 2.5 TABLET ORAL at 04:05

## 2025-05-24 RX ADMIN — VALSARTAN 80 MG: 80 TABLET, FILM COATED ORAL at 08:05

## 2025-05-24 RX ADMIN — VALACYCLOVIR HYDROCHLORIDE 1000 MG: 500 TABLET, FILM COATED ORAL at 08:05

## 2025-05-24 NOTE — PLAN OF CARE
Problem: Pain Acute  Goal: Optimal Pain Control and Function  Outcome: Progressing     Problem: Stroke, Intracerebral Hemorrhage  Goal: Optimal Coping  Outcome: Progressing   Plan of care discussed. Addressed questions and concerns. PRN for pain . Will cont to care.

## 2025-05-24 NOTE — SUBJECTIVE & OBJECTIVE
Interval History:  NAEON. AFVSS. Patient without complaints. INR 2.5, d/c heparin gtt. CTM - pharmacy managing. Anticipate IPR Monday.     Medications:  Continuous Infusions:      Scheduled Meds:   aspirin  81 mg Oral Daily    atorvastatin  40 mg Oral Daily    niMODipine  60 mg Oral Q4H    polyethylene glycol  17 g Oral BID    pregabalin  75 mg Oral BID    QUEtiapine  50 mg Oral QHS    senna-docusate  2 tablet Oral BID    valACYclovir  1,000 mg Oral Daily    valsartan  80 mg Oral Daily    warfarin  2.5 mg Oral Daily    [START ON 5/27/2025] warfarin  2.5 mg Oral Once per day on Tuesday Thursday    [START ON 5/25/2025] warfarin  5 mg Oral Once per day on Sunday Monday Wednesday Friday Saturday     PRN Meds:  Current Facility-Administered Medications:     acetaminophen, 650 mg, Oral, Q6H PRN    bisacodyL, 10 mg, Rectal, Daily PRN    diazePAM, 10 mg, Oral, Q6H PRN    hydrALAZINE, 10 mg, Intravenous, Q4H PRN    labetalol, 10 mg, Intravenous, Q4H PRN    melatonin, 6 mg, Oral, Nightly PRN    methocarbamoL, 750 mg, Oral, Q6H PRN    oxyCODONE, 5 mg, Oral, Q4H PRN    sodium chloride 0.9%, 10 mL, Intravenous, PRN     Review of Systems  Objective:     Weight: 100.2 kg (220 lb 14.4 oz)  Body mass index is 39.13 kg/m².  Vital Signs (Most Recent):  Temp: 98.2 °F (36.8 °C) (05/24/25 0815)  Pulse: 107 (05/24/25 0815)  Resp: 18 (05/24/25 0912)  BP: 98/63 (05/24/25 1015)  SpO2: 96 % (05/24/25 0815) Vital Signs (24h Range):  Temp:  [98.2 °F (36.8 °C)-98.4 °F (36.9 °C)] 98.2 °F (36.8 °C)  Pulse:  [] 107  Resp:  [16-18] 18  SpO2:  [94 %-97 %] 96 %  BP: ()/(63-80) 98/63     Date 05/24/25 0700 - 05/25/25 0659   Shift 6416-6828 9628-1434 2658-4433 24 Hour Total   INTAKE   Shift Total(mL/kg)       OUTPUT   Urine(mL/kg/hr) 180   180   Shift Total(mL/kg) 180(1.8)   180(1.8)   Weight (kg) 100.2 100.2 100.2 100.2             Neurosurgery Physical Exam  Awake, alert, Ox4  Cranial nerves intact  Follows commands x4 grossly full  strength throughout  SILT  EVD Incision clean/monocryl suture in place    Significant Labs:  Recent Labs   Lab 05/23/25 0525 05/24/25  0328    93    141   K 3.8 3.8    105   CO2 21* 25   BUN 15 11   CREATININE 0.6 0.7   CALCIUM 9.5 9.7   MG 1.6 1.7     Recent Labs   Lab 05/23/25 0525 05/24/25  0328   WBC 11.49 11.13   HGB 9.1* 9.5*   HCT 31.3* 31.9*    464*     Recent Labs   Lab 05/22/25 2123 05/23/25 0525 05/24/25  0328   INR  --  1.7* 2.9*   APTT 50.7* 39.7* 57.4*     Microbiology Results (last 7 days)       Procedure Component Value Units Date/Time    CSF culture [[id number]] Collected: 05/19/25 0948    Order Status: Completed Specimen: CSF (Spinal Fluid) from CSF Tap, Tube 3 Updated: 05/24/25 0723     CULTURE, CSF No Growth     GRAM STAIN Cytospin indicates:      No WBCs      No organisms seen    Gram stain [[id number]] Collected: 05/19/25 0948    Order Status: Canceled Specimen: CSF (Spinal Fluid) from CSF Tap, Tube 3 Updated: 05/19/25 1411    CSF culture [[id number]] Collected: 05/12/25 1729    Order Status: Completed Specimen: CSF (Spinal Fluid) from CSF Tap, Tube 3 Updated: 05/18/25 0703     CULTURE, CSF No Growth     GRAM STAIN Rare WBC seen      No organisms seen          All pertinent labs from the last 24 hours have been reviewed.    Significant Diagnostics:  I have reviewed and interpreted all pertinent imaging results/findings within the past 24 hours.

## 2025-05-24 NOTE — PLAN OF CARE
Problem: Infection  Goal: Absence of Infection Signs and Symptoms  Outcome: Progressing     Problem: Skin Injury Risk Increased  Goal: Skin Health and Integrity  Outcome: Progressing     Problem: Adult Inpatient Plan of Care  Goal: Plan of Care Review  Outcome: Progressing  Goal: Patient-Specific Goal (Individualized)  Outcome: Progressing  Goal: Absence of Hospital-Acquired Illness or Injury  Outcome: Progressing  Goal: Optimal Comfort and Wellbeing  Outcome: Progressing  Goal: Readiness for Transition of Care  Outcome: Progressing   Poc and meds reviewed with patient. Noted soft BP, no symptoms.Nimodipine held often, MD notified.Will continue to monitor

## 2025-05-24 NOTE — PT/OT/SLP PROGRESS
Physical Therapy Treatment    Patient Name:  Waldo Emmanuel   MRN:  [id number]    Recommendations:     Discharge Recommendations: High Intensity Therapy  Discharge Equipment Recommendations: shower chair, walker, rolling  Barriers to discharge: None    Assessment:     Waldo Emmanuel is a 49 y.o. 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  She presents with the following impairments/functional limitations: weakness, impaired endurance, impaired self care skills, impaired functional mobility, gait instability, impaired balance, decreased safety awareness, pain requiring min  assistance and verbal cues for bed mob, sit < > stand transitions, OOB/BTB, and gait to prevent falls due to weakness, fatigue, dizziness.   In light of pt's current functional level and deficits, it is anticipated that pt will need to participate in a high intensity rehab program consisting of PT and OT in order to achieve full rehab potential to return to previous level of function and roles.  Pt remains motivated to participate in PT session and will cont to benefit from skilled PT intervention..    Rehab Prognosis: Good; patient would benefit from acute skilled PT services to address these deficits and reach maximum level of function.    Recent Surgery: Procedure(s) (LRB):  MRI (Magnetic Resonance Imagine) (N/A) 9 Days Post-Op    Plan:     During this hospitalization, patient to be seen 4 x/week to address the identified rehab impairments via gait training, therapeutic activities, therapeutic exercises, neuromuscular re-education and progress toward the following goals:    Plan of Care Expires:  06/11/25    Subjective     Chief Complaint: headache  Pain/Comfort:  Pain Rating 1: 4/10  Location - Side 1: Right  Location - Orientation 1: anterior  Location 1: head  Pain Addressed 1: Pre-medicate for activity, Distraction, Cessation of Activity  Pain Rating  Post-Intervention 1:  (no rating provided)      Objective:     Communicated with nurse (Laina) prior to session (maybe just EOB activity because her BP is low).  Patient found HOB elevated with bed alarm, telemetry (family/friends present, however, they leave at beginning of session) upon PT entry to room.     General Precautions: Standard, fall  Orthopedic Precautions: N/A  Braces: N/A  Respiratory Status: Room air     Functional Mobility:  Bed Mobility:     Scooting: anteriorly to the EOB with SBA  Supine to Sit: SBA, exiting on the L side, HOB elevated  Sit to Supine: SBA, HOB flat, no use of rail  Transfers:     Sit to Stand:  CGA from EOB/BSC with no AD  Bed to Chair: min A with  no AD  using  Step Transfer  Gait: B HHA/no AD min A laterally to the R and to the L alongside the EOB 12ft and then 20ft (seated sitting rest break in between) with vc's for upright posture, weight shift, step length, and safety      AM-PAC 6 CLICK MOBILITY  Turning over in bed (including adjusting bedclothes, sheets and blankets)?: 4  Sitting down on and standing up from a chair with arms (e.g., wheelchair, bedside commode, etc.): 3  Moving from lying on back to sitting on the side of the bed?: 3  Moving to and from a bed to a chair (including a wheelchair)?: 3  Need to walk in hospital room?: 3  Climbing 3-5 steps with a railing?: 1  Basic Mobility Total Score: 17       Treatment & Education:  Patient provided with daily orientation and goals of this PT session. They were educated to call for assistance and to transfer with hospital staff only.  Also, pt was educated on the effects of prolonged immobility and the importance of performing OOB activity and exercises to promote healing and reduce recovery time    Patient left HOB elevated with all lines intact, call button in reach, bed alarm on, and nurse notified..    GOALS:   Multidisciplinary Problems       Physical Therapy Goals          Problem: Physical Therapy    Goal  Priority Disciplines Outcome Interventions   Physical Therapy Goal     PT, PT/OT Progressing    Description: Goals to be met by: 2025     Patient will increase functional independence with mobility by performin. Supine to sit with Contact Guard Assistance -MET  Update: w/ supervision  2. Sit to supine with Contact Guard Assistance -MET  Update: w/ supervision  3. Sit to stand transfer with Contact Guard Assistance -MET  Update: w/ supervision  4. Bed to chair transfer with Minimal Assistance using LRAD -MET  Update: w/ supervision  5. Gait  x 25 feet with Minimal Assistance using LRAD.  -MET  Update: w/ SBA  6. Stand for 2 minutes with Contact Guard Assistance using LRAD -MET  Update: w/ supervision  7. Lower extremity exercise program x15 reps per handout, with independence                         DME Justifications:   Waldo's mobility limitation cannot be sufficiently resolved by the use of a cane. Her functional mobility deficit can be sufficiently resolved with the use of a Rolling Walker. Patient's mobility limitation significantly impairs their ability to participate in one of more activities of daily living.  The use of a RW will significantly improve the patient's ability to participate in MRADLS and the patient will use it on regular basis in the home.     Time Tracking:     PT Received On: 25  PT Start Time: 1518     PT Stop Time: 1544  PT Total Time (min): 26 min     Billable Minutes: Gait Training 15 and Therapeutic Activity 11    Treatment Type: Treatment  PT/PTA: PTA     Number of PTA visits since last PT visit: 2025

## 2025-05-24 NOTE — ASSESSMENT & PLAN NOTE
49f presenting after syncope with CTH demonstrating SAH. CTA with AICA aneurysm R.     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    Post bleed day 18.    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 Corley

## 2025-05-24 NOTE — PROGRESS NOTES
Tray Srivastava - Neurosurgery ([facility])  Neurosurgery  Progress Note    Subjective:     History of Present Illness: 49f presenting after syncope with CTH demonstrating SAH. CTA without clear vascular malformation. Intubated prior to arrival to [facility] ED. Discussed expected hospital course and imaging with daughter in room    Post-Op Info:  Procedure(s) (LRB):  MRI (Magnetic Resonance Imagine) (N/A)   9 Days Post-Op   Interval History:  NAEON. AFVSS. Patient without complaints. INR 2.5, d/c heparin gtt. CTM - pharmacy managing. Anticipate IPR Monday.     Medications:  Continuous Infusions:      Scheduled Meds:   aspirin  81 mg Oral Daily    atorvastatin  40 mg Oral Daily    niMODipine  60 mg Oral Q4H    polyethylene glycol  17 g Oral BID    pregabalin  75 mg Oral BID    QUEtiapine  50 mg Oral QHS    senna-docusate  2 tablet Oral BID    valACYclovir  1,000 mg Oral Daily    valsartan  80 mg Oral Daily    warfarin  2.5 mg Oral Daily    [START ON 5/27/2025] warfarin  2.5 mg Oral Once per day on Tuesday Thursday    [START ON 5/25/2025] warfarin  5 mg Oral Once per day on Sunday Monday Wednesday Friday Saturday     PRN Meds:  Current Facility-Administered Medications:     acetaminophen, 650 mg, Oral, Q6H PRN    bisacodyL, 10 mg, Rectal, Daily PRN    diazePAM, 10 mg, Oral, Q6H PRN    hydrALAZINE, 10 mg, Intravenous, Q4H PRN    labetalol, 10 mg, Intravenous, Q4H PRN    melatonin, 6 mg, Oral, Nightly PRN    methocarbamoL, 750 mg, Oral, Q6H PRN    oxyCODONE, 5 mg, Oral, Q4H PRN    sodium chloride 0.9%, 10 mL, Intravenous, PRN     Review of Systems  Objective:     Weight: 100.2 kg (220 lb 14.4 oz)  Body mass index is 39.13 kg/m².  Vital Signs (Most Recent):  Temp: 98.2 °F (36.8 °C) (05/24/25 0815)  Pulse: 107 (05/24/25 0815)  Resp: 18 (05/24/25 0912)  BP: 98/63 (05/24/25 1015)  SpO2: 96 % (05/24/25 0815) Vital Signs (24h Range):  Temp:  [98.2 °F (36.8 °C)-98.4 °F (36.9 °C)] 98.2 °F (36.8 °C)  Pulse:  [] 107  Resp:  [16-18]  18  SpO2:  [94 %-97 %] 96 %  BP: ()/(63-80) 98/63     Date 05/24/25 0700 - 05/25/25 0659   Shift 5663-2908 1520-6538 5531-7022 24 Hour Total   INTAKE   Shift Total(mL/kg)       OUTPUT   Urine(mL/kg/hr) 180   180   Shift Total(mL/kg) 180(1.8)   180(1.8)   Weight (kg) 100.2 100.2 100.2 100.2             Neurosurgery Physical Exam  Awake, alert, Ox4  Cranial nerves intact  Follows commands x4 grossly full strength throughout  SILT  EVD Incision clean/monocryl suture in place    Significant Labs:  Recent Labs   Lab 05/23/25 0525 05/24/25  0328    93    141   K 3.8 3.8    105   CO2 21* 25   BUN 15 11   CREATININE 0.6 0.7   CALCIUM 9.5 9.7   MG 1.6 1.7     Recent Labs   Lab 05/23/25 0525 05/24/25  0328   WBC 11.49 11.13   HGB 9.1* 9.5*   HCT 31.3* 31.9*    464*     Recent Labs   Lab 05/22/25 2123 05/23/25  0525 05/24/25  0328   INR  --  1.7* 2.9*   APTT 50.7* 39.7* 57.4*     Microbiology Results (last 7 days)       Procedure Component Value Units Date/Time    CSF culture [[id number]] Collected: 05/19/25 0948    Order Status: Completed Specimen: CSF (Spinal Fluid) from CSF Tap, Tube 3 Updated: 05/24/25 0723     CULTURE, CSF No Growth     GRAM STAIN Cytospin indicates:      No WBCs      No organisms seen    Gram stain [[id number]] Collected: 05/19/25 0948    Order Status: Canceled Specimen: CSF (Spinal Fluid) from CSF Tap, Tube 3 Updated: 05/19/25 1411    CSF culture [[id number]] Collected: 05/12/25 1729    Order Status: Completed Specimen: CSF (Spinal Fluid) from CSF Tap, Tube 3 Updated: 05/18/25 0703     CULTURE, CSF No Growth     GRAM STAIN Rare WBC seen      No organisms seen          All pertinent labs from the last 24 hours have been reviewed.    Significant Diagnostics:  I have reviewed and interpreted all pertinent imaging results/findings within the past 24 hours.    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 CTA with AICA aneurysm R.     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    Post bleed day 18.    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 Barney Ellis, PAChanoC  Neurosurgery  Tray Srivastava - Neurosurgery ([facility])

## 2025-05-25 LAB
ABSOLUTE EOSINOPHIL (OHS): 0.06 K/UL
ABSOLUTE MONOCYTE (OHS): 0.85 K/UL (ref 0.3–1)
ABSOLUTE NEUTROPHIL COUNT (OHS): 8.92 K/UL (ref 1.8–7.7)
ALBUMIN SERPL BCP-MCNC: 2.7 G/DL (ref 3.5–5.2)
ALP SERPL-CCNC: 102 UNIT/L (ref 40–150)
ALT SERPL W/O P-5'-P-CCNC: 11 UNIT/L (ref 10–44)
ANION GAP (OHS): 11 MMOL/L (ref 8–16)
APTT PPP: 38.7 SECONDS (ref 21–32)
AST SERPL-CCNC: 11 UNIT/L (ref 11–45)
BASOPHILS # BLD AUTO: 0.02 K/UL
BASOPHILS NFR BLD AUTO: 0.2 %
BILIRUB SERPL-MCNC: 0.3 MG/DL (ref 0.1–1)
BUN SERPL-MCNC: 14 MG/DL (ref 6–20)
CALCIUM SERPL-MCNC: 9.5 MG/DL (ref 8.7–10.5)
CHLORIDE SERPL-SCNC: 106 MMOL/L (ref 95–110)
CO2 SERPL-SCNC: 23 MMOL/L (ref 23–29)
CREAT SERPL-MCNC: 0.6 MG/DL (ref 0.5–1.4)
ERYTHROCYTE [DISTWIDTH] IN BLOOD BY AUTOMATED COUNT: 14.6 % (ref 11.5–14.5)
GFR SERPLBLD CREATININE-BSD FMLA CKD-EPI: >60 ML/MIN/1.73/M2
GLUCOSE SERPL-MCNC: 104 MG/DL (ref 70–110)
HCT VFR BLD AUTO: 28.6 % (ref 37–48.5)
HGB BLD-MCNC: 8.4 GM/DL (ref 12–16)
IMM GRANULOCYTES # BLD AUTO: 0.07 K/UL (ref 0–0.04)
IMM GRANULOCYTES NFR BLD AUTO: 0.6 % (ref 0–0.5)
INR PPP: 3.2 (ref 0.8–1.2)
LYMPHOCYTES # BLD AUTO: 2.11 K/UL (ref 1–4.8)
MAGNESIUM SERPL-MCNC: 1.7 MG/DL (ref 1.6–2.6)
MCH RBC QN AUTO: 27.5 PG (ref 27–31)
MCHC RBC AUTO-ENTMCNC: 29.4 G/DL (ref 32–36)
MCV RBC AUTO: 94 FL (ref 82–98)
NUCLEATED RBC (/100WBC) (OHS): 0 /100 WBC
PHOSPHATE SERPL-MCNC: 2.8 MG/DL (ref 2.7–4.5)
PLATELET # BLD AUTO: 426 K/UL (ref 150–450)
PMV BLD AUTO: 9.5 FL (ref 9.2–12.9)
POTASSIUM SERPL-SCNC: 4.3 MMOL/L (ref 3.5–5.1)
PROT SERPL-MCNC: 7.3 GM/DL (ref 6–8.4)
PROTHROMBIN TIME: 31.8 SECONDS (ref 9–12.5)
RBC # BLD AUTO: 3.06 M/UL (ref 4–5.4)
RELATIVE EOSINOPHIL (OHS): 0.5 %
RELATIVE LYMPHOCYTE (OHS): 17.5 % (ref 18–48)
RELATIVE MONOCYTE (OHS): 7.1 % (ref 4–15)
RELATIVE NEUTROPHIL (OHS): 74.1 % (ref 38–73)
SODIUM SERPL-SCNC: 140 MMOL/L (ref 136–145)
WBC # BLD AUTO: 12.03 K/UL (ref 3.9–12.7)

## 2025-05-25 PROCEDURE — 80053 COMPREHEN METABOLIC PANEL: CPT | Performed by: PHYSICIAN ASSISTANT

## 2025-05-25 PROCEDURE — 11000001 HC ACUTE MED/SURG PRIVATE ROOM

## 2025-05-25 PROCEDURE — 25000003 PHARM REV CODE 250

## 2025-05-25 PROCEDURE — 25000003 PHARM REV CODE 250: Performed by: PHYSICIAN ASSISTANT

## 2025-05-25 PROCEDURE — 85610 PROTHROMBIN TIME: CPT | Performed by: NEUROLOGICAL SURGERY

## 2025-05-25 PROCEDURE — 36415 COLL VENOUS BLD VENIPUNCTURE: CPT | Performed by: PHYSICIAN ASSISTANT

## 2025-05-25 PROCEDURE — 84100 ASSAY OF PHOSPHORUS: CPT | Performed by: PHYSICIAN ASSISTANT

## 2025-05-25 PROCEDURE — 85025 COMPLETE CBC W/AUTO DIFF WBC: CPT | Performed by: PHYSICIAN ASSISTANT

## 2025-05-25 PROCEDURE — 99233 SBSQ HOSP IP/OBS HIGH 50: CPT | Mod: ,,, | Performed by: NEUROLOGICAL SURGERY

## 2025-05-25 PROCEDURE — 83735 ASSAY OF MAGNESIUM: CPT | Performed by: PHYSICIAN ASSISTANT

## 2025-05-25 PROCEDURE — 36415 COLL VENOUS BLD VENIPUNCTURE: CPT | Performed by: NEUROLOGICAL SURGERY

## 2025-05-25 PROCEDURE — 85730 THROMBOPLASTIN TIME PARTIAL: CPT | Performed by: NEUROLOGICAL SURGERY

## 2025-05-25 RX ORDER — WARFARIN 1 MG/1
1 TABLET ORAL ONCE
Status: COMPLETED | OUTPATIENT
Start: 2025-05-25 | End: 2025-05-25

## 2025-05-25 RX ADMIN — METHOCARBAMOL 750 MG: 750 TABLET ORAL at 08:05

## 2025-05-25 RX ADMIN — DIAZEPAM 10 MG: 5 TABLET ORAL at 05:05

## 2025-05-25 RX ADMIN — OXYCODONE 5 MG: 5 TABLET ORAL at 08:05

## 2025-05-25 RX ADMIN — VALACYCLOVIR HYDROCHLORIDE 1000 MG: 500 TABLET, FILM COATED ORAL at 08:05

## 2025-05-25 RX ADMIN — OXYCODONE 5 MG: 5 TABLET ORAL at 06:05

## 2025-05-25 RX ADMIN — NIMODIPINE 60 MG: 30 CAPSULE, LIQUID FILLED ORAL at 06:05

## 2025-05-25 RX ADMIN — QUETIAPINE FUMARATE 50 MG: 25 TABLET ORAL at 08:05

## 2025-05-25 RX ADMIN — ACETAMINOPHEN 650 MG: 325 TABLET ORAL at 08:05

## 2025-05-25 RX ADMIN — Medication 6 MG: at 08:05

## 2025-05-25 RX ADMIN — VALSARTAN 80 MG: 80 TABLET, FILM COATED ORAL at 08:05

## 2025-05-25 RX ADMIN — WARFARIN SODIUM 1 MG: 1 TABLET ORAL at 05:05

## 2025-05-25 RX ADMIN — PREGABALIN 75 MG: 75 CAPSULE ORAL at 08:05

## 2025-05-25 RX ADMIN — ASPIRIN 81 MG CHEWABLE TABLET 81 MG: 81 TABLET CHEWABLE at 08:05

## 2025-05-25 RX ADMIN — ATORVASTATIN CALCIUM 40 MG: 40 TABLET, FILM COATED ORAL at 08:05

## 2025-05-25 RX ADMIN — NIMODIPINE 60 MG: 30 CAPSULE, LIQUID FILLED ORAL at 08:05

## 2025-05-25 RX ADMIN — METHOCARBAMOL 750 MG: 750 TABLET ORAL at 06:05

## 2025-05-25 RX ADMIN — ACETAMINOPHEN 650 MG: 325 TABLET ORAL at 10:05

## 2025-05-25 RX ADMIN — NIMODIPINE 60 MG: 30 CAPSULE, LIQUID FILLED ORAL at 02:05

## 2025-05-25 RX ADMIN — NIMODIPINE 60 MG: 30 CAPSULE, LIQUID FILLED ORAL at 05:05

## 2025-05-25 NOTE — PLAN OF CARE
Problem: Stroke, Intracerebral Hemorrhage  Goal: Optimal Pain Control and Function  Outcome: Progressing     Problem: Adult Inpatient Plan of Care  Goal: Optimal Comfort and Wellbeing  5/25/2025 0213 by Faith Chaudhary, RN  Outcome: Progressing  5/24/2025 2135 by Faith Chaudhary, RN  Outcome: Progressing

## 2025-05-25 NOTE — PLAN OF CARE
Problem: Adult Inpatient Plan of Care  Goal: Readiness for Transition of Care  Outcome: Progressing     Problem: Adult Inpatient Plan of Care  Goal: Optimal Comfort and Wellbeing  Outcome: Progressing   Plan of care discussed. Addressed questions and concerns. PRN for pain will cont to care.

## 2025-05-26 ENCOUNTER — TELEPHONE (OUTPATIENT)
Dept: CARDIOLOGY | Facility: CLINIC | Age: 50
End: 2025-05-26
Payer: COMMERCIAL

## 2025-05-26 ENCOUNTER — TELEPHONE (OUTPATIENT)
Dept: NEUROSURGERY | Facility: CLINIC | Age: 50
End: 2025-05-26
Payer: COMMERCIAL

## 2025-05-26 VITALS
SYSTOLIC BLOOD PRESSURE: 95 MMHG | RESPIRATION RATE: 18 BRPM | TEMPERATURE: 99 F | WEIGHT: 220.88 LBS | DIASTOLIC BLOOD PRESSURE: 59 MMHG | BODY MASS INDEX: 39.14 KG/M2 | HEIGHT: 63 IN | HEART RATE: 105 BPM | OXYGEN SATURATION: 99 %

## 2025-05-26 DIAGNOSIS — I67.1 CEREBRAL ANEURYSM, NONRUPTURED: Primary | ICD-10-CM

## 2025-05-26 LAB
ABSOLUTE EOSINOPHIL (OHS): 0.04 K/UL
ABSOLUTE MONOCYTE (OHS): 0.74 K/UL (ref 0.3–1)
ABSOLUTE NEUTROPHIL COUNT (OHS): 8.7 K/UL (ref 1.8–7.7)
ALBUMIN SERPL BCP-MCNC: 2.9 G/DL (ref 3.5–5.2)
ALP SERPL-CCNC: 113 UNIT/L (ref 40–150)
ALT SERPL W/O P-5'-P-CCNC: 11 UNIT/L (ref 10–44)
ANION GAP (OHS): 10 MMOL/L (ref 8–16)
APTT PPP: 40.9 SECONDS (ref 21–32)
AST SERPL-CCNC: 13 UNIT/L (ref 11–45)
BASOPHILS # BLD AUTO: 0.02 K/UL
BASOPHILS NFR BLD AUTO: 0.2 %
BILIRUB SERPL-MCNC: 0.4 MG/DL (ref 0.1–1)
BUN SERPL-MCNC: 11 MG/DL (ref 6–20)
CALCIUM SERPL-MCNC: 9.8 MG/DL (ref 8.7–10.5)
CHLORIDE SERPL-SCNC: 105 MMOL/L (ref 95–110)
CO2 SERPL-SCNC: 25 MMOL/L (ref 23–29)
CREAT SERPL-MCNC: 0.7 MG/DL (ref 0.5–1.4)
ERYTHROCYTE [DISTWIDTH] IN BLOOD BY AUTOMATED COUNT: 14.6 % (ref 11.5–14.5)
GFR SERPLBLD CREATININE-BSD FMLA CKD-EPI: >60 ML/MIN/1.73/M2
GLUCOSE SERPL-MCNC: 101 MG/DL (ref 70–110)
HCT VFR BLD AUTO: 30.2 % (ref 37–48.5)
HGB BLD-MCNC: 8.9 GM/DL (ref 12–16)
IMM GRANULOCYTES # BLD AUTO: 0.07 K/UL (ref 0–0.04)
IMM GRANULOCYTES NFR BLD AUTO: 0.6 % (ref 0–0.5)
INR PPP: 3 (ref 0.8–1.2)
LYMPHOCYTES # BLD AUTO: 1.87 K/UL (ref 1–4.8)
MAGNESIUM SERPL-MCNC: 1.8 MG/DL (ref 1.6–2.6)
MCH RBC QN AUTO: 27.6 PG (ref 27–31)
MCHC RBC AUTO-ENTMCNC: 29.5 G/DL (ref 32–36)
MCV RBC AUTO: 94 FL (ref 82–98)
NUCLEATED RBC (/100WBC) (OHS): 0 /100 WBC
PHOSPHATE SERPL-MCNC: 3.4 MG/DL (ref 2.7–4.5)
PLATELET # BLD AUTO: 449 K/UL (ref 150–450)
PMV BLD AUTO: 9.8 FL (ref 9.2–12.9)
POTASSIUM SERPL-SCNC: 4.1 MMOL/L (ref 3.5–5.1)
PROT SERPL-MCNC: 7.8 GM/DL (ref 6–8.4)
PROTHROMBIN TIME: 30.6 SECONDS (ref 9–12.5)
RBC # BLD AUTO: 3.23 M/UL (ref 4–5.4)
RELATIVE EOSINOPHIL (OHS): 0.3 %
RELATIVE LYMPHOCYTE (OHS): 16.3 % (ref 18–48)
RELATIVE MONOCYTE (OHS): 6.5 % (ref 4–15)
RELATIVE NEUTROPHIL (OHS): 76.1 % (ref 38–73)
SODIUM SERPL-SCNC: 140 MMOL/L (ref 136–145)
WBC # BLD AUTO: 11.44 K/UL (ref 3.9–12.7)

## 2025-05-26 PROCEDURE — 85610 PROTHROMBIN TIME: CPT | Performed by: PHYSICIAN ASSISTANT

## 2025-05-26 PROCEDURE — 84100 ASSAY OF PHOSPHORUS: CPT | Performed by: PHYSICIAN ASSISTANT

## 2025-05-26 PROCEDURE — 83735 ASSAY OF MAGNESIUM: CPT | Performed by: PHYSICIAN ASSISTANT

## 2025-05-26 PROCEDURE — 25000003 PHARM REV CODE 250: Performed by: PHYSICIAN ASSISTANT

## 2025-05-26 PROCEDURE — 25000003 PHARM REV CODE 250

## 2025-05-26 PROCEDURE — 36415 COLL VENOUS BLD VENIPUNCTURE: CPT | Performed by: PHYSICIAN ASSISTANT

## 2025-05-26 PROCEDURE — 85730 THROMBOPLASTIN TIME PARTIAL: CPT | Performed by: PHYSICIAN ASSISTANT

## 2025-05-26 PROCEDURE — 82374 ASSAY BLOOD CARBON DIOXIDE: CPT | Performed by: PHYSICIAN ASSISTANT

## 2025-05-26 PROCEDURE — 99239 HOSP IP/OBS DSCHRG MGMT >30: CPT | Mod: ,,, | Performed by: PHYSICIAN ASSISTANT

## 2025-05-26 PROCEDURE — 82040 ASSAY OF SERUM ALBUMIN: CPT | Performed by: PHYSICIAN ASSISTANT

## 2025-05-26 PROCEDURE — 85025 COMPLETE CBC W/AUTO DIFF WBC: CPT | Performed by: PHYSICIAN ASSISTANT

## 2025-05-26 PROCEDURE — 97530 THERAPEUTIC ACTIVITIES: CPT

## 2025-05-26 PROCEDURE — 97535 SELF CARE MNGMENT TRAINING: CPT

## 2025-05-26 RX ORDER — VALSARTAN 80 MG/1
80 TABLET ORAL DAILY
Start: 2025-05-27 | End: 2026-05-27

## 2025-05-26 RX ORDER — WARFARIN 2.5 MG/1
2.5 TABLET ORAL
Start: 2025-05-27 | End: 2026-05-27

## 2025-05-26 RX ORDER — NIMODIPINE 30 MG/1
60 CAPSULE, LIQUID FILLED ORAL EVERY 4 HOURS
Start: 2025-05-26 | End: 2025-05-30

## 2025-05-26 RX ORDER — OXYCODONE AND ACETAMINOPHEN 5; 325 MG/1; MG/1
1 TABLET ORAL EVERY 6 HOURS PRN
Start: 2025-05-26

## 2025-05-26 RX ORDER — WARFARIN 2.5 MG/1
5 TABLET ORAL
Status: DISCONTINUED | OUTPATIENT
Start: 2025-05-28 | End: 2025-05-26 | Stop reason: HOSPADM

## 2025-05-26 RX ORDER — WARFARIN 2.5 MG/1
2.5 TABLET ORAL ONCE
Start: 2025-05-26 | End: 2025-05-26

## 2025-05-26 RX ORDER — ATORVASTATIN CALCIUM 40 MG/1
40 TABLET, FILM COATED ORAL DAILY
Start: 2025-05-27 | End: 2026-05-27

## 2025-05-26 RX ORDER — DIAZEPAM 10 MG/1
10 TABLET ORAL EVERY 6 HOURS PRN
Start: 2025-05-26 | End: 2025-06-25

## 2025-05-26 RX ORDER — METHOCARBAMOL 750 MG/1
750 TABLET, FILM COATED ORAL EVERY 6 HOURS PRN
Start: 2025-05-26

## 2025-05-26 RX ORDER — QUETIAPINE FUMARATE 50 MG/1
50 TABLET, FILM COATED ORAL NIGHTLY
Start: 2025-05-26 | End: 2026-05-26

## 2025-05-26 RX ORDER — WARFARIN 2.5 MG/1
2.5 TABLET ORAL
Status: DISCONTINUED | OUTPATIENT
Start: 2025-05-27 | End: 2025-05-26 | Stop reason: HOSPADM

## 2025-05-26 RX ORDER — TALC
6 POWDER (GRAM) TOPICAL NIGHTLY PRN
Start: 2025-05-26

## 2025-05-26 RX ORDER — WARFARIN 2.5 MG/1
2.5 TABLET ORAL ONCE
Status: DISCONTINUED | OUTPATIENT
Start: 2025-05-26 | End: 2025-05-26 | Stop reason: HOSPADM

## 2025-05-26 RX ORDER — WARFARIN SODIUM 5 MG/1
TABLET ORAL
Start: 2025-05-28

## 2025-05-26 RX ORDER — AMOXICILLIN 250 MG
2 CAPSULE ORAL 2 TIMES DAILY
Start: 2025-05-26

## 2025-05-26 RX ORDER — PREGABALIN 75 MG/1
75 CAPSULE ORAL 2 TIMES DAILY
Start: 2025-05-26 | End: 2025-11-24

## 2025-05-26 RX ADMIN — NIMODIPINE 60 MG: 30 CAPSULE, LIQUID FILLED ORAL at 05:05

## 2025-05-26 RX ADMIN — SENNOSIDES AND DOCUSATE SODIUM 2 TABLET: 50; 8.6 TABLET ORAL at 09:05

## 2025-05-26 RX ADMIN — METHOCARBAMOL 750 MG: 750 TABLET ORAL at 09:05

## 2025-05-26 RX ADMIN — VALSARTAN 80 MG: 80 TABLET, FILM COATED ORAL at 09:05

## 2025-05-26 RX ADMIN — POLYETHYLENE GLYCOL 3350 17 G: 17 POWDER, FOR SOLUTION ORAL at 09:05

## 2025-05-26 RX ADMIN — ACETAMINOPHEN 650 MG: 325 TABLET ORAL at 07:05

## 2025-05-26 RX ADMIN — NIMODIPINE 60 MG: 30 CAPSULE, LIQUID FILLED ORAL at 09:05

## 2025-05-26 RX ADMIN — PREGABALIN 75 MG: 75 CAPSULE ORAL at 09:05

## 2025-05-26 RX ADMIN — ASPIRIN 81 MG CHEWABLE TABLET 81 MG: 81 TABLET CHEWABLE at 09:05

## 2025-05-26 RX ADMIN — ATORVASTATIN CALCIUM 40 MG: 40 TABLET, FILM COATED ORAL at 09:05

## 2025-05-26 RX ADMIN — VALACYCLOVIR HYDROCHLORIDE 1000 MG: 500 TABLET, FILM COATED ORAL at 09:05

## 2025-05-26 NOTE — NURSING
Provider placed order for discharge at 1100. Patient to be  discharged to Discharge Destination: Rehab.   Transport set up via Discharge transportation: [employer].  Report called to [facility] at 1215. Report given to Jeri at [facility]. AVS printed for patient and discharge instructions reviewed with the patient at the bedside. Questions regarding discharge instructions and new medications were encouraged/addressed. Patient  verbalized/demonstrated understanding of D/C instructions and medication instructions. All personal belongings at the bedside placed in patient belongings bag and given to patient.  Bedside RX selected and medications delivered to patient prior to discharge. All PIVs/drains/equipment removed from patient and documented in chart per policy. VSS, see flowsheets for details. No acute signs of distress noted upon leaving the floor. AVS confirmation completed and unit secretary notified of patient's departure.

## 2025-05-26 NOTE — PT/OT/SLP PROGRESS
"Occupational Therapy   Treatment    Name: Waldo Emmanuel  MRN: [id number]  Admitting Diagnosis:  Nontraumatic subarachnoid hemorrhage from other intracranial arteries  11 Days Post-Op    Recommendations:     Discharge Recommendations: High Intensity Therapy  Discharge Equipment Recommendations:  walker, rolling, shower chair  Barriers to discharge:  None    Assessment:     Waldo Emmanuel is a 49 y.o. 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  She presents with consistent assistance needed for self-care and mobility activities this date. Performance deficits affecting function are gait instability, weakness, impaired endurance, impaired balance, decreased safety awareness, decreased lower extremity function, impaired self care skills, impaired cognition, pain, impaired functional mobility. Pt remains agreeable to session activities to increase independence. Pt completed mobility to and from bathroom with RW and CGA though with 1 mild posterior LOB upon attempt to sit UIC 2/2 mildly decreased safety awareness, benefiting from increased assistance to maintain upright posture. Pt benefiting from education on RW management to promote safety. Pt completing oral hygiene with appropriate sequencing. Pt with mild STM deficits observed this date. Patient has demonstrated sufficient progression to warrant high intensity therapy evidenced by objectives noted below.     Rehab Prognosis:  Good; patient would benefit from acute skilled OT services to address these deficits and reach maximum level of function.       Plan:     Patient to be seen 4 x/week to address the above listed problems via self-care/home management, therapeutic activities, therapeutic exercises, neuromuscular re-education  Plan of Care Expires: 06/11/25  Plan of Care Reviewed with: patient    Subjective     Chief Complaint: "what is this from?" (Suture at scalp)  Patient/Family " Comments/goals: increase independence  Pain/Comfort:  Pain Rating 1: 0/10  Pain Rating Post-Intervention 1: 0/10    Objective:     Communicated with: nursing prior to session.  Patient found HOB elevated with bed alarm, telemetry upon OT entry to room.    General Precautions: Standard, fall    Orthopedic Precautions:N/A  Braces: N/A  Respiratory Status: Room air     Occupational Performance:     Bed Mobility:    Patient completed Scooting/Bridging with contact guard assistance  Patient completed Supine to Sit with stand by assistance and to L    Pt seated EOB with SBA    Functional Mobility/Transfers:  Patient completed Sit <> Stand Transfer with contact guard assistance  with  rolling walker and v/cs for RW management   Patient completed Bed <> Chair Transfer using Step Transfer technique with minimum assistance and 2/2 decreased control with descent as pt with mild posterior LOB with rolling walker  Patient completed Toilet Transfer Step Transfer technique with contact guard assistance with  rolling walker  Functional Mobility: Pt engaged in functional mobility to simulate household/community distances with CGA and RW in order to maximize functional endurance and standing balance required for engagement in occupations of choice - pt with mild decreased safety awareness, benefiting from v/cs for RW management    Activities of Daily Living:  Grooming: contact guard assistance for oral hygiene while standing at sink with RW  Upper Body Dressing: minimum assistance to adolfo gown as robe  Toileting: minimum assistance for posterior clothing management      [facility] 6 Click ADL: 20    Treatment & Education:  Session this date targeted self-care and therapeutic activities to increase pt's independence   Pt educated on OT roles, POC, call button for assistance, staff assistance with mobility activities    Patient left up in chair with all lines intact, call button in reach, chair alarm on, and nursing notified    GOALS:    Multidisciplinary Problems       Occupational Therapy Goals          Problem: Occupational Therapy    Goal Priority Disciplines Outcome Interventions   Occupational Therapy Goal     OT, PT/OT Progressing    Description: Goals to be met by: 6/11/2025     Patient will increase functional independence with ADLs by performing:    UE Dressing with Supervision.  LE Dressing with Supervision.  Grooming while standing at sink with Supervision.  Toileting from toilet with Supervision for hygiene and clothing management.   Toilet transfer to toilet with Supervision.                         DME Justifications:   Waldo's mobility limitation cannot be sufficiently resolved by the use of a cane. Her functional mobility deficit can be sufficiently resolved with the use of a Rolling Walker. Patient's mobility limitation significantly impairs their ability to participate in one of more activities of daily living.  The use of a RW will significantly improve the patient's ability to participate in MRADLS and the patient will use it on regular basis in the home.    Time Tracking:     OT Date of Treatment: 05/26/25  OT Start Time: 1018  OT Stop Time: 1043  OT Total Time (min): 25 min    Billable Minutes:Self Care/Home Management 13  Therapeutic Activity 12    OT/ALEXUS: OT          5/26/2025

## 2025-05-26 NOTE — SUBJECTIVE & OBJECTIVE
Interval History: 5/25: exam stable, warfarin dosing per pharmacy    Medications:  Continuous Infusions:  Scheduled Meds:   aspirin  81 mg Oral Daily    atorvastatin  40 mg Oral Daily    niMODipine  60 mg Oral Q4H    polyethylene glycol  17 g Oral BID    pregabalin  75 mg Oral BID    QUEtiapine  50 mg Oral QHS    senna-docusate  2 tablet Oral BID    valACYclovir  1,000 mg Oral Daily    valsartan  80 mg Oral Daily     PRN Meds:  Current Facility-Administered Medications:     acetaminophen, 650 mg, Oral, Q6H PRN    bisacodyL, 10 mg, Rectal, Daily PRN    diazePAM, 10 mg, Oral, Q6H PRN    hydrALAZINE, 10 mg, Intravenous, Q4H PRN    labetalol, 10 mg, Intravenous, Q4H PRN    melatonin, 6 mg, Oral, Nightly PRN    methocarbamoL, 750 mg, Oral, Q6H PRN    oxyCODONE, 5 mg, Oral, Q4H PRN    sodium chloride 0.9%, 10 mL, Intravenous, PRN     Review of Systems  Objective:     Weight: 100.2 kg (220 lb 14.4 oz)  Body mass index is 39.13 kg/m².  Vital Signs (Most Recent):  Temp: 97.8 °F (36.6 °C) (05/25/25 2351)  Pulse: 98 (05/25/25 2351)  Resp: 18 (05/25/25 2351)  BP: 100/64 (05/25/25 2351)  SpO2: (!) 91 % (05/25/25 2351) Vital Signs (24h Range):  Temp:  [97.8 °F (36.6 °C)-99 °F (37.2 °C)] 97.8 °F (36.6 °C)  Pulse:  [] 98  Resp:  [16-18] 18  SpO2:  [91 %-96 %] 91 %  BP: ()/(55-77) 100/64                                 Physical Exam         Neurosurgery Physical Exam    Awake, alert, Ox3  Cranial nerves intact  Follows commands x4 grossly full strength throughout  SILT  EVD Incision clean/monocryl suture in place    Significant Labs:  Recent Labs   Lab 05/24/25  0328 05/25/25  0951   GLU 93 104    140   K 3.8 4.3    106   CO2 25 23   BUN 11 14   CREATININE 0.7 0.6   CALCIUM 9.7 9.5   MG 1.7 1.7     Recent Labs   Lab 05/24/25 0328 05/25/25  0814   WBC 11.13 12.03   HGB 9.5* 8.4*   HCT 31.9* 28.6*   * 426     Recent Labs   Lab 05/24/25 0328 05/25/25  0951   INR 2.9* 3.2*   APTT 57.4* 38.7*      Microbiology Results (last 7 days)       Procedure Component Value Units Date/Time    CSF culture [[id number]] Collected: 05/19/25 0948    Order Status: Completed Specimen: CSF (Spinal Fluid) from CSF Tap, Tube 3 Updated: 05/24/25 0723     CULTURE, CSF No Growth     GRAM STAIN Cytospin indicates:      No WBCs      No organisms seen    Gram stain [[id number]] Collected: 05/19/25 0948    Order Status: Canceled Specimen: CSF (Spinal Fluid) from CSF Tap, Tube 3 Updated: 05/19/25 1411          All pertinent labs from the last 24 hours have been reviewed.    Significant Diagnostics:  CT: No results found in the last 24 hours.  MRI: No results found in the last 24 hours.

## 2025-05-26 NOTE — PLAN OF CARE
Message received from Hannah at [facility] informing this CM that they have insurance authorization and they are ready for the patient. W/C van transportation arranged for an estimated  time of 12:00PM. The nurse can call report to [phone]. Patient and her daughter Tj ([phone]) notified of above.    Yudith Bird RN  Ext [phone]

## 2025-05-26 NOTE — DISCHARGE INSTRUCTIONS
Neurosurgery Patient Information  -Return to work will be determined on an individual basis.  -No driving until released by your medical provider or while taking narcotics  -Do not take any OTC products containing acetaminophen at the same time as you take your narcotic pain medication. Medications that may contain acetaminophen include but are not limited to: Excedrin and other headache medications, arthritis medications, cold and sinus medications, etc. Please review the list of active ingredients in any OTC medication prior to taking it.  -Do not take any herbal supplements for 2 weeks after surgery.   -Do not consume any alcoholic beverages until released by your neurosurgeon  -Do not perform any excessive bending over or leaning forward as this is a fall hazard.  -Do not perform any heavy lifting or lifting more than 5-10 lbs from the ground level as this is a fall hazard.  -Slowly increase your walking over the next 2 weeks as tolerated.   -Walk on paved surfaces only. It is okay to walk up and down stairs while holding onto a side rail.      Contact the Neurosurgery Office immediately if:  If you begin to notice any neurologic changes such as:           -Sudden onset of lethargy or sleepiness           -Sudden confusion, trouble speaking, or understanding            -Sudden trouble seeing in one or both eyes            -Sudden trouble walking, dizziness, loss of coordination            -Sudden severe headache with no known cause            -Sudden onset of numbness or weakness           -Pulsating at the groin/wrist puncture site, bleeding from the groin/wrist site, swelling at the groin/wrist site and/or arm/leg       Miscellaneous:   -You were started on a medication to help prevent vasospasms (Nimodipine) while in the hospital. Please continue to take this medication as instructed until course completed. This medication can be expensive and may not be covered by your insurance. If you are unable to pay for  this medication, you do not have to purchase it.  -Follow up with Dr. Corley in Neurosurgery clinic in 4-6 weeks with MRA of your brain prior to this appointment. Appointment will be mailed to you.    Please call our office with any questions or concerns.    [facility] Neurosurgery Office: [phone]

## 2025-05-26 NOTE — CONSULTS
[facility]       Bed:952[facility]952 A    22G x 1.75IN PIV placed in Left Forearm by ANNIA using Ultrasound Guidance.    Indication: PVA  Attempts: 1    Genie Betts RN    Provider arrived aftrer PIV inserted and said pt is being discharged and no longer needs an IV, PIV removed.

## 2025-05-26 NOTE — PLAN OF CARE
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 Restarted on precedex today and increased seroquel to 50qHS.       Herpes simplex virus (HSV) infection [B00.9]  Yes    Arterial occlusion, lower extremity [I70.209]  Yes    Cerebellar stroke, acute [I63.9]  No    Brain edema [G93.6]  Yes    History of DVT in adulthood [Z86.718]  Not Applicable    Brain aneurysm [I67.1]  No    Brain compression [G93.5]  Yes    Obstructive hydrocephalus [G91.1]  Yes    Acute encephalopathy [G93.40]  Yes    Acute DVT (deep venous thrombosis) [I82.409]  Yes     R brachial vein dvt+   On hep gtt with bridge to coumadin      Essential hypertension [I10]  Yes      Resolved Hospital Problems    Diagnosis Date Resolved POA    Endotracheal tube present [Z97.8] 05/10/2025 Yes       Allergies:  Review of patient's allergies indicates:   Allergen Reactions    Metformin Diarrhea     Other reaction(s): Diarrhae       Diet: regular diet    Activities: Activity as tolerated    Goals of Care Treatment Preferences:  Code Status: Full Code      Nursing: Patient may shower daily (please see below for detailed wound care instructions). Labs, dvt prophylaxis, and bowel regimen per facility.       Labs: BMP and INR BMP 2x/weekly (goal normal sodium - may wean salt tabs as able); INR should be monitored every 2-3 days during the next week. If INR remains stable, can space out INR to once a week  INR goal 2-3      CONSULTS:    Physical Therapy to  evaluate and treat. , Occupational Therapy to evaluate and treat., and  to evaluate for community resources/long-range planning.    MISCELLANEOUS CARE:  Patient may shower daily. DVT prophylaxis and bowel regimen per facility.       PHARMACY CONSULT NOTE: WARFARIN  Waldo Emmanuel is a 49 y.o. female on warfarin therapy for DVT. PharmD has been consulted for warfarin dosing.     Current order: warfarin 1 mg x1 (5/25)  Home dose: warfarin 5 mg daily except 2.5 mg T/Th  Coumadin clinic enrollment: Active  INR goal: 2-3           Lab Results   Component Value Date     INR 3.0 (H) 05/26/2025     INR 3.2 (H) 05/25/2025     INR 2.9 (H) 05/24/2025     PROTIME 30.6 (H) 05/26/2025     PROTIME 31.8 (H) 05/25/2025     PROTIME 29.1 (H) 05/24/2025      Significant drug interactions: no major interaction noted  Diet: Adult Regular without supplements      Recommendation(s):   INR at the upper end of therapeutic range but down trending after warfarin 1mg (5/25)  Recommend to administer warfarin 2.5 mg on 5/26, then begin home dose starting 5/27: warfarin 2.5 mg every Tue, Thu; warfarin 5mg all other days   INR should be monitored every 2-3 days during the first week  If INR remains stable, can space out INR to once a week    Pharmacy will continue to follow and monitor warfarin while admitted      Thank you for the consult,  Jordan Chacon, PharmD  Ext: [phone]    WOUND CARE ORDERS  None    Medications: Review discharge medications with patient and family and provide education.         Medication List        PAUSE taking these medications      cetirizine 10 MG tablet  Wait to take this until your doctor or other care provider tells you to start again.  Commonly known as: ZYRTEC  Take 10 mg by mouth.     cilostazoL 50 MG Tab  Wait to take this until your doctor or other care provider tells you to start again.  Commonly known as: PLETAL  Take 1 tablet (50 mg total) by mouth 2 (two) times daily.     lisdexamfetamine  60 MG capsule  Wait to take this until your doctor or other care provider tells you to start again.  Commonly known as: VYVANSE  Take 1 capsule (60 mg total) by mouth every morning.     multivitamin capsule  Wait to take this until your doctor or other care provider tells you to start again.  Take 1 capsule by mouth once daily.     omega-3 acid ethyl esters 1 gram capsule  Wait to take this until your doctor or other care provider tells you to start again.  Commonly known as: LOVAZA  Take 1 capsule (1 g total) by mouth once daily.     topiramate 50 MG tablet  Wait to take this until your doctor or other care provider tells you to start again.  Commonly known as: TOPAMAX  Take 1 tablet by mouth twice daily (dose correction)     * ZEPBOUND 5 mg/0.5 mL Pnij  Wait to take this until your doctor or other care provider tells you to start again.  Generic drug: tirzepatide (weight loss)  Inject 5 mg into the skin every 7 days.     * ZEPBOUND 7.5 mg/0.5 mL Pnij  Wait to take this until your doctor or other care provider tells you to start again.  Generic drug: tirzepatide (weight loss)  Inject 7.5 mg into the skin every 7 days.           * This list has 2 medication(s) that are the same as other medications prescribed for you. Read the directions carefully, and ask your doctor or other care provider to review them with you.                START taking these medications      atorvastatin 40 MG tablet  Commonly known as: LIPITOR  Take 1 tablet (40 mg total) by mouth once daily.  Start taking on: May 27, 2025     diazePAM 10 MG Tab  Commonly known as: VALIUM  Take 1 tablet (10 mg total) by mouth every 6 (six) hours as needed (back or neck pain/stiffness).     melatonin 3 mg tablet  Commonly known as: MELATIN  Take 2 tablets (6 mg total) by mouth nightly as needed for Insomnia.     methocarbamoL 750 MG Tab  Commonly known as: ROBAXIN  Take 1 tablet (750 mg total) by mouth every 6 (six) hours as needed (muscle spasms).      niMODipine 30 MG Cap  Commonly known as: NIMOTOP  Take 2 capsules (60 mg total) by mouth every 4 (four) hours. Hold if SBP <100 for 1 day     oxyCODONE-acetaminophen 5-325 mg per tablet  Commonly known as: PERCOCET  Take 1 tablet by mouth every 6 (six) hours as needed for Pain.     pregabalin 75 MG capsule  Commonly known as: LYRICA  Take 1 capsule (75 mg total) by mouth 2 (two) times daily.     QUEtiapine 50 MG tablet  Commonly known as: SEROQUEL  Take 1 tablet (50 mg total) by mouth every evening.     senna-docusate 8.6-50 mg per tablet  Commonly known as: PERICOLACE  Take 2 tablets by mouth 2 (two) times daily. Wean as able; goal normal sodium            CHANGE how you take these medications      valsartan 80 MG tablet  Commonly known as: DIOVAN  Take 1 tablet (80 mg total) by mouth once daily.  Start taking on: May 27, 2025  What changed:   medication strength  how much to take     * warfarin 2.5 MG tablet  Commonly known as: COUMADIN  Take 1 tablet (2.5 mg total) by mouth once. 1 dose today for 1 dose  What changed: You were already taking a medication with the same name, and this prescription was added. Make sure you understand how and when to take each.     * warfarin 2.5 MG tablet  Commonly known as: COUMADIN  Take 1 tablet (2.5 mg total) by mouth every Tues, Thurs.  Start taking on: May 27, 2025  What changed: You were already taking a medication with the same name, and this prescription was added. Make sure you understand how and when to take each.     * warfarin 5 MG tablet  Commonly known as: COUMADIN  Once per day on Sunday Monday Wednesday Friday Saturday  Start taking on: May 28, 2025  What changed:   how to take this  additional instructions  These instructions start on May 28, 2025. If you are unsure what to do until then, ask your doctor or other care provider.           * This list has 3 medication(s) that are the same as other medications prescribed for you. Read the directions carefully, and  ask your doctor or other care provider to review them with you.                CONTINUE taking these medications      aspirin 81 MG EC tablet  Commonly known as: ECOTRIN  Take 81 mg by mouth.     pantoprazole 40 MG tablet  Commonly known as: PROTONIX  Take 1 tablet (40 mg total) by mouth 2 (two) times daily.     valACYclovir 1000 MG tablet  Commonly known as: VALTREX  Take 1 tablet (1,000 mg total) by mouth once daily.            STOP taking these medications      rosuvastatin 20 MG tablet  Commonly known as: CRESTOR                Immunizations Administered as of 5/26/2025       Name Date Dose VIS Date Route Exp Date    COVID-19, MRNA, LN-S, PF (Pfizer) (Purple Cap) 11/23/2021 0.3 mL 10/29/2021 Intramuscular --    Site: Left arm     : [employer]     Lot: [id number]     Comment: Adminis     COVID-19, MRNA, LN-S, PF (Pfizer) (Purple Cap) 1/29/2021 0.3 mL -- Intramuscular --    Site: Right deltoid     : [employer]     Lot: [id number]     External: Auto Reconciled From Outside Source     Comment: Adminis     COVID-19, MRNA, LN-S, PF (Pfizer) (Purple Cap) 1/8/2021 0.3 mL -- Intramuscular --    Site: Right deltoid     : [employer]     Lot: [id number]     External: Auto Reconciled From Outside Source     Comment: Adminis             This patient has had both covid vaccinations    Some patients may experience side effects after vaccination.  These may include fever, headache, muscle or joint aches.  Most symptoms resolve with 24-48 hours and do not require urgent medical evaluation unless they persist for more than 72 hours or symptoms are concerning for an unrelated medical condition.          _________________________________  Zainab Ellis PA-C  05/26/2025

## 2025-05-26 NOTE — PLAN OF CARE
Problem: Infection  Goal: Absence of Infection Signs and Symptoms  5/25/2025 2156 by Feliberto De Oliveira RN  Outcome: Progressing  5/25/2025 2156 by Feliberto De Oliveira RN  Outcome: Progressing     Problem: Skin Injury Risk Increased  Goal: Skin Health and Integrity  5/25/2025 2156 by Feliberto De Oliveira RN  Outcome: Progressing  5/25/2025 2156 by Feliberto De Oliveira RN  Outcome: Progressing     Problem: Adult Inpatient Plan of Care  Goal: Plan of Care Review  5/25/2025 2156 by Feliberto De Oliveira RN  Outcome: Progressing  5/25/2025 2156 by Feliberto De Oliveira RN  Outcome: Progressing  Goal: Patient-Specific Goal (Individualized)  5/25/2025 2156 by Feliberto De Oliveira RN  Outcome: Progressing  5/25/2025 2156 by Feliberto De Oliveira RN  Outcome: Progressing  Goal: Absence of Hospital-Acquired Illness or Injury  5/25/2025 2156 by Feliberto De Oliveira RN  Outcome: Progressing  5/25/2025 2156 by Feliberto De Oliveira RN  Outcome: Progressing  Goal: Optimal Comfort and Wellbeing  5/25/2025 2156 by Feliberto De Oliveira RN  Outcome: Progressing  5/25/2025 2156 by Feliberto De Oliveira RN  Outcome: Progressing  Goal: Readiness for Transition of Care  5/25/2025 2156 by Feliberto De Oliveira RN  Outcome: Progressing  5/25/2025 2156 by Feliberto De Oliveira RN  Outcome: Progressing     Problem: Stroke, Intracerebral Hemorrhage  Goal: Optimal Coping  5/25/2025 2156 by Feliberto De Oliveira RN  Outcome: Progressing  5/25/2025 2156 by Feliberto De Oliveira RN  Outcome: Progressing  Goal: Effective Bowel Elimination  5/25/2025 2156 by Feliberto De Oliveira RN  Outcome: Progressing  5/25/2025 2156 by Feliberto De Oliveira RN  Outcome: Progressing  Goal: Optimal Cerebral Tissue Perfusion  5/25/2025 2156 by Feliberto De Oliveira RN  Outcome: Progressing  5/25/2025 2156 by Feliberto De Oliveira RN  Outcome: Progressing  Goal: Optimal Cognitive Function  5/25/2025 2156 by St. Yousuf, Bassem, RN  Outcome: Progressing  5/25/2025 2156 by Bassem De Oliveira,  RN  Outcome: Progressing  Goal: Effective Communication Skills  5/25/2025 2156 by Feliberto De Oliveira RN  Outcome: Progressing  5/25/2025 2156 by Feliberto De Oliveira RN  Outcome: Progressing  Goal: Optimal Functional Ability  5/25/2025 2156 by Feliberto De Oliveira RN  Outcome: Progressing  5/25/2025 2156 by Feliberto De Oliveira RN  Outcome: Progressing  Goal: Optimal Nutrition Intake  5/25/2025 2156 by Bassem De Oliveira, RN  Outcome: Progressing  5/25/2025 2156 by Feliberto De Oliveira RN  Outcome: Progressing  Goal: Optimal Pain Control and Function  5/25/2025 2156 by Feliberto De Oliveira RN  Outcome: Progressing  5/25/2025 2156 by Feliberto De Oliveira RN  Outcome: Progressing  Goal: Effective Oxygenation and Ventilation  5/25/2025 2156 by Feliberto De Oliveira RN  Outcome: Progressing  5/25/2025 2156 by Feliberto De Oliveira RN  Outcome: Progressing  Goal: Improved Sensorimotor Function  5/25/2025 2156 by Feliberto De Oliveira RN  Outcome: Progressing  5/25/2025 2156 by Feliberto De Oliveira RN  Outcome: Progressing  Goal: Safe and Effective Swallow  5/25/2025 2156 by Feliberto De Oliveira RN  Outcome: Progressing  5/25/2025 2156 by Feliberto De Oliveira RN  Outcome: Progressing  Goal: Effective Urinary Elimination  5/25/2025 2156 by Bassem De Oliveira, RN  Outcome: Progressing  5/25/2025 2156 by Feliberto De Oliveira RN  Outcome: Progressing     Problem: Fall Injury Risk  Goal: Absence of Fall and Fall-Related Injury  5/25/2025 2156 by Feliberto De Oliveira RN  Outcome: Progressing  5/25/2025 2156 by Feliberto De Oliveira RN  Outcome: Progressing     Problem: Wound  Goal: Optimal Coping  5/25/2025 2156 by Bassem De Oliveira, RN  Outcome: Progressing  5/25/2025 2156 by Feliberto De Oliveira RN  Outcome: Progressing  Goal: Optimal Functional Ability  5/25/2025 2156 by Bassem De Oliveira, RN  Outcome: Progressing  5/25/2025 2156 by Bassem De Oliveira, RN  Outcome: Progressing  Goal: Absence of Infection Signs and Symptoms  5/25/2025 2156 by  Feliberto De Oliveira RN  Outcome: Progressing  5/25/2025 2156 by Feliberto De Oliveira RN  Outcome: Progressing  Goal: Improved Oral Intake  5/25/2025 2156 by Feliberto De Oliveira RN  Outcome: Progressing  5/25/2025 2156 by Feliberto De Oliveira RN  Outcome: Progressing  Goal: Optimal Pain Control and Function  5/25/2025 2156 by Feliberto De Oliveira RN  Outcome: Progressing  5/25/2025 2156 by Feliberto De Oliveira RN  Outcome: Progressing  Goal: Skin Health and Integrity  5/25/2025 2156 by Feliberto De Oliveira RN  Outcome: Progressing  5/25/2025 2156 by Feliberto De Oliveira RN  Outcome: Progressing  Goal: Optimal Wound Healing  5/25/2025 2156 by Bassem De Oliveira, RN  Outcome: Progressing  5/25/2025 2156 by Feliberto De Oliveira RN  Outcome: Progressing     Problem: Pain Acute  Goal: Optimal Pain Control and Function  5/25/2025 2156 by Feliberto De Oliveira RN  Outcome: Progressing  5/25/2025 2156 by Feliberto De Oliveira RN  Outcome: Progressing

## 2025-05-26 NOTE — DISCHARGE SUMMARY
Tray Srivastava - Neurosurgery ([facility])  Neurosurgery  Discharge Summary      Patient Name: Waldo Emmanuel  MRN: [id number]  Admission Date: 5/6/2025  Hospital Length of Stay: 20 days  Discharge Date and Time: 5/26/2025 12:27 PM  Attending Physician: No att. providers found   Discharging Provider: Zainab Ellis PA-C  Primary Care Provider: Ansley Vivas MD    HPI:   49f presenting after syncope with CTH demonstrating SAH. CTA without clear vascular malformation. Intubated prior to arrival to [facility] ED. 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 EVD dropped to 10 post procedure. ASA start today. CTH post procedure stable. FC this AM. WTE per NCC  5/8: extubated grossly intact on exam. EVD at 10. Continue ICU for SAH protocol.  5/9: NAEON. DVT u/s no dvt in ble , r brachial vein +, evd draining well at 10cm H2O  5/10: NAEON, TCDs wnl, ok for hep gtt  5/11: NAEON, ICPs 5-14. CTH with new R PICA stroke, vents stable   5/12: naeon, exam stable, MRI yesterday showed R Cerbellar infarct. EVD and ICP WNL.   5/13 naeon, exam stable. Plan for MRA w contrast when able  5/14: NAEON. MRA w contrast today. Neuro stable. Continue EVD @ 10  5/15: improved delirium today, exam stable. Plan for MRA w contrast today w anesthesia  5/16: NAEON. Pt exam stable this mroning. 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 MRA completed yesterday with no acute findings.   5/17: naeon, exam improved. On hep gtt. Will move EVD to 20 today for wean trial  5/18 naeon, exam stable. Keep EVD at 20 today. Continue ICU and SAH protocol  5/19: EVD clamp today , cth tomorrow  5/20: naeon, exam stable, EVD clamped over 24 hours with stable ICP and CT. Will remove today.  5/21: EVD removed yesterday. CT this am stable after removal. Patient doing well without issues. PBD 15 today, plan to step down to floor    5/22: NAEO. AFVSS. Patient downgraded from [facility]. Continues on Heparin gtt and Coumadin, Pharmacy consult placed for assistance with the bridge. INR 1.2 today. Exam stable.   5/23: NAEON. AFVSS. Reports mild headache. Continues on hep gtt and Coumadin. Per pharmacy, anticipate patient's INR will be therapeutic Saturday 5/24: NAEON. AFVSS. Patient without complaints. INR 2.5, d/c heparin gtt. CTM - pharmacy managing. Anticipate IPR Monday.   5/25: exam stable, warfarin dosing per pharmacy   5/26: NAEON. AFVSS. Neurologically stable. Tolerating PO diet and voiding spontaneously. Medically stable to discharge to [facility]. Follow up in clinic in 4-6 weeks with repeat MRA. Discharge instructions given verbally to patient. All of her questions were answered. She is encouraged to call the clinic with any questions or concerns prior to follow up appt.     The above is a summary of documentation by providers. This discharge summary is in no way a substitute for medical documentation that has been provided throughout the stay by care-giving providers. Please reference all documentation in the Electronic medical record should any questions or concerns arise.      Physical exam 5/26/25:  General: well developed, well nourished, no distress.   Head: normocephalic  Neurologic: Alert and oriented. Thought content appropriate.  GCS: Motor: 6/Verbal: 5/Eyes: 4 GCS Total: 15  Mental Status: Awake, Alert, Oriented x 4  Language: No aphasia  Speech: No dysarthria  Cranial nerves: face symmetric,CN II-XII grossly intact.   Eyes: EOMI.   Pulmonary: normal respirations, no signs of respiratory distress  Skin: Skin is warm, dry and intact.  Sensory: intact to light touch throughout  Motor Strength:Moves all extremities spontaneously with good tone.  Full strength upper and lower extremities. No abnormal movements seen.               Goals of Care Treatment Preferences:  Code Status: Full Code      Consults:   Consults (From admission, onward)  "         Status Ordering Provider     Inpatient consult to Midline team  Once        Provider:  (Not yet assigned)    Completed WALTER GILLILAND     [facility] Medicine PharmD Consult  Once        Provider:  (Not yet assigned)    Acknowledged SHAUN SHER     Inpatient consult to Midline team  Once        Provider:  (Not yet assigned)    Completed JAVED CULLEN     Inpatient consult to PICC team ([facility])  Once        Provider:  (Not yet assigned)    Completed JAVED CULLEN     Inpatient consult to Midline team  Once        Provider:  (Not yet assigned)    Completed ART BRADFORD     Inpatient consult to Vascular Neurology  Once        Provider:  (Not yet assigned)    Completed KIKI STUART     Inpatient consult to Interventional Radiology  Once        Provider:  (Not yet assigned)    Completed KIKI STUART     Inpatient consult to Registered Dietitian/Nutritionist  Once        Provider:  (Not yet assigned)    Completed KIKI STUART     IP consult to case management/social work  Once        Provider:  (Not yet assigned)    Acknowledged CARLOS ALBERTO SQUIRES     Inpatient consult to Neuro ICU  Once        Provider:  (Not yet assigned)    Acknowledged CARLOS ALBERTO SQUIRES     Inpatient consult to Neurosurgery  Once        Provider:  (Not yet assigned)    Completed SOPHY APPLE            Significant Diagnostic Studies: Labs: CMP   Recent Labs   Lab 05/25/25  0951 05/26/25  0639    140   K 4.3 4.1    105   CO2 23 25    101   BUN 14 11   CREATININE 0.6 0.7   CALCIUM 9.5 9.8   PROT 7.3 7.8   ALBUMIN 2.7* 2.9*   BILITOT 0.3 0.4   ALKPHOS 102 113   AST 11 13   ALT 11 11   ANIONGAP 11 10   , CBC   Recent Labs   Lab 05/25/25  0814 05/26/25  0639   WBC 12.03 11.44   HGB 8.4* 8.9*   HCT 28.6* 30.2*    449   , and All labs within the past 24 hours have been reviewed  Microbiology: Sputum Culture No results found for: "GSRESP", "RESPIRATORYC" and CSF Culture:   Lab Results   Component Value " Date    CSFCULTURE No Growth 05/19/2025     Radiology:       US Transcran Doppler Intracran Artr Comp  Result Date: 5/21/2025    FINDINGS:  Normal mean velocities identified in the bilateral middle cerebral arteries, internal carotid arteries, anterior cerebral arteries, posterior cerebral arteries, vertebral arteries and the basilar artery .  The Lindegaard ratios are within normal limits. The Lindegaard ratio on the right is 1.14.  The Lindegaard ratio on the left is 1.21.  Right ILDA/ECICA ratio: 1.07 left ILDA/ECICA ratio: 1.22  Basilar artery to extracranial vertebral artery ratio: 1.39  There is mild elevation of the pulsatility indices.        CT Head Without Contrast  Result Date: 5/21/2025    CLINICAL HISTORY: s/p EVD pull  COMPARISON: CT head 05/20/2025, 05/19/2025.  FINDINGS:  Status post right frontal EVD removal with grossly stable appearance of the ventricular system.  No evidence of hydrocephalus.  Basal cisterns are patent.    The brain parenchyma appears unchanged with redemonstration of known infarct in the right cerebellar hemisphere.  No evidence of hemorrhagic conversion.  No evidence of acute major vascular distribution infarct.  No new mass effect or edema.    No new extra-axial blood or fluid collections.  Similar subtle serpiginous hyperattenuation in the right parietal lobe (02:20), likely trace subarachnoid hemorrhage.    Coil embolization in the posterior fossa with streak and beam hardening artifact slightly degrading evaluation of surrounding structures.    The paranasal sinuses and mastoid air cells are essentially clear.    No calvarial fracture.  Prior right frontal cranial defect from prior external ventricular drain.  Scattered scalp soft tissue calcifications.        CT Head Without Contrast  Result Date: 5/20/2025    CLINICAL HISTORY: surveillance;  COMPARISON: CT head without contrast 05/19/2025.  FINDINGS:  Right frontal coursing extraventricular drain with tip terminating about  the 3rd ventricle.  Ventricles are overall stable in size and configuration from comparison CT without evidence for new or worsening hydrocephalus.  No new or enlarging extra-axial blood or fluid collections.    Similar focal hypoattenuation about the right cerebellar hemisphere, in keeping with evolving infarct.  No evidence for hemorrhagic conversion.  Remaining brain parenchyma appears unchanged.  No new parenchymal mass effect, worsening edema, new hemorrhage, or new acute major vascular territory infarct.  Similar mild scattered subarachnoid blood products (for example series 601, image 81).    Embolization material within the posterior fossa with susceptibility artifact limiting evaluation of surrounding structures.    No calvarial or skull base fracture or osseous destructive lesion.  Paranasal sinuses and mastoid air cells are essentially clear        US Transcran Doppler Intracran Artr Comp  Result Date: 5/19/2025    FINDINGS:  Normal mean velocities identified in the bilateral middle cerebral arteries, internal carotid arteries, anterior cerebral arteries, posterior cerebral arteries, vertebral arteries and the basilar artery .  The Lindegaard ratios are within normal limits. The Lindegaard ratio on the right is 1.3.  The Lindegaard ratio on the left is 1.04.  Right ILDA/ECICA ratio: 0.99 left ILDA/ECICA ratio: 0.91  Basilar artery to extracranial vertebral artery ratio: 1.2  Pulsatility indices are mildly elevated.        US Upper Extremity Veins Bilateral  Result Date: 5/19/2025    CLINICAL HISTORY: r/o DVT;  COMPARISON: Ultrasound from 05/07/2025  FINDINGS:  Right central veins: The internal jugular, subclavian, and axillary veins are patent and free of thrombus.    Right arm veins: The brachial and basilic veins are patent and compressible.  Thrombosis of the right cephalic vein.    Left central veins: The internal jugular, subclavian, and axillary veins are patent and free of thrombus.    Left arm  veins: The brachial and basilic veins are patent and compressible.  Nonocclusive thrombosis of the left cephalic vein.    Miscellaneous: N/A        US Transcran Doppler Intracran Artr Comp  Result Date: 5/19/2025    FINDINGS:  Normal mean velocities identified in the bilateral middle cerebral arteries, internal carotid arteries, anterior cerebral arteries, posterior cerebral arteries, vertebral arteries and the basilar artery .  The Lindegaard ratios are within normal limits. The Lindegaard ratio on the right is 2.42.  The Lindegaard ratio on the left is 1.29.  Right ILDA/ECICA ratio: 2.43 left ILDA/ECICA ratio: 0.97  There is no significant elevation of the pulsatility indices.        CT Head Without Contrast  Result Date: 5/19/2025    CLINICAL HISTORY: interval followup;  COMPARISON: Head CT performed 05/16/2025, 03:18 hours  FINDINGS:  Similar position right anterior approach ventricular catheter.  Unchanged size and configuration of the ventricles.    Embolization material within the posterior fossa limits assessment involving ischemia centered in right cerebellum.  Re-expansion of the 4th ventricle compared to prior study, in keeping with improving cytotoxic edema.    No definite macroscopic hemorrhage within the infarct territory.    Persistence of relatively small volume subarachnoid blood, most conspicuous about the lateral right parietal sulci.  No definite new or enlarging areas of intracranial hemorrhage.  No worsening mass effect or new herniation syndrome.    Remainder examination not substantially changed.        US Transcran Doppler Intracran Artr Comp  Result Date: 5/19/2025    FINDINGS:  Normal mean velocities identified in the bilateral middle cerebral arteries, internal carotid arteries, anterior cerebral arteries, posterior cerebral arteries, vertebral arteries and the basilar artery .  The Lindegaard ratio on the right is 1.4.  The Lindegaard ratio on the left is 3.11.  Right ILDA/ECICA ratio: 1.46  left ILDA/ECICA ratio: 1.98  There is no significant elevation of the pulsatility indices.        US Transcran Doppler Intracran Artr Comp  Result Date: 5/16/2025    FINDINGS:  Normal mean velocities identified in the bilateral middle cerebral arteries, internal carotid arteries, anterior cerebral arteries, posterior cerebral arteries, vertebral arteries and the basilar artery .  The Lindegaard ratios are within normal limits. The Lindegaard ratio on the right is 1.66.  The Lindegaard ratio on the left is 1.44.  Right ILDA/ECICA ratio: 1.65 left ILDA/ECICA ratio: 1.4  There is no significant elevation of the pulsatility indices.        CT Head Without Contrast  Result Date: 5/16/2025    CLINICAL HISTORY: Subarachnoid hemorrhage (SAH) suspected;  COMPARISON: MRI of the brain and CT head performed 05/11/2025.  FINDINGS:  Artifact related to coil mass in the region of the rate AICA limits assessment of adjoining anatomy.    Relative to prior CT and MR imaging of 05/11/2025, there is continued maturation of large recent infarction involving the right cerebellum, likely additionally involving the middle cerebellar peduncle and micah.  Similar mass effect related to the cytotoxic edema, with effacement of the 4th ventricle.    Similar position of the right frontal approach ventricular catheter.  Lateral and 3rd ventricles appear similar configuration.  No new transependymal CSF egress.    Overall, no new or progressive areas of ischemia by noncontrast CT.    Continued decreased visualization of subarachnoid hemorrhage compared to prior CT of 05/11/2025.  No new or enlarging areas of intracranial hemorrhage.  No worsening mass effect or new herniation syndrome appreciated.    Remainder examination not substantially changed.        MRA Brain with and without contrast  Result Date: 5/15/2025    CLINICAL HISTORY: Cerebral aneurysm, follow-up;  COMPARISON: Multiple priors, most recent MRI brain 05/11/2025.  FINDINGS:  Large area  of susceptibility artifact from dental braces.    Anterior circulation: Visualized portions of the carotid arteries are normal caliber.  No focal stenosis, occlusion or aneurysm in the proximal middle or anterior cerebral arteries.    Posterior circulation: Focus of susceptibility in the medial left cerebellum from prior AICA aneurysm coiling.  Faint ill-defined peripheral enhancement about the aneurysm coil without definitive evidence of residual aneurysm filling.  Bilateral vertebral and basilar arteries as well as posterior cerebral arteries are normal caliber focal stenosis.  No new aneurysm.    Serpiginous cortical enhancement in the inferior right cerebellar hemisphere in the known area of infarction.        US Transcran Doppler Intracran Artr Comp  Result Date: 5/15/2025    FINDINGS:  Normal mean velocities identified in the bilateral middle cerebral arteries, internal carotid arteries, anterior cerebral arteries, posterior cerebral arteries, vertebral arteries and the basilar artery .  The Lindegaard ratios are within normal limits. The Lindegaard ratio on the right is 1.4.  The Lindegaard ratio on the left is 1.57.  Right ILDA/ECICA ratio: 1.34 left ILDA/ECICA ratio: 1.53  There is no significant elevation of the pulsatility indices.        X-Ray Chest AP Portable  Result Date: 5/14/2025    CLINICAL HISTORY: eval pna;  COMPARISON: May 9, 2025  FINDINGS:  Cardiac size is stable.  There is mildly improved inspiration on today's radiograph compared to the prior exam.  No large volume of pleural fluid is noted on this single view.  The osseous structures are intact.  There are mild bibasilar opacities and right perihilar opacity, improved.        US Transcran Doppler Intracran Artr Comp  Result Date: 5/14/2025    FINDINGS:  Normal mean velocities identified in the bilateral middle cerebral arteries, internal carotid arteries, anterior cerebral arteries, posterior cerebral arteries, vertebral arteries and the  basilar artery .  The Lindegaard ratios are within normal limits. The Lindegaard ratio on the right is 1.58.  The Lindegaard ratio on the left is 1.48.  Right ILDA/ECICA ratio: 1.6 left ILDA/ECICA ratio: 1.72  There is no significant elevation of the pulsatility indices.        US Lower Extremity Veins Bilateral  Result Date: 5/14/2025    CLINICAL HISTORY: Suspect RLE DVT;  COMPARISON: Ultrasound lower extremity veins bilateral 05/07/2025  FINDINGS:  Right thigh veins: The common femoral, femoral, popliteal, upper greater saphenous, and deep femoral veins are patent and free of thrombus. The veins are normally compressible and have normal phasic flow and augmentation response.    Right calf veins: The visualized calf veins are patent.    Left thigh veins: The common femoral, femoral, popliteal, upper greater saphenous, and deep femoral veins are patent and free of thrombus. The veins are normally compressible and have normal phasic flow and augmentation response.    Left calf veins: The visualized calf veins are patent.    Miscellaneous: Patent and compressible right external iliac vein.        US Transcran Doppler Intracran Artr Comp  Result Date: 5/13/2025    FINDINGS:  Normal mean velocities identified in the bilateral middle cerebral arteries, internal carotid arteries, anterior cerebral arteries, posterior cerebral arteries, vertebral arteries and the basilar artery .  The Lindegaard ratios are within normal limits. The Lindegaard ratio on the right is 1.6.  The Lindegaard ratio on the left is 1.32.  Right ILDA/ECICA ratio: 1.46 left ILDA/ECICA ratio: 1.51  There is no significant elevation of the pulsatility indices.        CV Ultrasound doppler arterial legs bilat  Result Date: 5/12/2025    Right QUIANA 0.65, is suggestive of moderate right lower extremity   arterial disease.    Right profunda femoral artery stenosis, 50-75%.    Right below-knee arteries display blunted monophasic waveforms with low   peak systolic  velocities.    Right posterior tibial artery stenosis, 50-75%.    Left QUIANA 1.0, is normal.    Duplex ultrasound shows left lower extremity arteries with no   hemodynamically significant stenosis.        US Transcran Doppler Intracran Artr Comp  Result Date: 5/12/2025    FINDINGS:  Right ILDA: Normal  Left ILDA: Normal  Right MCA: Normal  Left MCA: Normal  Right PCA: Normal  Left PCA: Normal  Basilar Artery: Normal  Right extracranial ICA: Normal  Left extracranial ICA: Normal  Right Distal Vertebral artery: Normal  Left Distal Vertebral artery: Normal  The Lindegaard ratios are normal, 1.8 on the right and 1.7 on the left  ILDA/EC-ICA ratios are within normal limits, 1.7 on the right and 2.0 on the left.  Pulsatility indices are normal.        US Transcran Doppler Intracran Artr Comp  Result Date: 5/12/2025    FINDINGS:  Normal mean velocities identified in the bilateral middle cerebral arteries, internal carotid arteries, anterior cerebral arteries, posterior cerebral arteries, vertebral arteries and the basilar artery .  The Lindegaard ratios are within normal limits. The Lindegaard ratio on the right is 1.37.  The Lindegaard ratio on the left is 1.22.  Right ILDA/ECICA ratio: 1.49 left ILDA/ECICA ratio: 1.29  Pulsatility indices are elevated.        MRI Brain Without Contrast  Result Date: 5/11/2025    CLINICAL HISTORY: Stroke, follow up;  COMPARISON: CT 05/11/2025  FINDINGS:  Study distorted by artifact from dental metal.  Large focus of T2 FLAIR signal hyperintensity within the right cerebellum corresponds to hypodensity seen on CT compatible with acute/recent infarction.  This is mostly obscured on diffusion and gradient imaging by metal artifacts.  There is mass effect with compression of the 4th ventricle as seen on CT.  There is small T1 hyperintensity along the ventral aspect of the cerebellum corresponding 2 hyperdensity seen on CT concerning for subacute aged hemorrhage.  In addition there is T2 flair signal  hyperintensity within the right paramedian micah concerning for additional area recent infarction.  The lateral and 3rd ventricles are stable with stable right frontal coursing ventricular catheter without evidence for hydrocephalus    There is crowding of the craniocervical junction without significant cerebellar tonsillar herniation    There is FLAIR hyperintensity in the parasagittal parietal/occipital sulci likely represents small volume scattered subarachnoid hemorrhage as seen on CT.    Please note known endovascular aneurysm coil pack from left AICA aneurysm coiling not seen.  This report was flagged in Epic as abnormal.        CT Head Without Contrast  Result Date: 5/11/2025    CLINICAL HISTORY: Stroke, follow up;SAH on heparin gtt, now therapeutic. Eval for bleeding;  COMPARISON: CT head performed 05/06/2025, 20:13 hours.  FINDINGS:  Compared to prior CT head performed 05/06/2025, 20:13 hours, similar position right anterior approach ventricular catheter.  Decreased caliber of the lateral and 3rd ventricles since that time point.    Relatively similar distribution of subarachnoid and intraventricular hemorrhage compared to the prior study.  No definite new or progressive areas of hemorrhage seen.    Redemonstrated posttreatment changes status post left AICA aneurysm coiling.  Since the prior examination, maturing recent infarction the right cerebellum with some sparing of the superior cerebellar hemisphere.    Questionable hypoattenuation involving the brainstem which may be artifactual given coil artifact, however additional areas of ischemia not excluded.    Further effacement of the 4th ventricle.  Mild crowding of the cerebellar tonsils into the foramen magnum, sella relative prior.  Increased CSF within the sella may indicate worsening intracranial hypertension.    Relatively similar crowding of the cerebral sulci and basilar cisterns.    Remainder examination without substantial interval  change.        US Transcran Doppler Intracran Artr Comp  Result Date: 5/10/2025    FINDINGS:  Right ILDA: Normal  Left ILDA: Normal  Right MCA: Normal  Left MCA: Normal  Right PCA: Normal  Left PCA: Normal  Basilar Artery: Normal  Right extracranial ICA: Normal  Left extracranial ICA: Normal  Right Distal Vertebral artery: Normal  Left Distal Vertebral artery: Normal  The Lindegaard ratios are within normal limits.  ILDA/EC-ICA ratios are within normal limits.  Pulsatility indices are normal.        CT Chest With Contrast  Result Date: 5/9/2025    CLINICAL HISTORY: Respiratory illness, nondiagnostic xray;  COMPARISON: 05/08/2025  FINDINGS:  Support tubes and lines: None.    Aorta: Normal caliber.  No significant calcified atherosclerosis.    Heart: Normal size.    Coronary arteries: Mild to moderate coronary artery calcifications.    Pericardium: Normal. No effusion, thickening, or calcification.    Central pulmonary arteries: Normal caliber.    Base of neck/thyroid: Unremarkable.    Lymph nodes: A subcarinal lymph node measures 2 cm in short axis (series 2, image 65).    Esophagus: Unremarkable.    Pleura: No effusion, thickening, or calcification.    Upper abdomen: Unremarkable.    Body wall: Diastasis recti    Airways: Unremarkable.    Lungs: Ill-defined airspace opacities, predominantly in the peribronchovascular region, are seen in the right upper, middle and lower lobes. Bilateral lower lobe dependent airspace disease is present.    Bones: No acute fractures dislocations.  No osseous destructive lesion.        US Transcran Doppler Intracran Artr Comp  Result Date: 5/9/2025    FINDINGS:  Right ILDA: Normal  Left ILDA: Normal  Right MCA: Normal  Left MCA: Normal  Right PCA: Normal  Left PCA: Normal  Basilar Artery: Normal  Right extracranial ICA: Normal  Left extracranial ICA: Normal  Right Distal Vertebral artery: Normal  Left Distal Vertebral artery: Normal  The Lindegaard ratios are 1.7 on the right and 1.5 on the  left  ILDA/EC-ICA ratios are 2.0 on the right and 1.7 on the left  Pulsatility indices are normal.        X-Ray Chest 1 View  Result Date: 5/9/2025    CLINICAL HISTORY: eval PNA;  FINDINGS:  Chest one view:  There is cardiomegaly.  There is mild edema but improving.  The bones bowel gas are noncontributory.        X-Ray Chest 1 View  Result Date: 5/8/2025    CLINICAL HISTORY: eval PNA;  FINDINGS: Chest one view:  There is cardiomegaly.  There is bilateral edema.  Bones bowel gas are noncontributory.        US Transcran Doppler Intracran Artr Comp  Result Date: 5/8/2025    FINDINGS:  Normal mean velocities identified in the bilateral middle cerebral arteries, internal carotid arteries, anterior cerebral arteries, posterior cerebral arteries, vertebral arteries and the basilar artery .  The Lindegaard ratio on the right is 1.12.  The Lindegaard ratio on the left is 1.2.  Right ILDA/ECICA ratio: 1.13 left ILDA/ECICA ratio: 1.08  There is no significant elevation of the pulsatility indices.        US Upper Extremity Veins Bilateral  Result Date: 5/7/2025    Right central veins: The internal jugular, subclavian, and axillary veins are patent and free of thrombus.    Right arm veins: Thrombosis of the right brachial veins.  Right basilic vein is patent.  Nonocclusive thrombus of the right cephalic vein.    Left central veins: The internal jugular, subclavian, and axillary veins are patent and free of thrombus.    Left arm veins: The brachial, basilic, and cephalic veins are patent and compressible.    Miscellaneous: N/A        US Lower Extremity Veins Bilateral  Result Date: 5/7/2025      CLINICAL HISTORY: r/o DVT;  COMPARISON: 11/18/2024  FINDINGS:  The left common femoral vein and left greater saphenous vein are obscured.  The right common femoral vein and right greater saphenous veins are patent.  Otherwise, duplex and color flow Doppler evaluation does not reveal any evidence of acute venous thrombosis in the superficial  femoral, popliteal, peroneal, anterior tibial and posterior tibial veins bilaterally.  There is no reflux to suggest valvular incompetence.        US Transcran Doppler Intracran Artr Comp  Result Date: 5/7/2025    FINDINGS:  Normal mean velocities identified in the bilateral middle cerebral arteries, internal carotid arteries, anterior cerebral arteries, posterior cerebral arteries, vertebral arteries and the basilar artery .  The Lindegaard ratios are within normal limits. The Lindegaard ratio on the right is 1.17.  The Lindegaard ratio on the left is 1.23.  Right ILDA/ECICA ratio: 1.2 left ILDA/ECICA ratio: 0.88  Pulsatility indices are mildly elevated.        CT Head Without Contrast  Result Date: 5/7/2025    CLINICAL HISTORY: post coil AICA;  COMPARISON: 05/06/2025  FINDINGS:  Operative changes status post right frontal ventricular catheter placement stable in course and positioning tip extending to the 3rd ventricle to the right foramen of [place]ro    Slight smaller caliber ventricles without evidence for worsening hydrocephalus    Interval operative change with artifact from endovascular coiling left AICA aneurysm.  Scattered subarachnoid and intraventricular hemorrhage again seen relatively similar.    Continued slight crowding the cerebral sulci and basilar cisterns with small caliber 4th ventricle.  No significant new abnormal parenchymal attenuation.  Moderate opacification sphenoid sinuses with lobular opacity right maxillary antra suggestive for mucosal thickening.    This report was flagged in Epic as abnormal.        X-Ray Chest 1 View  Result Date: 5/7/2025    CLINICAL HISTORY: Mechanical ventilation;  COMPARISON: 05/06/2025  FINDINGS: Endotracheal tube and enteric tube are in place.  Heart size is normal little patchy airspace consolidation is seen on the right but is slightly improved since yesterday.        X-Ray Chest AP Portable  Result Date: 5/6/2025    CLINICAL HISTORY: ETT  placement;  FINDINGS:  Chest one view AP portable. Tubes and lines are appropriate.  There is cardiomegaly.  Left lung is clear.  There is right edema/infiltrate.  Bones bowel gas are noncontributory.        CT Head Without Contrast  Result Date: 5/6/2025    CLINICAL HISTORY:  Subarachnoid hemorrhage (SAH) suspected;  Examination mildly degraded by patient motion artifact.  COMPARISON: CT 05/06/2025 at 00:08  FINDINGS:  Intracranial compartment:    Interval placement of a right frontal ventriculostomy.  This appears in reasonable position, coursing through the frontal horn of the right ventricle.  No significant hemorrhage along the catheter tract.    Ventricles remain mildly dilated.  Third ventricle diameter again 0 6 cm.  Unchanged volume intraventricular hemorrhage.    Persistent subarachnoid hemorrhage, similar in volume and distribution to recent examination.  No new extra-axial blood or fluid collections.    No new parenchymal hemorrhage, edema or major vascular distribution infarct.    Skull/extracranial contents (limited evaluation):    No displaced calvarial fracture.    Small volume fluid in the paranasal sinuses, nonspecific in the setting of support lines and tubes        CT Lumbar Spine Without Contrast  Result Date: 5/6/2025    FINDINGS:  Vertebrae: Mild degenerative facet arthrosis at L4-5. The lumbar spine vertebral body heights are normally maintained. No compression fracture or burst fracture is seen.  Discs/spinal canal/neural foramina: Mild degenerative disease at L5-S1. No spinal canal stenosis.  Soft tissues: Unremarkable.  Gallbladder and bile ducts: Previous cholecystectomy.        CTA Head and Neck (xpd)  Result Date: 5/6/2025    CLINICAL HISTORY: Stroke, hemorrhagic;  COMPARISON:Head CT earlier same date  FINDINGS:  Extracranial:    Left-sided 3 vessel aortic arch.  No evidence of stenosis of the origins of the great vessels from the arch. The left subclavian artery is patent.  The  proximal right subclavian artery is patent.  The distal portions are obscured by artifact.    The right common carotid is without significant stenosis. The extracranial right internal carotid artery is without significant stenosis. No evidence of aneurysm or dissection.  The right cervical ICA is medialized.    The left common carotid is without significant stenosis. The extracranial left internal carotid artery is without significant stenosis. No evidence of aneurysm or dissection.  The left cervical ICA is medialized.    Extracranial vertebral arteries: Bilateral vertebral artery origins are patent.  The vertebral arteries are codominant.  Normal course and caliber of the bilateral extracranial vertebral arteries without hemodynamically significant stenosis aneurysm or evidence of dissection.    Salivary glands appear grossly within normal limits.    The thyroid gland is within normal limits.    No pathologic cervical lymphadenopathy by size criteria.    Endotracheal tube terminates just superior to the charis.  Large area right upper lobe consolidation with air bronchograms.  Smaller areas of right middle lobe and left lower lobe consolidation with air bronchograms.  There are scattered areas of ground-glass density in the remaining aerated lungs, likely infectious/inflammatory.    No acute or concerning osseous abnormality.    Intracranial:    The intracranial ICAs are patent.  Atherosclerotic disease with mild bilateral cavernous ICA stenosis.  No aneurysm.    The right M1 segment is patent without hemodynamically significant stenosis or aneurysm.  The right M2 and proximal M3 branch vessels are patent.    The left M1 segment is patent without hemodynamically significant stenosis or aneurysm.  The left M2 and proximal M3 branch vessels are patent.    The bilateral A1 segments are present and patent.  Patent anterior communicating artery.  No aneurysm.  The A2 and proximal A3 branch vessels are patent.    The  intracranial vertebral arteries are patent to the basilar confluence.  The basilar artery is patent without hemodynamically significant stenosis or aneurysm.    Appearance of tiny bilateral posterior communicating arteries.  The bilateral P1, P2 and proximal P3 branch vessels are patent.    The origins of the superior cerebellar arteries are within normal limits.    8.6 x 5.2 x 4.6 mm aneurysm appears to arrive off the distal right anterior inferior cerebellar artery within the midline adjacent to the 4th ventricle.    The major dural venous sinuses are patent.        CT Cervical Spine Without Contrast  Result Date: 5/6/2025    CLINICAL HISTORY: Neck trauma, midline tenderness (Age 16-64y);  FINDINGS:  Straightening of the normal cervical lordosis can be seen with patient positioning or muscular spasm.The vertebral body heights are well-maintained. Intervertebral disc space height is maintained. No fractures are identified. Prevertebral soft tissues are unremarkable.    Mild multi-level degenerative disc disease    Please note that routine CT of the spine is limited in its evaluation of extradural/epidural disease.        X-Ray Chest AP Portable  Result Date: 5/6/2025    CLINICAL HISTORY: line placement;  COMPARISON: 05/06/2025  FINDINGS:  Endotracheal tube projects at the charis in could be withdrawn 3 cm.  Evaluation limited by poor inspiration.  Nasogastric tube projects within the abdomen pointed towards the duodenum.  Right upper lobe atelectasis and bilateral perihilar interstitial edema.  Moderate gastric distension.  In comparison to the prior study, there is no adverse interval changes.        X-Ray Chest AP Portable  Result Date: 5/6/2025    CLINICAL HISTORY: syncope;  FINDINGS:  Single view of the chest.  Comparison none.  Study limited by poor inspiration.  Cardiac silhouette is normal.  The lungs demonstrate no evidence of active disease.  No evidence of pleural effusion or pneumothorax.  Bones appear  intact.  Moderate degenerative changes and moderate atherosclerotic disease.        CT Thoracic Spine Without Contrast  Result Date: 5/6/2025    CLINICAL HISTORY: Back trauma, no prior imaging (Age >= 16y);  FINDINGS:  Vertebrae: The thoracic spine vertebral body heights are within normal limits. No compression fracture or burst fracture is seen.  Discs/spinal canal/neural foramina: Mild multilevel degenerative disc space narrowing and osteophytosis throughout the mid to lower thoracic spine. No significant spinal stenosis is identified.  Soft tissues: No abnormal fluid collections are identified. There is moderate cardiomegaly and severe coronary calcification, partially visualized. Previous cholecystectomy.        CT Head Without Contrast  Result Date: 5/6/2025    CLINICAL HISTORY: Head trauma, moderate-severe;  FINDINGS:  Intracranial compartment: Moderate volume of intraventricular hemorrhage involving the lateral ventricles, 3rd ventricle and 4th ventricle.  Subarachnoid hemorrhage seen within the interpeduncular fossa and ambient cisterns.    The brain parenchyma appears normal. No parenchymal mass, hemorrhage, edema or major vascular distribution infarct.    Mild hydrocephalus.    Skull/extracranial contents (limited evaluation): No fracture. Mastoid air cells and paranasal sinuses are essentially clear.        Cardiac Graphics: ECG:   Results for orders placed or performed during the hospital encounter of 05/06/25   EKG 12-lead    Collection Time: 05/13/25  2:47 AM   Result Value Ref Range    QRS Duration 80 ms    [facility] QTC Calculation 455 ms    Narrative    Test Reason : I72.9,    Vent. Rate : 112 BPM     Atrial Rate : 112 BPM     P-R Int : 126 ms          QRS Dur :  80 ms      QT Int : 334 ms       P-R-T Axes :  56 110  -2 degrees    QTcB Int : 455 ms    Sinus tachycardia  Left posterior fascicular block  T wave abnormality, consider inferior ischemia  Abnormal ECG  When compared with ECG of 06-May-2025  07:14,  No significant change was found  Confirmed by James Gomez ([id number]) on 5/13/2025 8:28:45 AM    Referred By: TIMOTEO WILKINS           Confirmed By: James Gomez      Echo Saline Bubble? Yes  Result Date: 5/7/2025    Left Ventricle: The left ventricle is normal in size. Normal wall   thickness. Normal wall motion. There is normal systolic function with a   visually estimated ejection fraction of 65 - 70%. Ejection fraction is   approximately 68%. There is indeterminate diastolic function.    Right Ventricle: The right ventricle is normal in size. Wall thickness   is normal. Systolic function is normal.    Left Atrium: Agitated saline study of the atrial septum is negative   after vasalva maneuver, suggesting absence of intracardiac shunt at the   atrial level. No patent foramen ovale confirmed by agitated saline   contrast.    Tricuspid Valve: There is mild to moderate regurgitation.    IVC/SVC: Patient is ventilated, cannot use IVC diameter to estimate   right atrial pressure.    Angiography:     IR Angiogram Carotid Cerebral Bilateral  Result Date: 5/14/2025    EXAM:    Cerebral angiogram.  Aneurysm embolization.    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    PROCEDURE COMMENT:    Informed consent was obtained from the patient's family prior to the examination. A timeout was performed.    Sedation: Endotracheal intubation and sedation with monitoring was provided by the anesthesia department.    Initially we  attempted to access the right groin under US guidance using a micropuncture needle. The microwire would not pass be external iliac artery so needle and wire were removed and hemostasis was achieved. Next, under US guidance, A 6 F sheath was placed into the left femoral artery and a 5F Robert catheter was advanced into the aortic arch.  Bilateral CCA/ICA/ECA and vertebral arteries were subselected and angiography of the brain was performed after injection into each of these vessels.    FINDINGS:  The right vertebral artery was selected and an angiogram was performed. Angiogram demonstrates excellent flow into the intracranial segments with opacification of all the branches and the vertebrobasilar junction with competitive flow from the left vertebral artery.  Small outpouching of the distal aspect of the right anterior inferior cerebellar artery although decreased in size when compared to CTA suggesting partial thrombosis..    Also, 3D spin was performed with reconstruction at separate workstation to obtain more details from right vertebral artery and further characterize the aneurysm. It appears partially thrombosed in the beginning of the study.    The right common carotid artery was selected and an angiogram was performed. Angiogram demonstrates excellent flow the bifurcation without significant plaque buildup or stenosis.    The right internal carotid artery was selected and an angiogram was performed. Angiogram demonstrates good flow into the intracranial segments with opacification of both the anterior and middle cerebral arteries. . There are no significant stenosis.  No aneurysm identified on the right internal carotid artery injection.    The right external carotid artery was selected and angiogram was performed.  Angiogram demonstrates good flow into all the branches with no abnormal anastomosis to internal carotid artery.  No AV fistula.    The left common carotid artery was selected and an angiogram was  performed. Angiogram demonstrates excellent flow at the bifurcation without significant plaque buildup or stenosis.    The left internal carotid artery was selected and an angiogram was performed. Angiogram demonstrates excellent flow into the intracranial segments with opacification of both the anterior and middle cerebral arteries.  There are no aneurysms or arteriovenous malformation is identified.  There is no significant stenosis.    The left external carotid artery was selected and angiogram was performed. Angiogram demonstrates good flow into all the branches with no abnormal anastomosis to internal carotid artery.  No AV fistula.    The left vertebral artery was selected and an angiogram was performed. Angiogram demonstrates excellent flow into the intracranial segments with opacification of all the branches of the vertebral artery as well as the basilar artery.  There is competitive flow from the right vertebral artery.  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  There is no significant stenosis.    The venous drainage pattern were within normal limits.    PROCEDURE:    Given that the aneurysm was not well visualized from the vertebral artery injection, it was decided to perform sub selective angiograms of the basilar and right anterior inferior cerebellar artery.    For intervention purposes, Benchmark 071- 95 cm long, Jeane 5 Montserratian- 125 cm long, Headway duo 156 cm, Synchro microwire.    The right anterior inferior cerebellar artery was selected and arteriogram was performed.  There was likely an AICA/PICA complex.  Arteriograms were performed in multiple projections through the microcatheter in the AICa.  The aneurysm was difficult to measure due to partial thrombosis.    Consultation was made with neuro surgery about potential treatment options including endovascular embolization and  clipping.  Risks were discussed of potential thrombus embolization endovascular treatment.  Risks of rupture with open surgery was also discussed.  Ultimately, we decided to proceed with endovascular embolization.    For the above findings, we decided to proceed with the coil embolization of the right anterior inferior cerebellar artery aneurysm.  Using a penumbra select catheter/035 glidewire system through a Benchmark 71, the right vertebral artery was selected.  Next, under roadmap guidance, advanced 5 Georgian Jeane intermediate catheter over Headway Duo 156 cm microcatheter and 014 synchro microwire.  This was gently navigated and positioned tip of Headway Duo microcatheter to the center of the aneurysm and the Jeane catheter in the right V 4 segment.  Micro angiography was performed within the anterior inferior cerebellar artery.  Under road mapping guidance, Cerepak coils 5 mm * 10 cm were deployed.   For follow-up angiograms were performed after coil placements, and these revealed good distribution within the aneurysm after coil deployment with progressive occlusion.  There was no compromise of the parent artery.  Final angiogram demonstrates good coil packing in the dome of the aneurysm, and there was no residual minimal filling.    The catheter was removed and an angiogram was performed through the femoral sheath demonstrating an access site appropriate for closure device.    Hemostasis was obtained in the right groin using 6 Georgian Vascade device.    The total contrast dose for the procedure was: 120 cc of Visipaque.    Radiation dose:    Radiation DAP 70030 CGycm2    Reference air kerma was 4059 mGy    Fluoro time was 32.8 minutes    Physicians in the procedure:  Dr. Piyush Silverio MD    Images and report were permanently recorded.      Pending Diagnostic Studies:       Procedure Component Value Units Date/Time    EKG 12-lead [[id number]]     Order Status: Sent Lab Status: No result           Final Active  Diagnoses:    Diagnosis Date Noted POA    PRINCIPAL PROBLEM:  Nontraumatic subarachnoid hemorrhage from cerebellar artery [I60.6] 05/06/2025 Yes    Agitation [R45.1] 05/22/2025 No    Delirium [R41.0] 05/22/2025 No    Herpes simplex virus (HSV) infection [B00.9] 05/18/2025 Yes    Arterial occlusion, lower extremity [I70.209] 05/16/2025 Yes    Cerebellar stroke, acute [I63.9] 05/11/2025 No    Brain edema [G93.6] 05/11/2025 Yes    History of DVT in adulthood [Z86.718] 05/07/2025 Not Applicable    Brain aneurysm [I67.1] 05/06/2025 No    Brain compression [G93.5] 05/06/2025 Yes    Obstructive hydrocephalus [G91.1] 05/06/2025 Yes    Acute encephalopathy [G93.40] 05/06/2025 Yes    Acute DVT (deep venous thrombosis) [I82.409] 07/23/2020 Yes    Essential hypertension [I10] 12/26/2017 Yes      Problems Resolved During this Admission:    Diagnosis Date Noted Date Resolved POA    Endotracheal tube present [Z97.8] 05/06/2025 05/10/2025 Yes      Discharged Condition: stable     Disposition: Rehab Facility    Follow Up:  Future Appointments   Date Time Provider Department Center   5/27/2025  2:00 PM [facility] XR1 [facility] XRAY [place]   6/12/2025  3:30 PM Ansley Vivas MD [facility]   7/7/2025 10:00 AM [facility] MRI1 [facility]   7/8/2025  1:30 PM Amado Corley MD [facility] NEUROS8 [facility]   12/29/2025  9:20 AM [facility]  MAMMO1 SCREEN [facility] MAMMO [facility]   12/29/2025 10:20 AM Clover Perez MD [facility] OBGYN [facility]        Contact information for after-discharge care       Destination       [facility] .    Service: Inpatient Rehabilitation  Contact information:  [address] [facility] [address] [phone]                                 Patient Instructions: See the patient instruction tab for detailed discharge instructions and follow up information.      Ambulatory referral/consult to Outpatient Case Management   Referral Priority: Routine Referral Type: Consultation   Referral  Reason: Specialty Services Required   Number of Visits Requested: 1     Notify your health care provider if you experience any of the following:  increased confusion or weakness     Notify your health care provider if you experience any of the following:  persistent dizziness, light-headedness, or visual disturbances     Notify your health care provider if you experience any of the following:  worsening rash     Notify your health care provider if you experience any of the following:  severe persistent headache     Notify your health care provider if you experience any of the following:  difficulty breathing or increased cough     Notify your health care provider if you experience any of the following:  redness, tenderness, or signs of infection (pain, swelling, redness, odor or green/yellow discharge around incision site)     Notify your health care provider if you experience any of the following:  severe uncontrolled pain     Notify your health care provider if you experience any of the following:  persistent nausea and vomiting or diarrhea     Notify your health care provider if you experience any of the following:  temperature >100.4     Activity as tolerated     Medications:  Reconciled Home Medications:      Medication List        PAUSE taking these medications      cetirizine 10 MG tablet  Wait to take this until your doctor or other care provider tells you to start again.  Commonly known as: ZYRTEC  Take 10 mg by mouth.     cilostazoL 50 MG Tab  Wait to take this until your doctor or other care provider tells you to start again.  Commonly known as: PLETAL  Take 1 tablet (50 mg total) by mouth 2 (two) times daily.     lisdexamfetamine 60 MG capsule  Wait to take this until your doctor or other care provider tells you to start again.  Commonly known as: VYVANSE  Take 1 capsule (60 mg total) by mouth every morning.     multivitamin capsule  Wait to take this until your doctor or other care provider tells you to  start again.  Take 1 capsule by mouth once daily.     omega-3 acid ethyl esters 1 gram capsule  Wait to take this until your doctor or other care provider tells you to start again.  Commonly known as: LOVAZA  Take 1 capsule (1 g total) by mouth once daily.     topiramate 50 MG tablet  Wait to take this until your doctor or other care provider tells you to start again.  Commonly known as: TOPAMAX  Take 1 tablet by mouth twice daily (dose correction)     * ZEPBOUND 5 mg/0.5 mL Pnij  Wait to take this until your doctor or other care provider tells you to start again.  Generic drug: tirzepatide (weight loss)  Inject 5 mg into the skin every 7 days.     * ZEPBOUND 7.5 mg/0.5 mL Pnij  Wait to take this until your doctor or other care provider tells you to start again.  Generic drug: tirzepatide (weight loss)  Inject 7.5 mg into the skin every 7 days.           * This list has 2 medication(s) that are the same as other medications prescribed for you. Read the directions carefully, and ask your doctor or other care provider to review them with you.                START taking these medications      atorvastatin 40 MG tablet  Commonly known as: LIPITOR  Take 1 tablet (40 mg total) by mouth once daily.  Start taking on: May 27, 2025     diazePAM 10 MG Tab  Commonly known as: VALIUM  Take 1 tablet (10 mg total) by mouth every 6 (six) hours as needed (back or neck pain/stiffness).     melatonin 3 mg tablet  Commonly known as: MELATIN  Take 2 tablets (6 mg total) by mouth nightly as needed for Insomnia.     methocarbamoL 750 MG Tab  Commonly known as: ROBAXIN  Take 1 tablet (750 mg total) by mouth every 6 (six) hours as needed (muscle spasms).     niMODipine 30 MG Cap  Commonly known as: NIMOTOP  Take 2 capsules (60 mg total) by mouth every 4 (four) hours. Hold if SBP <100 for 1 day     oxyCODONE-acetaminophen 5-325 mg per tablet  Commonly known as: PERCOCET  Take 1 tablet by mouth every 6 (six) hours as needed for Pain.      pregabalin 75 MG capsule  Commonly known as: LYRICA  Take 1 capsule (75 mg total) by mouth 2 (two) times daily.     QUEtiapine 50 MG tablet  Commonly known as: SEROQUEL  Take 1 tablet (50 mg total) by mouth every evening.     senna-docusate 8.6-50 mg per tablet  Commonly known as: PERICOLACE  Take 2 tablets by mouth 2 (two) times daily. Wean as able; goal normal sodium            CHANGE how you take these medications      valsartan 80 MG tablet  Commonly known as: DIOVAN  Take 1 tablet (80 mg total) by mouth once daily.  Start taking on: May 27, 2025  What changed:   medication strength  how much to take     * warfarin 2.5 MG tablet  Commonly known as: COUMADIN  Take 1 tablet (2.5 mg total) by mouth once. 1 dose today for 1 dose  What changed: You were already taking a medication with the same name, and this prescription was added. Make sure you understand how and when to take each.     * warfarin 2.5 MG tablet  Commonly known as: COUMADIN  Take 1 tablet (2.5 mg total) by mouth every Tues, Thurs.  Start taking on: May 27, 2025  What changed: You were already taking a medication with the same name, and this prescription was added. Make sure you understand how and when to take each.     * warfarin 5 MG tablet  Commonly known as: COUMADIN  Once per day on Sunday Monday Wednesday Friday Saturday  Start taking on: May 28, 2025  What changed:   how to take this  additional instructions  These instructions start on May 28, 2025. If you are unsure what to do until then, ask your doctor or other care provider.           * This list has 3 medication(s) that are the same as other medications prescribed for you. Read the directions carefully, and ask your doctor or other care provider to review them with you.                CONTINUE taking these medications      aspirin 81 MG EC tablet  Commonly known as: ECOTRIN  Take 81 mg by mouth.     pantoprazole 40 MG tablet  Commonly known as: PROTONIX  Take 1 tablet (40 mg total) by  mouth 2 (two) times daily.     valACYclovir 1000 MG tablet  Commonly known as: VALTREX  Take 1 tablet (1,000 mg total) by mouth once daily.            STOP taking these medications      rosuvastatin 20 MG tablet  Commonly known as: CHRISTOPHER Ellis PA-C  Neurosurgery  [facility] [place])

## 2025-05-26 NOTE — TELEPHONE ENCOUNTER
----- Message from Zainab Ellis PA-C sent at 5/26/2025 10:28 AM CDT -----  Please schedule a follow up appointment for this patient:With: Barney in 4-6 weeks with MRA with vessel wall imaging prior to appt Diagnosis: ruptured aneurysm s/p coil embo 5/6Thank you!Zainab

## 2025-05-26 NOTE — ASSESSMENT & PLAN NOTE
49f presenting after syncope with CTH demonstrating SAH. CTA with AICA aneurysm R.     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 Corley

## 2025-05-26 NOTE — CONSULTS
PHARMACY CONSULT NOTE: WARFARIN  Waldo Emmanuel is a 49 y.o. female on warfarin therapy for DVT. PharmD has been consulted for warfarin dosing.    Current order: warfarin 1 mg x1 (5/25)  Home dose: warfarin 5 mg daily except 2.5 mg T/Th  Coumadin clinic enrollment: Active  INR goal: 2-3    Lab Results   Component Value Date    INR 3.0 (H) 05/26/2025    INR 3.2 (H) 05/25/2025    INR 2.9 (H) 05/24/2025    PROTIME 30.6 (H) 05/26/2025    PROTIME 31.8 (H) 05/25/2025    PROTIME 29.1 (H) 05/24/2025     Significant drug interactions: no major interaction noted  Diet: Adult Regular without supplements     Recommendation(s):   INR at the upper end of therapeutic range but down trending after warfarin 1mg (5/25)  Recommend to administer warfarin 2.5 mg on 5/26, then begin home dose starting 5/27: warfarin 2.5 mg every Tue, Thu; warfarin 5mg all other days   INR should be monitored every 2-3 days during the first week  If INR remains stable, can space out INR to once a week    Pharmacy will continue to follow and monitor warfarin while admitted     Thank you for the consult,  Jordan Chacon, PharmD  Ext: [phone]    **Note: Consults are reviewed Monday-Friday 7:00am-3:30pm. THE ABOVE RECOMMENDATIONS ARE ONLY SUGGESTED.THE RECOMMENDATIONS SHOULD BE CONSIDERED IN CONJUNCTION WITH ALL PATIENT FACTORS. **

## 2025-05-26 NOTE — PLAN OF CARE
[facility] ([facility])  Discharge Final Note    Primary Care Provider: Ansley Vivas MD    Expected Discharge Date: 5/26/2025      Future Appointments   Date Time Provider Department Center   5/27/2025  2:00 PM [facility] XR1 [facility] XRAY [place]   6/12/2025  3:30 PM Ansley Vivas MD [facility]   7/7/2025 10:00 AM [facility] MRI1 [facility] MRI [place] [place]   7/8/2025  1:30 PM Amado Corley MD [facility] NEUROS8 [facility]   12/29/2025  9:20 AM LW  MAMMO1 SCREEN [facility] MAMMO [facility]   12/29/2025 10:20 AM Clover Perez MD [facility] OBGYN [facility]          Final Discharge Note (most recent)       Final Note - 05/26/25 1430          Final Note    Assessment Type Final Discharge Note     Anticipated Discharge Disposition Rehab Facility     What phone number can be called within the next 1-3 days to see how you are doing after discharge? [phone]     Hospital Resources/Appts/Education Provided Appointments scheduled and added to AVS        Post-Acute Status    Post-Acute Authorization Placement     Post-Acute Placement Status Set-up Complete/Auth obtained                     Important Message from Medicare             After-discharge care                Destination       *[facility]   Service: Inpatient Rehabilitation    [address] [address]   Phone: [phone]                       Yudith Bird RN  Ext [phone]

## 2025-05-26 NOTE — TELEPHONE ENCOUNTER
Patient was admitted to [facility] on 5/5/25 due to intracranial hemorrhage and is being transferred to a post acute rehabilitation facility.

## 2025-05-26 NOTE — PROGRESS NOTES
[facility] ([facility])  Neurosurgery  Progress Note    Subjective:     History of Present Illness: 49f presenting after syncope with CTH demonstrating SAH. CTA without clear vascular malformation. Intubated prior to arrival to [facility] ED. Discussed expected hospital course and imaging with daughter in room    Post-Op Info:  Procedure(s) (LRB):  MRI (Magnetic Resonance Imagine) (N/A)   11 Days Post-Op   Interval History: 5/25: exam stable, warfarin dosing per pharmacy    Medications:  Continuous Infusions:  Scheduled Meds:   aspirin  81 mg Oral Daily    atorvastatin  40 mg Oral Daily    niMODipine  60 mg Oral Q4H    polyethylene glycol  17 g Oral BID    pregabalin  75 mg Oral BID    QUEtiapine  50 mg Oral QHS    senna-docusate  2 tablet Oral BID    valACYclovir  1,000 mg Oral Daily    valsartan  80 mg Oral Daily     PRN Meds:  Current Facility-Administered Medications:     acetaminophen, 650 mg, Oral, Q6H PRN    bisacodyL, 10 mg, Rectal, Daily PRN    diazePAM, 10 mg, Oral, Q6H PRN    hydrALAZINE, 10 mg, Intravenous, Q4H PRN    labetalol, 10 mg, Intravenous, Q4H PRN    melatonin, 6 mg, Oral, Nightly PRN    methocarbamoL, 750 mg, Oral, Q6H PRN    oxyCODONE, 5 mg, Oral, Q4H PRN    sodium chloride 0.9%, 10 mL, Intravenous, PRN     Review of Systems  Objective:     Weight: 100.2 kg (220 lb 14.4 oz)  Body mass index is 39.13 kg/m².  Vital Signs (Most Recent):  Temp: 97.8 °F (36.6 °C) (05/25/25 2351)  Pulse: 98 (05/25/25 2351)  Resp: 18 (05/25/25 2351)  BP: 100/64 (05/25/25 2351)  SpO2: (!) 91 % (05/25/25 2351) Vital Signs (24h Range):  Temp:  [97.8 °F (36.6 °C)-99 °F (37.2 °C)] 97.8 °F (36.6 °C)  Pulse:  [] 98  Resp:  [16-18] 18  SpO2:  [91 %-96 %] 91 %  BP: ()/(55-77) 100/64                                 Physical Exam         Neurosurgery Physical Exam    Awake, alert, Ox3  Cranial nerves intact  Follows commands x4 grossly full strength throughout  SILT  EVD Incision clean/monocryl suture in  place    Significant Labs:  Recent Labs   Lab 05/24/25 0328 05/25/25  0951   GLU 93 104    140   K 3.8 4.3    106   CO2 25 23   BUN 11 14   CREATININE 0.7 0.6   CALCIUM 9.7 9.5   MG 1.7 1.7     Recent Labs   Lab 05/24/25 0328 05/25/25  0814   WBC 11.13 12.03   HGB 9.5* 8.4*   HCT 31.9* 28.6*   * 426     Recent Labs   Lab 05/24/25 0328 05/25/25  0951   INR 2.9* 3.2*   APTT 57.4* 38.7*     Microbiology Results (last 7 days)       Procedure Component Value Units Date/Time    CSF culture [[id number]] Collected: 05/19/25 0948    Order Status: Completed Specimen: CSF (Spinal Fluid) from CSF Tap, Tube 3 Updated: 05/24/25 0723     CULTURE, CSF No Growth     GRAM STAIN Cytospin indicates:      No WBCs      No organisms seen    Gram stain [[id number]] Collected: 05/19/25 0948    Order Status: Canceled Specimen: CSF (Spinal Fluid) from CSF Tap, Tube 3 Updated: 05/19/25 1411          All pertinent labs from the last 24 hours have been reviewed.    Significant Diagnostics:  CT: No results found in the last 24 hours.  MRI: No results found in the last 24 hours.  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 CTA with AICA aneurysm R.     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 Barney Blank MD  Neurosurgery  [facility] ([facility])

## 2025-05-26 NOTE — PLAN OF CARE
Problem: Infection  Goal: Absence of Infection Signs and Symptoms  Outcome: Progressing     Problem: Skin Injury Risk Increased  Goal: Skin Health and Integrity  Outcome: Progressing     Problem: Adult Inpatient Plan of Care  Goal: Plan of Care Review  Outcome: Progressing  Goal: Patient-Specific Goal (Individualized)  Outcome: Progressing  Goal: Absence of Hospital-Acquired Illness or Injury  Outcome: Progressing  Goal: Optimal Comfort and Wellbeing  Outcome: Progressing  Goal: Readiness for Transition of Care  Outcome: Progressing     Problem: Stroke, Intracerebral Hemorrhage  Goal: Optimal Coping  Outcome: Progressing  Goal: Effective Bowel Elimination  Outcome: Progressing  Goal: Optimal Cerebral Tissue Perfusion  Outcome: Progressing  Goal: Optimal Cognitive Function  Outcome: Progressing  Goal: Effective Communication Skills  Outcome: Progressing  Goal: Optimal Functional Ability  Outcome: Progressing  Goal: Optimal Nutrition Intake  Outcome: Progressing  Goal: Optimal Pain Control and Function  Outcome: Progressing  Goal: Effective Oxygenation and Ventilation  Outcome: Progressing  Goal: Improved Sensorimotor Function  Outcome: Progressing  Goal: Safe and Effective Swallow  Outcome: Progressing  Goal: Effective Urinary Elimination  Outcome: Progressing     Problem: Fall Injury Risk  Goal: Absence of Fall and Fall-Related Injury  Outcome: Progressing     Problem: Wound  Goal: Optimal Coping  Outcome: Progressing  Goal: Optimal Functional Ability  Outcome: Progressing  Goal: Absence of Infection Signs and Symptoms  Outcome: Progressing  Goal: Improved Oral Intake  Outcome: Progressing  Goal: Optimal Pain Control and Function  Outcome: Progressing  Goal: Skin Health and Integrity  Outcome: Progressing  Goal: Optimal Wound Healing  Outcome: Progressing     Problem: Pain Acute  Goal: Optimal Pain Control and Function  Outcome: Progressing

## 2025-05-27 RX ORDER — GLUCAGON 1 MG
1 KIT INJECTION
Status: CANCELLED | OUTPATIENT
Start: 2025-05-27

## 2025-05-28 ENCOUNTER — HOSPITAL ENCOUNTER (EMERGENCY)
Facility: HOSPITAL | Age: 50
Discharge: HOME | End: 2025-05-28
Attending: EMERGENCY MEDICINE
Payer: COMMERCIAL

## 2025-05-28 VITALS
TEMPERATURE: 98 F | SYSTOLIC BLOOD PRESSURE: 98 MMHG | HEART RATE: 118 BPM | DIASTOLIC BLOOD PRESSURE: 50 MMHG | RESPIRATION RATE: 19 BRPM | OXYGEN SATURATION: 97 %

## 2025-05-28 DIAGNOSIS — G44.1 OTHER VASCULAR HEADACHE: Primary | ICD-10-CM

## 2025-05-28 DIAGNOSIS — J18.9 PNEUMONIA OF RIGHT LOWER LOBE DUE TO INFECTIOUS ORGANISM: ICD-10-CM

## 2025-05-28 DIAGNOSIS — Z13.6 SCREENING FOR CARDIOVASCULAR CONDITION: ICD-10-CM

## 2025-05-28 DIAGNOSIS — I60.9 SUBARACHNOID HEMORRHAGE: ICD-10-CM

## 2025-05-28 LAB
ABSOLUTE EOSINOPHIL (OHS): 0.07 K/UL
ABSOLUTE MONOCYTE (OHS): 0.67 K/UL (ref 0.3–1)
ABSOLUTE NEUTROPHIL COUNT (OHS): 7.74 K/UL (ref 1.8–7.7)
ALBUMIN SERPL BCP-MCNC: 2.9 G/DL (ref 3.5–5.2)
ALP SERPL-CCNC: 106 UNIT/L (ref 40–150)
ALT SERPL W/O P-5'-P-CCNC: 15 UNIT/L (ref 10–44)
ANION GAP (OHS): 10 MMOL/L (ref 8–16)
AST SERPL-CCNC: 14 UNIT/L (ref 11–45)
B PERT DNA NPH QL NAA+PROBE: NOT DETECTED
BASOPHILS # BLD AUTO: 0.02 K/UL
BASOPHILS NFR BLD AUTO: 0.2 %
BILIRUB SERPL-MCNC: 0.3 MG/DL (ref 0.1–1)
BILIRUB UR QL STRIP.AUTO: NEGATIVE
BUN SERPL-MCNC: 14 MG/DL (ref 6–20)
C PNEUM DNA LOWER RESP QL NAA+NON-PROBE: NOT DETECTED
CALCIUM SERPL-MCNC: 9.3 MG/DL (ref 8.7–10.5)
CHLORIDE SERPL-SCNC: 108 MMOL/L (ref 95–110)
CLARITY UR: CLEAR
CO2 SERPL-SCNC: 22 MMOL/L (ref 23–29)
COLOR UR AUTO: YELLOW
CREAT SERPL-MCNC: 0.9 MG/DL (ref 0.5–1.4)
ERYTHROCYTE [DISTWIDTH] IN BLOOD BY AUTOMATED COUNT: 14.4 % (ref 11.5–14.5)
FLUAV RNA NPH QL NAA+NON-PROBE: NOT DETECTED
FLUBV RNA NPH QL NAA+NON-PROBE: NOT DETECTED
GFR SERPLBLD CREATININE-BSD FMLA CKD-EPI: >60 ML/MIN/1.73/M2
GLUCOSE SERPL-MCNC: 111 MG/DL (ref 70–110)
GLUCOSE UR QL STRIP: NEGATIVE
HADV DNA NPH QL NAA+NON-PROBE: NOT DETECTED
HCOV 229E RNA NPH QL NAA+NON-PROBE: NOT DETECTED
HCOV HKU1 RNA NPH QL NAA+NON-PROBE: NOT DETECTED
HCOV NL63 RNA NPH QL NAA+NON-PROBE: NOT DETECTED
HCOV OC43 RNA NPH QL NAA+NON-PROBE: NOT DETECTED
HCT VFR BLD AUTO: 27.6 % (ref 37–48.5)
HGB BLD-MCNC: 8.3 GM/DL (ref 12–16)
HGB UR QL STRIP: NEGATIVE
HMPV RNA LOWER RESP QL NAA+NON-PROBE: NOT DETECTED
HMPV RNA NPH QL NAA+NON-PROBE: NOT DETECTED
HOLD SPECIMEN: NORMAL
HPIV1 RNA NPH QL NAA+NON-PROBE: NOT DETECTED
HPIV2 RNA NPH QL NAA+NON-PROBE: NOT DETECTED
HPIV3 RNA NPH QL NAA+NON-PROBE: NOT DETECTED
HPIV4 RNA NPH QL NAA+NON-PROBE: NOT DETECTED
IMM GRANULOCYTES # BLD AUTO: 0.04 K/UL (ref 0–0.04)
IMM GRANULOCYTES NFR BLD AUTO: 0.4 % (ref 0–0.5)
KETONES UR QL STRIP: NEGATIVE
LACTATE SERPL-SCNC: 0.7 MMOL/L (ref 0.5–2.2)
LACTATE SERPL-SCNC: 1.1 MMOL/L (ref 0.5–2.2)
LEUKOCYTE ESTERASE UR QL STRIP: NEGATIVE
LYMPHOCYTES # BLD AUTO: 2.15 K/UL (ref 1–4.8)
MCH RBC QN AUTO: 28.2 PG (ref 27–31)
MCHC RBC AUTO-ENTMCNC: 30.1 G/DL (ref 32–36)
MCV RBC AUTO: 94 FL (ref 82–98)
NITRITE UR QL STRIP: NEGATIVE
NUCLEATED RBC (/100WBC) (OHS): 0 /100 WBC
PH UR STRIP: 6 [PH]
PLATELET # BLD AUTO: 405 K/UL (ref 150–450)
PMV BLD AUTO: 9.1 FL (ref 9.2–12.9)
POTASSIUM SERPL-SCNC: 4.1 MMOL/L (ref 3.5–5.1)
PROT SERPL-MCNC: 8.4 GM/DL (ref 6–8.4)
PROT UR QL STRIP: ABNORMAL
RBC # BLD AUTO: 2.94 M/UL (ref 4–5.4)
RELATIVE EOSINOPHIL (OHS): 0.7 %
RELATIVE LYMPHOCYTE (OHS): 20.1 % (ref 18–48)
RELATIVE MONOCYTE (OHS): 6.3 % (ref 4–15)
RELATIVE NEUTROPHIL (OHS): 72.3 % (ref 38–73)
RSV RNA NPH QL NAA+NON-PROBE: NOT DETECTED
RSV RNA NPH QL NAA+NON-PROBE: NOT DETECTED
RV+EV RNA NPH QL NAA+NON-PROBE: NOT DETECTED
SARS-COV-2 RNA RESP QL NAA+PROBE: NOT DETECTED
SODIUM SERPL-SCNC: 140 MMOL/L (ref 136–145)
SP GR UR STRIP: >=1.03
SPECIMEN SOURCE: NORMAL
UROBILINOGEN UR STRIP-ACNC: ABNORMAL EU/DL
WBC # BLD AUTO: 10.69 K/UL (ref 3.9–12.7)

## 2025-05-28 PROCEDURE — 82040 ASSAY OF SERUM ALBUMIN: CPT | Performed by: EMERGENCY MEDICINE

## 2025-05-28 PROCEDURE — 93005 ELECTROCARDIOGRAM TRACING: CPT

## 2025-05-28 PROCEDURE — 93010 ELECTROCARDIOGRAM REPORT: CPT | Mod: ,,, | Performed by: INTERNAL MEDICINE

## 2025-05-28 PROCEDURE — 25500020 PHARM REV CODE 255: Performed by: EMERGENCY MEDICINE

## 2025-05-28 PROCEDURE — 85025 COMPLETE CBC W/AUTO DIFF WBC: CPT | Performed by: EMERGENCY MEDICINE

## 2025-05-28 PROCEDURE — 99285 EMERGENCY DEPT VISIT HI MDM: CPT | Mod: 25

## 2025-05-28 PROCEDURE — 96375 TX/PRO/DX INJ NEW DRUG ADDON: CPT

## 2025-05-28 PROCEDURE — 63600175 PHARM REV CODE 636 W HCPCS: Performed by: FAMILY MEDICINE

## 2025-05-28 PROCEDURE — 81003 URINALYSIS AUTO W/O SCOPE: CPT | Performed by: EMERGENCY MEDICINE

## 2025-05-28 PROCEDURE — 63600175 PHARM REV CODE 636 W HCPCS: Performed by: EMERGENCY MEDICINE

## 2025-05-28 PROCEDURE — 0202U NFCT DS 22 TRGT SARS-COV-2: CPT | Performed by: EMERGENCY MEDICINE

## 2025-05-28 PROCEDURE — 96374 THER/PROPH/DIAG INJ IV PUSH: CPT

## 2025-05-28 PROCEDURE — 83605 ASSAY OF LACTIC ACID: CPT | Performed by: EMERGENCY MEDICINE

## 2025-05-28 PROCEDURE — 87040 BLOOD CULTURE FOR BACTERIA: CPT | Performed by: EMERGENCY MEDICINE

## 2025-05-28 PROCEDURE — 25000003 PHARM REV CODE 250: Performed by: FAMILY MEDICINE

## 2025-05-28 RX ORDER — LEVOFLOXACIN 500 MG/1
500 TABLET, FILM COATED ORAL DAILY
Qty: 5 TABLET | Refills: 0 | Status: SHIPPED | OUTPATIENT
Start: 2025-05-28 | End: 2025-06-02

## 2025-05-28 RX ORDER — CEFEPIME HYDROCHLORIDE 2 G/1
2 INJECTION, POWDER, FOR SOLUTION INTRAVENOUS
Status: COMPLETED | OUTPATIENT
Start: 2025-05-28 | End: 2025-05-28

## 2025-05-28 RX ORDER — MORPHINE SULFATE 4 MG/ML
2 INJECTION, SOLUTION INTRAMUSCULAR; INTRAVENOUS
Refills: 0 | Status: COMPLETED | OUTPATIENT
Start: 2025-05-28 | End: 2025-05-28

## 2025-05-28 RX ORDER — LEVOFLOXACIN 500 MG/1
500 TABLET, FILM COATED ORAL
Status: COMPLETED | OUTPATIENT
Start: 2025-05-28 | End: 2025-05-28

## 2025-05-28 RX ORDER — ONDANSETRON HYDROCHLORIDE 2 MG/ML
4 INJECTION, SOLUTION INTRAVENOUS
Status: COMPLETED | OUTPATIENT
Start: 2025-05-28 | End: 2025-05-28

## 2025-05-28 RX ADMIN — ONDANSETRON 4 MG: 2 INJECTION INTRAMUSCULAR; INTRAVENOUS at 05:05

## 2025-05-28 RX ADMIN — IOHEXOL 100 ML: 350 INJECTION, SOLUTION INTRAVENOUS at 03:05

## 2025-05-28 RX ADMIN — CEFEPIME 2 G: 2 INJECTION, POWDER, FOR SOLUTION INTRAVENOUS at 04:05

## 2025-05-28 RX ADMIN — MORPHINE SULFATE 2 MG: 4 INJECTION INTRAVENOUS at 05:05

## 2025-05-28 RX ADMIN — LEVOFLOXACIN 500 MG: 500 TABLET, FILM COATED ORAL at 07:05

## 2025-05-29 LAB
OHS QRS DURATION: 90 MS
OHS QTC CALCULATION: 447 MS

## 2025-05-30 ENCOUNTER — HOSPITAL ENCOUNTER (EMERGENCY)
Facility: HOSPITAL | Age: 50
Discharge: HOME | End: 2025-05-30
Attending: EMERGENCY MEDICINE
Payer: COMMERCIAL

## 2025-05-30 VITALS
SYSTOLIC BLOOD PRESSURE: 132 MMHG | HEIGHT: 63 IN | TEMPERATURE: 99 F | HEART RATE: 103 BPM | RESPIRATION RATE: 17 BRPM | WEIGHT: 219.81 LBS | DIASTOLIC BLOOD PRESSURE: 65 MMHG | BODY MASS INDEX: 38.95 KG/M2 | OXYGEN SATURATION: 98 %

## 2025-05-30 DIAGNOSIS — J18.9 PNEUMONIA DUE TO INFECTIOUS ORGANISM, UNSPECIFIED LATERALITY, UNSPECIFIED PART OF LUNG: Primary | ICD-10-CM

## 2025-05-30 LAB
ABSOLUTE EOSINOPHIL (OHS): 0.09 K/UL
ABSOLUTE MONOCYTE (OHS): 0.67 K/UL (ref 0.3–1)
ABSOLUTE NEUTROPHIL COUNT (OHS): 10.09 K/UL (ref 1.8–7.7)
ALBUMIN SERPL BCP-MCNC: 2.7 G/DL (ref 3.5–5.2)
ALP SERPL-CCNC: 97 UNIT/L (ref 40–150)
ALT SERPL W/O P-5'-P-CCNC: 13 UNIT/L (ref 10–44)
ANION GAP (OHS): 13 MMOL/L (ref 8–16)
AST SERPL-CCNC: 23 UNIT/L (ref 11–45)
BACTERIA #/AREA URNS AUTO: ABNORMAL /HPF
BASOPHILS # BLD AUTO: 0.02 K/UL
BASOPHILS NFR BLD AUTO: 0.2 %
BILIRUB SERPL-MCNC: 0.3 MG/DL (ref 0.1–1)
BILIRUB UR QL STRIP.AUTO: NEGATIVE
BNP SERPL-MCNC: <10 PG/ML (ref 0–99)
BUN SERPL-MCNC: 11 MG/DL (ref 6–20)
CALCIUM SERPL-MCNC: 9.2 MG/DL (ref 8.7–10.5)
CHLORIDE SERPL-SCNC: 108 MMOL/L (ref 95–110)
CLARITY UR: CLEAR
CO2 SERPL-SCNC: 19 MMOL/L (ref 23–29)
COLOR UR AUTO: YELLOW
CREAT SERPL-MCNC: 0.9 MG/DL (ref 0.5–1.4)
ERYTHROCYTE [DISTWIDTH] IN BLOOD BY AUTOMATED COUNT: 14.4 % (ref 11.5–14.5)
GFR SERPLBLD CREATININE-BSD FMLA CKD-EPI: >60 ML/MIN/1.73/M2
GLUCOSE SERPL-MCNC: 122 MG/DL (ref 70–110)
GLUCOSE UR QL STRIP: NEGATIVE
HCT VFR BLD AUTO: 26.6 % (ref 37–48.5)
HGB BLD-MCNC: 7.8 GM/DL (ref 12–16)
HGB UR QL STRIP: NEGATIVE
HOLD SPECIMEN: NORMAL
HYALINE CASTS UR QL AUTO: 4 /LPF (ref 0–1)
IMM GRANULOCYTES # BLD AUTO: 0.05 K/UL (ref 0–0.04)
IMM GRANULOCYTES NFR BLD AUTO: 0.4 % (ref 0–0.5)
INFLUENZA A MOLECULAR (OHS): NEGATIVE
INFLUENZA B MOLECULAR (OHS): NEGATIVE
KETONES UR QL STRIP: NEGATIVE
LACTATE SERPL-SCNC: 0.9 MMOL/L (ref 0.5–2.2)
LACTATE SERPL-SCNC: 1.4 MMOL/L (ref 0.5–2.2)
LEUKOCYTE ESTERASE UR QL STRIP: NEGATIVE
LYMPHOCYTES # BLD AUTO: 1.79 K/UL (ref 1–4.8)
MCH RBC QN AUTO: 27.5 PG (ref 27–31)
MCHC RBC AUTO-ENTMCNC: 29.3 G/DL (ref 32–36)
MCV RBC AUTO: 94 FL (ref 82–98)
MICROSCOPIC COMMENT: ABNORMAL
NITRITE UR QL STRIP: NEGATIVE
NUCLEATED RBC (/100WBC) (OHS): 0 /100 WBC
OHS QRS DURATION: 84 MS
OHS QTC CALCULATION: 436 MS
PH UR STRIP: 6 [PH]
PLATELET # BLD AUTO: 414 K/UL (ref 150–450)
PMV BLD AUTO: 9.2 FL (ref 9.2–12.9)
POTASSIUM SERPL-SCNC: 4.6 MMOL/L (ref 3.5–5.1)
PROT SERPL-MCNC: 8.5 GM/DL (ref 6–8.4)
PROT UR QL STRIP: ABNORMAL
RBC # BLD AUTO: 2.84 M/UL (ref 4–5.4)
RBC #/AREA URNS AUTO: 0 /HPF (ref 0–4)
RELATIVE EOSINOPHIL (OHS): 0.7 %
RELATIVE LYMPHOCYTE (OHS): 14.1 % (ref 18–48)
RELATIVE MONOCYTE (OHS): 5.3 % (ref 4–15)
RELATIVE NEUTROPHIL (OHS): 79.3 % (ref 38–73)
SARS-COV-2 RDRP RESP QL NAA+PROBE: NEGATIVE
SODIUM SERPL-SCNC: 140 MMOL/L (ref 136–145)
SP GR UR STRIP: 1.02
SQUAMOUS #/AREA URNS AUTO: 8 /HPF
TROPONIN I SERPL DL<=0.01 NG/ML-MCNC: <0.006 NG/ML
UROBILINOGEN UR STRIP-ACNC: ABNORMAL EU/DL
WBC # BLD AUTO: 12.71 K/UL (ref 3.9–12.7)
WBC #/AREA URNS AUTO: 0 /HPF (ref 0–5)

## 2025-05-30 PROCEDURE — A9540 TC99M MAA: HCPCS | Performed by: EMERGENCY MEDICINE

## 2025-05-30 PROCEDURE — 85025 COMPLETE CBC W/AUTO DIFF WBC: CPT | Performed by: EMERGENCY MEDICINE

## 2025-05-30 PROCEDURE — 96361 HYDRATE IV INFUSION ADD-ON: CPT

## 2025-05-30 PROCEDURE — 81003 URINALYSIS AUTO W/O SCOPE: CPT | Performed by: EMERGENCY MEDICINE

## 2025-05-30 PROCEDURE — 84075 ASSAY ALKALINE PHOSPHATASE: CPT | Performed by: EMERGENCY MEDICINE

## 2025-05-30 PROCEDURE — 63600175 PHARM REV CODE 636 W HCPCS: Performed by: EMERGENCY MEDICINE

## 2025-05-30 PROCEDURE — 25000003 PHARM REV CODE 250: Performed by: EMERGENCY MEDICINE

## 2025-05-30 PROCEDURE — 99285 EMERGENCY DEPT VISIT HI MDM: CPT | Mod: 25

## 2025-05-30 PROCEDURE — 84484 ASSAY OF TROPONIN QUANT: CPT | Performed by: EMERGENCY MEDICINE

## 2025-05-30 PROCEDURE — A9567 TECHNETIUM TC-99M AEROSOL: HCPCS | Performed by: EMERGENCY MEDICINE

## 2025-05-30 PROCEDURE — 83605 ASSAY OF LACTIC ACID: CPT | Performed by: EMERGENCY MEDICINE

## 2025-05-30 PROCEDURE — 93010 ELECTROCARDIOGRAM REPORT: CPT | Mod: ,,, | Performed by: INTERNAL MEDICINE

## 2025-05-30 PROCEDURE — 87040 BLOOD CULTURE FOR BACTERIA: CPT | Performed by: EMERGENCY MEDICINE

## 2025-05-30 PROCEDURE — U0002 COVID-19 LAB TEST NON-CDC: HCPCS | Performed by: EMERGENCY MEDICINE

## 2025-05-30 PROCEDURE — 93005 ELECTROCARDIOGRAM TRACING: CPT

## 2025-05-30 PROCEDURE — 25500020 PHARM REV CODE 255: Performed by: EMERGENCY MEDICINE

## 2025-05-30 PROCEDURE — 87502 INFLUENZA DNA AMP PROBE: CPT | Performed by: EMERGENCY MEDICINE

## 2025-05-30 PROCEDURE — 83880 ASSAY OF NATRIURETIC PEPTIDE: CPT | Performed by: EMERGENCY MEDICINE

## 2025-05-30 PROCEDURE — 96374 THER/PROPH/DIAG INJ IV PUSH: CPT

## 2025-05-30 RX ORDER — ACETAMINOPHEN 500 MG
1000 TABLET ORAL
Status: COMPLETED | OUTPATIENT
Start: 2025-05-30 | End: 2025-05-30

## 2025-05-30 RX ORDER — CEFEPIME HYDROCHLORIDE 2 G/1
2 INJECTION, POWDER, FOR SOLUTION INTRAVENOUS
Status: COMPLETED | OUTPATIENT
Start: 2025-05-30 | End: 2025-05-30

## 2025-05-30 RX ADMIN — SODIUM CHLORIDE 1000 ML: 9 INJECTION, SOLUTION INTRAVENOUS at 12:05

## 2025-05-30 RX ADMIN — CEFEPIME 2 G: 2 INJECTION, POWDER, FOR SOLUTION INTRAVENOUS at 12:05

## 2025-05-30 RX ADMIN — KIT FOR THE PREPARATION OF TECHNETIUM TC 99M ALBUMIN AGGREGATED 4.9 MILLICURIE: 2 INJECTION, POWDER, LYOPHILIZED, FOR SUSPENSION INTRAPERITONEAL; INTRAVENOUS at 03:05

## 2025-05-30 RX ADMIN — ACETAMINOPHEN 1000 MG: 500 TABLET ORAL at 04:05

## 2025-05-30 RX ADMIN — IOHEXOL 100 ML: 350 INJECTION, SOLUTION INTRAVENOUS at 11:05

## 2025-05-30 RX ADMIN — KIT FOR THE PREPARATION OF TECHNETIUM TC 99M PENTETATE 30 MILLICURIE: 20 INJECTION, POWDER, LYOPHILIZED, FOR SOLUTION INTRAVENOUS; RESPIRATORY (INHALATION) at 02:05

## 2025-05-30 NOTE — ED PROVIDER NOTES
SCRIBE #1 NOTE: I, Bryanna Pinedo, am scribing for, and in the presence of, Edilberto Madrid DO. I have scribed the entire note.       History     Chief Complaint   Patient presents with    Hyperventilating     Patient with recent neurosurg for brain aneurysm. Was seen here 5/28 and [facility]. Now back for tachypnea and potential PE s/p RLL pneumonia dx       Review of patient's allergies indicates:   Allergen Reactions    Metformin Diarrhea     Other reaction(s): Diarrhae         History of Present Illness     HPI    5/30/2025, 10:25 AM  History obtained from the patient and medical records      History of Present Illness: Waldo Emmanuel is a 49 y.o. female patient with a PMHx of HTN, GERD, iliac artery occlusion, acute cerebellar stroke,and anemia who presents to the Emergency Department for evaluation of tachypnea. Pt is currently staying at [facility] and was sent to the ED for concerns of tachycardia and possible PE per documentation sent with the patient.. Pt states that she thinks she was sent to ED because the nurse at [facility] was unable to get an IV in place. Pt was seen in this ED on 5/5/25 and transferred to [facility] for a Right AICA aneurysm. Pt underwent a coil embolization of this aneurysm on 5/6/25. Pt was discharged from [facility] on 5/26/25 to the [facility]. Pt was seen in this ED 5/28 and was diagnosed with right lower lobe pneumonia. No prior Tx specified.  No further complaints or concerns at this time.       Arrival mode: Ambulance Service    PCP: Ansley Vivas MD        Past Medical History:  Past Medical History:   Diagnosis Date    ADHD (attention deficit hyperactivity disorder)     Anemia     Fibroids     Genital herpes     GERD (gastroesophageal reflux disease)     H/O total hysterectomy 08/03/2021    Hypertension     Labral tear of hip joint     Ovarian cyst     Right common arterial occlusion      Routine general medical examination at a health care facility 2017    Total Right Arterial Occlusion        Past Surgical History:  Past Surgical History:   Procedure Laterality Date    ANGIOGRAM, ABDOMINAL AORTA W/ EXTREMITY RUNOFF N/A 2025    Procedure: Angiogram, Abdominal Aorta W/ Extremity Runoff: Right lower extremity angiogram;  Surgeon: Lennox Zendejas MD;  Location: [facility] CATH LAB;  Service: Vascular;  Laterality: N/A;    CARPAL TUNNEL RELEASE       SECTION      CHOLECYSTECTOMY      DILATION AND CURETTAGE OF UTERUS      MAGNETIC RESONANCE IMAGING N/A 5/15/2025    Procedure: MRI (Magnetic Resonance Imagine);  Surgeon: Obdulia Henning;  Location: [facility];  Service: Anesthesiology;  Laterality: N/A;    TOTAL ABDOMINAL HYSTERECTOMY W/ BILATERAL SALPINGOOPHORECTOMY  2021    menorrhagia         Family History:  Family History   Problem Relation Name Age of Onset    Hypertension Maternal Grandmother      Stroke Maternal Grandmother      Hypertension Father      Stroke Father      Stroke Mother      Breast cancer Paternal Cousin      Prostate cancer Paternal Uncle         Social History:  Social History     Tobacco Use    Smoking status: Never    Smokeless tobacco: Never   Substance and Sexual Activity    Alcohol use: Yes     Comment: Social - Rare     Drug use: No    Sexual activity: Not Currently     Partners: Male     Birth control/protection: See Surgical Hx        Review of Systems     Review of Systems  As noted in HPI   Physical Exam     Initial Vitals [25 0952]   BP Pulse Resp Temp SpO2   99/70 (!) 113 (!) 28 97.5 °F (36.4 °C) 95 %      MAP       --          Physical Exam  Constitutional:       General: She is not in acute distress.     Appearance: Normal appearance.   Cardiovascular:      Rate and Rhythm: Regular rhythm. Tachycardia present.      Heart sounds: No murmur heard.  Pulmonary:      Effort: Pulmonary effort is normal.      Comments: Decreased breath sounds  "bilaterally  Abdominal:      General: There is no distension.      Palpations: Abdomen is soft.      Tenderness: There is no abdominal tenderness.   Musculoskeletal:         General: Normal range of motion.   Skin:     General: Skin is warm and dry.      Findings: No rash.   Neurological:      General: No focal deficit present.      Mental Status: She is alert and oriented to person, place, and time.          ED Course   Critical Care    Date/Time: 5/30/2025 4:30 PM    Performed by: Edilberto Madrid DO  Authorized by: Edilberto Madrid DO  Direct patient critical care time: 25 minutes  Ordering / reviewing critical care time: 5 minutes  Documentation critical care time: 5 minutes  Total critical care time (exclusive of procedural time) : 35 minutes  Critical care time was exclusive of separately billable procedures and treating other patients.  Critical care was necessary to treat or prevent imminent or life-threatening deterioration of the following conditions: sepsis.  Critical care was time spent personally by me on the following activities: development of treatment plan with patient or surrogate, interpretation of cardiac output measurements, evaluation of patient's response to treatment, examination of patient, obtaining history from patient or surrogate, ordering and performing treatments and interventions, ordering and review of laboratory studies, ordering and review of radiographic studies, pulse oximetry, re-evaluation of patient's condition and review of old charts.        ED Vital Signs:  Vitals:    05/30/25 0952 05/30/25 1008 05/30/25 1201 05/30/25 1203   BP: 99/70  (!) 91/53 (!) 91/55   Pulse: (!) 113  105 105   Resp: (!) 28      Temp: 97.5 °F (36.4 °C)      TempSrc: Oral      SpO2: 95%      Weight:  99.7 kg (219 lb 12.8 oz)     Height:  5' 3" (1.6 m)      05/30/25 1212 05/30/25 1231 05/30/25 1235 05/30/25 1432   BP:   99/68 112/71   Pulse:  105 102 100   Resp:       Temp:       TempSrc:     "   SpO2: (!) 91% 100%     Weight:       Height:        05/30/25 1437 05/30/25 1640   BP:  132/65   Pulse:  103   Resp:  17   Temp: 98.7 °F (37.1 °C)    TempSrc: Oral    SpO2:  98%   Weight:     Height:         Abnormal Lab Results:  Labs Reviewed   COMPREHENSIVE METABOLIC PANEL - Abnormal       Result Value    Sodium 140      Potassium 4.6      Chloride 108      CO2 19 (*)     Glucose 122 (*)     BUN 11      Creatinine 0.9      Calcium 9.2      Protein Total 8.5 (*)     Albumin 2.7 (*)     Bilirubin Total 0.3      ALP 97      AST 23      ALT 13      Anion Gap 13      eGFR >60     URINALYSIS, REFLEX TO URINE CULTURE - Abnormal    Color, UA Yellow      Appearance, UA Clear      pH, UA 6.0      Spec Grav UA 1.025      Protein, UA 1+ (*)     Glucose, UA Negative      Ketones, UA Negative      Bilirubin, UA Negative      Blood, UA Negative      Nitrites, UA Negative      Urobilinogen, UA 4.0-6.0 (*)     Leukocyte Esterase, UA Negative     CBC WITH DIFFERENTIAL - Abnormal    WBC 12.71 (*)     RBC 2.84 (*)     HGB 7.8 (*)     HCT 26.6 (*)     MCV 94      MCH 27.5      MCHC 29.3 (*)     RDW 14.4      Platelet Count 414      MPV 9.2      Nucleated RBC 0      Neut % 79.3 (*)     Lymph % 14.1 (*)     Mono % 5.3      Eos % 0.7      Basophil % 0.2      Imm Grans % 0.4      Neut # 10.09 (*)     Lymph # 1.79      Mono # 0.67      Eos # 0.09      Baso # 0.02      Imm Grans # 0.05 (*)    URINALYSIS MICROSCOPIC - Abnormal    RBC, UA 0      WBC, UA 0      Bacteria, UA Rare      Squamous Epithelial Cells, UA 8      Hyaline Casts, UA 4 (*)     Microscopic Comment       CULTURE, BLOOD - Normal    Blood Culture No Growth After 6 Hours     INFLUENZA A & B BY MOLECULAR - Normal    INFLUENZA A MOLECULAR Negative      INFLUENZA B MOLECULAR  Negative     CULTURE, BLOOD - Normal    Blood Culture No Growth After 6 Hours     LACTIC ACID, PLASMA - Normal    Lactic Acid Level 1.4      Narrative:     Falsely low lactic acid results can be found in  samples containing >=13.0 mg/dL total bilirubin and/or >=3.5 mg/dL direct bilirubin.    SARS-COV-2 RNA AMPLIFICATION, QUAL - Normal    SARS COV-2 Molecular Negative     LACTIC ACID, PLASMA - Normal    Lactic Acid Level 0.9      Narrative:     Falsely low lactic acid results can be found in samples containing >=13.0 mg/dL total bilirubin and/or >=3.5 mg/dL direct bilirubin.    TROPONIN I - Normal    Troponin-I <0.006     B-TYPE NATRIURETIC PEPTIDE - Normal    BNP <10     TROPONIN I - Normal    Troponin-I <0.006     TROPONIN I - Normal    Troponin-I <0.006     CBC W/ AUTO DIFFERENTIAL    Narrative:     The following orders were created for panel order CBC auto differential.  Procedure                               Abnormality         Status                     ---------                               -----------         ------                     CBC with Differential[[id number]]       Abnormal            Final result                 Please view results for these tests on the individual orders.   GREY TOP URINE HOLD    Extra Tube Hold for add-ons.          All Lab Results:  Results for orders placed or performed during the hospital encounter of 05/30/25   Blood culture x two cultures. Draw prior to antibiotics.    Collection Time: 05/30/25 10:15 AM    Specimen: Peripheral, Antecubital, Left; Blood   Result Value Ref Range    Blood Culture No Growth After 6 Hours    Blood culture x two cultures. Draw prior to antibiotics.    Collection Time: 05/30/25 10:30 AM    Specimen: Peripheral, Antecubital, Left; Blood   Result Value Ref Range    Blood Culture No Growth After 6 Hours    Comprehensive metabolic panel    Collection Time: 05/30/25 10:32 AM   Result Value Ref Range    Sodium 140 136 - 145 mmol/L    Potassium 4.6 3.5 - 5.1 mmol/L    Chloride 108 95 - 110 mmol/L    CO2 19 (L) 23 - 29 mmol/L    Glucose 122 (H) 70 - 110 mg/dL    BUN 11 6 - 20 mg/dL    Creatinine 0.9 0.5 - 1.4 mg/dL    Calcium 9.2 8.7 - 10.5 mg/dL     Protein Total 8.5 (H) 6.0 - 8.4 gm/dL    Albumin 2.7 (L) 3.5 - 5.2 g/dL    Bilirubin Total 0.3 0.1 - 1.0 mg/dL    ALP 97 40 - 150 unit/L    AST 23 11 - 45 unit/L    ALT 13 10 - 44 unit/L    Anion Gap 13 8 - 16 mmol/L    eGFR >60 >60 mL/min/1.73/m2   Lactic acid, plasma #1    Collection Time: 05/30/25 10:32 AM   Result Value Ref Range    Lactic Acid Level 1.4 0.5 - 2.2 mmol/L   CBC with Differential    Collection Time: 05/30/25 10:32 AM   Result Value Ref Range    WBC 12.71 (H) 3.90 - 12.70 K/uL    RBC 2.84 (L) 4.00 - 5.40 M/uL    HGB 7.8 (L) 12.0 - 16.0 gm/dL    HCT 26.6 (L) 37.0 - 48.5 %    MCV 94 82 - 98 fL    MCH 27.5 27.0 - 31.0 pg    MCHC 29.3 (L) 32.0 - 36.0 g/dL    RDW 14.4 11.5 - 14.5 %    Platelet Count 414 150 - 450 K/uL    MPV 9.2 9.2 - 12.9 fL    Nucleated RBC 0 <=0 /100 WBC    Neut % 79.3 (H) 38 - 73 %    Lymph % 14.1 (L) 18 - 48 %    Mono % 5.3 4 - 15 %    Eos % 0.7 <=8 %    Basophil % 0.2 <=1.9 %    Imm Grans % 0.4 0.0 - 0.5 %    Neut # 10.09 (H) 1.8 - 7.7 K/uL    Lymph # 1.79 1 - 4.8 K/uL    Mono # 0.67 0.3 - 1 K/uL    Eos # 0.09 <=0.5 K/uL    Baso # 0.02 <=0.2 K/uL    Imm Grans # 0.05 (H) 0.00 - 0.04 K/uL   Troponin I    Collection Time: 05/30/25 10:32 AM   Result Value Ref Range    Troponin-I <0.006 <=0.026 ng/mL   Urinalysis, Reflex to Urine Culture Urine, Clean Catch    Collection Time: 05/30/25 10:33 AM    Specimen: Urine   Result Value Ref Range    Color, UA Yellow Straw, Stephanie, Yellow, Light-Orange    Appearance, UA Clear Clear    pH, UA 6.0 5.0 - 8.0    Spec Grav UA 1.025 1.005 - 1.030    Protein, UA 1+ (A) Negative    Glucose, UA Negative Negative    Ketones, UA Negative Negative    Bilirubin, UA Negative Negative    Blood, UA Negative Negative    Nitrites, UA Negative Negative    Urobilinogen, UA 4.0-6.0 (A) <2.0 EU/dL    Leukocyte Esterase, UA Negative Negative   GREY TOP URINE HOLD    Collection Time: 05/30/25 10:33 AM   Result Value Ref Range    Extra Tube Hold for add-ons.    Urinalysis  Microscopic    Collection Time: 05/30/25 10:33 AM   Result Value Ref Range    RBC, UA 0 0 - 4 /HPF    WBC, UA 0 0 - 5 /HPF    Bacteria, UA Rare None, Rare, Occasional /HPF    Squamous Epithelial Cells, UA 8 /HPF    Hyaline Casts, UA 4 (H) 0 - 1 /LPF    Microscopic Comment     EKG 12-lead    Collection Time: 05/30/25 10:37 AM   Result Value Ref Range    QRS Duration 84 ms    [facility] QTC Calculation 436 ms   Influenza A & B by Molecular    Collection Time: 05/30/25 10:58 AM    Specimen: Nasal Swab   Result Value Ref Range    INFLUENZA A MOLECULAR Negative Negative    INFLUENZA B MOLECULAR  Negative Negative   COVID-19 Rapid Screening    Collection Time: 05/30/25 10:58 AM   Result Value Ref Range    SARS COV-2 Molecular Negative Negative   Brain natriuretic peptide    Collection Time: 05/30/25 11:01 AM   Result Value Ref Range    BNP <10 0 - 99 pg/mL   Troponin I    Collection Time: 05/30/25 11:02 AM   Result Value Ref Range    Troponin-I <0.006 <=0.026 ng/mL   Lactic acid, plasma #2    Collection Time: 05/30/25  1:59 PM   Result Value Ref Range    Lactic Acid Level 0.9 0.5 - 2.2 mmol/L   Troponin I    Collection Time: 05/30/25  1:59 PM   Result Value Ref Range    Troponin-I <0.006 <=0.026 ng/mL       Imaging Results:  Imaging Results              NM Lung Scan Ventilation Perfusion (Final result)  Result time 05/30/25 15:48:43      Final result by Fran Cowan MD (05/30/25 15:48:43)                   Impression:      This represents a low probability  of pulmonary embolism.      Electronically signed by: Fran Cowan  Date:    05/30/2025  Time:    15:48               Narrative:    EXAMINATION:  NM LUNG VENTILATION AND PERFUSION IMAGING    CLINICAL HISTORY:  Pulmonary embolism (PE) suspected, high prob;    TECHNIQUE:  Thirty mCi of Tc-99m-DTPA were placed in the nebulizer. Following the inhalation Tc-99m-DTPA in aerosol and the subsequent IV administration of 4.9 mCi of Tc-99m-MAA, multiple images of the thorax were  obtained in various projections.    COMPARISON:  Chest CT May 30, 2025.    FINDINGS:  No mismatched defect demonstrated.                                       CTA Chest Non-Coronary (PE Studies) (Final result)  Result time 05/30/25 12:15:36      Final result by Daniel Kan MD (05/30/25 12:15:36)                   Impression:      1.  Completely suboptimal study for the evaluation of pulmonary emboli.  Central and peripheral pulmonary emboli could easily be overlooked.  Negative for evidence for saddle embolus.    2.  Numerous nodular opacities are seen throughout the right greater than left lungs, associated with enlarged hilar and mediastinal lymph nodes.  Small right effusion.  These findings are most likely infectious or inflammatory in nature, and treatment for presumed infectious or inflammatory process recommended with short-term follow-up imaging.  Less likely could these changes be neoplastic in origin.    3.  Coronary artery calcifications.  Fatty infiltration of the liver.  Cholecystectomy clips.  Other nonemergent findings as described above.    All CT scans at this facility are performed  using dose modulation techniques as appropriate to performed exam including the following:  automated exposure control; adjustment of mA and/or kV according to the patients size (this includes techniques or standardized protocols for targeted exams where dose is matched to indication/reason for exam: i.e. extremities or head);  iterative reconstruction technique.      Electronically signed by: Daniel Kan MD  Date:    05/30/2025  Time:    12:15               Narrative:    EXAMINATION:  CTA CHEST NON CORONARY (PE STUDIES), multiplanar and 3D MIP reconstructions    CLINICAL HISTORY:  Pulmonary embolism (PE) suspected, high prob;    TECHNIQUE:  Axial images through the chest were obtained with the use of IV contrast. Sagittal, coronal and 3D MIP <reconstructions are provided for review and permanently  archived.>    COMPARISON:  Chest x-ray performed earlier the same day    FINDINGS:  Corresponding to the changes seen on recent chest x-ray are multiple right greater than left ground-glass nodular densities involving all lobes.  Small right pleural effusion.  Negative for left effusion.  Negative for pneumothorax.  There are no infiltrates, effusions, or parenchymal lung nodules.    There is optimal opacification of the pulmonary arteries with visualization of the central pulmonary arteries.  Central and peripheral pulmonary emboli could easily be overlooked.  No definite CT evidence for a saddle embolus noted.  The aorta is intact without evidence for aneurysm or dissection.  Coronary artery calcifications noted.    There appear to be enlarged hilar and mediastinal lymph nodes, possibly reactive.    The airways are patent.  The thyroid gland is normal.  The esophagus is normal.    Fatty infiltration of the liver.  Cholecystectomy clips.  The upper abdominal organs are otherwise normal.    Negative for osseous lesions. Multilevel marginal spondylosis.  Convex left curvature of the upper thoracic spine.                                       X-Ray Chest AP Portable (Final result)  Result time 05/30/25 10:37:08      Final result by ELEN Rojas Sr., MD (05/30/25 10:37:08)                   Impression:      Interval development of mild pulmonary edema.      Electronically signed by: Hima Rojas MD  Date:    05/30/2025  Time:    10:37               Narrative:    EXAMINATION:  XR CHEST AP PORTABLE    CLINICAL HISTORY:  Shortness of breath;    COMPARISON:  05/28/2025    FINDINGS:  The size of the heart is prominent.  There has been interval development of a mild amount of interstitial and alveolar opacities seen in the lungs.  There is no pneumothorax.  The costophrenic angles are sharp.                                       The EKG was ordered, reviewed, and independently interpreted by the ED  provider.  Interpretation time: 10:37  Rate: 112 BPM  Rhythm: sinus tachycardia  Interpretation: Inferior infarct ,age undetermined. No STEMI.  When compared with ECG of 28-May-2025 13:45,  Inferior infarct is now Present          The Emergency Provider reviewed the vital signs and test results, which are outlined above.     ED Discussion     4:31 PM: Reassessed pt at this time. Discussed with patient and/or family/caretaker all pertinent ED information and results. Discussed pt dx and plan of tx. Gave the patient all f/u and return to the ED instructions. All questions and concerns were addressed at this time. Patient and/or family/caretaker expresses understanding of information and instructions, and is comfortable with plan to discharge. Pt is stable for discharge.     I discussed with patient and/or family/caretaker that evaluation in the ED does not suggest any emergent or life threatening medical conditions requiring immediate intervention beyond what was provided in the ED, and I believe patient is safe for discharge.  Regardless, an unremarkable evaluation in the ED does not preclude the development or presence of a serious of life threatening condition. As such, I instructed that the patient is to return immediately for any worsening or change in current symptoms.       Medical Decision Making  Septic workup unremarkable, patient able to tolerate fluids, patient showing no signs of hypoxia.  She is currently receiving Levaquin at the rehab facility.  She has no acute complaints and is in no acute distress.  She will be transferred back to the facility.  She is instructed to return to any emergency department immediately for any new or worsening symptoms and she verbalized understanding.    Amount and/or Complexity of Data Reviewed  Labs: ordered.     Details: CMP is nonspecific, UA shows no signs of UTI, CBC shows leukocytosis, blood cultures obtained and pending, influenza and COVID-19 are negative, lactic  acid x2 levels are both within normal limits, troponin x2 levels are both within normal limits, BNP is within normal limits  Radiology: ordered. Decision-making details documented in ED Course.     Details: CTA of the chest was suboptimal study and nondiagnostic therefore V/Q scan performed which showed low probability of pulmonary embolism., chest x-ray was concerning for possible pulmonary edema  ECG/medicine tests: ordered and independent interpretation performed. Decision-making details documented in ED Course.    Risk  OTC drugs.  Prescription drug management.  Risk Details: Differential diagnosis includes but is not limited to: ACS, heart failure, pneumonia, pulmonary embolism, sepsis                ED Medication(s):  Medications   ceFEPIme injection 2 g (2 g Intravenous Given 5/30/25 1206)   sodium chloride 0.9% bolus 1,000 mL 1,000 mL (0 mLs Intravenous Stopped 5/30/25 1347)   iohexoL (OMNIPAQUE 350) injection 100 mL (100 mLs Intravenous Given 5/30/25 1152)   sodium chloride 0.9% bolus 1,000 mL 1,000 mL (0 mLs Intravenous Stopped 5/30/25 1347)   kit prep Tc 99m-pentetic acid (aerosol) 20 mg SolR 30 millicurie (30 millicuries Inhalation Given 5/30/25 1450)   kit for prep of Tc-99m-albumin 2.5 mg SolR 4.9 millicurie (4.9 millicuries Intravenous Given 5/30/25 1521)   acetaminophen tablet 1,000 mg (1,000 mg Oral Given 5/30/25 1635)       Discharge Medication List as of 5/30/2025  4:29 PM           Follow-up Information       Schedule an appointment as soon as possible for a visit  with Ansley Vivas MD.    Specialty: Internal Medicine  Contact information:  [address] [facility] [address] [phone]                                 Scribe Attestation:   Scribe #1: I performed the above scribed service and the documentation accurately describes the services I performed. I attest to the accuracy of the note.     Attending:   Physician Attestation Statement for Scribe #1: Julius LARIOS Christopher B.,  , personally performed the services described in this documentation, as scribed by Bryanna Pinedo, in my presence, and it is both accurate and complete.           Clinical Impression       ICD-10-CM ICD-9-CM   1. Pneumonia due to infectious organism, unspecified laterality, unspecified part of lung  J18.9 486       Disposition:   Disposition: Discharged  Condition: Stable       Edilberto Madrid DO  05/31/25 0834

## 2025-06-02 LAB
BACTERIA BLD CULT: NORMAL
BACTERIA BLD CULT: NORMAL

## 2025-06-04 ENCOUNTER — OUTPATIENT CASE MANAGEMENT (OUTPATIENT)
Dept: ADMINISTRATIVE | Facility: OTHER | Age: 50
End: 2025-06-04
Payer: COMMERCIAL

## 2025-06-04 LAB
BACTERIA BLD CULT: NORMAL
BACTERIA BLD CULT: NORMAL

## 2025-06-09 ENCOUNTER — TELEPHONE (OUTPATIENT)
Dept: CARDIOLOGY | Facility: CLINIC | Age: 50
End: 2025-06-09
Payer: COMMERCIAL

## 2025-06-09 DIAGNOSIS — Z79.01 LONG TERM (CURRENT) USE OF ANTICOAGULANTS: Primary | ICD-10-CM

## 2025-06-09 DIAGNOSIS — I82.4Y9 ACUTE DEEP VEIN THROMBOSIS (DVT) OF PROXIMAL VEIN OF LOWER EXTREMITY, UNSPECIFIED LATERALITY: ICD-10-CM

## 2025-06-09 NOTE — TELEPHONE ENCOUNTER
Patient states she was discharged from [facility] on 6/6/25. Patient has been scheduled for INR on 6/11/25 at [facility] lab. Patient reports she is taking last [facility] dosage of warfarin 2.5 mg on Tues and Thurs and 5 mg all other days. Patient states she was instructed upon discharge to take 2.5 mg every day.

## 2025-06-10 ENCOUNTER — RESULTS FOLLOW-UP (OUTPATIENT)
Dept: HEMATOLOGY/ONCOLOGY | Facility: CLINIC | Age: 50
End: 2025-06-10

## 2025-06-10 ENCOUNTER — LAB VISIT (OUTPATIENT)
Dept: LAB | Facility: HOSPITAL | Age: 50
End: 2025-06-10
Attending: INTERNAL MEDICINE
Payer: COMMERCIAL

## 2025-06-10 DIAGNOSIS — I82.4Y9 ACUTE DEEP VEIN THROMBOSIS (DVT) OF PROXIMAL VEIN OF LOWER EXTREMITY, UNSPECIFIED LATERALITY: ICD-10-CM

## 2025-06-10 DIAGNOSIS — Z79.01 LONG TERM (CURRENT) USE OF ANTICOAGULANTS: ICD-10-CM

## 2025-06-10 LAB
INR PPP: 2 (ref 0.8–1.2)
PROTHROMBIN TIME: 21.2 SECONDS (ref 9–12.5)

## 2025-06-10 PROCEDURE — 36415 COLL VENOUS BLD VENIPUNCTURE: CPT

## 2025-06-10 PROCEDURE — 85610 PROTHROMBIN TIME: CPT

## 2025-06-11 ENCOUNTER — ANTI-COAG VISIT (OUTPATIENT)
Dept: CARDIOLOGY | Facility: CLINIC | Age: 50
End: 2025-06-11
Payer: COMMERCIAL

## 2025-06-11 DIAGNOSIS — I74.5 ILIAC ARTERY OCCLUSION: Primary | ICD-10-CM

## 2025-06-11 PROCEDURE — 93793 ANTICOAG MGMT PT WARFARIN: CPT | Mod: S$GLB,,,

## 2025-06-11 NOTE — PROGRESS NOTES
Health Maintenance Due   Topic Date Due   • Influenza Vaccine (1) 08/01/2020       Patient is due for topics as listed above but is not proceeding with Immunization(s) Influenza at this time.            INR at goal-2.0. Medications and chart reviewed. No changes noted to necessitate adjustment of warfarin or follow-up plan. See calendar.  INR drawn yesterday at MD visits.  INR is in range.  Pt reported to us that she was taking warfarin 2.5 mg on T/R and 5 mg on all other days, she confirms this dose again today with me. No other changes.   Repeat INR in 1 week, [facility] lab.

## 2025-06-17 ENCOUNTER — E-CONSULT (OUTPATIENT)
Dept: NEUROSURGERY | Facility: CLINIC | Age: 50
End: 2025-06-17
Payer: COMMERCIAL

## 2025-06-17 DIAGNOSIS — I67.1 BRAIN ANEURYSM: Primary | ICD-10-CM

## 2025-06-17 DIAGNOSIS — I63.9 CEREBELLAR STROKE, ACUTE: ICD-10-CM

## 2025-06-17 DIAGNOSIS — G93.5 BRAIN COMPRESSION: ICD-10-CM

## 2025-06-17 DIAGNOSIS — I60.6 NONTRAUMATIC SUBARACHNOID HEMORRHAGE FROM OTHER INTRACRANIAL ARTERIES: ICD-10-CM

## 2025-06-17 PROCEDURE — 99447 NTRPROF PH1/NTRNET/EHR 11-20: CPT | Mod: S$GLB,,, | Performed by: NEUROLOGICAL SURGERY

## 2025-06-17 NOTE — CONSULTS
[facility] - Neurosurgery 8th Fl  Response for E-Consult     Patient Name: Waldo Emmanuel  MRN: [id number]  Primary Care Provider: Ansley Vivas MD   Requesting Provider: Lito Yancey, *  E-Consult to Specialist  Consult performed by: Chikis Boyce MD  Consult ordered by: Lito Yancey MD  Reason for consult: Right cerebellar ICH          Recommendation: Ok to keep patient in [place]: small area of right cerebellar ICH was present of previous CT head and is unchanged in character/size.    Additional future steps to consider: Follow-up with neurosurgery as previously scheduled.     Total time of Consultation: 15 minute    I did speak to the requesting provider verbally about this.     *This eConsult is based on the clinical data available to me and is furnished without benefit of a physical examination. The eConsult will need to be interpreted in light of any clinical issues or changes in patient status not available to me at the time of filing this eConsults. Significant changes in patient condition or level of acuity should result in immediate formal consultation and reevaluation. Please alert me if you have further questions.    Thank you for this eConsult referral.     MD T[facility] Santiago - Neurosurgery [facility]

## 2025-06-18 ENCOUNTER — PATIENT MESSAGE (OUTPATIENT)
Dept: CARDIOLOGY | Facility: CLINIC | Age: 50
End: 2025-06-18
Payer: COMMERCIAL

## 2025-06-18 ENCOUNTER — TELEPHONE (OUTPATIENT)
Dept: CARDIOLOGY | Facility: CLINIC | Age: 50
End: 2025-06-18
Payer: COMMERCIAL

## 2025-06-19 ENCOUNTER — TELEPHONE (OUTPATIENT)
Dept: CARDIOLOGY | Facility: CLINIC | Age: 50
End: 2025-06-19
Payer: COMMERCIAL

## 2025-06-19 ENCOUNTER — LAB VISIT (OUTPATIENT)
Dept: LAB | Facility: HOSPITAL | Age: 50
End: 2025-06-19
Attending: NURSE PRACTITIONER
Payer: COMMERCIAL

## 2025-06-19 ENCOUNTER — ANTI-COAG VISIT (OUTPATIENT)
Dept: CARDIOLOGY | Facility: CLINIC | Age: 50
End: 2025-06-19
Payer: COMMERCIAL

## 2025-06-19 ENCOUNTER — RESULTS FOLLOW-UP (OUTPATIENT)
Dept: HEMATOLOGY/ONCOLOGY | Facility: CLINIC | Age: 50
End: 2025-06-19

## 2025-06-19 DIAGNOSIS — I82.4Y9 ACUTE DEEP VEIN THROMBOSIS (DVT) OF PROXIMAL VEIN OF LOWER EXTREMITY, UNSPECIFIED LATERALITY: ICD-10-CM

## 2025-06-19 DIAGNOSIS — Z79.01 LONG TERM (CURRENT) USE OF ANTICOAGULANTS: ICD-10-CM

## 2025-06-19 DIAGNOSIS — I74.5 ILIAC ARTERY OCCLUSION: Primary | ICD-10-CM

## 2025-06-19 LAB
INR PPP: 2.2 (ref 0.8–1.2)
PROTHROMBIN TIME: 23 SECONDS (ref 9–12.5)

## 2025-06-19 PROCEDURE — 93793 ANTICOAG MGMT PT WARFARIN: CPT | Mod: S$GLB,,,

## 2025-06-19 PROCEDURE — 85610 PROTHROMBIN TIME: CPT

## 2025-06-19 PROCEDURE — 36415 COLL VENOUS BLD VENIPUNCTURE: CPT

## 2025-06-19 NOTE — PROGRESS NOTES
INR at goal-2.2. Medications and chart reviewed. No changes noted to necessitate adjustment of warfarin or follow-up plan. See calendar.  Ms Emmanuel and family contacted.  Dose confirmed - warfarin 2.5 mg on T/R and 5 mg on all other days.  Repeat INR in 2 weeks.

## 2025-06-20 ENCOUNTER — PATIENT MESSAGE (OUTPATIENT)
Dept: OTOLARYNGOLOGY | Facility: CLINIC | Age: 50
End: 2025-06-20
Payer: COMMERCIAL

## 2025-06-26 ENCOUNTER — PATIENT MESSAGE (OUTPATIENT)
Dept: INTERNAL MEDICINE | Facility: CLINIC | Age: 50
End: 2025-06-26
Payer: COMMERCIAL

## 2025-06-26 DIAGNOSIS — E66.01 OBESITY, CLASS III, BMI 40-49.9 (MORBID OBESITY): ICD-10-CM

## 2025-06-26 RX ORDER — TIRZEPATIDE 5 MG/.5ML
5 INJECTION, SOLUTION SUBCUTANEOUS
Qty: 2 ML | Refills: 0 | Status: ACTIVE | OUTPATIENT
Start: 2025-06-26

## 2025-07-01 ENCOUNTER — HOSPITAL ENCOUNTER (OUTPATIENT)
Dept: RADIOLOGY | Facility: HOSPITAL | Age: 50
Discharge: HOME | End: 2025-07-01
Attending: INTERNAL MEDICINE
Payer: COMMERCIAL

## 2025-07-01 ENCOUNTER — ANTI-COAG VISIT (OUTPATIENT)
Dept: CARDIOLOGY | Facility: CLINIC | Age: 50
End: 2025-07-01
Payer: COMMERCIAL

## 2025-07-01 DIAGNOSIS — I74.5 ILIAC ARTERY OCCLUSION: Primary | ICD-10-CM

## 2025-07-01 DIAGNOSIS — Z13.6 ENCOUNTER FOR SCREENING FOR CARDIOVASCULAR DISORDERS: ICD-10-CM

## 2025-07-01 DIAGNOSIS — R05.3 CHRONIC COUGH: ICD-10-CM

## 2025-07-01 DIAGNOSIS — I25.10 CORONARY ARTERY CALCIFICATION SEEN ON CAT SCAN: ICD-10-CM

## 2025-07-01 DIAGNOSIS — Z79.01 LONG TERM (CURRENT) USE OF ANTICOAGULANTS: ICD-10-CM

## 2025-07-01 DIAGNOSIS — R59.0 LOCALIZED ENLARGED LYMPH NODES: ICD-10-CM

## 2025-07-01 PROCEDURE — 75571 CT HRT W/O DYE W/CA TEST: CPT | Mod: 26,,, | Performed by: RADIOLOGY

## 2025-07-01 PROCEDURE — 71250 CT THORAX DX C-: CPT | Mod: 26,,, | Performed by: RADIOLOGY

## 2025-07-01 PROCEDURE — 71250 CT THORAX DX C-: CPT | Mod: TC

## 2025-07-01 PROCEDURE — 93793 ANTICOAG MGMT PT WARFARIN: CPT | Mod: S$GLB,,,

## 2025-07-01 PROCEDURE — 75571 CT HRT W/O DYE W/CA TEST: CPT | Mod: TC

## 2025-07-01 NOTE — PROGRESS NOTES
"Critical INR of 5.1.  Lab collected.  Patient states, "I may have taken extra doses, but don't know".  No bleeding issues reported.  Advised on a pill organizer for safety - patient agrees.  Confirms current warfarin dose.  Instructions given:  0 mg today, lower to 2.5 mg on 7/02 (16.7%), then resume 2.5 mg every Tues, Thurs; and 5 mg all other days.  INR recheck next Monday, 7/07/2025 (ON lab).  Bleeding precautions given - ER for any abnormal issues.  Patient repeated back instructions correctly and confirms understanding.      "

## 2025-07-07 ENCOUNTER — OFFICE VISIT (OUTPATIENT)
Dept: PULMONOLOGY | Facility: CLINIC | Age: 50
End: 2025-07-07
Payer: COMMERCIAL

## 2025-07-07 ENCOUNTER — ANTI-COAG VISIT (OUTPATIENT)
Dept: CARDIOLOGY | Facility: CLINIC | Age: 50
End: 2025-07-07
Payer: COMMERCIAL

## 2025-07-07 ENCOUNTER — HOSPITAL ENCOUNTER (OUTPATIENT)
Dept: RADIOLOGY | Facility: HOSPITAL | Age: 50
Discharge: HOME | End: 2025-07-07
Attending: NEUROLOGICAL SURGERY
Payer: COMMERCIAL

## 2025-07-07 ENCOUNTER — CLINICAL SUPPORT (OUTPATIENT)
Dept: PULMONOLOGY | Facility: CLINIC | Age: 50
End: 2025-07-07
Payer: COMMERCIAL

## 2025-07-07 VITALS
HEIGHT: 63 IN | DIASTOLIC BLOOD PRESSURE: 70 MMHG | WEIGHT: 200.75 LBS | SYSTOLIC BLOOD PRESSURE: 110 MMHG | HEART RATE: 93 BPM | BODY MASS INDEX: 35.57 KG/M2 | OXYGEN SATURATION: 96 %

## 2025-07-07 DIAGNOSIS — I74.5 ILIAC ARTERY OCCLUSION: Primary | ICD-10-CM

## 2025-07-07 DIAGNOSIS — E78.49 OTHER HYPERLIPIDEMIA: ICD-10-CM

## 2025-07-07 DIAGNOSIS — Z86.718 HISTORY OF DVT IN ADULTHOOD: ICD-10-CM

## 2025-07-07 DIAGNOSIS — R59.0 LOCALIZED ENLARGED LYMPH NODES: ICD-10-CM

## 2025-07-07 DIAGNOSIS — R59.0 MEDIASTINAL LYMPHADENOPATHY: ICD-10-CM

## 2025-07-07 DIAGNOSIS — I67.1 CEREBRAL ANEURYSM, NONRUPTURED: ICD-10-CM

## 2025-07-07 DIAGNOSIS — R06.83 SNORING: ICD-10-CM

## 2025-07-07 DIAGNOSIS — Z79.01 LONG TERM (CURRENT) USE OF ANTICOAGULANTS: ICD-10-CM

## 2025-07-07 DIAGNOSIS — E78.1 HYPERTRIGLYCERIDEMIA: ICD-10-CM

## 2025-07-07 DIAGNOSIS — R91.8 ABNORMAL COMPUTED TOMOGRAPHY OF LUNG: ICD-10-CM

## 2025-07-07 DIAGNOSIS — R05.3 CHRONIC COUGH: ICD-10-CM

## 2025-07-07 DIAGNOSIS — R59.0 HILAR LYMPHADENOPATHY: ICD-10-CM

## 2025-07-07 DIAGNOSIS — R93.89 ABNORMAL CHEST CT: ICD-10-CM

## 2025-07-07 DIAGNOSIS — E66.01 OBESITY, CLASS III, BMI 40-49.9 (MORBID OBESITY): ICD-10-CM

## 2025-07-07 DIAGNOSIS — I10 ESSENTIAL HYPERTENSION: Primary | ICD-10-CM

## 2025-07-07 PROBLEM — E88.09 HYPOALBUMINEMIA: Status: ACTIVE | Noted: 2025-07-07

## 2025-07-07 PROBLEM — F09 ACQUIRED COGNITIVE DYSFUNCTION: Status: ACTIVE | Noted: 2025-07-07

## 2025-07-07 PROBLEM — G81.94 LEFT HEMIPARESIS: Status: ACTIVE | Noted: 2025-07-07

## 2025-07-07 PROBLEM — I25.10 CORONARY ARTERY CALCIFICATION SEEN ON CAT SCAN: Status: ACTIVE | Noted: 2025-07-07

## 2025-07-07 PROCEDURE — 4010F ACE/ARB THERAPY RXD/TAKEN: CPT | Mod: CPTII,S$GLB,, | Performed by: INTERNAL MEDICINE

## 2025-07-07 PROCEDURE — 95012 NITRIC OXIDE EXP GAS DETER: CPT | Mod: S$GLB,,, | Performed by: INTERNAL MEDICINE

## 2025-07-07 PROCEDURE — 1159F MED LIST DOCD IN RCRD: CPT | Mod: CPTII,S$GLB,, | Performed by: INTERNAL MEDICINE

## 2025-07-07 PROCEDURE — 3078F DIAST BP <80 MM HG: CPT | Mod: CPTII,S$GLB,, | Performed by: INTERNAL MEDICINE

## 2025-07-07 PROCEDURE — 3044F HG A1C LEVEL LT 7.0%: CPT | Mod: CPTII,S$GLB,, | Performed by: INTERNAL MEDICINE

## 2025-07-07 PROCEDURE — 99999 PR PBB SHADOW E&M-EST. PATIENT-LVL V: CPT | Mod: PBBFAC,,, | Performed by: INTERNAL MEDICINE

## 2025-07-07 PROCEDURE — 70546 MR ANGIOGRAPH HEAD W/O&W/DYE: CPT | Mod: 26,,, | Performed by: RADIOLOGY

## 2025-07-07 PROCEDURE — 3061F NEG MICROALBUMINURIA REV: CPT | Mod: CPTII,S$GLB,, | Performed by: INTERNAL MEDICINE

## 2025-07-07 PROCEDURE — 99205 OFFICE O/P NEW HI 60 MIN: CPT | Mod: 25,S$GLB,, | Performed by: INTERNAL MEDICINE

## 2025-07-07 PROCEDURE — 99999 PR PBB SHADOW E&M-EST. PATIENT-LVL I: CPT | Mod: PBBFAC,,,

## 2025-07-07 PROCEDURE — 3066F NEPHROPATHY DOC TX: CPT | Mod: CPTII,S$GLB,, | Performed by: INTERNAL MEDICINE

## 2025-07-07 PROCEDURE — 1160F RVW MEDS BY RX/DR IN RCRD: CPT | Mod: CPTII,S$GLB,, | Performed by: INTERNAL MEDICINE

## 2025-07-07 PROCEDURE — 3008F BODY MASS INDEX DOCD: CPT | Mod: CPTII,S$GLB,, | Performed by: INTERNAL MEDICINE

## 2025-07-07 PROCEDURE — 3074F SYST BP LT 130 MM HG: CPT | Mod: CPTII,S$GLB,, | Performed by: INTERNAL MEDICINE

## 2025-07-07 PROCEDURE — 25500020 PHARM REV CODE 255: Mod: PN | Performed by: NEUROLOGICAL SURGERY

## 2025-07-07 PROCEDURE — A9585 GADOBUTROL INJECTION: HCPCS | Mod: PN | Performed by: NEUROLOGICAL SURGERY

## 2025-07-07 PROCEDURE — 70546 MR ANGIOGRAPH HEAD W/O&W/DYE: CPT | Mod: TC,PN

## 2025-07-07 RX ORDER — GADOBUTROL 604.72 MG/ML
9 INJECTION INTRAVENOUS
Status: COMPLETED | OUTPATIENT
Start: 2025-07-07 | End: 2025-07-07

## 2025-07-07 RX ADMIN — GADOBUTROL 9 ML: 604.72 INJECTION INTRAVENOUS at 10:07

## 2025-07-07 NOTE — ASSESSMENT & PLAN NOTE
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

## 2025-07-07 NOTE — PROCEDURES
Clinical Guide to Interpretation or FeNO Levels :    FeNO  (ppb) LOW INTERMEDIATE HIGH   ADULT VALUES < 25 25-50          > 50   Th2-driven Inflammation Unlikely Likely Significant     Patients FeNO level at this visit : _16___ (ppb)    Interpretation of FeNO measurement in adults:  [x] FENO is less than 25 ppb implies non eosinophilic airway inflammation or the absence of airway inflammation.    Comment: Low FENO (<25 ppb) in adult asthmatics with persistent symptoms suggests other etiologies for these symptoms and a lower likelihood of benefit from adding or increasing inhaled glucocorticoids.    [] FENO between 25 and 50 ppb in adults should be interpreted cautiously with reference to the clinical situation (eg, symptomatic, on or off therapy, current smoking).    [] FENO greater than 50 ppb in adults  suggests eosinophilic airway inflammation   Comment: High FENO (>50 ppb) in adult asthmatics even with atypical symptoms suggests glucocorticoid responsiveness. High FENO (>50 ppb) can help identify poor adherence or uncontrolled inflammation in asthma patients with otherwise seemingly controlled asthma.    Discussion:  A FENO less than 25 ppb in adults and less than 20 ppb in children younger than 12 years of age implies noneosinophilic airway inflammation or the absence of airway inflammation.  A FENO greater than 50 ppb in adults or greater than 35 ppb in children suggests eosinophilic airway inflammation.   Values of FENO between 25 and 50 ppb in adults (20 to 35 ppb in children) should be interpreted cautiously with reference to the clinical situation (eg, symptomatic, on or off therapy, current smoking).  A rising FENO with a greater than 20 percent change and more than 25 ppb (20 ppb in children) from a previously stable level suggests increasing eosinophilic airway inflammation, but there are wide inter-individual differences.  A decrease in FENO greater than 20 percent for values over 50 ppb or more than  10 ppb for values less than 50 ppb may be clinically important.  ?FENO in other respiratory diseases - Several other diseases are associated with altered levels of exhaled NO: low levels of FENO have been noted in cystic fibrosis, current smoking, pulmonary hypertension, hypothermia, primary ciliary dyskinesia, and bronchopulmonary dysplasia. Elevated FENO has been noted in atopy, nonasthmatic eosinophilic bronchitis, COPD exacerbations, noncystic fibrosis bronchiectasis, and viral upper respiratory infections.    REFERENCE:  ATS Board of Directors, December 2004, and by the ERS Executive Committee, June 2004. ATS/ERS Recommendations for Standardized Procedures for the Online and Offline Measurement of Exhaled Lower Respiratory Nitric Oxide and Nasal Nitric Oxide. Guideline 2005

## 2025-07-07 NOTE — PROGRESS NOTES
Pulmonary Outpatient  Visit     Subjective:       Patient ID: Waldo Emmanuel is a 49 y.o. female.    Tobacco Use History[1]         Chief Complaint: Cough          Waldo Emmanuel is 49 y.o.  Referred by Ansley Vivas MD  [address] [place] [id number]   Consultation is for cough   This cough started when she was in the hospital this year in May 2025  At the same time shows treated for pneumonia with Augmentin  Cough severity index is 11  Recent admission treatment for intracranial hemorrhage  Seen by Neurosurgery   Coiling placed  Was in hospital ended up in rehab  She is currently undergoing recuperation   Also recent DVT in the right upper extremity  ~S/P coil embolization of right AICA aneurysm by Dr. Piyush Silverio and Dr. 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    She has 2 dogs at home   No wheezing   Cough is dry   No smoking history   Not on oxygen   No fever   No hemoptysis   No night sweats  Cough appears to be random in the day and nighttime however does not significantly affect sleep   History of pneumonia last year   Imaging reviewed with the patient  Compared with the most recent chest CT scan multifocal pulmonary ground-glass opacities and nodules have significantly improved                             Review of Systems   Constitutional:  Positive for fatigue.   Respiratory:  Positive for cough. Negative for apnea, snoring, sputum production, shortness of breath, wheezing and use of rescue inhaler.        Encounter Medications[2]        Pertinent Work Up:      Pulmonary Interventions:      Smoking hx:    Environmental/Occupational hx:        Interval Hx:      Occupational History:  denies occupational exposure to asbestos, silica, and  petrochemicals.    Avocational Exposures:  dog    Pet Exposures:  none      The following portions of the patient's history were reviewed and updated as appropriate: She  has a past medical history of ADHD (attention deficit hyperactivity disorder), Anemia, Fibroids, Genital herpes, GERD (gastroesophageal reflux disease), H/O total hysterectomy (2021), Hypertension, Labral tear of hip joint, Ovarian cyst, Right common arterial occlusion, Routine general medical examination at a health care facility (2017), and Total Right Arterial Occlusion.  She does not have any pertinent problems on file.  She  has a past surgical history that includes Cholecystectomy;  section; Carpal tunnel release; Dilation and curettage of uterus; Total abdominal hysterectomy w/ bilateral salpingoophorectomy (2021); angiogram, abdominal aorta w/ extremity runoff (N/A, 2025); and Magnetic resonance imaging (N/A, 5/15/2025).  Her family history includes Breast cancer in her paternal cousin; Hypertension in her father and maternal grandmother; Prostate cancer in her paternal uncle; Stroke in her father, maternal grandmother, and mother.  She  reports that she has never smoked. She has never used smokeless tobacco. She reports current alcohol use. She reports that she does not use drugs.  She has a current medication list which includes the following prescription(s): albuterol, aspirin, atorvastatin, benzonatate, [Paused] cetirizine, fluticasone furoate-vilanterol, [Paused] multivitamin, pantoprazole, zepbound, [Paused] topiramate, valacyclovir, valsartan, warfarin, and warfarin.  Medications Ordered Prior to Encounter[3]  She is allergic to metformin..      BP Readings from Last 3 Encounters:   25 110/70   25 112/68   06/10/25 122/86          MMRC Dyspnea Scale (4 is worst)     [] MMRC 0: Dyspneic on strenuous excercise (0 points)    [] MMRC 1: Dyspneic on walking a slight hill (0 points)    [] MMRC 2:  "Dyspneic on walking level ground; must stop occasionally due to breathlessness (1 point)    [] MMRC 3: Must stop for breathlessness after walking 100 yards or after a few minutes (2 points)    [] MMRC 4: Cannot leave house; breathless on dressing/undressing (3 points)                          Objective:     Vital Signs (Most Recent)  Vital Signs  Pulse: 93  SpO2: 96 %  BP: 110/70  Pain Score: 0-No pain  Height and Weight  Height: 5' 3" (160 cm)  Weight: 91 kg (200 lb 11.7 oz)  BSA (Calculated - sq m): 2.01 sq meters  BMI (Calculated): 35.6  Weight in (lb) to have BMI = 25: 140.8]  Wt Readings from Last 2 Encounters:   07/07/25 91 kg (200 lb 11.7 oz)   06/16/25 90 kg (198 lb 6.4 oz)       Physical Exam  Vitals and nursing note reviewed.   Constitutional:       Appearance: She is normal weight.   HENT:      Head: Normocephalic and atraumatic.      Nose: Nose normal.   Eyes:      Pupils: Pupils are equal, round, and reactive to light.   Cardiovascular:      Rate and Rhythm: Normal rate and regular rhythm.      Pulses: Normal pulses.      Heart sounds: Normal heart sounds.   Pulmonary:      Effort: Pulmonary effort is normal.      Breath sounds: Normal breath sounds.   Abdominal:      General: Bowel sounds are normal.      Palpations: Abdomen is soft.   Musculoskeletal:      Cervical back: Normal range of motion.   Skin:     General: Skin is warm.   Neurological:      General: No focal deficit present.      Mental Status: She is alert and oriented to person, place, and time.   Psychiatric:         Mood and Affect: Mood normal.          Laboratory  Lab Results   Component Value Date    WBC 9.5 06/10/2025    RBC 3.58 (L) 06/10/2025    HGB 9.9 (L) 06/10/2025    HCT 33.6 (L) 06/10/2025    MCV 94 06/10/2025    MCH 27.7 06/10/2025    MCHC 29.5 (L) 06/10/2025    RDW 14.4 06/10/2025     (H) 06/10/2025    MPV 9.2 05/30/2025    GRAN 6.0 03/18/2025    GRAN 51.9 03/18/2025    LYMPH 3.6 (H) 06/10/2025    MONO 4 06/10/2025    " "MONO 0.3 06/10/2025    EOS 0.1 06/10/2025    BASO 0.0 06/10/2025    EOSINOPHIL 1 06/10/2025    BASOPHIL 0 06/10/2025       BMP  Lab Results   Component Value Date     06/10/2025    K 3.8 06/10/2025     06/10/2025    CO2 23 06/10/2025    BUN 11 06/10/2025    CREATININE 0.67 06/10/2025    CALCIUM 9.6 06/10/2025    ANIONGAP 13 05/30/2025    ESTGFRAFRICA 97 06/30/2020    EGFRNONAA 65 08/26/2020    AST 16 06/10/2025    ALT 14 06/10/2025    PROT 8.5 (H) 05/30/2025          No results found for: "IGE"     No results found for: "ASPERGILLUS"  No results found for: "AFUMIGATUSCL"     No results found for: "ACE"     Diagnostic Results:  I have personally reviewed today the following studies:    MRA Brain with and without contrast  Narrative: EXAM: MRA BRAIN WITH AND WITHOUT    CLINICAL HISTORY: Cerebral aneurysm.  Status post coil embolization.    TECHNIQUE: MRA without and with gadolinium this contrast.    COMPARISON: CTA 05/06/2025 and 05/28/2025 and MRI/MRA 05/15/2025.    FINDINGS:  The examination is limited by the metal artifact originating from the oral cavity/dental amalgam.    DWI sequences appear to demonstrate no acute restricted diffusion allowing for the limitations.    There is a chronic posterior right paramedian pontine lacunar infarct.  There is a moderate-sized area of right cerebellar encephalomalacia consistent with a prior infarct.    The right skull base ICA is patent.  The supraclinoid ICA is patent.  The anterior cerebral artery and branches appear grossly normal.  The right MCA and branches appear grossly normal as well    The left skull base ICA is patent.  The supraclinoid ICA is patent to the left ILDA and branches are patent.  The left MCA and branches are patent.    In the posterior circulation the right vertebral artery is patent.    There is a hypointense area of signal in the inferior vermis in cistern region which appears to correspond to the coil embolization material noted " previously.  The right vertebral artery is patent.  The basilar artery is patent.  The posterior cerebral arteries are patent.    The left vertebral artery is patent but slightly smaller than the right.  Impression:  Chronic posterior right paramedian pontine lacunar infarct.  Chronic right inferior cerebellar artery territory infarct.    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    Otherwise negative MRA.    Finalized on: 7/7/2025 11:58 AM By:  Rober Tamez MD  [facility]# [id number]      2025-07-07 12:00:[id number]     [facility]                   EXAMINATION:  CT CHEST WITHOUT CONTRAST     CLINICAL HISTORY:  Cough, persistent;Lymphadenopathy, chest or axilla;mediastinal nd hilar lymphadenopathy; Chronic cough     TECHNIQUE:  Low dose axial images, sagittal and coronal reformations were obtained from the thoracic inlet to the lung bases. Contrast was not administered.     COMPARISON:  05/30/2025 CTA chest.     FINDINGS:  Base of Neck: No significant abnormality.     Thoracic soft tissues: Normal.     Aorta: Left-sided aortic arch.  No aneurysm and no significant atherosclerosis     Heart: Normal size.  Small pericardial effusion.     Pulmonary vasculature: Pulmonary arteries distribute normally.  There are four pulmonary veins.     Sherin/Mediastinum: Significant decrease in size of prior conglomerated mediastinal and right hilar lymphadenopathy.     Airways: Patent.     Lungs/Pleura: Significant interval reduction in size of multiple patchy and nodular opacities throughout the right upper, middle, and lower lobes favored to represent resolving infectious or inflammatory process.  Left lung is clear.  No pleural effusion.     Esophagus: Normal.     Upper Abdomen: Cholecystectomy.     Bones: No acute fracture. No suspicious lytic or sclerotic lesions.     Impression:     1. Significant reduction in size of right lung  opacities most consistent with resolving infectious or inflammatory process.  2. Significant decrease in mediastinal and right hilar lymphadenopathy, likely reactive.  3. Remainder as above.      EXAMINATION:  CT CHEST WITH CONTRAST     CLINICAL HISTORY:  Respiratory illness, nondiagnostic xray;     TECHNIQUE:  Using low dose technique the chest was surveyed from above the pulmonary apices through the posterior costophrenic angles.  Data was reconstructed for multiplanar images in axial, sagittal and coronal planes and for maximal intensity projection images in the the axial, sagittal and coronal planes.     COMPARISON:  05/08/2025     FINDINGS:  Support tubes and lines: None.     Aorta: Normal caliber.  No significant calcified atherosclerosis.     Heart: Normal size.     Coronary arteries: Mild to moderate coronary artery calcifications.     Pericardium: Normal. No effusion, thickening, or calcification.     Central pulmonary arteries: Normal caliber.     Base of neck/thyroid: Unremarkable.     Lymph nodes: A subcarinal lymph node measures 2 cm in short axis (series 2, image 65).     Esophagus: Unremarkable.     Pleura: No effusion, thickening, or calcification.     Upper abdomen: Unremarkable.     Body wall: Diastasis recti     Airways: Unremarkable.     Lungs: Ill-defined airspace opacities, predominantly in the peribronchovascular region, are seen in the right upper, middle and lower lobes. Bilateral lower lobe dependent airspace disease is present.     Bones: No acute fractures dislocations.  No osseous destructive lesion.     Impression:     Ill-defined airspace opacities throughout the perihilar region of the right lung and in the dependent portions of both lower lobes, concerning for pneumonia, including aspiration pneumonia.     Mildly enlarged subcarinal lymph node, possibly reactive.  Clinical considerations will determine further workup/follow-up.         EXAMINATION:  CTA CHEST NON CORONARY (PE  STUDIES), multiplanar and 3D MIP reconstructions     CLINICAL HISTORY:  Pulmonary embolism (PE) suspected, high prob;     TECHNIQUE:  Axial images through the chest were obtained with the use of IV contrast. Sagittal, coronal and 3D MIP <reconstructions are provided for review and permanently archived.>     COMPARISON:  Chest x-ray performed earlier the same day     FINDINGS:  Corresponding to the changes seen on recent chest x-ray are multiple right greater than left ground-glass nodular densities involving all lobes.  Small right pleural effusion.  Negative for left effusion.  Negative for pneumothorax.  There are no infiltrates, effusions, or parenchymal lung nodules.     There is optimal opacification of the pulmonary arteries with visualization of the central pulmonary arteries.  Central and peripheral pulmonary emboli could easily be overlooked.  No definite CT evidence for a saddle embolus noted.  The aorta is intact without evidence for aneurysm or dissection.  Coronary artery calcifications noted.     There appear to be enlarged hilar and mediastinal lymph nodes, possibly reactive.     The airways are patent.  The thyroid gland is normal.  The esophagus is normal.     Fatty infiltration of the liver.  Cholecystectomy clips.  The upper abdominal organs are otherwise normal.     Negative for osseous lesions. Multilevel marginal spondylosis.  Convex left curvature of the upper thoracic spine.     Impression:     1.  Completely suboptimal study for the evaluation of pulmonary emboli.  Central and peripheral pulmonary emboli could easily be overlooked.  Negative for evidence for saddle embolus.     2.  Numerous nodular opacities are seen throughout the right greater than left lungs, associated with enlarged hilar and mediastinal lymph nodes.  Small right effusion.  These findings are most likely infectious or inflammatory in nature, and treatment for presumed infectious or inflammatory process recommended with  short-term follow-up imaging.  Less likely could these changes be neoplastic in origin.     3.  Coronary artery calcifications.  Fatty infiltration of the liver.  Cholecystectomy clips.  Other nonemergent findings as described above.       Clinical Guide to Interpretation or FeNO Levels :     FeNO  (ppb) LOW INTERMEDIATE HIGH   ADULT VALUES < 25 25-50          > 50   Th2-driven Inflammation Unlikely Likely Significant      Patients FeNO level at this visit : _16___ (ppb)     Interpretation of FeNO measurement in adults:  [x] FENO is less than 25 ppb implies non eosinophilic airway inflammation or the absence of airway inflammation.               Comment: Low FENO (<25 ppb) in adult asthmatics with persistent symptoms suggests other etiologies for these symptoms and a lower likelihood of benefit from adding or increasing inhaled glucocorticoids.     [] FENO between 25 and 50 ppb in adults should be interpreted cautiously with reference to the clinical situation (eg, symptomatic, on or off therapy, current smoking).     [] FENO greater than 50 ppb in adults  suggests eosinophilic airway inflammation              Comment: High FENO (>50 ppb) in adult asthmatics even with atypical symptoms suggests glucocorticoid responsiveness. High FENO (>50 ppb) can help identify poor adherence or uncontrolled inflammation in asthma patients with otherwise seemingly controlled asthma.  Assessment/Plan:          1. Essential hypertension  Assessment & Plan:  On valsartan      2. Hypertriglyceridemia    3. Obesity, Class III, BMI 40-49.9 (morbid obesity)    4. Snoring    5. Abnormal chest CT  -     SIENNA Screen w/Reflex; Future; Expected date: 2025  -     Angiotensin Converting Enzyme; Future; Expected date: 2025  -     Interleukin-2 Receptor; Future; Expected date: 2025  -     FUNGAL IMMUNODIFFUSION - BLOOD; Future; Expected date: 2025  -     Fraction of  Nitric Oxide; Future  -     Complete PFT W/O  Bronchodilator and FeNO; Future  -     Sedimentation rate; Future; Expected date: 2025  -     C-Reactive Protein; Future; Expected date: 2025  -     Quantiferon Gold TB; Future; Expected date: 2025  -     Rheumatoid Factor; Future; Expected date: 2025  -     Myeloperoxidase Antibody (MPO); Future; Expected date: 2025    6. Chronic cough  Assessment & Plan:  Cough severity index was 11 which is low   Cough appeared while in the hospital   Suspect maybe inflammatory versus drug I LD  Imaging has improve  Return for PFTs    Orders:  -     Ambulatory referral/consult to Pulmonology  -     SIENNA Screen w/Reflex; Future; Expected date: 2025  -     Angiotensin Converting Enzyme; Future; Expected date: 2025  -     Interleukin-2 Receptor; Future; Expected date: 2025  -     FUNGAL IMMUNODIFFUSION - BLOOD; Future; Expected date: 2025  -     Fraction of  Nitric Oxide; Future  -     Complete PFT W/O Bronchodilator and FeNO; Future  -     Sedimentation rate; Future; Expected date: 2025  -     C-Reactive Protein; Future; Expected date: 2025  -     Quantiferon Gold TB; Future; Expected date: 2025  -     Rheumatoid Factor; Future; Expected date: 2025  -     Myeloperoxidase Antibody (MPO); Future; Expected date: 2025    7. Localized enlarged lymph nodes  -     Ambulatory referral/consult to Pulmonology    8. Mediastinal lymphadenopathy  -     Ambulatory referral/consult to Pulmonology    9. Hilar lymphadenopathy  Assessment & Plan:  These appear reactive   Appear to have improved compared to previous imaging  Utility of EBUS was discussed.    We will defer to Neurosurgery before we consider any diagnostic procedures and whether this will be high yield a low yield    Orders:  -     Ambulatory referral/consult to Pulmonology    10. Abnormal computed tomography of lung  Assessment & Plan:  Significant improvement in ground-glass opacities and  nodular opacities.    Mediastinal adenopathy may be reactive   Serologies sent out   May consider EBUS biopsies if this is the only way to provider diagnoses of her condition however she appears to be improving.    Orders:  -     Ambulatory referral/consult to Pulmonology  -     SIENNA Screen w/Reflex; Future; Expected date: 2025  -     Angiotensin Converting Enzyme; Future; Expected date: 2025  -     Interleukin-2 Receptor; Future; Expected date: 2025  -     FUNGAL IMMUNODIFFUSION - BLOOD; Future; Expected date: 2025  -     Fraction of  Nitric Oxide; Future  -     Complete PFT W/O Bronchodilator and FeNO; Future  -     Sedimentation rate; Future; Expected date: 2025  -     C-Reactive Protein; Future; Expected date: 2025  -     Quantiferon Gold TB; Future; Expected date: 2025  -     Rheumatoid Factor; Future; Expected date: 2025  -     Myeloperoxidase Antibody (MPO); Future; Expected date: 2025    11. Other hyperlipidemia  Overview:  Last Assessment & Plan:   Formatting of this note might be different from the original.  History & Physical   1. She has been on fenofibrate.  Discharge Summary   Continue home medications upon discharge.  Follow-up    Assessment & Plan:  On Lipitor      12. History of DVT in adulthood  Assessment & Plan:  On Coumadin             Follow up in about 4 weeks (around 2025), or PFT, labs, FeNo today.    This note was prepared using voice recognition system and is likely to have sound alike errors that may have been overlooked even after proof reading.  Please call me with any questions    Discussed diagnosis, its evaluation, treatment and usual course. All questions answered.    The patient was given open opportunity to ask questions and/or express concerns about treatment plan.   All questions/concerns were discussed.       Two patient identifiers used prior to evaluation.     Thank you for the courtesy of participating in the  care of this patient    Jose Cedeno MD      Personal Diagnostic Review  []  CXR    []  ECHO    []  ONSAT    []  6MWD    []  LABS    []  CHEST CT    []  PET CT    []  Biopsy results          New          [1]   Social History  Tobacco Use   Smoking Status Never   Smokeless Tobacco Never   [2]   Outpatient Encounter Medications as of 7/7/2025   Medication Sig Dispense Refill    albuterol (PROVENTIL/VENTOLIN HFA) 90 mcg/actuation inhaler Inhale 1-2 puffs into the lungs every 4 (four) hours as needed for Shortness of Breath or Wheezing.      aspirin (ECOTRIN) 81 MG EC tablet Take 81 mg by mouth.      atorvastatin (LIPITOR) 80 MG tablet Take 1 tablet (80 mg total) by mouth every evening. 90 tablet 1    benzonatate (TESSALON) 200 MG capsule Take 1 capsule (200 mg total) by mouth 3 (three) times daily as needed for Cough. 90 capsule 0    [Paused] cetirizine (ZYRTEC) 10 MG tablet Take 10 mg by mouth.      fluticasone furoate-vilanteroL (BREO) 100-25 mcg/dose diskus inhaler Inhale 1 puff into the lungs once daily.      [Paused] multivitamin capsule Take 1 capsule by mouth once daily.      pantoprazole (PROTONIX) 40 MG tablet Take 1 tablet (40 mg total) by mouth 2 (two) times daily. 180 tablet 1    tirzepatide, weight loss, (ZEPBOUND) 5 mg/0.5 mL PnIj Inject 5 mg into the skin every 7 days. 2 mL 0    [Paused] topiramate (TOPAMAX) 50 MG tablet Take 1 tablet by mouth twice daily (dose correction) 180 tablet 1    valACYclovir (VALTREX) 1000 MG tablet Take 1 tablet (1,000 mg total) by mouth once daily. 90 tablet 1    valsartan (DIOVAN) 80 MG tablet Take 1 tablet (80 mg total) by mouth once daily.      warfarin (COUMADIN) 2.5 MG tablet Take 1 tablet (2.5 mg total) by mouth every Tues, Thurs.      warfarin (COUMADIN) 5 MG tablet Once per day on Sunday Monday Wednesday Friday Saturday      [DISCONTINUED] amoxicillin-clavulanate 875-125mg (AUGMENTIN) 875-125 mg per tablet Take 1 tablet by mouth every 12 (twelve) hours.  (Patient not taking: Reported on 6/16/2025)      [DISCONTINUED] diazePAM (VALIUM) 10 MG Tab Take 1 tablet (10 mg total) by mouth every 6 (six) hours as needed (back or neck pain/stiffness).      [DISCONTINUED] methocarbamoL (ROBAXIN) 750 MG Tab Take 1 tablet (750 mg total) by mouth every 6 (six) hours as needed (muscle spasms). (Patient not taking: Reported on 6/16/2025)      [DISCONTINUED] niMODipine (NIMOTOP) 30 MG Cap Take 2 capsules (60 mg total) by mouth every 4 (four) hours. Hold if SBP <100 for 1 day      [DISCONTINUED] oxyCODONE-acetaminophen (PERCOCET) 5-325 mg per tablet Take 1 tablet by mouth every 6 (six) hours as needed for Pain. (Patient not taking: Reported on 6/16/2025)      [DISCONTINUED] pregabalin (LYRICA) 75 MG capsule Take 1 capsule (75 mg total) by mouth 2 (two) times daily. (Patient not taking: Reported on 6/16/2025)      [DISCONTINUED] QUEtiapine (SEROQUEL) 25 MG Tab Take 25 mg by mouth once daily.      [DISCONTINUED] QUEtiapine (SEROQUEL) 50 MG tablet Take 1 tablet (50 mg total) by mouth every evening.      [DISCONTINUED] senna-docusate (PERICOLACE) 8.6-50 mg per tablet Take 2 tablets by mouth 2 (two) times daily. Wean as able; goal normal sodium (Patient not taking: Reported on 6/16/2025)       Facility-Administered Encounter Medications as of 7/7/2025   Medication Dose Route Frequency Provider Last Rate Last Admin    [COMPLETED] gadobutroL (GADAVIST) injection 9 mL  9 mL Intravenous ONCE PRN Amado Corley MD   9 mL at 07/07/25 1046   [3]   Current Outpatient Medications on File Prior to Visit   Medication Sig Dispense Refill    albuterol (PROVENTIL/VENTOLIN HFA) 90 mcg/actuation inhaler Inhale 1-2 puffs into the lungs every 4 (four) hours as needed for Shortness of Breath or Wheezing.      aspirin (ECOTRIN) 81 MG EC tablet Take 81 mg by mouth.      atorvastatin (LIPITOR) 80 MG tablet Take 1 tablet (80 mg total) by mouth every evening. 90 tablet 1    benzonatate (TESSALON) 200 MG  capsule Take 1 capsule (200 mg total) by mouth 3 (three) times daily as needed for Cough. 90 capsule 0    [Paused] cetirizine (ZYRTEC) 10 MG tablet Take 10 mg by mouth.      fluticasone furoate-vilanteroL (BREO) 100-25 mcg/dose diskus inhaler Inhale 1 puff into the lungs once daily.      [Paused] multivitamin capsule Take 1 capsule by mouth once daily.      pantoprazole (PROTONIX) 40 MG tablet Take 1 tablet (40 mg total) by mouth 2 (two) times daily. 180 tablet 1    tirzepatide, weight loss, (ZEPBOUND) 5 mg/0.5 mL PnIj Inject 5 mg into the skin every 7 days. 2 mL 0    [Paused] topiramate (TOPAMAX) 50 MG tablet Take 1 tablet by mouth twice daily (dose correction) 180 tablet 1    valACYclovir (VALTREX) 1000 MG tablet Take 1 tablet (1,000 mg total) by mouth once daily. 90 tablet 1    valsartan (DIOVAN) 80 MG tablet Take 1 tablet (80 mg total) by mouth once daily.      warfarin (COUMADIN) 2.5 MG tablet Take 1 tablet (2.5 mg total) by mouth every Tues, Thurs.      warfarin (COUMADIN) 5 MG tablet Once per day on Sunday Monday Wednesday Friday Saturday      [DISCONTINUED] amoxicillin-clavulanate 875-125mg (AUGMENTIN) 875-125 mg per tablet Take 1 tablet by mouth every 12 (twelve) hours. (Patient not taking: Reported on 6/16/2025)      [DISCONTINUED] diazePAM (VALIUM) 10 MG Tab Take 1 tablet (10 mg total) by mouth every 6 (six) hours as needed (back or neck pain/stiffness).      [DISCONTINUED] methocarbamoL (ROBAXIN) 750 MG Tab Take 1 tablet (750 mg total) by mouth every 6 (six) hours as needed (muscle spasms). (Patient not taking: Reported on 6/16/2025)      [DISCONTINUED] niMODipine (NIMOTOP) 30 MG Cap Take 2 capsules (60 mg total) by mouth every 4 (four) hours. Hold if SBP <100 for 1 day      [DISCONTINUED] oxyCODONE-acetaminophen (PERCOCET) 5-325 mg per tablet Take 1 tablet by mouth every 6 (six) hours as needed for Pain. (Patient not taking: Reported on 6/16/2025)      [DISCONTINUED] pregabalin (LYRICA) 75 MG capsule  Take 1 capsule (75 mg total) by mouth 2 (two) times daily. (Patient not taking: Reported on 6/16/2025)      [DISCONTINUED] QUEtiapine (SEROQUEL) 25 MG Tab Take 25 mg by mouth once daily.      [DISCONTINUED] QUEtiapine (SEROQUEL) 50 MG tablet Take 1 tablet (50 mg total) by mouth every evening.      [DISCONTINUED] senna-docusate (PERICOLACE) 8.6-50 mg per tablet Take 2 tablets by mouth 2 (two) times daily. Wean as able; goal normal sodium (Patient not taking: Reported on 6/16/2025)       Current Facility-Administered Medications on File Prior to Visit   Medication Dose Route Frequency Provider Last Rate Last Admin    [COMPLETED] gadobutroL (GADAVIST) injection 9 mL  9 mL Intravenous ONCE PRN Amado Corley MD   9 mL at 07/07/25 1042

## 2025-07-07 NOTE — PROGRESS NOTES
INR is below therapeutic goal at 1.5.  Patient reports 2 missed doses due to Etoh over the 4th July.  Reports all other instructions were followed.  No s/s reported.  Will boost today's dose to 7.5 mg, then re-challenge new dose of 2.5 mg every Tues, Thurs; and 5 mg all other days.  INR recheck next Monday, 7/14/2025 ([facility] lab).  ER for any s/s of clotting/stroke.  Patient confirms understanding.

## 2025-07-07 NOTE — ASSESSMENT & PLAN NOTE
These appear reactive   Appear to have improved compared to previous imaging  Utility of EBUS was discussed.    We will defer to Neurosurgery before we consider any diagnostic procedures and whether this will be high yield a low yield

## 2025-07-07 NOTE — ASSESSMENT & PLAN NOTE
Significant improvement in ground-glass opacities and nodular opacities.    Mediastinal adenopathy may be reactive   Serologies sent out   May consider EBUS biopsies if this is the only way to provider diagnoses of her condition however she appears to be improving.

## 2025-07-08 ENCOUNTER — OFFICE VISIT (OUTPATIENT)
Dept: NEUROSURGERY | Facility: CLINIC | Age: 50
End: 2025-07-08
Payer: COMMERCIAL

## 2025-07-08 ENCOUNTER — TELEPHONE (OUTPATIENT)
Dept: NEUROSURGERY | Facility: CLINIC | Age: 50
End: 2025-07-08
Payer: COMMERCIAL

## 2025-07-08 ENCOUNTER — PATIENT MESSAGE (OUTPATIENT)
Dept: NEUROSURGERY | Facility: CLINIC | Age: 50
End: 2025-07-08

## 2025-07-08 VITALS — HEART RATE: 84 BPM | SYSTOLIC BLOOD PRESSURE: 119 MMHG | DIASTOLIC BLOOD PRESSURE: 80 MMHG

## 2025-07-08 DIAGNOSIS — G93.6 BRAIN EDEMA: Primary | ICD-10-CM

## 2025-07-08 DIAGNOSIS — I63.9 CEREBELLAR STROKE, ACUTE: Primary | ICD-10-CM

## 2025-07-08 DIAGNOSIS — I67.1 CEREBRAL ANEURYSM, NONRUPTURED: Primary | ICD-10-CM

## 2025-07-08 DIAGNOSIS — I67.1 BRAIN ANEURYSM: ICD-10-CM

## 2025-07-08 PROCEDURE — 4010F ACE/ARB THERAPY RXD/TAKEN: CPT | Mod: CPTII,S$GLB,, | Performed by: NEUROLOGICAL SURGERY

## 2025-07-08 PROCEDURE — 99999 PR PBB SHADOW E&M-EST. PATIENT-LVL III: CPT | Mod: PBBFAC,,, | Performed by: NEUROLOGICAL SURGERY

## 2025-07-08 PROCEDURE — 3074F SYST BP LT 130 MM HG: CPT | Mod: CPTII,S$GLB,, | Performed by: NEUROLOGICAL SURGERY

## 2025-07-08 PROCEDURE — 3044F HG A1C LEVEL LT 7.0%: CPT | Mod: CPTII,S$GLB,, | Performed by: NEUROLOGICAL SURGERY

## 2025-07-08 PROCEDURE — 3066F NEPHROPATHY DOC TX: CPT | Mod: CPTII,S$GLB,, | Performed by: NEUROLOGICAL SURGERY

## 2025-07-08 PROCEDURE — 3061F NEG MICROALBUMINURIA REV: CPT | Mod: CPTII,S$GLB,, | Performed by: NEUROLOGICAL SURGERY

## 2025-07-08 PROCEDURE — 99215 OFFICE O/P EST HI 40 MIN: CPT | Mod: S$GLB,,, | Performed by: NEUROLOGICAL SURGERY

## 2025-07-08 PROCEDURE — 3079F DIAST BP 80-89 MM HG: CPT | Mod: CPTII,S$GLB,, | Performed by: NEUROLOGICAL SURGERY

## 2025-07-09 ENCOUNTER — TELEPHONE (OUTPATIENT)
Dept: NEUROSURGERY | Facility: CLINIC | Age: 50
End: 2025-07-09
Payer: COMMERCIAL

## 2025-07-09 DIAGNOSIS — I67.1 CEREBRAL ANEURYSM, NONRUPTURED: Primary | ICD-10-CM

## 2025-07-10 NOTE — PROGRESS NOTES
Patient ID: Waldo Emmanuel is a 49 y.o. Black or  female    Subjective  Chief Complaint: patient presents for medical weight loss management.    Co-morbidities: HTN, DLD, metabolic syndrome    HPI: Patient [facility]25 and is currently managed on Zepbound 5 mg. Pt has previously used Ozempic for about 3 years and reported using Mounjaro (up to 15 mg) for about 6 months. Pt has not had therapy for >1 month ago. Pt denies tolerability issues and reported a loss of 20 lbs with Ozempic and 10 lbs with Mounjaro.    Tolerance to current therapy:  Denies nausea, vomiting, diarrhea, constipation, abdominal pain    Weight loss history:   Starting weight:    2/24/2025   Recent Readings    Weight (lbs) 227 lb    BMI 40.21 BMI    Current weight:    7/11/2025   Recent Readings    Weight (lbs) 200 lb    BMI 35.42 BMI    % weight loss since GLP-1 initiation: 11.7 %    Objective  Lab Results   Component Value Date     06/10/2025     05/30/2025     05/28/2025     Lab Results   Component Value Date    K 3.8 06/10/2025    K 4.6 05/30/2025    K 4.1 05/28/2025     Lab Results   Component Value Date     06/10/2025     05/30/2025     05/28/2025     Lab Results   Component Value Date    CO2 23 06/10/2025    CO2 19 (L) 05/30/2025    CO2 22 (L) 05/28/2025     Lab Results   Component Value Date    BUN 11 06/10/2025    BUN 11 05/30/2025    BUN 14 05/28/2025     Lab Results   Component Value Date    GLU 92 06/10/2025     (H) 05/30/2025     (H) 05/28/2025     Lab Results   Component Value Date    CALCIUM 9.6 06/10/2025    CALCIUM 9.2 05/30/2025    CALCIUM 9.3 05/28/2025     Lab Results   Component Value Date    PROT 8.5 (H) 05/30/2025    PROT 8.4 05/28/2025    PROT 7.8 05/26/2025     Lab Results   Component Value Date    ALBUMIN 3.8 (L) 06/10/2025    ALBUMIN 2.7 (L) 05/30/2025    ALBUMIN 2.9 (L) 05/28/2025     Lab Results    Component Value Date    BILITOT <0.2 06/10/2025    BILITOT 0.3 05/30/2025    BILITOT 0.3 05/28/2025     Lab Results   Component Value Date    AST 16 06/10/2025    AST 23 05/30/2025    AST 14 05/28/2025     Lab Results   Component Value Date    ALT 14 06/10/2025    ALT 13 05/30/2025    ALT 15 05/28/2025     Lab Results   Component Value Date    ANIONGAP 13 05/30/2025    ANIONGAP 10 05/28/2025    ANIONGAP 10 05/26/2025     Lab Results   Component Value Date    CREATININE 0.67 06/10/2025    CREATININE 0.9 05/30/2025    CREATININE 0.9 05/28/2025     Lab Results   Component Value Date    EGFRNORACEVR 106 06/10/2025    EGFRNORACEVR >60 05/30/2025    EGFRNORACEVR >60 05/28/2025     Assessment/Plan  - Continue Zepbound 7.5 mg SQ weekly  - RTC in 3 months for follow-up evaluation      Patient consented to pharmacist management via collaborative practice.

## 2025-07-11 ENCOUNTER — PATIENT MESSAGE (OUTPATIENT)
Dept: INTERNAL MEDICINE | Facility: CLINIC | Age: 50
End: 2025-07-11

## 2025-07-11 ENCOUNTER — OFFICE VISIT (OUTPATIENT)
Dept: INTERNAL MEDICINE | Facility: CLINIC | Age: 50
End: 2025-07-11
Payer: COMMERCIAL

## 2025-07-11 DIAGNOSIS — E66.812 OBESITY, CLASS II, BMI 35-39.9, ISOLATED (SEE ACTUAL BMI): Primary | ICD-10-CM

## 2025-07-11 RX ORDER — TIRZEPATIDE 7.5 MG/.5ML
7.5 INJECTION, SOLUTION SUBCUTANEOUS
Qty: 2 ML | Refills: 2 | Status: ACTIVE | OUTPATIENT
Start: 2025-07-11

## 2025-07-14 ENCOUNTER — ANTI-COAG VISIT (OUTPATIENT)
Dept: CARDIOLOGY | Facility: CLINIC | Age: 50
End: 2025-07-14
Payer: COMMERCIAL

## 2025-07-14 ENCOUNTER — LAB VISIT (OUTPATIENT)
Dept: LAB | Facility: HOSPITAL | Age: 50
End: 2025-07-14
Attending: INTERNAL MEDICINE
Payer: COMMERCIAL

## 2025-07-14 ENCOUNTER — TELEPHONE (OUTPATIENT)
Dept: SLEEP MEDICINE | Facility: CLINIC | Age: 50
End: 2025-07-14
Payer: COMMERCIAL

## 2025-07-14 DIAGNOSIS — I74.5 ILIAC ARTERY OCCLUSION: Primary | ICD-10-CM

## 2025-07-14 DIAGNOSIS — Z79.01 LONG TERM (CURRENT) USE OF ANTICOAGULANTS: ICD-10-CM

## 2025-07-14 DIAGNOSIS — R76.12 FALSE POSITIVE QUANTIFERON-TB GOLD TEST: Primary | ICD-10-CM

## 2025-07-14 DIAGNOSIS — I82.4Y9 ACUTE DEEP VEIN THROMBOSIS (DVT) OF PROXIMAL VEIN OF LOWER EXTREMITY, UNSPECIFIED LATERALITY: ICD-10-CM

## 2025-07-14 LAB
INR PPP: 3.1 (ref 0.8–1.2)
PROTHROMBIN TIME: 31.8 SECONDS (ref 9–12.5)

## 2025-07-14 PROCEDURE — 93793 ANTICOAG MGMT PT WARFARIN: CPT | Mod: S$GLB,,,

## 2025-07-14 PROCEDURE — 36415 COLL VENOUS BLD VENIPUNCTURE: CPT

## 2025-07-14 PROCEDURE — 85610 PROTHROMBIN TIME: CPT

## 2025-07-14 NOTE — TELEPHONE ENCOUNTER
Orders Placed This Encounter   Procedures    T-SPOT TB Screening Test     [facility] ALEX WARE or [facility] HENRIQUE RIGGINS: This test can only be collected between 5AM and 1PM. CANNOT be ordered or collected at any other [facility] sites.    [facility]: This test can only be collected between 5AM and 3PM. Cannot be collected on weekends, holidays, or day before holiday.       Standing Status:   Future     Expected Date:   7/14/2025     Expiration Date:   10/12/2026     Send normal result to authorizing provider's In Basket if patient is active on MyChart::   Yes

## 2025-07-14 NOTE — PROGRESS NOTES
INR not at goal-3.1. Medications, chart, and patient findings reviewed. See calendar for adjustments to dose and follow up plan.  Will request she eat a serving of greens today, small side salad, then resume her normal warfarin dose of 2.5 mg every Tue, Thu; 5 mg all other days.  Repeat INR in 2 weeks with other [facility] appointments.  Pt voices understanding.

## 2025-07-21 ENCOUNTER — PATIENT MESSAGE (OUTPATIENT)
Dept: ADMINISTRATIVE | Facility: OTHER | Age: 50
End: 2025-07-21
Payer: COMMERCIAL

## 2025-07-25 ENCOUNTER — OFFICE VISIT (OUTPATIENT)
Dept: CARDIOLOGY | Facility: CLINIC | Age: 50
End: 2025-07-25
Payer: COMMERCIAL

## 2025-07-25 VITALS
SYSTOLIC BLOOD PRESSURE: 139 MMHG | OXYGEN SATURATION: 99 % | WEIGHT: 201.38 LBS | BODY MASS INDEX: 35.68 KG/M2 | RESPIRATION RATE: 16 BRPM | HEART RATE: 95 BPM | DIASTOLIC BLOOD PRESSURE: 87 MMHG | HEIGHT: 63 IN

## 2025-07-25 DIAGNOSIS — R93.1 AGATSTON CORONARY ARTERY CALCIUM SCORE GREATER THAN 400: ICD-10-CM

## 2025-07-25 DIAGNOSIS — E78.2 MIXED HYPERLIPIDEMIA: Primary | ICD-10-CM

## 2025-07-25 DIAGNOSIS — Z86.718 HISTORY OF DVT IN ADULTHOOD: ICD-10-CM

## 2025-07-25 DIAGNOSIS — I25.10 CORONARY ARTERY CALCIFICATION SEEN ON CAT SCAN: ICD-10-CM

## 2025-07-25 DIAGNOSIS — I10 ESSENTIAL HYPERTENSION: ICD-10-CM

## 2025-07-25 PROCEDURE — 99999 PR PBB SHADOW E&M-EST. PATIENT-LVL V: CPT | Mod: PBBFAC,,, | Performed by: INTERNAL MEDICINE

## 2025-07-25 RX ORDER — METOPROLOL SUCCINATE 25 MG/1
25 TABLET, EXTENDED RELEASE ORAL DAILY PRN
Qty: 30 TABLET | Refills: 11 | Status: SHIPPED | OUTPATIENT
Start: 2025-07-25 | End: 2026-07-25

## 2025-07-25 RX ORDER — EZETIMIBE 10 MG/1
10 TABLET ORAL DAILY
Qty: 30 TABLET | Refills: 11 | Status: SHIPPED | OUTPATIENT
Start: 2025-07-25 | End: 2026-07-25

## 2025-07-25 NOTE — PROGRESS NOTES
Subjective:   Patient ID:  Waldo Emmanuel is a 49 y.o. female who presents for follow-up of No chief complaint on file.  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  Initially was told she has a DVT.  However recent venous ultrasound negative for DVT.    She underwent also a peripheral angiogram with vascular with  of right common iliac artery.  Now on cilostazol.    She was also put on Coumadin in the past and had seen hematology.  She also had seen rheumatology with negative workup as she states.    She had a CT scan of her chest negative for arthritis or aneurysm.    She does report that her watch tells her that she has 2% AFib.  She does have palpitations at times.    She has a intermittent chest pain very short lasting, mild in intensity, nonexertional.    She is not able to do much exertion due to claudication from her right common iliac occlusion.    Recent CT scan showed trace pericardial effusion.    Thus she was referred to Cardiology.         Today 7-25-25  High Ca score 744  No family hx of CAD  Pt with rare CP , no SOB  NMT/stress nml  Pt takes crestor 40   Pt with muscle aches with lipitor    HPI  Hyperlipidemia  This is a chronic problem. The current episode started more than 1 year ago. The problem is controlled. Pertinent negatives include no chest pain or shortness of breath. Current antihyperlipidemic treatment includes statins. The current treatment provides moderate improvement of lipids. There are no compliance problems.     Hypertension  This is a chronic problem. The current episode started more than 1 year ago. The problem has been gradually improving since onset. The problem is controlled. Pertinent negatives include no chest pain, palpitations or shortness of breath. Past treatments include beta blockers and angiotensin blockers. The current treatment provides moderate improvement. There are no compliance  problems.     Review of Systems   Constitutional: Negative. Negative for weight gain.   HENT: Negative.     Eyes: Negative.    Cardiovascular: Negative.  Negative for chest pain, leg swelling and palpitations.   Respiratory: Negative.  Negative for shortness of breath.    Endocrine: Negative.    Hematologic/Lymphatic: Negative.    Skin: Negative.    Musculoskeletal:  Negative for muscle weakness.   Gastrointestinal: Negative.    Genitourinary: Negative.    Neurological: Negative.  Negative for dizziness.   Psychiatric/Behavioral: Negative.     Allergic/Immunologic: Negative.    All other systems reviewed and are negative.    Family History   Problem Relation Name Age of Onset    Hypertension Maternal Grandmother      Stroke Maternal Grandmother      Hypertension Father      Stroke Father      Stroke Mother      Breast cancer Paternal Cousin      Prostate cancer Paternal Uncle       Past Medical History:   Diagnosis Date    ADHD (attention deficit hyperactivity disorder)     Anemia     Fibroids     Genital herpes     GERD (gastroesophageal reflux disease)     H/O total hysterectomy 08/03/2021    Hypertension     Labral tear of hip joint     Ovarian cyst     Right common arterial occlusion     Routine general medical examination at a [facility] care facility 06/26/2017    Total Right Arterial Occlusion      Social History[1]  Medications Ordered Prior to Encounter[2]  Review of patient's allergies indicates:   Allergen Reactions    Metformin Diarrhea     Other reaction(s): Diarrhae       Objective:     Physical Exam  Vitals and nursing note reviewed.   Constitutional:       Appearance: She is well-developed.   HENT:      Head: Normocephalic and atraumatic.   Eyes:      Conjunctiva/sclera: Conjunctivae normal.      Pupils: Pupils are equal, round, and reactive to light.   Cardiovascular:      Rate and Rhythm: Normal rate and regular rhythm.      Pulses: Intact distal pulses.      Heart sounds: Normal heart sounds.    Pulmonary:      Effort: Pulmonary effort is normal.      Breath sounds: Normal breath sounds.   Abdominal:      General: Bowel sounds are normal.      Palpations: Abdomen is soft.   Musculoskeletal:         General: Normal range of motion.      Cervical back: Normal range of motion and neck supple.   Skin:     General: Skin is warm and dry.   Neurological:      Mental Status: She is alert and oriented to person, place, and time.         Assessment:     1. Mixed hyperlipidemia    2. Coronary artery calcification seen on CAT scan    3. Agatston coronary artery calcium score greater than 400    4. Essential hypertension    5. History of DVT in adulthood        Plan:     Mixed hyperlipidemia    Coronary artery calcification seen on CAT scan  -     Ambulatory referral/consult to Cardiology    Agatston coronary artery calcium score greater than 400  -     Ambulatory referral/consult to Cardiology    Essential hypertension    History of DVT in adulthood        Add zetia to crestor  Keep LDL < 70  Add toprol prn  Continue valsartan-htn  F/u Par[facility]       [1]   Social History  Socioeconomic History    Marital status:    Tobacco Use    Smoking status: Never    Smokeless tobacco: Never   Substance and Sexual Activity    Alcohol use: Yes     Comment: Social - Rare     Drug use: No    Sexual activity: Not Currently     Partners: Male     Birth control/protection: See Surgical Hx     Social Drivers of Health     Financial Resource Strain: Low Risk  (5/10/2025)    Overall Financial Resource Strain (CARDIA)     Difficulty of Paying Living Expenses: Not hard at all   Food Insecurity: No Food Insecurity (5/10/2025)    Hunger Vital Sign     Worried About Running Out of Food in the Last Year: Never true     Ran Out of Food in the Last Year: Never true   Transportation Needs: No Transportation Needs (5/10/2025)    PRAPARE - Transportation     Lack of Transportation (Medical): No     Lack of Transportation (Non-Medical): No    Physical Activity: Inactive (2/23/2025)    Exercise Vital Sign     Days of Exercise per Week: 0 days     Minutes of Exercise per Session: 0 min   Stress: No Stress Concern Present (5/10/2025)    [employer] - Occupational Stress Questionnaire     Feeling of Stress : Not at all   Recent Concern: Stress - Stress Concern Present (2/23/2025)    [employer] - Occupational Stress Questionnaire     Feeling of Stress : Very much   Housing Stability: Low Risk  (5/10/2025)    Housing Stability Vital Sign     Unable to Pay for Housing in the Last Year: No     Number of Times Moved in the Last Year: 0     Homeless in the Last Year: No   [2]   Current Outpatient Medications on File Prior to Visit   Medication Sig Dispense Refill    albuterol (PROVENTIL/VENTOLIN HFA) 90 mcg/actuation inhaler Inhale 1-2 puffs into the lungs every 4 (four) hours as needed for Shortness of Breath or Wheezing.      aspirin (ECOTRIN) 81 MG EC tablet Take 81 mg by mouth.      atorvastatin (LIPITOR) 80 MG tablet Take 1 tablet (80 mg total) by mouth every evening. 90 tablet 1    [Paused] cetirizine (ZYRTEC) 10 MG tablet Take 10 mg by mouth.      fluticasone furoate-vilanteroL (BREO) 100-25 mcg/dose diskus inhaler Inhale 1 puff into the lungs once daily.      [Paused] multivitamin capsule Take 1 capsule by mouth once daily.      pantoprazole (PROTONIX) 40 MG tablet Take 1 tablet (40 mg total) by mouth 2 (two) times daily. 180 tablet 1    tirzepatide, weight loss, (ZEPBOUND) 7.5 mg/0.5 mL PnIj Inject 7.5 mg into the skin every 7 days. 2 mL 2    [Paused] topiramate (TOPAMAX) 50 MG tablet Take 1 tablet by mouth twice daily (dose correction) 180 tablet 1    valACYclovir (VALTREX) 1000 MG tablet Take 1 tablet (1,000 mg total) by mouth once daily. 90 tablet 1    valsartan (DIOVAN) 80 MG tablet Take 1 tablet (80 mg total) by mouth once daily.      warfarin (COUMADIN) 2.5 MG tablet Take 1 tablet (2.5 mg  total) by mouth every Tues, Thurs.      warfarin (COUMADIN) 5 MG tablet Once per day on Sunday Monday Wednesday Friday Saturday       No current facility-administered medications on file prior to visit.

## 2025-07-28 ENCOUNTER — PATIENT MESSAGE (OUTPATIENT)
Dept: CARDIOLOGY | Facility: CLINIC | Age: 50
End: 2025-07-28
Payer: COMMERCIAL

## 2025-07-28 ENCOUNTER — OFFICE VISIT (OUTPATIENT)
Dept: PULMONOLOGY | Facility: CLINIC | Age: 50
End: 2025-07-28
Payer: COMMERCIAL

## 2025-07-28 ENCOUNTER — CLINICAL SUPPORT (OUTPATIENT)
Dept: PULMONOLOGY | Facility: CLINIC | Age: 50
End: 2025-07-28
Payer: COMMERCIAL

## 2025-07-28 ENCOUNTER — ANTI-COAG VISIT (OUTPATIENT)
Dept: CARDIOLOGY | Facility: CLINIC | Age: 50
End: 2025-07-28
Payer: COMMERCIAL

## 2025-07-28 ENCOUNTER — LAB VISIT (OUTPATIENT)
Dept: LAB | Facility: HOSPITAL | Age: 50
End: 2025-07-28
Attending: INTERNAL MEDICINE
Payer: COMMERCIAL

## 2025-07-28 VITALS
DIASTOLIC BLOOD PRESSURE: 60 MMHG | BODY MASS INDEX: 35.61 KG/M2 | OXYGEN SATURATION: 99 % | WEIGHT: 201 LBS | HEIGHT: 63 IN | SYSTOLIC BLOOD PRESSURE: 100 MMHG | HEART RATE: 91 BPM

## 2025-07-28 DIAGNOSIS — E78.49 OTHER HYPERLIPIDEMIA: ICD-10-CM

## 2025-07-28 DIAGNOSIS — I10 ESSENTIAL HYPERTENSION: ICD-10-CM

## 2025-07-28 DIAGNOSIS — R05.3 CHRONIC COUGH: Primary | ICD-10-CM

## 2025-07-28 DIAGNOSIS — I74.5 ILIAC ARTERY OCCLUSION: ICD-10-CM

## 2025-07-28 DIAGNOSIS — Z86.718 HISTORY OF DVT IN ADULTHOOD: ICD-10-CM

## 2025-07-28 DIAGNOSIS — R59.0 LOCALIZED ENLARGED LYMPH NODES: ICD-10-CM

## 2025-07-28 DIAGNOSIS — R93.89 ABNORMAL CHEST CT: ICD-10-CM

## 2025-07-28 DIAGNOSIS — R05.3 CHRONIC COUGH: ICD-10-CM

## 2025-07-28 DIAGNOSIS — R91.8 ABNORMAL COMPUTED TOMOGRAPHY OF LUNG: ICD-10-CM

## 2025-07-28 DIAGNOSIS — Z79.01 LONG TERM (CURRENT) USE OF ANTICOAGULANTS: Primary | ICD-10-CM

## 2025-07-28 DIAGNOSIS — R59.0 HILAR LYMPHADENOPATHY: ICD-10-CM

## 2025-07-28 DIAGNOSIS — I82.4Y9 ACUTE DEEP VEIN THROMBOSIS (DVT) OF PROXIMAL VEIN OF LOWER EXTREMITY, UNSPECIFIED LATERALITY: ICD-10-CM

## 2025-07-28 DIAGNOSIS — E66.813 OBESITY, CLASS III, BMI 40-49.9 (MORBID OBESITY): ICD-10-CM

## 2025-07-28 DIAGNOSIS — Z79.01 LONG TERM (CURRENT) USE OF ANTICOAGULANTS: ICD-10-CM

## 2025-07-28 LAB
INR PPP: 2.6 (ref 0.8–1.2)
PROTHROMBIN TIME: 28.1 SECONDS (ref 9–12.5)

## 2025-07-28 PROCEDURE — 3044F HG A1C LEVEL LT 7.0%: CPT | Mod: CPTII,S$GLB,, | Performed by: INTERNAL MEDICINE

## 2025-07-28 PROCEDURE — 85610 PROTHROMBIN TIME: CPT

## 2025-07-28 PROCEDURE — 94729 DIFFUSING CAPACITY: CPT | Mod: S$GLB,,, | Performed by: INTERNAL MEDICINE

## 2025-07-28 PROCEDURE — 3078F DIAST BP <80 MM HG: CPT | Mod: CPTII,S$GLB,, | Performed by: INTERNAL MEDICINE

## 2025-07-28 PROCEDURE — 4010F ACE/ARB THERAPY RXD/TAKEN: CPT | Mod: CPTII,S$GLB,, | Performed by: INTERNAL MEDICINE

## 2025-07-28 PROCEDURE — 3066F NEPHROPATHY DOC TX: CPT | Mod: CPTII,S$GLB,, | Performed by: INTERNAL MEDICINE

## 2025-07-28 PROCEDURE — 95012 NITRIC OXIDE EXP GAS DETER: CPT | Mod: S$GLB,,, | Performed by: INTERNAL MEDICINE

## 2025-07-28 PROCEDURE — 3061F NEG MICROALBUMINURIA REV: CPT | Mod: CPTII,S$GLB,, | Performed by: INTERNAL MEDICINE

## 2025-07-28 PROCEDURE — 94726 PLETHYSMOGRAPHY LUNG VOLUMES: CPT | Mod: S$GLB,,, | Performed by: INTERNAL MEDICINE

## 2025-07-28 PROCEDURE — 3008F BODY MASS INDEX DOCD: CPT | Mod: CPTII,S$GLB,, | Performed by: INTERNAL MEDICINE

## 2025-07-28 PROCEDURE — 3074F SYST BP LT 130 MM HG: CPT | Mod: CPTII,S$GLB,, | Performed by: INTERNAL MEDICINE

## 2025-07-28 PROCEDURE — 99214 OFFICE O/P EST MOD 30 MIN: CPT | Mod: 25,S$GLB,, | Performed by: INTERNAL MEDICINE

## 2025-07-28 PROCEDURE — 94010 BREATHING CAPACITY TEST: CPT | Mod: S$GLB,,, | Performed by: INTERNAL MEDICINE

## 2025-07-28 PROCEDURE — 36415 COLL VENOUS BLD VENIPUNCTURE: CPT

## 2025-07-28 PROCEDURE — 99999 PR PBB SHADOW E&M-EST. PATIENT-LVL V: CPT | Mod: PBBFAC,,, | Performed by: INTERNAL MEDICINE

## 2025-07-28 RX ORDER — WARFARIN 2.5 MG/1
2.5 TABLET ORAL
Qty: 10 TABLET | Refills: 11 | Status: SHIPPED | OUTPATIENT
Start: 2025-07-29 | End: 2026-07-29

## 2025-07-28 RX ORDER — EZETIMIBE 10 MG/1
10 TABLET ORAL DAILY
COMMUNITY
Start: 2025-07-28 | End: 2026-07-28

## 2025-07-28 RX ORDER — WARFARIN SODIUM 5 MG/1
TABLET ORAL
Qty: 22 TABLET | Refills: 11 | Status: SHIPPED | OUTPATIENT
Start: 2025-07-28

## 2025-07-28 RX ORDER — EZETIMIBE 10 MG/1
10 TABLET ORAL DAILY
Qty: 90 TABLET | Refills: 3 | OUTPATIENT
Start: 2025-07-28 | End: 2026-07-28

## 2025-07-28 RX ORDER — BUDESONIDE AND FORMOTEROL FUMARATE DIHYDRATE 80; 4.5 UG/1; UG/1
2 AEROSOL RESPIRATORY (INHALATION) 2 TIMES DAILY
Qty: 10.2 G | Refills: 1 | Status: SHIPPED | OUTPATIENT
Start: 2025-07-28 | End: 2026-07-28

## 2025-07-28 NOTE — ASSESSMENT & PLAN NOTE
Wax and waning  Appear to benefit from steriods; PO or Inhaled  Spirometry and TLC NORMAL  DLCO mildly reduced  Latest chest CT adenopathy appeared improved  Will get ENT for r/o LPR, GERD  Utility of EBUS low yield if imaging appears improved:  Would need to hold Coumadin  Most inflamation markers were Normal

## 2025-07-28 NOTE — PROGRESS NOTES
INR at goal-2.6. Medications and chart reviewed. No changes noted to necessitate adjustment of warfarin or follow-up plan. See calendar.  INR has returned to goal range.  We will continue 5 mg QD x 2.5 mg on T/R.  Repeat INR IN 2 weeks at [facility] with other appointments.  Refill for warfarin sent to [facility] today.  LVM and will send in portal message for dosing reinforcement.

## 2025-07-28 NOTE — PROGRESS NOTES
Pulmonary Outpatient  Visit     Subjective:       Patient ID: Waldo Emmanuel is a 49 y.o. female.    Tobacco Use History[1]         Chief Complaint: Cough          Waldo Emmanuel is 49 y.o.  Referred by Ansley Vivas MD  [address] [place] [id number]   Consultation is for cough   This cough started when she was in the hospital this year in May 2025  At the same time shows treated for pneumonia with Augmentin  Cough severity index is 11  Recent admission treatment for intracranial hemorrhage  Seen by Neurosurgery   Coiling placed  Was in hospital ended up in rehab  She is currently undergoing recuperation   Also recent DVT in the right upper extremity  ~S/P coil embolization of right AICA aneurysm by Dr. Piyush Silverio and Dr. Nadia Correa on 05/06  In hospital between May 15th to Mickie works as a nurse practitioner   Was recently prescribed Breo Ellipta for the cough unclear whether this helps   Has seen Rheumatology in the past.    She has 2 dogs at home   No wheezing   Cough is dry   No smoking history   Not on oxygen   No fever   No hemoptysis   No night sweats  Cough appears to be random in the day and nighttime however does not significantly affect sleep   History of pneumonia last year   Imaging reviewed with the patient  Compared with the most recent chest CT scan multifocal pulmonary ground-glass opacities and nodules have significantly improved     07/28/2025  Followup  No interval issues other than fall x 2 Friday  Labs reveiwed  Cehst CT  Serologies  PFT  Cough somewhat better  No fever  Discussed trial of LABA ICS  FeNo was Normal                        Review of Systems   Constitutional:  Positive for fatigue.   Respiratory:  Negative for apnea, snoring, cough, sputum production, shortness of breath, wheezing and use of rescue inhaler.        Encounter Medications[2]        Pertinent Work Up:      Pulmonary  Interventions:      Smoking hx:    Environmental/Occupational hx:        Interval Hx:      Occupational History:  denies occupational exposure to asbestos, silica, and petrochemicals.    Avocational Exposures:  dog    Pet Exposures:  none      The following portions of the patient's history were reviewed and updated as appropriate: She  has a past medical history of ADHD (attention deficit hyperactivity disorder), Anemia, Fibroids, Genital herpes, GERD (gastroesophageal reflux disease), H/O total hysterectomy (2021), Hypertension, Labral tear of hip joint, Ovarian cyst, Right common arterial occlusion, Routine general medical examination at a [facility] facility (2017), and Total Right Arterial Occlusion.  She does not have any pertinent problems on file.  She  has a past surgical history that includes Cholecystectomy;  section; Carpal tunnel release; Dilation and curettage of uterus; Total abdominal hysterectomy w/ bilateral salpingoophorectomy (2021); angiogram, abdominal aorta w/ extremity runoff (N/A, 2025); and Magnetic resonance imaging (N/A, 5/15/2025).  Her family history includes Breast cancer in her paternal cousin; Hypertension in her father and maternal grandmother; Prostate cancer in her paternal uncle; Stroke in her father, maternal grandmother, and mother.  She  reports that she has never smoked. She has never used smokeless tobacco. She reports current alcohol use. She reports that she does not use drugs.  She has a current medication list which includes the following prescription(s): albuterol, aspirin, atorvastatin, [Paused] cetirizine, ezetimibe, metoprolol succinate, [Paused] multivitamin, pantoprazole, zepbound, [Paused] topiramate, valacyclovir, valsartan, budesonide-formoterol 80-4.5 mcg, ezetimibe, [START ON 2025] warfarin, and warfarin.  Medications Ordered Prior to Encounter[3]  She is allergic to metformin..      BP Readings from Last 3 Encounters:  "  07/28/25 100/60   07/25/25 139/87   07/08/25 119/80          MMRC Dyspnea Scale (4 is worst)     [] MMRC 0: Dyspneic on strenuous excercise (0 points)    [] MMRC 1: Dyspneic on walking a slight hill (0 points)    [] MMRC 2: Dyspneic on walking level ground; must stop occasionally due to breathlessness (1 point)    [] MMRC 3: Must stop for breathlessness after walking 100 yards or after a few minutes (2 points)    [] MMRC 4: Cannot leave house; breathless on dressing/undressing (3 points)                          Objective:     Vital Signs (Most Recent)  Vital Signs  Pulse: 91  SpO2: 99 %  BP: 100/60  Pain Score: 0-No pain  Height and Weight  Height: 5' 3" (160 cm)  Weight: 91.2 kg (201 lb)  BSA (Calculated - sq m): 2.01 sq meters  BMI (Calculated): 35.6  Weight in (lb) to have BMI = 25: 140.8]  Wt Readings from Last 2 Encounters:   07/28/25 91.2 kg (201 lb)   07/25/25 91.3 kg (201 lb 6.2 oz)       Physical Exam  Vitals and nursing note reviewed.   Constitutional:       Appearance: She is normal weight.   HENT:      Head: Normocephalic and atraumatic.      Nose: Nose normal.   Eyes:      Pupils: Pupils are equal, round, and reactive to light.   Cardiovascular:      Rate and Rhythm: Normal rate and regular rhythm.      Pulses: Normal pulses.      Heart sounds: Normal heart sounds.   Pulmonary:      Effort: Pulmonary effort is normal.      Breath sounds: Normal breath sounds.   Abdominal:      General: Bowel sounds are normal.      Palpations: Abdomen is soft.   Musculoskeletal:      Cervical back: Normal range of motion.   Skin:     General: Skin is warm.   Neurological:      General: No focal deficit present.      Mental Status: She is alert and oriented to person, place, and time.   Psychiatric:         Mood and Affect: Mood normal.          Laboratory  Lab Results   Component Value Date    WBC 9.5 06/10/2025    RBC 3.58 (L) 06/10/2025    HGB 9.9 (L) 06/10/2025    HCT 33.6 (L) 06/10/2025    MCV 94 06/10/2025    " "MCH 27.7 06/10/2025    MCHC 29.5 (L) 06/10/2025    RDW 14.4 06/10/2025     (H) 06/10/2025    MPV 9.2 05/30/2025    GRAN 6.0 03/18/2025    GRAN 51.9 03/18/2025    LYMPH 3.6 (H) 06/10/2025    MONO 4 06/10/2025    MONO 0.3 06/10/2025    EOS 0.1 06/10/2025    BASO 0.0 06/10/2025    EOSINOPHIL 1 06/10/2025    BASOPHIL 0 06/10/2025       BMP  Lab Results   Component Value Date     06/10/2025    K 3.8 06/10/2025     06/10/2025    CO2 23 06/10/2025    BUN 11 06/10/2025    CREATININE 0.67 06/10/2025    CALCIUM 9.6 06/10/2025    ANIONGAP 13 05/30/2025    ESTGFRAFRICA 97 06/30/2020    EGFRNONAA 65 08/26/2020    AST 16 06/10/2025    ALT 14 06/10/2025    PROT 8.5 (H) 05/30/2025          No results found for: "IGE"     No results found for: "ASPERGILLUS"  No results found for: "AFUMIGATUSCL"     No results found for: "ACE"     Diagnostic Results:  I have personally reviewed today the following studies:    MRA Brain with and without contrast  Narrative: EXAM: MRA BRAIN WITH AND WITHOUT    CLINICAL HISTORY: Cerebral aneurysm.  Status post coil embolization.    TECHNIQUE: MRA without and with gadolinium this contrast.    COMPARISON: CTA 05/06/2025 and 05/28/2025 and MRI/MRA 05/15/2025.    FINDINGS:  The examination is limited by the metal artifact originating from the oral cavity/dental amalgam.    DWI sequences appear to demonstrate no acute restricted diffusion allowing for the limitations.    There is a chronic posterior right paramedian pontine lacunar infarct.  There is a moderate-sized area of right cerebellar encephalomalacia consistent with a prior infarct.    The right skull base ICA is patent.  The supraclinoid ICA is patent.  The anterior cerebral artery and branches appear grossly normal.  The right MCA and branches appear grossly normal as well    The left skull base ICA is patent.  The supraclinoid ICA is patent to the left ILDA and branches are patent.  The left MCA and branches are patent.    In the " posterior circulation the right vertebral artery is patent.    There is a hypointense area of signal in the inferior vermis in cistern region which appears to correspond to the coil embolization material noted previously.  The right vertebral artery is patent.  The basilar artery is patent.  The posterior cerebral arteries are patent.    The left vertebral artery is patent but slightly smaller than the right.  Impression:  Chronic posterior right paramedian pontine lacunar infarct.  Chronic right inferior cerebellar artery territory infarct.    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    Otherwise negative MRA.    Finalized on: 7/7/2025 11:58 AM By:  Rober Tamez MD  [facility]# [id number]      2025-07-07 12:00:[id number]     [facility]                 Component      Latest Ref Rng 7/7/2025   QuantiFERON-TB Gold Plus      Negative  Indeterminate !    Quantiferon Nil Value 0.37190    TB1 Ag minus Nil 0.26731    TB2 Ag minus Nil 0.14079    Mitogen minus Nil 0.76438    Histoplasma spp. Abs, Precipitin      Not Detected  Not Detected    Blastomyces dermatitidis Abs, Precipitin      Not Detected  Not Detected    Aspergillus spp. Abs, Precipitin      Not Detected  Not Detected    Coccidioides immitis Abs, Precipitin      Not Detected  Not Detected    SIENNA      Negative <1:80  Negative <1:80    Angiotensin Converting Enzyme      16 - 85 U/L 22     Interleukin 2 Receptor, Soluble, Serum      175.3 - 858.2 pg/mL 437.2    Sed Rate      <=36 mm/hr 61 (H)    CRP      <=8.2 mg/L 6.3    Rheumatoid Factor      <=15.0 IU/mL <13.0    Myeloperoxidase (MPO) Ab      <3.5 U/mL <0.2       Legend:  ! Abnormal  (H) High.    Spirometry  FEV1: 2.35L( 102.6%), FVC 2.69L ( 95.2%)  FEV1/FVC 87    TLC: 3.93L( 82.3%)    DLCO 14.19( 60.2%)  Spirometry is normal. Lung volume determination shows TLC is normal. There is a reduction in FRC and/or RV of  uncertain clinical significance. Airway mechanics show airway resistance is increased. Specific conductance remains normal. DLCO is mildly decreased. MVV is normal.     Clinical Guide to Interpretation or FeNO Levels :     FeNO  (ppb) LOW INTERMEDIATE HIGH   ADULT VALUES < 25 25-50          > 50   Th2-driven Inflammation Unlikely Likely Significant      Patients FeNO level at this visit : __18__ (ppb)     Interpretation of FeNO measurement in adults:  [x] FENO is less than 25 ppb implies non eosinophilic airway inflammation or the absence of airway inflammation.               Comment: Low FENO (<25 ppb) in adult asthmatics with persistent symptoms suggests other etiologies for these symptoms and a lower likelihood of benefit from adding or increasing inhaled glucocorticoids.  Assessment/Plan:          1. Chronic cough  Assessment & Plan:  Wax and waning  Appear to benefit from steriods; PO or Inhaled  Spirometry and TLC NORMAL  DLCO mildly reduced  Latest chest CT adenopathy appeared improved  Will get ENT for r/o LPR, GERD  Utility of EBUS low yield if imaging appears improved:  Would need to hold Coumadin  Most inflamation markers were Normal    Orders:  -     Ambulatory referral/consult to ENT; Future; Expected date: 08/04/2025  -     budesonide-formoterol 80-4.5 mcg (SYMBICORT) 80-4.5 mcg/actuation HFAA; Inhale 2 puffs into the lungs 2 (two) times a day. Controller  Dispense: 10.2 g; Refill: 1  -     CT Chest Without Contrast; Future; Expected date: 07/28/2025    2. Essential hypertension  Assessment & Plan:  On valsartan      3. Other hyperlipidemia  Overview:  Last Assessment & Plan:   Formatting of this note might be different from the original.  History & Physical   1. She has been on fenofibrate.  Discharge Summary   Continue home medications upon discharge.  Follow-up    Assessment & Plan:  On Lipitor      4. Obesity, Class III, BMI 40-49.9 (morbid obesity)    5. Hilar lymphadenopathy  Assessment &  Plan:  These appear reactive   Appear to have improved compared to previous imaging  Utility of EBUS was discussed.    We will defer to Neurosurgery before we consider any diagnostic procedures and whether this will be high yield or low yield    Orders:  -     Ambulatory referral/consult to ENT; Future; Expected date: 08/04/2025  -     budesonide-formoterol 80-4.5 mcg (SYMBICORT) 80-4.5 mcg/actuation HFAA; Inhale 2 puffs into the lungs 2 (two) times a day. Controller  Dispense: 10.2 g; Refill: 1  -     CT Chest Without Contrast; Future; Expected date: 07/28/2025    6. Localized enlarged lymph nodes  Assessment & Plan:  improving    Orders:  -     Ambulatory referral/consult to ENT; Future; Expected date: 08/04/2025  -     budesonide-formoterol 80-4.5 mcg (SYMBICORT) 80-4.5 mcg/actuation HFAA; Inhale 2 puffs into the lungs 2 (two) times a day. Controller  Dispense: 10.2 g; Refill: 1  -     CT Chest Without Contrast; Future; Expected date: 07/28/2025    7. History of DVT in adulthood  Assessment & Plan:  On Coumadin             Follow up in about 8 weeks (around 9/22/2025), or lab today, REF ENT, cehst CT.    This note was prepared using voice recognition system and is likely to have sound alike errors that may have been overlooked even after proof reading.  Please call me with any questions    Discussed diagnosis, its evaluation, treatment and usual course. All questions answered.    The patient was given open opportunity to ask questions and/or express concerns about treatment plan.   All questions/concerns were discussed.       Two patient identifiers used prior to evaluation.     Thank you for the courtesy of participating in the care of this patient    Jose Cedeno MD      Personal Diagnostic Review  []  CXR    []  ECHO    []  ONSAT    []  6MWD    []  LABS    []  CHEST CT    []  PET CT    []  Biopsy results          New            [1]   Social History  Tobacco Use   Smoking Status Never   Smokeless Tobacco  Never   [2]   Outpatient Encounter Medications as of 2025   Medication Sig Dispense Refill    albuterol (PROVENTIL/VENTOLIN HFA) 90 mcg/actuation inhaler Inhale 1-2 puffs into the lungs every 4 (four) hours as needed for Shortness of Breath or Wheezing.      aspirin (ECOTRIN) 81 MG EC tablet Take 81 mg by mouth.      atorvastatin (LIPITOR) 80 MG tablet Take 1 tablet (80 mg total) by mouth every evening. 90 tablet 1    [Paused] cetirizine (ZYRTEC) 10 MG tablet Take 10 mg by mouth.      ezetimibe (ZETIA) 10 mg tablet Take 1 tablet (10 mg total) by mouth once daily. 30 tablet 11    metoprolol succinate (TOPROL-XL) 25 MG 24 hr tablet Take 1 tablet (25 mg total) by mouth daily as needed. 30 tablet 11    [Paused] multivitamin capsule Take 1 capsule by mouth once daily.      pantoprazole (PROTONIX) 40 MG tablet Take 1 tablet (40 mg total) by mouth 2 (two) times daily. 180 tablet 1    tirzepatide, weight loss, (ZEPBOUND) 7.5 mg/0.5 mL PnIj Inject 7.5 mg into the skin every 7 days. 2 mL 2    [Paused] topiramate (TOPAMAX) 50 MG tablet Take 1 tablet by mouth twice daily (dose correction) 180 tablet 1    valACYclovir (VALTREX) 1000 MG tablet Take 1 tablet (1,000 mg total) by mouth once daily. 90 tablet 1    valsartan (DIOVAN) 80 MG tablet Take 1 tablet (80 mg total) by mouth once daily.      [DISCONTINUED] fluticasone furoate-vilanteroL (BREO) 100-25 mcg/dose diskus inhaler Inhale 1 puff into the lungs once daily.      [DISCONTINUED] warfarin (COUMADIN) 2.5 MG tablet Take 1 tablet (2.5 mg total) by mouth every Tues, Thurs.      [DISCONTINUED] warfarin (COUMADIN) 5 MG tablet Once per day on       [] benzonatate (TESSALON) 200 MG capsule Take 1 capsule (200 mg total) by mouth 3 (three) times daily as needed for Cough. 90 capsule 0    budesonide-formoterol 80-4.5 mcg (SYMBICORT) 80-4.5 mcg/actuation HFAA Inhale 2 puffs into the lungs 2 (two) times a day. Controller 10.2 g 1     ezetimibe (ZETIA) 10 mg tablet Take 10 mg by mouth once daily.      [DISCONTINUED] tirzepatide, weight loss, (ZEPBOUND) 5 mg/0.5 mL PnIj Inject 5 mg into the skin every 7 days. 2 mL 0     No facility-administered encounter medications on file as of 7/28/2025.   [3]   Current Outpatient Medications on File Prior to Visit   Medication Sig Dispense Refill    albuterol (PROVENTIL/VENTOLIN HFA) 90 mcg/actuation inhaler Inhale 1-2 puffs into the lungs every 4 (four) hours as needed for Shortness of Breath or Wheezing.      aspirin (ECOTRIN) 81 MG EC tablet Take 81 mg by mouth.      atorvastatin (LIPITOR) 80 MG tablet Take 1 tablet (80 mg total) by mouth every evening. 90 tablet 1    [Paused] cetirizine (ZYRTEC) 10 MG tablet Take 10 mg by mouth.      ezetimibe (ZETIA) 10 mg tablet Take 1 tablet (10 mg total) by mouth once daily. 30 tablet 11    metoprolol succinate (TOPROL-XL) 25 MG 24 hr tablet Take 1 tablet (25 mg total) by mouth daily as needed. 30 tablet 11    [Paused] multivitamin capsule Take 1 capsule by mouth once daily.      pantoprazole (PROTONIX) 40 MG tablet Take 1 tablet (40 mg total) by mouth 2 (two) times daily. 180 tablet 1    tirzepatide, weight loss, (ZEPBOUND) 7.5 mg/0.5 mL PnIj Inject 7.5 mg into the skin every 7 days. 2 mL 2    [Paused] topiramate (TOPAMAX) 50 MG tablet Take 1 tablet by mouth twice daily (dose correction) 180 tablet 1    valACYclovir (VALTREX) 1000 MG tablet Take 1 tablet (1,000 mg total) by mouth once daily. 90 tablet 1    valsartan (DIOVAN) 80 MG tablet Take 1 tablet (80 mg total) by mouth once daily.      [DISCONTINUED] fluticasone furoate-vilanteroL (BREO) 100-25 mcg/dose diskus inhaler Inhale 1 puff into the lungs once daily.      [DISCONTINUED] warfarin (COUMADIN) 2.5 MG tablet Take 1 tablet (2.5 mg total) by mouth every Tues, Thurs.      [DISCONTINUED] warfarin (COUMADIN) 5 MG tablet Once per day on Sunday Monday Wednesday Friday Saturday      ezetimibe (ZETIA) 10 mg tablet Take  10 mg by mouth once daily.       No current facility-administered medications on file prior to visit.

## 2025-07-28 NOTE — ASSESSMENT & PLAN NOTE
These appear reactive   Appear to have improved compared to previous imaging  Utility of EBUS was discussed.    We will defer to Neurosurgery before we consider any diagnostic procedures and whether this will be high yield or low yield

## 2025-07-29 ENCOUNTER — TELEPHONE (OUTPATIENT)
Dept: NEUROSURGERY | Facility: CLINIC | Age: 50
End: 2025-07-29
Payer: COMMERCIAL

## 2025-07-29 ENCOUNTER — PATIENT MESSAGE (OUTPATIENT)
Dept: NEUROSURGERY | Facility: CLINIC | Age: 50
End: 2025-07-29
Payer: COMMERCIAL

## 2025-07-29 NOTE — TELEPHONE ENCOUNTER
Copied from CRM #[id number]. Topic: General Inquiry - Patient Advice  >> Jul 29, 2025 12:53 PM Nia wrote:  Pt calling regarding PT eval 6/18 and started PT, OT, ST on 6/19. To date she has had 11 sessions. Insurance covers 30 sessions for each. PCP thought it only  covered 3 months. It covers 6 months. [facility] states it will end on 11/1. PCP instructed pt to return to work on 8/5 pt is not able to return yet. States PCP wants  to charge her 100 to change date on FMLA  After she put the wrong date.on documents. Pt is requesting a call back to discuss her being out of work a little longer.She has had 3 falls in the the last couple days.  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 Please call [phone]. Will need an email address to send documents.

## 2025-07-31 LAB
BRPFT: ABNORMAL
DLCO SINGLE BREATH LLN: 17.86
DLCO SINGLE BREATH PRE REF: 60.2 %
DLCO SINGLE BREATH REF: 23.59
DLCOC SBVA LLN: 3.36
DLCOC SBVA REF: 4.94
DLCOC SINGLE BREATH LLN: 17.86
DLCOC SINGLE BREATH REF: 23.59
DLCOVA LLN: 3.36
DLCOVA PRE REF: 82.9 %
DLCOVA PRE: 4.1 ML/(MIN*MMHG*L) (ref 3.36–6.52)
DLCOVA REF: 4.94
ERV LLN: -16449.06
ERV PRE REF: 19.1 %
ERV REF: 0.94
FEF 25 75 LLN: 1.12
FEF 25 75 PRE REF: 148.2 %
FEF 25 75 REF: 2.32
FEV1 FVC LLN: 70
FEV1 FVC PRE REF: 107.5 %
FEV1 FVC REF: 81
FEV1 LLN: 1.72
FEV1 PRE REF: 102.6 %
FEV1 REF: 2.29
FRCPLETH LLN: 1.81
FRCPLETH PREREF: 53.6 %
FRCPLETH REF: 2.63
FVC LLN: 2.16
FVC PRE REF: 95.2 %
FVC REF: 2.83
IVC PRE: 2.51 L (ref 2.16–3.53)
IVC SINGLE BREATH LLN: 2.16
IVC SINGLE BREATH PRE REF: 88.5 %
IVC SINGLE BREATH REF: 2.83
MVV LLN: 84
MVV PRE REF: 88.7 %
MVV REF: 99
PEF LLN: 4.08
PEF PRE REF: 97.4 %
PEF REF: 6.03
PRE DLCO: 14.19 ML/(MIN*MMHG) (ref 17.86–29.32)
PRE ERV: 0.18 L (ref -16449.06–16450.94)
PRE FEF 25 75: 3.44 L/S (ref 1.12–3.93)
PRE FET 100: 4.87 SEC
PRE FEV1 FVC: 87.03 % (ref 70.09–90.24)
PRE FEV1: 2.35 L (ref 1.72–2.83)
PRE FRC PL: 1.41 L (ref 1.81–3.46)
PRE FVC: 2.69 L (ref 2.16–3.53)
PRE MVV: 87.67 L/MIN (ref 84.01–113.66)
PRE PEF: 5.87 L/S (ref 4.08–7.97)
PRE RV: 1.23 L (ref 1.12–2.27)
PRE TLC: 3.93 L (ref 3.78–5.76)
RAW LLN: 3.06
RAW PRE REF: 119 %
RAW PRE: 3.64 CMH2O*S/L (ref 3.06–3.06)
RAW REF: 3.06
RV LLN: 1.12
RV PRE REF: 72.6 %
RV REF: 1.7
RVTLC LLN: 26
RVTLC PRE REF: 87.2 %
RVTLC PRE: 31.37 % (ref 26.37–45.55)
RVTLC REF: 36
TLC LLN: 3.78
TLC PRE REF: 82.3 %
TLC REF: 4.77
VA PRE: 3.46 L (ref 4.62–4.62)
VA SINGLE BREATH LLN: 4.62
VA SINGLE BREATH PRE REF: 74.9 %
VA SINGLE BREATH REF: 4.62
VC LLN: 2.16
VC PRE REF: 95.2 %
VC PRE: 2.69 L (ref 2.16–3.53)
VC REF: 2.83

## 2025-08-05 ENCOUNTER — PATIENT MESSAGE (OUTPATIENT)
Dept: NEUROSURGERY | Facility: CLINIC | Age: 50
End: 2025-08-05
Payer: COMMERCIAL

## 2025-08-05 ENCOUNTER — TELEPHONE (OUTPATIENT)
Dept: NEUROSURGERY | Facility: CLINIC | Age: 50
End: 2025-08-05
Payer: COMMERCIAL

## 2025-08-05 NOTE — TELEPHONE ENCOUNTER
Copied from CRM #[id number]. Topic: General Inquiry - Patient Advice  >> Aug 4, 2025  2:14 PM Olamide wrote:  Consult/Advisory     Name Of Caller:Waldo Emmaunel     Provider : Barney     Contact Preference:[phone] , requesting a call back. (home)        Nature of call:Pt is calling in regards  to pt PCP not completing her FMLA paper was told to contact provider.     Note: She has restrictions not to drive, and she needs  to be cleared.  Suppose to return  to work  tomorrow,  Please advise.       Note: also Pt need a refill for the following medication  valsartan (DIOVAN) 320 MG tablet, states she is no longer on her other BP medication.         [facility]  [id number] [place] [address]  [facility] [place] [address]  Phone: [phone] Fax: [phone]        .

## 2025-08-05 NOTE — TELEPHONE ENCOUNTER
Returned call to pt. Pt requested that her return to work day to be changed to the day she completes her rehab/physical therapy. Explained to the patient that we do not manage Valsartan 80 mg. Patient stated that she is currently taking Valsartan 320 mg and requested that the 80 mg dose be discontinued so her PCP can continue managing and refilling the 320 mg dose.  Discussed with the nurse. Appointment date was changed, and Valsartan 80 mg was discontinued as requested.

## 2025-08-07 ENCOUNTER — OFFICE VISIT (OUTPATIENT)
Dept: NEUROLOGY | Facility: CLINIC | Age: 50
End: 2025-08-07
Payer: COMMERCIAL

## 2025-08-07 VITALS
SYSTOLIC BLOOD PRESSURE: 133 MMHG | BODY MASS INDEX: 35.62 KG/M2 | HEART RATE: 85 BPM | WEIGHT: 201.06 LBS | HEIGHT: 63 IN | DIASTOLIC BLOOD PRESSURE: 63 MMHG

## 2025-08-07 DIAGNOSIS — I67.1 BRAIN ANEURYSM: ICD-10-CM

## 2025-08-07 DIAGNOSIS — I69.319 COGNITIVE DEFICIT, POST-STROKE: Primary | ICD-10-CM

## 2025-08-07 PROCEDURE — 99999 PR PBB SHADOW E&M-EST. PATIENT-LVL IV: CPT | Mod: PBBFAC,,, | Performed by: PSYCHIATRY & NEUROLOGY

## 2025-08-07 RX ORDER — VALSARTAN 320 MG/1
320 TABLET ORAL DAILY
COMMUNITY

## 2025-08-07 NOTE — PROGRESS NOTES
"Subjective:      Patient ID: Waldo Emmanuel is a 49 y.o. female.    Chief Complaint:  Stroke    Ms. Emmanuel presents today for follow-up after aneurysm rupture and subsequent strokes.    HISTORY OF PRESENT ILLNESS:  She experienced an aneurysm rupture on May 6th with subsequent two strokes affecting the brainstem and cerebellum.   Initial symptoms included feeling pulled down while putting groceries in her car, followed by a severe headache upon sitting in the vehicle where she could not open her eyes.   She had a three-week hospital stay with no memory of the entire period and subsequently underwent two weeks of inpatient rehabilitation   followed by ongoing outpatient rehabilitation.    NEUROLOGIC:  Initial physical deficits included inability to write and right-sided weakness. She has had three falls, including two falls on July 25th.   She currently experiences persistent balance issues, describing her walking as feeling "drunk" with noticeable swaying when ambulating.   She is no longer using a walker but continues to have residual imbalance as her primary physical limitation.   She reports significant short-term memory difficulties, describing her memory as "horrible".   She believes she is otherwise functional and improving, though cognition remains a concern.    HEADACHE:  She has a long-standing history of migraines and experienced constant headaches until approximately two weeks prior to this encounter, which have since resolved.   She currently denies active severe headache, noting that if present, her headache would be rated at 0.5-1 on a pain scale. She takes medication only when headache becomes severe.    SLEEP:  She reports long-standing insomnia stemming from past traumatic experiences of home break-ins. She describes significant sleep disturbances characterized by ability to stay awake for extended periods, including up to 72 hours without substantial rest. She experiences intermittent sleep " patterns, typically sleeping for short 2-hour periods followed by deep sleep. She previously used Trazodone but discontinued due to concerns about being in too deep a sleep and feeling vulnerable to potential security threats. She expresses ongoing anxiety about being unable to quickly awaken if another break-in were to occur and denies taking any current medications for sleep.    FAMILY HISTORY:  She has a family history of aneurysms on her father's side, involving her father and brother, suggesting a potential genetic predisposition to aneurysms.     Review of Systems   Neurological:         Imbalance.  Memory difficulties.    All other systems reviewed and are negative.      Objective:     Neurological Exam  Mental Status  Alert. Oriented to person, place, time and situation. Recent and remote memory are intact. Able to copy figure. Clock drawing is normal. Speech is normal. Language is fluent with no aphasia. Attention and concentration are normal. Fund of knowledge is appropriate for level of education. Apraxia absent.    Cranial Nerves  CN I: Sense of smell is normal.  CN II: Visual acuity is normal. Visual fields full to confrontation.  CN III, IV, VI: Extraocular movements intact bilaterally. Normal lids and orbits bilaterally. Pupils equal round and reactive to light bilaterally.  CN V: Facial sensation is normal.  CN VII: Full and symmetric facial movement.  CN VIII: Hearing is normal.  CN IX, X: Palate elevates symmetrically. Normal gag reflex.  CN XI: Shoulder shrug strength is normal.  CN XII: Tongue midline without atrophy or fasciculations.    Motor  Normal muscle bulk throughout. No fasciculations present. Normal muscle tone. No abnormal involuntary movements. Strength is 5/5 throughout all four extremities.    Sensory  Sensation is intact to light touch, pinprick, vibration and proprioception in all four extremities. No right-sided hemispatial neglect. No left-sided hemispatial neglect. Right  agraphesthesia absent. Left agraphesthesia absent. Right astereognosis absent. Left astereognosis absent.    Reflexes                                            Right                      Left  Brachioradialis                    2+                         2+  Biceps                                 2+                         2+  Triceps                                2+                         2+  Finger flex                           2+                         2+  Hamstring                            2+                         2+  Patellar                                2+                         2+  Achilles                                2+                         2+  Right Plantar: downgoing  Left Plantar: downgoing  Jaw jerk absent.  Right pathological reflexes: Cornelius's absent. Ankle clonus absent.  Left pathological reflexes: Cornelius's absent. Ankle clonus absent.  Glabellar tap absent. Snout absent. Right palmomental absent. Left palmomental absent. Right palmar grasp absent. Left palmar grasp absent.    Coordination    Finger-to-nose, rapid alternating movements and heel-to-shin normal bilaterally without dysmetria.    Gait  Normal casual, toe, heel and tandem gait.      Physical Exam  Vitals and nursing note reviewed.   Constitutional:       Appearance: Normal appearance. She is normal weight.   HENT:      Head: Normocephalic and atraumatic.      Right Ear: Tympanic membrane normal.      Left Ear: Tympanic membrane normal.      Nose: Nose normal.      Mouth/Throat:      Mouth: Mucous membranes are moist.      Pharynx: Oropharynx is clear.   Eyes:      General: Lids are normal.      Extraocular Movements: Extraocular movements intact.      Conjunctiva/sclera: Conjunctivae normal.      Pupils: Pupils are equal, round, and reactive to light.   Cardiovascular:      Rate and Rhythm: Normal rate and regular rhythm.      Pulses: Normal pulses.      Heart sounds: Normal heart sounds.   Pulmonary:      Effort:  Pulmonary effort is normal.      Breath sounds: Normal breath sounds.   Abdominal:      General: Abdomen is flat. Bowel sounds are normal.      Palpations: Abdomen is soft.   Genitourinary:     Comments: Deferred.   Musculoskeletal:         General: Normal range of motion.      Cervical back: Normal range of motion and neck supple.   Skin:     General: Skin is warm and dry.      Capillary Refill: Capillary refill takes less than 2 seconds.   Neurological:      Mental Status: She is alert. Mental status is at baseline.      Motor: Motor strength is normal.     Coordination: Coordination is intact.      Deep Tendon Reflexes:      Reflex Scores:       Tricep reflexes are 2+ on the right side and 2+ on the left side.       Bicep reflexes are 2+ on the right side and 2+ on the left side.       Brachioradialis reflexes are 2+ on the right side and 2+ on the left side.       Patellar reflexes are 2+ on the right side and 2+ on the left side.       Achilles reflexes are 2+ on the right side and 2+ on the left side.  Psychiatric:         Mood and Affect: Mood normal.         Speech: Speech normal.         Behavior: Behavior normal.         Thought Content: Thought content normal.         Judgment: Judgment normal.       Assessment/Plan:   - I69.319 Cognitive deficit, post-stroke  - I67.1 Brain aneurysm    PLAN SUMMARY:  - Continue Topamax for headache management  - Recommend genetic screening for family members due to aneurysm predisposition  - Continue physical therapy for right-side weakness and balance issues  - Reassess driving restrictions after upcoming occupational therapy driving test  - Defer to neurosurgeon and interventional radiology for follow-up angiogram and MRI  - Report any seizure-like events or changes in cognitive function promptly  - Recommend psychiatric evaluation for PTSD-related insomnia  - Follow-up with neurosurgeon and interventional radiology for angiogram and MRI results    COGNITIVE DEFICIT,  POST-STROKE:  - Assessed post-stroke status following aneurysm rupture and subsequent strokes on brainstem and cerebellum.  - Evaluated physical deficits, including right-side weakness and balance issues.  - Considered cognitive function, noting improvement but persistent short-term memory difficulties.  - Evaluated need for continued physical therapy and work restrictions.  - Discussed potential for seizures due to irritation from blood in meninges, instructing patient to report any seizure-like events or changes in cognitive function promptly.  - Considered PTSD and sleep issues, recommending psychiatric evaluation for PTSD-related insomnia.  - Discussed driving restrictions, agreeing to reassess after upcoming driving test with occupational therapy, emphasizing need for honesty regarding driving capabilities and safety.    BRAIN ANEURYSM:  - Assessed post-stroke status following aneurysm rupture and subsequent strokes on brainstem and cerebellum.  - Reviewed CTA results showing coiled aneurysms and mild atherosclerosis without significant stenosis.  - Discussed potential for seizures due to irritation from blood in meninges, instructing patient to report any seizure-like events or changes in cognitive function promptly.  - Explained importance of genetic predisposition for aneurysms and need for family members to be regularly checked.  - Deferred to neurosurgeon and interventional radiology for follow-up angiogram and MRI.  - Explained rationale for Topamax as a headache trigger medication, not specific to aneurysm-related headaches.     Continue PT / OT / ST Therapy program.   FMLA by PCP - re address before the 18 th. .     Labs: CMP / Hgb A 1 C / CBC / Coagulation panel   INR: 2.6     LP: CSF studies traumatic ( see results ).   Lipids panel: LDL 98.     Coumadin / Zetia / Diovan / Toprol      ECHO: EF > 65 % / negative Bubble ( 05/ 2025 ).    CTA Head and Neck - 2025 -  Right cerebellar evolving subacute  infarct with new or more pronounced 0.5 cm hemorrhage within the infarct.  Right anterior inferior cerebellar artery aneurysm coiling without evidence of residual or recurrent aneurysm within limitations of metallic artifact.  No intracranial large vessel occlusion or hemodynamically significant stenosis.  The cervical carotid and vertebral arteries are patent without hemodynamically significant stenosis.    IR Carotids - pending.   MRI Brain - pending.     MRA Brain - 05/ 2025 - Chronic posterior right paramedian pontine lacunar infarct.  Chronic right inferior cerebellar artery territory infarct.  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    This note was generated with the assistance of ambient listening technology. Verbal consent was obtained by the patient and accompanying visitor(s)   for the recording of patient appointment to facilitate this note. I attest to having reviewed and edited the generated note for accuracy,   though some syntax or spelling errors may persist.   Please contact the author of this note for any clarification.     RTC: 3 months.    I spent a total of 45 minutes on the day of the visit.This includes face to face time and non-face to face time preparing to see the patient (eg, review of tests),   obtaining and/or reviewing separately obtained history, documenting clinical information in the electronic or other health record,   independently interpreting results and communicating results to the patient/family/caregiver, or care coordinator.     Please do not hesitate to contact me with any updates, questions or concerns.     Jere Rizzo MD.  General Neurologist.

## 2025-08-11 ENCOUNTER — OFFICE VISIT (OUTPATIENT)
Dept: OTOLARYNGOLOGY | Facility: CLINIC | Age: 50
End: 2025-08-11
Payer: COMMERCIAL

## 2025-08-11 ENCOUNTER — ANTI-COAG VISIT (OUTPATIENT)
Dept: CARDIOLOGY | Facility: CLINIC | Age: 50
End: 2025-08-11
Payer: COMMERCIAL

## 2025-08-11 ENCOUNTER — LAB VISIT (OUTPATIENT)
Dept: LAB | Facility: HOSPITAL | Age: 50
End: 2025-08-11
Attending: INTERNAL MEDICINE
Payer: COMMERCIAL

## 2025-08-11 VITALS — HEIGHT: 63 IN | BODY MASS INDEX: 35.62 KG/M2

## 2025-08-11 DIAGNOSIS — I82.4Y9 ACUTE DEEP VEIN THROMBOSIS (DVT) OF PROXIMAL VEIN OF LOWER EXTREMITY, UNSPECIFIED LATERALITY: ICD-10-CM

## 2025-08-11 DIAGNOSIS — Z79.01 LONG TERM (CURRENT) USE OF ANTICOAGULANTS: ICD-10-CM

## 2025-08-11 DIAGNOSIS — I74.5 ILIAC ARTERY OCCLUSION: Primary | ICD-10-CM

## 2025-08-11 DIAGNOSIS — R05.3 CHRONIC COUGH: ICD-10-CM

## 2025-08-11 DIAGNOSIS — K21.9 LARYNGOPHARYNGEAL REFLUX (LPR): ICD-10-CM

## 2025-08-11 LAB
INR PPP: 3.3 (ref 0.8–1.2)
PROTHROMBIN TIME: 35.1 SECONDS (ref 9–12.5)

## 2025-08-11 PROCEDURE — 1159F MED LIST DOCD IN RCRD: CPT | Mod: CPTII,S$GLB,, | Performed by: STUDENT IN AN ORGANIZED HEALTH CARE EDUCATION/TRAINING PROGRAM

## 2025-08-11 PROCEDURE — 85610 PROTHROMBIN TIME: CPT

## 2025-08-11 PROCEDURE — 31575 DIAGNOSTIC LARYNGOSCOPY: CPT | Mod: S$GLB,,, | Performed by: STUDENT IN AN ORGANIZED HEALTH CARE EDUCATION/TRAINING PROGRAM

## 2025-08-11 PROCEDURE — 36415 COLL VENOUS BLD VENIPUNCTURE: CPT

## 2025-08-11 PROCEDURE — 3066F NEPHROPATHY DOC TX: CPT | Mod: CPTII,S$GLB,, | Performed by: STUDENT IN AN ORGANIZED HEALTH CARE EDUCATION/TRAINING PROGRAM

## 2025-08-11 PROCEDURE — 3008F BODY MASS INDEX DOCD: CPT | Mod: CPTII,S$GLB,, | Performed by: STUDENT IN AN ORGANIZED HEALTH CARE EDUCATION/TRAINING PROGRAM

## 2025-08-11 PROCEDURE — 3044F HG A1C LEVEL LT 7.0%: CPT | Mod: CPTII,S$GLB,, | Performed by: STUDENT IN AN ORGANIZED HEALTH CARE EDUCATION/TRAINING PROGRAM

## 2025-08-11 PROCEDURE — 99203 OFFICE O/P NEW LOW 30 MIN: CPT | Mod: 25,S$GLB,, | Performed by: STUDENT IN AN ORGANIZED HEALTH CARE EDUCATION/TRAINING PROGRAM

## 2025-08-11 PROCEDURE — 3061F NEG MICROALBUMINURIA REV: CPT | Mod: CPTII,S$GLB,, | Performed by: STUDENT IN AN ORGANIZED HEALTH CARE EDUCATION/TRAINING PROGRAM

## 2025-08-11 PROCEDURE — 99999 PR PBB SHADOW E&M-EST. PATIENT-LVL III: CPT | Mod: PBBFAC,,, | Performed by: STUDENT IN AN ORGANIZED HEALTH CARE EDUCATION/TRAINING PROGRAM

## 2025-08-11 PROCEDURE — 4010F ACE/ARB THERAPY RXD/TAKEN: CPT | Mod: CPTII,S$GLB,, | Performed by: STUDENT IN AN ORGANIZED HEALTH CARE EDUCATION/TRAINING PROGRAM

## 2025-08-18 ENCOUNTER — TELEPHONE (OUTPATIENT)
Dept: NEUROLOGY | Facility: CLINIC | Age: 50
End: 2025-08-18
Payer: COMMERCIAL

## 2025-08-19 ENCOUNTER — TELEPHONE (OUTPATIENT)
Dept: NEUROSURGERY | Facility: CLINIC | Age: 50
End: 2025-08-19
Payer: COMMERCIAL

## 2025-08-19 ENCOUNTER — OFFICE VISIT (OUTPATIENT)
Dept: INTERNAL MEDICINE | Facility: CLINIC | Age: 50
End: 2025-08-19
Payer: COMMERCIAL

## 2025-08-19 VITALS
TEMPERATURE: 97 F | WEIGHT: 202.63 LBS | DIASTOLIC BLOOD PRESSURE: 82 MMHG | SYSTOLIC BLOOD PRESSURE: 126 MMHG | OXYGEN SATURATION: 98 % | HEIGHT: 63 IN | HEART RATE: 100 BPM | BODY MASS INDEX: 35.9 KG/M2

## 2025-08-19 DIAGNOSIS — I10 ESSENTIAL HYPERTENSION: ICD-10-CM

## 2025-08-19 DIAGNOSIS — R05.3 CHRONIC COUGH: ICD-10-CM

## 2025-08-19 DIAGNOSIS — Z79.01 LONG TERM (CURRENT) USE OF ANTICOAGULANTS: ICD-10-CM

## 2025-08-19 DIAGNOSIS — K21.9 GASTROESOPHAGEAL REFLUX DISEASE, UNSPECIFIED WHETHER ESOPHAGITIS PRESENT: ICD-10-CM

## 2025-08-19 DIAGNOSIS — E78.49 OTHER HYPERLIPIDEMIA: ICD-10-CM

## 2025-08-19 DIAGNOSIS — I74.5 ILIAC ARTERY OCCLUSION: Primary | ICD-10-CM

## 2025-08-19 DIAGNOSIS — F90.9 ATTENTION DEFICIT HYPERACTIVITY DISORDER (ADHD), UNSPECIFIED ADHD TYPE: ICD-10-CM

## 2025-08-19 PROCEDURE — 99999 PR PBB SHADOW E&M-EST. PATIENT-LVL IV: CPT | Mod: PBBFAC,,, | Performed by: FAMILY MEDICINE

## 2025-08-19 RX ORDER — VALACYCLOVIR HYDROCHLORIDE 1 G/1
1000 TABLET, FILM COATED ORAL DAILY
Qty: 90 TABLET | Refills: 3 | Status: SHIPPED | OUTPATIENT
Start: 2025-08-19

## 2025-08-19 RX ORDER — ALBUTEROL SULFATE 90 UG/1
1-2 INHALANT RESPIRATORY (INHALATION) EVERY 4 HOURS PRN
Qty: 18 G | Refills: 3 | Status: SHIPPED | OUTPATIENT
Start: 2025-08-19

## 2025-08-19 RX ORDER — LISDEXAMFETAMINE DIMESYLATE 70 MG/1
70 CAPSULE ORAL EVERY MORNING
Qty: 30 CAPSULE | Refills: 0 | Status: SHIPPED | OUTPATIENT
Start: 2025-08-19

## 2025-08-19 RX ORDER — PANTOPRAZOLE SODIUM 40 MG/1
40 TABLET, DELAYED RELEASE ORAL 2 TIMES DAILY
Qty: 180 TABLET | Refills: 3 | Status: SHIPPED | OUTPATIENT
Start: 2025-08-19

## 2025-08-19 RX ORDER — VALSARTAN 320 MG/1
320 TABLET ORAL DAILY
Qty: 90 TABLET | Refills: 3 | Status: SHIPPED | OUTPATIENT
Start: 2025-08-19

## 2025-08-19 RX ORDER — ATORVASTATIN CALCIUM 40 MG/1
40 TABLET, FILM COATED ORAL DAILY
Qty: 90 TABLET | Refills: 3 | Status: SHIPPED | OUTPATIENT
Start: 2025-08-19

## 2025-08-20 ENCOUNTER — LAB VISIT (OUTPATIENT)
Dept: LAB | Facility: HOSPITAL | Age: 50
End: 2025-08-20
Attending: INTERNAL MEDICINE
Payer: COMMERCIAL

## 2025-08-20 DIAGNOSIS — I82.4Y9 ACUTE DEEP VEIN THROMBOSIS (DVT) OF PROXIMAL VEIN OF LOWER EXTREMITY, UNSPECIFIED LATERALITY: ICD-10-CM

## 2025-08-20 DIAGNOSIS — Z79.01 LONG TERM (CURRENT) USE OF ANTICOAGULANTS: ICD-10-CM

## 2025-08-20 LAB
INR PPP: 5.4 (ref 0.8–1.2)
PROTHROMBIN TIME: 52.7 SECONDS (ref 9–12.5)

## 2025-08-20 PROCEDURE — 36415 COLL VENOUS BLD VENIPUNCTURE: CPT

## 2025-08-20 PROCEDURE — 85610 PROTHROMBIN TIME: CPT

## 2025-08-21 ENCOUNTER — ANTI-COAG VISIT (OUTPATIENT)
Dept: CARDIOLOGY | Facility: CLINIC | Age: 50
End: 2025-08-21
Payer: COMMERCIAL

## 2025-08-21 ENCOUNTER — PATIENT MESSAGE (OUTPATIENT)
Dept: ADMINISTRATIVE | Facility: OTHER | Age: 50
End: 2025-08-21
Payer: COMMERCIAL

## 2025-08-21 DIAGNOSIS — I74.5 ILIAC ARTERY OCCLUSION: ICD-10-CM

## 2025-08-21 DIAGNOSIS — Z79.01 LONG TERM (CURRENT) USE OF ANTICOAGULANTS: Primary | ICD-10-CM

## 2025-08-22 ENCOUNTER — OFFICE VISIT (OUTPATIENT)
Dept: NEUROLOGY | Facility: CLINIC | Age: 50
End: 2025-08-22
Payer: COMMERCIAL

## 2025-08-22 DIAGNOSIS — I67.1 BRAIN ANEURYSM: ICD-10-CM

## 2025-08-22 DIAGNOSIS — I69.319 COGNITIVE DEFICIT, POST-STROKE: Primary | ICD-10-CM

## 2025-08-26 ENCOUNTER — ANTI-COAG VISIT (OUTPATIENT)
Dept: CARDIOLOGY | Facility: CLINIC | Age: 50
End: 2025-08-26
Payer: COMMERCIAL

## 2025-08-26 ENCOUNTER — LAB VISIT (OUTPATIENT)
Dept: LAB | Facility: HOSPITAL | Age: 50
End: 2025-08-26
Attending: INTERNAL MEDICINE
Payer: COMMERCIAL

## 2025-08-26 DIAGNOSIS — I74.5 ILIAC ARTERY OCCLUSION: Primary | ICD-10-CM

## 2025-08-26 DIAGNOSIS — Z79.01 LONG TERM (CURRENT) USE OF ANTICOAGULANTS: ICD-10-CM

## 2025-08-26 PROCEDURE — 93793 ANTICOAG MGMT PT WARFARIN: CPT | Mod: S$GLB,,,

## (undated) DEVICE — KIT SYR REUSABLE

## (undated) DEVICE — CATH ANGIO SOFT VU 5FR 65CM

## (undated) DEVICE — Device

## (undated) DEVICE — KIT INTRODUCER STIFFEN MICRO

## (undated) DEVICE — CONTRAST VISIPAQUE 150ML

## (undated) DEVICE — ANGIOTOUCH KIT

## (undated) DEVICE — SHEATH INTRODUCER 5FR 10CM

## (undated) DEVICE — PACK HEART CATH BR

## (undated) DEVICE — CATH ANGIO DIAG RIM 5X65

## (undated) DEVICE — GUIDEWIRE STD .035X180CM ANG

## (undated) DEVICE — GUIDEWIRE STF .035X260CM ANG

## (undated) DEVICE — KIT MICROINTRO 4F .018X40X7CM